# Patient Record
Sex: FEMALE | Race: WHITE | NOT HISPANIC OR LATINO | Employment: OTHER | URBAN - METROPOLITAN AREA
[De-identification: names, ages, dates, MRNs, and addresses within clinical notes are randomized per-mention and may not be internally consistent; named-entity substitution may affect disease eponyms.]

---

## 2017-12-08 LAB
EXT MICROALBUMIN URINE RANDOM: 7.7
HBA1C MFR BLD HPLC: 7.8 %

## 2018-03-12 LAB — HBA1C MFR BLD HPLC: 8.3 %

## 2018-06-11 LAB
EXT MICROALBUMIN URINE RANDOM: 4.9
HBA1C MFR BLD HPLC: 8.6 %

## 2018-09-05 LAB — HBA1C MFR BLD HPLC: 6.8 %

## 2018-12-20 LAB — HBA1C MFR BLD HPLC: 8.3 %

## 2019-01-24 ENCOUNTER — OFFICE VISIT (OUTPATIENT)
Dept: FAMILY MEDICINE CLINIC | Facility: CLINIC | Age: 59
End: 2019-01-24
Payer: COMMERCIAL

## 2019-01-24 VITALS
SYSTOLIC BLOOD PRESSURE: 110 MMHG | TEMPERATURE: 96.7 F | OXYGEN SATURATION: 97 % | BODY MASS INDEX: 36.18 KG/M2 | HEIGHT: 67 IN | WEIGHT: 230.5 LBS | DIASTOLIC BLOOD PRESSURE: 64 MMHG | HEART RATE: 65 BPM

## 2019-01-24 DIAGNOSIS — E11.9 TYPE 2 DIABETES MELLITUS WITHOUT COMPLICATION, WITH LONG-TERM CURRENT USE OF INSULIN (HCC): Primary | ICD-10-CM

## 2019-01-24 DIAGNOSIS — Z79.4 TYPE 2 DIABETES MELLITUS WITHOUT COMPLICATION, WITH LONG-TERM CURRENT USE OF INSULIN (HCC): Primary | ICD-10-CM

## 2019-01-24 DIAGNOSIS — Z23 NEED FOR INFLUENZA VACCINATION: ICD-10-CM

## 2019-01-24 DIAGNOSIS — Z12.39 SCREENING FOR BREAST CANCER: ICD-10-CM

## 2019-01-24 DIAGNOSIS — E78.5 HYPERLIPIDEMIA LDL GOAL <100: ICD-10-CM

## 2019-01-24 DIAGNOSIS — Z12.11 SCREENING FOR COLON CANCER: ICD-10-CM

## 2019-01-24 PROCEDURE — 99204 OFFICE O/P NEW MOD 45 MIN: CPT | Performed by: FAMILY MEDICINE

## 2019-01-24 PROCEDURE — 3725F SCREEN DEPRESSION PERFORMED: CPT | Performed by: FAMILY MEDICINE

## 2019-01-24 RX ORDER — BLOOD SUGAR DIAGNOSTIC
STRIP MISCELLANEOUS
Refills: 4 | COMMUNITY
Start: 2018-12-09 | End: 2020-07-20

## 2019-01-24 RX ORDER — INSULIN DETEMIR 100 [IU]/ML
INJECTION, SOLUTION SUBCUTANEOUS
Refills: 0 | COMMUNITY
Start: 2018-12-31 | End: 2019-02-05 | Stop reason: SDUPTHER

## 2019-01-24 RX ORDER — SITAGLIPTIN AND METFORMIN HYDROCHLORIDE 100; 1000 MG/1; MG/1
TABLET, FILM COATED, EXTENDED RELEASE ORAL
Refills: 6 | COMMUNITY
Start: 2019-01-09 | End: 2019-02-05 | Stop reason: SDUPTHER

## 2019-01-24 NOTE — ASSESSMENT & PLAN NOTE
The patient is absolutely not worried about her elevated LDL levels  She does not want to take a statin or any other medication to bring the level down  We did discuss that an elevated LDL over 100 in a patient with diabetes increases the risk for heart attack, stroke, kidney failure, among other things  At this point I am going to recheck the lipids in one year from December as there is no point in checking them every three months  It is not recommended to check the lipids every three months and she is not treating them so there is even less reason to do so

## 2019-01-24 NOTE — ASSESSMENT & PLAN NOTE
The patient's diabetes was uncontrolled when the A1c was checked in December  Had previously been well controlled with an A1c of 6 8 but prior to that she had been uncontrolled with A1cs over 8  For now she is continuing the same dosages of her medications and has made more diet changes after the new year  We will recheck the A1c at a visit in March  Her blood pressure is excellent, she does not have hypertension  That being said, she should be on a low dose of lisinopril for renal protection  She does not want to take any other medicines that she absolutely does not have to take  This is something that I discuss with her in the future  Her eye exam and foot exam are up-to-date  We will get the records from her previous physician  I will repeat a microalbumin in June as it will be a year at that point

## 2019-01-24 NOTE — PROGRESS NOTES
Subjective:   Chief Complaint   Patient presents with    New Patient     pt is here due to a change in insurance  She would like to establish care  Pt had just been seen in November at [facility]  Internal Medicine in [place] for her diabetes  Pt has no complaints  Patient ID: Rylan King is a 66 y o  female      The patient is new to the office, here to establish care  She was previously being seen at [facility]  Internal Medicine  She has a diagnosis of type 2 diabetes  She has recently changed insurances and now has [facility] which is why she is changing practice is  She says that her diabetes has been diagnosed since the early 2000s  She is currently on Janumet, regular insulin with meals depending on her sugars prior to the meal, and Levemir  She had been getting full lab work including A1c, CMP, and lipid panel along with a visit and foot exam every three months    She did bring her lab results from the past year  12/8/2017-A1c 7 8  3/12/2018 A1c 8 3  6/11/2018 A1c 8 6  9/5/2018 A1c 6 8  She did have an A1c again in December and she says it was 7 something but she does not remember the exact number and does not have the results printed out with her today    She has also had persistent LDL cholesterol levels  She does not want to take any medication for this  She says that this is not something that she and her previous doctor discussed  She says she did discuss at all with her chiropractor and her chiropractor was not worried about it  -151 since 12/2017  Her TG have been mildly elevated as well - 150-198    Her last microalbumin was in June 2018 and it was normal    She has had an eye exam recently with no retinopathy  Her foot exam is always normal, no neuropathy  Her creatinines in EGFR has always been normal, no nephropathy    She does work very hard to keep a diabetic diet          The following portions of the patient's history were reviewed and updated as appropriate: allergies, current medications, past family history, past medical history, past social history, past surgical history and problem list     Review of Systems      Objective:  Vitals:    01/24/19 0856   BP: 110/64   Pulse: 65   Temp: (!) 96 7 °F (35 9 °C)   SpO2: 97%   Weight: 105 kg (230 lb 8 oz)   Height: 5' 7 25" (1 708 m)      Physical Exam   Constitutional: She is oriented to person, place, and time  She appears well-developed and well-nourished  HENT:   Head: Normocephalic and atraumatic  Mouth/Throat: Oropharynx is clear and moist    Eyes: Conjunctivae and EOM are normal    Neck: Normal range of motion  Neck supple  No thyroid mass present  Cardiovascular: Normal rate, regular rhythm and normal heart sounds  Pulmonary/Chest: Effort normal and breath sounds normal  No respiratory distress  She has no wheezes  She has no rales  Abdominal: Normal appearance  Musculoskeletal: Normal range of motion  She exhibits no edema  Neurological: She is alert and oriented to person, place, and time  Skin: Skin is warm, dry and intact  Psychiatric: She has a normal mood and affect  Her behavior is normal  Judgment and thought content normal    Nursing note and vitals reviewed  Assessment/Plan:    Type 2 diabetes mellitus without complication, with long-term current use of insulin ([facility] [address] )  The patient's diabetes was uncontrolled when the A1c was checked in December  Had previously been well controlled with an A1c of 6 8 but prior to that she had been uncontrolled with A1cs over 8  For now she is continuing the same dosages of her medications and has made more diet changes after the new year  We will recheck the A1c at a visit in March  Her blood pressure is excellent, she does not have hypertension  That being said, she should be on a low dose of lisinopril for renal protection  She does not want to take any other medicines that she absolutely does not have to take    This is something that I discuss with her in the future  Her eye exam and foot exam are up-to-date  We will get the records from her previous physician  I will repeat a microalbumin in June as it will be a year at that point  Hyperlipidemia LDL goal <100  The patient is absolutely not worried about her elevated LDL levels  She does not want to take a statin or any other medication to bring the level down  We did discuss that an elevated LDL over 100 in a patient with diabetes increases the risk for heart attack, stroke, kidney failure, among other things  At this point I am going to recheck the lipids in one year from December as there is no point in checking them every three months  It is not recommended to check the lipids every three months and she is not treating them so there is even less reason to do so  Diagnoses and all orders for this visit:    Type 2 diabetes mellitus without complication, with long-term current use of insulin (HCC)  -     Comprehensive metabolic panel; Future  -     Hemoglobin A1c (w/out EAG);  Future    Hyperlipidemia LDL goal <100    Need for influenza vaccination  -     SYRINGE/SINGLE-DOSE VIAL: influenza vaccine, [id number], quadrivalent, 0 5 mL, preservative-free, for patients 3+ yr ([facility])    Screening for colon cancer  -     Occult Blood, Fecal, IA; Future  -     Occult Blood, Fecal, IA    Screening for breast cancer  -     Mammo screening bilateral w 3d & cad; Future

## 2019-02-04 RX ORDER — SITAGLIPTIN AND METFORMIN HYDROCHLORIDE 100; 1000 MG/1; MG/1
TABLET, FILM COATED, EXTENDED RELEASE ORAL
Refills: 6 | Status: CANCELLED | OUTPATIENT
Start: 2019-02-04

## 2019-02-04 RX ORDER — INSULIN DETEMIR 100 [IU]/ML
INJECTION, SOLUTION SUBCUTANEOUS
Qty: 5 PEN | Refills: 0 | Status: CANCELLED | OUTPATIENT
Start: 2019-02-04

## 2019-02-05 ENCOUNTER — TELEPHONE (OUTPATIENT)
Dept: FAMILY MEDICINE CLINIC | Facility: CLINIC | Age: 59
End: 2019-02-05

## 2019-02-05 DIAGNOSIS — Z79.4 CONTROLLED TYPE 2 DIABETES MELLITUS WITHOUT COMPLICATION, WITH LONG-TERM CURRENT USE OF INSULIN (HCC): Primary | ICD-10-CM

## 2019-02-05 DIAGNOSIS — Z79.4 TYPE 2 DIABETES MELLITUS WITHOUT COMPLICATION, WITH LONG-TERM CURRENT USE OF INSULIN (HCC): Primary | ICD-10-CM

## 2019-02-05 DIAGNOSIS — E11.9 TYPE 2 DIABETES MELLITUS WITHOUT COMPLICATION, WITH LONG-TERM CURRENT USE OF INSULIN (HCC): Primary | ICD-10-CM

## 2019-02-05 DIAGNOSIS — E11.9 CONTROLLED TYPE 2 DIABETES MELLITUS WITHOUT COMPLICATION, WITH LONG-TERM CURRENT USE OF INSULIN (HCC): Primary | ICD-10-CM

## 2019-02-05 RX ORDER — METFORMIN HYDROCHLORIDE 500 MG/1
1000 TABLET, EXTENDED RELEASE ORAL
Qty: 180 TABLET | Refills: 3 | Status: SHIPPED | OUTPATIENT
Start: 2019-02-05 | End: 2020-02-03

## 2019-02-05 RX ORDER — SITAGLIPTIN AND METFORMIN HYDROCHLORIDE 100; 1000 MG/1; MG/1
1 TABLET, FILM COATED, EXTENDED RELEASE ORAL DAILY
Qty: 90 TABLET | Refills: 3 | Status: SHIPPED | OUTPATIENT
Start: 2019-02-05 | End: 2019-07-09 | Stop reason: ALTCHOICE

## 2019-02-05 RX ORDER — INSULIN DETEMIR 100 [IU]/ML
40 INJECTION, SOLUTION SUBCUTANEOUS DAILY
Qty: 5 PEN | Refills: 5 | Status: SHIPPED | OUTPATIENT
Start: 2019-02-05 | End: 2019-04-16

## 2019-02-05 NOTE — TELEPHONE ENCOUNTER
Patient's Janumet under prior auth currently (pending - submitted via cover my meds)  Please send an alternative to [facility] for patient so that she has medication  They did approve her levemir so she does have insulin  Thanks

## 2019-02-05 NOTE — TELEPHONE ENCOUNTER
Patient needing refills on her Levemir and Janumet today as she is leaving on a trip tomorrow  She would like them sent to [facility]  Please advise, thanks

## 2019-03-15 DIAGNOSIS — Z79.4 TYPE 2 DIABETES MELLITUS WITHOUT COMPLICATION, WITH LONG-TERM CURRENT USE OF INSULIN (HCC): Primary | ICD-10-CM

## 2019-03-15 DIAGNOSIS — E11.9 TYPE 2 DIABETES MELLITUS WITHOUT COMPLICATION, WITH LONG-TERM CURRENT USE OF INSULIN (HCC): Primary | ICD-10-CM

## 2019-03-15 NOTE — PROGRESS NOTES
Please call and let the patient know that I received a notification from her pharmacy that with the insurance she currently has Levemir will not be covered  Jonh Dunne is the medication that will be covered so I sent it  It should be similar

## 2019-03-18 ENCOUNTER — TELEPHONE (OUTPATIENT)
Dept: FAMILY MEDICINE CLINIC | Facility: CLINIC | Age: 59
End: 2019-03-18

## 2019-04-11 DIAGNOSIS — Z11.59 ENCOUNTER FOR HEPATITIS C SCREENING TEST FOR LOW RISK PATIENT: Primary | ICD-10-CM

## 2019-04-16 ENCOUNTER — TELEPHONE (OUTPATIENT)
Dept: FAMILY MEDICINE CLINIC | Facility: CLINIC | Age: 59
End: 2019-04-16

## 2019-04-16 DIAGNOSIS — Z79.4 TYPE 2 DIABETES MELLITUS WITHOUT COMPLICATION, WITH LONG-TERM CURRENT USE OF INSULIN (HCC): ICD-10-CM

## 2019-04-16 DIAGNOSIS — E11.9 TYPE 2 DIABETES MELLITUS WITHOUT COMPLICATION, WITH LONG-TERM CURRENT USE OF INSULIN (HCC): ICD-10-CM

## 2019-04-16 DIAGNOSIS — E11.9 TYPE 2 DIABETES MELLITUS WITHOUT COMPLICATION, WITH LONG-TERM CURRENT USE OF INSULIN (HCC): Primary | ICD-10-CM

## 2019-04-16 DIAGNOSIS — Z79.4 TYPE 2 DIABETES MELLITUS WITHOUT COMPLICATION, WITH LONG-TERM CURRENT USE OF INSULIN (HCC): Primary | ICD-10-CM

## 2019-04-17 ENCOUNTER — TELEPHONE (OUTPATIENT)
Dept: FAMILY MEDICINE CLINIC | Facility: CLINIC | Age: 59
End: 2019-04-17

## 2019-04-18 DIAGNOSIS — Z79.4 TYPE 2 DIABETES MELLITUS WITHOUT COMPLICATION, WITH LONG-TERM CURRENT USE OF INSULIN (HCC): Primary | ICD-10-CM

## 2019-04-18 DIAGNOSIS — E11.9 TYPE 2 DIABETES MELLITUS WITHOUT COMPLICATION, WITH LONG-TERM CURRENT USE OF INSULIN (HCC): Primary | ICD-10-CM

## 2019-04-22 ENCOUNTER — OFFICE VISIT (OUTPATIENT)
Dept: FAMILY MEDICINE CLINIC | Facility: CLINIC | Age: 59
End: 2019-04-22
Payer: COMMERCIAL

## 2019-04-22 VITALS
HEART RATE: 67 BPM | HEIGHT: 67 IN | TEMPERATURE: 97.4 F | OXYGEN SATURATION: 98 % | DIASTOLIC BLOOD PRESSURE: 78 MMHG | SYSTOLIC BLOOD PRESSURE: 110 MMHG | WEIGHT: 231.5 LBS | BODY MASS INDEX: 36.34 KG/M2

## 2019-04-22 DIAGNOSIS — Z79.4 TYPE 2 DIABETES MELLITUS WITHOUT COMPLICATION, WITH LONG-TERM CURRENT USE OF INSULIN (HCC): Primary | ICD-10-CM

## 2019-04-22 DIAGNOSIS — F43.12 CHRONIC POST-TRAUMATIC STRESS DISORDER (PTSD): ICD-10-CM

## 2019-04-22 DIAGNOSIS — Z13.29 SCREENING FOR THYROID DISORDER: ICD-10-CM

## 2019-04-22 DIAGNOSIS — E78.5 HYPERLIPIDEMIA LDL GOAL <100: ICD-10-CM

## 2019-04-22 DIAGNOSIS — Z13.0 SCREENING, ANEMIA, DEFICIENCY, IRON: ICD-10-CM

## 2019-04-22 DIAGNOSIS — E66.9 OBESITY (BMI 35.0-39.9 WITHOUT COMORBIDITY): ICD-10-CM

## 2019-04-22 DIAGNOSIS — E11.9 TYPE 2 DIABETES MELLITUS WITHOUT COMPLICATION, WITH LONG-TERM CURRENT USE OF INSULIN (HCC): Primary | ICD-10-CM

## 2019-04-22 DIAGNOSIS — Z13.220 SCREENING FOR LIPID DISORDERS: ICD-10-CM

## 2019-04-22 PROCEDURE — 99214 OFFICE O/P EST MOD 30 MIN: CPT | Performed by: FAMILY MEDICINE

## 2019-04-22 PROCEDURE — 1036F TOBACCO NON-USER: CPT | Performed by: FAMILY MEDICINE

## 2019-04-22 PROCEDURE — 3008F BODY MASS INDEX DOCD: CPT | Performed by: FAMILY MEDICINE

## 2019-07-02 ENCOUNTER — CLINICAL SUPPORT (OUTPATIENT)
Dept: FAMILY MEDICINE CLINIC | Facility: CLINIC | Age: 59
End: 2019-07-02
Payer: COMMERCIAL

## 2019-07-02 DIAGNOSIS — Z11.59 ENCOUNTER FOR HEPATITIS C SCREENING TEST FOR LOW RISK PATIENT: ICD-10-CM

## 2019-07-02 DIAGNOSIS — Z79.4 TYPE 2 DIABETES MELLITUS WITHOUT COMPLICATION, WITH LONG-TERM CURRENT USE OF INSULIN (HCC): Primary | ICD-10-CM

## 2019-07-02 DIAGNOSIS — E11.9 TYPE 2 DIABETES MELLITUS WITHOUT COMPLICATION, WITH LONG-TERM CURRENT USE OF INSULIN (HCC): ICD-10-CM

## 2019-07-02 DIAGNOSIS — Z13.0 SCREENING, ANEMIA, DEFICIENCY, IRON: ICD-10-CM

## 2019-07-02 DIAGNOSIS — Z79.4 TYPE 2 DIABETES MELLITUS WITHOUT COMPLICATION, WITH LONG-TERM CURRENT USE OF INSULIN (HCC): ICD-10-CM

## 2019-07-02 DIAGNOSIS — E78.5 HYPERLIPIDEMIA LDL GOAL <100: Primary | ICD-10-CM

## 2019-07-02 DIAGNOSIS — E11.9 TYPE 2 DIABETES MELLITUS WITHOUT COMPLICATION, WITH LONG-TERM CURRENT USE OF INSULIN (HCC): Primary | ICD-10-CM

## 2019-07-02 DIAGNOSIS — Z13.29 SCREENING FOR THYROID DISORDER: ICD-10-CM

## 2019-07-02 PROCEDURE — 36415 COLL VENOUS BLD VENIPUNCTURE: CPT

## 2019-07-02 RX ORDER — PEN NEEDLE, DIABETIC 31 GX5/16"
NEEDLE, DISPOSABLE MISCELLANEOUS
Qty: 100 EACH | Refills: 5 | Status: SHIPPED | OUTPATIENT
Start: 2019-07-02 | End: 2019-10-07 | Stop reason: SDUPTHER

## 2019-07-03 LAB
ALBUMIN SERPL-MCNC: 4.3 G/DL (ref 3.5–5.5)
ALBUMIN/GLOB SERPL: 1.6 {RATIO} (ref 1.2–2.2)
ALP SERPL-CCNC: 58 IU/L (ref 39–117)
ALT SERPL-CCNC: 14 IU/L (ref 0–32)
AST SERPL-CCNC: 15 IU/L (ref 0–40)
BASOPHILS # BLD AUTO: 0.1 X10E3/UL (ref 0–0.2)
BASOPHILS NFR BLD AUTO: 1 %
BILIRUB SERPL-MCNC: 0.4 MG/DL (ref 0–1.2)
BUN SERPL-MCNC: 10 MG/DL (ref 6–24)
BUN/CREAT SERPL: 13 (ref 9–23)
CALCIUM SERPL-MCNC: 9.6 MG/DL (ref 8.7–10.2)
CHLORIDE SERPL-SCNC: 106 MMOL/L (ref 96–106)
CHOLEST SERPL-MCNC: 233 MG/DL (ref 100–199)
CO2 SERPL-SCNC: 24 MMOL/L (ref 20–29)
CREAT SERPL-MCNC: 0.78 MG/DL (ref 0.57–1)
EOSINOPHIL # BLD AUTO: 0.2 X10E3/UL (ref 0–0.4)
EOSINOPHIL NFR BLD AUTO: 3 %
ERYTHROCYTE [DISTWIDTH] IN BLOOD BY AUTOMATED COUNT: 13.8 % (ref 12.3–15.4)
EST. AVERAGE GLUCOSE BLD GHB EST-MCNC: 203 MG/DL
GLOBULIN SER-MCNC: 2.7 G/DL (ref 1.5–4.5)
GLUCOSE SERPL-MCNC: 173 MG/DL (ref 65–99)
HBA1C MFR BLD: 8.7 % (ref 4.8–5.6)
HCT VFR BLD AUTO: 40.5 % (ref 34–46.6)
HCV AB S/CO SERPL IA: <0.1 S/CO RATIO (ref 0–0.9)
HDLC SERPL-MCNC: 57 MG/DL
HGB BLD-MCNC: 13.7 G/DL (ref 11.1–15.9)
IMM GRANULOCYTES # BLD: 0 X10E3/UL (ref 0–0.1)
IMM GRANULOCYTES NFR BLD: 1 %
LDLC SERPL CALC-MCNC: 149 MG/DL (ref 0–99)
LDLC/HDLC SERPL: 2.6 RATIO (ref 0–3.2)
LYMPHOCYTES # BLD AUTO: 2.2 X10E3/UL (ref 0.7–3.1)
LYMPHOCYTES NFR BLD AUTO: 39 %
MCH RBC QN AUTO: 28.6 PG (ref 26.6–33)
MCHC RBC AUTO-ENTMCNC: 33.8 G/DL (ref 31.5–35.7)
MCV RBC AUTO: 85 FL (ref 79–97)
MONOCYTES # BLD AUTO: 0.3 X10E3/UL (ref 0.1–0.9)
MONOCYTES NFR BLD AUTO: 6 %
NEUTROPHILS # BLD AUTO: 2.9 X10E3/UL (ref 1.4–7)
NEUTROPHILS NFR BLD AUTO: 50 %
PLATELET # BLD AUTO: 188 X10E3/UL (ref 150–450)
POTASSIUM SERPL-SCNC: 4.2 MMOL/L (ref 3.5–5.2)
PROT SERPL-MCNC: 7 G/DL (ref 6–8.5)
RBC # BLD AUTO: 4.79 X10E6/UL (ref 3.77–5.28)
SL AMB EGFR AFRICAN AMERICAN: 98 ML/MIN/1.73
SL AMB EGFR NON AFRICAN AMERICAN: 85 ML/MIN/1.73
SL AMB VLDL CHOLESTEROL CALC: 27 MG/DL (ref 5–40)
SODIUM SERPL-SCNC: 141 MMOL/L (ref 134–144)
TRIGL SERPL-MCNC: 133 MG/DL (ref 0–149)
TSH SERPL DL<=0.005 MIU/L-ACNC: 1.24 UIU/ML (ref 0.45–4.5)
WBC # BLD AUTO: 5.7 X10E3/UL (ref 3.4–10.8)

## 2019-07-09 ENCOUNTER — OFFICE VISIT (OUTPATIENT)
Dept: FAMILY MEDICINE CLINIC | Facility: CLINIC | Age: 59
End: 2019-07-09
Payer: COMMERCIAL

## 2019-07-09 VITALS
SYSTOLIC BLOOD PRESSURE: 112 MMHG | TEMPERATURE: 97.9 F | WEIGHT: 236.25 LBS | HEIGHT: 67 IN | DIASTOLIC BLOOD PRESSURE: 62 MMHG | HEART RATE: 75 BPM | OXYGEN SATURATION: 97 % | BODY MASS INDEX: 37.08 KG/M2

## 2019-07-09 DIAGNOSIS — Z01.419 ENCOUNTER FOR GYNECOLOGICAL EXAMINATION WITHOUT ABNORMAL FINDING: ICD-10-CM

## 2019-07-09 DIAGNOSIS — E11.9 TYPE 2 DIABETES MELLITUS WITHOUT COMPLICATION, WITH LONG-TERM CURRENT USE OF INSULIN (HCC): Primary | ICD-10-CM

## 2019-07-09 DIAGNOSIS — E78.5 HYPERLIPIDEMIA LDL GOAL <100: ICD-10-CM

## 2019-07-09 DIAGNOSIS — Z79.4 TYPE 2 DIABETES MELLITUS WITHOUT COMPLICATION, WITH LONG-TERM CURRENT USE OF INSULIN (HCC): Primary | ICD-10-CM

## 2019-07-09 DIAGNOSIS — R39.89 URINE DISCOLORATION: ICD-10-CM

## 2019-07-09 DIAGNOSIS — L30.9 DERMATITIS OF RIGHT FOOT: ICD-10-CM

## 2019-07-09 DIAGNOSIS — Z12.11 SCREEN FOR COLON CANCER: ICD-10-CM

## 2019-07-09 DIAGNOSIS — Z12.4 SCREENING FOR CERVICAL CANCER: ICD-10-CM

## 2019-07-09 PROCEDURE — 3008F BODY MASS INDEX DOCD: CPT | Performed by: FAMILY MEDICINE

## 2019-07-09 PROCEDURE — 1036F TOBACCO NON-USER: CPT | Performed by: FAMILY MEDICINE

## 2019-07-09 PROCEDURE — 99396 PREV VISIT EST AGE 40-64: CPT | Performed by: FAMILY MEDICINE

## 2019-07-09 PROCEDURE — 99214 OFFICE O/P EST MOD 30 MIN: CPT | Performed by: FAMILY MEDICINE

## 2019-07-09 RX ORDER — KETOCONAZOLE 20 MG/G
CREAM TOPICAL DAILY
Qty: 60 G | Refills: 0 | Status: SHIPPED | OUTPATIENT
Start: 2019-07-09 | End: 2020-12-03

## 2019-07-09 NOTE — PROGRESS NOTES
Yrn Coreas is a 66 y o  female here for a routine gynecologic exam       Current complaints:   None  She has some dryness - but uses something over the counter and it is helping  Gynecologic History  No LMP recorded  Patient is postmenopausal   Contraception: none  Last Pap: year ago  Results were: normal  Last mammogram: years ago   Results were: normal  Last colonoscopy: never  Results were: she was given a FIT kit months ago but has not done it    Obstetric History  OB History        The following portions of the patient's history were reviewed and updated as appropriate: allergies, current medications, past family history, past medical history, past social history, past surgical history and problem list     Review of Systems   Constitutional: Negative for unexpected weight change  Genitourinary: Negative for difficulty urinating, dyspareunia, dysuria, frequency, menstrual problem, pelvic pain, vaginal bleeding, vaginal discharge and vaginal pain  Skin: Negative for rash  Objective    Vitals:    07/09/19 1051   BP: 112/62   Pulse: 75   Temp: 97 9 °F (36 6 °C)   SpO2: 97%   Weight: 107 kg (236 lb 4 oz)   Height: 5' 7 25" (1 708 m)       Physical Exam   Constitutional: She appears well-developed and well-nourished  Pulmonary/Chest: Effort normal  Right breast exhibits no mass, no nipple discharge, no skin change and no tenderness  Left breast exhibits no mass, no nipple discharge, no skin change and no tenderness  No breast swelling, discharge or bleeding  Breasts are symmetrical    Genitourinary: Vagina normal and uterus normal  No breast swelling, discharge or bleeding  Pelvic exam was performed with patient supine  There is no rash, tenderness or lesion on the right labia  There is no rash, tenderness or lesion on the left labia  Cervix exhibits no motion tenderness, no discharge and no friability  Right adnexum displays no mass, no tenderness and no fullness   Left adnexum displays no mass, no tenderness and no fullness  Assessment     Healthy female exam       Plan     Education reviewed: low fat, low cholesterol diet and skin cancer screening  Follow up in: 3 months

## 2019-07-09 NOTE — ASSESSMENT & PLAN NOTE
The patient's diabetes is uncontrolled  It is difficult to treat her in the sense that she feels she understands everything completely but really does not seem to thoroughly understand diabetes  She admits she likes to [facility] but she is not doing this very well  I suggested she take 6-8 units of insulin before each meal regardless of what her sugar is and continue the current dose of Basaglar  I am going to refer her to endocrinology because I think she would benefit significantly from a continuous glucose monitor  She needs to see that her sugars are high because she is absolutely surprised about her A1c today

## 2019-07-09 NOTE — ASSESSMENT & PLAN NOTE
The patient's LDL is 149 it should be less than 100  It has been lower in the past and I would think that as her sugars come down this would come down as well though I do not think she is ever going to get less than 100 without medication  She absolutely refuses to take a statin

## 2019-07-10 LAB
ALBUMIN/CREAT UR: 12.5 MG/G CREAT (ref 0–30)
APPEARANCE UR: ABNORMAL
BILIRUB UR QL STRIP: NEGATIVE
COLOR UR: YELLOW
CREAT UR-MCNC: 227.2 MG/DL
GLUCOSE UR QL: ABNORMAL
HGB UR QL STRIP: NEGATIVE
KETONES UR QL STRIP: NEGATIVE
LEUKOCYTE ESTERASE UR QL STRIP: NEGATIVE
MICRO URNS: ABNORMAL
MICROALBUMIN UR-MCNC: 28.5 UG/ML
NITRITE UR QL STRIP: NEGATIVE
PH UR STRIP: 5 [PH] (ref 5–7.5)
PROT UR QL STRIP: NEGATIVE
SP GR UR: >=1.03 (ref 1–1.03)
UROBILINOGEN UR STRIP-ACNC: 0.2 MG/DL (ref 0.2–1)

## 2019-07-11 LAB
CYTOLOGIST CVX/VAG CYTO: NORMAL
DX ICD CODE: NORMAL
HPV I/H RISK 1 DNA CVX QL PROBE+SIG AMP: NEGATIVE
OTHER STN SPEC: NORMAL
PATH REPORT.FINAL DX SPEC: NORMAL
SL AMB NOTE:: NORMAL
SL AMB SPECIMEN ADEQUACY: NORMAL

## 2019-08-20 DIAGNOSIS — E11.9 TYPE 2 DIABETES MELLITUS WITHOUT COMPLICATION, WITH LONG-TERM CURRENT USE OF INSULIN (HCC): ICD-10-CM

## 2019-08-20 DIAGNOSIS — Z79.4 TYPE 2 DIABETES MELLITUS WITHOUT COMPLICATION, WITH LONG-TERM CURRENT USE OF INSULIN (HCC): ICD-10-CM

## 2019-10-07 ENCOUNTER — OFFICE VISIT (OUTPATIENT)
Dept: FAMILY MEDICINE CLINIC | Facility: CLINIC | Age: 59
End: 2019-10-07
Payer: COMMERCIAL

## 2019-10-07 VITALS
BODY MASS INDEX: 38.59 KG/M2 | OXYGEN SATURATION: 98 % | TEMPERATURE: 98.1 F | DIASTOLIC BLOOD PRESSURE: 80 MMHG | WEIGHT: 248.25 LBS | SYSTOLIC BLOOD PRESSURE: 119 MMHG | HEART RATE: 57 BPM

## 2019-10-07 DIAGNOSIS — Z79.4 TYPE 2 DIABETES MELLITUS WITHOUT COMPLICATION, WITH LONG-TERM CURRENT USE OF INSULIN (HCC): ICD-10-CM

## 2019-10-07 DIAGNOSIS — Z23 NEED FOR INFLUENZA VACCINATION: ICD-10-CM

## 2019-10-07 DIAGNOSIS — B37.3 VAGINAL YEAST INFECTION: Primary | ICD-10-CM

## 2019-10-07 DIAGNOSIS — E11.9 TYPE 2 DIABETES MELLITUS WITHOUT COMPLICATION, WITH LONG-TERM CURRENT USE OF INSULIN (HCC): ICD-10-CM

## 2019-10-07 LAB — SL AMB POCT HEMOGLOBIN AIC: 8.8 (ref ?–6.5)

## 2019-10-07 PROCEDURE — 99214 OFFICE O/P EST MOD 30 MIN: CPT | Performed by: FAMILY MEDICINE

## 2019-10-07 PROCEDURE — 83036 HEMOGLOBIN GLYCOSYLATED A1C: CPT | Performed by: FAMILY MEDICINE

## 2019-10-07 RX ORDER — FLUCONAZOLE 150 MG/1
150 TABLET ORAL WEEKLY
Qty: 3 TABLET | Refills: 0 | Status: SHIPPED | OUTPATIENT
Start: 2019-10-07 | End: 2019-10-22

## 2019-10-07 RX ORDER — LANCETS
EACH MISCELLANEOUS
Qty: 100 EACH | Refills: 5 | Status: SHIPPED | OUTPATIENT
Start: 2019-10-07 | End: 2020-07-20

## 2019-10-07 RX ORDER — BLOOD-GLUCOSE METER
EACH MISCELLANEOUS 4 TIMES DAILY
Qty: 1 KIT | Refills: 0 | Status: SHIPPED | OUTPATIENT
Start: 2019-10-07 | End: 2020-07-20

## 2019-10-07 NOTE — PATIENT INSTRUCTIONS
Vulvovaginal Candidiasis   WHAT YOU NEED TO KNOW:   Vulvovaginal candidiasis, or yeast infection, is a common vaginal infection  Vulvovaginal candidiasis is caused by a fungus, or yeast-like germ  Fungi are normally found in your vagina  When there are too many fungi, it can cause an infection  DISCHARGE INSTRUCTIONS:   Return to the emergency department if:   · You have fever and chills  · You are bleeding from your vagina and it is not your monthly period  · You develop abdominal or pelvic pain  Contact your healthcare provider if:   · Your signs and symptoms get worse, even after treatment  · You have questions or concerns about your condition or care  Medicines:   · Medicines  help treat the fungal infection and decrease inflammation  The medicine may be a pill, topical cream, or vaginal suppository  · Take your medicine as directed  Contact your healthcare provider if you think your medicine is not helping or if you have side effects  Tell him of her if you are allergic to any medicine  Keep a list of the medicines, vitamins, and herbs you take  Include the amounts, and when and why you take them  Bring the list or the pill bottles to follow-up visits  Carry your medicine list with you in case of an emergency  Manage your symptoms:   · Wear cotton underwear  · Keep the vaginal area clean and dry  · Do not have sex until your symptoms are gone  · Do not douche  · Do not use feminine hygiene sprays, powders, or bubble bath  Prevent another infection:   · Take showers instead of baths  · Eat yogurt  · Limit the amount of alcohol you drink'    · Control your blood sugar if you are diabetic  · Limit your time in hot tubs  Follow up with your healthcare provider as directed:  Write down your questions so you remember to ask them during your visits     © 2017 [employer]ios Information is for End User's use only and may not be sold, redistributed or otherwise used for commercial purposes  All illustrations and images included in CareNotes® are the copyrighted property of A D A M , Inc  or Amilcar Herrera  The above information is an  only  It is not intended as medical advice for individual conditions or treatments  Talk to your doctor, nurse or pharmacist before following any medical regimen to see if it is safe and effective for you

## 2019-10-07 NOTE — PROGRESS NOTES
Subjective:   Chief Complaint   Patient presents with    Vaginitis     pt here today with itching  Pt states she does have discharge, but it does not have a smell and it is a light discharge  Pt started with ears itching about a week ago   Medication Refill     pt is wondering if she can have a new test kit  FBW complete 7/2/19  Last A1C was 8 7 on 7/2/19  Pt states she had a thermography in place of her mammogram  Pt went to [facility] for her DM eye exam  Pt defers flu shot  A1C was 8 8 today  Cologuard will be sent  Patient ID: Nader Parekh is a 64 y o  female      The patient is here today complaining of a very itchy vaginal discharge  She has had trouble with discomfort in this area, which she felt was due to vaginal dryness, but over the weekend things got much worse  She is unable to wear any pants that or even remotely tight without itching a lot  She does have discharge though it is not necessarily thick and white  She has definitely had yeast infections in the past  This feels very much like a yeast infection to her  She also has not yet been able to get in see endocrinology  She had an appointment coming up that she made many months ago and was just called advised that it needed to be moved to January  She is very upset about this  She knows that her sugars are high   We want her to to a continuous glucose monitor to help her get more control of the diabetes  She has continued to gain weight on the insulin  Her A1c today is 8 8 wears three months ago was 8 7        The following portions of the patient's history were reviewed and updated as appropriate: allergies, current medications, past family history, past medical history, past social history, past surgical history and problem list     Review of Systems      Objective:  Vitals:    10/07/19 1048   BP: 119/80   Pulse: 57   Temp: 98 1 °F (36 7 °C)   SpO2: 98%   Weight: 113 kg (248 lb 4 oz)      Physical Exam   Constitutional: She is oriented to person, place, and time  She appears well-developed and well-nourished  No distress  Neurological: She is alert and oriented to person, place, and time  No cranial nerve deficit  Coordination normal    Skin: Skin is warm and dry  No rash noted  She is not diaphoretic  No erythema  Psychiatric: She has a normal mood and affect  Her behavior is normal  Judgment and thought content normal          Assessment/Plan:    Type 2 diabetes mellitus without complication, with long-term current use of insulin ([facility] [address] )  I am going to add Victoza at this time  She is already good with giving herself an injection and this would hopefully help her lose some weight as well  She will continue her current doses of insulin for now  She will continue metformin 1000 mg  She has taken 2000 mg in the past but has significant GI upset when she does so  I am going to reach out to endocrinology to see if we can get her set up with her continuous glucose monitor prior to her actual visit so we can get her controlled sooner rather than later  She was reminded she needs her diabetic eye exam     Lab Results   Component Value Date    HGBA1C 8 8 (A) 10/07/2019        Diagnoses and all orders for this visit:    Vaginal yeast infection  -     fluconazole (DIFLUCAN) 150 mg tablet; Take 1 tablet (150 mg total) by mouth once a week for 3 doses    Type 2 diabetes mellitus without complication, with long-term current use of insulin (HCC)  -     liraglutide (VICTOZA) injection; Inject 0 2 mL (1 2 mg total) under the skin daily Start with 0 6mg daily for 1 week  -     Blood Glucose Monitoring Suppl (ACCU-CHEK GUIDE) w/Device KIT; by Does not apply route 4 (four) times a day  -     glucose blood (ACCU-CHEK GUIDE) test strip; Use as instructed  -     Lancets (ACCU-CHEK MULTICLIX) lancets; Use as instructed  -     Insulin Pen Needle (B-D ULTRAFINE III SHORT PEN) 31G X 8 MM MISC;  Inject under the skin 5 (five) times a day  -     POCT hemoglobin A1c    Need for influenza vaccination  -     influenza vaccine, [id number], quadrivalent, recombinant, PF, 0 5 mL, for patients 18 yr+ (FLUBLOK)

## 2019-10-07 NOTE — ASSESSMENT & PLAN NOTE
I am going to add Victoza at this time  She is already good with giving herself an injection and this would hopefully help her lose some weight as well  She will continue her current doses of insulin for now  She will continue metformin 1000 mg  She has taken 2000 mg in the past but has significant GI upset when she does so  I am going to reach out to endocrinology to see if we can get her set up with her continuous glucose monitor prior to her actual visit so we can get her controlled sooner rather than later    She was reminded she needs her diabetic eye exam     Lab Results   Component Value Date    HGBA1C 8 8 (A) 10/07/2019

## 2019-10-23 ENCOUNTER — CONSULT (OUTPATIENT)
Dept: ENDOCRINOLOGY | Facility: HOSPITAL | Age: 59
End: 2019-10-23
Attending: FAMILY MEDICINE
Payer: COMMERCIAL

## 2019-10-23 VITALS
WEIGHT: 245 LBS | HEIGHT: 67 IN | HEART RATE: 68 BPM | BODY MASS INDEX: 38.45 KG/M2 | DIASTOLIC BLOOD PRESSURE: 76 MMHG | SYSTOLIC BLOOD PRESSURE: 114 MMHG

## 2019-10-23 DIAGNOSIS — E11.9 TYPE 2 DIABETES MELLITUS WITHOUT COMPLICATION, WITH LONG-TERM CURRENT USE OF INSULIN (HCC): ICD-10-CM

## 2019-10-23 DIAGNOSIS — E78.5 HYPERLIPIDEMIA LDL GOAL <100: Primary | ICD-10-CM

## 2019-10-23 DIAGNOSIS — Z79.4 TYPE 2 DIABETES MELLITUS WITHOUT COMPLICATION, WITH LONG-TERM CURRENT USE OF INSULIN (HCC): ICD-10-CM

## 2019-10-23 PROCEDURE — 99244 OFF/OP CNSLTJ NEW/EST MOD 40: CPT | Performed by: INTERNAL MEDICINE

## 2019-10-23 NOTE — PROGRESS NOTES
10/23/2019    Assessment/Plan      Diagnoses and all orders for this visit:    Hyperlipidemia LDL goal <100  -     Lipid Panel with Direct LDL reflex; Future  -     Lipid Panel with Direct LDL reflex    Type 2 diabetes mellitus without complication, with long-term current use of insulin ([facility] )  -     Ambulatory referral to Endocrinology  -     Hemoglobin A1C; Future  -     Comprehensive metabolic panel; Future  -     CBC and differential; Future  -     Microalbumin / creatinine urine ratio; Future  -     TSH, 3rd generation; Future  -     Hemoglobin A1C  -     Comprehensive metabolic panel  -     CBC and differential  -     Microalbumin / creatinine urine ratio  -     TSH, 3rd generation    Other orders  -     CRANBERRY PO; Take by mouth        Assessment/Plan:  1  Type 2 diabetes with long-term insulin use:  She does frustration in terms of weight gain while on insulin  I think starting Victoza was a good idea  I do not see a history of pancreatitis in her records  We discussed the risks and side effects look out for 1 year increasing her dose  I will increase that dose to 1 2 mg daily  I provided her a log sheet that she will send in blood sugars for review in about 1 week and we will see if we can adjust her insulin doses over time as well  Continue metformin as well right now  Follow-up in about 3 months with labs as ordered above just prior  2  Hyperlipidemia:  Check lipid panel before next appointment  CC: Diabetes Consult    History of Present Illness     HPI: Theophilus Soulier is a 64y o  year old female with type 2 diabetes for about 17 years  She is on oral agents and insulin at home and takes metformin XR 1000 mg daily with dinner, Victoza 0 6 mg daily, Basaglar 40 units daily as well as NovoLog  In the past, metformin dose was attempted to be increased but this caused gastrointestinal issues  She denies any polyuria, polydipsia, nocturia and blurry vision    She denies neuropathy, nephropathy and retinopathy  Hypoglycemic episodes: No      The patient's last eye exam was in Dec 2018  Blood Sugar/Glucometer/Pump/CGM review:  Glucometer download from 10/10 through 10/23 shows an average sugar 171 with 1 4 readings per day  Highs readings 267 Los as 1 1  Blood sugars generally are well controlled in the morning  There are infrequent values later in the day  In the past, she has declined treatment for elevated cholesterol through her PCP  Review of Systems   Constitutional: Negative for fatigue  HENT: Negative for trouble swallowing and voice change  Eyes: Negative for visual disturbance  Respiratory: Negative for shortness of breath  Cardiovascular: Negative for palpitations and leg swelling  Gastrointestinal: Negative for abdominal pain, nausea and vomiting  Endocrine: Negative for polydipsia and polyuria  Musculoskeletal: Negative for arthralgias and myalgias  Skin: Negative for rash  Neurological: Negative for dizziness, tremors and weakness  Hematological: Negative for adenopathy  Psychiatric/Behavioral: Negative for agitation and confusion  Historical Information   Past Medical History:   Diagnosis Date    Anxiety     Condyloma acuminatum     Diabetes mellitus ([facility] ) 2002    type 2    Genital warts     Herpes simplex     HPV (human papilloma virus) infection      History reviewed  No pertinent surgical history    Social History   Social History     Substance and Sexual Activity   Alcohol Use Yes    Frequency: Monthly or less     Social History     Substance and Sexual Activity   Drug Use Yes    Types: Marijuana     Social History     Tobacco Use   Smoking Status Never Smoker   Smokeless Tobacco Never Used     Family History:   Family History   Problem Relation Age of Onset    Diabetes Father     Heart disease Father     ALS Father     Diabetes Brother     Alzheimer's disease Mother     Colon cancer Maternal Grandfather        Meds/Allergies Current Outpatient Medications   Medication Sig Dispense Refill    Blood Glucose Monitoring Suppl (ACCU-CHEK GUIDE) w/Device KIT by Does not apply route 4 (four) times a day 1 kit 0    Cholecalciferol (VITAMIN D PO) Take by mouth      CRANBERRY PO Take by mouth      ELDERBERRY PO Take by mouth      glucose blood (ACCU-CHEK GUIDE) test strip Use as instructed 100 each 5    insulin glargine (BASAGLAR KWIKPEN) 100 units/mL injection pen Inject 40 Units under the skin daily for 90 days 12 pen 3    Insulin Pen Needle (B-D ULTRAFINE III SHORT PEN) 31G X 8 MM MISC Inject under the skin 5 (five) times a day 150 each 5    ketoconazole (NIZORAL) 2 % cream Apply topically daily 60 g 0    Lancets (ACCU-CHEK MULTICLIX) lancets Use as instructed 100 each 5    liraglutide (VICTOZA) injection Inject 0 2 mL (1 2 mg total) under the skin daily Start with 0 6mg daily for 1 week 6 mL 1    metFORMIN (GLUCOPHAGE-XR) 500 mg 24 hr tablet Take 2 tablets (1,000 mg total) by mouth daily with dinner 180 tablet 3    Nutritional Supplements (DHEA PO) Take by mouth      Omega-3 Fatty Acids (FISH OIL PO) Take by mouth      QUERCETIN PO Take by mouth      VITAMIN E PO Take by mouth      CONTOUR NEXT TEST test strip TEST 3 TIMES A DAY  4    insulin aspart (NovoLOG) 100 units/mL injection Inject under the skin 3 (three) times a day before meals       No current facility-administered medications for this visit  No Known Allergies    Objective   Vitals: Blood pressure 114/76, pulse 68, height 5' 7 25" (1 708 m), weight 111 kg (245 lb)  Invasive Devices     None                 Physical Exam   Constitutional: She is oriented to person, place, and time  She appears well-developed and well-nourished  No distress  HENT:   Head: Normocephalic and atraumatic  Neck: Normal range of motion  Neck supple  No thyromegaly present  Cardiovascular: Normal rate and regular rhythm     Pulses:       Dorsalis pedis pulses are 2+ on the right side, and 2+ on the left side  Posterior tibial pulses are 2+ on the right side, and 2+ on the left side  Pulmonary/Chest: Effort normal and breath sounds normal  No respiratory distress  Abdominal: Soft  Bowel sounds are normal    Musculoskeletal: Normal range of motion  She exhibits no edema  Feet:   Right Foot:   Skin Integrity: Negative for ulcer, skin breakdown, erythema, warmth, callus or dry skin  Left Foot:   Skin Integrity: Negative for ulcer, skin breakdown, erythema, warmth, callus or dry skin  Neurological: She is alert and oriented to person, place, and time  She exhibits normal muscle tone  Skin: Skin is warm and dry  No rash noted  She is not diaphoretic  Psychiatric: She has a normal mood and affect  Her behavior is normal    Vitals reviewed  Patient's shoes and socks removed  Right Foot/Ankle   Right Foot Inspection  Skin Exam: skin normal and skin intact no dry skin, no warmth, no callus, no erythema, no maceration, no abnormal color, no pre-ulcer, no ulcer and no callus                            Sensory   Vibration: intact    Monofilament testing: intact  Vascular    The right DP pulse is 2+  The right PT pulse is 2+  Left Foot/Ankle  Left Foot Inspection  Skin Exam: skin normal and skin intactno dry skin, no warmth, no erythema, no maceration, normal color, no pre-ulcer, no ulcer and no callus                                         Sensory   Vibration: intact    Monofilament: intact  Vascular    The left DP pulse is 2+  The left PT pulse is 2+  Assign Risk Category:  No deformity present; No loss of protective sensation;        Risk: 0        The history was obtained from the review of the chart and from the patient      Lab Results:    Most recent Alc is  Lab Results   Component Value Date    HGBA1C 8 8 (A) 10/07/2019           No components found for: HA1C  No components found for: GLU    Lab Results   Component Value Date    CREATININE 0 78 07/02/2019    BUN 10 07/02/2019    K 4 2 07/02/2019     07/02/2019    CO2 24 07/02/2019     No results found for: EGFR  No components found for: [facility]    Lab Results   Component Value Date    HDL 57 07/02/2019    TRIG 133 07/02/2019       Lab Results   Component Value Date    ALT 14 07/02/2019    AST 15 07/02/2019       Lab Results   Component Value Date    TSH 1 240 07/02/2019       Recent Results (from the past 61464 hour(s))   Hemoglobin A1C    Collection Time: 09/05/18 12:00 AM   Result Value Ref Range    Hemoglobin A1C 6 8    Hemoglobin A1C    Collection Time: 12/20/18 12:00 AM   Result Value Ref Range    Hemoglobin A1C 8 3    TSH, 3rd generation with Free T4 reflex    Collection Time: 07/02/19 10:45 AM   Result Value Ref Range    TSH 1 240 0 450 - 4 500 uIU/mL   Lipid Panel with Direct LDL reflex    Collection Time: 07/02/19 10:45 AM   Result Value Ref Range    Cholesterol, Total 233 (H) 100 - 199 mg/dL    Triglycerides 133 0 - 149 mg/dL    HDL 57 >39 mg/dL    VLDL Cholesterol Calculated 27 5 - 40 mg/dL    LDL Direct 149 (H) 0 - 99 mg/dL    LDl/HDL Ratio 2 6 0 0 - 3 2 ratio   CBC and differential    Collection Time: 07/02/19 10:45 AM   Result Value Ref Range    White Blood Cell Count 5 7 3 4 - 10 8 x10E3/uL    Red Blood Cell Count 4 79 3 77 - 5 28 x10E6/uL    Hemoglobin 13 7 11 1 - 15 9 g/dL    HCT 40 5 34 0 - 46 6 %    MCV 85 79 - 97 fL    MCH 28 6 26 6 - 33 0 pg    MCHC 33 8 31 5 - 35 7 g/dL    RDW 13 8 12 3 - 15 4 %    Platelet Count 021 811 - 450 x10E3/uL    Neutrophils 50 Not Estab  %    Lymphocytes 39 Not Estab  %    Monocytes 6 Not Estab  %    Eosinophils 3 Not Estab  %    Basophils PCT 1 Not Estab  %    Neutrophils (Absolute) 2 9 1 4 - 7 0 x10E3/uL    Lymphocytes (Absolute) 2 2 0 7 - 3 1 x10E3/uL    Monocytes (Absolute) 0 3 0 1 - 0 9 x10E3/uL    Eosinophils (Absolute) 0 2 0 0 - 0 4 x10E3/uL    Basophils ABS 0 1 0 0 - 0 2 x10E3/uL    Immature Granulocytes 1 Not Estab  %    Immature Granulocytes (Absolute) 0 0 0 0 - 0 1 x10E3/uL   Hepatitis C antibody    Collection Time: 07/02/19 10:45 AM   Result Value Ref Range    HEP C AB <0 1 0 0 - 0 9 s/co ratio   HEMOGLOBIN A1C W/ EAG ESTIMATION    Collection Time: 07/02/19 10:45 AM   Result Value Ref Range    Hemoglobin A1C 8 7 (H) 4 8 - 5 6 %    Estimated Average Glucose 203 mg/dL   Comprehensive metabolic panel    Collection Time: 07/02/19 10:45 AM   Result Value Ref Range    Glucose, Random 173 (H) 65 - 99 mg/dL    BUN 10 6 - 24 mg/dL    Creatinine 0 78 0 57 - 1 00 mg/dL    eGFR Non African American 85 >59 mL/min/1 73    eGFR  98 >59 mL/min/1 73    SL AMB BUN/CREATININE RATIO 13 9 - 23    Sodium 141 134 - 144 mmol/L    Potassium 4 2 3 5 - 5 2 mmol/L    Chloride 106 96 - 106 mmol/L    CO2 24 20 - 29 mmol/L    CALCIUM 9 6 8 7 - 10 2 mg/dL    Protein, Total 7 0 6 0 - 8 5 g/dL    Albumin 4 3 3 5 - 5 5 g/dL    Globulin, Total 2 7 1 5 - 4 5 g/dL    Albumin/Globulin Ratio 1 6 1 2 - 2 2    TOTAL BILIRUBIN 0 4 0 0 - 1 2 mg/dL    Alk Phos Isoenzymes 58 39 - 117 IU/L    AST 15 0 - 40 IU/L    ALT 14 0 - 32 IU/L   Pap Lb, HPV-hr    Collection Time: 07/09/19 11:05 AM   Result Value Ref Range    Diagnosis: Comment     Specimen Adequacy Comment     Clinician Provided ICD10 Comment     Performed by: Comment      NILAM Gann Note: Comment     HPV, High Risk Only Negative Negative   UA (URINE) with reflex to Microscopic    Collection Time: 07/09/19  5:12 PM   Result Value Ref Range    Specific Gravity      >=1 030 (A) 1 005 - 1 030    Ph 5 0 5 0 - 7 5    Color UA Yellow Yellow    Urine Appearance Turbid (A) Clear    Leukocyte Esterase Negative Negative    Protein Negative Negative/Trace    Glucose, 24 HR Urine 2+ (A) Negative    Ketone, Urine Negative Negative    Blood, Urine Negative Negative    Bilirubin, Urine Negative Negative    Urobilinogen Urine 0 2 0 2 - 1 0 mg/dL    SL AMB NITRITES URINE, QUAL   Negative Negative    Microscopic Examination Comment    Microalbumin / creatinine urine ratio    Collection Time: 07/09/19  5:12 PM   Result Value Ref Range    Creatinine, Urine 227 2 Not Estab  mg/dL    Microalbum  ,U,Random 28 5 Not Estab  ug/mL    Microalb/Creat Ratio 12 5 0 0 - 30 0 mg/g creat   POCT hemoglobin A1c    Collection Time: 10/07/19 12:13 PM   Result Value Ref Range    Hemoglobin A1C 8 8 (A) 6 5         Future Appointments   Date Time Provider Janet Saloni   4/8/2020  1:30 PM Val Mendoza MD [facility]       Portions of the record may have been created with voice recognition software  Occasional wrong word or "sound a like" substitutions may have occurred due to the inherent limitations of voice recognition software  Read the chart carefully and recognize, using context, where substitutions have occurred

## 2019-12-12 ENCOUNTER — TELEPHONE (OUTPATIENT)
Dept: FAMILY MEDICINE CLINIC | Facility: CLINIC | Age: 59
End: 2019-12-12

## 2019-12-12 NOTE — TELEPHONE ENCOUNTER
Called pt looking to see if she went to eye dr and if so where  Please let me know when she calls back

## 2020-02-03 DIAGNOSIS — E11.9 TYPE 2 DIABETES MELLITUS WITHOUT COMPLICATION, WITH LONG-TERM CURRENT USE OF INSULIN (HCC): ICD-10-CM

## 2020-02-03 DIAGNOSIS — Z79.4 TYPE 2 DIABETES MELLITUS WITHOUT COMPLICATION, WITH LONG-TERM CURRENT USE OF INSULIN (HCC): ICD-10-CM

## 2020-02-03 RX ORDER — METFORMIN HYDROCHLORIDE 500 MG/1
1000 TABLET, EXTENDED RELEASE ORAL
Qty: 60 TABLET | Refills: 3 | Status: SHIPPED | OUTPATIENT
Start: 2020-02-03 | End: 2020-05-27

## 2020-02-10 LAB — HBA1C MFR BLD HPLC: 10.1 %

## 2020-02-11 ENCOUNTER — OFFICE VISIT (OUTPATIENT)
Dept: ENDOCRINOLOGY | Facility: HOSPITAL | Age: 60
End: 2020-02-11
Payer: COMMERCIAL

## 2020-02-11 VITALS
BODY MASS INDEX: 38.88 KG/M2 | HEART RATE: 78 BPM | DIASTOLIC BLOOD PRESSURE: 70 MMHG | WEIGHT: 247.7 LBS | SYSTOLIC BLOOD PRESSURE: 112 MMHG | HEIGHT: 67 IN

## 2020-02-11 DIAGNOSIS — E78.5 HYPERLIPIDEMIA LDL GOAL <100: ICD-10-CM

## 2020-02-11 DIAGNOSIS — E55.9 VITAMIN D DEFICIENCY: ICD-10-CM

## 2020-02-11 DIAGNOSIS — Z79.4 TYPE 2 DIABETES MELLITUS WITHOUT COMPLICATION, WITH LONG-TERM CURRENT USE OF INSULIN (HCC): Primary | ICD-10-CM

## 2020-02-11 DIAGNOSIS — E11.9 TYPE 2 DIABETES MELLITUS WITHOUT COMPLICATION, WITH LONG-TERM CURRENT USE OF INSULIN (HCC): Primary | ICD-10-CM

## 2020-02-11 LAB
ALBUMIN SERPL-MCNC: 4.5 G/DL (ref 3.8–4.9)
ALBUMIN/CREAT UR: 37 MG/G CREAT (ref 0–29)
ALBUMIN/GLOB SERPL: 2 {RATIO} (ref 1.2–2.2)
ALP SERPL-CCNC: 61 IU/L (ref 39–117)
ALT SERPL-CCNC: 21 IU/L (ref 0–32)
AST SERPL-CCNC: 14 IU/L (ref 0–40)
BASOPHILS # BLD AUTO: 0.1 X10E3/UL (ref 0–0.2)
BASOPHILS NFR BLD AUTO: 1 %
BILIRUB SERPL-MCNC: 0.4 MG/DL (ref 0–1.2)
BUN SERPL-MCNC: 13 MG/DL (ref 6–24)
BUN/CREAT SERPL: 18 (ref 9–23)
CALCIUM SERPL-MCNC: 9.5 MG/DL (ref 8.7–10.2)
CHLORIDE SERPL-SCNC: 99 MMOL/L (ref 96–106)
CHOLEST SERPL-MCNC: 214 MG/DL (ref 100–199)
CO2 SERPL-SCNC: 26 MMOL/L (ref 20–29)
CREAT SERPL-MCNC: 0.72 MG/DL (ref 0.57–1)
CREAT UR-MCNC: 142.5 MG/DL
EOSINOPHIL # BLD AUTO: 0.2 X10E3/UL (ref 0–0.4)
EOSINOPHIL NFR BLD AUTO: 3 %
ERYTHROCYTE [DISTWIDTH] IN BLOOD BY AUTOMATED COUNT: 13.6 % (ref 11.7–15.4)
EST. AVERAGE GLUCOSE BLD GHB EST-MCNC: 243 MG/DL
GLOBULIN SER-MCNC: 2.3 G/DL (ref 1.5–4.5)
GLUCOSE SERPL-MCNC: 161 MG/DL (ref 65–99)
HBA1C MFR BLD: 10.1 % (ref 4.8–5.6)
HCT VFR BLD AUTO: 39.6 % (ref 34–46.6)
HDLC SERPL-MCNC: 56 MG/DL
HGB BLD-MCNC: 13.3 G/DL (ref 11.1–15.9)
IMM GRANULOCYTES # BLD: 0 X10E3/UL (ref 0–0.1)
IMM GRANULOCYTES NFR BLD: 1 %
LDLC SERPL CALC-MCNC: 133 MG/DL (ref 0–99)
LDLC/HDLC SERPL: 2.4 RATIO (ref 0–3.2)
LYMPHOCYTES # BLD AUTO: 2.3 X10E3/UL (ref 0.7–3.1)
LYMPHOCYTES NFR BLD AUTO: 40 %
MCH RBC QN AUTO: 28.1 PG (ref 26.6–33)
MCHC RBC AUTO-ENTMCNC: 33.6 G/DL (ref 31.5–35.7)
MCV RBC AUTO: 84 FL (ref 79–97)
MICROALBUMIN UR-MCNC: 52.8 UG/ML
MONOCYTES # BLD AUTO: 0.5 X10E3/UL (ref 0.1–0.9)
MONOCYTES NFR BLD AUTO: 9 %
NEUTROPHILS # BLD AUTO: 2.7 X10E3/UL (ref 1.4–7)
NEUTROPHILS NFR BLD AUTO: 46 %
PLATELET # BLD AUTO: 171 X10E3/UL (ref 150–450)
POTASSIUM SERPL-SCNC: 4.3 MMOL/L (ref 3.5–5.2)
PROT SERPL-MCNC: 6.8 G/DL (ref 6–8.5)
RBC # BLD AUTO: 4.74 X10E6/UL (ref 3.77–5.28)
SL AMB EGFR AFRICAN AMERICAN: 108 ML/MIN/1.73
SL AMB EGFR NON AFRICAN AMERICAN: 93 ML/MIN/1.73
SL AMB VLDL CHOLESTEROL CALC: 25 MG/DL (ref 5–40)
SODIUM SERPL-SCNC: 139 MMOL/L (ref 134–144)
TRIGL SERPL-MCNC: 123 MG/DL (ref 0–149)
TSH SERPL DL<=0.005 MIU/L-ACNC: 1.96 UIU/ML (ref 0.45–4.5)
WBC # BLD AUTO: 5.8 X10E3/UL (ref 3.4–10.8)

## 2020-02-11 PROCEDURE — 3060F POS MICROALBUMINURIA REV: CPT | Performed by: NURSE PRACTITIONER

## 2020-02-11 PROCEDURE — 1036F TOBACCO NON-USER: CPT | Performed by: NURSE PRACTITIONER

## 2020-02-11 PROCEDURE — 99214 OFFICE O/P EST MOD 30 MIN: CPT | Performed by: NURSE PRACTITIONER

## 2020-02-11 PROCEDURE — 3008F BODY MASS INDEX DOCD: CPT | Performed by: NURSE PRACTITIONER

## 2020-02-11 NOTE — PATIENT INSTRUCTIONS
Be mindful of diet  Stay active and stay hydrated  Continue metformin  For now, continue Basaglar at current dose  Start Victoza to 1 2 mg  Follow-up with diabetes education for injection training  Check blood sugars at least twice daily at alternating times and send a record to the office in 2 weeks for review after starting Victoza  Consider starting statin therapy to help control your cholesterol, as discussed  Obtain an updated diabetic eye exam     Obtain lab work as prescribed  Supplement with 2000 units of vitamin D3 daily  As discussed, start Red Yeast Rice 600 mg twice daily

## 2020-02-11 NOTE — PROGRESS NOTES
Gissell Rodríguez 64 y o  female MRN: [id number]    Encounter: [id number]      Assessment/Plan     Assessment: This is a 64y o -year-old female with type 2 diabetes with longer term insulin use, hyperlipidemia and vitamin-D deficiency  Plan:  1  Type 2 diabetes with long-term insulin use:  Her hemoglobin A1c is elevated to 10 1  For now, she will continue Basaglar and Metformin at current dose  I have asked her to start Victoza as prescribed at last visit  She will follow up with diabetes education for injection training and also medical nutrition therapy for help with her diet  I have asked her to check her blood sugars twice daily at alternating times and send a record to the office 2 weeks after starting Victoza  Reviewed recognition and proper treatment of hypoglycemic episodes  I encouraged her to have a diabetic eye exam completed      2  Hyperlipidemia:  Recent total cholesterol is elevated at 214 with an LDL of 133  She is hesitant to start statin therapy despite explaining the benefits, risks and side effects  She will try red yeast rice 600 mg twice daily  Check fasting lipid panel prior to next visit  3   Vitamin-D deficiency:  I have encouraged her to supplement with 2000 units of vitamin D3 daily consistently  Will check 25 hydroxy vitamin-D level over time  CC:  Type 2 Diabetes follow-up    History of Present Illness     HPI:  64y o  year old female with type 2 diabetes for about 18 years  She is on oral agents and insulin at home and takes metformin XR 1000 mg daily with dinner, Victoza 1 2 mg daily and Basaglar 40 units  She states that she has not started Victoza since her last visit because she was unsure of how to perform the injections  She states that she has not been treating with NovoLog at meals for approximately 1-2 years  Her most recent hemoglobin A1c from February 10, 2020 is 10 1    In the past, metformin dose was attempted to be increased but this caused gastrointestinal issues  She denies any polyuria, polydipsia, nocturia and blurry vision  She denies neuropathy, nephropathy and retinopathy        Hypoglycemic episodes: No       The patient's last eye exam was in December 2018  Her most recent diabetic foot exam was performed at her office visit on October 23, 2019       Blood Sugar/Glucometer/Pump/CGM review:  Glucometer download from January 29 through February 11, 2020 shows an average 14 readings-1 0 readings per day with a average blood sugar of 171 mg/dL      In the past, she has declined treatment for elevated cholesterol through her PCP  Review of Systems   Constitutional: Negative  Negative for chills, fatigue and fever  HENT: Negative  Negative for trouble swallowing and voice change  Eyes: Negative  Negative for visual disturbance  Respiratory: Negative  Negative for chest tightness and shortness of breath  Cardiovascular: Negative  Negative for chest pain  Gastrointestinal: Negative  Negative for abdominal pain, constipation, diarrhea and vomiting  Endocrine: Negative for cold intolerance, heat intolerance, polydipsia, polyphagia and polyuria  Genitourinary: Negative  Musculoskeletal: Negative  Skin: Negative  Allergic/Immunologic: Negative  Neurological: Negative  Negative for dizziness, syncope, light-headedness and headaches  Hematological: Negative  Psychiatric/Behavioral: Negative  All other systems reviewed and are negative  Historical Information   Past Medical History:   Diagnosis Date    Anxiety     Condyloma acuminatum     [facility] mellitus [facility] ) 2002    type 2    Genital warts     Herpes simplex     HPV (human papilloma virus) infection      No past surgical history on file    Social History   Social History     Substance and Sexual Activity   Alcohol Use Yes    Frequency: Monthly or less     Social History     Substance and Sexual Activity   Drug Use Yes    Types: Marijuana Social History     Tobacco Use   Smoking Status Never Smoker   Smokeless Tobacco Never Used     Family History:   Family History   Problem Relation Age of Onset    Diabetes Father     Heart disease Father     ALS Father     Diabetes Brother     Alzheimer's disease Mother     Colon cancer Maternal Grandfather        Meds/Allergies   Current Outpatient Medications   Medication Sig Dispense Refill    Blood Glucose Monitoring Suppl (ACCU-CHEK GUIDE) w/Device KIT by Does not apply route 4 (four) times a day 1 kit 0    Cholecalciferol (VITAMIN D PO) Take by mouth      CONTOUR NEXT TEST test strip TEST 3 TIMES A DAY  4    CRANBERRY PO Take by mouth      ELDERBERRY PO Take by mouth      glucose blood (ACCU-CHEK GUIDE) test strip Use as instructed 100 each 5    insulin glargine (BASAGLAR KWIKPEN) 100 units/mL injection pen Inject 40 Units under the skin daily for 90 days 12 pen 3    Insulin Pen Needle (B-D ULTRAFINE III SHORT PEN) 31G X 8 MM MISC Inject under the skin 5 (five) times a day 150 each 5    ketoconazole (NIZORAL) 2 % cream Apply topically daily 60 g 0    Lancets (ACCU-CHEK MULTICLIX) lancets Use as instructed 100 each 5    liraglutide (VICTOZA) injection INJECT 0 2 ML (1 2 MG TOTAL) UNDER THE SKIN DAILY START WITH 0 6MG DAILY FOR 1 WEEK 6 pen 3    metFORMIN (GLUCOPHAGE-XR) 500 mg 24 hr tablet TAKE 2 TABLETS (1,000 MG TOTAL) BY MOUTH DAILY WITH DINNER 60 tablet 3    Nutritional Supplements (DHEA PO) Take by mouth      Omega-3 Fatty Acids (FISH OIL PO) Take by mouth      QUERCETIN PO Take by mouth      VITAMIN E PO Take by mouth      insulin aspart (NovoLOG) 100 units/mL injection Inject under the skin 3 (three) times a day before meals       No current facility-administered medications for this visit  No Known Allergies    Objective   Vitals: Blood pressure 112/70, pulse 78, height 5' 7 25" (1 708 m), weight 112 kg (247 lb 11 2 oz)      Physical Exam   Constitutional: She is oriented to person, place, and time  She appears well-developed and well-nourished  Obese   HENT:   Head: Normocephalic and atraumatic  Mouth/Throat: Oropharynx is clear and moist    Eyes: Pupils are equal, round, and reactive to light  Conjunctivae and EOM are normal    Neck: Normal range of motion  Neck supple  Cardiovascular: Normal rate, regular rhythm, normal heart sounds and intact distal pulses  Pulses are no weak pulses  Pulses:       Dorsalis pedis pulses are 1+ on the right side, and 1+ on the left side  Posterior tibial pulses are 1+ on the right side, and 1+ on the left side  Pulmonary/Chest: Effort normal and breath sounds normal    Abdominal: Soft  Bowel sounds are normal    Musculoskeletal: Normal range of motion  Feet:   Right Foot:   Skin Integrity: Negative for ulcer, skin breakdown, erythema, warmth, callus or dry skin  Left Foot:   Skin Integrity: Negative for ulcer, skin breakdown, erythema, warmth, callus or dry skin  Neurological: She is alert and oriented to person, place, and time  Skin: Skin is warm and dry  Dry skin   Psychiatric: She has a normal mood and affect  Her behavior is normal  Judgment and thought content normal    Vitals reviewed  Patient's shoes and socks removed  Right Foot/Ankle   Right Foot Inspection  Skin Exam: skin normal and skin intact no dry skin, no warmth, no callus, no erythema, no maceration, no abnormal color, no pre-ulcer, no ulcer and no callus                          Toe Exam: ROM and strength within normal limits  Sensory       Monofilament testing: intact  Vascular  Capillary refills: < 3 seconds  The right DP pulse is 1+  The right PT pulse is 1+       Left Foot/Ankle  Left Foot Inspection  Skin Exam: skin normal and skin intactno dry skin, no warmth, no erythema, no maceration, normal color, no pre-ulcer, no ulcer and no callus                         Toe Exam: ROM and strength within normal limits                   Sensory Monofilament: intact  Vascular  Capillary refills: < 3 seconds  The left DP pulse is 1+  The left PT pulse is 1+  Assign Risk Category:  No deformity present; No loss of protective sensation; No weak pulses       Risk: 0        Lab Results:   Lab Results   Component Value Date/Time    Hemoglobin A1C 8 8 (A) 10/07/2019 12:13 PM    Hemoglobin A1C 8 7 (H) 07/02/2019 10:45 AM    White Blood Cell Count 5 7 07/02/2019 10:45 AM    Hemoglobin 13 7 07/02/2019 10:45 AM    HCT 40 5 07/02/2019 10:45 AM    MCV 85 07/02/2019 10:45 AM    Platelet Count 977 76/69/1146 10:45 AM    BUN 10 07/02/2019 10:45 AM    Potassium 4 2 07/02/2019 10:45 AM    Chloride 106 07/02/2019 10:45 AM    CO2 24 07/02/2019 10:45 AM    Creatinine 0 78 07/02/2019 10:45 AM    AST 15 07/02/2019 10:45 AM    ALT 14 07/02/2019 10:45 AM    Albumin 4 3 07/02/2019 10:45 AM    Globulin, Total 2 7 07/02/2019 10:45 AM    HDL 57 07/02/2019 10:45 AM    Triglycerides 133 07/02/2019 10:45 AM     Portions of the record may have been created with voice recognition software  Occasional wrong word or "sound a like" substitutions may have occurred due to the inherent limitations of voice recognition software  Read the chart carefully and recognize, using context, where substitutions have occurred

## 2020-02-13 ENCOUNTER — TELEPHONE (OUTPATIENT)
Dept: ENDOCRINOLOGY | Facility: HOSPITAL | Age: 60
End: 2020-02-13

## 2020-02-13 ENCOUNTER — OFFICE VISIT (OUTPATIENT)
Dept: DIABETES SERVICES | Facility: HOSPITAL | Age: 60
End: 2020-02-13
Payer: COMMERCIAL

## 2020-02-13 VITALS — WEIGHT: 245 LBS | BODY MASS INDEX: 38.45 KG/M2 | HEIGHT: 67 IN

## 2020-02-13 DIAGNOSIS — Z79.4 TYPE 2 DIABETES MELLITUS WITHOUT COMPLICATION, WITH LONG-TERM CURRENT USE OF INSULIN (HCC): Primary | ICD-10-CM

## 2020-02-13 DIAGNOSIS — E11.9 TYPE 2 DIABETES MELLITUS WITHOUT COMPLICATION, WITH LONG-TERM CURRENT USE OF INSULIN (HCC): Primary | ICD-10-CM

## 2020-02-13 PROCEDURE — 97802 MEDICAL NUTRITION INDIV IN: CPT | Performed by: DIETITIAN, REGISTERED

## 2020-02-13 NOTE — TELEPHONE ENCOUNTER
Patient was seen for appt 2/11/20  She said at that time not all lab results were back at that time  Now that they are all back she would like to know results of all labs done 2/10/20   Thanks

## 2020-02-13 NOTE — PROGRESS NOTES
Medical Nutrition Therapy     Assessment    Visit Type: Initial visit  Chief complaint:  Type 2     HPI:  Met with Ynes "Quintonne Mulga" for initial MNT  States diabetes close to 18 years, completed initial Education at diagnosis  Her blood sugars have been good at times, but the past 2 rounds she has been over 10%  This has scared her in to being motivated to make changes  Has had a lack of compliance with medications over the years due to unclear instructions rather than noncompliance  She started taking her Victoza again this week as she was unsure of how to take it  Seems to be varying the time of day  Discussed it does not matter when she takes that as long as it is a consistent time  Reviewed possible side effects as well  She understands how to use the pen as she currently takes insulin  Basaglar 40-50 units at night, she fluctuates this dose based on what her blood sugars were that morning  Discussed she cannot take basal insulin this way, she will stick with 40 units  Previously was on mealtime insulin at 1 point but did not have a clear understanding of how to take it and it was eventually no longer discussed at visits with her previous doctor so she just stopped  testing sugars once a day, doing twice a day  Today was 91, usually <200, 180, 160, then later in the day  She wants to work on making a variety of changes, including starting to exercise again, better sleep patterns, more consistency in her eating and better vegetable intake  She sees that her blood sugars are better when she works on all of these things and she is motivated for change now  Together we discussed what foods contain CHO, reading a food label, serving sizes, and set a carb goal of 15-30g CHO/meal to promote weight loss with 15g snacks  Put together sample meals for Ynes's reference and evaluated Ynes's current eating plan  Good understanding, Sigrid Sharpe will call with questions or for more education   F/u linked with Megan Morales in May 2020         Ht Readings from Last 1 Encounters:   02/13/20 5' 7" (1 702 m)     Wt Readings from Last 3 Encounters:   02/13/20 111 kg (245 lb)   02/11/20 112 kg (247 lb 11 2 oz)   10/23/19 111 kg (245 lb)        Body mass index is 38 37 kg/m²  Lab Results   Component Value Date    HGBA1C 10 1 (H) 02/10/2020    HGBA1C 10 1 02/10/2020    HGBA1C 8 8 (A) 10/07/2019       No results found for: CHOL  Lab Results   Component Value Date    HDL 56 02/10/2020    HDL 57 07/02/2019     No results found for: [facility]  Lab Results   Component Value Date    TRIG 123 02/10/2020    TRIG 133 07/02/2019     No results found for: CHOLHDL    Weight Change: Yes has been gaining     Medical Diagnosis/ICD 10 Code:  E11 9    Barriers to Learning: no barriers    Do you follow any special diet presently?: No- lives alone  Self employed   Who shops: patient  Who cooks: patient    Food Log: Completed via the method of food recall    Breakfast: up around 8-9am  Usually not hungry right away  Shake from [employer]  9-10am   Morning Snack:not much, would be cutie, herring in wine sauce  Lunch: after 1pm  [employer] salads if out, shake if not for breakfast  [place] at [employer]  Salad at home trying to eat more of those  Afternoon Snack: toast 1 slice with cinamon,   Dinner: 5-6pm, sometimes 6-7pm  [place] and alexi slaw and grilled lettuce  Usually out  Always veggies involved  Evening Snack: was doing chocolate recently- chocolate with toffee 1, popcorn, chips, ice cream  Ok with cottage cheese  Sometimes banana bed 10-11pm   Beverages: water, seltzer, green tea plain, no soda, no juice but loves it, no milk has some flax milk  Eating out/Take out: often   Exercise : ADL, this is weak spot had issues with her right foot blisters       Nutrition Diagnosis:  Food and nutrition related knowledge deficit  related to Lack of prior exposure to accurate nutrition related information as evidenced by Verbalizes inaccurate or incomplete information    Intervention: plate method, label reading, behavior modification strategies, carbohydrate counting, meal planning, individualized meal plan, monitoring portion control and exercise guidelines     Treatment Goals: Patient understands education and recommendations    Education Material Given  Oliver Saavedra was provided the Portion Booklet and Planning Healthy Meals     Monitoring and evaluation:    Term code indicator   1 6 3 Carbohydrate Intake Criteria: 15-30g CHO per meal, 15g CHO snacks    Patients Response to Instruction:  Conner Wilkerson  Expected Compliancegood    Thank you for coming to the [facility] for education today  Please feel free to call with any questions or concerns      Rafaela Green, [address] [place] [address] 20  [address] [facility] [phone]

## 2020-02-13 NOTE — TELEPHONE ENCOUNTER
Not all of her lab work was resulted at the time of the visit:  Hemoglobin A1c is 10 1  Thyroid function is normal   Urine microalbumin creatinine ratio slightly elevated at 37  We will continue to monitor    CBC is essentially normal

## 2020-03-06 ENCOUNTER — OFFICE VISIT (OUTPATIENT)
Dept: FAMILY MEDICINE CLINIC | Facility: CLINIC | Age: 60
End: 2020-03-06
Payer: COMMERCIAL

## 2020-03-06 VITALS
HEIGHT: 67 IN | DIASTOLIC BLOOD PRESSURE: 68 MMHG | HEART RATE: 75 BPM | WEIGHT: 244.25 LBS | BODY MASS INDEX: 38.34 KG/M2 | OXYGEN SATURATION: 97 % | TEMPERATURE: 97.8 F | SYSTOLIC BLOOD PRESSURE: 104 MMHG

## 2020-03-06 DIAGNOSIS — Z79.4 TYPE 2 DIABETES MELLITUS WITHOUT COMPLICATION, WITH LONG-TERM CURRENT USE OF INSULIN (HCC): ICD-10-CM

## 2020-03-06 DIAGNOSIS — J01.00 ACUTE NON-RECURRENT MAXILLARY SINUSITIS: Primary | ICD-10-CM

## 2020-03-06 DIAGNOSIS — E11.9 TYPE 2 DIABETES MELLITUS WITHOUT COMPLICATION, WITH LONG-TERM CURRENT USE OF INSULIN (HCC): ICD-10-CM

## 2020-03-06 PROCEDURE — 1036F TOBACCO NON-USER: CPT | Performed by: FAMILY MEDICINE

## 2020-03-06 PROCEDURE — 3046F HEMOGLOBIN A1C LEVEL >9.0%: CPT | Performed by: FAMILY MEDICINE

## 2020-03-06 PROCEDURE — 99214 OFFICE O/P EST MOD 30 MIN: CPT | Performed by: FAMILY MEDICINE

## 2020-03-06 PROCEDURE — 3008F BODY MASS INDEX DOCD: CPT | Performed by: FAMILY MEDICINE

## 2020-03-06 RX ORDER — CEFUROXIME AXETIL 500 MG/1
500 TABLET ORAL EVERY 12 HOURS SCHEDULED
Qty: 20 TABLET | Refills: 0 | Status: SHIPPED | OUTPATIENT
Start: 2020-03-06 | End: 2020-03-16

## 2020-03-06 NOTE — ASSESSMENT & PLAN NOTE
Lab Results   Component Value Date    HGBA1C 10 1 (H) 02/10/2020    I told the patient is very proud of her for starting to take this seriously  Last month prior to her going to diabetes education her A1c had gone up to 10 1  She is now making excellent changes and I encouraged she do so continuously  She is disheartened by the increase in numbers but I advised this is likely due to her illness and this should go back to where she was when her illness resolves  She will follow-up with diabetes education and endocrinology as scheduled and continue her current medications

## 2020-03-06 NOTE — PROGRESS NOTES
Subjective:   Chief Complaint   Patient presents with    Cold Like Symptoms     PT COMES IN TODAY WITH COLD SX'S FROM SUNDAY INTO MONDAY  SINUS PRESSURE / CONGESTION , COUGHING , SNEEZING AND EAR ITCHING   PT STATES HER BS NUMBERS ARE UP/DOWN SINCE BEING SICK   PT SEEN BY ENDOCRINE  LAST OV FEBRUARY  Patient ID: Akhil White is a 64 y o  female  The pt is here today because she has been sick for about a week  She started with diabetes education on 2/13/20 after a visit with endo on 2/11/20  She had been doing really well with her numbers for the first time in years  She has really never taken the diabetes seriously and now she is, she has made a ton of diet and lifestyle changes  She is taking her medicines as prescribed  She started noticing her sugars going up about five days ago  This is also when she started feeling like she had a cold, maybe a day or two prior to that  It has gotten much worse  Sneezing - a ton, it's so bad she has to pull over  Cough, congestion, runny nose  Can't sleep at night  Sinus pain or pressure  Hurts to cough  No chest pain or shortness of breath  No fevers or chills  No nausea or vomiting      The following portions of the patient's history were reviewed and updated as appropriate: allergies, current medications, past family history, past medical history, past social history, past surgical history and problem list     Review of Systems          Objective:  Vitals:    03/06/20 1133   BP: 104/68   Pulse: 75   Temp: 97 8 °F (36 6 °C)   SpO2: 97%   Weight: 111 kg (244 lb 4 oz)   Height: 5' 7" (1 702 m)      Physical Exam   Constitutional: She is oriented to person, place, and time  Vital signs are normal  She appears well-developed and well-nourished  She appears ill  HENT:   Head: Normocephalic and atraumatic  Right Ear: Tympanic membrane and external ear normal    Left Ear: Tympanic membrane and external ear normal    Nose: Mucosal edema present   No rhinorrhea or sinus tenderness  Right sinus exhibits maxillary sinus tenderness  Right sinus exhibits no frontal sinus tenderness  Left sinus exhibits maxillary sinus tenderness  Left sinus exhibits no frontal sinus tenderness  Mouth/Throat: Mucous membranes are normal  Posterior oropharyngeal erythema present  No oropharyngeal exudate, posterior oropharyngeal edema or tonsillar abscesses  Eyes: Conjunctivae and lids are normal    Pulmonary/Chest: Effort normal and breath sounds normal    Lymphadenopathy:     She has cervical adenopathy  Neurological: She is alert and oriented to person, place, and time  Skin: Skin is warm, dry and intact  Psychiatric: She has a normal mood and affect  Thought content normal          Assessment/Plan:    Type 2 diabetes mellitus without complication, with long-term current use of insulin ([facility])    Lab Results   Component Value Date    HGBA1C 10 1 (H) 02/10/2020    I told the patient is very proud of her for starting to take this seriously  Last month prior to her going to diabetes education her A1c had gone up to 10 1  She is now making excellent changes and I encouraged she do so continuously  She is disheartened by the increase in numbers but I advised this is likely due to her illness and this should go back to where she was when her illness resolves  She will follow-up with diabetes education and endocrinology as scheduled and continue her current medications  Diagnoses and all orders for this visit:    Acute non-recurrent maxillary sinusitis  -     cefuroxime (CEFTIN) 500 mg tablet; Take 1 tablet (500 mg total) by mouth every 12 (twelve) hours for 10 days    I suspect that the patient has a bacterial sinusitis  I prescribed antibiotics and encouraged medication for sx relief  Rest and fluids encouraged as well        Type 2 diabetes mellitus without complication, with long-term current use of insulin ([facility] [address] )

## 2020-03-13 DIAGNOSIS — B37.9 YEAST INFECTION: Primary | ICD-10-CM

## 2020-03-13 RX ORDER — FLUCONAZOLE 150 MG/1
150 TABLET ORAL ONCE
Qty: 1 TABLET | Refills: 0 | Status: SHIPPED | OUTPATIENT
Start: 2020-03-13 | End: 2020-03-13

## 2020-03-18 ENCOUNTER — OFFICE VISIT (OUTPATIENT)
Dept: FAMILY MEDICINE CLINIC | Facility: CLINIC | Age: 60
End: 2020-03-18
Payer: COMMERCIAL

## 2020-03-18 VITALS
DIASTOLIC BLOOD PRESSURE: 60 MMHG | WEIGHT: 243.5 LBS | TEMPERATURE: 97.2 F | OXYGEN SATURATION: 96 % | BODY MASS INDEX: 38.22 KG/M2 | HEART RATE: 73 BPM | SYSTOLIC BLOOD PRESSURE: 100 MMHG | HEIGHT: 67 IN

## 2020-03-18 DIAGNOSIS — L43.9 LICHEN PLANUS ATROPHICUS: Primary | ICD-10-CM

## 2020-03-18 PROCEDURE — 3008F BODY MASS INDEX DOCD: CPT | Performed by: FAMILY MEDICINE

## 2020-03-18 PROCEDURE — 99214 OFFICE O/P EST MOD 30 MIN: CPT | Performed by: FAMILY MEDICINE

## 2020-03-18 PROCEDURE — 3046F HEMOGLOBIN A1C LEVEL >9.0%: CPT | Performed by: FAMILY MEDICINE

## 2020-03-18 PROCEDURE — 1036F TOBACCO NON-USER: CPT | Performed by: FAMILY MEDICINE

## 2020-03-18 NOTE — PROGRESS NOTES
Subjective:   Chief Complaint   Patient presents with    itch     pt c/o yeast infection, increased discharge form last week and itchiness        Patient ID: Vladimir Beltran is a 64 y o  female  The pt is here today because she has had terrible vaginal itching  She had left over fluconazole so she took 1 on  Friday 3/13/20  Took another this week as well - it worked a little but not a lot each time  Thursday night she was in a ton of pain, had ice on her vagina, almost called 911  Most of her sx are external  She has some discharge - not a lot- no odor  Worse around the time that she would have had her period, she thinks            The following portions of the patient's history were reviewed and updated as appropriate: allergies, current medications, past family history, past medical history, past social history, past surgical history and problem list     Review of Systems          Objective:  Vitals:    03/18/20 1523   BP: 100/60   Pulse: 73   Temp: (!) 97 2 °F (36 2 °C)   SpO2: 96%   Weight: 110 kg (243 lb 8 oz)   Height: 5' 7" (1 702 m)      Physical Exam   Constitutional: She is oriented to person, place, and time  She appears well-developed and well-nourished  No distress  Genitourinary:   Genitourinary Comments: Significant erythema and edema with some areas of hypopigmentation on a labia, tender   Neurological: She is alert and oriented to person, place, and time  No cranial nerve deficit  Coordination normal    Skin: Skin is warm and dry  No rash noted  She is not diaphoretic  No erythema  Psychiatric: She has a normal mood and affect  Her behavior is normal  Judgment and thought content normal          Assessment/Plan:    No problem-specific Assessment & Plan notes found for this encounter  Diagnoses and all orders for this visit:    Lichen planus atrophicus  -     triamcinolone (KENALOG) 0 1 % ointment;  Apply topically 2 (two) times a day        I do not think this is [place] infection but rather lichen planus  We discussed the diagnosis and I am going to prescribe triamcinolone for her  If she is not getting better she will let me know  If she does get completely better she can discontinue use of the steroid unless she gets recurrent symptoms  If his symptoms are so significant that they will not go away without daily use of the triamcinolone I would then recommend a biopsy through gyn

## 2020-04-08 DIAGNOSIS — E11.9 TYPE 2 DIABETES MELLITUS WITHOUT COMPLICATION, WITH LONG-TERM CURRENT USE OF INSULIN (HCC): ICD-10-CM

## 2020-04-08 DIAGNOSIS — Z79.4 TYPE 2 DIABETES MELLITUS WITHOUT COMPLICATION, WITH LONG-TERM CURRENT USE OF INSULIN (HCC): ICD-10-CM

## 2020-05-12 ENCOUNTER — TELEMEDICINE (OUTPATIENT)
Dept: FAMILY MEDICINE CLINIC | Facility: CLINIC | Age: 60
End: 2020-05-12
Payer: COMMERCIAL

## 2020-05-12 DIAGNOSIS — E11.9 TYPE 2 DIABETES MELLITUS WITHOUT COMPLICATION, WITH LONG-TERM CURRENT USE OF INSULIN (HCC): Primary | ICD-10-CM

## 2020-05-12 DIAGNOSIS — Z79.4 TYPE 2 DIABETES MELLITUS WITHOUT COMPLICATION, WITH LONG-TERM CURRENT USE OF INSULIN (HCC): Primary | ICD-10-CM

## 2020-05-12 DIAGNOSIS — E78.5 HYPERLIPIDEMIA LDL GOAL <100: ICD-10-CM

## 2020-05-12 PROBLEM — E11.65 HYPERGLYCEMIA DUE TO TYPE 2 DIABETES MELLITUS (HCC): Status: ACTIVE | Noted: 2020-05-12

## 2020-05-12 PROBLEM — I10 ESSENTIAL HYPERTENSION: Status: ACTIVE | Noted: 2020-05-12

## 2020-05-12 PROBLEM — E11.65 HYPERGLYCEMIA DUE TO TYPE 2 DIABETES MELLITUS (HCC): Status: RESOLVED | Noted: 2020-05-12 | Resolved: 2020-05-12

## 2020-05-12 PROBLEM — E78.01 FAMILIAL HYPERCHOLESTEROLEMIA: Status: ACTIVE | Noted: 2020-05-12

## 2020-05-12 LAB — SL AMB POCT HEMOGLOBIN AIC: 7.4 (ref ?–6.5)

## 2020-05-12 PROCEDURE — 99214 OFFICE O/P EST MOD 30 MIN: CPT | Performed by: FAMILY MEDICINE

## 2020-05-12 PROCEDURE — 1036F TOBACCO NON-USER: CPT | Performed by: FAMILY MEDICINE

## 2020-05-12 PROCEDURE — 3051F HG A1C>EQUAL 7.0%<8.0%: CPT | Performed by: FAMILY MEDICINE

## 2020-05-12 PROCEDURE — 3008F BODY MASS INDEX DOCD: CPT | Performed by: FAMILY MEDICINE

## 2020-05-12 PROCEDURE — 83036 HEMOGLOBIN GLYCOSYLATED A1C: CPT | Performed by: FAMILY MEDICINE

## 2020-05-13 ENCOUNTER — TELEPHONE (OUTPATIENT)
Dept: FAMILY MEDICINE CLINIC | Facility: CLINIC | Age: 60
End: 2020-05-13

## 2020-05-13 VITALS — HEIGHT: 67 IN | BODY MASS INDEX: 37.34 KG/M2 | WEIGHT: 237.9 LBS

## 2020-05-20 ENCOUNTER — TELEPHONE (OUTPATIENT)
Dept: ENDOCRINOLOGY | Facility: HOSPITAL | Age: 60
End: 2020-05-20

## 2020-05-20 LAB
ALBUMIN SERPL-MCNC: 4 G/DL (ref 3.8–4.9)
ALBUMIN/GLOB SERPL: 1.7 {RATIO} (ref 1.2–2.2)
ALP SERPL-CCNC: 57 IU/L (ref 39–117)
ALT SERPL-CCNC: 17 IU/L (ref 0–32)
AST SERPL-CCNC: 15 IU/L (ref 0–40)
BILIRUB SERPL-MCNC: 0.3 MG/DL (ref 0–1.2)
BUN SERPL-MCNC: 9 MG/DL (ref 6–24)
BUN/CREAT SERPL: 11 (ref 9–23)
CALCIUM SERPL-MCNC: 9.1 MG/DL (ref 8.7–10.2)
CHLORIDE SERPL-SCNC: 106 MMOL/L (ref 96–106)
CHOLEST SERPL-MCNC: 190 MG/DL (ref 100–199)
CHOLEST/HDLC SERPL: 4.1 RATIO (ref 0–4.4)
CO2 SERPL-SCNC: 25 MMOL/L (ref 20–29)
CREAT SERPL-MCNC: 0.8 MG/DL (ref 0.57–1)
EST. AVERAGE GLUCOSE BLD GHB EST-MCNC: 169 MG/DL
GLOBULIN SER-MCNC: 2.3 G/DL (ref 1.5–4.5)
GLUCOSE SERPL-MCNC: 98 MG/DL (ref 65–99)
HBA1C MFR BLD: 7.5 % (ref 4.8–5.6)
HDLC SERPL-MCNC: 46 MG/DL
LDLC SERPL CALC-MCNC: 108 MG/DL (ref 0–99)
POTASSIUM SERPL-SCNC: 4.4 MMOL/L (ref 3.5–5.2)
PROT SERPL-MCNC: 6.3 G/DL (ref 6–8.5)
SL AMB EGFR AFRICAN AMERICAN: 95 ML/MIN/1.73
SL AMB EGFR NON AFRICAN AMERICAN: 82 ML/MIN/1.73
SL AMB VLDL CHOLESTEROL CALC: 36 MG/DL (ref 5–40)
SODIUM SERPL-SCNC: 142 MMOL/L (ref 134–144)
TRIGL SERPL-MCNC: 182 MG/DL (ref 0–149)

## 2020-05-20 PROCEDURE — 3051F HG A1C>EQUAL 7.0%<8.0%: CPT | Performed by: NURSE PRACTITIONER

## 2020-05-21 ENCOUNTER — OFFICE VISIT (OUTPATIENT)
Dept: ENDOCRINOLOGY | Facility: HOSPITAL | Age: 60
End: 2020-05-21
Payer: COMMERCIAL

## 2020-05-21 ENCOUNTER — OFFICE VISIT (OUTPATIENT)
Dept: DIABETES SERVICES | Facility: HOSPITAL | Age: 60
End: 2020-05-21
Payer: COMMERCIAL

## 2020-05-21 ENCOUNTER — TELEPHONE (OUTPATIENT)
Dept: ENDOCRINOLOGY | Facility: HOSPITAL | Age: 60
End: 2020-05-21

## 2020-05-21 VITALS
TEMPERATURE: 98 F | HEART RATE: 67 BPM | DIASTOLIC BLOOD PRESSURE: 80 MMHG | WEIGHT: 243.4 LBS | SYSTOLIC BLOOD PRESSURE: 124 MMHG | HEIGHT: 67 IN | BODY MASS INDEX: 38.2 KG/M2

## 2020-05-21 VITALS — HEIGHT: 67 IN | WEIGHT: 237 LBS | BODY MASS INDEX: 37.2 KG/M2

## 2020-05-21 DIAGNOSIS — Z79.4 TYPE 2 DIABETES MELLITUS WITHOUT COMPLICATION, WITH LONG-TERM CURRENT USE OF INSULIN (HCC): Primary | ICD-10-CM

## 2020-05-21 DIAGNOSIS — E78.5 HYPERLIPIDEMIA LDL GOAL <100: ICD-10-CM

## 2020-05-21 DIAGNOSIS — E11.9 TYPE 2 DIABETES MELLITUS WITHOUT COMPLICATION, WITH LONG-TERM CURRENT USE OF INSULIN (HCC): Primary | ICD-10-CM

## 2020-05-21 DIAGNOSIS — E55.9 VITAMIN D DEFICIENCY: ICD-10-CM

## 2020-05-21 LAB
LEFT EYE DIABETIC RETINOPATHY: NORMAL
RIGHT EYE DIABETIC RETINOPATHY: NORMAL

## 2020-05-21 PROCEDURE — 3008F BODY MASS INDEX DOCD: CPT | Performed by: NURSE PRACTITIONER

## 2020-05-21 PROCEDURE — 1036F TOBACCO NON-USER: CPT | Performed by: NURSE PRACTITIONER

## 2020-05-21 PROCEDURE — 3051F HG A1C>EQUAL 7.0%<8.0%: CPT | Performed by: NURSE PRACTITIONER

## 2020-05-21 PROCEDURE — 97803 MED NUTRITION INDIV SUBSEQ: CPT | Performed by: DIETITIAN, REGISTERED

## 2020-05-21 PROCEDURE — 99214 OFFICE O/P EST MOD 30 MIN: CPT | Performed by: NURSE PRACTITIONER

## 2020-05-27 DIAGNOSIS — Z79.4 TYPE 2 DIABETES MELLITUS WITHOUT COMPLICATION, WITH LONG-TERM CURRENT USE OF INSULIN (HCC): ICD-10-CM

## 2020-05-27 DIAGNOSIS — E11.9 TYPE 2 DIABETES MELLITUS WITHOUT COMPLICATION, WITH LONG-TERM CURRENT USE OF INSULIN (HCC): ICD-10-CM

## 2020-05-27 RX ORDER — METFORMIN HYDROCHLORIDE 500 MG/1
1000 TABLET, EXTENDED RELEASE ORAL
Qty: 60 TABLET | Refills: 3 | Status: SHIPPED | OUTPATIENT
Start: 2020-05-27 | End: 2020-09-25

## 2020-06-05 DIAGNOSIS — E11.9 TYPE 2 DIABETES MELLITUS WITHOUT COMPLICATION, WITH LONG-TERM CURRENT USE OF INSULIN (HCC): ICD-10-CM

## 2020-06-05 DIAGNOSIS — Z79.4 TYPE 2 DIABETES MELLITUS WITHOUT COMPLICATION, WITH LONG-TERM CURRENT USE OF INSULIN (HCC): ICD-10-CM

## 2020-07-17 DIAGNOSIS — L43.9 LICHEN PLANUS ATROPHICUS: ICD-10-CM

## 2020-07-17 DIAGNOSIS — E11.9 TYPE 2 DIABETES MELLITUS WITHOUT COMPLICATION, WITH LONG-TERM CURRENT USE OF INSULIN (HCC): ICD-10-CM

## 2020-07-17 DIAGNOSIS — Z79.4 TYPE 2 DIABETES MELLITUS WITHOUT COMPLICATION, WITH LONG-TERM CURRENT USE OF INSULIN (HCC): ICD-10-CM

## 2020-07-20 ENCOUNTER — TELEPHONE (OUTPATIENT)
Dept: FAMILY MEDICINE CLINIC | Facility: CLINIC | Age: 60
End: 2020-07-20

## 2020-07-20 DIAGNOSIS — Z79.4 TYPE 2 DIABETES MELLITUS WITHOUT COMPLICATION, WITH LONG-TERM CURRENT USE OF INSULIN (HCC): Primary | ICD-10-CM

## 2020-07-20 DIAGNOSIS — E11.9 TYPE 2 DIABETES MELLITUS WITHOUT COMPLICATION, WITH LONG-TERM CURRENT USE OF INSULIN (HCC): Primary | ICD-10-CM

## 2020-07-20 RX ORDER — BLOOD-GLUCOSE METER
EACH MISCELLANEOUS DAILY
Qty: 1 KIT | Refills: 0 | Status: SHIPPED | OUTPATIENT
Start: 2020-07-20 | End: 2022-02-07 | Stop reason: SDUPTHER

## 2020-07-20 RX ORDER — LANCETS
EACH MISCELLANEOUS
Qty: 100 EACH | Refills: 5 | Status: SHIPPED | OUTPATIENT
Start: 2020-07-20 | End: 2022-03-03

## 2020-07-20 NOTE — TELEPHONE ENCOUNTER
She called her insurance they will pay for One touch or contor  Can you call that in  The other is not covered by her insurance  She needs the meter and test strips

## 2020-07-23 ENCOUNTER — TELEPHONE (OUTPATIENT)
Dept: FAMILY MEDICINE CLINIC | Facility: CLINIC | Age: 60
End: 2020-07-23

## 2020-07-23 NOTE — TELEPHONE ENCOUNTER
Pt adv, pt states basaglar is no longer covered by her ins, pt do not need a refill right now but she is wondering if levemir will be covered

## 2020-08-18 ENCOUNTER — TELEPHONE (OUTPATIENT)
Dept: FAMILY MEDICINE CLINIC | Facility: CLINIC | Age: 60
End: 2020-08-18

## 2020-08-18 DIAGNOSIS — Z79.4 TYPE 2 DIABETES MELLITUS WITHOUT COMPLICATION, WITH LONG-TERM CURRENT USE OF INSULIN (HCC): Primary | ICD-10-CM

## 2020-08-18 DIAGNOSIS — E11.9 TYPE 2 DIABETES MELLITUS WITHOUT COMPLICATION, WITH LONG-TERM CURRENT USE OF INSULIN (HCC): Primary | ICD-10-CM

## 2020-08-18 RX ORDER — INSULIN DETEMIR 100 [IU]/ML
40 INJECTION, SOLUTION SUBCUTANEOUS DAILY
Qty: 12 PEN | Refills: 1 | Status: SHIPPED | OUTPATIENT
Start: 2020-08-18 | End: 2020-10-31

## 2020-08-18 NOTE — TELEPHONE ENCOUNTER
PT CALLS IN TODAY REGARDING RX FOR LEVEMIR PEN  [facility] PHONED PATIENT THAT NEW RX FOR THE COVERED MED ( LEVEMIR ) HAS NOT BEEN SENT  HER INSURANCE DOES NOT COVER BASAGLAR ANY LONGER  PLEASE SEND TO [facility]    QUESTIONS    PHONE PATIENT [phone]

## 2020-09-03 ENCOUNTER — TELEPHONE (OUTPATIENT)
Dept: ENDOCRINOLOGY | Facility: HOSPITAL | Age: 60
End: 2020-09-03

## 2020-09-05 LAB
ALBUMIN SERPL-MCNC: 4.3 G/DL (ref 3.8–4.9)
ALBUMIN/GLOB SERPL: 1.9 {RATIO} (ref 1.2–2.2)
ALP SERPL-CCNC: 59 IU/L (ref 39–117)
ALT SERPL-CCNC: 21 IU/L (ref 0–32)
AST SERPL-CCNC: 18 IU/L (ref 0–40)
BILIRUB SERPL-MCNC: 0.4 MG/DL (ref 0–1.2)
BUN SERPL-MCNC: 11 MG/DL (ref 6–24)
BUN/CREAT SERPL: 13 (ref 9–23)
CALCIUM SERPL-MCNC: 9.5 MG/DL (ref 8.7–10.2)
CHLORIDE SERPL-SCNC: 106 MMOL/L (ref 96–106)
CO2 SERPL-SCNC: 20 MMOL/L (ref 20–29)
CREAT SERPL-MCNC: 0.85 MG/DL (ref 0.57–1)
EST. AVERAGE GLUCOSE BLD GHB EST-MCNC: 151 MG/DL
GLOBULIN SER-MCNC: 2.3 G/DL (ref 1.5–4.5)
GLUCOSE SERPL-MCNC: 112 MG/DL (ref 65–99)
HBA1C MFR BLD: 6.9 % (ref 4.8–5.6)
POTASSIUM SERPL-SCNC: 4 MMOL/L (ref 3.5–5.2)
PROT SERPL-MCNC: 6.6 G/DL (ref 6–8.5)
SL AMB EGFR AFRICAN AMERICAN: 88 ML/MIN/1.73
SL AMB EGFR NON AFRICAN AMERICAN: 76 ML/MIN/1.73
SODIUM SERPL-SCNC: 142 MMOL/L (ref 134–144)

## 2020-09-05 PROCEDURE — 3044F HG A1C LEVEL LT 7.0%: CPT | Performed by: NURSE PRACTITIONER

## 2020-09-08 ENCOUNTER — OFFICE VISIT (OUTPATIENT)
Dept: ENDOCRINOLOGY | Facility: HOSPITAL | Age: 60
End: 2020-09-08
Payer: COMMERCIAL

## 2020-09-08 VITALS
HEIGHT: 67 IN | DIASTOLIC BLOOD PRESSURE: 80 MMHG | SYSTOLIC BLOOD PRESSURE: 122 MMHG | WEIGHT: 233.2 LBS | BODY MASS INDEX: 36.6 KG/M2 | HEART RATE: 79 BPM | TEMPERATURE: 97.6 F

## 2020-09-08 DIAGNOSIS — E11.9 TYPE 2 DIABETES MELLITUS WITHOUT COMPLICATION, WITH LONG-TERM CURRENT USE OF INSULIN (HCC): Primary | ICD-10-CM

## 2020-09-08 DIAGNOSIS — E78.5 HYPERLIPIDEMIA LDL GOAL <100: ICD-10-CM

## 2020-09-08 DIAGNOSIS — E55.9 VITAMIN D DEFICIENCY: ICD-10-CM

## 2020-09-08 DIAGNOSIS — Z79.4 TYPE 2 DIABETES MELLITUS WITHOUT COMPLICATION, WITH LONG-TERM CURRENT USE OF INSULIN (HCC): Primary | ICD-10-CM

## 2020-09-08 PROCEDURE — 1036F TOBACCO NON-USER: CPT | Performed by: NURSE PRACTITIONER

## 2020-09-08 PROCEDURE — 99214 OFFICE O/P EST MOD 30 MIN: CPT | Performed by: NURSE PRACTITIONER

## 2020-09-08 NOTE — PATIENT INSTRUCTIONS
Be mindful of diet      Stay active and stay hydrated        For now, continue  your current regimen      Check blood sugars at least twice daily at alternating times and send a record to the office in 2 weeks for review after starting Victoza      Keep up the good work!     Obtain lab work as prescribed      Supplement with 2000 units of vitamin D3 daily      Continue Co Q10 with Red Yeast Rice

## 2020-09-08 NOTE — PROGRESS NOTES
Petrona Parker 64 y o  female MRN: [id number]    Encounter: [id number]      Assessment/Plan     Assessment: This is a 64y o -year-old female with type 2 diabetes with long term insulin use, hyperlipidemia and vitamin-D deficiency  Plan:  1  Type 2 diabetes with long-term insulin use:  Her hemoglobin A1c is improved to 6 9  Recent blood sugars are well controlled  For now, she will continue her current regimen   She will continue with positive lifestyle changes with regard to diet and exercise  I have asked her to continue checking her blood sugars regularly and send a record to the office in 2 weeks for review   Reviewed recognition and proper treatment of hypoglycemic episodes  Check hemoglobin A1c and comprehensive metabolic panel prior to next visit       2  Hyperlipidemia: Continue CQ10 and red yeast rice 600 mg daily   Check fasting lipid panel prior to next visit     3   Vitamin-D deficiency:  Continue supplementation    Will check 25 hydroxy vitamin-D level prior to next visit  CC:  Type 2 Diabetes follow-up    History of Present Illness     HPI:  64 y  o  year old female with type 2 diabetes for about 18 years   She is on oral agents and insulin at home and takes metformin XR 1000 mg daily with dinner, Victoza 1 8 mg daily and Basaglar 40 units   She states that she has not been treating with NovoLog at meals for approximately 1-2 years  Stacey Verma most recent hemoglobin A1c from  September 4, 2020 is  6 9   In the past, metformin dose was attempted to be increased but this caused gastrointestinal issues   She denies any polyuria, polydipsia, nocturia and blurry vision   She denies neuropathy, nephropathy and retinopathy        Hypoglycemic episodes: No       The patient's last eye exam was on May 21, 2020   Her most recent diabetic foot exam was performed at her office visit on February 11, 2020       Blood Sugar/Glucometer/Pump/CGM review:  Glucometer download reveals only a few scattered readings that appear to be controlled      In the past, she has declined treatment for elevated cholesterol through her PCP  She has treating with Co Q10 and red yeast rice  Review of Systems   Constitutional: Negative  Negative for chills, fatigue and fever  HENT: Negative  Negative for trouble swallowing and voice change  Eyes: Negative  Negative for photophobia, pain, discharge, redness, itching and visual disturbance  Respiratory: Negative  Negative for chest tightness and shortness of breath  Cardiovascular: Negative  Negative for chest pain  Gastrointestinal: Negative  Negative for abdominal pain, constipation, diarrhea and vomiting  Endocrine: Positive for polyuria (Once per night nocturia)  Negative for cold intolerance, heat intolerance, polydipsia and polyphagia  Genitourinary: Negative  Musculoskeletal: Negative  Skin: Negative  Allergic/Immunologic: Negative  Neurological: Negative  Negative for dizziness, syncope, light-headedness and headaches  Hematological: Negative  Psychiatric/Behavioral: Negative  All other systems reviewed and are negative  Historical Information   Past Medical History:   Diagnosis Date    Anxiety     Condyloma acuminatum     Diabetes mellitus ([facility] [address] ) 2002    type 2    Genital warts     Herpes simplex     HPV (human papilloma virus) infection      No past surgical history on file    Social History   Social History     Substance and Sexual Activity   Alcohol Use Yes    Frequency: Monthly or less     Social History     Substance and Sexual Activity   Drug Use Yes    Types: Marijuana     Social History     Tobacco Use   Smoking Status Never Smoker   Smokeless Tobacco Never Used     Family History:   Family History   Problem Relation Age of Onset    Diabetes Father     Heart disease Father     ALS Father     Diabetes Brother     Alzheimer's disease Mother     Colon cancer Maternal Grandfather        Meds/Allergies   Current Outpatient Medications   Medication Sig Dispense Refill    Blood Glucose Monitoring Suppl (Pawel Baez) w/Device KIT by Does not apply route daily 1 kit 0    Cholecalciferol (VITAMIN D PO) Take by mouth      Coenzyme Q10-Red Yeast Rice (CO Q-10 PLUS RED YEAST RICE PO) Take 2 capsules by mouth      CRANBERRY PO Take by mouth      ELDERBERRY PO Take by mouth      glucose blood (OneTouch Verio) test strip Check sugars 2-4 times a day 100 each 5    insulin detemir (Levemir FlexTouch) 100 Units/mL injection pen Inject 40 Units under the skin daily 12 pen 1    Insulin Pen Needle (B-D ULTRAFINE III SHORT PEN) 31G X 8 MM MISC Inject under the skin 5 (five) times a day 150 each 5    ketoconazole (NIZORAL) 2 % cream Apply topically daily (Patient not taking: Reported on 5/12/2020) 60 g 0    Lancets (ONETOUCH ULTRASOFT) lancets Check sugars 2-4 times a day 100 each 5    liraglutide (VICTOZA) injection Inject 0 3 mL (1 8 mg total) under the skin daily 27 mL 1    LUTEIN PO Take by mouth daily      metFORMIN (GLUCOPHAGE-XR) 500 mg 24 hr tablet TAKE 2 TABLETS (1,000 MG TOTAL) BY MOUTH DAILY WITH DINNER 60 tablet 3    Nutritional Supplements (DHEA PO) Take by mouth      Omega-3 Fatty Acids (FISH OIL PO) Take by mouth      Probiotic Product (PROBIOTIC PO) Take by mouth daily      QUERCETIN PO Take by mouth      triamcinolone (KENALOG) 0 1 % ointment APPLY TO AFFECTED AREA TWICE A DAY 80 g 0    VITAMIN E PO Take by mouth       No current facility-administered medications for this visit  No Known Allergies    Objective   Vitals: There were no vitals taken for this visit  Physical Exam  Vitals signs reviewed  Constitutional:       Appearance: She is well-developed  She is obese  HENT:      Head: Normocephalic and atraumatic  Eyes:      Conjunctiva/sclera: Conjunctivae normal       Pupils: Pupils are equal, round, and reactive to light  Neck:      Musculoskeletal: Normal range of motion and neck supple  Cardiovascular:      Rate and Rhythm: Normal rate and regular rhythm  Heart sounds: Normal heart sounds  Pulmonary:      Effort: Pulmonary effort is normal       Breath sounds: Normal breath sounds  Abdominal:      General: Bowel sounds are normal       Palpations: Abdomen is soft  Musculoskeletal: Normal range of motion  Skin:     General: Skin is warm and dry  Comments: Dry skin   Neurological:      Mental Status: She is alert and oriented to person, place, and time  Psychiatric:         Behavior: Behavior normal          Thought Content:  Thought content normal          Judgment: Judgment normal        Lab Results:   Lab Results   Component Value Date/Time    Hemoglobin A1C 6 9 (H) 09/04/2020 07:40 AM    Hemoglobin A1C 7 5 (H) 05/19/2020 07:12 AM    Hemoglobin A1C 7 4 (A) 05/12/2020 03:08 PM    Hemoglobin A1C 10 1 (H) 02/10/2020 07:38 AM    White Blood Cell Count 5 8 02/10/2020 07:38 AM    Hemoglobin 13 3 02/10/2020 07:38 AM    HCT 39 6 02/10/2020 07:38 AM    MCV 84 02/10/2020 07:38 AM    Platelet Count 059 52/23/7877 07:38 AM    BUN 11 09/04/2020 07:40 AM    BUN 9 05/19/2020 07:12 AM    BUN 13 02/10/2020 07:38 AM    Potassium 4 0 09/04/2020 07:40 AM    Potassium 4 4 05/19/2020 07:12 AM    Potassium 4 3 02/10/2020 07:38 AM    Chloride 106 09/04/2020 07:40 AM    Chloride 106 05/19/2020 07:12 AM    Chloride 99 02/10/2020 07:38 AM    CO2 20 09/04/2020 07:40 AM    CO2 25 05/19/2020 07:12 AM    CO2 26 02/10/2020 07:38 AM    Creatinine 0 85 09/04/2020 07:40 AM    Creatinine 0 80 05/19/2020 07:12 AM    Creatinine 0 72 02/10/2020 07:38 AM    AST 18 09/04/2020 07:40 AM    AST 15 05/19/2020 07:12 AM    AST 14 02/10/2020 07:38 AM    ALT 21 09/04/2020 07:40 AM    ALT 17 05/19/2020 07:12 AM    ALT 21 02/10/2020 07:38 AM    Albumin 4 3 09/04/2020 07:40 AM    Albumin 4 0 05/19/2020 07:12 AM    Albumin 4 5 02/10/2020 07:38 AM    Globulin, Total 2 3 09/04/2020 07:40 AM    Globulin, Total 2 3 05/19/2020 07:12 AM    Globulin, Total 2 3 02/10/2020 07:38 AM    HDL 46 05/19/2020 07:12 AM    HDL 56 02/10/2020 07:38 AM    Triglycerides 182 (H) 05/19/2020 07:12 AM    Triglycerides 123 02/10/2020 07:38 AM     Portions of the record may have been created with voice recognition software  Occasional wrong word or "sound a like" substitutions may have occurred due to the inherent limitations of voice recognition software  Read the chart carefully and recognize, using context, where substitutions have occurred

## 2020-09-25 DIAGNOSIS — E11.9 TYPE 2 DIABETES MELLITUS WITHOUT COMPLICATION, WITH LONG-TERM CURRENT USE OF INSULIN (HCC): ICD-10-CM

## 2020-09-25 DIAGNOSIS — Z79.4 TYPE 2 DIABETES MELLITUS WITHOUT COMPLICATION, WITH LONG-TERM CURRENT USE OF INSULIN (HCC): ICD-10-CM

## 2020-09-25 RX ORDER — METFORMIN HYDROCHLORIDE 500 MG/1
TABLET, EXTENDED RELEASE ORAL
Qty: 60 TABLET | Refills: 3 | Status: SHIPPED | OUTPATIENT
Start: 2020-09-25 | End: 2021-01-25

## 2020-10-31 DIAGNOSIS — Z79.4 TYPE 2 DIABETES MELLITUS WITHOUT COMPLICATION, WITH LONG-TERM CURRENT USE OF INSULIN (HCC): ICD-10-CM

## 2020-10-31 DIAGNOSIS — L43.9 LICHEN PLANUS ATROPHICUS: ICD-10-CM

## 2020-10-31 DIAGNOSIS — E11.9 TYPE 2 DIABETES MELLITUS WITHOUT COMPLICATION, WITH LONG-TERM CURRENT USE OF INSULIN (HCC): ICD-10-CM

## 2020-10-31 RX ORDER — INSULIN DETEMIR 100 [IU]/ML
40 INJECTION, SOLUTION SUBCUTANEOUS DAILY
Qty: 5 PEN | Refills: 0 | Status: SHIPPED | OUTPATIENT
Start: 2020-10-31 | End: 2020-12-01 | Stop reason: SDUPTHER

## 2020-12-01 DIAGNOSIS — E11.9 TYPE 2 DIABETES MELLITUS WITHOUT COMPLICATION, WITH LONG-TERM CURRENT USE OF INSULIN (HCC): ICD-10-CM

## 2020-12-01 DIAGNOSIS — Z79.4 TYPE 2 DIABETES MELLITUS WITHOUT COMPLICATION, WITH LONG-TERM CURRENT USE OF INSULIN (HCC): ICD-10-CM

## 2020-12-01 RX ORDER — INSULIN DETEMIR 100 [IU]/ML
40 INJECTION, SOLUTION SUBCUTANEOUS DAILY
Qty: 5 PEN | Refills: 0 | OUTPATIENT
Start: 2020-12-01 | End: 2021-03-01

## 2020-12-01 RX ORDER — INSULIN DETEMIR 100 [IU]/ML
40 INJECTION, SOLUTION SUBCUTANEOUS DAILY
Qty: 5 PEN | Refills: 0 | Status: SHIPPED | OUTPATIENT
Start: 2020-12-01 | End: 2020-12-03 | Stop reason: SDUPTHER

## 2020-12-03 ENCOUNTER — OFFICE VISIT (OUTPATIENT)
Dept: FAMILY MEDICINE CLINIC | Facility: CLINIC | Age: 60
End: 2020-12-03
Payer: COMMERCIAL

## 2020-12-03 VITALS
HEART RATE: 66 BPM | HEIGHT: 67 IN | TEMPERATURE: 96.5 F | DIASTOLIC BLOOD PRESSURE: 88 MMHG | WEIGHT: 230 LBS | SYSTOLIC BLOOD PRESSURE: 130 MMHG | BODY MASS INDEX: 36.1 KG/M2 | OXYGEN SATURATION: 99 %

## 2020-12-03 DIAGNOSIS — Z00.00 ANNUAL PHYSICAL EXAM: Primary | ICD-10-CM

## 2020-12-03 DIAGNOSIS — F43.12 CHRONIC POST-TRAUMATIC STRESS DISORDER (PTSD): ICD-10-CM

## 2020-12-03 DIAGNOSIS — E11.9 TYPE 2 DIABETES MELLITUS WITHOUT COMPLICATION, WITH LONG-TERM CURRENT USE OF INSULIN (HCC): ICD-10-CM

## 2020-12-03 DIAGNOSIS — L20.84 INTRINSIC ECZEMA: ICD-10-CM

## 2020-12-03 DIAGNOSIS — Z79.4 TYPE 2 DIABETES MELLITUS WITHOUT COMPLICATION, WITH LONG-TERM CURRENT USE OF INSULIN (HCC): ICD-10-CM

## 2020-12-03 PROCEDURE — 3008F BODY MASS INDEX DOCD: CPT | Performed by: FAMILY MEDICINE

## 2020-12-03 PROCEDURE — 99396 PREV VISIT EST AGE 40-64: CPT | Performed by: FAMILY MEDICINE

## 2020-12-03 PROCEDURE — 3725F SCREEN DEPRESSION PERFORMED: CPT | Performed by: FAMILY MEDICINE

## 2020-12-03 PROCEDURE — 1036F TOBACCO NON-USER: CPT | Performed by: FAMILY MEDICINE

## 2020-12-03 RX ORDER — PEN NEEDLE, DIABETIC 31 GX5/16"
NEEDLE, DISPOSABLE MISCELLANEOUS
Qty: 100 EACH | Refills: 5 | Status: SHIPPED | OUTPATIENT
Start: 2020-12-03 | End: 2022-02-07 | Stop reason: SDUPTHER

## 2020-12-03 RX ORDER — INSULIN DETEMIR 100 [IU]/ML
40 INJECTION, SOLUTION SUBCUTANEOUS DAILY
Qty: 12 PEN | Refills: 1 | Status: SHIPPED | OUTPATIENT
Start: 2020-12-03 | End: 2021-04-29

## 2020-12-18 DIAGNOSIS — E11.9 TYPE 2 DIABETES MELLITUS WITHOUT COMPLICATION, WITH LONG-TERM CURRENT USE OF INSULIN (HCC): ICD-10-CM

## 2020-12-18 DIAGNOSIS — Z79.4 TYPE 2 DIABETES MELLITUS WITHOUT COMPLICATION, WITH LONG-TERM CURRENT USE OF INSULIN (HCC): ICD-10-CM

## 2020-12-18 RX ORDER — LIRAGLUTIDE 6 MG/ML
INJECTION SUBCUTANEOUS
Qty: 9 PEN | Refills: 5 | Status: SHIPPED | OUTPATIENT
Start: 2020-12-18 | End: 2022-02-23

## 2021-01-06 LAB
25(OH)D3+25(OH)D2 SERPL-MCNC: 23.6 NG/ML (ref 30–100)
ALBUMIN SERPL-MCNC: 4.3 G/DL (ref 3.8–4.9)
ALBUMIN/GLOB SERPL: 1.7 {RATIO} (ref 1.2–2.2)
ALP SERPL-CCNC: 63 IU/L (ref 39–117)
ALT SERPL-CCNC: 14 IU/L (ref 0–32)
AST SERPL-CCNC: 16 IU/L (ref 0–40)
BILIRUB SERPL-MCNC: 0.6 MG/DL (ref 0–1.2)
BUN SERPL-MCNC: 12 MG/DL (ref 6–24)
BUN/CREAT SERPL: 15 (ref 9–23)
CALCIUM SERPL-MCNC: 9.7 MG/DL (ref 8.7–10.2)
CHLORIDE SERPL-SCNC: 101 MMOL/L (ref 96–106)
CHOLEST SERPL-MCNC: 232 MG/DL (ref 100–199)
CHOLEST/HDLC SERPL: 4.8 RATIO (ref 0–4.4)
CO2 SERPL-SCNC: 27 MMOL/L (ref 20–29)
CREAT SERPL-MCNC: 0.82 MG/DL (ref 0.57–1)
EST. AVERAGE GLUCOSE BLD GHB EST-MCNC: 197 MG/DL
GLOBULIN SER-MCNC: 2.6 G/DL (ref 1.5–4.5)
GLUCOSE SERPL-MCNC: 126 MG/DL (ref 65–99)
HBA1C MFR BLD: 8.5 % (ref 4.8–5.6)
HDLC SERPL-MCNC: 48 MG/DL
LDLC SERPL CALC-MCNC: 153 MG/DL (ref 0–99)
POTASSIUM SERPL-SCNC: 4.2 MMOL/L (ref 3.5–5.2)
PROT SERPL-MCNC: 6.9 G/DL (ref 6–8.5)
SL AMB EGFR AFRICAN AMERICAN: 91 ML/MIN/1.73
SL AMB EGFR NON AFRICAN AMERICAN: 79 ML/MIN/1.73
SL AMB VLDL CHOLESTEROL CALC: 31 MG/DL (ref 5–40)
SODIUM SERPL-SCNC: 142 MMOL/L (ref 134–144)
TRIGL SERPL-MCNC: 170 MG/DL (ref 0–149)

## 2021-01-06 PROCEDURE — 3052F HG A1C>EQUAL 8.0%<EQUAL 9.0%: CPT | Performed by: NURSE PRACTITIONER

## 2021-01-12 ENCOUNTER — OFFICE VISIT (OUTPATIENT)
Dept: ENDOCRINOLOGY | Facility: HOSPITAL | Age: 61
End: 2021-01-12
Payer: COMMERCIAL

## 2021-01-12 VITALS
HEIGHT: 67 IN | SYSTOLIC BLOOD PRESSURE: 110 MMHG | HEART RATE: 80 BPM | DIASTOLIC BLOOD PRESSURE: 66 MMHG | BODY MASS INDEX: 35.12 KG/M2 | WEIGHT: 223.8 LBS

## 2021-01-12 DIAGNOSIS — E78.5 HYPERLIPIDEMIA LDL GOAL <100: ICD-10-CM

## 2021-01-12 DIAGNOSIS — Z79.4 TYPE 2 DIABETES MELLITUS WITHOUT COMPLICATION, WITH LONG-TERM CURRENT USE OF INSULIN (HCC): Primary | ICD-10-CM

## 2021-01-12 DIAGNOSIS — E55.9 VITAMIN D DEFICIENCY: ICD-10-CM

## 2021-01-12 DIAGNOSIS — E11.9 TYPE 2 DIABETES MELLITUS WITHOUT COMPLICATION, WITH LONG-TERM CURRENT USE OF INSULIN (HCC): Primary | ICD-10-CM

## 2021-01-12 PROCEDURE — 1036F TOBACCO NON-USER: CPT | Performed by: NURSE PRACTITIONER

## 2021-01-12 PROCEDURE — 3008F BODY MASS INDEX DOCD: CPT | Performed by: NURSE PRACTITIONER

## 2021-01-12 PROCEDURE — 99214 OFFICE O/P EST MOD 30 MIN: CPT | Performed by: NURSE PRACTITIONER

## 2021-01-12 NOTE — PROGRESS NOTES
Nader Parekh 64 y o  female MRN: [id number]    Encounter: [id number]      Assessment/Plan     Assessment: This is a 64y o -year-old female with type 2 diabetes with long term insulin use, hyperlipidemia and vitamin-D deficiency  Plan:  1  Type 2 diabetes with long-term insulin use:  Her hemoglobin A1c is elevated to 8 5  For now, she will continue her current regimen of Victoza and Metformin consistently  I have asked her to increase her Levemir to 40 units   She will continue with positive lifestyle changes with regard to diet and exercise   I have asked her to continue checking her blood sugars regularly and send a record to the office in 2 weeks for review   Reviewed recognition and proper treatment of hypoglycemic episodes  Check hemoglobin A1c and comprehensive metabolic panel prior to next visit       2  Hyperlipidemia:  Cholesterol continues to be elevated  She is not currently interested in pursuing statin therapy  Continue CQ10 and red yeast rice 600 mg daily - consistently        3   Vitamin-D deficiency:  Continue supplementation  with 5000 units of vitamin D3 daily - consistently       CC: Type 2 Diabetes follow-up    History of Present Illness     HPI:  64 y  o  year old female with type 2 diabetes for about 18 years   She is on oral agents and insulin at home and takes metformin XR 1000 mg daily with dinner, Victoza 1 8 mg daily and Levemir 40 units   She states that she has not been consistent with her Victoza daily  She has also has been stressed with job related issues  Ros Catherine most recent hemoglobin A1c from January 5, 2021 is 8  5   In the past, metformin dose was attempted to be increased but this caused gastrointestinal issues   She denies any polyuria, polydipsia, nocturia and blurry vision   She denies neuropathy, nephropathy and retinopathy        Hypoglycemic episodes: No       The patient's last eye exam was on May 21, 2020   Her most recent diabetic foot exam was performed at her office visit on February 11, 2020       Blood Sugar/Glucometer/Pump/CGM review:  Glucometer download from December 30, 2020 to January 12, 2021 reveals only a few scattered readings which are elevated over 200 mg/dl at times      In the past, she has declined treatment for elevated cholesterol through her PCP   She has treating with Co Q10 and red yeast rice inconsistently  For her vitamin-D deficiency, she is supplementing with 1000 units of vitamin D3 when she can remember  Review of Systems   Constitutional: Negative  Negative for chills, fatigue and fever  HENT: Negative  Negative for trouble swallowing and voice change  Eyes: Negative  Negative for visual disturbance  Respiratory: Negative  Negative for chest tightness and shortness of breath  Cardiovascular: Negative  Negative for chest pain  Gastrointestinal: Negative  Negative for abdominal pain, constipation, diarrhea and vomiting  Endocrine: Positive for polyuria ( once per night nocturia)  Negative for cold intolerance, heat intolerance, polydipsia and polyphagia  Genitourinary: Negative  Musculoskeletal: Negative  Skin: Negative  Allergic/Immunologic: Negative  Neurological: Negative  Negative for dizziness, syncope, light-headedness and headaches  Hematological: Negative  Psychiatric/Behavioral: The patient is nervous/anxious  All other systems reviewed and are negative  Historical Information   Past Medical History:   Diagnosis Date    Anxiety     Condyloma acuminatum     Diabetes mellitus ([facility] [address] ) 2002    type 2    Genital warts     Herpes simplex     HPV (human papilloma virus) infection      History reviewed  No pertinent surgical history    Social History   Social History     Substance and Sexual Activity   Alcohol Use Yes    Frequency: Monthly or less     Social History     Substance and Sexual Activity   Drug Use Yes    Types: Marijuana     Social History     Tobacco Use   Smoking Status Never Smoker   Smokeless Tobacco Never Used     Family History:   Family History   Problem Relation Age of Onset    Diabetes Father     Heart disease Father     ALS Father     Diabetes Brother     Alzheimer's disease Mother     Colon cancer Maternal Grandfather        Meds/Allergies   Current Outpatient Medications   Medication Sig Dispense Refill    Blood Glucose Monitoring Suppl (Delores Nathan) w/Device KIT by Does not apply route daily 1 kit 0    Cholecalciferol (VITAMIN D PO) Take by mouth      Coenzyme Q10-Red Yeast Rice (CO Q-10 PLUS RED YEAST RICE PO) Take 2 capsules by mouth      ELDERBERRY PO Take by mouth      glucose blood (OneTouch Verio) test strip Check sugars 2-4 times a day 100 each 5    insulin detemir (Levemir FlexTouch) 100 Units/mL injection pen Inject 40 Units under the skin daily 12 pen 1    Lancets (ONETOUCH ULTRASOFT) lancets Check sugars 2-4 times a day 100 each 5    LUTEIN PO Take by mouth daily      LYSINE PO Take by mouth      metFORMIN (GLUCOPHAGE-XR) 500 mg 24 hr tablet TAKE 2 TABLETS BY MOUTH DAILY WITH DINNER 60 tablet 3    Nutritional Supplements (DHEA PO) Take by mouth      Omega-3 Fatty Acids (FISH OIL PO) Take by mouth      patient supplied medication [place]- adrenal cortex      QUERCETIN PO Take by mouth      triamcinolone (KENALOG) 0 1 % ointment Apply 1 application topically 2 (two) times a day To affected area 80 g 0    Victoza injection INJECT 0 3 ML (1 8 MG) UNDER THE SKIN ONCE DAILY 9 pen 5    VITAMIN E PO Take by mouth      CRANBERRY PO Take by mouth      Insulin Pen Needle (B-D ULTRAFINE III SHORT PEN) 31G X 8 MM MISC Inject under the skin 5 (five) times a day 100 each 5    Probiotic Product (PROBIOTIC PO) Take by mouth daily       No current facility-administered medications for this visit  Allergies   Allergen Reactions    Eggs Or Egg-Derived Products      Upset stomach          Objective   Vitals: Blood pressure 110/66, pulse 80, height 5' 7" (1 702 m), weight 102 kg (223 lb 12 8 oz)  Physical Exam  Constitutional:       Appearance: She is well-developed  She is obese  HENT:      Head: Normocephalic and atraumatic  Eyes:      Conjunctiva/sclera: Conjunctivae normal       Pupils: Pupils are equal, round, and reactive to light  Neck:      Musculoskeletal: Normal range of motion and neck supple  Cardiovascular:      Rate and Rhythm: Normal rate and regular rhythm  Heart sounds: Normal heart sounds  Pulmonary:      Effort: Pulmonary effort is normal       Breath sounds: Normal breath sounds  Abdominal:      General: Bowel sounds are normal       Palpations: Abdomen is soft  Musculoskeletal: Normal range of motion  Skin:     General: Skin is warm and dry  Neurological:      Mental Status: She is alert and oriented to person, place, and time  Psychiatric:         Behavior: Behavior normal          Thought Content:  Thought content normal          Judgment: Judgment normal        Lab Results:   Lab Results   Component Value Date/Time    Hemoglobin A1C 8 5 (H) 01/05/2021 09:48 AM    Hemoglobin A1C 6 9 (H) 09/04/2020 07:40 AM    Hemoglobin A1C 7 5 (H) 05/19/2020 07:12 AM    White Blood Cell Count 5 8 02/10/2020 07:38 AM    Hemoglobin 13 3 02/10/2020 07:38 AM    HCT 39 6 02/10/2020 07:38 AM    MCV 84 02/10/2020 07:38 AM    Platelet Count 736 62/90/2478 07:38 AM    BUN 12 01/05/2021 09:48 AM    BUN 11 09/04/2020 07:40 AM    BUN 9 05/19/2020 07:12 AM    Potassium 4 2 01/05/2021 09:48 AM    Potassium 4 0 09/04/2020 07:40 AM    Potassium 4 4 05/19/2020 07:12 AM    Chloride 101 01/05/2021 09:48 AM    Chloride 106 09/04/2020 07:40 AM    Chloride 106 05/19/2020 07:12 AM    CO2 27 01/05/2021 09:48 AM    CO2 20 09/04/2020 07:40 AM    CO2 25 05/19/2020 07:12 AM    Creatinine 0 82 01/05/2021 09:48 AM    Creatinine 0 85 09/04/2020 07:40 AM    Creatinine 0 80 05/19/2020 07:12 AM    AST 16 01/05/2021 09:48 AM    AST 18 09/04/2020 07:40 AM    AST 15 05/19/2020 07:12 AM    ALT 14 01/05/2021 09:48 AM    ALT 21 09/04/2020 07:40 AM    ALT 17 05/19/2020 07:12 AM    Albumin 4 3 01/05/2021 09:48 AM    Albumin 4 3 09/04/2020 07:40 AM    Albumin 4 0 05/19/2020 07:12 AM    Globulin, Total 2 6 01/05/2021 09:48 AM    Globulin, Total 2 3 09/04/2020 07:40 AM    Globulin, Total 2 3 05/19/2020 07:12 AM    HDL 48 01/05/2021 09:48 AM    HDL 46 05/19/2020 07:12 AM    HDL 56 02/10/2020 07:38 AM    Triglycerides 170 (H) 01/05/2021 09:48 AM    Triglycerides 182 (H) 05/19/2020 07:12 AM    Triglycerides 123 02/10/2020 07:38 AM     Portions of the record may have been created with voice recognition software  Occasional wrong word or "sound a like" substitutions may have occurred due to the inherent limitations of voice recognition software  Read the chart carefully and recognize, using context, where substitutions have occurred

## 2021-01-12 NOTE — PATIENT INSTRUCTIONS
Be mindful of diet      Stay active and stay hydrated        For now, continue your current regimen of metformin and Victoza - consistently  Please increase Levemir to 44 units      Check blood sugars at least twice daily at alternating times and send a record to the office in 2 weeks for review      Obtain lab work as prescribed      Supplement with 2000 units of vitamin D3 daily - consistently      Continue Co Q10 with Red Yeast Rice

## 2021-01-17 DIAGNOSIS — Z79.4 TYPE 2 DIABETES MELLITUS WITHOUT COMPLICATION, WITH LONG-TERM CURRENT USE OF INSULIN (HCC): ICD-10-CM

## 2021-01-17 DIAGNOSIS — E11.9 TYPE 2 DIABETES MELLITUS WITHOUT COMPLICATION, WITH LONG-TERM CURRENT USE OF INSULIN (HCC): ICD-10-CM

## 2021-01-18 RX ORDER — BLOOD SUGAR DIAGNOSTIC
STRIP MISCELLANEOUS
Qty: 100 EACH | Refills: 5 | Status: SHIPPED | OUTPATIENT
Start: 2021-01-18 | End: 2022-03-03

## 2021-01-24 DIAGNOSIS — E11.9 TYPE 2 DIABETES MELLITUS WITHOUT COMPLICATION, WITH LONG-TERM CURRENT USE OF INSULIN (HCC): ICD-10-CM

## 2021-01-24 DIAGNOSIS — Z79.4 TYPE 2 DIABETES MELLITUS WITHOUT COMPLICATION, WITH LONG-TERM CURRENT USE OF INSULIN (HCC): ICD-10-CM

## 2021-01-25 RX ORDER — METFORMIN HYDROCHLORIDE 500 MG/1
TABLET, EXTENDED RELEASE ORAL
Qty: 60 TABLET | Refills: 3 | Status: SHIPPED | OUTPATIENT
Start: 2021-01-25 | End: 2021-05-24

## 2021-03-10 DIAGNOSIS — Z23 ENCOUNTER FOR IMMUNIZATION: ICD-10-CM

## 2021-04-08 DIAGNOSIS — L20.84 INTRINSIC ECZEMA: ICD-10-CM

## 2021-04-28 DIAGNOSIS — L20.84 INTRINSIC ECZEMA: ICD-10-CM

## 2021-04-28 DIAGNOSIS — Z79.4 TYPE 2 DIABETES MELLITUS WITHOUT COMPLICATION, WITH LONG-TERM CURRENT USE OF INSULIN (HCC): ICD-10-CM

## 2021-04-28 DIAGNOSIS — E11.9 TYPE 2 DIABETES MELLITUS WITHOUT COMPLICATION, WITH LONG-TERM CURRENT USE OF INSULIN (HCC): ICD-10-CM

## 2021-04-29 RX ORDER — INSULIN DETEMIR 100 [IU]/ML
40 INJECTION, SOLUTION SUBCUTANEOUS DAILY
Qty: 15 ML | Refills: 2 | Status: SHIPPED | OUTPATIENT
Start: 2021-04-29 | End: 2021-07-06

## 2021-05-23 DIAGNOSIS — E11.9 TYPE 2 DIABETES MELLITUS WITHOUT COMPLICATION, WITH LONG-TERM CURRENT USE OF INSULIN (HCC): ICD-10-CM

## 2021-05-23 DIAGNOSIS — Z79.4 TYPE 2 DIABETES MELLITUS WITHOUT COMPLICATION, WITH LONG-TERM CURRENT USE OF INSULIN (HCC): ICD-10-CM

## 2021-05-24 RX ORDER — METFORMIN HYDROCHLORIDE 500 MG/1
500 TABLET, EXTENDED RELEASE ORAL
Qty: 60 TABLET | Refills: 3 | Status: SHIPPED | OUTPATIENT
Start: 2021-05-24 | End: 2021-07-06 | Stop reason: SDUPTHER

## 2021-05-29 LAB
ALBUMIN SERPL-MCNC: 4.5 G/DL (ref 3.8–4.9)
ALBUMIN/CREAT UR: 25 MG/G CREAT (ref 0–29)
ALBUMIN/GLOB SERPL: 2 {RATIO} (ref 1.2–2.2)
ALP SERPL-CCNC: 68 IU/L (ref 48–121)
ALT SERPL-CCNC: 13 IU/L (ref 0–32)
AST SERPL-CCNC: 13 IU/L (ref 0–40)
BILIRUB SERPL-MCNC: 0.6 MG/DL (ref 0–1.2)
BUN SERPL-MCNC: 11 MG/DL (ref 6–24)
BUN/CREAT SERPL: 14 (ref 9–23)
CALCIUM SERPL-MCNC: 9.3 MG/DL (ref 8.7–10.2)
CHLORIDE SERPL-SCNC: 100 MMOL/L (ref 96–106)
CO2 SERPL-SCNC: 25 MMOL/L (ref 20–29)
CREAT SERPL-MCNC: 0.81 MG/DL (ref 0.57–1)
CREAT UR-MCNC: 39.5 MG/DL
EST. AVERAGE GLUCOSE BLD GHB EST-MCNC: 229 MG/DL
GLOBULIN SER-MCNC: 2.3 G/DL (ref 1.5–4.5)
GLUCOSE SERPL-MCNC: 232 MG/DL (ref 65–99)
HBA1C MFR BLD: 9.6 % (ref 4.8–5.6)
MICROALBUMIN UR-MCNC: 10 UG/ML
POTASSIUM SERPL-SCNC: 4.7 MMOL/L (ref 3.5–5.2)
PROT SERPL-MCNC: 6.8 G/DL (ref 6–8.5)
SL AMB EGFR AFRICAN AMERICAN: 93 ML/MIN/1.73
SL AMB EGFR NON AFRICAN AMERICAN: 80 ML/MIN/1.73
SODIUM SERPL-SCNC: 137 MMOL/L (ref 134–144)
TSH SERPL DL<=0.005 MIU/L-ACNC: 1.03 UIU/ML (ref 0.45–4.5)

## 2021-05-29 PROCEDURE — 3046F HEMOGLOBIN A1C LEVEL >9.0%: CPT | Performed by: NURSE PRACTITIONER

## 2021-05-29 PROCEDURE — 3061F NEG MICROALBUMINURIA REV: CPT | Performed by: NURSE PRACTITIONER

## 2021-06-02 ENCOUNTER — OFFICE VISIT (OUTPATIENT)
Dept: ENDOCRINOLOGY | Facility: HOSPITAL | Age: 61
End: 2021-06-02
Payer: COMMERCIAL

## 2021-06-02 VITALS
HEART RATE: 70 BPM | HEIGHT: 67 IN | WEIGHT: 222.6 LBS | DIASTOLIC BLOOD PRESSURE: 60 MMHG | SYSTOLIC BLOOD PRESSURE: 112 MMHG | BODY MASS INDEX: 34.94 KG/M2

## 2021-06-02 DIAGNOSIS — E11.9 TYPE 2 DIABETES MELLITUS WITHOUT COMPLICATION, WITH LONG-TERM CURRENT USE OF INSULIN (HCC): Primary | ICD-10-CM

## 2021-06-02 DIAGNOSIS — E78.5 HYPERLIPIDEMIA LDL GOAL <100: ICD-10-CM

## 2021-06-02 DIAGNOSIS — Z79.4 TYPE 2 DIABETES MELLITUS WITHOUT COMPLICATION, WITH LONG-TERM CURRENT USE OF INSULIN (HCC): Primary | ICD-10-CM

## 2021-06-02 DIAGNOSIS — E55.9 VITAMIN D DEFICIENCY: ICD-10-CM

## 2021-06-02 PROCEDURE — 1036F TOBACCO NON-USER: CPT | Performed by: NURSE PRACTITIONER

## 2021-06-02 PROCEDURE — 3008F BODY MASS INDEX DOCD: CPT | Performed by: NURSE PRACTITIONER

## 2021-06-02 PROCEDURE — 99214 OFFICE O/P EST MOD 30 MIN: CPT | Performed by: NURSE PRACTITIONER

## 2021-06-02 NOTE — PATIENT INSTRUCTIONS
Be mindful of diet      Stay active and stay hydrated        For now, continue your current regimen of metformin and Victoza - consistently      Please increase Levemir to 50 units      Check blood sugars at least twice daily at alternating times and send a record to the office in 2 weeks for review      Obtain lab work as prescribed      Supplement with 2000 units of vitamin D3 daily - consistently      Continue Co Q10 with Red Yeast Rice

## 2021-06-02 NOTE — PROGRESS NOTES
Coretta Lizama 64 y o  female MRN: [id number]    Encounter: [id number]      Assessment/Plan     Assessment: This is a 64y o -year-old female with type 2 diabetes with long term insulin use, hyperlipidemia and vitamin-D deficiency  Plan:  1  Type 2 diabetes with long-term insulin use:  Her hemoglobin A1c is elevated to 9 6  For now, she will continue her current regimen of Victoza and Metformin consistently  I have asked her to increase her Levemir to 50 units   She will continue with positive lifestyle changes with regard to diet and exercise  Offered medical nutrition therapy for help with her diet which was declined at this time  She is not interested in starting any new medications to help treat her diabetes at this time despite a lengthy conversation  Erin Beltre have asked her to continue checking her blood sugars regularly and send a record to the office in 2 weeks for review  Reviewed recognition and proper treatment of hypoglycemic episodes   Check hemoglobin A1c and comprehensive metabolic panel prior to next visit       2  Hyperlipidemia:  Continue CQ10 and red yeast rice 600 mg daily - consistently  She has not been interested in pursuing statin therapy in the past   Check fasting lipid panel prior to next visit       3  Vitamin-D deficiency:  Continue supplementation  with 5000 units of vitamin D3 daily - consistently       CC:   Type 2 Diabetes follow-up    History of Present Illness     HPI:  64 y  o  year old female with type 2 diabetes for about 18 years   She is on oral agents and insulin at home and takes metformin XR 1000 mg daily with dinner, Victoza 1 8 mg daily and Levemir 44 units   She states that she has not been consistent with her Victoza daily    She has also has been stressed with job related issues  Abelardo Daniel most recent hemoglobin A1c from May 28, 2021 is 9 6   In the past, metformin dose was attempted to be increased but this caused gastrointestinal issues   She denies any polyuria, polydipsia, nocturia and blurry vision   She denies neuropathy, nephropathy and retinopathy        Hypoglycemic episodes: No       The patient's last eye exam was on May 21, 2020  Her most recent diabetic foot exam was performed at her office visit on February 11, 2020       Blood Sugar/Glucometer/Pump/CGM review: Limited from May 24 through June 1, 2021 reveals only a few scattered readings which are elevated over 200 mg/dl at times      In the past, she has declined treatment for elevated cholesterol through her PCP   She has treating with CoQ10 and red yeast rice inconsistently      For her vitamin-D deficiency, she is supplementing with 1000 units of vitamin D3 when she can remember  Review of Systems   Constitutional: Negative  Negative for chills, fatigue and fever  HENT: Negative  Negative for trouble swallowing and voice change  Eyes: Negative  Negative for photophobia, pain, discharge, redness, itching and visual disturbance  Respiratory: Negative  Negative for chest tightness and shortness of breath  Cardiovascular: Negative  Negative for chest pain  Gastrointestinal: Negative  Negative for abdominal pain, constipation, diarrhea and vomiting  Endocrine: Positive for polyuria ( once per night nocturia)  Negative for cold intolerance, heat intolerance, polydipsia and polyphagia  Genitourinary: Negative  Musculoskeletal: Negative  Skin: Negative  Allergic/Immunologic: Negative  Neurological: Negative  Negative for dizziness, syncope, light-headedness and headaches  Hematological: Negative  Psychiatric/Behavioral: The patient is nervous/anxious  All other systems reviewed and are negative  Historical Information   Past Medical History:   Diagnosis Date    Anxiety     Condyloma acuminatum     Diabetes mellitus ([facility] [address] ) 2002    type 2    Genital warts     Herpes simplex     HPV (human papilloma virus) infection      No past surgical history on file    Social History   Social History     Substance and Sexual Activity   Alcohol Use Yes    Frequency: Monthly or less     Social History     Substance and Sexual Activity   Drug Use Yes    Types: Marijuana     Social History     Tobacco Use   Smoking Status Never Smoker   Smokeless Tobacco Never Used     Family History:   Family History   Problem Relation Age of Onset    Diabetes Father     Heart disease Father     ALS Father     Diabetes Brother     Alzheimer's disease Mother     Colon cancer Maternal Grandfather        Meds/Allergies   Current Outpatient Medications   Medication Sig Dispense Refill    Blood Glucose Monitoring Suppl Ashley Sanchez) w/Device KIT by Does not apply route daily 1 kit 0    Cholecalciferol (VITAMIN D PO) Take by mouth      Coenzyme Q10-Red Yeast Rice (CO Q-10 PLUS RED YEAST RICE PO) Take 2 capsules by mouth      CRANBERRY PO Take by mouth      ELDERBERRY PO Take by mouth      glucose blood (OneTouch Verio) test strip CHECK SUGARS 2-4 TIMES A  each 5    insulin detemir (Levemir FlexTouch) 100 Units/mL injection pen Inject 40 Units under the skin daily 15 mL 2    Insulin Pen Needle (B-D ULTRAFINE III SHORT PEN) 31G X 8 MM MISC Inject under the skin 5 (five) times a day 100 each 5    Lancets (ONETOUCH ULTRASOFT) lancets Check sugars 2-4 times a day 100 each 5    LUTEIN PO Take by mouth daily      LYSINE PO Take by mouth      metFORMIN (GLUCOPHAGE-XR) 500 mg 24 hr tablet Take 1 tablet (500 mg total) by mouth daily with dinner 60 tablet 3    Nutritional Supplements (DHEA PO) Take by mouth      Omega-3 Fatty Acids (FISH OIL PO) Take by mouth      patient supplied medication Isabelle Mtz- adrenal cortex      Probiotic Product (PROBIOTIC PO) Take by mouth daily      QUERCETIN PO Take by mouth      triamcinolone (KENALOG) 0 1 % ointment Apply 1 application topically 2 (two) times a day To affected area 80 g 0    Victoza injection INJECT 0 3 ML (1 8 MG) UNDER THE SKIN ONCE DAILY 9 pen 5    VITAMIN E PO Take by mouth       No current facility-administered medications for this visit  Allergies   Allergen Reactions    Eggs Or Egg-Derived Products - Food Allergy      Upset stomach  Objective   Vitals: There were no vitals taken for this visit  Physical Exam  Vitals signs reviewed  Constitutional:       Appearance: She is well-developed  She is obese  HENT:      Head: Normocephalic and atraumatic  Eyes:      Conjunctiva/sclera: Conjunctivae normal       Pupils: Pupils are equal, round, and reactive to light  Neck:      Musculoskeletal: Normal range of motion and neck supple  Cardiovascular:      Rate and Rhythm: Normal rate and regular rhythm  Heart sounds: Normal heart sounds  Pulmonary:      Effort: Pulmonary effort is normal       Breath sounds: Normal breath sounds  Abdominal:      General: Bowel sounds are normal       Palpations: Abdomen is soft  Musculoskeletal: Normal range of motion  Skin:     General: Skin is warm and dry  Neurological:      Mental Status: She is alert and oriented to person, place, and time  Psychiatric:         Behavior: Behavior normal          Thought Content:  Thought content normal          Judgment: Judgment normal        Lab Results:   Lab Results   Component Value Date/Time    Hemoglobin A1C 9 6 (H) 05/28/2021 12:00 PM    Hemoglobin A1C 8 5 (H) 01/05/2021 09:48 AM    Hemoglobin A1C 6 9 (H) 09/04/2020 07:40 AM    BUN 11 05/28/2021 12:00 PM    BUN 12 01/05/2021 09:48 AM    BUN 11 09/04/2020 07:40 AM    Potassium 4 7 05/28/2021 12:00 PM    Potassium 4 2 01/05/2021 09:48 AM    Potassium 4 0 09/04/2020 07:40 AM    Chloride 100 05/28/2021 12:00 PM    Chloride 101 01/05/2021 09:48 AM    Chloride 106 09/04/2020 07:40 AM    CO2 25 05/28/2021 12:00 PM    CO2 27 01/05/2021 09:48 AM    CO2 20 09/04/2020 07:40 AM    Creatinine 0 81 05/28/2021 12:00 PM    Creatinine 0 82 01/05/2021 09:48 AM    Creatinine 0 85 09/04/2020 07:40 AM AST 13 05/28/2021 12:00 PM    AST 16 01/05/2021 09:48 AM    AST 18 09/04/2020 07:40 AM    ALT 13 05/28/2021 12:00 PM    ALT 14 01/05/2021 09:48 AM    ALT 21 09/04/2020 07:40 AM    Albumin 4 5 05/28/2021 12:00 PM    Albumin 4 3 01/05/2021 09:48 AM    Albumin 4 3 09/04/2020 07:40 AM    Globulin, Total 2 3 05/28/2021 12:00 PM    Globulin, Total 2 6 01/05/2021 09:48 AM    Globulin, Total 2 3 09/04/2020 07:40 AM    HDL 48 01/05/2021 09:48 AM    Triglycerides 170 (H) 01/05/2021 09:48 AM       Portions of the record may have been created with voice recognition software  Occasional wrong word or "sound a like" substitutions may have occurred due to the inherent limitations of voice recognition software  Read the chart carefully and recognize, using context, where substitutions have occurred

## 2021-07-06 ENCOUNTER — OFFICE VISIT (OUTPATIENT)
Dept: FAMILY MEDICINE CLINIC | Facility: CLINIC | Age: 61
End: 2021-07-06
Payer: COMMERCIAL

## 2021-07-06 VITALS
HEIGHT: 67 IN | DIASTOLIC BLOOD PRESSURE: 72 MMHG | SYSTOLIC BLOOD PRESSURE: 112 MMHG | BODY MASS INDEX: 35.39 KG/M2 | WEIGHT: 225.5 LBS | TEMPERATURE: 97.6 F | OXYGEN SATURATION: 97 % | HEART RATE: 67 BPM

## 2021-07-06 DIAGNOSIS — F43.12 CHRONIC POST-TRAUMATIC STRESS DISORDER (PTSD): ICD-10-CM

## 2021-07-06 DIAGNOSIS — E11.9 TYPE 2 DIABETES MELLITUS WITHOUT COMPLICATION, WITH LONG-TERM CURRENT USE OF INSULIN (HCC): Primary | ICD-10-CM

## 2021-07-06 DIAGNOSIS — F41.1 GENERALIZED ANXIETY DISORDER: ICD-10-CM

## 2021-07-06 DIAGNOSIS — Z79.4 TYPE 2 DIABETES MELLITUS WITHOUT COMPLICATION, WITH LONG-TERM CURRENT USE OF INSULIN (HCC): Primary | ICD-10-CM

## 2021-07-06 DIAGNOSIS — Z12.31 ENCOUNTER FOR SCREENING MAMMOGRAM FOR MALIGNANT NEOPLASM OF BREAST: ICD-10-CM

## 2021-07-06 PROBLEM — E78.01 FAMILIAL HYPERCHOLESTEROLEMIA: Status: ACTIVE | Noted: 2021-07-06

## 2021-07-06 PROBLEM — I10 ESSENTIAL HYPERTENSION: Status: ACTIVE | Noted: 2021-07-06

## 2021-07-06 PROCEDURE — 99214 OFFICE O/P EST MOD 30 MIN: CPT | Performed by: FAMILY MEDICINE

## 2021-07-06 PROCEDURE — 3008F BODY MASS INDEX DOCD: CPT | Performed by: FAMILY MEDICINE

## 2021-07-06 PROCEDURE — 3725F SCREEN DEPRESSION PERFORMED: CPT | Performed by: FAMILY MEDICINE

## 2021-07-06 PROCEDURE — 1036F TOBACCO NON-USER: CPT | Performed by: FAMILY MEDICINE

## 2021-07-06 RX ORDER — CHROMIUM 200 MCG
TABLET ORAL
COMMUNITY
End: 2022-03-03

## 2021-07-06 RX ORDER — INSULIN DETEMIR 100 [IU]/ML
50 INJECTION, SOLUTION SUBCUTANEOUS DAILY
Qty: 15 ML | Refills: 2
Start: 2021-07-06 | End: 2021-08-07

## 2021-07-06 RX ORDER — METFORMIN HYDROCHLORIDE 500 MG/1
1000 TABLET, EXTENDED RELEASE ORAL 2 TIMES DAILY WITH MEALS
Qty: 360 TABLET | Refills: 1 | Status: SHIPPED | OUTPATIENT
Start: 2021-07-06 | End: 2022-02-10 | Stop reason: SDUPTHER

## 2021-07-06 RX ORDER — ESCITALOPRAM OXALATE 10 MG/1
10 TABLET ORAL DAILY
Qty: 30 TABLET | Refills: 5 | Status: SHIPPED | OUTPATIENT
Start: 2021-07-06 | End: 2021-10-14

## 2021-07-06 NOTE — PROGRESS NOTES
BMI Counseling: Body mass index is 35 32 kg/m²  The BMI is above normal  Exercise recommendations include exercising 3-5 times per week  Subjective:   Chief Complaint   Patient presents with    Follow-up     PT COMES IN FOR HER 6 MONTH SUGAR FOLLOW UP    BW COMPLETED 05/28  MAMMO TO BE SCHEDULED  COVID VACCINE COMPLETED   Anxiety     ELEVATED ANXIETY         Patient ID: Latonia Kate is a 64 y o  female  Patient is here today to follow-up on her diabetes and chronic medical conditions  She has PTSD and generalized anxiety  She is not depressed   She wants to start an SSRI as she has a friend who is on one and was talking to her about how well she is doing on it   When the patient gets very anxious she feels like her sugars correlate and are very high   Her A1c has gone from 6 9 and 9 6 in the past year   She is seeing Endocrinology   They have increased her insulin and according to their note discussed different medications with her  According to their note she refused other medications   She did increase the insulin but tells me she is going to take it back down as she has not seen a difference   She is absolutely convinced that when she is anxious her sugars are high and when she is not anxious or sugars are lower  She also has a lot of problems with her teeth - she has a large hole now after a root canal       The following portions of the patient's history were reviewed and updated as appropriate: allergies, current medications, past family history, past medical history, past social history, past surgical history and problem list     Review of Systems      Objective:  Vitals:    07/06/21 1128   BP: 112/72   Pulse: 67   Temp: 97 6 °F (36 4 °C)   SpO2: 97%   Weight: 102 kg (225 lb 8 oz)   Height: 5' 7" (1 702 m)      Physical Exam  Cardiovascular:      Pulses: no weak pulses          Dorsalis pedis pulses are 2+ on the right side and 2+ on the left side     Feet:      Right foot:      Skin integrity: No ulcer, skin breakdown, erythema, warmth, callus or dry skin  Left foot:      Skin integrity: No ulcer, skin breakdown, erythema, warmth, callus or dry skin  Patient's shoes and socks removed  Right Foot/Ankle   Right Foot Inspection  Skin Exam: skin normal and skin intact no dry skin, no warmth, no callus, no erythema, no maceration, no abnormal color, no pre-ulcer, no ulcer and no callus                            Sensory       Monofilament testing: intact  Vascular    The right DP pulse is 2+  Left Foot/Ankle  Left Foot Inspection  Skin Exam: skin normal and skin intactno dry skin, no warmth, no erythema, no maceration, normal color, no pre-ulcer, no ulcer and no callus                                         Sensory       Monofilament: intact  Vascular    The left DP pulse is 2+  Assign Risk Category:  No deformity present; No loss of protective sensation; No weak pulses       Risk: 0        Assessment/Plan:    Type 2 diabetes mellitus without complication, with long-term current use of insulin ([address] )    Recent labs and endocrinology note reviewed  She was going to take the insulin back down but I advised that she continue the 50 units as she says her fasting sugars were about 170-180  I advised that the  Fasting sugars are would drive the dose of that insulin and her fasting sugars were not controlled so she should stick with the increased to 50 units  She is also only taking 1000 milligrams of metformin a day  I am going to have her try to add 500 milligrams in the morning and if she is doing well with that increase that to 1000 milligrams  She has had one episode of significant diarrhea with metformin in the past but has been doing well with her current dose so I am hopeful she can increase the dose  We had a long discussion about the fact that acute anxiety should not necessarily acutely increase her sugars  Chronic anxiety can do that    Her chronic dental work in dental problems can increase her sugars  That being said, her sugars have increased steadily over the past year and I believe the medication changes or additions are going to need to be made  She was reluctant to do this but I advised that if we do not get her diabetes better controlled she certainly is at risk for long-term affects including things like kidney failure and dialysis  Since I made these changes in since we had this discussion I am also going to forward this note to endocrinology  Lab Results   Component Value Date    HGBA1C 9 6 (H) 05/28/2021       Generalized anxiety disorder    The patient does suffer with chronic anxiety and PTSD and she is not currently on any medication for that  I am very willing to give her an SSRI to see if that helps  We discussed that sometimes this can take many weeks to start working  As long as she is feeling well on it she should keep taking it and we can always increase the dose if needed  Diagnoses and all orders for this visit:    Type 2 diabetes mellitus without complication, with long-term current use of insulin (HCC)  -     metFORMIN (GLUCOPHAGE-XR) 500 mg 24 hr tablet; Take 2 tablets (1,000 mg total) by mouth 2 (two) times a day with meals  -     insulin detemir (Levemir FlexTouch) 100 Units/mL injection pen; Inject 50 Units under the skin daily    Generalized anxiety disorder  -     escitalopram (LEXAPRO) 10 mg tablet; Take 1 tablet (10 mg total) by mouth daily    Chronic post-traumatic stress disorder (PTSD)  -     escitalopram (LEXAPRO) 10 mg tablet;  Take 1 tablet (10 mg total) by mouth daily    Encounter for screening mammogram for malignant neoplasm of breast  -     Mammo screening bilateral w 3d & cad; Future    Other orders  -     Chromium (Chromium GTF) 200 MCG TABS; Take by mouth  -     ASHWAGANDHA PO; Take by mouth

## 2021-07-06 NOTE — Clinical Note
I had a long discussion with the patient about the fact that her acute anxiety is not causing her acute blood pressures sugar elevations  I do not think she believes me, still  I did tell her that we really need to adjust medications and encourage that she continue to discuss this with you  She was going to take her insulin dose back down and I think I may have convinced her to keep it where it is  I did also talk with her about increasing the metformin which she may or may not try  She is interested to know what medication changes might be made and does respond to her my chart messages so if you have thoughts your could certainly send her a message

## 2021-07-06 NOTE — ASSESSMENT & PLAN NOTE
The patient does suffer with chronic anxiety and PTSD and she is not currently on any medication for that  I am very willing to give her an SSRI to see if that helps  We discussed that sometimes this can take many weeks to start working  As long as she is feeling well on it she should keep taking it and we can always increase the dose if needed

## 2021-07-06 NOTE — ASSESSMENT & PLAN NOTE
Recent labs and endocrinology note reviewed  She was going to take the insulin back down but I advised that she continue the 50 units as she says her fasting sugars were about 170-180  I advised that the  Fasting sugars are would drive the dose of that insulin and her fasting sugars were not controlled so she should stick with the increased to 50 units  She is also only taking 1000 milligrams of metformin a day  I am going to have her try to add 500 milligrams in the morning and if she is doing well with that increase that to 1000 milligrams  She has had one episode of significant diarrhea with metformin in the past but has been doing well with her current dose so I am hopeful she can increase the dose  We had a long discussion about the fact that acute anxiety should not necessarily acutely increase her sugars  Chronic anxiety can do that  Her chronic dental work in dental problems can increase her sugars  That being said, her sugars have increased steadily over the past year and I believe the medication changes or additions are going to need to be made  She was reluctant to do this but I advised that if we do not get her diabetes better controlled she certainly is at risk for long-term affects including things like kidney failure and dialysis  Since I made these changes in since we had this discussion I am also going to forward this note to endocrinology        Lab Results   Component Value Date    HGBA1C 9 6 (H) 05/28/2021

## 2021-08-07 DIAGNOSIS — Z79.4 TYPE 2 DIABETES MELLITUS WITHOUT COMPLICATION, WITH LONG-TERM CURRENT USE OF INSULIN (HCC): ICD-10-CM

## 2021-08-07 DIAGNOSIS — E11.9 TYPE 2 DIABETES MELLITUS WITHOUT COMPLICATION, WITH LONG-TERM CURRENT USE OF INSULIN (HCC): ICD-10-CM

## 2021-08-07 RX ORDER — INSULIN DETEMIR 100 [IU]/ML
INJECTION, SOLUTION SUBCUTANEOUS
Qty: 45 ML | Refills: 2 | Status: SHIPPED | OUTPATIENT
Start: 2021-08-07 | End: 2022-03-03

## 2021-10-09 LAB
ALBUMIN SERPL-MCNC: 4.5 G/DL (ref 3.8–4.9)
ALBUMIN/GLOB SERPL: 1.9 {RATIO} (ref 1.2–2.2)
ALP SERPL-CCNC: 55 IU/L (ref 44–121)
ALT SERPL-CCNC: 15 IU/L (ref 0–32)
AST SERPL-CCNC: 17 IU/L (ref 0–40)
BASOPHILS # BLD AUTO: 0.1 X10E3/UL (ref 0–0.2)
BASOPHILS NFR BLD AUTO: 1 %
BILIRUB SERPL-MCNC: 0.8 MG/DL (ref 0–1.2)
BUN SERPL-MCNC: 14 MG/DL (ref 6–24)
BUN/CREAT SERPL: 16 (ref 9–23)
CALCIUM SERPL-MCNC: 9.8 MG/DL (ref 8.7–10.2)
CHLORIDE SERPL-SCNC: 99 MMOL/L (ref 96–106)
CHOLEST SERPL-MCNC: 221 MG/DL (ref 100–199)
CHOLEST/HDLC SERPL: 4.3 RATIO (ref 0–4.4)
CO2 SERPL-SCNC: 24 MMOL/L (ref 20–29)
CREAT SERPL-MCNC: 0.85 MG/DL (ref 0.57–1)
EOSINOPHIL # BLD AUTO: 0.2 X10E3/UL (ref 0–0.4)
EOSINOPHIL NFR BLD AUTO: 3 %
ERYTHROCYTE [DISTWIDTH] IN BLOOD BY AUTOMATED COUNT: 12.8 % (ref 11.7–15.4)
EST. AVERAGE GLUCOSE BLD GHB EST-MCNC: 214 MG/DL
GLOBULIN SER-MCNC: 2.4 G/DL (ref 1.5–4.5)
GLUCOSE SERPL-MCNC: 162 MG/DL (ref 65–99)
HBA1C MFR BLD: 9.1 % (ref 4.8–5.6)
HCT VFR BLD AUTO: 40.9 % (ref 34–46.6)
HDLC SERPL-MCNC: 51 MG/DL
HGB BLD-MCNC: 13.7 G/DL (ref 11.1–15.9)
IMM GRANULOCYTES # BLD: 0 X10E3/UL (ref 0–0.1)
IMM GRANULOCYTES NFR BLD: 0 %
LDLC SERPL CALC-MCNC: 144 MG/DL (ref 0–99)
LYMPHOCYTES # BLD AUTO: 2 X10E3/UL (ref 0.7–3.1)
LYMPHOCYTES NFR BLD AUTO: 40 %
MCH RBC QN AUTO: 28 PG (ref 26.6–33)
MCHC RBC AUTO-ENTMCNC: 33.5 G/DL (ref 31.5–35.7)
MCV RBC AUTO: 84 FL (ref 79–97)
MONOCYTES # BLD AUTO: 0.5 X10E3/UL (ref 0.1–0.9)
MONOCYTES NFR BLD AUTO: 9 %
NEUTROPHILS # BLD AUTO: 2.3 X10E3/UL (ref 1.4–7)
NEUTROPHILS NFR BLD AUTO: 47 %
PLATELET # BLD AUTO: 157 X10E3/UL (ref 150–450)
POTASSIUM SERPL-SCNC: 4.4 MMOL/L (ref 3.5–5.2)
PROT SERPL-MCNC: 6.9 G/DL (ref 6–8.5)
RBC # BLD AUTO: 4.89 X10E6/UL (ref 3.77–5.28)
SL AMB EGFR AFRICAN AMERICAN: 87 ML/MIN/1.73
SL AMB EGFR NON AFRICAN AMERICAN: 75 ML/MIN/1.73
SL AMB VLDL CHOLESTEROL CALC: 26 MG/DL (ref 5–40)
SODIUM SERPL-SCNC: 140 MMOL/L (ref 134–144)
TRIGL SERPL-MCNC: 145 MG/DL (ref 0–149)
WBC # BLD AUTO: 5 X10E3/UL (ref 3.4–10.8)

## 2021-10-09 PROCEDURE — 3046F HEMOGLOBIN A1C LEVEL >9.0%: CPT | Performed by: NURSE PRACTITIONER

## 2021-10-14 ENCOUNTER — OFFICE VISIT (OUTPATIENT)
Dept: ENDOCRINOLOGY | Facility: HOSPITAL | Age: 61
End: 2021-10-14
Payer: COMMERCIAL

## 2021-10-14 VITALS
HEIGHT: 67 IN | SYSTOLIC BLOOD PRESSURE: 124 MMHG | WEIGHT: 222.2 LBS | HEART RATE: 64 BPM | BODY MASS INDEX: 34.88 KG/M2 | DIASTOLIC BLOOD PRESSURE: 84 MMHG

## 2021-10-14 DIAGNOSIS — E11.9 TYPE 2 DIABETES MELLITUS WITHOUT COMPLICATION, WITH LONG-TERM CURRENT USE OF INSULIN (HCC): Primary | ICD-10-CM

## 2021-10-14 DIAGNOSIS — I10 ESSENTIAL HYPERTENSION: ICD-10-CM

## 2021-10-14 DIAGNOSIS — Z79.4 TYPE 2 DIABETES MELLITUS WITHOUT COMPLICATION, WITH LONG-TERM CURRENT USE OF INSULIN (HCC): Primary | ICD-10-CM

## 2021-10-14 DIAGNOSIS — E78.5 HYPERLIPIDEMIA LDL GOAL <100: ICD-10-CM

## 2021-10-14 DIAGNOSIS — E55.9 VITAMIN D DEFICIENCY: ICD-10-CM

## 2021-10-14 PROCEDURE — 1036F TOBACCO NON-USER: CPT | Performed by: NURSE PRACTITIONER

## 2021-10-14 PROCEDURE — 3008F BODY MASS INDEX DOCD: CPT | Performed by: NURSE PRACTITIONER

## 2021-10-14 PROCEDURE — 99214 OFFICE O/P EST MOD 30 MIN: CPT | Performed by: NURSE PRACTITIONER

## 2021-10-14 RX ORDER — GLIMEPIRIDE 1 MG/1
TABLET ORAL
Qty: 90 TABLET | Refills: 3 | Status: SHIPPED | OUTPATIENT
Start: 2021-10-14 | End: 2022-03-03

## 2021-12-06 ENCOUNTER — RA CDI HCC (OUTPATIENT)
Dept: OTHER | Facility: HOSPITAL | Age: 61
End: 2021-12-06

## 2021-12-27 ENCOUNTER — RA CDI HCC (OUTPATIENT)
Dept: OTHER | Facility: HOSPITAL | Age: 61
End: 2021-12-27

## 2022-01-10 ENCOUNTER — RA CDI HCC (OUTPATIENT)
Dept: OTHER | Facility: HOSPITAL | Age: 62
End: 2022-01-10

## 2022-01-10 NOTE — PROGRESS NOTES
[address]  coding opportunities          Number of diagnosis code(s) already on the problem list added to FYI fla     Chart Reviewed * (Number of) Inbasket suggestions sent to Provider: 1                  Patients insurance company: [employer] (Medicare Advantage and Commercial)     Visit status: Patient canceled the appointment     Provider never responded to [address]  coding request     [facility] [address]  coding opportunities          Number of diagnosis code(s) already on the problem list added to FYI fla     Chart Reviewed * (Number of) Inbasket suggestions sent to Provider: 1                  Patients insurance company: [employer] ([employer])           Refer to A1c lab dated 10/8/21 = 9 1  E11 65 Type 2 diabetes mellitus with hyperglycemia ([facility] [address] )     With the beginning of the new year, this is a reminder to address ALL HCC (risk adjustment) codes for  as patient scores reset to zero QUINCY   E11 9, Z79 4 Type 2 diabetes mellitus without complication, with long-term current use of insulin ([address] )

## 2022-02-07 DIAGNOSIS — Z79.4 TYPE 2 DIABETES MELLITUS WITHOUT COMPLICATION, WITH LONG-TERM CURRENT USE OF INSULIN (HCC): ICD-10-CM

## 2022-02-07 DIAGNOSIS — E11.9 TYPE 2 DIABETES MELLITUS WITHOUT COMPLICATION, WITH LONG-TERM CURRENT USE OF INSULIN (HCC): ICD-10-CM

## 2022-02-08 RX ORDER — PEN NEEDLE, DIABETIC 31 GX5/16"
NEEDLE, DISPOSABLE MISCELLANEOUS
Qty: 100 EACH | Refills: 5 | Status: SHIPPED | OUTPATIENT
Start: 2022-02-08 | End: 2022-03-03

## 2022-02-10 DIAGNOSIS — Z79.4 TYPE 2 DIABETES MELLITUS WITHOUT COMPLICATION, WITH LONG-TERM CURRENT USE OF INSULIN (HCC): ICD-10-CM

## 2022-02-10 DIAGNOSIS — E11.9 TYPE 2 DIABETES MELLITUS WITHOUT COMPLICATION, WITH LONG-TERM CURRENT USE OF INSULIN (HCC): ICD-10-CM

## 2022-02-10 RX ORDER — BLOOD-GLUCOSE METER
EACH MISCELLANEOUS DAILY
Qty: 1 KIT | Refills: 0 | Status: SHIPPED | OUTPATIENT
Start: 2022-02-10

## 2022-02-10 RX ORDER — METFORMIN HYDROCHLORIDE 500 MG/1
1000 TABLET, EXTENDED RELEASE ORAL
Qty: 180 TABLET | Refills: 0 | Status: SHIPPED | OUTPATIENT
Start: 2022-02-10 | End: 2022-03-03

## 2022-02-17 LAB
ALBUMIN SERPL-MCNC: 4.3 G/DL (ref 3.8–4.9)
ALBUMIN/GLOB SERPL: 1.7 {RATIO} (ref 1.2–2.2)
ALP SERPL-CCNC: 58 IU/L (ref 44–121)
ALT SERPL-CCNC: 17 IU/L (ref 0–32)
AST SERPL-CCNC: 18 IU/L (ref 0–40)
BASOPHILS # BLD AUTO: 0.1 X10E3/UL (ref 0–0.2)
BASOPHILS NFR BLD AUTO: 1 %
BILIRUB SERPL-MCNC: 0.6 MG/DL (ref 0–1.2)
BUN SERPL-MCNC: 12 MG/DL (ref 6–24)
BUN/CREAT SERPL: 15 (ref 9–23)
CALCIUM SERPL-MCNC: 10 MG/DL (ref 8.7–10.2)
CHLORIDE SERPL-SCNC: 101 MMOL/L (ref 96–106)
CO2 SERPL-SCNC: 26 MMOL/L (ref 20–29)
CREAT SERPL-MCNC: 0.82 MG/DL (ref 0.57–1)
EOSINOPHIL # BLD AUTO: 0.1 X10E3/UL (ref 0–0.4)
EOSINOPHIL NFR BLD AUTO: 3 %
ERYTHROCYTE [DISTWIDTH] IN BLOOD BY AUTOMATED COUNT: 12.5 % (ref 11.7–15.4)
EST. AVERAGE GLUCOSE BLD GHB EST-MCNC: 189 MG/DL
GLOBULIN SER-MCNC: 2.6 G/DL (ref 1.5–4.5)
GLUCOSE SERPL-MCNC: 153 MG/DL (ref 65–99)
HBA1C MFR BLD: 8.2 % (ref 4.8–5.6)
HCT VFR BLD AUTO: 41.8 % (ref 34–46.6)
HGB BLD-MCNC: 13.6 G/DL (ref 11.1–15.9)
IMM GRANULOCYTES # BLD: 0 X10E3/UL (ref 0–0.1)
IMM GRANULOCYTES NFR BLD: 0 %
LYMPHOCYTES # BLD AUTO: 1.9 X10E3/UL (ref 0.7–3.1)
LYMPHOCYTES NFR BLD AUTO: 39 %
MCH RBC QN AUTO: 27.4 PG (ref 26.6–33)
MCHC RBC AUTO-ENTMCNC: 32.5 G/DL (ref 31.5–35.7)
MCV RBC AUTO: 84 FL (ref 79–97)
MONOCYTES # BLD AUTO: 0.4 X10E3/UL (ref 0.1–0.9)
MONOCYTES NFR BLD AUTO: 9 %
NEUTROPHILS # BLD AUTO: 2.3 X10E3/UL (ref 1.4–7)
NEUTROPHILS NFR BLD AUTO: 48 %
PLATELET # BLD AUTO: 204 X10E3/UL (ref 150–450)
POTASSIUM SERPL-SCNC: 5.1 MMOL/L (ref 3.5–5.2)
PROT SERPL-MCNC: 6.9 G/DL (ref 6–8.5)
RBC # BLD AUTO: 4.96 X10E6/UL (ref 3.77–5.28)
SL AMB EGFR AFRICAN AMERICAN: 91 ML/MIN/1.73
SL AMB EGFR NON AFRICAN AMERICAN: 79 ML/MIN/1.73
SODIUM SERPL-SCNC: 139 MMOL/L (ref 134–144)
TSH SERPL DL<=0.005 MIU/L-ACNC: 0.97 UIU/ML (ref 0.45–4.5)
WBC # BLD AUTO: 4.9 X10E3/UL (ref 3.4–10.8)

## 2022-02-17 PROCEDURE — 3052F HG A1C>EQUAL 8.0%<EQUAL 9.0%: CPT | Performed by: NURSE PRACTITIONER

## 2022-02-23 ENCOUNTER — OFFICE VISIT (OUTPATIENT)
Dept: ENDOCRINOLOGY | Facility: HOSPITAL | Age: 62
End: 2022-02-23
Payer: COMMERCIAL

## 2022-02-23 VITALS
WEIGHT: 225.4 LBS | BODY MASS INDEX: 35.38 KG/M2 | HEIGHT: 67 IN | SYSTOLIC BLOOD PRESSURE: 124 MMHG | HEART RATE: 68 BPM | DIASTOLIC BLOOD PRESSURE: 80 MMHG

## 2022-02-23 DIAGNOSIS — Z79.4 TYPE 2 DIABETES MELLITUS WITHOUT COMPLICATION, WITH LONG-TERM CURRENT USE OF INSULIN (HCC): Primary | ICD-10-CM

## 2022-02-23 DIAGNOSIS — E78.5 HYPERLIPIDEMIA LDL GOAL <100: ICD-10-CM

## 2022-02-23 DIAGNOSIS — E55.9 VITAMIN D DEFICIENCY: ICD-10-CM

## 2022-02-23 DIAGNOSIS — E11.9 TYPE 2 DIABETES MELLITUS WITHOUT COMPLICATION, WITH LONG-TERM CURRENT USE OF INSULIN (HCC): Primary | ICD-10-CM

## 2022-02-23 PROCEDURE — 1036F TOBACCO NON-USER: CPT | Performed by: NURSE PRACTITIONER

## 2022-02-23 PROCEDURE — 99214 OFFICE O/P EST MOD 30 MIN: CPT | Performed by: NURSE PRACTITIONER

## 2022-02-23 PROCEDURE — 3008F BODY MASS INDEX DOCD: CPT | Performed by: NURSE PRACTITIONER

## 2022-02-23 NOTE — PROGRESS NOTES
Liliane Huerta 63 y o  female MRN: [id number]    Encounter: [id number]      Assessment/Plan     Assessment: This is a 63y o -year-old female with type 2 diabetes with long term insulin use, hyperlipidemia and vitamin-D deficiency  Plan:  1  Type 2 diabetes with long-term insulin use:  Her hemoglobin A1c is improved to 8 2 but remains above goal  For now, she will continue her current regimen of Glimepiride, Levemir and Metformin consistently    She will continue with positive lifestyle changes with regard to diet and exercise   I have asked her to continue checking her blood sugars regularly and send a record to the office in 2 weeks for review  Reviewed recognition and proper treatment of hypoglycemic episodes   Check hemoglobin A1c and comprehensive metabolic panel prior to next visit       2  Hyperlipidemia: Continue CQ10 and red yeast rice 600 mg daily - consistently   She has not been interested in pursuing statin therapy   Check fasting lipid panel prior to next visit       3  Vitamin-D deficiency:  Continue supplementation  with 5000 units of vitamin D3 daily - consistently       CC:  Type 2 Diabetes follow-up    History of Present Illness     HPI:  61 y  o  year old female with type 2 diabetes for about 18 years  She is on oral agents and insulin at home and takes metformin XR 1000 mg daily with dinner, glimepiride 1 mg with lunch and Levemir 40 units   She states that she has not been consistent with her Victoza daily   She has also has been stressed with job related issues  [facility] most recent hemoglobin A1c from February 16, 2022 is 8  2   In the past, metformin dose was attempted to be increased but this caused gastrointestinal issues   She denies any polyuria, polydipsia, nocturia and blurry vision   She denies neuropathy, nephropathy and retinopathy        Hypoglycemic episodes: No       The patient's last eye exam was on May 21, 2020   Her most recent diabetic foot exam was performed at her office visit on July 6, 2021       Blood Sugar/Glucometer/Pump/CGM review: Limited from February 9 through February 23, 2022 reveals she has been taking her blood sugars once daily with some scattered readings elevated over 200 mg/dl at times      In the past, she has declined treatment for elevated cholesterol through her PCP   She has treating with CoQ10 and red yeast rice inconsistently      For her vitamin-D deficiency, she is supplementing with 1000 units of vitamin D3 when she can remember  Review of Systems   Constitutional: Negative  Negative for chills, fatigue and fever  HENT: Negative  Negative for trouble swallowing and voice change  Eyes: Negative  Negative for visual disturbance  Respiratory: Negative  Negative for chest tightness and shortness of breath  Cardiovascular: Negative  Negative for chest pain  Gastrointestinal: Negative  Negative for abdominal pain, constipation, diarrhea and vomiting  Endocrine: Positive for polyuria ( once per night nocturia)  Negative for cold intolerance, heat intolerance, polydipsia and polyphagia  Genitourinary: Negative  Musculoskeletal: Negative  Skin: Negative  Allergic/Immunologic: Negative  Neurological: Negative  Negative for dizziness, syncope, light-headedness and headaches  Hematological: Negative  Psychiatric/Behavioral: The patient is nervous/anxious  All other systems reviewed and are negative  Historical Information   Past Medical History:   Diagnosis Date    Anxiety     Condyloma acuminatum     Diabetes mellitus ([facility] [address] ) 2002    type 2    Genital warts     Herpes simplex     HPV (human papilloma virus) infection      No past surgical history on file    Social History   Social History     Substance and Sexual Activity   Alcohol Use Yes     Social History     Substance and Sexual Activity   Drug Use Yes    Types: Marijuana     Social History     Tobacco Use   Smoking Status Never Smoker   Smokeless Tobacco Never Used     Family History:   Family History   Problem Relation Age of Onset    Diabetes Father     Heart disease Father     ALS Father     Diabetes Brother     Alzheimer's disease Mother     Colon cancer Maternal Grandfather        Meds/Allergies   Current Outpatient Medications   Medication Sig Dispense Refill    Blood Glucose Monitoring Suppl (OneTouch Verio) w/Device KIT Use daily 1 kit 0    Cholecalciferol (VITAMIN D PO) Take by mouth      Chromium (Chromium GTF) 200 MCG TABS Take by mouth      Coenzyme Q10-Red Yeast Rice (CO Q-10 PLUS RED YEAST RICE PO) Take 2 capsules by mouth      CRANBERRY PO Take by mouth      ELDERBERRY PO Take by mouth      glimepiride (AMARYL) 1 mg tablet 1 tablet daily with lunch  90 tablet 3    glucose blood (OneTouch Verio) test strip CHECK SUGARS 2-4 TIMES A  each 5    Insulin Pen Needle (B-D ULTRAFINE III SHORT PEN) 31G X 8 MM MISC Inject under the skin 5 (five) times a day 100 each 5    Lancets (ONETOUCH ULTRASOFT) lancets Check sugars 2-4 times a day 100 each 5    Levemir FlexTouch 100 units/mL injection pen INJECT 40 UNITS UNDER THE SKIN DAILY 45 mL 2    LUTEIN PO Take by mouth daily (Patient not taking: Reported on 10/14/2021)      Lutein-Zeaxanthin 25-5 MG CAPS       metFORMIN (GLUCOPHAGE-XR) 500 mg 24 hr tablet Take 2 tablets (1,000 mg total) by mouth daily with dinner 180 tablet 0    patient supplied medication Chun Berman- adrenal cortex      QUERCETIN PO Take by mouth      triamcinolone (KENALOG) 0 1 % ointment Apply 1 application topically 2 (two) times a day To affected area 80 g 0    Victoza injection INJECT 0 3 ML (1 8 MG) UNDER THE SKIN ONCE DAILY 9 pen 5    VITAMIN E PO Take by mouth       No current facility-administered medications for this visit  Allergies   Allergen Reactions    Eggs Or Egg-Derived Products - Food Allergy      Upset stomach   Milk-Related Compounds - Food Allergy Diarrhea       Objective   Vitals:  There were no vitals taken for this visit  Physical Exam  Vitals reviewed  Constitutional:       Appearance: She is well-developed  She is obese  HENT:      Head: Normocephalic and atraumatic  Eyes:      Conjunctiva/sclera: Conjunctivae normal       Pupils: Pupils are equal, round, and reactive to light  Cardiovascular:      Rate and Rhythm: Normal rate and regular rhythm  Heart sounds: Normal heart sounds  Pulmonary:      Effort: Pulmonary effort is normal       Breath sounds: Normal breath sounds  Abdominal:      General: Bowel sounds are normal       Palpations: Abdomen is soft  Musculoskeletal:         General: Normal range of motion  Cervical back: Normal range of motion and neck supple  Skin:     General: Skin is warm and dry  Neurological:      Mental Status: She is alert and oriented to person, place, and time  Psychiatric:         Behavior: Behavior normal          Thought Content:  Thought content normal          Judgment: Judgment normal        Lab Results:   Lab Results   Component Value Date/Time    Hemoglobin A1C 8 2 (H) 02/16/2022 10:51 AM    Hemoglobin A1C 9 1 (H) 10/08/2021 10:19 AM    Hemoglobin A1C 9 6 (H) 05/28/2021 12:00 PM    White Blood Cell Count 4 9 02/16/2022 10:51 AM    White Blood Cell Count 5 0 10/08/2021 10:19 AM    Hemoglobin 13 6 02/16/2022 10:51 AM    Hemoglobin 13 7 10/08/2021 10:19 AM    HCT 41 8 02/16/2022 10:51 AM    HCT 40 9 10/08/2021 10:19 AM    MCV 84 02/16/2022 10:51 AM    MCV 84 10/08/2021 10:19 AM    Platelet Count 319 61/89/8983 10:51 AM    Platelet Count 361 71/34/4505 10:19 AM    BUN 12 02/16/2022 10:51 AM    BUN 14 10/08/2021 10:19 AM    BUN 11 05/28/2021 12:00 PM    Potassium 5 1 02/16/2022 10:51 AM    Potassium 4 4 10/08/2021 10:19 AM    Potassium 4 7 05/28/2021 12:00 PM    Chloride 101 02/16/2022 10:51 AM    Chloride 99 10/08/2021 10:19 AM    Chloride 100 05/28/2021 12:00 PM    CO2 26 02/16/2022 10:51 AM    CO2 24 10/08/2021 10:19 AM    CO2 25 05/28/2021 12:00 PM    Creatinine 0 82 02/16/2022 10:51 AM    Creatinine 0 85 10/08/2021 10:19 AM    Creatinine 0 81 05/28/2021 12:00 PM    AST 18 02/16/2022 10:51 AM    AST 17 10/08/2021 10:19 AM    AST 13 05/28/2021 12:00 PM    ALT 17 02/16/2022 10:51 AM    ALT 15 10/08/2021 10:19 AM    ALT 13 05/28/2021 12:00 PM    Albumin 4 3 02/16/2022 10:51 AM    Albumin 4 5 10/08/2021 10:19 AM    Albumin 4 5 05/28/2021 12:00 PM    Globulin, Total 2 6 02/16/2022 10:51 AM    Globulin, Total 2 4 10/08/2021 10:19 AM    Globulin, Total 2 3 05/28/2021 12:00 PM    HDL 51 10/08/2021 10:19 AM    Triglycerides 145 10/08/2021 10:19 AM     Portions of the record may have been created with voice recognition software  Occasional wrong word or "sound a like" substitutions may have occurred due to the inherent limitations of voice recognition software  Read the chart carefully and recognize, using context, where substitutions have occurred

## 2022-02-23 NOTE — PATIENT INSTRUCTIONS
Be mindful of diet      Stay active and stay hydrated        For now, continue your current regimen of metformin        Please continue Levemir at current dose, for now      Continue glimepiride 1 mg with lunch      Check blood sugars at least twice daily at alternating times and send a record to the office in 2 weeks for review      Obtain lab work as prescribed      Supplement with 2000 units of vitamin D3 daily - consistently      Continue Co Q10 with Red Yeast Rice

## 2022-02-24 ENCOUNTER — RA CDI HCC (OUTPATIENT)
Dept: OTHER | Facility: HOSPITAL | Age: 62
End: 2022-02-24

## 2022-02-24 NOTE — PROGRESS NOTES
[facility]  coding opportunities             Chart Reviewed * (Number of) Inbasket suggestions sent to Provider: 1 [phone]                  Patients insurance company: [employer] (Medicare Advantage and Commercial)

## 2022-03-03 ENCOUNTER — OFFICE VISIT (OUTPATIENT)
Dept: FAMILY MEDICINE CLINIC | Facility: CLINIC | Age: 62
End: 2022-03-03
Payer: COMMERCIAL

## 2022-03-03 VITALS
WEIGHT: 228 LBS | DIASTOLIC BLOOD PRESSURE: 79 MMHG | SYSTOLIC BLOOD PRESSURE: 119 MMHG | HEIGHT: 67 IN | TEMPERATURE: 95.9 F | BODY MASS INDEX: 35.79 KG/M2 | HEART RATE: 66 BPM | OXYGEN SATURATION: 98 %

## 2022-03-03 DIAGNOSIS — L20.84 INTRINSIC ECZEMA: ICD-10-CM

## 2022-03-03 DIAGNOSIS — E11.9 TYPE 2 DIABETES MELLITUS WITHOUT COMPLICATION, WITH LONG-TERM CURRENT USE OF INSULIN (HCC): ICD-10-CM

## 2022-03-03 DIAGNOSIS — Z00.00 ANNUAL PHYSICAL EXAM: Primary | ICD-10-CM

## 2022-03-03 DIAGNOSIS — Z79.4 TYPE 2 DIABETES MELLITUS WITHOUT COMPLICATION, WITH LONG-TERM CURRENT USE OF INSULIN (HCC): ICD-10-CM

## 2022-03-03 PROCEDURE — 99396 PREV VISIT EST AGE 40-64: CPT | Performed by: PHYSICIAN ASSISTANT

## 2022-03-03 PROCEDURE — 1036F TOBACCO NON-USER: CPT | Performed by: PHYSICIAN ASSISTANT

## 2022-03-03 PROCEDURE — 3008F BODY MASS INDEX DOCD: CPT | Performed by: PHYSICIAN ASSISTANT

## 2022-03-03 PROCEDURE — 3725F SCREEN DEPRESSION PERFORMED: CPT | Performed by: PHYSICIAN ASSISTANT

## 2022-03-03 RX ORDER — LANCETS
EACH MISCELLANEOUS
Qty: 100 EACH | Refills: 5 | Status: SHIPPED | OUTPATIENT
Start: 2022-03-03

## 2022-03-03 RX ORDER — ASCORBIC ACID
200 CRYSTALS ORAL DAILY
Start: 2022-03-03

## 2022-03-03 RX ORDER — METFORMIN HYDROCHLORIDE 500 MG/1
1000 TABLET, EXTENDED RELEASE ORAL
Qty: 180 TABLET | Refills: 0
Start: 2022-03-03 | End: 2022-06-01

## 2022-03-03 RX ORDER — BLOOD SUGAR DIAGNOSTIC
STRIP MISCELLANEOUS
Qty: 100 EACH | Refills: 5 | Status: SHIPPED | OUTPATIENT
Start: 2022-03-03 | End: 2022-08-10

## 2022-03-03 RX ORDER — INSULIN DETEMIR 100 [IU]/ML
40 INJECTION, SOLUTION SUBCUTANEOUS DAILY
Qty: 45 ML | Refills: 2
Start: 2022-03-03 | End: 2022-05-02 | Stop reason: SDUPTHER

## 2022-03-03 RX ORDER — PEN NEEDLE, DIABETIC 31 GX5/16"
NEEDLE, DISPOSABLE MISCELLANEOUS
Qty: 100 EACH | Refills: 5 | Status: SHIPPED | OUTPATIENT
Start: 2022-03-03 | End: 2022-04-02

## 2022-03-03 RX ORDER — LORAZEPAM 2 MG
60 TABLET ORAL DAILY
Start: 2022-03-03

## 2022-03-03 RX ORDER — GLIMEPIRIDE 1 MG/1
TABLET ORAL
Qty: 90 TABLET | Refills: 3
Start: 2022-03-03 | End: 2022-05-02 | Stop reason: SDUPTHER

## 2022-03-03 RX ORDER — CHROMIUM 200 MCG
200 TABLET ORAL DAILY
Qty: 30 TABLET
Start: 2022-03-03

## 2022-03-03 RX ORDER — VIT C/VIT D3/E/ZINC/ELDERBERRY 65 MG-3.15
1 TABLET,CHEWABLE ORAL DAILY
Start: 2022-03-03 | End: 2022-06-01

## 2022-03-03 NOTE — PROGRESS NOTES
[id number]    NAME: Deb Bass  AGE: 63 y o  SEX: female  : 1960     DATE: 3/3/2022     Assessment and Plan:     Problem List Items Addressed This Visit     None      Visit Diagnoses     Annual physical exam    -  Primary          Immunizations and preventive care screenings were discussed with patient today  Appropriate education was printed on patient's after visit summary  Counseling:  · Dental Health: discussed importance of regular tooth brushing, flossing, and dental visits  No follow-ups on file  Chief Complaint:     Chief Complaint   Patient presents with    Physical Exam     yearly      History of Present Illness:     Adult Annual Physical   Patient here for a comprehensive physical exam  The patient reports no problems  Diet and Physical Activity  · Diet/Nutrition: well balanced diet  · Exercise: walking  Depression Screening  PHQ-2/9 Depression Screening    Little interest or pleasure in doing things: 0 - not at all  Feeling down, depressed, or hopeless: 0 - not at all  PHQ-2 Score: 0  PHQ-2 Interpretation: Negative depression screen       General Health  · Sleep: sleeps well  · Hearing: normal - bilateral   · Vision: no vision problems  · Dental: regular dental visits  /GYN Health  · Patient is: postmenopausal  · Last menstrual period: more than 5 years ago  · Contraceptive method: none needed        Review of Systems:     Review of Systems   Constitutional: Negative for appetite change, chills, diaphoresis, fatigue and fever  HENT: Negative for congestion, ear pain, hearing loss, rhinorrhea and sore throat  Eyes: Negative for pain, discharge and visual disturbance  Wears glasses  Respiratory: Negative for cough, chest tightness and shortness of breath  Gastrointestinal: Negative for abdominal pain, constipation, diarrhea, nausea and vomiting     Endocrine: Negative for polydipsia, polyphagia and polyuria  Genitourinary: Negative for dysuria, frequency, vaginal bleeding and vaginal discharge  Musculoskeletal: Negative for arthralgias, back pain, myalgias, neck pain and neck stiffness  Neurological: Negative for dizziness, tremors, weakness, light-headedness, numbness and headaches  Psychiatric/Behavioral: Negative for dysphoric mood, self-injury, sleep disturbance and suicidal ideas  The patient is not nervous/anxious  Past Medical History:     Past Medical History:   Diagnosis Date    Anxiety     Condyloma acuminatum     Diabetes mellitus ([facility] [address] ) 2002    type 2    Genital warts     Herpes simplex     HPV (human papilloma virus) infection       Past Surgical History:     History reviewed  No pertinent surgical history  Social History:     Social History     Socioeconomic History    Marital status:      Spouse name: None    Number of children: None    Years of education: None    Highest education level: None   Occupational History    None   Tobacco Use    Smoking status: Never Smoker    Smokeless tobacco: Never Used   Vaping Use    Vaping Use: Never used   Substance and Sexual Activity    Alcohol use:  Yes    Drug use: Yes     Types: Marijuana    Sexual activity: None   Other Topics Concern    None   Social History Narrative    None     Social Determinants of Health     Financial Resource Strain: Not on file   Food Insecurity: Not on file   Transportation Needs: Not on file   Physical Activity: Not on file   Stress: Not on file   Social Connections: Not on file   Intimate Partner Violence: Not on file   Housing Stability: Not on file      Family History:     Family History   Problem Relation Age of Onset    Diabetes Father     Heart disease Father     ALS Father     Diabetes Brother     Alzheimer's disease Mother     Colon cancer Maternal Grandfather       Current Medications:     Current Outpatient Medications   Medication Sig Dispense Refill    Blood Glucose Monitoring Suppl (OneTouch Verio) w/Device KIT Use daily 1 kit 0    Cholecalciferol (VITAMIN D PO) Take by mouth      Lutein-Zeaxanthin 25-5 MG CAPS        No current facility-administered medications for this visit  Allergies: Allergies   Allergen Reactions    Eggs Or Egg-Derived Products - Food Allergy      Upset stomach   Milk-Related Compounds - Food Allergy Diarrhea      Physical Exam:     /79   Pulse 66   Temp (!) 95 9 °F (35 5 °C)   Ht 5' 7" (1 702 m)   Wt 103 kg (228 lb)   SpO2 98%   BMI 35 71 kg/m²     Physical Exam  Vitals reviewed  Constitutional:       General: She is not in acute distress  Appearance: She is obese  She is not ill-appearing, toxic-appearing or diaphoretic  HENT:      Head: Normocephalic and atraumatic  Right Ear: Tympanic membrane, ear canal and external ear normal  There is no impacted cerumen  Left Ear: Tympanic membrane, ear canal and external ear normal  There is no impacted cerumen  Nose: Nose normal       Mouth/Throat:      Mouth: Mucous membranes are moist       Pharynx: No oropharyngeal exudate or posterior oropharyngeal erythema  Eyes:      General: No scleral icterus  Right eye: No discharge  Left eye: No discharge  Extraocular Movements: Extraocular movements intact  Conjunctiva/sclera: Conjunctivae normal    Cardiovascular:      Rate and Rhythm: Normal rate and regular rhythm  Pulses: Normal pulses  Heart sounds: Normal heart sounds  Pulmonary:      Effort: Pulmonary effort is normal       Breath sounds: Normal breath sounds  Abdominal:      General: There is no distension  Palpations: Abdomen is soft  There is no mass  Tenderness: There is no abdominal tenderness  There is no guarding or rebound  Hernia: No hernia is present  Musculoskeletal:         General: No swelling, tenderness, deformity or signs of injury   Normal range of motion  Right lower leg: No edema  Left lower leg: No edema  Neurological:      General: No focal deficit present  Mental Status: She is alert and oriented to person, place, and time  Cranial Nerves: No cranial nerve deficit  Sensory: No sensory deficit  Motor: No weakness  Coordination: Coordination normal    Psychiatric:         Mood and Affect: Mood normal          Behavior: Behavior normal          Thought Content:  Thought content normal          Judgment: Judgment normal           [facility], DYLAN Gorman

## 2022-03-03 NOTE — PATIENT INSTRUCTIONS

## 2022-05-02 DIAGNOSIS — Z79.4 TYPE 2 DIABETES MELLITUS WITHOUT COMPLICATION, WITH LONG-TERM CURRENT USE OF INSULIN (HCC): Primary | ICD-10-CM

## 2022-05-02 DIAGNOSIS — E11.9 TYPE 2 DIABETES MELLITUS WITHOUT COMPLICATION, WITH LONG-TERM CURRENT USE OF INSULIN (HCC): ICD-10-CM

## 2022-05-02 DIAGNOSIS — E11.9 TYPE 2 DIABETES MELLITUS WITHOUT COMPLICATION, WITH LONG-TERM CURRENT USE OF INSULIN (HCC): Primary | ICD-10-CM

## 2022-05-02 DIAGNOSIS — Z79.4 TYPE 2 DIABETES MELLITUS WITHOUT COMPLICATION, WITH LONG-TERM CURRENT USE OF INSULIN (HCC): ICD-10-CM

## 2022-05-02 RX ORDER — INSULIN DETEMIR 100 [IU]/ML
40 INJECTION, SOLUTION SUBCUTANEOUS DAILY
Qty: 45 ML | Refills: 0 | Status: SHIPPED | OUTPATIENT
Start: 2022-05-02

## 2022-05-02 RX ORDER — INSULIN DETEMIR 100 [IU]/ML
40 INJECTION, SOLUTION SUBCUTANEOUS DAILY
Qty: 45 ML | Refills: 0
Start: 2022-05-02 | End: 2022-05-02 | Stop reason: SDUPTHER

## 2022-05-03 RX ORDER — GLIMEPIRIDE 1 MG/1
TABLET ORAL
Qty: 90 TABLET | Refills: 0 | Status: SHIPPED | OUTPATIENT
Start: 2022-05-03 | End: 2022-07-12

## 2022-06-01 ENCOUNTER — TELEMEDICINE (OUTPATIENT)
Dept: FAMILY MEDICINE CLINIC | Facility: CLINIC | Age: 62
End: 2022-06-01
Payer: COMMERCIAL

## 2022-06-01 VITALS — HEIGHT: 67 IN | BODY MASS INDEX: 35.31 KG/M2 | WEIGHT: 225 LBS | TEMPERATURE: 99.2 F

## 2022-06-01 DIAGNOSIS — J06.9 VIRAL URI WITH COUGH: Primary | ICD-10-CM

## 2022-06-01 PROCEDURE — 99213 OFFICE O/P EST LOW 20 MIN: CPT | Performed by: PHYSICIAN ASSISTANT

## 2022-06-01 NOTE — PROGRESS NOTES
Virtual Regular Visit    Verification of patient location:    Patient is located in the following state in which I hold an active license [place]      Assessment/Plan:    URI- viral, recommend saline nasal flushes, and   Pushing fluids, monitor sx  If worsen call office     Reason for visit is   Chief Complaint   Patient presents with    Virtual Regular Visit     Cough, low grade fever for the past 9-10 days   Home covid test negative    Virtual Regular Visit        Encounter provider Shaw Kendrick PA-C    Provider located at [address] [place] [address]      Recent Visits  No visits were found meeting these conditions  Showing recent visits within past 7 days and meeting all other requirements  Today's Visits  Date Type Provider Dept   06/01/22 Telemedicine Shaw Kendrick PA-C [facility]   Showing today's visits and meeting all other requirements  Future Appointments  No visits were found meeting these conditions  Showing future appointments within next 150 days and meeting all other requirements       The patient was identified by name and date of birth  Araceli Giles was informed that this is a telemedicine visit and that the visit is being conducted through [address] [place] [address] Now and patient was informed that this is a secure, HIPAA-compliant platform  She agrees to proceed     My office door was closed  No one else was in the room  She acknowledged consent and understanding of privacy and security of the video platform  The patient has agreed to participate and understands they can discontinue the visit at any time  Patient is aware this is a billable service  Subjective  Araceli Giles is a 63 y o  female c/o head cold symptoms, dry cough since last Sunday, May 22, 2022  Congestion since  yesterday   Did home test for COVID   Monday evening, May 30th, and it was negative  Mucous is clear         Past Medical History:   Diagnosis Date    Anxiety     Condyloma acuminatum     Diabetes mellitus ([facility] [address] ) 2002    type 2    Genital warts     Herpes simplex     HPV (human papilloma virus) infection        No past surgical history on file  Current Outpatient Medications   Medication Sig Dispense Refill    Blood Glucose Monitoring Suppl (OneTouch Verio) w/Device KIT Use daily 1 kit 0    Cholecalciferol (VITAMIN D PO) Take by mouth      Chromium (Chromium GTF) 200 MCG TABS Take 1 tablet (200 mcg total) by mouth in the morning 30 tablet     Coenzyme Q10-Red Yeast Rice (Co Q-10 Plus Red Yeast Rice)  MG CAPS Take 60 mg by mouth in the morning      glimepiride (AMARYL) 1 mg tablet 1 tablet daily with lunch  90 tablet 0    glucose blood (OneTouch Verio) test strip CHECK SUGARS 2-4 TIMES A  each 5    insulin detemir (Levemir FlexTouch) 100 Units/mL injection pen Inject 40 Units under the skin daily 45 mL 0    Lancets (onetouch ultrasoft) lancets Check sugars 2-4 times a day 100 each 5    Lutein-Zeaxanthin 25-5 MG CAPS       metFORMIN (GLUCOPHAGE-XR) 500 mg 24 hr tablet Take 2 tablets (1,000 mg total) by mouth daily with dinner 180 tablet 0    triamcinolone (KENALOG) 0 1 % ointment Apply 1 application topically 2 (two) times a day To affected area 80 g 0    Vitamin E 200 units TABS Take 1 tablet (200 Units total) by mouth in the morning      Insulin Pen Needle (B-D ULTRAFINE III SHORT PEN) 31G X 8 MM MISC Inject under the skin 5 (five) times a day 100 each 5     No current facility-administered medications for this visit  Allergies   Allergen Reactions    Eggs Or Egg-Derived Products - Food Allergy      Upset stomach   Milk-Related Compounds - Food Allergy Diarrhea       Review of Systems   Constitutional: Positive for chills and fever  Negative for diaphoresis and fatigue  HENT: Positive for congestion and rhinorrhea  Negative for ear pain, postnasal drip and sore throat  Respiratory: Positive for cough   Negative for chest tightness and shortness of breath  Video Exam    Vitals:    06/01/22 1050   Temp: 99 2 °F (37 3 °C)   TempSrc: Tympanic   Weight: 102 kg (225 lb)   Height: 5' 7" (1 702 m)       Physical Exam  Constitutional:       General: She is not in acute distress  Appearance: She is not ill-appearing, toxic-appearing or diaphoretic  Pulmonary:      Effort: Pulmonary effort is normal    Neurological:      General: No focal deficit present  Mental Status: She is alert and oriented to person, place, and time  I spent 20 minutes directly with the patient during this visit    VIRTUAL VISIT DISCLAIMER      Sheryle Radish verbally agrees to participate in [employer]  Pt is aware that [employer] could be limited without vital signs or the ability to perform a full hands-on physical Adairdora Cortez understands she or the provider may request at any time to terminate the video visit and request the patient to seek care or treatment in person

## 2022-06-24 LAB
ALBUMIN SERPL-MCNC: 4.2 G/DL (ref 3.8–4.9)
ALBUMIN/CREAT UR: 5 MG/G CREAT (ref 0–29)
ALBUMIN/GLOB SERPL: 1.8 {RATIO} (ref 1.2–2.2)
ALP SERPL-CCNC: 46 IU/L (ref 44–121)
ALT SERPL-CCNC: 12 IU/L (ref 0–32)
AST SERPL-CCNC: 11 IU/L (ref 0–40)
BASOPHILS # BLD AUTO: 0.1 X10E3/UL (ref 0–0.2)
BASOPHILS NFR BLD AUTO: 2 %
BILIRUB SERPL-MCNC: 0.6 MG/DL (ref 0–1.2)
BUN SERPL-MCNC: 9 MG/DL (ref 6–24)
BUN/CREAT SERPL: 11 (ref 9–23)
CALCIUM SERPL-MCNC: 9.6 MG/DL (ref 8.7–10.2)
CHLORIDE SERPL-SCNC: 102 MMOL/L (ref 96–106)
CHOLEST SERPL-MCNC: 212 MG/DL (ref 100–199)
CHOLEST/HDLC SERPL: 4.4 RATIO (ref 0–4.4)
CO2 SERPL-SCNC: 24 MMOL/L (ref 20–29)
CREAT SERPL-MCNC: 0.83 MG/DL (ref 0.57–1)
CREAT UR-MCNC: 62.6 MG/DL
EGFR: 81 ML/MIN/1.73
EOSINOPHIL # BLD AUTO: 0.2 X10E3/UL (ref 0–0.4)
EOSINOPHIL NFR BLD AUTO: 3 %
ERYTHROCYTE [DISTWIDTH] IN BLOOD BY AUTOMATED COUNT: 12.7 % (ref 11.7–15.4)
EST. AVERAGE GLUCOSE BLD GHB EST-MCNC: 194 MG/DL
GLOBULIN SER-MCNC: 2.4 G/DL (ref 1.5–4.5)
GLUCOSE SERPL-MCNC: 102 MG/DL (ref 65–99)
HBA1C MFR BLD: 8.4 % (ref 4.8–5.6)
HCT VFR BLD AUTO: 38.7 % (ref 34–46.6)
HDLC SERPL-MCNC: 48 MG/DL
HGB BLD-MCNC: 12.7 G/DL (ref 11.1–15.9)
IMM GRANULOCYTES # BLD: 0 X10E3/UL (ref 0–0.1)
IMM GRANULOCYTES NFR BLD: 0 %
LDLC SERPL CALC-MCNC: 136 MG/DL (ref 0–99)
LYMPHOCYTES # BLD AUTO: 2.1 X10E3/UL (ref 0.7–3.1)
LYMPHOCYTES NFR BLD AUTO: 38 %
MCH RBC QN AUTO: 28.2 PG (ref 26.6–33)
MCHC RBC AUTO-ENTMCNC: 32.8 G/DL (ref 31.5–35.7)
MCV RBC AUTO: 86 FL (ref 79–97)
MICROALBUMIN UR-MCNC: 3 UG/ML
MONOCYTES # BLD AUTO: 0.5 X10E3/UL (ref 0.1–0.9)
MONOCYTES NFR BLD AUTO: 9 %
NEUTROPHILS # BLD AUTO: 2.7 X10E3/UL (ref 1.4–7)
NEUTROPHILS NFR BLD AUTO: 48 %
PLATELET # BLD AUTO: 185 X10E3/UL (ref 150–450)
POTASSIUM SERPL-SCNC: 4.5 MMOL/L (ref 3.5–5.2)
PROT SERPL-MCNC: 6.6 G/DL (ref 6–8.5)
RBC # BLD AUTO: 4.51 X10E6/UL (ref 3.77–5.28)
SL AMB VLDL CHOLESTEROL CALC: 28 MG/DL (ref 5–40)
SODIUM SERPL-SCNC: 140 MMOL/L (ref 134–144)
TRIGL SERPL-MCNC: 157 MG/DL (ref 0–149)
TSH SERPL DL<=0.005 MIU/L-ACNC: 0.88 UIU/ML (ref 0.45–4.5)
WBC # BLD AUTO: 5.6 X10E3/UL (ref 3.4–10.8)

## 2022-06-24 PROCEDURE — 3061F NEG MICROALBUMINURIA REV: CPT | Performed by: NURSE PRACTITIONER

## 2022-06-24 PROCEDURE — 3052F HG A1C>EQUAL 8.0%<EQUAL 9.0%: CPT | Performed by: NURSE PRACTITIONER

## 2022-06-28 ENCOUNTER — OFFICE VISIT (OUTPATIENT)
Dept: ENDOCRINOLOGY | Facility: HOSPITAL | Age: 62
End: 2022-06-28
Payer: COMMERCIAL

## 2022-06-28 VITALS
SYSTOLIC BLOOD PRESSURE: 104 MMHG | OXYGEN SATURATION: 99 % | BODY MASS INDEX: 34.47 KG/M2 | HEIGHT: 67 IN | HEART RATE: 70 BPM | WEIGHT: 219.6 LBS | DIASTOLIC BLOOD PRESSURE: 60 MMHG

## 2022-06-28 DIAGNOSIS — E78.5 HYPERLIPIDEMIA LDL GOAL <100: ICD-10-CM

## 2022-06-28 DIAGNOSIS — E55.9 VITAMIN D DEFICIENCY: ICD-10-CM

## 2022-06-28 DIAGNOSIS — Z79.4 TYPE 2 DIABETES MELLITUS WITHOUT COMPLICATION, WITH LONG-TERM CURRENT USE OF INSULIN (HCC): Primary | ICD-10-CM

## 2022-06-28 DIAGNOSIS — E11.9 TYPE 2 DIABETES MELLITUS WITHOUT COMPLICATION, WITH LONG-TERM CURRENT USE OF INSULIN (HCC): Primary | ICD-10-CM

## 2022-06-28 DIAGNOSIS — I10 ESSENTIAL HYPERTENSION: ICD-10-CM

## 2022-06-28 PROCEDURE — 99214 OFFICE O/P EST MOD 30 MIN: CPT | Performed by: NURSE PRACTITIONER

## 2022-06-28 PROCEDURE — 3078F DIAST BP <80 MM HG: CPT | Performed by: NURSE PRACTITIONER

## 2022-06-28 PROCEDURE — 3074F SYST BP LT 130 MM HG: CPT | Performed by: NURSE PRACTITIONER

## 2022-06-28 PROCEDURE — 1036F TOBACCO NON-USER: CPT | Performed by: NURSE PRACTITIONER

## 2022-06-28 PROCEDURE — 3008F BODY MASS INDEX DOCD: CPT | Performed by: NURSE PRACTITIONER

## 2022-06-28 RX ORDER — FLASH GLUCOSE SENSOR
1 KIT MISCELLANEOUS
Qty: 2 EACH | Refills: 6 | Status: SHIPPED | OUTPATIENT
Start: 2022-06-28

## 2022-06-28 RX ORDER — FLASH GLUCOSE SCANNING READER
1 EACH MISCELLANEOUS
Qty: 1 EACH | Refills: 0 | Status: SHIPPED | OUTPATIENT
Start: 2022-06-28

## 2022-06-28 NOTE — PROGRESS NOTES
Disha Beavers 63 y o  female MRN: [id number]    Encounter: [id number]      Assessment/Plan     Assessment: This is a 63y o -year-old female with type 2 diabetes with long term insulin use, hyperlipidemia and vitamin-D deficiency  Plan:  1  Type 2 diabetes with long-term insulin use:  Her hemoglobin A1c is 8 4  For now, she will continue her current regimen of Glimepiride, Levemir and Metformin consistently  Reviewed and reinforced the importance of consistency with her medications, diet and exercise   I have asked her to continue checking her blood sugars regularly and send a record to the office in 2 weeks for review  I have provided her with a prescription for a Freestyle Jorge device and sensors to help facilitate her checking her blood sugars more frequently  Reviewed recognition and proper treatment of hypoglycemic episodes   Check hemoglobin A1c and comprehensive metabolic panel prior to next visit       2  Hyperlipidemia: Continue CQ10 and red yeast rice 600 mg daily - consistently   She has not been interested in pursuing statin therapy   Check fasting lipid panel prior to next visit       3  Vitamin-D deficiency:  Continue supplementation with 2000 units of vitamin D3 daily - consistently       CC:  Type 2 Diabetes follow-up    History of Present Illness     HPI:  61 y  o  year old female with type 2 diabetes for about 18 years  She is on oral agents and insulin at home and takes metformin XR 1000 mg daily with dinner, glimepiride 1 mg with lunch and Levemir 40 units   She states that she has not been consistent with her glimepiride daily  Her most recent hemoglobin A1c from June 23, 2022 is 8  4   In the past, metformin dose was attempted to be increased but this caused gastrointestinal issues  She denies any polyuria, polydipsia, nocturia and blurry vision  She denies neuropathy, nephropathy and retinopathy        Hypoglycemic episodes: No       The patient's last eye exam was on May 21, 2020   Her most recent diabetic foot exam was performed at her office visit on July 6, 2021       Blood Sugar/Glucometer/Pump/CGM review: Limited from Cierra 15 through June 28, 2022 reveals she has been taking her blood sugars readings less than once daily with some scattered readings elevated over 200 mg/dl at times      In the past, she has declined treatment for elevated cholesterol through her PCP   She has treating with CoQ10 and red yeast rice inconsistently      For her vitamin-D deficiency, she is supplementing with 2000 units of vitamin D3 when she can remember  Review of Systems   Constitutional: Negative  Negative for chills, fatigue and fever  HENT: Negative  Negative for trouble swallowing and voice change  Eyes: Negative  Negative for visual disturbance  Respiratory: Negative  Negative for chest tightness and shortness of breath  Cardiovascular: Negative  Negative for chest pain  Gastrointestinal: Negative  Negative for abdominal pain, constipation, diarrhea and vomiting  Endocrine: Positive for polyuria (Once per night nocturia)  Negative for cold intolerance, heat intolerance, polydipsia and polyphagia  Genitourinary: Negative  Musculoskeletal: Negative  Skin: Negative  Allergic/Immunologic: Negative  Neurological: Negative  Negative for dizziness, syncope, light-headedness and headaches  Hematological: Negative  Psychiatric/Behavioral: The patient is nervous/anxious  All other systems reviewed and are negative  Historical Information   Past Medical History:   Diagnosis Date    Anxiety     Condyloma acuminatum     Diabetes mellitus ([facility] [address] ) 2002    type 2    Genital warts     Herpes simplex     HPV (human papilloma virus) infection      History reviewed  No pertinent surgical history    Social History   Social History     Substance and Sexual Activity   Alcohol Use Yes     Social History     Substance and Sexual Activity   Drug Use Yes    Types: Marijuana Social History     Tobacco Use   Smoking Status Never Smoker   Smokeless Tobacco Never Used     Family History:   Family History   Problem Relation Age of Onset    Diabetes Father     Heart disease Father     ALS Father     Diabetes Brother     Alzheimer's disease Mother     Colon cancer Maternal Grandfather        Meds/Allergies   Current Outpatient Medications   Medication Sig Dispense Refill    Bacillus Coagulans-Inulin (PROBIOTIC-PREBIOTIC PO) Take by mouth      Blood Glucose Monitoring Suppl (OneTouch Verio) w/Device KIT Use daily 1 kit 0    Cholecalciferol (VITAMIN D PO) Take by mouth      Chromium (Chromium GTF) 200 MCG TABS Take 1 tablet (200 mcg total) by mouth in the morning 30 tablet     Coenzyme Q10-Red Yeast Rice (Co Q-10 Plus Red Yeast Rice)  MG CAPS Take 60 mg by mouth in the morning      glimepiride (AMARYL) 1 mg tablet 1 tablet daily with lunch  90 tablet 0    glucose blood (OneTouch Verio) test strip CHECK SUGARS 2-4 TIMES A  each 5    insulin detemir (Levemir FlexTouch) 100 Units/mL injection pen Inject 40 Units under the skin daily 45 mL 0    Lancets (onetouch ultrasoft) lancets Check sugars 2-4 times a day 100 each 5    Lutein-Zeaxanthin 25-5 MG CAPS       QUERCETIN PO Take by mouth      triamcinolone (KENALOG) 0 1 % ointment Apply 1 application topically 2 (two) times a day To affected area 80 g 0    Vitamin E 200 units TABS Take 1 tablet (200 Units total) by mouth in the morning      Insulin Pen Needle (B-D ULTRAFINE III SHORT PEN) 31G X 8 MM MISC Inject under the skin 5 (five) times a day 100 each 5    metFORMIN (GLUCOPHAGE-XR) 500 mg 24 hr tablet Take 2 tablets (1,000 mg total) by mouth daily with dinner 180 tablet 0     No current facility-administered medications for this visit  Allergies   Allergen Reactions    Eggs Or Egg-Derived Products - Food Allergy      Upset stomach       Milk-Related Compounds - Food Allergy Diarrhea       Objective Vitals: Blood pressure 104/60, pulse 70, height 5' 7" (1 702 m), weight 99 6 kg (219 lb 9 6 oz), SpO2 99 %  Physical Exam  Vitals reviewed  Constitutional:       Appearance: She is well-developed  She is obese  HENT:      Head: Normocephalic and atraumatic  Eyes:      Conjunctiva/sclera: Conjunctivae normal       Pupils: Pupils are equal, round, and reactive to light  Cardiovascular:      Rate and Rhythm: Normal rate and regular rhythm  Heart sounds: Normal heart sounds  Pulmonary:      Effort: Pulmonary effort is normal       Breath sounds: Normal breath sounds  Abdominal:      General: Bowel sounds are normal       Palpations: Abdomen is soft  Musculoskeletal:         General: Normal range of motion  Cervical back: Normal range of motion and neck supple  Skin:     General: Skin is warm and dry  Neurological:      Mental Status: She is alert and oriented to person, place, and time  Psychiatric:         Behavior: Behavior normal          Thought Content:  Thought content normal          Judgment: Judgment normal        Lab Results:   Lab Results   Component Value Date/Time    Hemoglobin A1C 8 4 (H) 06/23/2022 07:31 AM    Hemoglobin A1C 8 2 (H) 02/16/2022 10:51 AM    Hemoglobin A1C 9 1 (H) 10/08/2021 10:19 AM    White Blood Cell Count 5 6 06/23/2022 07:31 AM    White Blood Cell Count 4 9 02/16/2022 10:51 AM    White Blood Cell Count 5 0 10/08/2021 10:19 AM    Hemoglobin 12 7 06/23/2022 07:31 AM    Hemoglobin 13 6 02/16/2022 10:51 AM    Hemoglobin 13 7 10/08/2021 10:19 AM    HCT 38 7 06/23/2022 07:31 AM    HCT 41 8 02/16/2022 10:51 AM    HCT 40 9 10/08/2021 10:19 AM    MCV 86 06/23/2022 07:31 AM    MCV 84 02/16/2022 10:51 AM    MCV 84 10/08/2021 10:19 AM    Platelet Count 428 19/19/3299 07:31 AM    Platelet Count 049 42/78/9422 10:51 AM    Platelet Count 524 93/36/5339 10:19 AM    BUN 9 06/23/2022 07:31 AM    BUN 12 02/16/2022 10:51 AM    BUN 14 10/08/2021 10:19 AM    Potassium 4 5 06/23/2022 07:31 AM    Potassium 5 1 02/16/2022 10:51 AM    Potassium 4 4 10/08/2021 10:19 AM    Chloride 102 06/23/2022 07:31 AM    Chloride 101 02/16/2022 10:51 AM    Chloride 99 10/08/2021 10:19 AM    CO2 24 06/23/2022 07:31 AM    CO2 26 02/16/2022 10:51 AM    CO2 24 10/08/2021 10:19 AM    Creatinine 0 83 06/23/2022 07:31 AM    Creatinine 0 82 02/16/2022 10:51 AM    Creatinine 0 85 10/08/2021 10:19 AM    AST 11 06/23/2022 07:31 AM    AST 18 02/16/2022 10:51 AM    AST 17 10/08/2021 10:19 AM    ALT 12 06/23/2022 07:31 AM    ALT 17 02/16/2022 10:51 AM    ALT 15 10/08/2021 10:19 AM    Albumin 4 2 06/23/2022 07:31 AM    Albumin 4 3 02/16/2022 10:51 AM    Albumin 4 5 10/08/2021 10:19 AM    Globulin, Total 2 4 06/23/2022 07:31 AM    Globulin, Total 2 6 02/16/2022 10:51 AM    Globulin, Total 2 4 10/08/2021 10:19 AM    HDL 48 06/23/2022 07:31 AM    HDL 51 10/08/2021 10:19 AM    Triglycerides 157 (H) 06/23/2022 07:31 AM    Triglycerides 145 10/08/2021 10:19 AM     Portions of the record may have been created with voice recognition software  Occasional wrong word or "sound a like" substitutions may have occurred due to the inherent limitations of voice recognition software  Read the chart carefully and recognize, using context, where substitutions have occurred

## 2022-06-28 NOTE — PATIENT INSTRUCTIONS
Be mindful of diet  Stay active and stay hydrated  For now, continue your current regimen of metformin  Please continue Levemir at current dose, for now  Continue glimepiride 1 mg with lunch  Please set an alarm to remind you !!!     Check blood sugars at least twice daily at alternating times and send a record to the office in 2 weeks for review  Obtain lab work as prescribed  Supplement with 2000 units of vitamin D3 daily - consistently  Continue Co Q10 with Red Yeast Rice

## 2022-07-05 ENCOUNTER — TELEPHONE (OUTPATIENT)
Dept: OTHER | Facility: OTHER | Age: 62
End: 2022-07-05

## 2022-07-05 DIAGNOSIS — L20.84 INTRINSIC ECZEMA: ICD-10-CM

## 2022-07-07 ENCOUNTER — DOCUMENTATION (OUTPATIENT)
Dept: ENDOCRINOLOGY | Facility: HOSPITAL | Age: 62
End: 2022-07-07

## 2022-07-08 NOTE — PROGRESS NOTES
Prior Sabine Ash was denied because patient is not on multiple insulin injections daily  Patient advised this was denied

## 2022-07-11 DIAGNOSIS — Z79.4 TYPE 2 DIABETES MELLITUS WITHOUT COMPLICATION, WITH LONG-TERM CURRENT USE OF INSULIN (HCC): ICD-10-CM

## 2022-07-11 DIAGNOSIS — E11.9 TYPE 2 DIABETES MELLITUS WITHOUT COMPLICATION, WITH LONG-TERM CURRENT USE OF INSULIN (HCC): ICD-10-CM

## 2022-07-12 RX ORDER — GLIMEPIRIDE 1 MG/1
TABLET ORAL
Qty: 90 TABLET | Refills: 0 | Status: SHIPPED | OUTPATIENT
Start: 2022-07-12 | End: 2022-09-02

## 2022-08-10 DIAGNOSIS — Z79.4 TYPE 2 DIABETES MELLITUS WITHOUT COMPLICATION, WITH LONG-TERM CURRENT USE OF INSULIN (HCC): ICD-10-CM

## 2022-08-10 DIAGNOSIS — E11.9 TYPE 2 DIABETES MELLITUS WITHOUT COMPLICATION, WITH LONG-TERM CURRENT USE OF INSULIN (HCC): ICD-10-CM

## 2022-08-10 RX ORDER — BLOOD SUGAR DIAGNOSTIC
STRIP MISCELLANEOUS
Qty: 100 STRIP | Refills: 5 | Status: SHIPPED | OUTPATIENT
Start: 2022-08-10

## 2022-09-01 DIAGNOSIS — Z79.4 TYPE 2 DIABETES MELLITUS WITHOUT COMPLICATION, WITH LONG-TERM CURRENT USE OF INSULIN (HCC): ICD-10-CM

## 2022-09-01 DIAGNOSIS — L20.84 INTRINSIC ECZEMA: ICD-10-CM

## 2022-09-01 DIAGNOSIS — E11.9 TYPE 2 DIABETES MELLITUS WITHOUT COMPLICATION, WITH LONG-TERM CURRENT USE OF INSULIN (HCC): ICD-10-CM

## 2022-09-02 RX ORDER — GLIMEPIRIDE 1 MG/1
TABLET ORAL
Qty: 90 TABLET | Refills: 0 | Status: SHIPPED | OUTPATIENT
Start: 2022-09-02

## 2022-10-03 DIAGNOSIS — Z79.4 TYPE 2 DIABETES MELLITUS WITHOUT COMPLICATION, WITH LONG-TERM CURRENT USE OF INSULIN (HCC): ICD-10-CM

## 2022-10-03 DIAGNOSIS — E11.9 TYPE 2 DIABETES MELLITUS WITHOUT COMPLICATION, WITH LONG-TERM CURRENT USE OF INSULIN (HCC): ICD-10-CM

## 2022-10-03 RX ORDER — METFORMIN HYDROCHLORIDE 500 MG/1
1000 TABLET, EXTENDED RELEASE ORAL
Qty: 180 TABLET | Refills: 0 | Status: CANCELLED | OUTPATIENT
Start: 2022-10-03 | End: 2023-01-01

## 2022-10-04 ENCOUNTER — TELEPHONE (OUTPATIENT)
Dept: FAMILY MEDICINE CLINIC | Facility: CLINIC | Age: 62
End: 2022-10-04

## 2022-10-04 DIAGNOSIS — E11.9 TYPE 2 DIABETES MELLITUS WITHOUT COMPLICATION, WITH LONG-TERM CURRENT USE OF INSULIN (HCC): ICD-10-CM

## 2022-10-04 DIAGNOSIS — Z79.4 TYPE 2 DIABETES MELLITUS WITHOUT COMPLICATION, WITH LONG-TERM CURRENT USE OF INSULIN (HCC): ICD-10-CM

## 2022-10-04 RX ORDER — METFORMIN HYDROCHLORIDE 500 MG/1
1000 TABLET, EXTENDED RELEASE ORAL
Qty: 180 TABLET | Refills: 0 | Status: SHIPPED | OUTPATIENT
Start: 2022-10-04 | End: 2023-01-02

## 2022-10-04 NOTE — TELEPHONE ENCOUNTER
----- Message from Angie Oleg sent at 10/4/2022 10:10 AM EDT -----  Regarding: please refill Metformin ASAP  Dr Sole Sandhu    I called the refill hotline yesterday to have the metformin refilled and it was not  I did get a return call to set up an appointment, which I did  I do not have any left for tonight's dosage      metFORMIN 500 mg 24 hr tablet  Commonly known as: GLUCOPHAGE-XR  Learn more  Take 2 tablets (1,000 mg total) by mouth daily with dinner

## 2022-10-26 LAB
25(OH)D3+25(OH)D2 SERPL-MCNC: 50.7 NG/ML (ref 30–100)
ALBUMIN SERPL-MCNC: 4.3 G/DL (ref 3.8–4.9)
ALBUMIN/GLOB SERPL: 1.7 {RATIO} (ref 1.2–2.2)
ALP SERPL-CCNC: 41 IU/L (ref 44–121)
ALT SERPL-CCNC: 18 IU/L (ref 0–32)
AST SERPL-CCNC: 15 IU/L (ref 0–40)
BASOPHILS # BLD AUTO: 0.1 X10E3/UL (ref 0–0.2)
BASOPHILS NFR BLD AUTO: 1 %
BILIRUB SERPL-MCNC: 0.7 MG/DL (ref 0–1.2)
BUN SERPL-MCNC: 9 MG/DL (ref 8–27)
BUN/CREAT SERPL: 11 (ref 12–28)
CALCIUM SERPL-MCNC: 10 MG/DL (ref 8.7–10.3)
CHLORIDE SERPL-SCNC: 102 MMOL/L (ref 96–106)
CO2 SERPL-SCNC: 26 MMOL/L (ref 20–29)
CREAT SERPL-MCNC: 0.82 MG/DL (ref 0.57–1)
EGFR: 82 ML/MIN/1.73
EOSINOPHIL # BLD AUTO: 0.1 X10E3/UL (ref 0–0.4)
EOSINOPHIL NFR BLD AUTO: 2 %
ERYTHROCYTE [DISTWIDTH] IN BLOOD BY AUTOMATED COUNT: 12.5 % (ref 11.7–15.4)
EST. AVERAGE GLUCOSE BLD GHB EST-MCNC: 134 MG/DL
GLOBULIN SER-MCNC: 2.5 G/DL (ref 1.5–4.5)
GLUCOSE SERPL-MCNC: 100 MG/DL (ref 70–99)
HBA1C MFR BLD: 6.3 % (ref 4.8–5.6)
HCT VFR BLD AUTO: 37.6 % (ref 34–46.6)
HGB BLD-MCNC: 12.6 G/DL (ref 11.1–15.9)
IMM GRANULOCYTES # BLD: 0 X10E3/UL (ref 0–0.1)
IMM GRANULOCYTES NFR BLD: 0 %
LYMPHOCYTES # BLD AUTO: 1.7 X10E3/UL (ref 0.7–3.1)
LYMPHOCYTES NFR BLD AUTO: 35 %
MCH RBC QN AUTO: 28.6 PG (ref 26.6–33)
MCHC RBC AUTO-ENTMCNC: 33.5 G/DL (ref 31.5–35.7)
MCV RBC AUTO: 85 FL (ref 79–97)
MONOCYTES # BLD AUTO: 0.5 X10E3/UL (ref 0.1–0.9)
MONOCYTES NFR BLD AUTO: 9 %
NEUTROPHILS # BLD AUTO: 2.5 X10E3/UL (ref 1.4–7)
NEUTROPHILS NFR BLD AUTO: 53 %
PLATELET # BLD AUTO: 191 X10E3/UL (ref 150–450)
POTASSIUM SERPL-SCNC: 4.9 MMOL/L (ref 3.5–5.2)
PROT SERPL-MCNC: 6.8 G/DL (ref 6–8.5)
RBC # BLD AUTO: 4.41 X10E6/UL (ref 3.77–5.28)
SODIUM SERPL-SCNC: 141 MMOL/L (ref 134–144)
TSH SERPL DL<=0.005 MIU/L-ACNC: 1.03 UIU/ML (ref 0.45–4.5)
WBC # BLD AUTO: 4.9 X10E3/UL (ref 3.4–10.8)

## 2022-11-01 ENCOUNTER — OFFICE VISIT (OUTPATIENT)
Dept: ENDOCRINOLOGY | Facility: HOSPITAL | Age: 62
End: 2022-11-01

## 2022-11-01 VITALS
SYSTOLIC BLOOD PRESSURE: 122 MMHG | HEIGHT: 67 IN | HEART RATE: 76 BPM | BODY MASS INDEX: 32.52 KG/M2 | WEIGHT: 207.2 LBS | DIASTOLIC BLOOD PRESSURE: 78 MMHG

## 2022-11-01 DIAGNOSIS — Z79.4 TYPE 2 DIABETES MELLITUS WITHOUT COMPLICATION, WITH LONG-TERM CURRENT USE OF INSULIN (HCC): Primary | ICD-10-CM

## 2022-11-01 DIAGNOSIS — E55.9 VITAMIN D DEFICIENCY: ICD-10-CM

## 2022-11-01 DIAGNOSIS — I10 ESSENTIAL HYPERTENSION: ICD-10-CM

## 2022-11-01 DIAGNOSIS — E11.9 TYPE 2 DIABETES MELLITUS WITHOUT COMPLICATION, WITH LONG-TERM CURRENT USE OF INSULIN (HCC): Primary | ICD-10-CM

## 2022-11-01 DIAGNOSIS — E78.5 HYPERLIPIDEMIA LDL GOAL <100: ICD-10-CM

## 2022-11-01 RX ORDER — B-COMPLEX WITH VITAMIN C
TABLET ORAL
COMMUNITY
Start: 2022-08-02

## 2022-11-01 NOTE — PROGRESS NOTES
Lynn Larson 63 y o  female MRN: [id number]    Encounter: [id number]      Assessment/Plan     Assessment: This is a 63y o -year-old female with type 2 diabetes with long term insulin use, hyperlipidemia and vitamin-D deficiency  Plan:  1  Type 2 diabetes with long-term insulin use:  Her hemoglobin A1c is improved to 6 3  For now, she will continue her current regimen of Glimepiride, Levemir and Metformin consistently  Reviewed and reinforced the importance of consistency with her medications, diet and exercise   I have asked her to continue checking her blood sugars regularly and send a record to the office in 2 weeks for review  Reviewed recognition and proper treatment of hypoglycemic episodes   Check hemoglobin A1c and comprehensive metabolic panel prior to next visit       2  Hyperlipidemia: Continue CQ10 and red yeast rice 600 mg daily - consistently   She has not been interested in pursuing statin therapy   Check fasting lipid panel prior to next visit       3  Vitamin-D deficiency: Stable at 50 7  Continue supplementation with 2000 units of vitamin D3 daily - consistently     CC:  Type 2 Diabetes follow-up    History of Present Illness     HPI:  63y o  year old female with type 2 diabetes for about 18 years  She is on oral agents and insulin at home and takes metformin XR 1000 mg daily with dinner, glimepiride 1 mg with lunch and Levemir 40 units   She states that she has not been consistent with her glimepiride daily  Her most recent hemoglobin A1c from October 25, 2022 is 6  3   In the past, metformin dose was attempted to be increased but this caused gastrointestinal issues  She denies any polyuria, polydipsia, nocturia and blurry vision  She denies neuropathy, nephropathy and retinopathy        Hypoglycemic episodes: No       The patient's last eye exam was on May 21, 2020   Her most recent diabetic foot exam was performed at her office visit on July 6, 2021       Blood Sugar/Glucometer/Pump/CGM review: Limited from October 18 through November 1, 2022 reveals she has been taking her blood sugars readings once daily and is relatively controlled      In the past, she has declined treatment for elevated cholesterol through her PCP   She has treating with CoQ10 and red yeast rice inconsistently      For her vitamin-D deficiency, she is supplementing with 2000 units of vitamin D3  Recent 25 hydroxy vitamin-D level from October 25, 2022 is 50 7  Review of Systems   Constitutional: Negative  Negative for chills, fatigue and fever  HENT: Negative  Negative for trouble swallowing and voice change  Eyes: Negative  Negative for visual disturbance  Respiratory: Negative  Negative for chest tightness and shortness of breath  Cardiovascular: Negative  Negative for chest pain  Gastrointestinal: Negative  Negative for abdominal pain, constipation, diarrhea and vomiting  Endocrine: Positive for polyuria (Once per night nocturia)  Negative for cold intolerance, heat intolerance, polydipsia and polyphagia  Genitourinary: Negative  Musculoskeletal: Negative  Skin: Negative  Allergic/Immunologic: Negative  Neurological: Negative  Negative for dizziness, syncope, light-headedness and headaches  Hematological: Negative  Psychiatric/Behavioral: The patient is nervous/anxious  All other systems reviewed and are negative  Historical Information   Past Medical History:   Diagnosis Date   • Anxiety    • Condyloma acuminatum    • Diabetes mellitus ([facility] [address] ) 2002    type 2   • Genital warts    • Herpes simplex    • HPV (human papilloma virus) infection      No past surgical history on file    Social History   Social History     Substance and Sexual Activity   Alcohol Use Not Currently     Social History     Substance and Sexual Activity   Drug Use Yes   • Frequency: 4 0 times per week   • Types: Marijuana    Comment: Have a green card     Social History     Tobacco Use   Smoking Status Never Smoker   Smokeless Tobacco Never Used     Family History:   Family History   Problem Relation Age of Onset   • Diabetes Father    • Heart disease Father    • ALS Father    • Diabetes type II Father            • Diabetes Brother    • Diabetes type II Brother    • Alzheimer's disease Mother    • Colon cancer Maternal Grandfather        Meds/Allergies   Current Outpatient Medications   Medication Sig Dispense Refill   • Blood Glucose Monitoring Suppl (OneTouch Verio) w/Device KIT Use daily 1 kit 0   • Cholecalciferol (VITAMIN D PO) Take 5,000 Units by mouth in the morning     • Chromium (Chromium GTF) 200 MCG TABS Take 1 tablet (200 mcg total) by mouth in the morning 30 tablet    • CINNAMON PO Take by mouth in the morning     • Coenzyme Q10-Red Yeast Rice (Co Q-10 Plus Red Yeast Rice)  MG CAPS Take 60 mg by mouth in the morning     • glimepiride (AMARYL) 1 mg tablet 1 TABLET DAILY WITH LUNCH  90 tablet 0   • insulin detemir (Levemir FlexTouch) 100 Units/mL injection pen Inject 40 Units under the skin daily 45 mL 0   • Lancets (onetouch ultrasoft) lancets Check sugars 2-4 times a day 100 each 5   • Lutein-Zeaxanthin 25-5 MG CAPS      • metFORMIN (GLUCOPHAGE-XR) 500 mg 24 hr tablet Take 2 tablets (1,000 mg total) by mouth daily with dinner 180 tablet 0   • OneTouch Verio test strip CHECK SUGARS 2-4 TIMES A  strip 5   • QUERCETIN PO Take by mouth     • triamcinolone (KENALOG) 0 1 % ointment APPLY 1 APPLICATION TOPICALLY 2 (TWO) TIMES A DAY TO AFFECTED AREA 80 g 0   • Vitamin B Complex-C CAPS      • Insulin Pen Needle (B-D ULTRAFINE III SHORT PEN) 31G X 8 MM MISC Inject under the skin 5 (five) times a day 100 each 5   • Vitamin E 200 units TABS Take 1 tablet (200 Units total) by mouth in the morning (Patient not taking: Reported on 2022)       No current facility-administered medications for this visit       Allergies   Allergen Reactions   • Eggs Or Egg-Derived Products - Food Allergy Upset stomach  • Milk-Related Compounds - Food Allergy Diarrhea       Objective   Vitals: Blood pressure 122/78, pulse 76, height 5' 7" (1 702 m), weight 94 kg (207 lb 3 2 oz)  Physical Exam  Vitals reviewed  Constitutional:       Appearance: She is well-developed  She is obese  HENT:      Head: Normocephalic and atraumatic  Eyes:      Conjunctiva/sclera: Conjunctivae normal       Pupils: Pupils are equal, round, and reactive to light  Cardiovascular:      Rate and Rhythm: Normal rate and regular rhythm  Heart sounds: Normal heart sounds  Pulmonary:      Effort: Pulmonary effort is normal       Breath sounds: Normal breath sounds  Abdominal:      General: Bowel sounds are normal       Palpations: Abdomen is soft  Musculoskeletal:         General: Normal range of motion  Cervical back: Normal range of motion and neck supple  Skin:     General: Skin is warm and dry  Neurological:      Mental Status: She is alert and oriented to person, place, and time  Psychiatric:         Behavior: Behavior normal          Thought Content:  Thought content normal          Judgment: Judgment normal        Lab Results:   Lab Results   Component Value Date/Time    Hemoglobin A1C 6 3 (H) 10/25/2022 10:25 AM    Hemoglobin A1C 8 4 (H) 06/23/2022 07:31 AM    Hemoglobin A1C 8 2 (H) 02/16/2022 10:51 AM    White Blood Cell Count 4 9 10/25/2022 10:25 AM    White Blood Cell Count 5 6 06/23/2022 07:31 AM    White Blood Cell Count 4 9 02/16/2022 10:51 AM    Hemoglobin 12 6 10/25/2022 10:25 AM    Hemoglobin 12 7 06/23/2022 07:31 AM    Hemoglobin 13 6 02/16/2022 10:51 AM    HCT 37 6 10/25/2022 10:25 AM    HCT 38 7 06/23/2022 07:31 AM    HCT 41 8 02/16/2022 10:51 AM    MCV 85 10/25/2022 10:25 AM    MCV 86 06/23/2022 07:31 AM    MCV 84 02/16/2022 10:51 AM    Platelet Count 608 77/17/3659 10:25 AM    Platelet Count 445 84/71/7693 07:31 AM    Platelet Count 706 99/57/9101 10:51 AM    BUN 9 10/25/2022 10:25 AM    BUN 9 06/23/2022 07:31 AM    BUN 12 02/16/2022 10:51 AM    Potassium 4 9 10/25/2022 10:25 AM    Potassium 4 5 06/23/2022 07:31 AM    Potassium 5 1 02/16/2022 10:51 AM    Chloride 102 10/25/2022 10:25 AM    Chloride 102 06/23/2022 07:31 AM    Chloride 101 02/16/2022 10:51 AM    CO2 26 10/25/2022 10:25 AM    CO2 24 06/23/2022 07:31 AM    CO2 26 02/16/2022 10:51 AM    Creatinine 0 82 10/25/2022 10:25 AM    Creatinine 0 83 06/23/2022 07:31 AM    Creatinine 0 82 02/16/2022 10:51 AM    AST 15 10/25/2022 10:25 AM    AST 11 06/23/2022 07:31 AM    AST 18 02/16/2022 10:51 AM    ALT 18 10/25/2022 10:25 AM    ALT 12 06/23/2022 07:31 AM    ALT 17 02/16/2022 10:51 AM    Albumin 4 3 10/25/2022 10:25 AM    Albumin 4 2 06/23/2022 07:31 AM    Albumin 4 3 02/16/2022 10:51 AM    Globulin, Total 2 5 10/25/2022 10:25 AM    Globulin, Total 2 4 06/23/2022 07:31 AM    Globulin, Total 2 6 02/16/2022 10:51 AM    HDL 48 06/23/2022 07:31 AM    Triglycerides 157 (H) 06/23/2022 07:31 AM       Portions of the record may have been created with voice recognition software  Occasional wrong word or "sound a like" substitutions may have occurred due to the inherent limitations of voice recognition software  Read the chart carefully and recognize, using context, where substitutions have occurred

## 2022-11-02 ENCOUNTER — OFFICE VISIT (OUTPATIENT)
Dept: FAMILY MEDICINE CLINIC | Facility: CLINIC | Age: 62
End: 2022-11-02

## 2022-11-02 VITALS
OXYGEN SATURATION: 99 % | HEIGHT: 68 IN | TEMPERATURE: 95.6 F | SYSTOLIC BLOOD PRESSURE: 122 MMHG | DIASTOLIC BLOOD PRESSURE: 80 MMHG | WEIGHT: 208 LBS | HEART RATE: 65 BPM | BODY MASS INDEX: 31.52 KG/M2

## 2022-11-02 DIAGNOSIS — E78.01 FAMILIAL HYPERCHOLESTEROLEMIA: ICD-10-CM

## 2022-11-02 DIAGNOSIS — E11.9 TYPE 2 DIABETES MELLITUS WITHOUT COMPLICATION, WITH LONG-TERM CURRENT USE OF INSULIN (HCC): Primary | ICD-10-CM

## 2022-11-02 DIAGNOSIS — Z79.4 TYPE 2 DIABETES MELLITUS WITHOUT COMPLICATION, WITH LONG-TERM CURRENT USE OF INSULIN (HCC): Primary | ICD-10-CM

## 2022-11-02 DIAGNOSIS — L20.84 INTRINSIC ECZEMA: ICD-10-CM

## 2022-11-02 PROBLEM — Z28.21 PNEUMOCOCCAL VACCINATION DECLINED BY PATIENT: Status: ACTIVE | Noted: 2022-11-02

## 2022-11-02 PROBLEM — Z28.21 INFLUENZA VACCINATION DECLINED BY PATIENT: Status: ACTIVE | Noted: 2022-11-02

## 2022-11-02 PROBLEM — I10 ESSENTIAL HYPERTENSION: Status: RESOLVED | Noted: 2021-07-06 | Resolved: 2022-11-02

## 2022-11-02 NOTE — PROGRESS NOTES
Assessment/Plan:      1  Type 2 diabetes mellitus without complication, with long-term current use of insulin [facility])  Assessment & Plan:    Lab Results   Component Value Date    HGBA1C 6 3 (H) 10/25/2022   controlled, continue current medication  Just seen by endocrine      2  Intrinsic eczema    3  Familial hypercholesterolemia        Subjective:  Chief Complaint   Patient presents with   • Diabetes     Foot exam,         Patient ID: Tj Hill is a 63 y o  female  Pt is here for a follow up on chronic conditions  Pt was diagnosed with diabetes after an ongoing yeast infection  Eliminated everything with yeast, exercising regularly, walking and has not had a yeast infection  Participates with coaching and helping with diabetes  Pt had appt with endocrine yesterday  hba1c down to 6 4 from 8 2 in august  Pt got a thermogram instead of a mammogram      Review of Systems   Constitutional: Negative  Negative for fatigue and fever  HENT: Negative  Eyes: Negative  Respiratory: Negative  Negative for cough  Cardiovascular: Negative  Gastrointestinal: Negative  Endocrine: Negative  Genitourinary: Negative  Musculoskeletal: Negative  Skin: Negative  Allergic/Immunologic: Negative  Neurological: Negative  Psychiatric/Behavioral: Negative  The following portions of the patient's history were reviewed and updated as appropriate: allergies, current medications, past family history, past medical history, past social history, past surgical history and problem list     Objective:  Vitals:    11/02/22 0857   BP: 122/80   BP Location: Left arm   Patient Position: Sitting   Cuff Size: Adult   Pulse: 65   Temp: (!) 95 6 °F (35 3 °C)   TempSrc: Tympanic   SpO2: 99%   Weight: 94 3 kg (208 lb)   Height: 5' 7 5" (1 715 m)      Physical Exam  Vitals and nursing note reviewed  Constitutional:       Appearance: She is well-developed  HENT:      Head: Normocephalic        Right Ear: External ear normal       Left Ear: External ear normal       Nose: Nose normal    Eyes:      Conjunctiva/sclera: Conjunctivae normal       Pupils: Pupils are equal, round, and reactive to light  Cardiovascular:      Rate and Rhythm: Normal rate and regular rhythm  Pulses: no weak pulses          Dorsalis pedis pulses are 2+ on the right side and 2+ on the left side  Posterior tibial pulses are 2+ on the right side and 2+ on the left side  Heart sounds: Normal heart sounds  Pulmonary:      Effort: Pulmonary effort is normal       Breath sounds: Normal breath sounds  Abdominal:      General: Bowel sounds are normal       Palpations: Abdomen is soft  Musculoskeletal:      Cervical back: Normal range of motion and neck supple  Feet:      Right foot:      Skin integrity: No ulcer, skin breakdown, erythema, warmth, callus or dry skin  Left foot:      Skin integrity: No ulcer, skin breakdown, erythema, warmth, callus or dry skin  Skin:     General: Skin is warm and dry  Neurological:      Mental Status: She is alert and oriented to person, place, and time  Psychiatric:         Behavior: Behavior normal          Thought Content: Thought content normal          Judgment: Judgment normal          Patient's shoes and socks removed  Right Foot/Ankle   Right Foot Inspection  Skin Exam: skin normal and skin intact  No dry skin, no warmth, no callus, no erythema, no maceration, no abnormal color, no pre-ulcer, no ulcer and no callus  Toe Exam: ROM and strength within normal limits  Sensory   Vibration: intact  Proprioception: intact  Monofilament testing: intact    Vascular  Capillary refills: < 3 seconds  The right DP pulse is 2+  The right PT pulse is 2+  Left Foot/Ankle  Left Foot Inspection  Skin Exam: skin normal and skin intact  No dry skin, no warmth, no erythema, no maceration, normal color, no pre-ulcer, no ulcer and no callus       Toe Exam: ROM and strength within normal limits  Sensory   Vibration: intact  Proprioception: intact  Monofilament testing: intact    Vascular  Capillary refills: < 3 seconds  The left DP pulse is 2+  The left PT pulse is 2+       Assign Risk Category  No deformity present  No loss of protective sensation  No weak pulses  Risk: 0

## 2022-11-02 NOTE — ASSESSMENT & PLAN NOTE
Lab Results   Component Value Date    HGBA1C 6 3 (H) 10/25/2022   controlled, continue current medication   Just seen by endocrine

## 2022-11-06 DIAGNOSIS — Z79.4 TYPE 2 DIABETES MELLITUS WITHOUT COMPLICATION, WITH LONG-TERM CURRENT USE OF INSULIN (HCC): ICD-10-CM

## 2022-11-06 DIAGNOSIS — E11.9 TYPE 2 DIABETES MELLITUS WITHOUT COMPLICATION, WITH LONG-TERM CURRENT USE OF INSULIN (HCC): ICD-10-CM

## 2022-11-06 RX ORDER — INSULIN DETEMIR 100 [IU]/ML
40 INJECTION, SOLUTION SUBCUTANEOUS DAILY
Qty: 3 ML | Refills: 2 | Status: SHIPPED | OUTPATIENT
Start: 2022-11-06

## 2022-11-23 ENCOUNTER — TELEPHONE (OUTPATIENT)
Dept: FAMILY MEDICINE CLINIC | Facility: CLINIC | Age: 62
End: 2022-11-23

## 2022-11-23 NOTE — TELEPHONE ENCOUNTER
Patient called in because she's experiencing a sore throat that hurts when she swallows and she wants to know what Dr Landon's recommends for her to take for it  Patient is currently in [facility] and will be going to urgent care but wants recommendations in the meantime until then  She states over the counter recommendations are ok as well  She took cough syrup but it wasn't affective

## 2022-12-30 DIAGNOSIS — E11.9 TYPE 2 DIABETES MELLITUS WITHOUT COMPLICATION, WITH LONG-TERM CURRENT USE OF INSULIN (HCC): ICD-10-CM

## 2022-12-30 DIAGNOSIS — Z79.4 TYPE 2 DIABETES MELLITUS WITHOUT COMPLICATION, WITH LONG-TERM CURRENT USE OF INSULIN (HCC): ICD-10-CM

## 2022-12-30 RX ORDER — METFORMIN HYDROCHLORIDE 500 MG/1
TABLET, EXTENDED RELEASE ORAL
Qty: 180 TABLET | Refills: 0 | Status: SHIPPED | OUTPATIENT
Start: 2022-12-30

## 2022-12-30 RX ORDER — GLIMEPIRIDE 1 MG/1
TABLET ORAL
Qty: 90 TABLET | Refills: 0 | Status: SHIPPED | OUTPATIENT
Start: 2022-12-30

## 2023-02-23 DIAGNOSIS — E11.9 TYPE 2 DIABETES MELLITUS WITHOUT COMPLICATION, WITH LONG-TERM CURRENT USE OF INSULIN (HCC): ICD-10-CM

## 2023-02-23 DIAGNOSIS — E11.9 TYPE 2 DIABETES MELLITUS WITHOUT COMPLICATION, WITH LONG-TERM CURRENT USE OF INSULIN (HCC): Primary | ICD-10-CM

## 2023-02-23 DIAGNOSIS — Z79.4 TYPE 2 DIABETES MELLITUS WITHOUT COMPLICATION, WITH LONG-TERM CURRENT USE OF INSULIN (HCC): ICD-10-CM

## 2023-02-23 DIAGNOSIS — Z79.4 TYPE 2 DIABETES MELLITUS WITHOUT COMPLICATION, WITH LONG-TERM CURRENT USE OF INSULIN (HCC): Primary | ICD-10-CM

## 2023-02-23 RX ORDER — INSULIN GLARGINE 100 [IU]/ML
40 INJECTION, SOLUTION SUBCUTANEOUS DAILY
Qty: 15 ML | Refills: 1 | Status: SHIPPED | OUTPATIENT
Start: 2023-02-23 | End: 2023-02-24 | Stop reason: RX

## 2023-02-23 RX ORDER — INSULIN DETEMIR 100 [IU]/ML
40 INJECTION, SOLUTION SUBCUTANEOUS DAILY
Qty: 15 ML | Refills: 2 | Status: SHIPPED | OUTPATIENT
Start: 2023-02-23

## 2023-02-24 DIAGNOSIS — E11.9 TYPE 2 DIABETES MELLITUS WITHOUT COMPLICATION, WITH LONG-TERM CURRENT USE OF INSULIN (HCC): Primary | ICD-10-CM

## 2023-02-24 DIAGNOSIS — Z79.4 TYPE 2 DIABETES MELLITUS WITHOUT COMPLICATION, WITH LONG-TERM CURRENT USE OF INSULIN (HCC): Primary | ICD-10-CM

## 2023-02-24 RX ORDER — INSULIN GLARGINE 100 [IU]/ML
40 INJECTION, SOLUTION SUBCUTANEOUS DAILY
Qty: 15 ML | Refills: 0 | Status: SHIPPED | OUTPATIENT
Start: 2023-02-24

## 2023-02-25 LAB
ALBUMIN SERPL-MCNC: 4.3 G/DL (ref 3.8–4.9)
ALBUMIN/GLOB SERPL: 1.8 {RATIO} (ref 1.2–2.2)
ALP SERPL-CCNC: 45 IU/L (ref 44–121)
ALT SERPL-CCNC: 14 IU/L (ref 0–32)
AST SERPL-CCNC: 17 IU/L (ref 0–40)
BASOPHILS # BLD AUTO: 0.1 X10E3/UL (ref 0–0.2)
BASOPHILS NFR BLD AUTO: 1 %
BILIRUB SERPL-MCNC: 1 MG/DL (ref 0–1.2)
BUN SERPL-MCNC: 8 MG/DL (ref 8–27)
BUN/CREAT SERPL: 8 (ref 12–28)
CALCIUM SERPL-MCNC: 9.8 MG/DL (ref 8.7–10.3)
CHLORIDE SERPL-SCNC: 98 MMOL/L (ref 96–106)
CHOLEST SERPL-MCNC: 194 MG/DL (ref 100–199)
CHOLEST/HDLC SERPL: 3.7 RATIO (ref 0–4.4)
CO2 SERPL-SCNC: 25 MMOL/L (ref 20–29)
CREAT SERPL-MCNC: 0.96 MG/DL (ref 0.57–1)
EGFR: 68 ML/MIN/1.73
EOSINOPHIL # BLD AUTO: 0.2 X10E3/UL (ref 0–0.4)
EOSINOPHIL NFR BLD AUTO: 3 %
ERYTHROCYTE [DISTWIDTH] IN BLOOD BY AUTOMATED COUNT: 12.7 % (ref 11.7–15.4)
EST. AVERAGE GLUCOSE BLD GHB EST-MCNC: 134 MG/DL
GLOBULIN SER-MCNC: 2.4 G/DL (ref 1.5–4.5)
GLUCOSE SERPL-MCNC: 88 MG/DL (ref 70–99)
HBA1C MFR BLD: 6.3 % (ref 4.8–5.6)
HCT VFR BLD AUTO: 36.8 % (ref 34–46.6)
HDLC SERPL-MCNC: 53 MG/DL
HGB BLD-MCNC: 12.7 G/DL (ref 11.1–15.9)
IMM GRANULOCYTES # BLD: 0 X10E3/UL (ref 0–0.1)
IMM GRANULOCYTES NFR BLD: 0 %
LDLC SERPL CALC-MCNC: 119 MG/DL (ref 0–99)
LYMPHOCYTES # BLD AUTO: 1.5 X10E3/UL (ref 0.7–3.1)
LYMPHOCYTES NFR BLD AUTO: 30 %
MCH RBC QN AUTO: 29.1 PG (ref 26.6–33)
MCHC RBC AUTO-ENTMCNC: 34.5 G/DL (ref 31.5–35.7)
MCV RBC AUTO: 84 FL (ref 79–97)
MONOCYTES # BLD AUTO: 0.4 X10E3/UL (ref 0.1–0.9)
MONOCYTES NFR BLD AUTO: 9 %
NEUTROPHILS # BLD AUTO: 2.8 X10E3/UL (ref 1.4–7)
NEUTROPHILS NFR BLD AUTO: 57 %
PLATELET # BLD AUTO: 186 X10E3/UL (ref 150–450)
POTASSIUM SERPL-SCNC: 4.6 MMOL/L (ref 3.5–5.2)
PROT SERPL-MCNC: 6.7 G/DL (ref 6–8.5)
RBC # BLD AUTO: 4.37 X10E6/UL (ref 3.77–5.28)
SL AMB VLDL CHOLESTEROL CALC: 22 MG/DL (ref 5–40)
SODIUM SERPL-SCNC: 135 MMOL/L (ref 134–144)
TRIGL SERPL-MCNC: 123 MG/DL (ref 0–149)
WBC # BLD AUTO: 5 X10E3/UL (ref 3.4–10.8)

## 2023-03-01 ENCOUNTER — OFFICE VISIT (OUTPATIENT)
Dept: ENDOCRINOLOGY | Facility: HOSPITAL | Age: 63
End: 2023-03-01

## 2023-03-01 VITALS
HEART RATE: 78 BPM | SYSTOLIC BLOOD PRESSURE: 124 MMHG | DIASTOLIC BLOOD PRESSURE: 82 MMHG | HEIGHT: 68 IN | BODY MASS INDEX: 31.07 KG/M2 | WEIGHT: 205 LBS

## 2023-03-01 DIAGNOSIS — E78.5 HYPERLIPIDEMIA LDL GOAL <100: ICD-10-CM

## 2023-03-01 DIAGNOSIS — Z79.4 TYPE 2 DIABETES MELLITUS WITHOUT COMPLICATION, WITH LONG-TERM CURRENT USE OF INSULIN (HCC): Primary | ICD-10-CM

## 2023-03-01 DIAGNOSIS — E11.9 TYPE 2 DIABETES MELLITUS WITHOUT COMPLICATION, WITH LONG-TERM CURRENT USE OF INSULIN (HCC): Primary | ICD-10-CM

## 2023-03-01 DIAGNOSIS — E55.9 VITAMIN D DEFICIENCY: ICD-10-CM

## 2023-03-01 DIAGNOSIS — I10 ESSENTIAL HYPERTENSION: ICD-10-CM

## 2023-03-01 NOTE — PROGRESS NOTES
Popeye Berry 63 y o  female MRN: [id number]    Encounter: [id number]      Assessment/Plan     Assessment: This is a 63y o -year-old female with type 2 diabetes with long term insulin use, hyperlipidemia and vitamin-D deficiency  Plan:  1  Type 2 diabetes with long-term insulin use:  Her hemoglobin A1c is improved to 6 3  For now, she will continue her current regimen of Glimepiride, Levemir and Metformin consistently   Reviewed and reinforced the importance of consistency with her medications, diet and exercise   I have asked her to continue checking her blood sugars regularly and send a record to the office in 2 weeks for review   Reviewed recognition and proper treatment of hypoglycemic episodes   Check hemoglobin A1c and comprehensive metabolic panel prior to next visit       2  Hyperlipidemia: Improved  Continue CQ10 and red yeast rice 600 mg daily - consistently   She has not been interested in pursuing statin therapy   Check fasting lipid panel prior to next visit       3  Vitamin-D deficiency: Stable at 50 7  Continue supplementation with 2000 units of vitamin D3 daily - consistently       CC: Type 2 Diabetes follow up    History of Present Illness     HPI:  63y o  year old female with type 2 diabetes for about 18 years  She is on oral agents and insulin at home and takes metformin XR 1000 mg daily with dinner, glimepiride 1 mg with lunch and Levemir 40 units   She states that she has not been consistent with her glimepiride daily  Her most recent hemoglobin A1c from February 24, 2022 is 6  3   In the past, metformin dose was attempted to be increased but this caused gastrointestinal issues  She denies any polyuria, polydipsia, nocturia and blurry vision  She denies neuropathy, nephropathy and retinopathy        Hypoglycemic episodes: No       The patient's last eye exam was on May 21, 2020   Her most recent diabetic foot exam was performed at her office visit on November 2, 2022       Blood Sugar/Glucometer/Pump/CGM review: No blood sugars in office for review      In the past, she has declined treatment for elevated cholesterol through her PCP   She has treating with CoQ10 and red yeast rice inconsistently      For her vitamin-D deficiency, she is supplementing with 2000 units of vitamin D3  Recent 25 hydroxy vitamin-D level from October 25, 2022 is 50 7  Review of Systems   Constitutional: Negative  Negative for chills, fatigue and fever  HENT: Negative  Negative for trouble swallowing and voice change  Eyes: Negative  Negative for photophobia, pain, discharge, redness, itching and visual disturbance  Respiratory: Negative  Negative for chest tightness and shortness of breath  Cardiovascular: Negative  Negative for chest pain  Gastrointestinal: Negative  Negative for abdominal pain, constipation, diarrhea and vomiting  Endocrine: Positive for polyuria  Negative for cold intolerance, heat intolerance, polydipsia and polyphagia  Genitourinary: Negative  Musculoskeletal: Negative  Skin: Negative  Allergic/Immunologic: Negative  Neurological: Negative  Negative for dizziness, syncope, light-headedness and headaches  Hematological: Negative  Psychiatric/Behavioral: The patient is nervous/anxious  All other systems reviewed and are negative  Historical Information   Past Medical History:   Diagnosis Date   • Anxiety    • Condyloma acuminatum    • Diabetes mellitus ([facility] [address] ) 2002    type 2   • Genital warts    • Herpes simplex    • HPV (human papilloma virus) infection    • Obesity 1990   • Visual impairment 1984     History reviewed  No pertinent surgical history    Social History   Social History     Substance and Sexual Activity   Alcohol Use Not Currently     Social History     Substance and Sexual Activity   Drug Use Yes   • Frequency: 4 0 times per week   • Types: Marijuana    Comment: Have a green card     Social History     Tobacco Use   Smoking Status Never   Smokeless Tobacco Never     Family History:   Family History   Problem Relation Age of Onset   • Diabetes Father    • Heart disease Father             • ALS Father    • Diabetes type II Father            • Diabetes Brother    • Diabetes type II Brother    • Alzheimer's disease Mother    • Dementia Mother             • Colon cancer Maternal Grandfather                 Meds/Allergies   Current Outpatient Medications   Medication Sig Dispense Refill   • Blood Glucose Monitoring Suppl (OneTouch Verio) w/Device KIT Use daily 1 kit 0   • Cholecalciferol (VITAMIN D PO) Take 5,000 Units by mouth in the morning     • Chromium (Chromium GTF) 200 MCG TABS Take 1 tablet (200 mcg total) by mouth in the morning 30 tablet    • CINNAMON PO Take by mouth in the morning     • Coenzyme Q10-Red Yeast Rice (Co Q-10 Plus Red Yeast Rice)  MG CAPS Take 60 mg by mouth in the morning     • glimepiride (AMARYL) 1 mg tablet 1 TABLET DAILY WITH LUNCH  90 tablet 0   • Insulin Pen Needle (B-D ULTRAFINE III SHORT PEN) 31G X 8 MM MISC Inject under the skin 5 (five) times a day 100 each 5   • Lancets (onetouch ultrasoft) lancets Check sugars 2-4 times a day 100 each 5   • Levemir FlexTouch 100 units/mL injection pen INJECT 40 UNITS UNDER THE SKIN DAILY 15 mL 2   • Lutein-Zeaxanthin 25-5 MG CAPS      • metFORMIN (GLUCOPHAGE-XR) 500 mg 24 hr tablet TAKE 2 TABLETS BY MOUTH DAILY WITH DINNER 180 tablet 0   • OneTouch Verio test strip CHECK SUGARS 2-4 TIMES A  strip 5   • QUERCETIN PO Take by mouth     • triamcinolone (KENALOG) 0 1 % ointment APPLY 1 APPLICATION TOPICALLY 2 (TWO) TIMES A DAY TO AFFECTED AREA 80 g 0   • Vitamin B Complex-C CAPS      • Insulin Glargine Solostar (Semglee) 100 UNIT/ML SOPN Inject 0 4 mL (40 Units total) under the skin in the morning (Patient not taking: Reported on 3/1/2023) 15 mL 0     No current facility-administered medications for this visit       Allergies Allergen Reactions   • Eggs Or Egg-Derived Products - Food Allergy      Upset stomach  • Milk-Related Compounds - Food Allergy Diarrhea       Objective   Vitals: Blood pressure 124/82, pulse 78, height 5' 7 5" (1 715 m), weight 93 kg (205 lb)  Physical Exam  Vitals reviewed  Constitutional:       Appearance: She is well-developed  HENT:      Head: Normocephalic and atraumatic  Eyes:      Conjunctiva/sclera: Conjunctivae normal       Pupils: Pupils are equal, round, and reactive to light  Cardiovascular:      Rate and Rhythm: Normal rate and regular rhythm  Heart sounds: Normal heart sounds  Pulmonary:      Effort: Pulmonary effort is normal       Breath sounds: Normal breath sounds  Abdominal:      General: Bowel sounds are normal       Palpations: Abdomen is soft  Musculoskeletal:         General: Normal range of motion  Cervical back: Normal range of motion and neck supple  Skin:     General: Skin is warm and dry  Neurological:      Mental Status: She is alert and oriented to person, place, and time  Psychiatric:         Behavior: Behavior normal          Thought Content:  Thought content normal          Judgment: Judgment normal        Lab Results:   Lab Results   Component Value Date/Time    Hemoglobin A1C 6 3 (H) 02/24/2023 10:45 AM    Hemoglobin A1C 6 3 (H) 10/25/2022 10:25 AM    Hemoglobin A1C 8 4 (H) 06/23/2022 07:31 AM    White Blood Cell Count 5 0 02/24/2023 10:45 AM    White Blood Cell Count 4 9 10/25/2022 10:25 AM    White Blood Cell Count 5 6 06/23/2022 07:31 AM    Hemoglobin 12 7 02/24/2023 10:45 AM    Hemoglobin 12 6 10/25/2022 10:25 AM    Hemoglobin 12 7 06/23/2022 07:31 AM    HCT 36 8 02/24/2023 10:45 AM    HCT 37 6 10/25/2022 10:25 AM    HCT 38 7 06/23/2022 07:31 AM    MCV 84 02/24/2023 10:45 AM    MCV 85 10/25/2022 10:25 AM    MCV 86 06/23/2022 07:31 AM    Platelet Count 814 18/35/4643 10:45 AM    Platelet Count 999 15/02/3904 10:25 AM    Platelet Count 631 06/23/2022 07:31 AM    BUN 8 02/24/2023 10:45 AM    BUN 9 10/25/2022 10:25 AM    BUN 9 06/23/2022 07:31 AM    Potassium 4 6 02/24/2023 10:45 AM    Potassium 4 9 10/25/2022 10:25 AM    Potassium 4 5 06/23/2022 07:31 AM    Chloride 98 02/24/2023 10:45 AM    Chloride 102 10/25/2022 10:25 AM    Chloride 102 06/23/2022 07:31 AM    CO2 25 02/24/2023 10:45 AM    CO2 26 10/25/2022 10:25 AM    CO2 24 06/23/2022 07:31 AM    Creatinine 0 96 02/24/2023 10:45 AM    Creatinine 0 82 10/25/2022 10:25 AM    Creatinine 0 83 06/23/2022 07:31 AM    AST 17 02/24/2023 10:45 AM    AST 15 10/25/2022 10:25 AM    AST 11 06/23/2022 07:31 AM    ALT 14 02/24/2023 10:45 AM    ALT 18 10/25/2022 10:25 AM    ALT 12 06/23/2022 07:31 AM    Albumin 4 3 02/24/2023 10:45 AM    Albumin 4 3 10/25/2022 10:25 AM    Albumin 4 2 06/23/2022 07:31 AM    Globulin, Total 2 4 02/24/2023 10:45 AM    Globulin, Total 2 5 10/25/2022 10:25 AM    Globulin, Total 2 4 06/23/2022 07:31 AM    HDL 53 02/24/2023 10:45 AM    HDL 48 06/23/2022 07:31 AM    Triglycerides 123 02/24/2023 10:45 AM    Triglycerides 157 (H) 06/23/2022 07:31 AM     Portions of the record may have been created with voice recognition software  Occasional wrong word or "sound a like" substitutions may have occurred due to the inherent limitations of voice recognition software  Read the chart carefully and recognize, using context, where substitutions have occurred

## 2023-03-16 DIAGNOSIS — E11.9 TYPE 2 DIABETES MELLITUS WITHOUT COMPLICATION, WITH LONG-TERM CURRENT USE OF INSULIN (HCC): ICD-10-CM

## 2023-03-16 DIAGNOSIS — Z79.4 TYPE 2 DIABETES MELLITUS WITHOUT COMPLICATION, WITH LONG-TERM CURRENT USE OF INSULIN (HCC): ICD-10-CM

## 2023-03-16 RX ORDER — METFORMIN HYDROCHLORIDE 500 MG/1
TABLET, EXTENDED RELEASE ORAL
Qty: 180 TABLET | Refills: 0 | Status: SHIPPED | OUTPATIENT
Start: 2023-03-16

## 2023-03-19 DIAGNOSIS — Z79.4 TYPE 2 DIABETES MELLITUS WITHOUT COMPLICATION, WITH LONG-TERM CURRENT USE OF INSULIN (HCC): ICD-10-CM

## 2023-03-19 DIAGNOSIS — E11.9 TYPE 2 DIABETES MELLITUS WITHOUT COMPLICATION, WITH LONG-TERM CURRENT USE OF INSULIN (HCC): ICD-10-CM

## 2023-03-20 ENCOUNTER — TELEPHONE (OUTPATIENT)
Dept: FAMILY MEDICINE CLINIC | Facility: CLINIC | Age: 63
End: 2023-03-20

## 2023-03-20 DIAGNOSIS — E11.9 TYPE 2 DIABETES MELLITUS WITHOUT COMPLICATION, WITH LONG-TERM CURRENT USE OF INSULIN (HCC): ICD-10-CM

## 2023-03-20 DIAGNOSIS — Z79.4 TYPE 2 DIABETES MELLITUS WITHOUT COMPLICATION, WITH LONG-TERM CURRENT USE OF INSULIN (HCC): ICD-10-CM

## 2023-03-20 RX ORDER — GLIMEPIRIDE 1 MG/1
TABLET ORAL
Qty: 90 TABLET | Refills: 0 | Status: SHIPPED | OUTPATIENT
Start: 2023-03-20

## 2023-03-20 RX ORDER — PEN NEEDLE, DIABETIC 31 GX5/16"
NEEDLE, DISPOSABLE MISCELLANEOUS
Qty: 100 EACH | Refills: 5 | Status: SHIPPED | OUTPATIENT
Start: 2023-03-20 | End: 2023-04-19

## 2023-03-20 NOTE — TELEPHONE ENCOUNTER
----- Message from Arnold Rubio sent at 3/19/2023  3:55 PM EDT -----  Regarding: Immediate Refill for BD UF [place]  Contact: [phone]  I didn't realize this script refill ran out on 3/3/23  I am out of needles to administer the evening insulin  BD UF Short Pen Needle [id number]    Thank you!     Arnold Rubio

## 2023-04-04 RX ORDER — ANTIARTHRITIC COMBINATION NO.2 900 MG
TABLET ORAL
COMMUNITY
Start: 2023-02-01

## 2023-04-04 RX ORDER — VITAMIN E 268 MG
CAPSULE ORAL
COMMUNITY
Start: 2022-09-01

## 2023-05-16 RX ORDER — INSULIN GLARGINE 100 [IU]/ML
INJECTION, SOLUTION SUBCUTANEOUS
Refills: 0 | OUTPATIENT
Start: 2023-05-16

## 2023-06-15 DIAGNOSIS — E11.9 TYPE 2 DIABETES MELLITUS WITHOUT COMPLICATION, WITH LONG-TERM CURRENT USE OF INSULIN (HCC): ICD-10-CM

## 2023-06-15 DIAGNOSIS — Z79.4 TYPE 2 DIABETES MELLITUS WITHOUT COMPLICATION, WITH LONG-TERM CURRENT USE OF INSULIN (HCC): ICD-10-CM

## 2023-06-15 RX ORDER — GLIMEPIRIDE 1 MG/1
TABLET ORAL
Qty: 90 TABLET | Refills: 0 | Status: SHIPPED | OUTPATIENT
Start: 2023-06-15

## 2023-06-15 RX ORDER — METFORMIN HYDROCHLORIDE 500 MG/1
TABLET, EXTENDED RELEASE ORAL
Qty: 180 TABLET | Refills: 0 | Status: SHIPPED | OUTPATIENT
Start: 2023-06-15

## 2023-06-19 DIAGNOSIS — E11.9 TYPE 2 DIABETES MELLITUS WITHOUT COMPLICATION, WITH LONG-TERM CURRENT USE OF INSULIN (HCC): ICD-10-CM

## 2023-06-19 DIAGNOSIS — Z79.4 TYPE 2 DIABETES MELLITUS WITHOUT COMPLICATION, WITH LONG-TERM CURRENT USE OF INSULIN (HCC): ICD-10-CM

## 2023-06-19 RX ORDER — INSULIN DETEMIR 100 [IU]/ML
40 INJECTION, SOLUTION SUBCUTANEOUS DAILY
Qty: 15 ML | Refills: 0 | Status: SHIPPED | OUTPATIENT
Start: 2023-06-19

## 2023-06-30 LAB
ALBUMIN SERPL-MCNC: 4.1 G/DL (ref 3.8–4.9)
ALBUMIN/GLOB SERPL: 1.4 {RATIO} (ref 1.2–2.2)
ALP SERPL-CCNC: 53 IU/L (ref 44–121)
ALT SERPL-CCNC: 10 IU/L (ref 0–32)
AST SERPL-CCNC: 12 IU/L (ref 0–40)
BASOPHILS # BLD AUTO: 0.1 X10E3/UL (ref 0–0.2)
BASOPHILS NFR BLD AUTO: 2 %
BILIRUB SERPL-MCNC: 0.9 MG/DL (ref 0–1.2)
BUN SERPL-MCNC: 11 MG/DL (ref 8–27)
BUN/CREAT SERPL: 12 (ref 12–28)
CALCIUM SERPL-MCNC: 9.6 MG/DL (ref 8.7–10.3)
CHLORIDE SERPL-SCNC: 97 MMOL/L (ref 96–106)
CO2 SERPL-SCNC: 26 MMOL/L (ref 20–29)
CREAT SERPL-MCNC: 0.94 MG/DL (ref 0.57–1)
EGFR: 69 ML/MIN/1.73
EOSINOPHIL # BLD AUTO: 0.2 X10E3/UL (ref 0–0.4)
EOSINOPHIL NFR BLD AUTO: 3 %
ERYTHROCYTE [DISTWIDTH] IN BLOOD BY AUTOMATED COUNT: 12.6 % (ref 11.7–15.4)
EST. AVERAGE GLUCOSE BLD GHB EST-MCNC: 126 MG/DL
GLOBULIN SER-MCNC: 2.9 G/DL (ref 1.5–4.5)
GLUCOSE SERPL-MCNC: 96 MG/DL (ref 70–99)
HBA1C MFR BLD: 6 % (ref 4.8–5.6)
HCT VFR BLD AUTO: 39 % (ref 34–46.6)
HGB BLD-MCNC: 12.9 G/DL (ref 11.1–15.9)
IMM GRANULOCYTES # BLD: 0 X10E3/UL (ref 0–0.1)
IMM GRANULOCYTES NFR BLD: 1 %
LYMPHOCYTES # BLD AUTO: 1.8 X10E3/UL (ref 0.7–3.1)
LYMPHOCYTES NFR BLD AUTO: 26 %
MCH RBC QN AUTO: 28.2 PG (ref 26.6–33)
MCHC RBC AUTO-ENTMCNC: 33.1 G/DL (ref 31.5–35.7)
MCV RBC AUTO: 85 FL (ref 79–97)
MONOCYTES # BLD AUTO: 0.5 X10E3/UL (ref 0.1–0.9)
MONOCYTES NFR BLD AUTO: 8 %
NEUTROPHILS # BLD AUTO: 4.2 X10E3/UL (ref 1.4–7)
NEUTROPHILS NFR BLD AUTO: 60 %
PLATELET # BLD AUTO: 205 X10E3/UL (ref 150–450)
POTASSIUM SERPL-SCNC: 4.5 MMOL/L (ref 3.5–5.2)
PROT SERPL-MCNC: 7 G/DL (ref 6–8.5)
RBC # BLD AUTO: 4.57 X10E6/UL (ref 3.77–5.28)
SODIUM SERPL-SCNC: 136 MMOL/L (ref 134–144)
TSH SERPL DL<=0.005 MIU/L-ACNC: 0.82 UIU/ML (ref 0.45–4.5)
WBC # BLD AUTO: 6.8 X10E3/UL (ref 3.4–10.8)

## 2023-07-05 ENCOUNTER — OFFICE VISIT (OUTPATIENT)
Dept: ENDOCRINOLOGY | Facility: HOSPITAL | Age: 63
End: 2023-07-05
Payer: COMMERCIAL

## 2023-07-05 VITALS — WEIGHT: 197.8 LBS | BODY MASS INDEX: 29.98 KG/M2 | HEIGHT: 68 IN

## 2023-07-05 DIAGNOSIS — I10 ESSENTIAL HYPERTENSION: Primary | ICD-10-CM

## 2023-07-05 DIAGNOSIS — E78.5 HYPERLIPIDEMIA LDL GOAL <100: ICD-10-CM

## 2023-07-05 DIAGNOSIS — E11.9 TYPE 2 DIABETES MELLITUS WITHOUT COMPLICATION, WITH LONG-TERM CURRENT USE OF INSULIN (HCC): ICD-10-CM

## 2023-07-05 DIAGNOSIS — Z79.4 TYPE 2 DIABETES MELLITUS WITHOUT COMPLICATION, WITH LONG-TERM CURRENT USE OF INSULIN (HCC): ICD-10-CM

## 2023-07-05 DIAGNOSIS — E55.9 VITAMIN D DEFICIENCY: ICD-10-CM

## 2023-07-05 PROCEDURE — 99214 OFFICE O/P EST MOD 30 MIN: CPT | Performed by: NURSE PRACTITIONER

## 2023-07-05 RX ORDER — METFORMIN HYDROCHLORIDE 500 MG/1
1000 TABLET, EXTENDED RELEASE ORAL
Qty: 180 TABLET | Refills: 3 | Status: SHIPPED | OUTPATIENT
Start: 2023-07-05

## 2023-07-05 RX ORDER — BLOOD SUGAR DIAGNOSTIC
STRIP MISCELLANEOUS
Qty: 200 STRIP | Refills: 5 | Status: SHIPPED | OUTPATIENT
Start: 2023-07-05

## 2023-07-05 NOTE — PROGRESS NOTES
Kenya Mccoy 63 y.o. female MRN: [id number]    Encounter: [id number]      Assessment/Plan     Assessment: This is a 63y.o.-year-old female with type 2 diabetes with long term insulin use, hyperlipidemia and vitamin-D deficiency. Plan:  1. Type 2 diabetes with long-term insulin use:  Her hemoglobin A1c is improved to 6.0. For now, she will continue her current regimen of Glimepiride, and Metformin consistently.  Reviewed and reinforced the importance of consistency with her medications, diet and exercise.  I have asked her to continue checking her blood sugars regularly and send a record to the office in 2 weeks for review.  Reviewed recognition and proper treatment of hypoglycemic episodes.  Check hemoglobin A1c and comprehensive metabolic panel prior to next visit.      2. Hyperlipidemia: Continue CQ10 and red yeast rice 600 mg daily - consistently.  She has not been interested in pursuing statin therapy.  Check fasting lipid panel prior to next visit.      3. Vitamin-D deficiency: Stable at 50.7. Continue supplementation with 2000 units of vitamin D3 daily - consistently.      CC: Type II diabetes follow-up    History of Present Illness     HPI:  63y.o. year old female with type 2 diabetes for approximately 18 years. She is on oral agents and insulin at home and takes metformin XR 1000 mg daily with dinner, glimepiride 1 mg with lunch and Levemir 40 units.  She states that she has not been consistent with her glimepiride daily. Her most recent hemoglobin A1c from June 29, 2023 is 6.0. She denies any polyuria, polydipsia, nocturia and blurry vision. She denies neuropathy, nephropathy and retinopathy.       Hypoglycemic episodes: No.      The patient's last eye exam was on May 21, 2020.  Her most recent diabetic foot exam was performed at her office visit on November 2, 2022.      Blood Sugar/Glucometer/Pump/CGM review: No blood sugars in office for review.     In the past, she has declined treatment for elevated cholesterol through her PCP.  She has treating with CoQ10 and red yeast rice inconsistently.     For her vitamin-D deficiency, she is supplementing with 2000 units of vitamin D3.  Recent 25 hydroxy vitamin-D level from October 25, 2022 is 50.7. Review of Systems   Constitutional: Negative. Negative for chills, fatigue and fever. HENT: Negative. Negative for trouble swallowing and voice change. Eyes: Negative. Negative for photophobia, pain, discharge, redness, itching and visual disturbance. Respiratory: Negative. Negative for chest tightness and shortness of breath. Cardiovascular: Negative. Negative for chest pain. Gastrointestinal: Negative. Negative for abdominal pain, constipation, diarrhea and vomiting. Endocrine: Positive for polyuria. Negative for cold intolerance, heat intolerance, polydipsia and polyphagia. Genitourinary: Negative. Musculoskeletal: Negative. Skin: Negative. Allergic/Immunologic: Negative. Neurological: Negative. Negative for dizziness, syncope, light-headedness and headaches. Hematological: Negative. Psychiatric/Behavioral: The patient is nervous/anxious. All other systems reviewed and are negative. Historical Information   Past Medical History:   Diagnosis Date   • Anxiety    • Condyloma acuminatum    • Diabetes mellitus ([address]) 2002    type 2   • Genital warts    • Herpes simplex    • HPV (human papilloma virus) infection    • Obesity 1990   • Visual impairment 1984     No past surgical history on file.   Social History   Social History     Substance and Sexual Activity   Alcohol Use Not Currently     Social History     Substance and Sexual Activity   Drug Use Yes   • Frequency: 4.0 times per week   • Types: Marijuana    Comment: Have a green card     Social History     Tobacco Use   Smoking Status Never   Smokeless Tobacco Never     Family History:   Family History   Problem Relation Age of Onset   • Diabetes Father    • Heart disease Father             • ALS Father    • Diabetes type II Father            • Diabetes Brother    • Diabetes type II Brother    • Alzheimer's disease Mother    • Dementia Mother             • Colon cancer Maternal Grandfather                 Meds/Allergies   Current Outpatient Medications   Medication Sig Dispense Refill   • ADRENAL CORTEX PO      • Blood Glucose Monitoring Suppl (OneTouch Verio) w/Device KIT Use daily 1 kit 0   • Cholecalciferol (VITAMIN D PO) Take 5,000 Units by mouth in the morning     • Chromium (Chromium GTF) 200 MCG TABS Take 1 tablet (200 mcg total) by mouth in the morning 30 tablet    • CINNAMON PO Take by mouth in the morning     • Coenzyme Q10-Red Yeast Rice (Co Q-10 Plus Red Yeast Rice)  MG CAPS Take 60 mg by mouth in the morning     • DHEA 25 MG tablet      • glimepiride (AMARYL) 1 mg tablet 1 TABLET DAILY WITH LUNCH. 90 tablet 0   • glucose blood (OneTouch Verio) test strip Test sugars twice a day 200 strip 5   • Lancets (onetouch ultrasoft) lancets Check sugars 2-4 times a day 100 each 5   • Levemir FlexTouch 100 units/mL injection pen INJECT 40 UNITS UNDER THE SKIN DAILY 15 mL 0   • Lutein-Zeaxanthin 25-5 MG CAPS      • metFORMIN (GLUCOPHAGE-XR) 500 mg 24 hr tablet TAKE 2 TABLETS BY MOUTH DAILY WITH DINNER. 180 tablet 0   • QUERCETIN PO Take by mouth     • triamcinolone (KENALOG) 0.1 % ointment APPLY 1 APPLICATION TOPICALLY 2 (TWO) TIMES A DAY TO AFFECTED AREA 80 g 0   • Vitamin B Complex-C CAPS      • Vitamin E 180 MG (400 UNIT) capsule      • Insulin Glargine Solostar (Semglee) 100 UNIT/ML SOPN Inject 0.4 mL (40 Units total) under the skin in the morning (Patient not taking: Reported on 3/1/2023) 15 mL 0   • Insulin Pen Needle (B-D ULTRAFINE III SHORT PEN) 31G X 8 MM MISC Inject under the skin 5 (five) times a day 100 each 5     No current facility-administered medications for this visit.      Allergies   Allergen Reactions   • Eggs Or Egg-Derived Products - Food Allergy      Upset stomach. • Milk-Related Compounds - Food Allergy Diarrhea   • Tomato (Diagnostic) - Food Allergy Other (See Comments)     Reflux       Objective   Vitals: Height 5' 7.5" (1.715 m), weight 89.7 kg (197 lb 12.8 oz). Physical Exam  Vitals reviewed. Constitutional:       Appearance: She is well-developed. HENT:      Head: Normocephalic and atraumatic. Eyes:      Conjunctiva/sclera: Conjunctivae normal.      Pupils: Pupils are equal, round, and reactive to light. Cardiovascular:      Rate and Rhythm: Normal rate and regular rhythm. Heart sounds: Normal heart sounds. Pulmonary:      Effort: Pulmonary effort is normal.      Breath sounds: Normal breath sounds. Abdominal:      General: Bowel sounds are normal.      Palpations: Abdomen is soft. Musculoskeletal:         General: Normal range of motion. Cervical back: Normal range of motion and neck supple. Skin:     General: Skin is warm and dry. Neurological:      Mental Status: She is alert and oriented to person, place, and time. Psychiatric:         Behavior: Behavior normal.         Thought Content:  Thought content normal.         Judgment: Judgment normal.       Lab Results:   Lab Results   Component Value Date/Time    Hemoglobin A1C 6.0 (H) 06/29/2023 10:31 AM    Hemoglobin A1C 6.3 (H) 02/24/2023 10:45 AM    Hemoglobin A1C 6.3 (H) 10/25/2022 10:25 AM    White Blood Cell Count 6.8 06/29/2023 10:31 AM    White Blood Cell Count 5.0 02/24/2023 10:45 AM    White Blood Cell Count 4.9 10/25/2022 10:25 AM    Hemoglobin 12.9 06/29/2023 10:31 AM    Hemoglobin 12.7 02/24/2023 10:45 AM    Hemoglobin 12.6 10/25/2022 10:25 AM    HCT 39.0 06/29/2023 10:31 AM    HCT 36.8 02/24/2023 10:45 AM    HCT 37.6 10/25/2022 10:25 AM    MCV 85 06/29/2023 10:31 AM    MCV 84 02/24/2023 10:45 AM    MCV 85 10/25/2022 10:25 AM    Platelet Count 819 31/54/6735 10:31 AM    Platelet Count 573 68/13/9032 10:45 AM Platelet Count 493 89/16/9566 10:25 AM    BUN 11 06/29/2023 10:31 AM    BUN 8 02/24/2023 10:45 AM    BUN 9 10/25/2022 10:25 AM    Potassium 4.5 06/29/2023 10:31 AM    Potassium 4.6 02/24/2023 10:45 AM    Potassium 4.9 10/25/2022 10:25 AM    Chloride 97 06/29/2023 10:31 AM    Chloride 98 02/24/2023 10:45 AM    Chloride 102 10/25/2022 10:25 AM    CO2 26 06/29/2023 10:31 AM    CO2 25 02/24/2023 10:45 AM    CO2 26 10/25/2022 10:25 AM    Creatinine 0.94 06/29/2023 10:31 AM    Creatinine 0.96 02/24/2023 10:45 AM    Creatinine 0.82 10/25/2022 10:25 AM    AST 12 06/29/2023 10:31 AM    AST 17 02/24/2023 10:45 AM    AST 15 10/25/2022 10:25 AM    ALT 10 06/29/2023 10:31 AM    ALT 14 02/24/2023 10:45 AM    ALT 18 10/25/2022 10:25 AM    Protein, Total 7.0 06/29/2023 10:31 AM    Protein, Total 6.7 02/24/2023 10:45 AM    Protein, Total 6.8 10/25/2022 10:25 AM    Albumin 4.1 06/29/2023 10:31 AM    Albumin 4.3 02/24/2023 10:45 AM    Albumin 4.3 10/25/2022 10:25 AM    Globulin, Total 2.9 06/29/2023 10:31 AM    Globulin, Total 2.4 02/24/2023 10:45 AM    Globulin, Total 2.5 10/25/2022 10:25 AM    HDL 53 02/24/2023 10:45 AM    Triglycerides 123 02/24/2023 10:45 AM     Portions of the record may have been created with voice recognition software. Occasional wrong word or "sound a like" substitutions may have occurred due to the inherent limitations of voice recognition software. Read the chart carefully and recognize, using context, where substitutions have occurred.

## 2023-07-05 NOTE — PATIENT INSTRUCTIONS
Be mindful of diet. Stay active and stay hydrated. For now, continue your current regimen of metformin. Please decrease Levemir to 34 unit. Continue glimepiride 1 mg with lunch. Check blood sugars at least twice daily at alternating times and send a record to the office in 2 weeks for review. Supplement with 2000 units of vitamin D3 daily - consistently. Continue Co Q10 with Red Yeast Rice. Obtain lab work as prescribed.

## 2023-07-17 DIAGNOSIS — Z79.4 TYPE 2 DIABETES MELLITUS WITHOUT COMPLICATION, WITH LONG-TERM CURRENT USE OF INSULIN (HCC): ICD-10-CM

## 2023-07-17 DIAGNOSIS — E11.9 TYPE 2 DIABETES MELLITUS WITHOUT COMPLICATION, WITH LONG-TERM CURRENT USE OF INSULIN (HCC): ICD-10-CM

## 2023-07-17 RX ORDER — INSULIN DETEMIR 100 [IU]/ML
INJECTION, SOLUTION SUBCUTANEOUS
Qty: 15 ML | Refills: 0 | Status: SHIPPED | OUTPATIENT
Start: 2023-07-17 | End: 2023-08-17

## 2023-07-27 ENCOUNTER — TELEPHONE (OUTPATIENT)
Dept: FAMILY MEDICINE CLINIC | Facility: CLINIC | Age: 63
End: 2023-07-27

## 2023-07-27 ENCOUNTER — OFFICE VISIT (OUTPATIENT)
Dept: FAMILY MEDICINE CLINIC | Facility: CLINIC | Age: 63
End: 2023-07-27
Payer: COMMERCIAL

## 2023-07-27 VITALS
HEIGHT: 67 IN | SYSTOLIC BLOOD PRESSURE: 110 MMHG | BODY MASS INDEX: 29.82 KG/M2 | HEART RATE: 65 BPM | OXYGEN SATURATION: 97 % | WEIGHT: 190 LBS | TEMPERATURE: 96.9 F | DIASTOLIC BLOOD PRESSURE: 70 MMHG

## 2023-07-27 DIAGNOSIS — I10 ESSENTIAL HYPERTENSION: ICD-10-CM

## 2023-07-27 DIAGNOSIS — E11.9 TYPE 2 DIABETES MELLITUS WITHOUT COMPLICATION, WITH LONG-TERM CURRENT USE OF INSULIN (HCC): ICD-10-CM

## 2023-07-27 DIAGNOSIS — Z00.00 WELL ADULT EXAM: Primary | ICD-10-CM

## 2023-07-27 DIAGNOSIS — Z79.4 TYPE 2 DIABETES MELLITUS WITHOUT COMPLICATION, WITH LONG-TERM CURRENT USE OF INSULIN (HCC): ICD-10-CM

## 2023-07-27 DIAGNOSIS — T78.40XA ALLERGY, INITIAL ENCOUNTER: Primary | ICD-10-CM

## 2023-07-27 PROBLEM — E78.2 MIXED HYPERLIPIDEMIA: Status: ACTIVE | Noted: 2023-07-27

## 2023-07-27 PROCEDURE — 99396 PREV VISIT EST AGE 40-64: CPT | Performed by: FAMILY MEDICINE

## 2023-07-27 PROCEDURE — 3725F SCREEN DEPRESSION PERFORMED: CPT | Performed by: FAMILY MEDICINE

## 2023-07-27 PROCEDURE — 3078F DIAST BP <80 MM HG: CPT | Performed by: FAMILY MEDICINE

## 2023-07-27 PROCEDURE — 3074F SYST BP LT 130 MM HG: CPT | Performed by: FAMILY MEDICINE

## 2023-07-27 NOTE — PROGRESS NOTES
Moreno Gaviria is a 63 y.o.  female and is here for routine health maintenance. The patient reports no problems. History of Present Illness     Pt is here for a physical today. She was seen by endocrine who decreased levemir to 34 units starting 7/5  Her blood sugars have been No lower than 70 -   6.0 for hba1c now on her meter, verio one touch   Her Eye exam is scheduled  in august- Dr. Anai Pal eye   Has mold in her trailer - and is planning on moving out. She is in a rental property. Family history: Paternal grandfather with colon cancer       Well Adult Physical   Patient here for a comprehensive physical exam.      Diet and Physical Activity  Diet: well balanced diet  Weight concerns: Patient is overweight (BMI 25.0-29. 9)  Exercise: frequently      Depression Screen  PHQ-2/9 Depression Screening    Little interest or pleasure in doing things: 0 - not at all  Feeling down, depressed, or hopeless: 0 - not at all  PHQ-2 Score: 0  PHQ-2 Interpretation: Negative depression screen          General Health  Hearing: Normal:  bilateral  Vision: no vision problems  Dental: regular dental visits     History:  LMP: No LMP recorded. Patient is postmenopausal.      Cancer Screening  Colononoscopy due  Mammogram due  Pap due 7/2024, up to date  Abnormal pap? no  Smoker NO Annual screening with low-dose helical computed tomography (CT) for patients age 54 to 76 years with history of smoking at least 30 pack-years and, if a former smoker, had quit within the previous 15 years      The following portions of the patient's history were reviewed and updated as appropriate: allergies, current medications, past family history, past medical history, past social history, past surgical history and problem list.    Review of Systems     Review of Systems   Constitutional: Negative. Negative for fatigue and fever. HENT: Negative. Eyes: Negative. Respiratory: Negative. Negative for cough. Cardiovascular: Negative. Gastrointestinal: Negative. Endocrine: Negative. Genitourinary: Negative. Musculoskeletal: Negative. Skin: Negative. Allergic/Immunologic: Negative. Neurological: Negative. Psychiatric/Behavioral: Negative. Past Medical History     Past Medical History:   Diagnosis Date   • Anxiety    • Condyloma acuminatum    • Diabetes mellitus ([address] [facility])     type 2   • Genital warts    • Herpes simplex    • HPV (human papilloma virus) infection    • Obesity    • Visual impairment        Past Surgical History     History reviewed. No pertinent surgical history.     Social History     Social History     Socioeconomic History   • Marital status:      Spouse name: None   • Number of children: None   • Years of education: None   • Highest education level: None   Occupational History   • None   Tobacco Use   • Smoking status: Never   • Smokeless tobacco: Never   Vaping Use   • Vaping Use: Never used   Substance and Sexual Activity   • Alcohol use: Not Currently   • Drug use: Yes     Frequency: 4.0 times per week     Types: Marijuana     Comment: Have a green card   • Sexual activity: Not Currently     Partners: Male     Birth control/protection: Post-menopausal     Comment: Not active   Other Topics Concern   • None   Social History Narrative   • None     Social Determinants of Health     Financial Resource Strain: Not on file   Food Insecurity: Not on file   Transportation Needs: Not on file   Physical Activity: Not on file   Stress: Not on file   Social Connections: Not on file   Intimate Partner Violence: Not on file   Housing Stability: Not on file       Family History     Family History   Problem Relation Age of Onset   • Diabetes Father    • Heart disease Father             • ALS Father    • Diabetes type II Father            • Diabetes Brother    • Diabetes type II Brother    • Alzheimer's disease Mother    • Dementia Mother     • Colon cancer Maternal Grandfather                 Current Medications       Current Outpatient Medications:   •  ADRENAL CORTEX PO, , Disp: , Rfl:   •  Blood Glucose Monitoring Suppl (OneTouch Verio) w/Device KIT, Use daily, Disp: 1 kit, Rfl: 0  •  Cholecalciferol (VITAMIN D PO), Take 5,000 Units by mouth in the morning, Disp: , Rfl:   •  Chromium (Chromium GTF) 200 MCG TABS, Take 1 tablet (200 mcg total) by mouth in the morning, Disp: 30 tablet, Rfl:   •  CINNAMON PO, Take by mouth in the morning, Disp: , Rfl:   •  Coenzyme Q10-Red Yeast Rice (Co Q-10 Plus Red Yeast Rice)  MG CAPS, Take 60 mg by mouth in the morning, Disp: , Rfl:   •  DHEA 25 MG tablet, , Disp: , Rfl:   •  glimepiride (AMARYL) 1 mg tablet, 1 TABLET DAILY WITH LUNCH., Disp: 90 tablet, Rfl: 0  •  glucose blood (OneTouch Verio) test strip, Test sugars twice a day, Disp: 200 strip, Rfl: 5  •  Lancets (onetouch ultrasoft) lancets, Check sugars 2-4 times a day, Disp: 100 each, Rfl: 5  •  Levemir FlexPen 100 units/mL injection pen, INJECT 40 UNITS UNDER THE SKIN DAILY, Disp: 15 mL, Rfl: 0  •  Lutein-Zeaxanthin 25-5 MG CAPS, , Disp: , Rfl:   •  metFORMIN (GLUCOPHAGE-XR) 500 mg 24 hr tablet, Take 2 tablets (1,000 mg total) by mouth daily with dinner, Disp: 180 tablet, Rfl: 3  •  QUERCETIN PO, Take by mouth, Disp: , Rfl:   •  triamcinolone (KENALOG) 0.1 % ointment, APPLY 1 APPLICATION TOPICALLY 2 (TWO) TIMES A DAY TO AFFECTED AREA, Disp: 80 g, Rfl: 0  •  Vitamin B Complex-C CAPS, , Disp: , Rfl:   •  Vitamin E 180 MG (400 UNIT) capsule, , Disp: , Rfl:   •  Insulin Pen Needle (B-D ULTRAFINE III SHORT PEN) 31G X 8 MM MISC, Inject under the skin 5 (five) times a day, Disp: 100 each, Rfl: 5     Allergies     Allergies   Allergen Reactions   • Eggs Or Egg-Derived Products - Food Allergy      Upset stomach.     • Milk-Related Compounds - Food Allergy Diarrhea   • Tomato (Diagnostic) - Food Allergy Other (See Comments)     Reflux Objective     /70   Pulse 65   Temp (!) 96.9 °F (36.1 °C) (Tympanic)   Ht 5' 7" (1.702 m)   Wt 86.2 kg (190 lb)   SpO2 97%   BMI 29.76 kg/m²      Physical Exam  Vitals and nursing note reviewed. Constitutional:       Appearance: She is well-developed. HENT:      Head: Normocephalic. Right Ear: External ear normal.      Left Ear: External ear normal.      Nose: Nose normal.   Eyes:      Conjunctiva/sclera: Conjunctivae normal.      Pupils: Pupils are equal, round, and reactive to light. Cardiovascular:      Rate and Rhythm: Normal rate and regular rhythm. Heart sounds: Normal heart sounds. Pulmonary:      Effort: Pulmonary effort is normal.      Breath sounds: Normal breath sounds. Abdominal:      General: Bowel sounds are normal.      Palpations: Abdomen is soft. Musculoskeletal:      Cervical back: Normal range of motion and neck supple. Skin:     General: Skin is warm and dry. Neurological:      Mental Status: She is alert and oriented to person, place, and time. Psychiatric:         Behavior: Behavior normal.         Thought Content: Thought content normal.         Judgment: Judgment normal.           No results found.     Health Maintenance     Health Maintenance   Topic Date Due   • Pneumococcal Vaccine: Pediatrics (0 to 5 Years) and At-Risk Patients (6 to 59 Years) (1 - PCV) Never done   • HIV Screening  Never done   • DTaP,Tdap,and Td Vaccines (2 - Td or Tdap) 01/01/2021   • COVID-19 Vaccine (3 - Pfizer series) 06/30/2021   • DM Eye Exam  05/21/2022   • BMI: Followup Plan  07/06/2022   • Cervical Cancer Screening  07/09/2022   • Kidney Health Evaluation: Albumin/Creatinine Ratio  06/23/2023   • Colorectal Cancer Screening  05/19/2023   • Influenza Vaccine (1) 09/01/2023   • Breast Cancer Screening: Mammogram  07/27/2024 (Originally 9/25/2002)   • Diabetic Foot Exam  11/02/2023   • HEMOGLOBIN A1C  12/29/2023   • Kidney Health Evaluation: GFR  06/29/2024   • Depression Screening  07/27/2024   • BMI: Adult  07/27/2024   • Annual Physical  07/27/2024   • Hepatitis C Screening  Completed   • HIB Vaccine  Aged Out   • IPV Vaccine  Aged Out   • Hepatitis A Vaccine  Aged Out   • Meningococcal ACWY Vaccine  Aged Out   • HPV Vaccine  Aged Out     Immunization History   Administered Date(s) Administered   • COVID-19 PFIZER VACCINE 0.3 ML IM 04/14/2021, 05/05/2021   • Tdap 01/01/2011       Assessment/Plan         1. Healthy female exam.  2. Patient Counseling:   · Nutrition: Stressed importance of a well balanced diet, moderation of sodium/saturated fat, caloric balance and sufficient intake of fiber  · Exercise: Stressed the importance of regular exercise with a goal of 150 minutes per week  · Dental Health: Discussed daily flossing and brushing and regular dental visits     · Immunizations reviewed. · Discussed benefits of screening . · Discussed the patient's BMI with her. The BMI is above average; BMI management plan is completed  3. Cancer Screening   4. Labs   5. continue follow up with endocrine. 6. Follow up in one year.     Ketan Gary DO

## 2023-07-27 NOTE — PATIENT INSTRUCTIONS
Eye exam scheduled for next month [facility] eye  [facility] - will come in the mail for colon cancer screen       Wellness Visit for Adults   AMBULATORY CARE:   A wellness visit  is when you see your healthcare provider to get screened for health problems. Your healthcare provider will also give you advice on how to stay healthy. Write down your questions so you remember to ask them. Ask your healthcare provider how often you should have a wellness visit. What happens at a wellness visit:  Your healthcare provider will ask about your health, and your family history of health problems. This includes high blood pressure, heart disease, and cancer. He or she will ask if you have symptoms that concern you, if you smoke, and about your mood. You may also be asked about your intake of medicines, supplements, food, and alcohol. Any of the following may be done: Your weight  will be checked. Your height may also be checked so your body mass index (BMI) can be calculated. Your BMI shows if you are at a healthy weight. Your blood pressure  and heart rate will be checked. Your temperature may also be checked. Blood and urine tests  may be done. Blood tests may be done to check your cholesterol levels. Abnormal cholesterol levels increase your risk for heart disease and stroke. You may also need a blood or urine test to check for diabetes if you are at increased risk. Urine tests may be done to look for signs of an infection or kidney disease. A physical exam  includes checking your heartbeat and lungs with a stethoscope. Your healthcare provider may also check your skin to look for sun damage. Screening tests  may be recommended. A screening test is done to check for diseases that may not cause symptoms. The screening tests you may need depend on your age, gender, family history, and lifestyle habits. For example, colorectal screening may be recommended if you are 48years old or older.     Screening tests you need if you are a woman:   A Pap smear  is used to screen for cervical cancer. Pap smears are usually done every 3 to 5 years depending on your age. You may need them more often if you have had abnormal Pap smear test results in the past. Ask your healthcare provider how often you should have a Pap smear. A mammogram  is an x-ray of your breasts to screen for breast cancer. Experts recommend mammograms every 2 years starting at age 48 years. You may need a mammogram at age 52 years or younger if you have an increased risk for breast cancer. Talk to your healthcare provider about when you should start having mammograms and how often you need them. Vaccines you may need:   Get an influenza vaccine  every year. The influenza vaccine protects you from the flu. Several types of viruses cause the flu. The viruses change over time, so new vaccines are made each year. Get a tetanus-diphtheria (Td) booster vaccine  every 10 years. This vaccine protects you against tetanus and diphtheria. Tetanus is a severe infection that may cause painful muscle spasms and lockjaw. Diphtheria is a severe bacterial infection that causes a thick covering in the back of your mouth and throat. Get a human papillomavirus (HPV) vaccine  if you are female and aged 23 to 32 or male 23 to 24 and never received it. This vaccine protects you from HPV infection. HPV is the most common infection spread by sexual contact. HPV may also cause vaginal, penile, and anal cancers. Get a pneumococcal vaccine  if you are aged 72 years or older. The pneumococcal vaccine is an injection given to protect you from pneumococcal disease. Pneumococcal disease is an infection caused by pneumococcal bacteria. The infection may cause pneumonia, meningitis, or an ear infection. Get a shingles vaccine  if you are 60 or older, even if you have had shingles before. The shingles vaccine is an injection to protect you from the varicella-zoster virus.  This is the same virus that causes chickenpox. Shingles is a painful rash that develops in people who had chickenpox or have been exposed to the virus. How to eat healthy:  My Plate is a model for planning healthy meals. It shows the types and amounts of foods that should go on your plate. Fruits and vegetables make up about half of your plate, and grains and protein make up the other half. A serving of dairy is included on the side of your plate. The amount of calories and serving sizes you need depends on your age, gender, weight, and height. Examples of healthy foods are listed below:  Eat a variety of vegetables  such as dark green, red, and orange vegetables. You can also include canned vegetables low in sodium (salt) and frozen vegetables without added butter or sauces. Eat a variety of fresh fruits , canned fruit in 100% juice, frozen fruit, and dried fruit. Include whole grains. At least half of the grains you eat should be whole grains. Examples include whole-wheat bread, wheat pasta, brown rice, and whole-grain cereals such as oatmeal.    Eat a variety of protein foods such as seafood (fish and shellfish), lean meat, and poultry without skin (turkey and chicken). Examples of lean meats include pork leg, shoulder, or tenderloin, and beef round, sirloin, tenderloin, and extra lean ground beef. Other protein foods include eggs and egg substitutes, beans, peas, soy products, nuts, and seeds. Choose low-fat dairy products such as skim or 1% milk or low-fat yogurt, cheese, and cottage cheese. Limit unhealthy fats  such as butter, hard margarine, and shortening. Exercise:  Exercise at least 30 minutes per day on most days of the week. Some examples of exercise include walking, biking, dancing, and swimming. You can also fit in more physical activity by taking the stairs instead of the elevator or parking farther away from stores. Include muscle strengthening activities 2 days each week.  Regular exercise provides many health benefits. It helps you manage your weight, and decreases your risk for type 2 diabetes, heart disease, stroke, and high blood pressure. Exercise can also help improve your mood. Ask your healthcare provider about the best exercise plan for you. General health and safety guidelines:   Do not smoke. Nicotine and other chemicals in cigarettes and cigars can cause lung damage. Ask your healthcare provider for information if you currently smoke and need help to quit. E-cigarettes or smokeless tobacco still contain nicotine. Talk to your healthcare provider before you use these products. Limit alcohol. A drink of alcohol is 12 ounces of beer, 5 ounces of wine, or 1½ ounces of liquor. Lose weight, if needed. Being overweight increases your risk of certain health conditions. These include heart disease, high blood pressure, type 2 diabetes, and certain types of cancer. Protect your skin. Do not sunbathe or use tanning beds. Use sunscreen with a SPF 15 or higher. Apply sunscreen at least 15 minutes before you go outside. Reapply sunscreen every 2 hours. Wear protective clothing, hats, and sunglasses when you are outside. Drive safely. Always wear your seatbelt. Make sure everyone in your car wears a seatbelt. A seatbelt can save your life if you are in an accident. Do not use your cell phone when you are driving. This could distract you and cause an accident. Pull over if you need to make a call or send a text message. Practice safe sex. Use latex condoms if are sexually active and have more than one partner. Your healthcare provider may recommend screening tests for sexually transmitted infections (STIs). Wear helmets, lifejackets, and protective gear. Always wear a helmet when you ride a bike or motorcycle, go skiing, or play sports that could cause a head injury. Wear protective equipment when you play sports.  Wear a lifejacket when you are on a boat or doing water sports. © Copyright [facility] 2022 Information is for End User's use only and may not be sold, redistributed or otherwise used for commercial purposes. The above information is an  only. It is not intended as medical advice for individual conditions or treatments. Talk to your doctor, nurse or pharmacist before following any medical regimen to see if it is safe and effective for you.

## 2023-07-27 NOTE — TELEPHONE ENCOUNTER
Pt came in after her appointment  to see if she can have that blood work ordered to see if she has any mold allergies.     Please advise

## 2023-07-28 NOTE — ASSESSMENT & PLAN NOTE
Lab Results   Component Value Date    HGBA1C 6.0 (H) 06/29/2023   controlled, continue current medication and keep follow up with endocrine

## 2023-07-31 DIAGNOSIS — Z91.09 ENVIRONMENTAL ALLERGIES: Primary | ICD-10-CM

## 2023-08-10 ENCOUNTER — TELEPHONE (OUTPATIENT)
Dept: ENDOCRINOLOGY | Facility: HOSPITAL | Age: 63
End: 2023-08-10

## 2023-08-12 DIAGNOSIS — E11.9 TYPE 2 DIABETES MELLITUS WITHOUT COMPLICATION, WITH LONG-TERM CURRENT USE OF INSULIN (HCC): ICD-10-CM

## 2023-08-12 DIAGNOSIS — Z79.4 TYPE 2 DIABETES MELLITUS WITHOUT COMPLICATION, WITH LONG-TERM CURRENT USE OF INSULIN (HCC): ICD-10-CM

## 2023-08-13 RX ORDER — BLOOD-GLUCOSE METER
EACH MISCELLANEOUS DAILY
Qty: 1 KIT | Refills: 0 | Status: SHIPPED | OUTPATIENT
Start: 2023-08-13

## 2023-08-14 NOTE — TELEPHONE ENCOUNTER
The patient was called and told about the provider's recommendation concerning the decrease in her levemir.

## 2023-08-15 DIAGNOSIS — E11.9 TYPE 2 DIABETES MELLITUS WITHOUT COMPLICATION, WITH LONG-TERM CURRENT USE OF INSULIN (HCC): ICD-10-CM

## 2023-08-15 DIAGNOSIS — Z79.4 TYPE 2 DIABETES MELLITUS WITHOUT COMPLICATION, WITH LONG-TERM CURRENT USE OF INSULIN (HCC): ICD-10-CM

## 2023-08-15 RX ORDER — BLOOD SUGAR DIAGNOSTIC
STRIP MISCELLANEOUS
Qty: 100 STRIP | Refills: 5 | Status: SHIPPED | OUTPATIENT
Start: 2023-08-15

## 2023-08-15 NOTE — TELEPHONE ENCOUNTER
Patient has her meter now but needs test strips for meter, requesting refill  [facility]  For the one topuch verio new meter

## 2023-08-17 ENCOUNTER — TELEPHONE (OUTPATIENT)
Dept: FAMILY MEDICINE CLINIC | Facility: CLINIC | Age: 63
End: 2023-08-17

## 2023-08-17 DIAGNOSIS — E11.9 TYPE 2 DIABETES MELLITUS WITHOUT COMPLICATION, WITH LONG-TERM CURRENT USE OF INSULIN (HCC): ICD-10-CM

## 2023-08-17 DIAGNOSIS — Z79.4 TYPE 2 DIABETES MELLITUS WITHOUT COMPLICATION, WITH LONG-TERM CURRENT USE OF INSULIN (HCC): ICD-10-CM

## 2023-08-17 LAB
LEFT EYE DIABETIC RETINOPATHY: NORMAL
RIGHT EYE DIABETIC RETINOPATHY: NORMAL

## 2023-08-17 PROCEDURE — 2023F DILAT RTA XM W/O RTNOPTHY: CPT | Performed by: FAMILY MEDICINE

## 2023-08-17 RX ORDER — INSULIN DETEMIR 100 [IU]/ML
INJECTION, SOLUTION SUBCUTANEOUS
Qty: 15 ML | Refills: 0 | Status: SHIPPED | OUTPATIENT
Start: 2023-08-17

## 2023-08-17 NOTE — TELEPHONE ENCOUNTER
Patient calling office if ok to speak with Buffy Vasquez. Patient advise Buffy Vasquez currently with a patient and staff need to take message for provider. Patient calling for advised. Patient is currently in the process of moving yesterday was moving and putting thing on the storage did had breakfast and a snack. When patient return home was having a conversation with a friend and her friend notice patient was not making any sense on what she was saying patient luther said. Friend ask patient if she was ok if she want to go to the ER for evaluation. Patient stated no she was find. Per patient this just happened yesterday and she sat down drink plenty of lemonade and fealt fine. Per patient she thing it may be low sugar not sure. Per patient any other symptoms and she does not feel was dehydration.      Looking for advise

## 2023-08-18 ENCOUNTER — OFFICE VISIT (OUTPATIENT)
Dept: FAMILY MEDICINE CLINIC | Facility: CLINIC | Age: 63
End: 2023-08-18
Payer: COMMERCIAL

## 2023-08-18 VITALS
HEIGHT: 67 IN | WEIGHT: 189 LBS | OXYGEN SATURATION: 97 % | RESPIRATION RATE: 18 BRPM | BODY MASS INDEX: 29.66 KG/M2 | TEMPERATURE: 98.3 F | SYSTOLIC BLOOD PRESSURE: 102 MMHG | DIASTOLIC BLOOD PRESSURE: 60 MMHG | HEART RATE: 69 BPM

## 2023-08-18 DIAGNOSIS — I10 ESSENTIAL HYPERTENSION: ICD-10-CM

## 2023-08-18 DIAGNOSIS — E11.9 TYPE 2 DIABETES MELLITUS WITHOUT COMPLICATION, WITH LONG-TERM CURRENT USE OF INSULIN (HCC): Primary | ICD-10-CM

## 2023-08-18 DIAGNOSIS — Z79.4 TYPE 2 DIABETES MELLITUS WITHOUT COMPLICATION, WITH LONG-TERM CURRENT USE OF INSULIN (HCC): Primary | ICD-10-CM

## 2023-08-18 PROCEDURE — 99213 OFFICE O/P EST LOW 20 MIN: CPT | Performed by: FAMILY MEDICINE

## 2023-08-18 NOTE — PROGRESS NOTES
Assessment/Plan:      1. Type 2 diabetes mellitus without complication, with long-term current use of insulin [facility])  Assessment & Plan:    Lab Results   Component Value Date    HGBA1C 6.0 (H) 06/29/2023   controlled, next due after 9/29. Possible episode of hypoglycemia, cut back on insulin      2. Essential hypertension  Assessment & Plan:  Controlled, currently without medication          Subjective:  Chief Complaint   Patient presents with   • Follow-up     On Wednesday 08/16/23 Patient felt dizzy and seemed out of it Patients friend says she had symptoms  as if it was a stroke  After drinking some lemonade she felt a lot better   Patient is doing cataract surgery September 13         Patient ID: Sunni Neely is a 63 y.o. female. Pt is moving   Pt complains of some Dizziness when getting out of the car intermittently  Friend was present at visit and states pt had an episode when she was talking and not making sense. Given  Lemonade right away, speaking normally again. But Still a little off. Dropped from insulin to 40U to 34U, to 32 units follows with endocrine. No chest pain or shortness of breath. No weakness. Review of Systems   Constitutional: Negative. Negative for fatigue and fever. HENT: Negative. Eyes: Negative. Respiratory: Negative. Negative for cough. Cardiovascular: Negative. Gastrointestinal: Negative. Endocrine: Negative. Genitourinary: Negative. Musculoskeletal: Negative. Skin: Negative. Allergic/Immunologic: Negative. Neurological: Negative. Psychiatric/Behavioral: Negative.           The following portions of the patient's history were reviewed and updated as appropriate: allergies, current medications, past family history, past medical history, past social history, past surgical history and problem list.    Objective:  Vitals:    08/18/23 1548   BP: 102/60   BP Location: Right arm   Patient Position: Sitting   Cuff Size: Standard   Pulse: 69 Resp: 18   Temp: 98.3 °F (36.8 °C)   TempSrc: Tympanic   SpO2: 97%   Weight: 85.7 kg (189 lb)   Height: 5' 7" (1.702 m)      Physical Exam  Vitals and nursing note reviewed. Constitutional:       Appearance: She is well-developed. HENT:      Head: Normocephalic and atraumatic. Cardiovascular:      Rate and Rhythm: Normal rate and regular rhythm. Heart sounds: Normal heart sounds. Pulmonary:      Effort: Pulmonary effort is normal.      Breath sounds: Normal breath sounds. Abdominal:      General: Bowel sounds are normal.      Palpations: Abdomen is soft. Skin:     General: Skin is warm and dry. Neurological:      Mental Status: She is alert and oriented to person, place, and time. Psychiatric:         Behavior: Behavior normal.         Thought Content:  Thought content normal.         Judgment: Judgment normal.

## 2023-08-18 NOTE — PATIENT INSTRUCTIONS
Monitor blood sugars  Keep appointment with endocrine.    Due for colon cancer screen- patient prefers cologuard-

## 2023-09-10 DIAGNOSIS — E11.9 TYPE 2 DIABETES MELLITUS WITHOUT COMPLICATION, WITH LONG-TERM CURRENT USE OF INSULIN (HCC): ICD-10-CM

## 2023-09-10 DIAGNOSIS — Z79.4 TYPE 2 DIABETES MELLITUS WITHOUT COMPLICATION, WITH LONG-TERM CURRENT USE OF INSULIN (HCC): ICD-10-CM

## 2023-09-11 RX ORDER — GLIMEPIRIDE 1 MG/1
TABLET ORAL
Qty: 90 TABLET | Refills: 0 | Status: SHIPPED | OUTPATIENT
Start: 2023-09-11

## 2023-09-16 PROBLEM — R47.1 DYSARTHRIA: Status: ACTIVE | Noted: 2023-09-16

## 2023-09-16 PROBLEM — Z00.00 WELL ADULT EXAM: Status: RESOLVED | Noted: 2023-07-27 | Resolved: 2023-09-16

## 2023-09-16 NOTE — ASSESSMENT & PLAN NOTE
Lab Results   Component Value Date    HGBA1C 6.0 (H) 06/29/2023   controlled, next due after 9/29.  Possible episode of hypoglycemia, cut back on insulin

## 2023-09-16 NOTE — ASSESSMENT & PLAN NOTE
Possible episode of low blood sugar. No weakness. Pt will monitor blood sugars for lows and pt cut back on insulin. Pt has follow up with endocrine.

## 2023-09-18 DIAGNOSIS — Z79.4 TYPE 2 DIABETES MELLITUS WITHOUT COMPLICATION, WITH LONG-TERM CURRENT USE OF INSULIN (HCC): ICD-10-CM

## 2023-09-18 DIAGNOSIS — E11.9 TYPE 2 DIABETES MELLITUS WITHOUT COMPLICATION, WITH LONG-TERM CURRENT USE OF INSULIN (HCC): ICD-10-CM

## 2023-09-18 RX ORDER — INSULIN DETEMIR 100 [IU]/ML
INJECTION, SOLUTION SUBCUTANEOUS
Qty: 15 ML | Refills: 0 | Status: SHIPPED | OUTPATIENT
Start: 2023-09-18

## 2023-09-27 ENCOUNTER — TELEPHONE (OUTPATIENT)
Dept: FAMILY MEDICINE CLINIC | Facility: CLINIC | Age: 63
End: 2023-09-27
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
## 2023-10-02 ENCOUNTER — TELEPHONE (OUTPATIENT)
Dept: FAMILY MEDICINE CLINIC | Facility: CLINIC | Age: 63
End: 2023-10-02

## 2023-10-02 NOTE — TELEPHONE ENCOUNTER
Good morning. My name is Lauren BAKER. YOB: 1960. My callback number is [phone]. Today I'm calling about a follow up visit with Doctor Geneva Maki. I was at [facility] last week for hypoglycemia and I called her before going to the hospital and she directed me to do so. I was calling to follow up with her on the results and some more questions I had regarding cardiac, the cardiac cardiology. Thank you.

## 2023-10-02 NOTE — TELEPHONE ENCOUNTER
Faxed referral letter to [facility] for Ynes to Dr. Amita Martinez. Fax#[phone].

## 2023-10-04 ENCOUNTER — OFFICE VISIT (OUTPATIENT)
Dept: FAMILY MEDICINE CLINIC | Facility: CLINIC | Age: 63
End: 2023-10-04
Payer: COMMERCIAL

## 2023-10-04 VITALS
HEART RATE: 64 BPM | WEIGHT: 188 LBS | HEIGHT: 67 IN | SYSTOLIC BLOOD PRESSURE: 110 MMHG | DIASTOLIC BLOOD PRESSURE: 60 MMHG | TEMPERATURE: 96.7 F | BODY MASS INDEX: 29.51 KG/M2 | OXYGEN SATURATION: 100 %

## 2023-10-04 DIAGNOSIS — E11.65 TYPE 2 DIABETES MELLITUS WITH HYPERGLYCEMIA, WITH LONG-TERM CURRENT USE OF INSULIN (HCC): ICD-10-CM

## 2023-10-04 DIAGNOSIS — Z79.4 TYPE 2 DIABETES MELLITUS WITH HYPERGLYCEMIA, WITH LONG-TERM CURRENT USE OF INSULIN (HCC): ICD-10-CM

## 2023-10-04 DIAGNOSIS — I45.9 HEART BLOCK: ICD-10-CM

## 2023-10-04 DIAGNOSIS — Z79.4 TYPE 2 DIABETES MELLITUS WITHOUT COMPLICATION, WITH LONG-TERM CURRENT USE OF INSULIN (HCC): ICD-10-CM

## 2023-10-04 DIAGNOSIS — S00.83XD CONTUSION OF OTHER PART OF HEAD, SUBSEQUENT ENCOUNTER: ICD-10-CM

## 2023-10-04 DIAGNOSIS — Z12.11 SCREEN FOR COLON CANCER: ICD-10-CM

## 2023-10-04 DIAGNOSIS — R55 SYNCOPE, UNSPECIFIED SYNCOPE TYPE: Primary | ICD-10-CM

## 2023-10-04 DIAGNOSIS — I10 ESSENTIAL HYPERTENSION: ICD-10-CM

## 2023-10-04 DIAGNOSIS — E11.9 TYPE 2 DIABETES MELLITUS WITHOUT COMPLICATION, WITH LONG-TERM CURRENT USE OF INSULIN (HCC): ICD-10-CM

## 2023-10-04 PROBLEM — S00.93XA CONTUSION OF HEAD: Status: ACTIVE | Noted: 2023-10-04

## 2023-10-04 PROCEDURE — 99214 OFFICE O/P EST MOD 30 MIN: CPT | Performed by: FAMILY MEDICINE

## 2023-10-04 NOTE — PROGRESS NOTES
Assessment & Plan     1. Syncope, unspecified syncope type  Assessment & Plan:  Pt cut back on levimir from 20U to 8U and monitor blood sugar. Has appointment with endocrine 10/10. Increase levimir to 10 U . 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 Type 2 diabetes mellitus without complication, with long-term current use of insulin (HCC)  Assessment & Plan:    Lab Results   Component Value Date    HGBA1C 6.0 (H) 06/29/2023   next hba1c upcoming . Pt currently taking 8 units of levemir down from 20 units and blood sugars have been running 140-150. No further episodes. Has upcoming follow up with endocrine. 3. Essential hypertension  Assessment & Plan:  Controlled, not on medication      4. Heart block  Assessment & Plan:  Bifasicular on EKG in recent hospitalization. Has follow up with cardiology      5. Type 2 diabetes mellitus with hyperglycemia, with long-term current use of insulin ([facility])    6. Contusion of other part of head, subsequent encounter  Assessment & Plan:  Improved, no further symptoms of headache, nausea. Feeling tired intermittently       7. Screen for colon cancer  -     [facility]        Depression Screening and Follow-up Plan: Patient was screened for depression during today's encounter. They screened negative with a PHQ-2 score of 0. Subjective     Transitional Care Management Review:   Yomaira Ramon is a 66 y.o. female here for TCM follow up. During the TCM phone call patient stated:  TCM Call       None          TCM Call       None          Pt is here for follow up from hospital  Pt had taken Night time insulin  Her blood sugar was 80 the next am and ate breakfast.  Before lunch, she had a syncopal epsiode  Hit forehead and nose. Abrasion on right side of face. She was Sipping orange juice. [place] ambulance arrived. Head trauma- had trouble with immediate memory of event. Could not remember phone number or address.    Blood sugar had been running 100-120   Dropped levemir from 20 to 8U now running 140-150  Next week on 10/10 has follow up with endocrine. EKG showed bifasicular heart block       Review of Systems   Constitutional: Negative. Negative for fatigue and fever. HENT: Negative. Eyes: Negative. Respiratory: Negative. Negative for cough. Cardiovascular: Negative. Gastrointestinal: Negative. Endocrine: Negative. Genitourinary: Negative. Musculoskeletal: Negative. Skin: Negative. Allergic/Immunologic: Negative. Neurological:  Positive for syncope. Psychiatric/Behavioral: Negative. Objective     /60   Pulse 64   Temp (!) 96.7 °F (35.9 °C) (Tympanic)   Ht 5' 7" (1.702 m)   Wt 85.3 kg (188 lb)   SpO2 100%   BMI 29.44 kg/m²      Physical Exam  Vitals and nursing note reviewed. Constitutional:       Appearance: She is well-developed. HENT:      Head: Normocephalic and atraumatic. Right Ear: External ear normal.      Left Ear: External ear normal.      Nose: Nose normal.   Eyes:      Conjunctiva/sclera: Conjunctivae normal.      Pupils: Pupils are equal, round, and reactive to light. Cardiovascular:      Rate and Rhythm: Normal rate and regular rhythm. Heart sounds: Normal heart sounds. Pulmonary:      Effort: Pulmonary effort is normal.      Breath sounds: Normal breath sounds. Abdominal:      General: Bowel sounds are normal.      Palpations: Abdomen is soft. Musculoskeletal:         General: Normal range of motion. Cervical back: Normal range of motion and neck supple. Skin:     General: Skin is warm and dry. Neurological:      Mental Status: She is alert and oriented to person, place, and time. Psychiatric:         Behavior: Behavior normal.         Thought Content:  Thought content normal.         Judgment: Judgment normal.       Medications have been reviewed by provider in current encounter    Alden Rain DO

## 2023-10-04 NOTE — PATIENT INSTRUCTIONS
Cardiac monitor, turning in on Monday  Follow up with cardiology 10/19  Endocrine 10/10  Increase levemir to 10u daily at bedtime and continue to monitor blood glucose  No glimepiride. Concussion   AMBULATORY CARE:   A concussion  is a mild brain injury. It is usually caused by a bump or blow to the head from a fall, a motor vehicle crash, or a sports injury. Being forcefully shaken may cause a concussion. Symptoms  may happen right away, or they may develop days after the concussion:  Headache    Dizziness, loss of balance, or blurry vision    Nausea or vomiting    A change in mood, such as restlessness or irritability    Trouble thinking, remembering things, or concentrating    Ringing in the ears    Drowsiness or decreased energy    Changes in your normal sleeping pattern    Call your local emergency number (911 in the ), or have someone call if:   You cannot be woken. You have a seizure, increasing confusion, or a change in personality. Your speech becomes slurred. Seek care immediately if:   You have sudden or new vision problems. One of your pupils is bigger than the other. You have a severe headache that does not go away. You have arm or leg weakness, numbness, or new problems with coordination. You have blood or clear fluid coming out of the ears or nose. You cannot stop vomiting. Call your doctor if:   You have nausea or are vomiting. You feel more sleepy than usual.    Your symptoms get worse. Your symptoms last longer than 6 weeks. You have questions or concerns about your condition or care. Manage a concussion:  Usually no treatment is needed for a mild concussion. Concussion symptoms usually go away within 10 days, but they may last longer. The following may be recommended to manage your symptoms:  Rest from physical and mental activities as directed. Mental activities are those that require thinking, concentration, and attention.  You will need to rest until your symptoms are gone. Rest will allow you to recover from your concussion. Ask your healthcare provider when you can return to work and other daily activities. Have someone stay with you for the first 24 hours after your injury. Your healthcare provider should be contacted if your symptoms get worse, or you develop new symptoms. Do not participate in sports and physical activities until your healthcare provider says it is okay. These can make your symptoms worse or lead to another concussion. Your healthcare provider will tell you when it is okay for you to return to sports or physical activities. Ask for more information about sports concussions. Do not use heavy machinery or drive for 24 hours after your injury, or as directed. This can be dangerous and cause a serious accident. Your healthcare provider will tell you when it is safe for you to return to these activities. Pain medicine  may help relieve headache pain. Do not use NSAIDs or aspirin. These can increase your risk for bleeding. Your provider may recommend acetaminophen. Ask how much to take and how often to take it. Follow directions. Read the labels of all other medicines you are using to see if they also contain acetaminophen, or ask your doctor or pharmacist. Acetaminophen can cause liver damage if not taken correctly. Prevent another concussion:   Wear protective sports equipment that fits properly. Helmets help lower your risk for a serious brain injury. Talk to your healthcare provider about ways you can decrease your risk for a concussion if you play sports. Wear your seatbelt every time you travel. This helps lower your risk for a head injury if you are in a car accident. Follow up with your doctor as directed:  Write down your questions so you remember to ask them during your visits.   For more information:   [employer]  [address] , [address]  Phone: 1- [phone] [phone]  Phone: 1- Gabbie  Web Address: [employer]. org    © Copyright [employer] 2023 Information is for End User's use only and may not be sold, redistributed or otherwise used for commercial purposes. The above information is an  only. It is not intended as medical advice for individual conditions or treatments. Talk to your doctor, nurse or pharmacist before following any medical regimen to see if it is safe and effective for you.

## 2023-10-10 ENCOUNTER — OFFICE VISIT (OUTPATIENT)
Dept: ENDOCRINOLOGY | Facility: HOSPITAL | Age: 63
End: 2023-10-10
Payer: COMMERCIAL

## 2023-10-10 VITALS
HEART RATE: 66 BPM | BODY MASS INDEX: 29.54 KG/M2 | DIASTOLIC BLOOD PRESSURE: 70 MMHG | HEIGHT: 67 IN | WEIGHT: 188.2 LBS | SYSTOLIC BLOOD PRESSURE: 114 MMHG

## 2023-10-10 DIAGNOSIS — Z79.4 TYPE 2 DIABETES MELLITUS WITHOUT COMPLICATION, WITH LONG-TERM CURRENT USE OF INSULIN (HCC): Primary | ICD-10-CM

## 2023-10-10 DIAGNOSIS — E78.5 HYPERLIPIDEMIA LDL GOAL <100: ICD-10-CM

## 2023-10-10 DIAGNOSIS — I10 ESSENTIAL HYPERTENSION: ICD-10-CM

## 2023-10-10 DIAGNOSIS — E55.9 VITAMIN D DEFICIENCY: ICD-10-CM

## 2023-10-10 DIAGNOSIS — E11.9 TYPE 2 DIABETES MELLITUS WITHOUT COMPLICATION, WITH LONG-TERM CURRENT USE OF INSULIN (HCC): Primary | ICD-10-CM

## 2023-10-10 PROBLEM — Z12.11 SCREEN FOR COLON CANCER: Status: ACTIVE | Noted: 2023-07-27

## 2023-10-10 PROCEDURE — 99214 OFFICE O/P EST MOD 30 MIN: CPT | Performed by: NURSE PRACTITIONER

## 2023-10-10 NOTE — PATIENT INSTRUCTIONS
Be mindful of diet. Stay active and stay hydrated. For now, continue your current regimen of Levemir and Metformin. Check blood sugars at least twice daily at alternating times and send a record to the office in 2 weeks for review. Supplement with 2000 units of vitamin D3 daily - consistently. Continue Co Q10 with Red Yeast Rice. Obtain lab work as prescribed.

## 2023-10-10 NOTE — PROGRESS NOTES
Sonya Leon 66 y.o. female MRN: [id number]    Encounter: [id number]      Assessment/Plan     Assessment: This is a 66y.o.-year-old female with type 2 diabetes with long term insulin use, hyperlipidemia and vitamin-D deficiency. Plan:  1. Type 2 diabetes with long-term insulin use: Incidence of low blood sugar and hypoglycemia has greatly decreased since her discharge from the hospital and the decrease of her Levemir the omission of her glimepiride. For now, she will continue her current regimen of Levemir and Metformin consistently. Reviewed and reinforced the importance of consistency with her medications, diet and exercise. I have asked her to continue checking her blood sugars regularly and send a record to the office in 2 weeks for review. Reviewed recognition and proper treatment of hypoglycemic episodes. Check hemoglobin A1c and comprehensive metabolic panel prior to next visit. 2. Hyperlipidemia: Continue CQ10 and red yeast rice 600 mg daily - consistently. She has not been interested in pursuing statin therapy. Check fasting lipid panel prior to next visit. 3. Vitamin-D deficiency: Continue supplementation with 2000 units of vitamin D3 daily - consistently. CC: Type II diabetes follow-up    History of Present Illness     HPI:  66y.o. year old female with type 2 diabetes for approximately 18 years. She is on oral agents and insulin at home and takes metformin XR 1000 mg daily with dinner and Levemir 8 units. He was recently hospitalized at [facility] for a syncopal episode on September 27, 2023. She was discharged on September 29, 2023. She was having lower blood sugars throughout her visit at the hospital, thus, her glimepiride was stopped and her Levemir was decreased. She states that she has not been consistent with her glimepiride daily. She denies any polyuria, polydipsia, nocturia and blurry vision. She denies neuropathy, nephropathy and retinopathy. Hypoglycemic episodes: No.      The patient's last eye exam was on May 21, 2020. Her most recent diabetic foot exam was performed at her office visit on November 2, 2022. Blood Sugar/Glucometer/Pump/CGM review: No blood sugars in office for review. In the past, she has declined treatment for elevated cholesterol through her PCP. She has treating with CoQ10 and red yeast rice inconsistently. For her vitamin-D deficiency, she is supplementing with 2000 units of vitamin D3.       Review of Systems   Constitutional: Negative. Negative for chills, fatigue and fever. HENT: Negative. Negative for trouble swallowing and voice change. Eyes: Negative. Negative for photophobia, pain, discharge, redness, itching and visual disturbance. Respiratory: Negative. Negative for chest tightness and shortness of breath. Cardiovascular: Negative. Negative for chest pain. Gastrointestinal: Negative. Negative for abdominal pain, constipation, diarrhea and vomiting. Endocrine: Positive for polyuria. Negative for cold intolerance, heat intolerance, polydipsia and polyphagia. Genitourinary: Negative. Musculoskeletal: Negative. Skin: Negative. Allergic/Immunologic: Negative. Neurological: Negative. Negative for dizziness, syncope, light-headedness and headaches. Hematological: Negative. Psychiatric/Behavioral:  The patient is nervous/anxious. All other systems reviewed and are negative. Historical Information   Past Medical History:   Diagnosis Date    Anxiety     Condyloma acuminatum     Diabetes mellitus ([address]) 2002    type 2    Genital warts     Herpes simplex     HPV (human papilloma virus) infection     Obesity 1990    Visual impairment 1984     History reviewed. No pertinent surgical history.   Social History   Social History     Substance and Sexual Activity   Alcohol Use Not Currently     Social History     Substance and Sexual Activity   Drug Use Yes    Frequency: 4.0 times per week    Types: Marijuana    Comment: Have a green card     Social History     Tobacco Use   Smoking Status Never   Smokeless Tobacco Never     Family History:   Family History   Problem Relation Age of Onset    Diabetes Father     Heart disease Father              ALS Father     Diabetes type II Father             Diabetes Brother     Diabetes type II Brother     Alzheimer's disease Mother     Dementia Mother              Colon cancer Maternal Grandfather                 Meds/Allergies   Current Outpatient Medications   Medication Sig Dispense Refill    ADRENAL CORTEX PO       Blood Glucose Monitoring Suppl (OneTouch Verio Flex System) w/Device KIT Inject under the skin daily Testing Once a day E11.9 1 kit 0    Cholecalciferol (VITAMIN D PO) Take 5,000 Units by mouth in the morning      Chromium (Chromium GTF) 200 MCG TABS Take 1 tablet (200 mcg total) by mouth in the morning 30 tablet     CINNAMON PO Take by mouth in the morning      Coenzyme Q10-Red Yeast Rice (Co Q-10 Plus Red Yeast Rice)  MG CAPS Take 60 mg by mouth in the morning      DHEA 25 MG tablet       glucose blood (OneTouch Verio) test strip CHECK SUGARS 2-4 TIMES A  strip 5    Insulin Pen Needle (B-D ULTRAFINE III SHORT PEN) 31G X 8 MM MISC Inject under the skin 5 (five) times a day 100 each 5    Lancets (onetouch ultrasoft) lancets Check sugars 2-4 times a day 100 each 5    Levemir FlexPen 100 units/mL injection pen INJECT 40 UNITS UNDER THE SKIN DAILY 15 mL 0    Lutein-Zeaxanthin 25-5 MG CAPS       metFORMIN (GLUCOPHAGE-XR) 500 mg 24 hr tablet Take 2 tablets (1,000 mg total) by mouth daily with dinner 180 tablet 3    QUERCETIN PO Take by mouth      triamcinolone (KENALOG) 0.1 % ointment APPLY 1 APPLICATION TOPICALLY 2 (TWO) TIMES A DAY TO AFFECTED AREA 80 g 0    Vitamin B Complex-C CAPS       Vitamin E 180 MG (400 UNIT) capsule        No current facility-administered medications for this visit. Allergies   Allergen Reactions    Eggs Or Egg-Derived Products - Food Allergy      Upset stomach. Milk-Related Compounds - Food Allergy Diarrhea    Tomato (Diagnostic) - Food Allergy Other (See Comments)     Reflux       Objective   Vitals: There were no vitals taken for this visit. Physical Exam  Vitals reviewed. Constitutional:       Appearance: She is well-developed. HENT:      Head: Normocephalic and atraumatic. Eyes:      Conjunctiva/sclera: Conjunctivae normal.      Pupils: Pupils are equal, round, and reactive to light. Cardiovascular:      Rate and Rhythm: Normal rate and regular rhythm. Pulses: no weak pulses          Dorsalis pedis pulses are 1+ on the right side and 1+ on the left side. Posterior tibial pulses are 1+ on the right side and 1+ on the left side. Heart sounds: Normal heart sounds. Pulmonary:      Effort: Pulmonary effort is normal.      Breath sounds: Normal breath sounds. Abdominal:      General: Bowel sounds are normal.      Palpations: Abdomen is soft. Musculoskeletal:         General: Normal range of motion. Cervical back: Normal range of motion and neck supple. Feet:      Right foot:      Skin integrity: No ulcer, skin breakdown, erythema, warmth, callus or dry skin. Left foot:      Skin integrity: No ulcer, skin breakdown, erythema, warmth, callus or dry skin. Skin:     General: Skin is warm and dry. Neurological:      Mental Status: She is alert and oriented to person, place, and time. Psychiatric:         Behavior: Behavior normal.         Thought Content: Thought content normal.         Judgment: Judgment normal.       Patient's shoes and socks removed. Right Foot/Ankle   Right Foot Inspection  Skin Exam: skin normal and skin intact. No dry skin, no warmth, no callus, no erythema, no maceration, no abnormal color, no pre-ulcer, no ulcer and no callus. Toe Exam: ROM and strength within normal limits.      Sensory Monofilament testing: intact    Vascular  Capillary refills: < 3 seconds  The right DP pulse is 1+. The right PT pulse is 1+. Left Foot/Ankle  Left Foot Inspection  Skin Exam: skin normal and skin intact. No dry skin, no warmth, no erythema, no maceration, normal color, no pre-ulcer, no ulcer and no callus. Toe Exam: ROM and strength within normal limits. Sensory   Monofilament testing: intact    Vascular  Capillary refills: < 3 seconds  The left DP pulse is 1+. The left PT pulse is 1+.      Assign Risk Category  No deformity present  No loss of protective sensation  No weak pulses  Risk: 0        Lab Results:   Lab Results   Component Value Date/Time    Hemoglobin A1C 6.0 (H) 06/29/2023 10:31 AM    Hemoglobin A1C 6.3 (H) 02/24/2023 10:45 AM    Hemoglobin A1C 6.3 (H) 10/25/2022 10:25 AM    White Blood Cell Count 6.8 06/29/2023 10:31 AM    White Blood Cell Count 5.0 02/24/2023 10:45 AM    White Blood Cell Count 4.9 10/25/2022 10:25 AM    Hemoglobin 12.9 06/29/2023 10:31 AM    Hemoglobin 12.7 02/24/2023 10:45 AM    Hemoglobin 12.6 10/25/2022 10:25 AM    HCT 39.0 06/29/2023 10:31 AM    HCT 36.8 02/24/2023 10:45 AM    HCT 37.6 10/25/2022 10:25 AM    MCV 85 06/29/2023 10:31 AM    MCV 84 02/24/2023 10:45 AM    MCV 85 10/25/2022 10:25 AM    Platelet Count 183 51/98/7942 10:31 AM    Platelet Count 314 80/43/0312 10:45 AM    Platelet Count 707 95/42/9874 10:25 AM    BUN 11 06/29/2023 10:31 AM    BUN 8 02/24/2023 10:45 AM    BUN 9 10/25/2022 10:25 AM    Potassium 4.5 06/29/2023 10:31 AM    Potassium 4.6 02/24/2023 10:45 AM    Potassium 4.9 10/25/2022 10:25 AM    Chloride 97 06/29/2023 10:31 AM    Chloride 98 02/24/2023 10:45 AM    Chloride 102 10/25/2022 10:25 AM    CO2 26 06/29/2023 10:31 AM    CO2 25 02/24/2023 10:45 AM    CO2 26 10/25/2022 10:25 AM    Creatinine 0.94 06/29/2023 10:31 AM    Creatinine 0.96 02/24/2023 10:45 AM    Creatinine 0.82 10/25/2022 10:25 AM    AST 12 06/29/2023 10:31 AM    AST 17 02/24/2023 10:45 AM    AST 15 10/25/2022 10:25 AM    ALT 10 06/29/2023 10:31 AM    ALT 14 02/24/2023 10:45 AM    ALT 18 10/25/2022 10:25 AM    Protein, Total 7.0 06/29/2023 10:31 AM    Protein, Total 6.7 02/24/2023 10:45 AM    Protein, Total 6.8 10/25/2022 10:25 AM    Albumin 4.1 06/29/2023 10:31 AM    Albumin 4.3 02/24/2023 10:45 AM    Albumin 4.3 10/25/2022 10:25 AM    Globulin, Total 2.9 06/29/2023 10:31 AM    Globulin, Total 2.4 02/24/2023 10:45 AM    Globulin, Total 2.5 10/25/2022 10:25 AM    HDL 53 02/24/2023 10:45 AM    Triglycerides 123 02/24/2023 10:45 AM     Portions of the record may have been created with voice recognition software. Occasional wrong word or "sound a like" substitutions may have occurred due to the inherent limitations of voice recognition software. Read the chart carefully and recognize, using context, where substitutions have occurred.

## 2023-10-11 NOTE — ASSESSMENT & PLAN NOTE
Pt cut back on levimir from 20U to 8U and monitor blood sugar. Has appointment with endocrine 10/10. Increase levimir to 10 U .  Keep follow up with cardiology for bifasicular heart block on EKG for evaluation

## 2023-10-11 NOTE — ASSESSMENT & PLAN NOTE
Lab Results   Component Value Date    HGBA1C 6.0 (H) 06/29/2023   next hba1c upcoming . Pt currently taking 8 units of levemir down from 20 units and blood sugars have been running 140-150. No further episodes. Has upcoming follow up with endocrine.

## 2023-10-14 PROBLEM — I45.9 HEART BLOCK: Status: ACTIVE | Noted: 2023-10-14

## 2023-10-20 ENCOUNTER — CLINICAL SUPPORT (OUTPATIENT)
Dept: FAMILY MEDICINE CLINIC | Facility: CLINIC | Age: 63
End: 2023-10-20
Payer: COMMERCIAL

## 2023-10-20 ENCOUNTER — TELEPHONE (OUTPATIENT)
Dept: FAMILY MEDICINE CLINIC | Facility: CLINIC | Age: 63
End: 2023-10-20

## 2023-10-20 DIAGNOSIS — S81.802A OPEN LEG WOUND, LEFT, INITIAL ENCOUNTER: Primary | ICD-10-CM

## 2023-10-20 DIAGNOSIS — Z23 ENCOUNTER FOR IMMUNIZATION: Primary | ICD-10-CM

## 2023-10-20 PROCEDURE — 90471 IMMUNIZATION ADMIN: CPT

## 2023-10-20 PROCEDURE — 90715 TDAP VACCINE 7 YRS/> IM: CPT

## 2023-10-20 RX ORDER — CEPHALEXIN 500 MG/1
500 CAPSULE ORAL 2 TIMES DAILY
Qty: 14 CAPSULE | Refills: 0 | Status: SHIPPED | OUTPATIENT
Start: 2023-10-20 | End: 2023-10-27

## 2023-10-20 NOTE — TELEPHONE ENCOUNTER
Abrasion to left anterior shin with surrounding erythema and edema.  Warm to touch  Keflex twice daily for 7 days   Keep clean and dry  Keep covered  TDAP given today

## 2023-10-20 NOTE — TELEPHONE ENCOUNTER
Can she be here by 4pm for me to just take a quick look at it. She is overdue for a tetanus vaccine. I would recommend she come today for a tetanus vaccine. I can quick peak at it and prescribe an antibiotic if appropriate.

## 2023-10-20 NOTE — TELEPHONE ENCOUNTER
Sharri my name is Janes Rodriguez first name Ynes TAMAYO. 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 So on Monday I was dog walking in [place] and there's a railroad trestle that's unused and a lot of folks across the trestle and then climb up onto the bridge and walk across the little canal that's there. So I did quite a long walk and I know I was tired. Wasn't wasn't necessarily low blood sugar, but I tripped and I stumbled over the railroad tracks and onto rocks that are next to the rock next to the trestle line Anyway, the wound that I sustained is a pink coloring around the wound itself, and it does look like it got infected, even though I've been putting antibacterial spray on it as well as Seb. So I just thought, maybe I don't know if I need to be seen or do I need an antibiotic. It does hurt a little bit, but not like as painful as it was on Monday. So what, like I said, would have been doing, is putting antibacterial bacitracin. I think it's called on the wounded self cleaning it every day and putting it on the wound. And then actually now it doesn't look as red as it did earlier. So I didn't know what I should be seeing or or maybe just to tell a so I could show you picture up in. OK. So starting this afternoon, I'll be in [place], [place], still on a pet sit, but just closer to the office where I am now. Thank you very much. My number is [phone]. Thank you.

## 2023-10-21 DIAGNOSIS — E11.9 TYPE 2 DIABETES MELLITUS WITHOUT COMPLICATION, WITH LONG-TERM CURRENT USE OF INSULIN (HCC): ICD-10-CM

## 2023-10-21 DIAGNOSIS — Z79.4 TYPE 2 DIABETES MELLITUS WITHOUT COMPLICATION, WITH LONG-TERM CURRENT USE OF INSULIN (HCC): ICD-10-CM

## 2023-10-21 RX ORDER — INSULIN DETEMIR 100 [IU]/ML
INJECTION, SOLUTION SUBCUTANEOUS
Qty: 15 ML | Refills: 0 | Status: SHIPPED | OUTPATIENT
Start: 2023-10-21

## 2023-10-22 LAB — COLOGUARD RESULT REPORTABLE: NEGATIVE

## 2023-10-23 ENCOUNTER — TELEPHONE (OUTPATIENT)
Dept: FAMILY MEDICINE CLINIC | Facility: CLINIC | Age: 63
End: 2023-10-23

## 2023-10-23 NOTE — TELEPHONE ENCOUNTER
----- Message from Khang Hendrickson DO sent at 10/22/2023  1:11 PM EDT -----  Your cologuard is negative, repeat in 3 years.

## 2023-10-23 NOTE — TELEPHONE ENCOUNTER
Patient aware on results, saw mychart message from provider to them on Mychart   Your cologuard is negative, repeat in 3 years.    Written by Min Walker DO on 10/22/2023  1:11 PM EDT  Seen by patient Amadeo Reina on 10/22/2023  1:17 PM

## 2023-10-25 ENCOUNTER — TELEPHONE (OUTPATIENT)
Dept: ENDOCRINOLOGY | Facility: HOSPITAL | Age: 63
End: 2023-10-25

## 2023-10-31 ENCOUNTER — TELEPHONE (OUTPATIENT)
Dept: ENDOCRINOLOGY | Facility: HOSPITAL | Age: 63
End: 2023-10-31

## 2023-10-31 ENCOUNTER — TELEPHONE (OUTPATIENT)
Dept: FAMILY MEDICINE CLINIC | Facility: CLINIC | Age: 63
End: 2023-10-31

## 2023-10-31 NOTE — TELEPHONE ENCOUNTER
Pt called and just wanted to inform doctor that she is in the hospital of last night. Pt said she is at [facility] in [place], [address] Pt said that she gave your name as her primary doctor and was not sure if they reached out yet.

## 2023-10-31 NOTE — TELEPHONE ENCOUNTER
SETH    The patient called this afternoon and wanted to let you know that she is currently at [facility] in [place]. She stated that she was having issues with low sugars, and she is having additional testing and an MRI as well.

## 2023-11-02 ENCOUNTER — TELEPHONE (OUTPATIENT)
Dept: FAMILY MEDICINE CLINIC | Facility: CLINIC | Age: 63
End: 2023-11-02

## 2023-11-02 ENCOUNTER — TRANSITIONAL CARE MANAGEMENT (OUTPATIENT)
Dept: FAMILY MEDICINE CLINIC | Facility: CLINIC | Age: 63
End: 2023-11-02

## 2023-11-02 RX ORDER — CRANBERRY FRUIT EXTRACT 200 MG
CAPSULE ORAL
COMMUNITY
Start: 2023-09-27

## 2023-11-02 NOTE — TELEPHONE ENCOUNTER
Pt is calling because she was just released from [facility] in [facility]. Pt was there from 10/30 to 11/1. Pt says that she can not get any dental work done until primary doctor gives the okay. Pt is asking if doctor could write a letter to dentist to clear her to come in. Pt is seeing Dr. Naila Alvarez. Pt is asking once letter is written if the doctor can fax that over to them. Pt has appointment scheduled 11/14.     Fax #[phone]

## 2023-11-03 ENCOUNTER — OFFICE VISIT (OUTPATIENT)
Dept: FAMILY MEDICINE CLINIC | Facility: CLINIC | Age: 63
End: 2023-11-03
Payer: COMMERCIAL

## 2023-11-03 ENCOUNTER — TELEPHONE (OUTPATIENT)
Dept: FAMILY MEDICINE CLINIC | Facility: CLINIC | Age: 63
End: 2023-11-03

## 2023-11-03 VITALS
SYSTOLIC BLOOD PRESSURE: 114 MMHG | HEART RATE: 67 BPM | DIASTOLIC BLOOD PRESSURE: 64 MMHG | BODY MASS INDEX: 28.72 KG/M2 | HEIGHT: 67 IN | WEIGHT: 183 LBS | OXYGEN SATURATION: 100 % | TEMPERATURE: 97.1 F

## 2023-11-03 DIAGNOSIS — E11.9 TYPE 2 DIABETES MELLITUS WITHOUT COMPLICATION, WITH LONG-TERM CURRENT USE OF INSULIN (HCC): ICD-10-CM

## 2023-11-03 DIAGNOSIS — R94.01 ABNORMAL EEG: ICD-10-CM

## 2023-11-03 DIAGNOSIS — R47.01 APHASIA: Primary | ICD-10-CM

## 2023-11-03 DIAGNOSIS — Z79.4 TYPE 2 DIABETES MELLITUS WITHOUT COMPLICATION, WITH LONG-TERM CURRENT USE OF INSULIN (HCC): ICD-10-CM

## 2023-11-03 PROBLEM — I10 ESSENTIAL HYPERTENSION: Status: RESOLVED | Noted: 2021-07-06 | Resolved: 2023-11-03

## 2023-11-03 PROBLEM — I45.9 HEART BLOCK: Status: RESOLVED | Noted: 2023-10-14 | Resolved: 2023-11-03

## 2023-11-03 LAB
ALBUMIN SERPL-MCNC: 4.1 G/DL (ref 3.9–4.9)
ALBUMIN/CREAT UR: 11 MG/G CREAT (ref 0–29)
ALBUMIN/GLOB SERPL: 1.4 {RATIO} (ref 1.2–2.2)
ALP SERPL-CCNC: 65 IU/L (ref 44–121)
ALT SERPL-CCNC: 15 IU/L (ref 0–32)
AST SERPL-CCNC: 15 IU/L (ref 0–40)
BASOPHILS # BLD AUTO: 0.1 X10E3/UL (ref 0–0.2)
BASOPHILS NFR BLD AUTO: 2 %
BILIRUB SERPL-MCNC: 0.9 MG/DL (ref 0–1.2)
BUN SERPL-MCNC: 12 MG/DL (ref 8–27)
BUN/CREAT SERPL: 12 (ref 12–28)
CALCIUM SERPL-MCNC: 10.1 MG/DL (ref 8.7–10.3)
CHLORIDE SERPL-SCNC: 102 MMOL/L (ref 96–106)
CHOLEST SERPL-MCNC: 208 MG/DL (ref 100–199)
CHOLEST/HDLC SERPL: 3.9 RATIO (ref 0–4.4)
CO2 SERPL-SCNC: 27 MMOL/L (ref 20–29)
CREAT SERPL-MCNC: 1.02 MG/DL (ref 0.57–1)
CREAT UR-MCNC: 217.9 MG/DL
EGFR: 63 ML/MIN/1.73
EOSINOPHIL # BLD AUTO: 0.1 X10E3/UL (ref 0–0.4)
EOSINOPHIL NFR BLD AUTO: 2 %
ERYTHROCYTE [DISTWIDTH] IN BLOOD BY AUTOMATED COUNT: 12.3 % (ref 11.7–15.4)
EST. AVERAGE GLUCOSE BLD GHB EST-MCNC: 140 MG/DL
GLOBULIN SER-MCNC: 2.9 G/DL (ref 1.5–4.5)
GLUCOSE SERPL-MCNC: 200 MG/DL (ref 70–99)
HBA1C MFR BLD: 6.5 % (ref 4.8–5.6)
HCT VFR BLD AUTO: 40.3 % (ref 34–46.6)
HDLC SERPL-MCNC: 53 MG/DL
HGB BLD-MCNC: 13 G/DL (ref 11.1–15.9)
IMM GRANULOCYTES # BLD: 0 X10E3/UL (ref 0–0.1)
IMM GRANULOCYTES NFR BLD: 1 %
LDLC SERPL CALC-MCNC: 129 MG/DL (ref 0–99)
LYMPHOCYTES # BLD AUTO: 1.2 X10E3/UL (ref 0.7–3.1)
LYMPHOCYTES NFR BLD AUTO: 24 %
MCH RBC QN AUTO: 28.5 PG (ref 26.6–33)
MCHC RBC AUTO-ENTMCNC: 32.3 G/DL (ref 31.5–35.7)
MCV RBC AUTO: 88 FL (ref 79–97)
MICROALBUMIN UR-MCNC: 24.6 UG/ML
MONOCYTES # BLD AUTO: 0.3 X10E3/UL (ref 0.1–0.9)
MONOCYTES NFR BLD AUTO: 7 %
NEUTROPHILS # BLD AUTO: 3.1 X10E3/UL (ref 1.4–7)
NEUTROPHILS NFR BLD AUTO: 64 %
PLATELET # BLD AUTO: 194 X10E3/UL (ref 150–450)
POTASSIUM SERPL-SCNC: 4.3 MMOL/L (ref 3.5–5.2)
PROT SERPL-MCNC: 7 G/DL (ref 6–8.5)
RBC # BLD AUTO: 4.56 X10E6/UL (ref 3.77–5.28)
SL AMB VLDL CHOLESTEROL CALC: 26 MG/DL (ref 5–40)
SODIUM SERPL-SCNC: 141 MMOL/L (ref 134–144)
TRIGL SERPL-MCNC: 144 MG/DL (ref 0–149)
WBC # BLD AUTO: 4.8 X10E3/UL (ref 3.4–10.8)

## 2023-11-03 PROCEDURE — 99496 TRANSJ CARE MGMT HIGH F2F 7D: CPT | Performed by: FAMILY MEDICINE

## 2023-11-03 RX ORDER — CLOPIDOGREL BISULFATE 75 MG/1
75 TABLET ORAL DAILY
Qty: 30 TABLET | Refills: 1 | Status: SHIPPED | OUTPATIENT
Start: 2023-11-03

## 2023-11-03 RX ORDER — LEVETIRACETAM 500 MG/1
500 TABLET ORAL EVERY 12 HOURS SCHEDULED
Qty: 60 TABLET | Refills: 2 | Status: SHIPPED | OUTPATIENT
Start: 2023-11-03

## 2023-11-03 NOTE — PATIENT INSTRUCTIONS
Dental clearance Dr. Shoshana Powell fax [phone]  Schedule appt with neurology  Records request sent  Keep appt with endocrine  Schedule pap

## 2023-11-03 NOTE — LETTER
November 3, 2023      Lluvia Russell 9/25/2021 was seen in my office today, 11/3/2023, in follow up to her recent hospitalization. She is cleared to be seen by the dentist and undergo any treatment necessary. If you have further questions, please feel free to call [phone].         Sincerely,      Bladimir Gomez D.O.

## 2023-11-03 NOTE — TELEPHONE ENCOUNTER
Pt is calling because they were just in this morning and forgot to ask the doctor if she has any recommendations for a Neurologist in [place]. Pt said that her insurance would not cover [place].     Please advise

## 2023-11-03 NOTE — PROGRESS NOTES
Assessment & Plan     1. Aphasia  Assessment & Plan:  Records release sent for hospital stay- ct/mri/eeg  Cva vs seizure activity. Schedule follow up with neurology. Pt will start keppra and hold on plavix until after dental work. Orders:  -     clopidogrel (PLAVIX) 75 mg tablet; Take 1 tablet (75 mg total) by mouth daily    2. Abnormal EEG  Assessment & Plan:  Requested records to review. Eeg abnormal per patient. Schedule follow up with neurology. Start keppra as prescribed in hospital    Orders:  -     levETIRAcetam (Keppra) 500 mg tablet; Take 1 tablet (500 mg total) by mouth every 12 (twelve) hours    3. Type 2 diabetes mellitus without complication, with long-term current use of insulin (HCC)  Assessment & Plan:    Lab Results   Component Value Date    HGBA1C 6.5 (H) 11/02/2023   Controlled, next hba1c 2/3/2023  Diabetic foot and eye exam up to date  Currently taking levemir 12 u daily and metformin. Has upcoming appt with endocrine. Subjective     Transitional Care Management Review:   Jonathan President is a 66 y.o. female here for TCM follow up. During the TCM phone call patient stated:  TCM Call       Date and time call was made  11/2/2023  3:06 PM    Hospital care reviewed  Records not available    Patient was hospitialized at  Other (comment)    [facility] in [address] [facility]    Date of Admission  10/30/23    Date of discharge  11/01/23    Disposition  Home    Were the patients medications reviewed and updated  Yes    Current Symptoms  None          TCM Call       Post hospital issues  None    Should patient be enrolled in anticoag monitoring? No    Scheduled for follow up?   Yes    Did you obtain your prescribed medications  Yes    Do you need help managing your prescriptions or medications  No    Is transportation to your appointment needed  No    I have advised the patient to call PCP with any new or worsening symptoms  ABDIEL Greer          Pt here in follow up to recent hospitalization. We have not received hospital records yet. Pt states Monday night was having trouble speaking, aphasia. Pts friend called [facility] and taken to [facility].   Seen in ed had vomiting, pt states she has motion sickness. No diarrhea  Pt was in ICU Pt had eeg - pt states was abnormal and put on keppra - had ct and MRI of her brain per patient - normal  Prescribed plavix and aspirin. Blood work done yesterday at [address] now taking 12 units of levemir  Pt states that she has not picked up the prescription medication yet. Pt has an upcoming dental appointment and does not want to start plavix. Pt states she is feeling good today and has not had any further episodes. Review of Systems   Constitutional: Negative. Negative for fatigue and fever. HENT: Negative. Eyes: Negative. Respiratory: Negative. Negative for cough. Cardiovascular: Negative. Gastrointestinal: Negative. Endocrine: Negative. Genitourinary: Negative. Musculoskeletal: Negative. Skin: Negative. Allergic/Immunologic: Negative. Neurological: Negative. Psychiatric/Behavioral: Negative.          Objective     /64   Pulse 67   Temp (!) 97.1 °F (36.2 °C)   Ht 5' 7" (1.702 m)   Wt 83 kg (183 lb)   SpO2 100%   BMI 28.66 kg/m²      Physical Exam  Medications have been reviewed by provider in current encounter    Maury Julian DO

## 2023-11-05 PROBLEM — R47.01 APHASIA: Status: ACTIVE | Noted: 2023-11-05

## 2023-11-05 PROBLEM — R94.01 ABNORMAL EEG: Status: ACTIVE | Noted: 2023-11-05

## 2023-11-05 NOTE — ASSESSMENT & PLAN NOTE
Records release sent for hospital stay- ct/mri/eeg  Cva vs seizure activity. Schedule follow up with neurology. Pt will start keppra and hold on plavix until after dental work.

## 2023-11-05 NOTE — ASSESSMENT & PLAN NOTE
Requested records to review. Eeg abnormal per patient. Schedule follow up with neurology.  Start keppra as prescribed in hospital

## 2023-11-05 NOTE — ASSESSMENT & PLAN NOTE
Lab Results   Component Value Date    HGBA1C 6.5 (H) 11/02/2023   Controlled, next hba1c 2/3/2023  Diabetic foot and eye exam up to date  Currently taking levemir 12 u daily and metformin. Has upcoming appt with endocrine.

## 2023-11-07 ENCOUNTER — TELEPHONE (OUTPATIENT)
Dept: FAMILY MEDICINE CLINIC | Facility: CLINIC | Age: 63
End: 2023-11-07

## 2023-11-07 NOTE — TELEPHONE ENCOUNTER
Patient called, wanted to keep you up to date on her health:  [facility] 10/30-11/1 - Aphasia   Patient has MRI,CT,and EEG done saw Dr. Susanne Harding last week and update her what Dr. Graeme romero/ [facility] said this morning that all tests were all negative and normal, he agreed no stroke, said could be a very tiny TIA mini stroke, patient fell sept and went \[facility]\"" and CT scan from there said could be low blood sugar. Advised her keep food in car and have something avail and not let blood sugar drop.

## 2023-11-07 NOTE — TELEPHONE ENCOUNTER
[facility] to received faxed records for patient, medical records, office is closed, call back during regular business hours 8 am to 4:30 pm, also sent LVM to call back

## 2023-11-09 ENCOUNTER — OFFICE VISIT (OUTPATIENT)
Dept: ENDOCRINOLOGY | Facility: HOSPITAL | Age: 63
End: 2023-11-09
Payer: COMMERCIAL

## 2023-11-09 VITALS
BODY MASS INDEX: 28.72 KG/M2 | HEART RATE: 62 BPM | DIASTOLIC BLOOD PRESSURE: 76 MMHG | WEIGHT: 183 LBS | SYSTOLIC BLOOD PRESSURE: 118 MMHG | HEIGHT: 67 IN

## 2023-11-09 DIAGNOSIS — Z79.4 TYPE 2 DIABETES MELLITUS WITHOUT COMPLICATION, WITH LONG-TERM CURRENT USE OF INSULIN (HCC): Primary | ICD-10-CM

## 2023-11-09 DIAGNOSIS — E11.9 TYPE 2 DIABETES MELLITUS WITHOUT COMPLICATION, WITH LONG-TERM CURRENT USE OF INSULIN (HCC): Primary | ICD-10-CM

## 2023-11-09 DIAGNOSIS — E78.5 HYPERLIPIDEMIA LDL GOAL <100: ICD-10-CM

## 2023-11-09 DIAGNOSIS — I10 ESSENTIAL HYPERTENSION: ICD-10-CM

## 2023-11-09 DIAGNOSIS — E55.9 VITAMIN D DEFICIENCY: ICD-10-CM

## 2023-11-09 PROCEDURE — 99214 OFFICE O/P EST MOD 30 MIN: CPT | Performed by: NURSE PRACTITIONER

## 2023-11-09 RX ORDER — ROSUVASTATIN CALCIUM 5 MG/1
5 TABLET, COATED ORAL DAILY
Qty: 90 TABLET | Refills: 3 | Status: SHIPPED | OUTPATIENT
Start: 2023-11-09

## 2023-11-09 NOTE — PATIENT INSTRUCTIONS
Stay active and stay hydrated. Hemoglobin A1c is 6.5. Keep up the good work!!!! For now, continue your current dose of Metformin. Increase Levemir slightly to 16 units. Check blood sugars at least twice daily at alternating times and send a record to the office in 2 weeks for review. Supplement with 2000 units of vitamin D3 daily - consistently. Start Crestor 5 mg daily. Discontinue Co Q10 with Red Yeast Rice. Obtain lab work as prescribed.

## 2023-11-09 NOTE — PROGRESS NOTES
Jonathan President 66 y.o. female MRN: [id number]    Encounter: [id number]      Assessment/Plan     Assessment: This is a 66y.o.-year-old female with type 2 diabetes with long term insulin use, hyperlipidemia and vitamin-D deficiency. Plan:  1. Type 2 diabetes with long-term insulin use: I most recent hemoglobin A1c is 6.5. Review of her recent blood sugars reveals some mild hyperglycemia in the morning at times in the 170-190 range. For this I have asked her to increase her Levemir slightly to 16 units. She will continue metformin at current dose. Reviewed and reinforced the importance of consistency with her medications, diet and exercise. I have asked her to continue checking her blood sugars regularly and send a record to the office in 2 weeks for review. Reviewed recognition and proper treatment of hypoglycemic episodes. Check hemoglobin A1c and comprehensive metabolic panel prior to next visit. 2. Hyperlipidemia: Start Crestor 5 mg daily. Discontinue CQ10 and red yeast rice 600 mg daily after you start Crestor. She has not been interested in pursuing statin therapy. Check fasting lipid panel prior to next visit. 3. Vitamin-D deficiency: Continue supplementation with 2000 units of vitamin D3 daily - consistently. CC: Type 2 Diabetes Follow Up    History of Present Illness     HPI:  66y.o. year old female with type 2 diabetes for approximately 18 years. She is on oral agents and insulin at home and takes metformin XR 1000 mg daily with dinner and Levemir 8 units. He was recently hospitalized at [facility] for a syncopal episode on September 27, 2023. She was discharged on September 29, 2023. She was having lower blood sugars throughout her visit at the hospital, thus, her glimepiride was stopped and her Levemir was decreased. She states that she has not been consistent with her glimepiride daily. She denies any polyuria, polydipsia, nocturia and blurry vision.  She denies neuropathy, nephropathy and retinopathy. Hypoglycemic episodes: No.      The patient's last eye exam was on May 21, 2020. Her most recent diabetic foot exam was performed at her office visit on October 10, 2023. Blood Sugar/Glucometer/Pump/CGM review: Limited blood sugars in office for review. In the past, she has declined treatment for elevated cholesterol through her PCP. She has treating with CoQ10 and red yeast rice inconsistently. For her vitamin-D deficiency, she is supplementing with 2000 units of vitamin D3.       Review of Systems   Constitutional: Negative. Negative for chills, fatigue and fever. HENT: Negative. Negative for trouble swallowing and voice change. Eyes: Negative. Negative for photophobia, pain, discharge, redness, itching and visual disturbance. Respiratory: Negative. Negative for chest tightness and shortness of breath. Cardiovascular: Negative. Negative for chest pain. Gastrointestinal: Negative. Negative for abdominal pain, constipation, diarrhea and vomiting. Endocrine: Negative for cold intolerance, heat intolerance, polydipsia, polyphagia and polyuria. Genitourinary: Negative. Musculoskeletal: Negative. Skin: Negative. Allergic/Immunologic: Negative. Neurological: Negative. Negative for dizziness, syncope, light-headedness and headaches. Hematological: Negative. Psychiatric/Behavioral:  The patient is nervous/anxious. All other systems reviewed and are negative. Historical Information   Past Medical History:   Diagnosis Date    Anxiety     Condyloma acuminatum     Depression 1968    Therapy    Diabetes mellitus ([facility]) 2002    type 2    Genital warts     Herpes simplex     HPV (human papilloma virus) infection     Obesity 1990    Visual impairment 1984     History reviewed. No pertinent surgical history.   Social History   Social History     Substance and Sexual Activity   Alcohol Use Not Currently     Social History     Substance and Sexual Activity   Drug Use Yes    Frequency: 1.0 times per week    Types: Marijuana    Comment: Have a medical card     Social History     Tobacco Use   Smoking Status Never   Smokeless Tobacco Never     Family History:   Family History   Problem Relation Age of Onset    Alzheimer's disease Mother     Dementia Mother          2016    Diabetes Father              Heart disease Father              ALS Father     Diabetes type II Father             Hypertension Sister     Diabetes Brother     Diabetes type II Brother     No Known Problems Brother     Colon cancer Maternal Grandfather                 Meds/Allergies   Current Outpatient Medications   Medication Sig Dispense Refill    ADRENAL CORTEX PO       Blood Glucose Monitoring Suppl (OneTouch Verio Flex System) w/Device KIT Inject under the skin daily Testing Once a day E11.9 1 kit 0    Cholecalciferol (VITAMIN D PO) Take 5,000 Units by mouth in the morning      CINNAMON PO Take by mouth in the morning      Coenzyme Q10-Red Yeast Rice (Co Q-10 Plus Red Yeast Rice)  MG CAPS Take 60 mg by mouth in the morning      glucose blood (OneTouch Verio) test strip CHECK SUGARS 2-4 TIMES A  strip 5    insulin detemir (Levemir FlexPen) 100 Units/mL injection pen 8 units daily (Patient taking differently: 12 units daily) 15 mL 0    Lancets (onetouch ultrasoft) lancets Check sugars 2-4 times a day 100 each 5    metFORMIN (GLUCOPHAGE-XR) 500 mg 24 hr tablet Take 2 tablets (1,000 mg total) by mouth daily with dinner 180 tablet 3    Red Yeast Rice Extract 600 MG CAPS       triamcinolone (KENALOG) 0.1 % ointment APPLY 1 APPLICATION TOPICALLY 2 (TWO) TIMES A DAY TO AFFECTED AREA 80 g 0    Vitamin B Complex-C CAPS       Chromium (Chromium GTF) 200 MCG TABS Take 1 tablet (200 mcg total) by mouth in the morning (Patient not taking: Reported on 2023) 30 tablet     clopidogrel (PLAVIX) 75 mg tablet Take 1 tablet (75 mg total) by mouth daily (Patient not taking: Reported on 11/9/2023) 30 tablet 1    Insulin Pen Needle (B-D ULTRAFINE III SHORT PEN) 31G X 8 MM MISC Inject under the skin 5 (five) times a day 100 each 5    levETIRAcetam (Keppra) 500 mg tablet Take 1 tablet (500 mg total) by mouth every 12 (twelve) hours (Patient not taking: Reported on 11/9/2023) 60 tablet 2     No current facility-administered medications for this visit. Allergies   Allergen Reactions    Eggs Or Egg-Derived Products - Food Allergy      Upset stomach. Milk-Related Compounds - Food Allergy Diarrhea    Tomato (Diagnostic) - Food Allergy Other (See Comments)     Reflux       Objective   Vitals: Blood pressure 118/76, pulse 62, height 5' 7" (1.702 m), weight 83 kg (183 lb). Physical Exam  Vitals reviewed. Constitutional:       Appearance: She is well-developed. HENT:      Head: Normocephalic and atraumatic. Eyes:      Conjunctiva/sclera: Conjunctivae normal.      Pupils: Pupils are equal, round, and reactive to light. Cardiovascular:      Rate and Rhythm: Normal rate and regular rhythm. Heart sounds: Normal heart sounds. Pulmonary:      Effort: Pulmonary effort is normal.      Breath sounds: Normal breath sounds. Abdominal:      General: Bowel sounds are normal.      Palpations: Abdomen is soft. Musculoskeletal:         General: Normal range of motion. Cervical back: Normal range of motion and neck supple. Skin:     General: Skin is warm and dry. Neurological:      Mental Status: She is alert and oriented to person, place, and time. Psychiatric:         Behavior: Behavior normal.         Thought Content:  Thought content normal.         Judgment: Judgment normal.       Lab Results:   Lab Results   Component Value Date/Time    Hemoglobin A1C 6.5 (H) 11/02/2023 11:23 AM    Hemoglobin A1C 6.0 (H) 06/29/2023 10:31 AM    Hemoglobin A1C 6.3 (H) 02/24/2023 10:45 AM    White Blood Cell Count 4.8 11/02/2023 11:23 AM    White Blood Cell Count 6.8 06/29/2023 10:31 AM    White Blood Cell Count 5.0 02/24/2023 10:45 AM    Hemoglobin 13.0 11/02/2023 11:23 AM    Hemoglobin 12.9 06/29/2023 10:31 AM    Hemoglobin 12.7 02/24/2023 10:45 AM    HCT 40.3 11/02/2023 11:23 AM    HCT 39.0 06/29/2023 10:31 AM    HCT 36.8 02/24/2023 10:45 AM    MCV 88 11/02/2023 11:23 AM    MCV 85 06/29/2023 10:31 AM    MCV 84 02/24/2023 10:45 AM    Platelet Count 907 66/45/2975 11:23 AM    Platelet Count 686 26/84/8410 10:31 AM    Platelet Count 029 55/40/0986 10:45 AM    BUN 12 11/02/2023 11:23 AM    BUN 11 06/29/2023 10:31 AM    BUN 8 02/24/2023 10:45 AM    Potassium 4.3 11/02/2023 11:23 AM    Potassium 4.5 06/29/2023 10:31 AM    Potassium 4.6 02/24/2023 10:45 AM    Chloride 102 11/02/2023 11:23 AM    Chloride 97 06/29/2023 10:31 AM    Chloride 98 02/24/2023 10:45 AM    CO2 27 11/02/2023 11:23 AM    CO2 26 06/29/2023 10:31 AM    CO2 25 02/24/2023 10:45 AM    Creatinine 1.02 (H) 11/02/2023 11:23 AM    Creatinine 0.94 06/29/2023 10:31 AM    Creatinine 0.96 02/24/2023 10:45 AM    AST 15 11/02/2023 11:23 AM    AST 12 06/29/2023 10:31 AM    AST 17 02/24/2023 10:45 AM    ALT 15 11/02/2023 11:23 AM    ALT 10 06/29/2023 10:31 AM    ALT 14 02/24/2023 10:45 AM    Protein, Total 7.0 11/02/2023 11:23 AM    Protein, Total 7.0 06/29/2023 10:31 AM    Protein, Total 6.7 02/24/2023 10:45 AM    Albumin 4.1 11/02/2023 11:23 AM    Albumin 4.1 06/29/2023 10:31 AM    Albumin 4.3 02/24/2023 10:45 AM    Globulin, Total 2.9 11/02/2023 11:23 AM    Globulin, Total 2.9 06/29/2023 10:31 AM    Globulin, Total 2.4 02/24/2023 10:45 AM    HDL 53 11/02/2023 11:23 AM    HDL 53 02/24/2023 10:45 AM    Triglycerides 144 11/02/2023 11:23 AM    Triglycerides 123 02/24/2023 10:45 AM     Portions of the record may have been created with voice recognition software.  Occasional wrong word or "sound a like" substitutions may have occurred due to the inherent limitations of voice recognition software. Read the chart carefully and recognize, using context, where substitutions have occurred. 67

## 2023-11-13 ENCOUNTER — TELEPHONE (OUTPATIENT)
Dept: NEUROLOGY | Facility: CLINIC | Age: 63
End: 2023-11-13

## 2023-11-13 NOTE — TELEPHONE ENCOUNTER
Patient calling to schedule new patient appointment for TIA on 10/30/2023 and patient seen at [address] [facility] [address]. Testing done. Triage intake sent.

## 2023-11-20 ENCOUNTER — TELEPHONE (OUTPATIENT)
Dept: FAMILY MEDICINE CLINIC | Facility: CLINIC | Age: 63
End: 2023-11-20

## 2023-11-20 NOTE — TELEPHONE ENCOUNTER
Called patient LVM patient advised on Dr. Geoffrey Beth message  There is a small incidence of patients on antibiotics developing stroke symptoms. I would ask neurology as well since they see more strokes than family practice. Barbie Siegel

## 2023-11-20 NOTE — TELEPHONE ENCOUNTER
----- Message from Hoa Robles sent at 11/18/2023 10:04 AM EST -----  Regarding: Side effects of Cephalexin  Contact: [phone]  Dr. Misty Schmitt,    My dentist visit to exam the gum issue this week ended up prescribing Cephalexin. I remember this being the same pill I had earlier. Unfortunately I am experiencing most of the RARE side effects this antibiotic causes. My dental surgery is Monday, so for now I'm going to stop taking it. Last night when I returned back to the place I'm staying,  I experienced all the same symptoms I had when I was sent to the hospital the second time. I was extremely tired just like Oct 30th. So I started to drink alot of water and that seemed to help. I read up on the medication and I'm wondering if this might of been the cause for "TIA" / Aphasia?     thanks for letting me know    bret

## 2023-12-01 ENCOUNTER — TELEPHONE (OUTPATIENT)
Dept: FAMILY MEDICINE CLINIC | Facility: CLINIC | Age: 63
End: 2023-12-01

## 2023-12-01 DIAGNOSIS — I63.9 CEREBROVASCULAR ACCIDENT (CVA), UNSPECIFIED MECHANISM (HCC): ICD-10-CM

## 2023-12-01 DIAGNOSIS — R56.9 SEIZURE-LIKE ACTIVITY (HCC): Primary | ICD-10-CM

## 2023-12-01 NOTE — TELEPHONE ENCOUNTER
Patient recommends she find neurologist through [facility] called and set up appt neuro [facility] Phys[facility] assistant [facility] in [facility] and they need referral saying she was referred to neuro and her appt is dec 29th and needs prior to appt at 8:15 AM dec 29th    Phone # [phone]  Fax # [phone]    Called the provider to figure out what information is needed for referral. Spoke to the office and they said she just needs amb ref neuro for Cva vs seizure activity.       Referral completed,oreded,and in the epic system

## 2023-12-09 PROBLEM — Z12.11 SCREEN FOR COLON CANCER: Status: RESOLVED | Noted: 2023-07-27 | Resolved: 2023-12-09

## 2023-12-13 ENCOUNTER — OFFICE VISIT (OUTPATIENT)
Dept: FAMILY MEDICINE CLINIC | Facility: CLINIC | Age: 63
End: 2023-12-13
Payer: COMMERCIAL

## 2023-12-13 VITALS
TEMPERATURE: 96.4 F | HEART RATE: 68 BPM | WEIGHT: 184 LBS | DIASTOLIC BLOOD PRESSURE: 80 MMHG | HEIGHT: 67 IN | OXYGEN SATURATION: 99 % | SYSTOLIC BLOOD PRESSURE: 132 MMHG | BODY MASS INDEX: 28.88 KG/M2

## 2023-12-13 DIAGNOSIS — R47.01 APHASIA: ICD-10-CM

## 2023-12-13 DIAGNOSIS — Z79.4 TYPE 2 DIABETES MELLITUS WITHOUT COMPLICATION, WITH LONG-TERM CURRENT USE OF INSULIN (HCC): ICD-10-CM

## 2023-12-13 DIAGNOSIS — R94.01 ABNORMAL EEG: Primary | ICD-10-CM

## 2023-12-13 DIAGNOSIS — E11.9 TYPE 2 DIABETES MELLITUS WITHOUT COMPLICATION, WITH LONG-TERM CURRENT USE OF INSULIN (HCC): ICD-10-CM

## 2023-12-13 PROCEDURE — 3075F SYST BP GE 130 - 139MM HG: CPT | Performed by: FAMILY MEDICINE

## 2023-12-13 PROCEDURE — 3079F DIAST BP 80-89 MM HG: CPT | Performed by: FAMILY MEDICINE

## 2023-12-13 PROCEDURE — 99213 OFFICE O/P EST LOW 20 MIN: CPT | Performed by: FAMILY MEDICINE

## 2023-12-13 PROCEDURE — 3725F SCREEN DEPRESSION PERFORMED: CPT | Performed by: FAMILY MEDICINE

## 2023-12-13 RX ORDER — INSULIN DETEMIR 100 [IU]/ML
INJECTION, SOLUTION SUBCUTANEOUS
Start: 2023-12-13

## 2023-12-13 NOTE — PROGRESS NOTES
"Assessment/Plan:      1. Abnormal EEG  Assessment & Plan:  Keep appointment with neuro      2. Type 2 diabetes mellitus without complication, with long-term current use of insulin ([facility])  Assessment & Plan:    Lab Results   Component Value Date    HGBA1C 6.5 (H) 11/02/2023   Controlled next after 2/2/2024, continue to monitor blood sugars. Foot and eye exam, urine microalbumin up to date    Orders:  -     insulin detemir (Levemir FlexPen) 100 Units/mL injection pen; 16 units daily    3. Aphasia  Assessment & Plan:  No further episodes since blood sugar has been better regulated,             Subjective:  Chief Complaint   Patient presents with    Follow-up     Diabetic check her endo did her A1c in november         Patient ID: Ynes Mendoza is a 63 y.o. female.    Pt has been checking blood sugars. 140 in am  lowest 98  Had 300 one episode over the weekend.  Recently moved- increased stress  Using Levimir 16 units  Feeling better  Stopped antibiotic from dentist and feeling better.   Needs cataract removal left eye           Review of Systems   Constitutional: Negative.  Negative for fatigue and fever.   HENT: Negative.     Eyes: Negative.    Respiratory: Negative.  Negative for cough.    Cardiovascular: Negative.    Gastrointestinal: Negative.    Endocrine: Negative.    Genitourinary: Negative.    Musculoskeletal: Negative.    Skin: Negative.    Allergic/Immunologic: Negative.    Neurological: Negative.    Psychiatric/Behavioral: Negative.           The following portions of the patient's history were reviewed and updated as appropriate: allergies, current medications, past family history, past medical history, past social history, past surgical history and problem list.    Objective:  Vitals:    12/13/23 0949   BP: 132/80   Pulse: 68   Temp: (!) 96.4 °F (35.8 °C)   TempSrc: Tympanic   SpO2: 99%   Weight: 83.5 kg (184 lb)   Height: 5' 7\" (1.702 m)      Physical Exam  Vitals and nursing note reviewed.   Constitutional: "       Appearance: She is well-developed.   HENT:      Head: Normocephalic and atraumatic.   Cardiovascular:      Rate and Rhythm: Normal rate and regular rhythm.      Heart sounds: Normal heart sounds.   Pulmonary:      Effort: Pulmonary effort is normal.      Breath sounds: Normal breath sounds.   Abdominal:      General: Bowel sounds are normal.      Palpations: Abdomen is soft.   Skin:     General: Skin is warm and dry.   Neurological:      Mental Status: She is alert and oriented to person, place, and time.   Psychiatric:         Behavior: Behavior normal.         Thought Content: Thought content normal.         Judgment: Judgment normal.

## 2023-12-18 ENCOUNTER — TELEPHONE (OUTPATIENT)
Dept: NEUROLOGY | Facility: CLINIC | Age: 63
End: 2023-12-18

## 2023-12-18 NOTE — TELEPHONE ENCOUNTER
Placed call to confirm patients upcoming appointment, Informed patient of the date/time and location. Advised to call office if they need to reschedule their visit.

## 2023-12-20 DIAGNOSIS — E11.9 TYPE 2 DIABETES MELLITUS WITHOUT COMPLICATION, WITH LONG-TERM CURRENT USE OF INSULIN (HCC): ICD-10-CM

## 2023-12-20 DIAGNOSIS — Z79.4 TYPE 2 DIABETES MELLITUS WITHOUT COMPLICATION, WITH LONG-TERM CURRENT USE OF INSULIN (HCC): ICD-10-CM

## 2023-12-20 RX ORDER — METFORMIN HYDROCHLORIDE 500 MG/1
1000 TABLET, EXTENDED RELEASE ORAL
Qty: 180 TABLET | Refills: 3 | Status: SHIPPED | OUTPATIENT
Start: 2023-12-20

## 2023-12-20 RX ORDER — PEN NEEDLE, DIABETIC 31 GX5/16"
NEEDLE, DISPOSABLE MISCELLANEOUS
Qty: 100 EACH | Refills: 5 | Status: SHIPPED | OUTPATIENT
Start: 2023-12-20 | End: 2024-01-19

## 2023-12-20 NOTE — TELEPHONE ENCOUNTER
Insulin pen needle  Metformin 500 mg- has refills but changed pharmacies and they wont transfer them.    [phone]

## 2023-12-21 DIAGNOSIS — E11.9 TYPE 2 DIABETES MELLITUS WITHOUT COMPLICATION, WITH LONG-TERM CURRENT USE OF INSULIN (HCC): Primary | ICD-10-CM

## 2023-12-21 DIAGNOSIS — Z79.4 TYPE 2 DIABETES MELLITUS WITHOUT COMPLICATION, WITH LONG-TERM CURRENT USE OF INSULIN (HCC): Primary | ICD-10-CM

## 2023-12-21 RX ORDER — BLOOD SUGAR DIAGNOSTIC
STRIP MISCELLANEOUS
Qty: 100 STRIP | Refills: 5 | Status: SHIPPED | OUTPATIENT
Start: 2023-12-21

## 2023-12-21 NOTE — TELEPHONE ENCOUNTER
Received [email or web address] message requesting test strips to go to a different [facility].

## 2023-12-31 NOTE — ASSESSMENT & PLAN NOTE
Lab Results   Component Value Date    HGBA1C 6.5 (H) 11/02/2023   Controlled next after 2/2/2024, continue to monitor blood sugars. Foot and eye exam, urine microalbumin up to date

## 2024-01-10 ENCOUNTER — TELEPHONE (OUTPATIENT)
Dept: NEUROLOGY | Facility: CLINIC | Age: 64
End: 2024-01-10

## 2024-01-10 NOTE — TELEPHONE ENCOUNTER
LMOM informing patient their appointment on 2/6/24 with Dr. Rodriguez will need to be rescheduled. Offered appointment tomorrow at 2 pm in [place]

## 2024-01-19 ENCOUNTER — TELEPHONE (OUTPATIENT)
Dept: NEUROLOGY | Facility: CLINIC | Age: 64
End: 2024-01-19

## 2024-01-19 DIAGNOSIS — E11.9 TYPE 2 DIABETES MELLITUS WITHOUT COMPLICATION, WITH LONG-TERM CURRENT USE OF INSULIN (HCC): ICD-10-CM

## 2024-01-19 DIAGNOSIS — Z79.4 TYPE 2 DIABETES MELLITUS WITHOUT COMPLICATION, WITH LONG-TERM CURRENT USE OF INSULIN (HCC): ICD-10-CM

## 2024-01-19 RX ORDER — INSULIN DETEMIR 100 [IU]/ML
INJECTION, SOLUTION SUBCUTANEOUS
Qty: 15 ML | Refills: 0 | Status: SHIPPED | OUTPATIENT
Start: 2024-01-19

## 2024-01-19 RX ORDER — METFORMIN HYDROCHLORIDE 500 MG/1
1000 TABLET, EXTENDED RELEASE ORAL
Qty: 180 TABLET | Refills: 0 | Status: SHIPPED | OUTPATIENT
Start: 2024-01-19

## 2024-01-19 NOTE — TELEPHONE ENCOUNTER
Patient called she is out of metformin, would like it sent [address]     Refills have been requested for the following medications:         metFORMIN (GLUCOPHAGE-XR) 500 mg 24 hr tablet [Saloni Landon]      Patient Comment: This prescription was not picked up. [place] nor [facility] 's have it on file. I am out. Is it possible to have it filled for today 1/19/24 or by 1/20/24? thank you     Preferred pharmacy: [id number] - [place], [place] - [address] [place] [address]

## 2024-01-19 NOTE — TELEPHONE ENCOUNTER
Received VM transcription from 1/18/24, 11:42 AM:    Hello, my name is Ynes Mendoza. I am calling because I have an appointment on Monday with Dr. Rodriguez. And I would like to know if they received the medical records from [facility], in [place], [place] from my last hospital visit. If they have not received them, I would like to either go pick them up in [place] or make sure the records are at the office on Monday. Thank you.  ------------------------    See media tab 11/8/2023. Please confirm with pt that these are the records she is referring to.

## 2024-01-22 ENCOUNTER — PATIENT MESSAGE (OUTPATIENT)
Dept: NEUROLOGY | Facility: CLINIC | Age: 64
End: 2024-01-22

## 2024-01-22 ENCOUNTER — CONSULT (OUTPATIENT)
Dept: NEUROLOGY | Facility: CLINIC | Age: 64
End: 2024-01-22
Payer: COMMERCIAL

## 2024-01-22 VITALS
BODY MASS INDEX: 29.65 KG/M2 | HEART RATE: 67 BPM | TEMPERATURE: 98.1 F | SYSTOLIC BLOOD PRESSURE: 100 MMHG | WEIGHT: 189.3 LBS | DIASTOLIC BLOOD PRESSURE: 76 MMHG | OXYGEN SATURATION: 99 %

## 2024-01-22 DIAGNOSIS — Z86.73 HISTORY OF TIA (TRANSIENT ISCHEMIC ATTACK): ICD-10-CM

## 2024-01-22 DIAGNOSIS — E11.9 TYPE 2 DIABETES MELLITUS WITHOUT COMPLICATION, WITH LONG-TERM CURRENT USE OF INSULIN (HCC): ICD-10-CM

## 2024-01-22 DIAGNOSIS — Z79.4 TYPE 2 DIABETES MELLITUS WITHOUT COMPLICATION, WITH LONG-TERM CURRENT USE OF INSULIN (HCC): ICD-10-CM

## 2024-01-22 DIAGNOSIS — Z86.73 HISTORY OF TIA (TRANSIENT ISCHEMIC ATTACK): Primary | ICD-10-CM

## 2024-01-22 DIAGNOSIS — R47.01 APHASIA: ICD-10-CM

## 2024-01-22 DIAGNOSIS — E78.2 MIXED HYPERLIPIDEMIA: ICD-10-CM

## 2024-01-22 PROBLEM — R94.01 ABNORMAL EEG: Status: RESOLVED | Noted: 2023-11-05 | Resolved: 2024-01-22

## 2024-01-22 PROCEDURE — 99214 OFFICE O/P EST MOD 30 MIN: CPT | Performed by: PSYCHIATRY & NEUROLOGY

## 2024-01-22 RX ORDER — ASPIRIN 81 MG/1
81 TABLET, CHEWABLE ORAL DAILY
Start: 2024-01-22

## 2024-01-22 NOTE — PROGRESS NOTES
Patient ID: Ynes Mendoza is a 63 y.o. female.    Assessment/Plan:   Patient Instructions   Transient ischemic attack: Ynes presents for an initial consultation with regard to an episode that happened towards the end of last October which sounds like a transient ischemic attack.  She has not had similar symptoms since that time but does report 1 prior episode of altered speech from last summer.  Her neurologic exam is reassuring in the office today and I was able to review most of the records from [facility] from her admission.  At this point in time based on her description I do not think this sounds consistent with seizure, but consistent with aphasia and was likely either a transient ischemic attack or tenecteplase aborted stroke.  -For secondary stroke prevention I would recommend aspirin 81 mg taken once daily.  She should take note of being a little more prone to bleeding or bruising on the medication.  She should also inform her dentist and may need to stop the medication prior to dental procedures  -I agree with her current rosuvastatin and would recommend checking her cholesterol level approximately 3 months from its initiation with a target LDL cholesterol of less than 70.  If the LDL cholesterol remains greater than 70 I would recommend increasing the medication until she is at goal provide if she does not have any side effects with it.  She should not combine this with red yeast rice.  -She should occasionally check her blood pressure away from doctors office and target numbers less than 130/80 most of the time  -We will defer to her primary care team and endocrinology team for monitoring of her cholesterol and blood sugar numbers and agree with a target hemoglobin A1c of less than 7%  -She should ensure that she gets adequate rest, remains well-hydrated, and should stay physically active every day  -We will request the original images from [facility] which I will review directly once they are  available.  If there is any evidence of carotid artery stenosis we will consider monitoring every so often with a Doppler ultrasound to make sure that there is no worsening    From my standpoint she is doing exceptionally well.  She will return to the office to see me directly in 6 months time but I would be happy to see her sooner if any should arise and I will be in touch with her once we have access to her images.  If she were to have strokelike symptoms such as sudden painless loss of vision or double vision, difficulty speaking or swallowing, vertigo/room spinning that does not quickly resolve, or sudden weakness/numbness/loss of coordination affecting 1 side of the face or body she should proceed by ambulance to the nearest emergency room immediately.     Diagnoses and all orders for this visit:    History of TIA (transient ischemic attack)  -     aspirin 81 mg chewable tablet; Chew 1 tablet (81 mg total) daily    Mixed hyperlipidemia    Type 2 diabetes mellitus without complication, with long-term current use of insulin (HCC)    Aphasia           Subjective:    CLAUDIO Quick presents for an initial consultation with regard to a prior neurologic event.    By way of background she notes that this past July she had a brief episode during which she had difficulty speaking and her speech was quite garbled.  The episode was transient and she chalked it up to hypoglycemia at the time.  Notably she had already been drinking a combination of lemonade and tea prior to the episode itself and the symptoms resolved prior to ingesting other food or liquid    This past September she did have an episode of hypoglycemia that caused syncope    On or about October 30, 2023 she was at a friend's house and had just come down from taking a nap.  She might of felt just a little bit of fogginess in terms of thinking.  She noted suddenly that she was unable to speak.  She was awake during this time and had no loss of comprehension or  confusion, but most typically could not say any words at all and what words did come out did not sound particularly garbled but were not in the correct order.  She was taken to the emergency room at [facility] and given tenecteplase for presumed acute ischemic stroke.    Reportedly vessel imaging at the time did not show any large vessel occlusion/large territorial infarction.  MRI was negative for acute ischemic stroke.  Her symptoms resolved after 4 hours.  She had an EEG which was abnormal due to some left frontal slowing but no epileptiform changes.    I was able to otherwise review her risk factors and her LDL cholesterol at the most recent check was 129.    We had an extensive discussion with regard to the physiology/pathophysiology of stroke and TIA.  At this point TIA is the most likely explanation for her symptoms.  Bearing this in mind she was advised to restart taking baby aspirin once per day and given additional direction with regard to control of vascular risk factors as described above.    She did note that she has some challenges if she sits in a car for too long wherein she will feel dizzy/off balance and as result she uses a cane to prevent falls.  She also notes that previously she had weakness in her distal bilateral upper extremities to the point that she could not open jars or bottles.  This has been improving.    I was able to review the imaging results in general from [facility] as they were scanned into chart through the primary care team     Objective:    Blood pressure 100/76, pulse 67, temperature 98.1 °F (36.7 °C), temperature source Temporal, weight 85.9 kg (189 lb 4.8 oz), SpO2 99%.    Physical Exam    Neurological Exam    At the time of my examination she was awake and alert and in no distress.  Cranial nerves II through XII are symmetrically intact bilaterally.  Motor testing reveals symmetric strength throughout the bilateral upper and lower extremities although   strength is somewhat diminished compared to what might otherwise be expected.  Sensation was symmetric to temperature and vibration in the bilateral upper extremities.  Light touch was symmetric in the bilateral lower extremities. There was no clear tremor or ataxia.  She was able to rise easily without assistance and her gait was stable.    ROS:    Review of Systems   Constitutional:  Negative for appetite change, fatigue and fever.   HENT: Negative.  Negative for hearing loss, tinnitus, trouble swallowing and voice change.    Eyes: Negative.  Negative for photophobia, pain and visual disturbance.   Respiratory: Negative.  Negative for shortness of breath.    Cardiovascular: Negative.  Negative for palpitations.   Gastrointestinal: Negative.  Negative for nausea and vomiting.   Endocrine: Negative.  Negative for cold intolerance.   Genitourinary: Negative.  Negative for dysuria, frequency and urgency.   Musculoskeletal:  Negative for back pain, gait problem, myalgias, neck pain and neck stiffness.   Skin: Negative.  Negative for rash.   Allergic/Immunologic: Negative.    Neurological: Negative.  Negative for dizziness, tremors, seizures, syncope, facial asymmetry, speech difficulty, weakness, light-headedness, numbness and headaches.   Hematological: Negative.  Does not bruise/bleed easily.   Psychiatric/Behavioral: Negative.  Negative for confusion, hallucinations and sleep disturbance.    All other systems reviewed and are negative.    I have spent a total time of 56 minutes on 01/22/24 in caring for this patient including Diagnostic results, Prognosis, Risks and benefits of tx options, Instructions for management, Patient and family education, Importance of tx compliance, Risk factor reductions, Impressions, Counseling / Coordination of care, Documenting in the medical record, Reviewing / ordering tests, medicine, procedures  , and Obtaining or reviewing history  .

## 2024-01-22 NOTE — PATIENT INSTRUCTIONS
Transient ischemic attack: Ynes presents for an initial consultation with regard to an episode that happened towards the end of last October which sounds like a transient ischemic attack.  She has not had similar symptoms since that time but does report 1 prior episode of altered speech from last summer.  Her neurologic exam is reassuring in the office today and I was able to review most of the records from [facility] from her admission.  At this point in time based on her description I do not think this sounds consistent with seizure, but consistent with aphasia and was likely either a transient ischemic attack or tenecteplase aborted stroke.  -For secondary stroke prevention I would recommend aspirin 81 mg taken once daily.  She should take note of being a little more prone to bleeding or bruising on the medication.  She should also inform her dentist and may need to stop the medication prior to dental procedures  -I agree with her current rosuvastatin and would recommend checking her cholesterol level approximately 3 months from its initiation with a target LDL cholesterol of less than 70.  If the LDL cholesterol remains greater than 70 I would recommend increasing the medication until she is at goal provide if she does not have any side effects with it.  She should not combine this with red yeast rice.  -She should occasionally check her blood pressure away from doctors office and target numbers less than 130/80 most of the time  -We will defer to her primary care team and endocrinology team for monitoring of her cholesterol and blood sugar numbers and agree with a target hemoglobin A1c of less than 7%  -She should ensure that she gets adequate rest, remains well-hydrated, and should stay physically active every day  -We will request the original images from [facility] which I will review directly once they are available.  If there is any evidence of carotid artery stenosis we will consider monitoring  every so often with a Doppler ultrasound to make sure that there is no worsening    From my standpoint she is doing exceptionally well.  She will return to the office to see me directly in 6 months time but I would be happy to see her sooner if any should arise and I will be in touch with her once we have access to her images.  If she were to have strokelike symptoms such as sudden painless loss of vision or double vision, difficulty speaking or swallowing, vertigo/room spinning that does not quickly resolve, or sudden weakness/numbness/loss of coordination affecting 1 side of the face or body she should proceed by ambulance to the nearest emergency room immediately.

## 2024-01-23 RX ORDER — ASPIRIN 81 MG/1
81 TABLET, CHEWABLE ORAL DAILY
Qty: 30 TABLET | Refills: 3 | Status: CANCELLED | OUTPATIENT
Start: 2024-01-23

## 2024-01-23 NOTE — TELEPHONE ENCOUNTER
Called pt. She was made aware that the ASA 81 mg is available over the counter. She stated that she would get it at her pharmacy.

## 2024-01-23 NOTE — PATIENT COMMUNICATION
Spoke with pt. She was made aware that the ASA 81 mg is available over the counter. She will pick it up at the pharmacy.

## 2024-02-03 LAB
CHOLEST SERPL-MCNC: 148 MG/DL (ref 100–199)
CHOLEST/HDLC SERPL: 2.3 RATIO (ref 0–4.4)
HDLC SERPL-MCNC: 63 MG/DL
LDLC SERPL CALC-MCNC: 71 MG/DL (ref 0–99)
SL AMB VLDL CHOLESTEROL CALC: 14 MG/DL (ref 5–40)
TRIGL SERPL-MCNC: 70 MG/DL (ref 0–149)

## 2024-02-04 LAB
A ALTERNATA IGE QN: <0.1 KU/L
A FUMIGATUS IGE QN: <0.1 KU/L
ALBUMIN SERPL-MCNC: 3.7 G/DL (ref 3.9–4.9)
ALBUMIN/GLOB SERPL: 1.5 {RATIO} (ref 1.2–2.2)
ALP SERPL-CCNC: 54 IU/L (ref 44–121)
ALT SERPL-CCNC: 12 IU/L (ref 0–32)
AMER ROACH IGE QN: <0.1 KU/L
AST SERPL-CCNC: 15 IU/L (ref 0–40)
BAHIA GRASS IGE QN: <0.1 KU/L
BASOPHILS # BLD AUTO: 0.1 X10E3/UL (ref 0–0.2)
BASOPHILS NFR BLD AUTO: 1 %
BERMUDA GRASS IGE QN: <0.1 KU/L
BILIRUB SERPL-MCNC: 0.6 MG/DL (ref 0–1.2)
BOXELDER IGE QN: <0.1 KU/L
BUN SERPL-MCNC: 11 MG/DL (ref 8–27)
BUN/CREAT SERPL: 14 (ref 12–28)
C HERBARUM IGE QN: <0.1 KU/L
CALCIUM SERPL-MCNC: 9.5 MG/DL (ref 8.7–10.3)
CAT DANDER IGE QN: <0.1 KU/L
CHLORIDE SERPL-SCNC: 103 MMOL/L (ref 96–106)
CMN PIGWEED IGE QN: <0.1 KU/L
CO2 SERPL-SCNC: 24 MMOL/L (ref 20–29)
COMMON RAGWEED IGE QN: <0.1 KU/L
CREAT SERPL-MCNC: 0.76 MG/DL (ref 0.57–1)
D FARINAE IGE QN: <0.1 KU/L
D PTERONYSS IGE QN: <0.1 KU/L
DOG DANDER IGE QN: <0.1 KU/L
EGFR: 89 ML/MIN/1.73
ENGL PLANTAIN IGE QN: <0.1 KU/L
EOSINOPHIL # BLD AUTO: 0.1 X10E3/UL (ref 0–0.4)
EOSINOPHIL NFR BLD AUTO: 3 %
ERYTHROCYTE [DISTWIDTH] IN BLOOD BY AUTOMATED COUNT: 12 % (ref 11.7–15.4)
EST. AVERAGE GLUCOSE BLD GHB EST-MCNC: 180 MG/DL
GLOBULIN SER-MCNC: 2.5 G/DL (ref 1.5–4.5)
GLUCOSE SERPL-MCNC: 166 MG/DL (ref 70–99)
HBA1C MFR BLD: 7.9 % (ref 4.8–5.6)
HCT VFR BLD AUTO: 36.7 % (ref 34–46.6)
HGB BLD-MCNC: 11.8 G/DL (ref 11.1–15.9)
IMM GRANULOCYTES # BLD: 0 X10E3/UL (ref 0–0.1)
IMM GRANULOCYTES NFR BLD: 0 %
JOHNSON GRASS IGE QN: <0.1 KU/L
KENT BLUE GRASS IGE QN: <0.1 KU/L
LYMPHOCYTES # BLD AUTO: 1.3 X10E3/UL (ref 0.7–3.1)
LYMPHOCYTES NFR BLD AUTO: 26 %
Lab: NORMAL
M RACEMOSUS IGE QN: <0.1 KU/L
MCH RBC QN AUTO: 29.2 PG (ref 26.6–33)
MCHC RBC AUTO-ENTMCNC: 32.2 G/DL (ref 31.5–35.7)
MCV RBC AUTO: 91 FL (ref 79–97)
MONOCYTES # BLD AUTO: 0.4 X10E3/UL (ref 0.1–0.9)
MONOCYTES NFR BLD AUTO: 8 %
MT JUNIPER IGE QN: <0.1 KU/L
NETTLE IGE QN: <0.1 KU/L
NEUTROPHILS # BLD AUTO: 3 X10E3/UL (ref 1.4–7)
NEUTROPHILS NFR BLD AUTO: 62 %
PENICILLIUM CHRYSOGENUM: <0.1 KU/L
PLATELET # BLD AUTO: 157 X10E3/UL (ref 150–450)
POTASSIUM SERPL-SCNC: 4.3 MMOL/L (ref 3.5–5.2)
PROT SERPL-MCNC: 6.2 G/DL (ref 6–8.5)
RBC # BLD AUTO: 4.04 X10E6/UL (ref 3.77–5.28)
SILVER BIRCH IGE QN: <0.1 KU/L
SODIUM SERPL-SCNC: 142 MMOL/L (ref 134–144)
STEMPHYLIUM HERBARUM: <0.1 KU/L
TSH SERPL DL<=0.005 MIU/L-ACNC: 0.87 UIU/ML (ref 0.45–4.5)
WBC # BLD AUTO: 4.9 X10E3/UL (ref 3.4–10.8)
WHITE ELM IGE QN: <0.1 KU/L
WHITE HICKORY IGE QN: <0.1 KU/L
WHITE MULBERRY IGE QN: <0.1 KU/L
WHITE OAK IGE QN: <0.1 KU/L

## 2024-02-08 ENCOUNTER — OFFICE VISIT (OUTPATIENT)
Dept: ENDOCRINOLOGY | Facility: HOSPITAL | Age: 64
End: 2024-02-08
Payer: COMMERCIAL

## 2024-02-08 VITALS
HEART RATE: 66 BPM | WEIGHT: 189 LBS | HEIGHT: 67 IN | DIASTOLIC BLOOD PRESSURE: 76 MMHG | SYSTOLIC BLOOD PRESSURE: 118 MMHG | BODY MASS INDEX: 29.66 KG/M2

## 2024-02-08 DIAGNOSIS — E78.01 FAMILIAL HYPERCHOLESTEROLEMIA: ICD-10-CM

## 2024-02-08 DIAGNOSIS — E78.2 MIXED HYPERLIPIDEMIA: ICD-10-CM

## 2024-02-08 DIAGNOSIS — Z79.4 TYPE 2 DIABETES MELLITUS WITHOUT COMPLICATION, WITH LONG-TERM CURRENT USE OF INSULIN (HCC): Primary | ICD-10-CM

## 2024-02-08 DIAGNOSIS — E11.9 TYPE 2 DIABETES MELLITUS WITHOUT COMPLICATION, WITH LONG-TERM CURRENT USE OF INSULIN (HCC): Primary | ICD-10-CM

## 2024-02-08 DIAGNOSIS — E11.65 TYPE 2 DIABETES MELLITUS WITH HYPERGLYCEMIA, WITH LONG-TERM CURRENT USE OF INSULIN (HCC): ICD-10-CM

## 2024-02-08 DIAGNOSIS — Z79.4 TYPE 2 DIABETES MELLITUS WITH HYPERGLYCEMIA, WITH LONG-TERM CURRENT USE OF INSULIN (HCC): ICD-10-CM

## 2024-02-08 PROCEDURE — 99214 OFFICE O/P EST MOD 30 MIN: CPT | Performed by: NURSE PRACTITIONER

## 2024-02-08 NOTE — PATIENT INSTRUCTIONS
Be mindful of diet.    Stay active and stay hydrated.       Hemoglobin A1c is 7.9.     For now, continue your current dose of Metformin and Levemir.     Check blood sugars at least twice daily at alternating times and send a record to the office in 2 weeks for review.     Supplement with 2000 units of vitamin D3 daily - consistently.     Continue Crestor 5 mg daily.     Obtain lab work as prescribed.

## 2024-02-08 NOTE — PROGRESS NOTES
Ynes Mendoza 63 y.o. female MRN: [id number]    Encounter: [id number]      Assessment/Plan     Assessment:  This is a 63 y.o.-year-old female with type 2 diabetes with long term insulin use, hyperlipidemia and vitamin-D deficiency     Plan:  1. Type 2 diabetes with long-term insulin use: I most recent hemoglobin A1c is 7.9.  Review of her recent blood sugars reveals some mild hyperglycemia in the morning at times.  For some discussion, she will continue her current regimen and focus on her diet and exercise.  Reviewed and reinforced the importance of consistency with her medications, diet and exercise.  I have asked her to continue checking her blood sugars regularly and send a record to the office in 2 weeks for review.  Reviewed recognition and proper treatment of hypoglycemic episodes.  Check hemoglobin A1c and comprehensive metabolic panel prior to next visit.      2. Hyperlipidemia: Improved with Crestor 5 mg daily. Continue Crestor and will continue to monitor.      3. Vitamin-D deficiency: Continue supplementation with 2000 units of vitamin D3 daily - consistently.      CC: Type 2 Diabetes follow up    History of Present Illness     HPI:  63 y.o. year old female with type 2 diabetes for approximately 18 years. She is on oral agents and insulin at home and takes metformin XR 1000 mg daily with dinner and Levemir 816 units.  He was recently hospitalized at [facility] for a syncopal episode on September 27, 2023.  She was discharged on September 29, 2023.  She was having lower blood sugars throughout her visit at the hospital, thus, her glimepiride was stopped and her Levemir was decreased.  Recent Hemoglobin A1c from February 2, 2024 is 7.9.  She denies any polyuria, polydipsia, nocturia and blurry vision. She denies neuropathy, nephropathy and retinopathy.       Hypoglycemic episodes: No.      The patient's last eye exam was on May 21, 2020. Her most recent diabetic foot exam was performed at her  office visit on October 10, 2023.      Blood Sugar/Glucometer/Pump/CGM review: Limited blood sugars in office for review.     For her hyperlipidemia, she is treated with Crestor 5 mg daily which was started at last office visit..     For her vitamin-D deficiency, she is supplementing with 2000 units of vitamin D3.       Review of Systems   Constitutional: Negative.  Negative for chills, fatigue and fever.   HENT: Negative.  Negative for trouble swallowing and voice change.    Eyes: Negative.  Negative for photophobia, pain, discharge, redness, itching and visual disturbance.   Respiratory: Negative.  Negative for chest tightness and shortness of breath.    Cardiovascular: Negative.  Negative for chest pain and palpitations.   Gastrointestinal: Negative.  Negative for abdominal pain, constipation, diarrhea and vomiting.   Endocrine: Negative for heat intolerance, polydipsia, polyphagia and polyuria.   Genitourinary: Negative.    Musculoskeletal: Negative.    Skin: Negative.    Allergic/Immunologic: Negative.    Neurological: Negative.  Negative for dizziness, syncope, light-headedness and headaches.   Hematological: Negative.    Psychiatric/Behavioral:  The patient is nervous/anxious.    All other systems reviewed and are negative.      Historical Information   Past Medical History:   Diagnosis Date    Abnormal EEG 11/05/2023    Anxiety     Aphasia 11/05/2023    Condyloma acuminatum     Depression 1968    Therapy    Diabetes mellitus (HCC) 2002    type 2    Genital warts     Herpes simplex     HPV (human papilloma virus) infection     Obesity 1990    Visual impairment 1984     History reviewed. No pertinent surgical history.  Social History   Social History     Substance and Sexual Activity   Alcohol Use Not Currently     Social History     Substance and Sexual Activity   Drug Use Yes    Frequency: 1.0 times per week    Types: Marijuana    Comment: Have a medical card     Social History     Tobacco Use   Smoking  Status Never   Smokeless Tobacco Never     Family History:   Family History   Problem Relation Age of Onset    Alzheimer's disease Mother     Dementia Mother          2016    Diabetes Father              Heart disease Father              ALS Father     Diabetes type II Father             Hypertension Sister     Diabetes Brother     Diabetes type II Brother     No Known Problems Brother     Colon cancer Maternal Grandfather                 Meds/Allergies   Current Outpatient Medications   Medication Sig Dispense Refill    ADRENAL CORTEX PO       aspirin 81 mg chewable tablet Chew 1 tablet (81 mg total) daily      Blood Glucose Monitoring Suppl (OneTouch Verio Flex System) w/Device KIT Inject under the skin daily Testing Once a day E11.9 1 kit 0    Cholecalciferol (VITAMIN D PO) Take 5,000 Units by mouth in the morning      CINNAMON PO Take by mouth in the morning      glucose blood (OneTouch Verio) test strip CHECK SUGARS 2-4 TIMES A  strip 5    insulin detemir (Levemir FlexPen) 100 Units/mL injection pen 8 UNITS DAILY (Patient taking differently: 16 UNITS DAILY) 15 mL 0    Insulin Pen Needle (B-D ULTRAFINE III SHORT PEN) 31G X 8 MM MISC Inject under the skin 5 (five) times a day 100 each 5    Lancets (onetouch ultrasoft) lancets Check sugars 2-4 times a day 100 each 5    metFORMIN (GLUCOPHAGE-XR) 500 mg 24 hr tablet Take 2 tablets (1,000 mg total) by mouth daily with dinner 180 tablet 0    rosuvastatin (CRESTOR) 5 mg tablet Take 1 tablet (5 mg total) by mouth daily 90 tablet 3    triamcinolone (KENALOG) 0.1 % ointment APPLY 1 APPLICATION TOPICALLY 2 (TWO) TIMES A DAY TO AFFECTED AREA 80 g 0    Vitamin B Complex-C CAPS        No current facility-administered medications for this visit.     Allergies   Allergen Reactions    Cephalexin Confusion    Eggs Or Egg-Derived Products - Food Allergy      Upset stomach.     Milk-Related Compounds - Food Allergy Diarrhea  "   Tomato (Diagnostic) - Food Allergy Other (See Comments)     Reflux       Objective   Vitals: Blood pressure 118/76, pulse 66, height 5' 7\" (1.702 m), weight 85.7 kg (189 lb).    Physical Exam  Vitals reviewed.   Constitutional:       Appearance: She is well-developed.   HENT:      Head: Normocephalic and atraumatic.   Eyes:      Conjunctiva/sclera: Conjunctivae normal.      Pupils: Pupils are equal, round, and reactive to light.   Cardiovascular:      Rate and Rhythm: Normal rate and regular rhythm.      Heart sounds: Normal heart sounds.   Pulmonary:      Effort: Pulmonary effort is normal.      Breath sounds: Normal breath sounds.   Abdominal:      General: Bowel sounds are normal.      Palpations: Abdomen is soft.   Musculoskeletal:         General: Normal range of motion.      Cervical back: Normal range of motion and neck supple.   Skin:     General: Skin is warm and dry.   Neurological:      Mental Status: She is alert and oriented to person, place, and time.   Psychiatric:         Behavior: Behavior normal.         Thought Content: Thought content normal.         Judgment: Judgment normal.       Lab Results:   Lab Results   Component Value Date/Time    Hemoglobin A1C 7.9 (H) 02/02/2024 09:38 AM    Hemoglobin A1C 6.5 (H) 11/02/2023 11:23 AM    Hemoglobin A1C 6.0 (H) 06/29/2023 10:31 AM    White Blood Cell Count 4.9 02/02/2024 09:38 AM    White Blood Cell Count 4.8 11/02/2023 11:23 AM    White Blood Cell Count 6.8 06/29/2023 10:31 AM    Hemoglobin 11.8 02/02/2024 09:38 AM    Hemoglobin 13.0 11/02/2023 11:23 AM    Hemoglobin 12.9 06/29/2023 10:31 AM    HCT 36.7 02/02/2024 09:38 AM    HCT 40.3 11/02/2023 11:23 AM    HCT 39.0 06/29/2023 10:31 AM    MCV 91 02/02/2024 09:38 AM    MCV 88 11/02/2023 11:23 AM    MCV 85 06/29/2023 10:31 AM    Platelet Count 157 02/02/2024 09:38 AM    Platelet Count 194 11/02/2023 11:23 AM    Platelet Count 205 06/29/2023 10:31 AM    BUN 11 02/02/2024 09:38 AM    BUN 12 11/02/2023 " "11:23 AM    BUN 11 06/29/2023 10:31 AM    Potassium 4.3 02/02/2024 09:38 AM    Potassium 4.3 11/02/2023 11:23 AM    Potassium 4.5 06/29/2023 10:31 AM    Chloride 103 02/02/2024 09:38 AM    Chloride 102 11/02/2023 11:23 AM    Chloride 97 06/29/2023 10:31 AM    CO2 24 02/02/2024 09:38 AM    CO2 27 11/02/2023 11:23 AM    CO2 26 06/29/2023 10:31 AM    Creatinine 0.76 02/02/2024 09:38 AM    Creatinine 1.02 (H) 11/02/2023 11:23 AM    Creatinine 0.94 06/29/2023 10:31 AM    AST 15 02/02/2024 09:38 AM    AST 15 11/02/2023 11:23 AM    AST 12 06/29/2023 10:31 AM    ALT 12 02/02/2024 09:38 AM    ALT 15 11/02/2023 11:23 AM    ALT 10 06/29/2023 10:31 AM    Protein, Total 6.2 02/02/2024 09:38 AM    Protein, Total 7.0 11/02/2023 11:23 AM    Protein, Total 7.0 06/29/2023 10:31 AM    Albumin 3.7 (L) 02/02/2024 09:38 AM    Albumin 4.1 11/02/2023 11:23 AM    Albumin 4.1 06/29/2023 10:31 AM    Globulin, Total 2.5 02/02/2024 09:38 AM    Globulin, Total 2.9 11/02/2023 11:23 AM    Globulin, Total 2.9 06/29/2023 10:31 AM    HDL 63 02/02/2024 09:39 AM    HDL 53 11/02/2023 11:23 AM    HDL 53 02/24/2023 10:45 AM    Triglycerides 70 02/02/2024 09:39 AM    Triglycerides 144 11/02/2023 11:23 AM    Triglycerides 123 02/24/2023 10:45 AM     Portions of the record may have been created with voice recognition software. Occasional wrong word or \"sound a like\" substitutions may have occurred due to the inherent limitations of voice recognition software. Read the chart carefully and recognize, using context, where substitutions have occurred.    "

## 2024-02-15 NOTE — PROGRESS NOTES
Met with patient to discuss physical therapy needs. Pt was referred to me from a member of [facility].   Patient has a history and referral to physical therapy for her lower back pain. She reports benefiting greatly from myofascial work in the past for pain/injuries. We discussed my areas of expertise for breast cancer rehabilitation and pelvic floor physical therapy and my exercise-based approach with only small amounts of myofascial work. Patient and I agreed further pursuit of physical therapy with me at this time was not the right fit.     Visit was cancelled at this time.     Marley Wilson, PT     [facility]  coding opportunities             Chart Reviewed * (Number of) Inbasket suggestions sent to Provider: 1                  [facility]  coding opportunities             Chart Reviewed * (Number of) Inbasket suggestions sent to Provider: 1                  Patients insurance company: [employer] (Medicare Advantage and Commercial)     Visit status: Patient canceled the appointment     Provider never responded to [place] [id number]  coding request     Patients insurance company: [employer] (Medicare Advantage and Commercial)           Refer to A1c lab dated 10/8/21 = 9 1  E11 65 Type 2 diabetes mellitus with hyperglycemia ([facility] )

## 2024-02-27 ENCOUNTER — CLINICAL SUPPORT (OUTPATIENT)
Dept: FAMILY MEDICINE CLINIC | Facility: CLINIC | Age: 64
End: 2024-02-27
Payer: COMMERCIAL

## 2024-02-27 DIAGNOSIS — Z11.1 PPD SCREENING TEST: Primary | ICD-10-CM

## 2024-02-27 PROCEDURE — 86580 TB INTRADERMAL TEST: CPT

## 2024-02-29 ENCOUNTER — CLINICAL SUPPORT (OUTPATIENT)
Dept: FAMILY MEDICINE CLINIC | Facility: CLINIC | Age: 64
End: 2024-02-29

## 2024-02-29 DIAGNOSIS — Z11.1 ENCOUNTER FOR PPD SKIN TEST READING: Primary | ICD-10-CM

## 2024-02-29 LAB
INDURATION: 0 MM
TB SKIN TEST: NEGATIVE

## 2024-03-12 ENCOUNTER — TELEPHONE (OUTPATIENT)
Dept: ENDOCRINOLOGY | Facility: HOSPITAL | Age: 64
End: 2024-03-12

## 2024-03-14 NOTE — TELEPHONE ENCOUNTER
Patient does not want to increase her metformin to 1000 mg in the morning because when she takes more than 2 tablets, she will have diarrhea. Is there anything else she can change? Please advise patient.

## 2024-03-15 ENCOUNTER — OFFICE VISIT (OUTPATIENT)
Dept: FAMILY MEDICINE CLINIC | Facility: CLINIC | Age: 64
End: 2024-03-15
Payer: COMMERCIAL

## 2024-03-15 VITALS
SYSTOLIC BLOOD PRESSURE: 112 MMHG | TEMPERATURE: 95.3 F | DIASTOLIC BLOOD PRESSURE: 64 MMHG | BODY MASS INDEX: 29.98 KG/M2 | OXYGEN SATURATION: 100 % | HEART RATE: 59 BPM | WEIGHT: 191 LBS | HEIGHT: 67 IN

## 2024-03-15 DIAGNOSIS — Z79.4 TYPE 2 DIABETES MELLITUS WITHOUT COMPLICATION, WITH LONG-TERM CURRENT USE OF INSULIN (HCC): Primary | ICD-10-CM

## 2024-03-15 DIAGNOSIS — E78.01 FAMILIAL HYPERCHOLESTEROLEMIA: ICD-10-CM

## 2024-03-15 DIAGNOSIS — E11.9 TYPE 2 DIABETES MELLITUS WITHOUT COMPLICATION, WITH LONG-TERM CURRENT USE OF INSULIN (HCC): Primary | ICD-10-CM

## 2024-03-15 DIAGNOSIS — Z79.4 TYPE 2 DIABETES MELLITUS WITH HYPERGLYCEMIA, WITH LONG-TERM CURRENT USE OF INSULIN (HCC): Primary | ICD-10-CM

## 2024-03-15 DIAGNOSIS — E11.65 TYPE 2 DIABETES MELLITUS WITH HYPERGLYCEMIA, WITH LONG-TERM CURRENT USE OF INSULIN (HCC): Primary | ICD-10-CM

## 2024-03-15 PROBLEM — S00.93XA CONTUSION OF HEAD: Status: RESOLVED | Noted: 2023-10-04 | Resolved: 2024-03-15

## 2024-03-15 PROCEDURE — 99213 OFFICE O/P EST LOW 20 MIN: CPT | Performed by: FAMILY MEDICINE

## 2024-03-15 RX ORDER — INSULIN DETEMIR 100 [IU]/ML
INJECTION, SOLUTION SUBCUTANEOUS
Qty: 15 ML | Refills: 2 | Status: SHIPPED | OUTPATIENT
Start: 2024-03-15

## 2024-03-15 RX ORDER — GLIMEPIRIDE 1 MG/1
TABLET ORAL
Qty: 45 TABLET | Refills: 3 | Status: SHIPPED | OUTPATIENT
Start: 2024-03-15

## 2024-03-15 NOTE — ASSESSMENT & PLAN NOTE
Lab Results   Component Value Date    HGBA1C 7.9 (H) 02/02/2024   Next hba1c after 5/2 . Keep follow up with endocrine on 5/23   Eye exam and foot exam up to date

## 2024-03-15 NOTE — PROGRESS NOTES
"Assessment/Plan:      1. Type 2 diabetes mellitus without complication, with long-term current use of insulin ([facility])  Assessment & Plan:    Lab Results   Component Value Date    HGBA1C 7.9 (H) 02/02/2024   Next hba1c after 5/2 . Keep follow up with endocrine on 5/23   Eye exam and foot exam up to date    Orders:  -     insulin detemir (Levemir FlexPen) 100 Units/mL injection pen; 16 UNITS DAILY    2. Familial hypercholesterolemia  Assessment & Plan:  Controlled, up to date with lipid panel. Continue crestor 5mg daily            Subjective:  Chief Complaint   Patient presents with    Follow-up     Follow up   Weight going back on         Patient ID: Ynes Mendoza is a 63 y.o. female.    Pt is here for a 3 month follow up on chronic conditions.   She has Increased stress with living environment.   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  When she was at a clients house, she had an episode of Dizziness at top of staircase- resolved quickly, did not fall   Last hba1c 7.9 on 2/2. Next due after 5/2- states she has been doing better since then.   Seen by eye doctor for cataract surgery-         Review of Systems   Constitutional: Negative.  Negative for fatigue and fever.   HENT: Negative.     Eyes: Negative.    Respiratory: Negative.  Negative for cough.    Cardiovascular: Negative.    Gastrointestinal: Negative.    Endocrine: Negative.    Genitourinary: Negative.    Musculoskeletal: Negative.    Skin: Negative.    Allergic/Immunologic: Negative.    Neurological: Negative.    Psychiatric/Behavioral: Negative.           The following portions of the patient's history were reviewed and updated as appropriate: allergies, current medications, past family history, past medical history, past social history, past surgical history and problem list.    Objective:  Vitals:    03/15/24 0953   BP: 112/64   Pulse: 59   Temp: (!) 95.3 °F (35.2 °C)   TempSrc: Tympanic   SpO2: 100%   Weight: 86.6 kg (191 lb)   Height: 5' 7\" (1.702 " m)      Physical Exam  Vitals and nursing note reviewed.   Constitutional:       Appearance: She is well-developed.   HENT:      Head: Normocephalic and atraumatic.   Cardiovascular:      Rate and Rhythm: Normal rate and regular rhythm.      Heart sounds: Normal heart sounds.   Pulmonary:      Effort: Pulmonary effort is normal.      Breath sounds: Normal breath sounds.   Abdominal:      General: Bowel sounds are normal.      Palpations: Abdomen is soft.   Skin:     General: Skin is warm and dry.   Neurological:      Mental Status: She is alert and oriented to person, place, and time.   Psychiatric:         Behavior: Behavior normal.         Thought Content: Thought content normal.         Judgment: Judgment normal.

## 2024-03-15 NOTE — PATIENT INSTRUCTIONS
5/2 blood work   5/23 appointment   Consider restarting glimepiride or start januvia= will wait for endocrine response.

## 2024-03-17 PROBLEM — E78.01 FAMILIAL HYPERCHOLESTEROLEMIA: Status: ACTIVE | Noted: 2019-01-24

## 2024-03-22 LAB
LEFT EYE DIABETIC RETINOPATHY: NORMAL
RIGHT EYE DIABETIC RETINOPATHY: NORMAL

## 2024-03-26 ENCOUNTER — TELEMEDICINE (OUTPATIENT)
Dept: FAMILY MEDICINE CLINIC | Facility: CLINIC | Age: 64
End: 2024-03-26
Payer: COMMERCIAL
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
## 2024-03-26 DIAGNOSIS — S29.9XXA TRAUMATIC INJURY OF RIB: ICD-10-CM

## 2024-03-26 PROCEDURE — 99214 OFFICE O/P EST MOD 30 MIN: CPT | Performed by: FAMILY MEDICINE

## 2024-03-26 NOTE — ASSESSMENT & PLAN NOTE
Seen initially at [facility] with chest xray and EKG. Normal. Right rib injury
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
## 2024-03-26 NOTE — ASSESSMENT & PLAN NOTE
Xray chest, negative. Cool compresses, avoid aggravating motions. Ibuprofen as needed with food. Consider muscle relaxer if sharp pains

## 2024-04-01 ENCOUNTER — TELEPHONE (OUTPATIENT)
Dept: FAMILY MEDICINE CLINIC | Facility: CLINIC | Age: 64
End: 2024-04-01

## 2024-04-01 DIAGNOSIS — M62.838 MUSCLE SPASM: Primary | ICD-10-CM

## 2024-04-01 RX ORDER — TIZANIDINE 4 MG/1
4 TABLET ORAL EVERY 8 HOURS PRN
Qty: 20 TABLET | Refills: 0 | Status: SHIPPED | OUTPATIENT
Start: 2024-04-01

## 2024-04-01 NOTE — TELEPHONE ENCOUNTER
Pt calling and say she had telehealth with you on the 26th and said she was going to prescribe muscle relaxer and say she didn't need it at that time and now she is calling for them say she needs them

## 2024-04-15 DIAGNOSIS — E11.9 TYPE 2 DIABETES MELLITUS WITHOUT COMPLICATION, WITH LONG-TERM CURRENT USE OF INSULIN (HCC): ICD-10-CM

## 2024-04-15 DIAGNOSIS — Z79.4 TYPE 2 DIABETES MELLITUS WITHOUT COMPLICATION, WITH LONG-TERM CURRENT USE OF INSULIN (HCC): ICD-10-CM

## 2024-04-15 RX ORDER — METFORMIN HYDROCHLORIDE 500 MG/1
TABLET, EXTENDED RELEASE ORAL
Qty: 180 TABLET | Refills: 0 | Status: SHIPPED | OUTPATIENT
Start: 2024-04-15 | End: 2024-04-16 | Stop reason: SDUPTHER

## 2024-04-16 DIAGNOSIS — Z79.4 TYPE 2 DIABETES MELLITUS WITHOUT COMPLICATION, WITH LONG-TERM CURRENT USE OF INSULIN (HCC): ICD-10-CM

## 2024-04-16 DIAGNOSIS — E11.9 TYPE 2 DIABETES MELLITUS WITHOUT COMPLICATION, WITH LONG-TERM CURRENT USE OF INSULIN (HCC): ICD-10-CM

## 2024-04-16 RX ORDER — METFORMIN HYDROCHLORIDE 500 MG/1
1000 TABLET, EXTENDED RELEASE ORAL DAILY
Qty: 180 TABLET | Refills: 0 | Status: SHIPPED | OUTPATIENT
Start: 2024-04-16

## 2024-04-16 NOTE — TELEPHONE ENCOUNTER
Pt call and say she is in not in [place] she is traveling and and will not get the metfromin medication that you send in yesterday, wanted to know if you can send it to some other pharmacy where she is located at       I change the pharmacy where she is located

## 2024-05-03 DIAGNOSIS — Z79.4 TYPE 2 DIABETES MELLITUS WITHOUT COMPLICATION, WITH LONG-TERM CURRENT USE OF INSULIN (HCC): ICD-10-CM

## 2024-05-03 DIAGNOSIS — E11.9 TYPE 2 DIABETES MELLITUS WITHOUT COMPLICATION, WITH LONG-TERM CURRENT USE OF INSULIN (HCC): ICD-10-CM

## 2024-05-03 DIAGNOSIS — M62.838 MUSCLE SPASM: ICD-10-CM

## 2024-05-04 RX ORDER — BLOOD SUGAR DIAGNOSTIC
STRIP MISCELLANEOUS
Qty: 300 STRIP | Refills: 5 | Status: SHIPPED | OUTPATIENT
Start: 2024-05-04

## 2024-05-06 RX ORDER — TIZANIDINE 4 MG/1
4 TABLET ORAL EVERY 8 HOURS PRN
Qty: 20 TABLET | Refills: 0 | Status: SHIPPED | OUTPATIENT
Start: 2024-05-06

## 2024-05-09 ENCOUNTER — TELEPHONE (OUTPATIENT)
Dept: NEUROLOGY | Facility: CLINIC | Age: 64
End: 2024-05-09

## 2024-05-13 ENCOUNTER — TELEPHONE (OUTPATIENT)
Dept: FAMILY MEDICINE CLINIC | Facility: CLINIC | Age: 64
End: 2024-05-13

## 2024-05-13 NOTE — TELEPHONE ENCOUNTER
Lmom for patient to call to schedule a pre op Cataract PE scheduled for 8/22/24 at [facility].    Patient must have pre op within 30 days of appt.  No earlier than 7/22/24.    EKG is required.    Preop PE forms are in MA station to be completed at time of visit.

## 2024-05-17 LAB
25(OH)D3+25(OH)D2 SERPL-MCNC: 48.9 NG/ML (ref 30–100)
ALBUMIN SERPL-MCNC: 4.2 G/DL (ref 3.9–4.9)
ALBUMIN/GLOB SERPL: 1.8 {RATIO} (ref 1.2–2.2)
ALP SERPL-CCNC: 60 IU/L (ref 44–121)
ALT SERPL-CCNC: 17 IU/L (ref 0–32)
AST SERPL-CCNC: 16 IU/L (ref 0–40)
BASOPHILS # BLD AUTO: 0.1 X10E3/UL (ref 0–0.2)
BASOPHILS NFR BLD AUTO: 2 %
BILIRUB SERPL-MCNC: 0.6 MG/DL (ref 0–1.2)
BUN SERPL-MCNC: 15 MG/DL (ref 8–27)
BUN/CREAT SERPL: 22 (ref 12–28)
CALCIUM SERPL-MCNC: 9.7 MG/DL (ref 8.7–10.3)
CHLORIDE SERPL-SCNC: 104 MMOL/L (ref 96–106)
CO2 SERPL-SCNC: 24 MMOL/L (ref 20–29)
CREAT SERPL-MCNC: 0.69 MG/DL (ref 0.57–1)
EGFR: 99 ML/MIN/1.73
EOSINOPHIL # BLD AUTO: 0.2 X10E3/UL (ref 0–0.4)
EOSINOPHIL NFR BLD AUTO: 3 %
ERYTHROCYTE [DISTWIDTH] IN BLOOD BY AUTOMATED COUNT: 12.6 % (ref 11.7–15.4)
EST. AVERAGE GLUCOSE BLD GHB EST-MCNC: 174 MG/DL
GLOBULIN SER-MCNC: 2.4 G/DL (ref 1.5–4.5)
GLUCOSE SERPL-MCNC: 95 MG/DL (ref 70–99)
HBA1C MFR BLD: 7.7 % (ref 4.8–5.6)
HCT VFR BLD AUTO: 38.7 % (ref 34–46.6)
HGB BLD-MCNC: 12.2 G/DL (ref 11.1–15.9)
IMM GRANULOCYTES # BLD: 0 X10E3/UL (ref 0–0.1)
IMM GRANULOCYTES NFR BLD: 0 %
LYMPHOCYTES # BLD AUTO: 1.3 X10E3/UL (ref 0.7–3.1)
LYMPHOCYTES NFR BLD AUTO: 26 %
MCH RBC QN AUTO: 29.1 PG (ref 26.6–33)
MCHC RBC AUTO-ENTMCNC: 31.5 G/DL (ref 31.5–35.7)
MCV RBC AUTO: 92 FL (ref 79–97)
MONOCYTES # BLD AUTO: 0.4 X10E3/UL (ref 0.1–0.9)
MONOCYTES NFR BLD AUTO: 7 %
NEUTROPHILS # BLD AUTO: 3.1 X10E3/UL (ref 1.4–7)
NEUTROPHILS NFR BLD AUTO: 62 %
PLATELET # BLD AUTO: 173 X10E3/UL (ref 150–450)
POTASSIUM SERPL-SCNC: 4.5 MMOL/L (ref 3.5–5.2)
PROT SERPL-MCNC: 6.6 G/DL (ref 6–8.5)
RBC # BLD AUTO: 4.19 X10E6/UL (ref 3.77–5.28)
SODIUM SERPL-SCNC: 141 MMOL/L (ref 134–144)
WBC # BLD AUTO: 5.1 X10E3/UL (ref 3.4–10.8)

## 2024-05-23 ENCOUNTER — OFFICE VISIT (OUTPATIENT)
Dept: ENDOCRINOLOGY | Facility: HOSPITAL | Age: 64
End: 2024-05-23
Payer: COMMERCIAL

## 2024-05-23 VITALS
DIASTOLIC BLOOD PRESSURE: 78 MMHG | SYSTOLIC BLOOD PRESSURE: 120 MMHG | WEIGHT: 194.6 LBS | BODY MASS INDEX: 30.54 KG/M2 | HEIGHT: 67 IN

## 2024-05-23 DIAGNOSIS — E78.2 MIXED HYPERLIPIDEMIA: ICD-10-CM

## 2024-05-23 DIAGNOSIS — E55.9 VITAMIN D DEFICIENCY: ICD-10-CM

## 2024-05-23 DIAGNOSIS — E78.01 FAMILIAL HYPERCHOLESTEROLEMIA: ICD-10-CM

## 2024-05-23 DIAGNOSIS — E11.9 TYPE 2 DIABETES MELLITUS WITHOUT COMPLICATION, WITH LONG-TERM CURRENT USE OF INSULIN (HCC): Primary | ICD-10-CM

## 2024-05-23 DIAGNOSIS — Z79.4 TYPE 2 DIABETES MELLITUS WITH HYPERGLYCEMIA, WITH LONG-TERM CURRENT USE OF INSULIN (HCC): ICD-10-CM

## 2024-05-23 DIAGNOSIS — Z79.4 TYPE 2 DIABETES MELLITUS WITHOUT COMPLICATION, WITH LONG-TERM CURRENT USE OF INSULIN (HCC): Primary | ICD-10-CM

## 2024-05-23 DIAGNOSIS — I10 ESSENTIAL HYPERTENSION: ICD-10-CM

## 2024-05-23 DIAGNOSIS — E11.65 TYPE 2 DIABETES MELLITUS WITH HYPERGLYCEMIA, WITH LONG-TERM CURRENT USE OF INSULIN (HCC): ICD-10-CM

## 2024-05-23 PROCEDURE — 99214 OFFICE O/P EST MOD 30 MIN: CPT | Performed by: NURSE PRACTITIONER
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
## 2024-05-23 NOTE — PATIENT INSTRUCTIONS
Be mindful of diet.     Stay active and stay hydrated.       Hemoglobin A1c is 7.7.     For now, continue your current dose of Metformin, Glimepiride and Levemir.     Check blood sugars at least twice daily at alternating times and send a record to the office in 2 weeks for review.     Supplement with 2000 units of vitamin D3 daily - consistently.     Continue Crestor 5 mg daily.     Obtain lab work as prescribed.

## 2024-06-07 ENCOUNTER — NURSE TRIAGE (OUTPATIENT)
Age: 64
End: 2024-06-07

## 2024-06-07 NOTE — TELEPHONE ENCOUNTER
See below. I did ask her to email her blood sugars. I advised her that you are out till Tuesday and she wants to wait for you. The lowest blood sugar was 79. Mainly her numbers have been between 100 and 120.

## 2024-06-07 NOTE — TELEPHONE ENCOUNTER
"Patient called with low sugars over the last 2 weeks. She stated that she almost stumbled yesterday and her sugar was 118. She thought that was a good number for her, but 125-140 is a good range for her. She said below that is too low for her. She wanted to see if Issa Rashid can review her medications and make adjustments. She said she has been trying to access her mychart to send him her readings, but she can't get into it. She said her numbers the past 2 weeks have mainly been below 120, and she is worried about passing out. Can she be called with an update?        Reason for Disposition   Morning (before breakfast) blood glucose < 80 mg/dL (4.4 mmol/L) and more than once in past week    Answer Assessment - Initial Assessment Questions  1. SYMPTOMS: \"What symptoms are you concerned about?\"      Stumbled, and blood sugar was 118  2. ONSET:  \"When did the symptoms start?\"     Past 2 weeks    3. BLOOD GLUCOSE: \"What is your blood glucose level?\"       112, before lunch 79  4. USUAL RANGE: \"What is your blood glucose level usually?\" (e.g., usual fasting morning value, usual evening value)      125-140  5. TYPE 1 or 2:  \"Do you know what type of diabetes you have?\"  (e.g., Type 1, Type 2, Gestational; doesn't know)       Type 2  6. INSULIN: \"Do you take insulin?\" \"What type of insulin(s) do you use? What is the mode of delivery? (syringe, pen; injection or pump) \"When did you last give yourself an insulin dose?\" (i.e., time or hours/minutes ago) \"How much did you give?\" (i.e., how many units)      Insulin   7. DIABETES PILLS: \"Do you take any pills for your diabetes?\"      Yes   8. OTHER SYMPTOMS: \"Do you have any symptoms?\" (e.g., fever, frequent urination, difficulty breathing, vomiting)      denies  9. LOW BLOOD GLUCOSE TREATMENT: \"What have you done so far to treat the low blood glucose level?\"      Eats a protein bar, juice boxes   10. FOOD: \"When did you last eat or drink?\"        About an hour ago   11. " "ALONE: \"Are you alone right now or is someone with you?\"         Alone    Protocols used: Diabetes - Low Blood Sugar-ADULT-[place]    "

## 2024-06-11 NOTE — TELEPHONE ENCOUNTER
For the complaint of the mild low blood sugars we can decrease her long-acting insulin from 16 units to 12 units.  Otherwise, continue current regimen.

## 2024-06-11 NOTE — TELEPHONE ENCOUNTER
I called and left the patient a detailed message concerning the providers recommendations for the low sugars.

## 2024-06-13 ENCOUNTER — TELEPHONE (OUTPATIENT)
Dept: ENDOCRINOLOGY | Facility: HOSPITAL | Age: 64
End: 2024-06-13

## 2024-06-13 ENCOUNTER — TELEPHONE (OUTPATIENT)
Age: 64
End: 2024-06-13

## 2024-06-13 NOTE — TELEPHONE ENCOUNTER
Spoke with patient and [place]. Informed patient that the insurance company would need to authorize treatment in [place]. Patient stated that a claim is open under [facility] as well. Asked patient if she would like to put Chiropractic care under that claim. She said she would have to find that info and call back.   Informed patient I would try reaching to the  under [employer] again but if they do not authorize coverage then appt on 6/14 would need to be rescheduled.

## 2024-06-13 NOTE — TELEPHONE ENCOUNTER
Received email from patient, which will be attached. I believe she wanted to send in blood sugar logs, but they were not attached. Left message for patient to call back and discuss further.
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
## 2024-06-14 NOTE — TELEPHONE ENCOUNTER
She seems to be doing better with less hypoglycemia.  For now, continue current regimen.  As for her email?  I will ask Kassy next week which meter would be best to fit her needs as outlined.

## 2024-06-14 NOTE — TELEPHONE ENCOUNTER
Blood sugars have been sent in and they will be scanned into her chart for review.  She stated that she has been having some issues with low sugars and she did cut down the insulin as you instructed.    Please review and advise

## 2024-06-19 NOTE — TELEPHONE ENCOUNTER
Caller: [facility]    Doctor: Lore    Reason for call: Calling to provide TPL claim info. Updated in registration. L/m to callback to schedule NP appt    [facility]  Claim # [id number]   DOI 3/25/24  Neck/back    Call back#: [phone]

## 2024-07-01 DIAGNOSIS — Z79.4 TYPE 2 DIABETES MELLITUS WITH HYPERGLYCEMIA, WITH LONG-TERM CURRENT USE OF INSULIN (HCC): Primary | ICD-10-CM

## 2024-07-01 DIAGNOSIS — E11.65 TYPE 2 DIABETES MELLITUS WITH HYPERGLYCEMIA, WITH LONG-TERM CURRENT USE OF INSULIN (HCC): Primary | ICD-10-CM

## 2024-07-01 RX ORDER — BLOOD SUGAR DIAGNOSTIC
STRIP MISCELLANEOUS
Qty: 100 EACH | Refills: 1 | Status: SHIPPED | OUTPATIENT
Start: 2024-07-01

## 2024-07-01 RX ORDER — BLOOD-GLUCOSE METER
KIT MISCELLANEOUS DAILY
Qty: 1 KIT | Refills: 0 | Status: SHIPPED | OUTPATIENT
Start: 2024-07-01

## 2024-07-01 RX ORDER — BLOOD-GLUCOSE CONTROL, LOW
EACH MISCELLANEOUS
Qty: 100 EACH | Refills: 1 | Status: SHIPPED | OUTPATIENT
Start: 2024-07-01

## 2024-07-29 ENCOUNTER — CONSULT (OUTPATIENT)
Dept: FAMILY MEDICINE CLINIC | Facility: CLINIC | Age: 64
End: 2024-07-29
Payer: COMMERCIAL

## 2024-07-29 VITALS
BODY MASS INDEX: 30.92 KG/M2 | HEIGHT: 67 IN | WEIGHT: 197 LBS | SYSTOLIC BLOOD PRESSURE: 102 MMHG | DIASTOLIC BLOOD PRESSURE: 62 MMHG | TEMPERATURE: 97.7 F | HEART RATE: 62 BPM | OXYGEN SATURATION: 97 %

## 2024-07-29 DIAGNOSIS — H25.12 AGE-RELATED NUCLEAR CATARACT OF LEFT EYE: ICD-10-CM

## 2024-07-29 DIAGNOSIS — R26.89 BALANCE PROBLEM: ICD-10-CM

## 2024-07-29 DIAGNOSIS — Z01.818 PRE-OP EXAMINATION: Primary | ICD-10-CM

## 2024-07-29 DIAGNOSIS — Z79.4 TYPE 2 DIABETES MELLITUS WITHOUT COMPLICATION, WITH LONG-TERM CURRENT USE OF INSULIN (HCC): ICD-10-CM

## 2024-07-29 DIAGNOSIS — E11.9 TYPE 2 DIABETES MELLITUS WITHOUT COMPLICATION, WITH LONG-TERM CURRENT USE OF INSULIN (HCC): ICD-10-CM

## 2024-07-29 LAB — SL AMB POCT HEMOGLOBIN AIC: 7.2 (ref ?–6.5)

## 2024-07-29 PROCEDURE — 93000 ELECTROCARDIOGRAM COMPLETE: CPT | Performed by: FAMILY MEDICINE

## 2024-07-29 PROCEDURE — 99214 OFFICE O/P EST MOD 30 MIN: CPT | Performed by: FAMILY MEDICINE

## 2024-07-29 PROCEDURE — 83036 HEMOGLOBIN GLYCOSYLATED A1C: CPT | Performed by: FAMILY MEDICINE
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
## 2024-07-29 NOTE — PROGRESS NOTES
"Assessment/Plan:      1. Pre-op examination  -     POCT ECG  -     POCT hemoglobin A1c  2. Age-related nuclear cataract of left eye  3. Balance problem  -     Ambulatory Referral to Otolaryngology; Future  4. Type 2 diabetes mellitus without complication, with long-term current use of insulin ([facility])  Assessment & Plan:    Lab Results   Component Value Date    HGBA1C 7.2 (A) 07/29/2024   Adequate control, repeat hba1c in 3 months        Subjective:  Chief Complaint   Patient presents with    Pre-op Exam     Eye surgery  EKG   [place] 08/22/2024         Patient ID: Ynes Mendoza is a 63 y.o. female.    Requesting Peer counseling. Complains of some lightheadedness.   Ent evaluation for inner ear- feels off balance since her fall. Notices more if warm out.   Pt changed to highmark         Review of Systems   Constitutional: Negative.  Negative for fatigue and fever.   HENT: Negative.     Eyes: Negative.    Respiratory: Negative.  Negative for cough.    Cardiovascular: Negative.    Gastrointestinal: Negative.    Endocrine: Negative.    Genitourinary: Negative.    Musculoskeletal: Negative.    Skin: Negative.    Allergic/Immunologic: Negative.    Neurological:  Positive for dizziness and light-headedness.   Psychiatric/Behavioral: Negative.           The following portions of the patient's history were reviewed and updated as appropriate: allergies, current medications, past family history, past medical history, past social history, past surgical history and problem list.    Objective:  Vitals:    07/29/24 0857   BP: 102/62   Pulse: 62   Temp: 97.7 °F (36.5 °C)   TempSrc: Tympanic   SpO2: 97%   Weight: 89.4 kg (197 lb)   Height: 5' 7\" (1.702 m)      Physical Exam  Vitals and nursing note reviewed.   Constitutional:       Appearance: She is well-developed.   HENT:      Head: Normocephalic and atraumatic.      Right Ear: External ear normal.      Left Ear: External ear normal.      Nose: Nose normal.   Eyes:      " Conjunctiva/sclera: Conjunctivae normal.      Pupils: Pupils are equal, round, and reactive to light.   Cardiovascular:      Rate and Rhythm: Normal rate and regular rhythm.      Heart sounds: Normal heart sounds.   Pulmonary:      Effort: Pulmonary effort is normal.      Breath sounds: Normal breath sounds.   Abdominal:      General: Bowel sounds are normal.      Palpations: Abdomen is soft.   Musculoskeletal:         General: Normal range of motion.      Cervical back: Normal range of motion and neck supple.   Skin:     General: Skin is warm and dry.   Neurological:      Mental Status: She is alert and oriented to person, place, and time.   Psychiatric:         Behavior: Behavior normal.         Thought Content: Thought content normal.         Judgment: Judgment normal.

## 2024-07-31 DIAGNOSIS — E11.9 TYPE 2 DIABETES MELLITUS WITHOUT COMPLICATION, WITH LONG-TERM CURRENT USE OF INSULIN (HCC): ICD-10-CM

## 2024-07-31 DIAGNOSIS — Z79.4 TYPE 2 DIABETES MELLITUS WITHOUT COMPLICATION, WITH LONG-TERM CURRENT USE OF INSULIN (HCC): ICD-10-CM

## 2024-07-31 NOTE — TELEPHONE ENCOUNTER
Reason for call:   [x] Refill   [] Prior Auth  [] Other:     Office:   [x] PCP/Provider -  Saloni Landon DO   [] Specialty/Provider -     Medication: metFORMIN (GLUCOPHAGE-XR) 500 mg 24 hr tablet     Dose/Frequency:     Take 2 tablets (1,000 mg total) by mouth in the morning       Quantity: 200    Pharmacy: [facility]/pharmacy #[id number] - [place], [place] - [address] [place] [address] [phone]     Does the patient have enough for 3 days?   [x] Yes   [] No - Send as HP to POD

## 2024-08-01 ENCOUNTER — TELEPHONE (OUTPATIENT)
Age: 64
End: 2024-08-01

## 2024-08-01 DIAGNOSIS — M62.838 MUSCLE SPASM: ICD-10-CM

## 2024-08-01 DIAGNOSIS — Z79.4 TYPE 2 DIABETES MELLITUS WITHOUT COMPLICATION, WITH LONG-TERM CURRENT USE OF INSULIN (HCC): ICD-10-CM

## 2024-08-01 DIAGNOSIS — E11.65 TYPE 2 DIABETES MELLITUS WITH HYPERGLYCEMIA, WITH LONG-TERM CURRENT USE OF INSULIN (HCC): ICD-10-CM

## 2024-08-01 DIAGNOSIS — Z79.4 TYPE 2 DIABETES MELLITUS WITH HYPERGLYCEMIA, WITH LONG-TERM CURRENT USE OF INSULIN (HCC): ICD-10-CM

## 2024-08-01 DIAGNOSIS — E11.9 TYPE 2 DIABETES MELLITUS WITHOUT COMPLICATION, WITH LONG-TERM CURRENT USE OF INSULIN (HCC): ICD-10-CM

## 2024-08-01 PROBLEM — Z01.818 PRE-OP EXAMINATION: Status: ACTIVE | Noted: 2024-08-01

## 2024-08-01 PROBLEM — H25.12 AGE-RELATED NUCLEAR CATARACT OF LEFT EYE: Status: ACTIVE | Noted: 2024-08-01

## 2024-08-01 PROBLEM — R26.89 BALANCE PROBLEM: Status: ACTIVE | Noted: 2024-08-01

## 2024-08-01 RX ORDER — BLOOD-GLUCOSE METER
KIT MISCELLANEOUS DAILY
Qty: 1 KIT | Refills: 1 | Status: SHIPPED | OUTPATIENT
Start: 2024-08-01

## 2024-08-01 RX ORDER — METFORMIN HYDROCHLORIDE 500 MG/1
1000 TABLET, EXTENDED RELEASE ORAL DAILY
Qty: 200 TABLET | Refills: 0 | OUTPATIENT
Start: 2024-08-01

## 2024-08-01 RX ORDER — BLOOD SUGAR DIAGNOSTIC
STRIP MISCELLANEOUS
Qty: 100 EACH | Refills: 1 | Status: SHIPPED | OUTPATIENT
Start: 2024-08-01

## 2024-08-01 RX ORDER — TIZANIDINE 4 MG/1
4 TABLET ORAL EVERY 8 HOURS PRN
Qty: 20 TABLET | Refills: 0 | Status: SHIPPED | OUTPATIENT
Start: 2024-08-01

## 2024-08-01 RX ORDER — BLOOD-GLUCOSE CONTROL, LOW
EACH MISCELLANEOUS
Qty: 100 EACH | Refills: 1 | Status: SHIPPED | OUTPATIENT
Start: 2024-08-01

## 2024-08-01 RX ORDER — METFORMIN HYDROCHLORIDE 500 MG/1
1000 TABLET, EXTENDED RELEASE ORAL DAILY
Qty: 200 TABLET | Refills: 1 | Status: SHIPPED | OUTPATIENT
Start: 2024-08-01

## 2024-08-01 NOTE — TELEPHONE ENCOUNTER
Pt called to discuss multiple things. Pt stated she had put a message regarding a couple refills and noticed that her metformin was continuing to be sent to the pharmacy in [place] when she was on vacation. Pt would like to make sure that the script is being sent to the correct pharmacy, [id number] [address] [facility], [place]. Please advise pt if another script can be sent, I did advise pt their system verified the script as of this morning but please check. Pt also requested new test scripts for her blood glucose monitor and wanted to see if Dr. Landon could also approve that; she tried to go through her Endocrinologist back in beginning of July but then she stopped seeing that provider.    Pt received a notification regarding her EKG she completed for her surgery; she noticed the graph images but also saw the result as abnormal and was concerned. Please advise pt once Dr. Landon has resulted her notes for the EKG and discuss.    Pt wanted to make Dr. Landon aware about the mammogram she got completed last November and she should be seeing the results in pt's chart soon. Pt was asking about the referral to Otolaryngology and wondering what the next step was; advised her if she is unable to see it in the chart, she can call back so we can provide her the phone # to schedule.    Please advise pt regarding the metformin, the blood glucose test scripts, and the EKG results, and if the office has any additional information they would like to discuss with her, thank you.

## 2024-08-01 NOTE — ASSESSMENT & PLAN NOTE
Lab Results   Component Value Date    HGBA1C 7.2 (A) 07/29/2024   Adequate control, repeat hba1c in 3 months

## 2024-08-02 ENCOUNTER — APPOINTMENT (EMERGENCY)
Dept: RADIOLOGY | Facility: HOSPITAL | Age: 64
End: 2024-08-02
Attending: EMERGENCY MEDICINE
Payer: MEDICAID

## 2024-08-02 ENCOUNTER — TELEPHONE (OUTPATIENT)
Age: 64
End: 2024-08-02

## 2024-08-02 ENCOUNTER — HOSPITAL ENCOUNTER (INPATIENT)
Facility: HOSPITAL | Age: 64
LOS: 3 days | Discharge: HOME HEALTH | End: 2024-08-05
Attending: EMERGENCY MEDICINE | Admitting: INTERNAL MEDICINE
Payer: MEDICAID

## 2024-08-02 DIAGNOSIS — R47.01 APHASIA: Primary | ICD-10-CM

## 2024-08-02 PROBLEM — Z79.4 TYPE 2 DIABETES MELLITUS, WITH LONG-TERM CURRENT USE OF INSULIN (CMS/HCC): Status: ACTIVE | Noted: 2024-08-02

## 2024-08-02 PROBLEM — R21 RASH AND NONSPECIFIC SKIN ERUPTION: Status: ACTIVE | Noted: 2024-08-02

## 2024-08-02 PROBLEM — E78.01 FAMILIAL HYPERCHOLESTEROLEMIA: Status: ACTIVE | Noted: 2024-08-02

## 2024-08-02 PROBLEM — E78.5 DYSLIPIDEMIA: Status: ACTIVE | Noted: 2024-08-02

## 2024-08-02 PROBLEM — Z86.73 HX-TIA (TRANSIENT ISCHEMIC ATTACK): Status: ACTIVE | Noted: 2024-08-02

## 2024-08-02 PROBLEM — E11.9 DIABETES MELLITUS (CMS/HCC): Status: ACTIVE | Noted: 2024-08-02

## 2024-08-02 LAB
ABO + RH BLD: NORMAL
AMMONIA PLAS-SCNC: 14 UMOL/L (ref 18–72)
AMPHET UR QL SCN: NOT DETECTED
ANION GAP SERPL CALC-SCNC: 11 MEQ/L (ref 3–15)
APTT PPP: 25 SEC (ref 23–35)
BACTERIA URNS QL MICRO: ABNORMAL /HPF
BARBITURATES UR QL SCN: NOT DETECTED
BASOPHILS # BLD: 0.08 K/UL (ref 0.01–0.1)
BASOPHILS NFR BLD: 1.3 %
BENZODIAZ UR QL SCN: NOT DETECTED
BILIRUB UR QL STRIP.AUTO: NEGATIVE MG/DL
BLD GP AB SCN SERPL QL: NEGATIVE
BNP SERPL-MCNC: 53 PG/ML
BUN SERPL-MCNC: 21 MG/DL (ref 7–25)
CALCIUM SERPL-MCNC: 10 MG/DL (ref 8.6–10.3)
CANNABINOIDS UR QL SCN: NOT DETECTED
CHLORIDE SERPL-SCNC: 102 MEQ/L (ref 98–107)
CLARITY UR REFRACT.AUTO: CLEAR
CO2 SERPL-SCNC: 23 MEQ/L (ref 21–31)
COCAINE UR QL SCN: NOT DETECTED
COLOR UR AUTO: YELLOW
CREAT SERPL-MCNC: 1.1 MG/DL (ref 0.6–1.2)
CRP SERPL-MCNC: 1.7 MG/L
D AG BLD QL: POSITIVE
DIFFERENTIAL METHOD BLD: NORMAL
EGFRCR SERPLBLD CKD-EPI 2021: 57.3 ML/MIN/1.73M*2
EOSINOPHIL # BLD: 0.17 K/UL (ref 0.04–0.36)
EOSINOPHIL NFR BLD: 2.7 %
ERYTHROCYTE [DISTWIDTH] IN BLOOD BY AUTOMATED COUNT: 12.3 % (ref 11.7–14.4)
ERYTHROCYTE [SEDIMENTATION RATE] IN BLOOD BY WESTERGREN METHOD: 14 MM/HR
ETHANOL SERPL-MCNC: <10 MG/DL
FENTANYL URINE SCR: NOT DETECTED
GLUCOSE BLD-MCNC: 167 MG/DL (ref 70–99)
GLUCOSE BLD-MCNC: 228 MG/DL (ref 70–99)
GLUCOSE SERPL-MCNC: 246 MG/DL (ref 70–99)
GLUCOSE UR STRIP.AUTO-MCNC: NEGATIVE MG/DL
HCT VFR BLD AUTO: 38.3 % (ref 35–45)
HGB BLD-MCNC: 13.2 G/DL (ref 11.8–15.7)
HGB UR QL STRIP.AUTO: NEGATIVE
HYALINE CASTS #/AREA URNS LPF: ABNORMAL /LPF
IMM GRANULOCYTES # BLD AUTO: 0.02 K/UL (ref 0–0.08)
IMM GRANULOCYTES NFR BLD AUTO: 0.3 %
INR PPP: 1
KETONES UR STRIP.AUTO-MCNC: NEGATIVE MG/DL
LABORATORY COMMENT REPORT: NORMAL
LACTATE SERPL-SCNC: 1.3 MMOL/L (ref 0.4–2)
LEUKOCYTE ESTERASE UR QL STRIP.AUTO: NEGATIVE
LYMPHOCYTES # BLD: 1.82 K/UL (ref 1.2–3.5)
LYMPHOCYTES NFR BLD: 29 %
MCH RBC QN AUTO: 29.6 PG (ref 28–33.2)
MCHC RBC AUTO-ENTMCNC: 34.5 G/DL (ref 32.2–35.5)
MCV RBC AUTO: 85.9 FL (ref 83–98)
MONOCYTES # BLD: 0.51 K/UL (ref 0.28–0.8)
MONOCYTES NFR BLD: 8.1 %
NEUTROPHILS # BLD: 3.68 K/UL (ref 1.7–7)
NEUTS SEG NFR BLD: 58.6 %
NITRITE UR QL STRIP.AUTO: NEGATIVE
NRBC BLD-RTO: 0 %
OPIATES UR QL SCN: NOT DETECTED
PCP UR QL SCN: NOT DETECTED
PDW BLD AUTO: 10.9 FL (ref 9.4–12.3)
PH UR STRIP.AUTO: 8.5 [PH]
PLATELET # BLD AUTO: 177 K/UL (ref 150–369)
POCT TEST: ABNORMAL
POCT TEST: ABNORMAL
POTASSIUM SERPL-SCNC: 4.1 MEQ/L (ref 3.5–5.1)
PROT UR QL STRIP.AUTO: ABNORMAL
PROTHROMBIN TIME: 13.3 SEC (ref 12.2–14.5)
RBC # BLD AUTO: 4.46 M/UL (ref 3.93–5.22)
RBC #/AREA URNS HPF: ABNORMAL /HPF
SODIUM SERPL-SCNC: 136 MEQ/L (ref 136–145)
SP GR UR REFRACT.AUTO: 1.03
SPECIMEN EXP DATE BLD: NORMAL
SQUAMOUS URNS QL MICRO: ABNORMAL /HPF
TROPONIN I SERPL HS-MCNC: 4.7 PG/ML
TROPONIN I SERPL HS-MCNC: 5.5 PG/ML
TSH SERPL DL<=0.05 MIU/L-ACNC: 0.64 MIU/L (ref 0.34–5.6)
UROBILINOGEN UR STRIP-ACNC: 0.2 EU/DL
WBC # BLD AUTO: 6.28 K/UL (ref 3.8–10.5)
WBC #/AREA URNS HPF: ABNORMAL /HPF

## 2024-08-02 PROCEDURE — 86901 BLOOD TYPING SEROLOGIC RH(D): CPT

## 2024-08-02 PROCEDURE — 70450 CT HEAD/BRAIN W/O DYE: CPT

## 2024-08-02 PROCEDURE — 86140 C-REACTIVE PROTEIN: CPT | Performed by: STUDENT IN AN ORGANIZED HEALTH CARE EDUCATION/TRAINING PROGRAM

## 2024-08-02 PROCEDURE — 85652 RBC SED RATE AUTOMATED: CPT | Performed by: STUDENT IN AN ORGANIZED HEALTH CARE EDUCATION/TRAINING PROGRAM

## 2024-08-02 PROCEDURE — 85025 COMPLETE CBC W/AUTO DIFF WBC: CPT | Performed by: EMERGENCY MEDICINE

## 2024-08-02 PROCEDURE — 20600000 HC ROOM AND CARE INTERMEDIATE/TELEMETRY

## 2024-08-02 PROCEDURE — G0480 DRUG TEST DEF 1-7 CLASSES: HCPCS | Performed by: EMERGENCY MEDICINE

## 2024-08-02 PROCEDURE — 85730 THROMBOPLASTIN TIME PARTIAL: CPT | Performed by: EMERGENCY MEDICINE

## 2024-08-02 PROCEDURE — 80048 BASIC METABOLIC PNL TOTAL CA: CPT | Performed by: EMERGENCY MEDICINE

## 2024-08-02 PROCEDURE — 84484 ASSAY OF TROPONIN QUANT: CPT | Performed by: EMERGENCY MEDICINE

## 2024-08-02 PROCEDURE — 36415 COLL VENOUS BLD VENIPUNCTURE: CPT | Performed by: EMERGENCY MEDICINE

## 2024-08-02 PROCEDURE — 63700000 HC SELF-ADMINISTRABLE DRUG: Performed by: EMERGENCY MEDICINE

## 2024-08-02 PROCEDURE — 80061 LIPID PANEL: CPT | Performed by: STUDENT IN AN ORGANIZED HEALTH CARE EDUCATION/TRAINING PROGRAM

## 2024-08-02 PROCEDURE — 81003 URINALYSIS AUTO W/O SCOPE: CPT | Performed by: EMERGENCY MEDICINE

## 2024-08-02 PROCEDURE — 93005 ELECTROCARDIOGRAM TRACING: CPT | Performed by: EMERGENCY MEDICINE

## 2024-08-02 PROCEDURE — 99223 1ST HOSP IP/OBS HIGH 75: CPT | Performed by: INTERNAL MEDICINE

## 2024-08-02 PROCEDURE — 0042T CT BRAIN PERFUSION: CPT

## 2024-08-02 PROCEDURE — 70498 CT ANGIOGRAPHY NECK: CPT

## 2024-08-02 PROCEDURE — 4A03X5D MEASUREMENT OF ARTERIAL FLOW, INTRACRANIAL, EXTERNAL APPROACH: ICD-10-PCS | Performed by: RADIOLOGY

## 2024-08-02 PROCEDURE — 82607 VITAMIN B-12: CPT | Performed by: STUDENT IN AN ORGANIZED HEALTH CARE EDUCATION/TRAINING PROGRAM

## 2024-08-02 PROCEDURE — 84443 ASSAY THYROID STIM HORMONE: CPT | Performed by: STUDENT IN AN ORGANIZED HEALTH CARE EDUCATION/TRAINING PROGRAM

## 2024-08-02 PROCEDURE — 63600105 HC IODINE BASED CONTRAST: Performed by: EMERGENCY MEDICINE

## 2024-08-02 PROCEDURE — 99285 EMERGENCY DEPT VISIT HI MDM: CPT | Mod: 25

## 2024-08-02 PROCEDURE — 83036 HEMOGLOBIN GLYCOSYLATED A1C: CPT | Performed by: STUDENT IN AN ORGANIZED HEALTH CARE EDUCATION/TRAINING PROGRAM

## 2024-08-02 PROCEDURE — 83880 ASSAY OF NATRIURETIC PEPTIDE: CPT | Performed by: STUDENT IN AN ORGANIZED HEALTH CARE EDUCATION/TRAINING PROGRAM

## 2024-08-02 PROCEDURE — 85610 PROTHROMBIN TIME: CPT | Performed by: EMERGENCY MEDICINE

## 2024-08-02 PROCEDURE — 80307 DRUG TEST PRSMV CHEM ANLYZR: CPT | Performed by: INTERNAL MEDICINE

## 2024-08-02 PROCEDURE — 82140 ASSAY OF AMMONIA: CPT | Performed by: STUDENT IN AN ORGANIZED HEALTH CARE EDUCATION/TRAINING PROGRAM

## 2024-08-02 PROCEDURE — 83605 ASSAY OF LACTIC ACID: CPT | Performed by: STUDENT IN AN ORGANIZED HEALTH CARE EDUCATION/TRAINING PROGRAM

## 2024-08-02 RX ORDER — IBUPROFEN 200 MG
16-32 TABLET ORAL AS NEEDED
Status: DISCONTINUED | OUTPATIENT
Start: 2024-08-02 | End: 2024-08-05 | Stop reason: HOSPADM

## 2024-08-02 RX ORDER — IOPAMIDOL 755 MG/ML
100 INJECTION, SOLUTION INTRAVASCULAR
Status: COMPLETED | OUTPATIENT
Start: 2024-08-02 | End: 2024-08-02

## 2024-08-02 RX ORDER — ACETAMINOPHEN 325 MG/1
650 TABLET ORAL EVERY 4 HOURS PRN
Status: DISCONTINUED | OUTPATIENT
Start: 2024-08-02 | End: 2024-08-05 | Stop reason: HOSPADM

## 2024-08-02 RX ORDER — METFORMIN HYDROCHLORIDE 500 MG/1
1000 TABLET, EXTENDED RELEASE ORAL EVERY MORNING
COMMUNITY

## 2024-08-02 RX ORDER — ROSUVASTATIN CALCIUM 5 MG/1
5 TABLET, COATED ORAL DAILY
Status: DISCONTINUED | OUTPATIENT
Start: 2024-08-03 | End: 2024-08-03

## 2024-08-02 RX ORDER — DEXTROSE 40 %
15-30 GEL (GRAM) ORAL AS NEEDED
Status: DISCONTINUED | OUTPATIENT
Start: 2024-08-02 | End: 2024-08-05 | Stop reason: HOSPADM

## 2024-08-02 RX ORDER — CLOPIDOGREL BISULFATE 300 MG/1
600 TABLET, FILM COATED ORAL ONCE
Status: COMPLETED | OUTPATIENT
Start: 2024-08-02 | End: 2024-08-02

## 2024-08-02 RX ORDER — DEXTROSE 50 % IN WATER (D50W) INTRAVENOUS SYRINGE
25 AS NEEDED
Status: DISCONTINUED | OUTPATIENT
Start: 2024-08-02 | End: 2024-08-05 | Stop reason: HOSPADM

## 2024-08-02 RX ORDER — ROSUVASTATIN CALCIUM 5 MG/1
5 TABLET, COATED ORAL DAILY
COMMUNITY

## 2024-08-02 RX ORDER — IOPAMIDOL 755 MG/ML
40 INJECTION, SOLUTION INTRAVASCULAR
Status: COMPLETED | OUTPATIENT
Start: 2024-08-02 | End: 2024-08-02

## 2024-08-02 RX ORDER — GLIMEPIRIDE 1 MG/1
0.5 TABLET ORAL
COMMUNITY

## 2024-08-02 RX ORDER — INSULIN LISPRO 100 [IU]/ML
3-5 INJECTION, SOLUTION INTRAVENOUS; SUBCUTANEOUS 4 TIMES DAILY
Status: DISCONTINUED | OUTPATIENT
Start: 2024-08-03 | End: 2024-08-03

## 2024-08-02 RX ORDER — NAPROXEN SODIUM 220 MG/1
324 TABLET, FILM COATED ORAL ONCE
Status: COMPLETED | OUTPATIENT
Start: 2024-08-02 | End: 2024-08-02

## 2024-08-02 RX ADMIN — ASPIRIN 81 MG CHEWABLE TABLET 324 MG: 81 TABLET CHEWABLE at 18:17

## 2024-08-02 RX ADMIN — CLOPIDOGREL BISULFATE 600 MG: 300 TABLET, FILM COATED ORAL at 18:16

## 2024-08-02 RX ADMIN — IOPAMIDOL 100 ML: 755 INJECTION, SOLUTION INTRAVENOUS at 17:21

## 2024-08-02 RX ADMIN — IOPAMIDOL 40 ML: 755 INJECTION, SOLUTION INTRAVENOUS at 17:58

## 2024-08-02 ASSESSMENT — ENCOUNTER SYMPTOMS
LANGUAGE SYMPTOMS: 1
STRIDOR: 0
WOUND: 0
SPEECH DIFFICULTY: 1
EYE REDNESS: 0
WHEEZING: 0
FEVER: 0
HEADACHES: 1
FACIAL ASYMMETRY: 1
VOMITING: 0
CONFUSION: 1

## 2024-08-02 ASSESSMENT — COGNITIVE AND FUNCTIONAL STATUS - GENERAL: DO YOU HAVE SERIOUS DIFFICULTY WALKING OR CLIMBING STAIRS: NO
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
## 2024-08-03 LAB
ALBUMIN SERPL-MCNC: 4 G/DL (ref 3.5–5.7)
ALP SERPL-CCNC: 40 IU/L (ref 34–125)
ALT SERPL-CCNC: 16 IU/L (ref 7–52)
ANION GAP SERPL CALC-SCNC: 8 MEQ/L (ref 3–15)
AST SERPL-CCNC: 15 IU/L (ref 13–39)
ATRIAL RATE: 62
BILIRUB SERPL-MCNC: 1.2 MG/DL (ref 0.3–1.2)
BILIRUB UR QL STRIP.AUTO: NEGATIVE MG/DL
BUN SERPL-MCNC: 18 MG/DL (ref 7–25)
CALCIUM SERPL-MCNC: 9.3 MG/DL (ref 8.6–10.3)
CHLORIDE SERPL-SCNC: 102 MEQ/L (ref 98–107)
CHOLEST SERPL-MCNC: 182 MG/DL
CK SERPL-CCNC: 42 U/L (ref 30–223)
CLARITY UR REFRACT.AUTO: CLEAR
CO2 SERPL-SCNC: 29 MEQ/L (ref 21–31)
COLOR UR AUTO: YELLOW
CREAT SERPL-MCNC: 1.1 MG/DL (ref 0.6–1.2)
EGFRCR SERPLBLD CKD-EPI 2021: 57.3 ML/MIN/1.73M*2
EST. AVERAGE GLUCOSE BLD GHB EST-MCNC: 166 MG/DL
GLUCOSE BLD-MCNC: 155 MG/DL (ref 70–99)
GLUCOSE BLD-MCNC: 196 MG/DL (ref 70–99)
GLUCOSE BLD-MCNC: 227 MG/DL (ref 70–99)
GLUCOSE BLD-MCNC: 97 MG/DL (ref 70–99)
GLUCOSE SERPL-MCNC: 270 MG/DL (ref 70–99)
GLUCOSE UR STRIP.AUTO-MCNC: NEGATIVE MG/DL
HBA1C MFR BLD: 7.4 %
HDLC SERPL-MCNC: 61 MG/DL
HDLC SERPL: 3 {RATIO}
HGB UR QL STRIP.AUTO: NEGATIVE
KETONES UR STRIP.AUTO-MCNC: NEGATIVE MG/DL
LDLC SERPL CALC-MCNC: 103 MG/DL
LEUKOCYTE ESTERASE UR QL STRIP.AUTO: NEGATIVE
MAGNESIUM SERPL-MCNC: 2 MG/DL (ref 1.8–2.5)
NITRITE UR QL STRIP.AUTO: NEGATIVE
NONHDLC SERPL-MCNC: 121 MG/DL
P AXIS: 6
PH UR STRIP.AUTO: 7 [PH]
POCT TEST: ABNORMAL
POCT TEST: NORMAL
POTASSIUM SERPL-SCNC: 4.4 MEQ/L (ref 3.5–5.1)
PR INTERVAL: 400
PROT SERPL-MCNC: 7.1 G/DL (ref 6–8.2)
PROT UR QL STRIP.AUTO: NEGATIVE
QRS DURATION: 126
QT INTERVAL: 440
QTC CALCULATION(BAZETT): 446
R AXIS: -18
SODIUM SERPL-SCNC: 139 MEQ/L (ref 136–145)
SP GR UR REFRACT.AUTO: 1.01
T WAVE AXIS: -7
TRIGL SERPL-MCNC: 92 MG/DL
UROBILINOGEN UR STRIP-ACNC: 0.2 EU/DL
VENTRICULAR RATE: 62
VIT B12 SERPL-MCNC: 765 PG/ML (ref 180–914)

## 2024-08-03 PROCEDURE — 97116 GAIT TRAINING THERAPY: CPT | Mod: GP

## 2024-08-03 PROCEDURE — 97162 PT EVAL MOD COMPLEX 30 MIN: CPT | Mod: GP

## 2024-08-03 PROCEDURE — 97530 THERAPEUTIC ACTIVITIES: CPT | Mod: GO

## 2024-08-03 PROCEDURE — 82550 ASSAY OF CK (CPK): CPT | Performed by: HOSPITALIST

## 2024-08-03 PROCEDURE — 80061 LIPID PANEL: CPT | Performed by: HOSPITALIST

## 2024-08-03 PROCEDURE — 99222 1ST HOSP IP/OBS MODERATE 55: CPT | Performed by: STUDENT IN AN ORGANIZED HEALTH CARE EDUCATION/TRAINING PROGRAM

## 2024-08-03 PROCEDURE — 63600000 HC DRUGS/DETAIL CODE: Performed by: PHYSICIAN ASSISTANT

## 2024-08-03 PROCEDURE — 63700000 HC SELF-ADMINISTRABLE DRUG: Performed by: INTERNAL MEDICINE

## 2024-08-03 PROCEDURE — 36415 COLL VENOUS BLD VENIPUNCTURE: CPT | Performed by: HOSPITALIST

## 2024-08-03 PROCEDURE — 20600000 HC ROOM AND CARE INTERMEDIATE/TELEMETRY

## 2024-08-03 PROCEDURE — 63700000 HC SELF-ADMINISTRABLE DRUG: Performed by: HOSPITALIST

## 2024-08-03 PROCEDURE — 81003 URINALYSIS AUTO W/O SCOPE: CPT | Performed by: EMERGENCY MEDICINE

## 2024-08-03 PROCEDURE — 99233 SBSQ HOSP IP/OBS HIGH 50: CPT | Performed by: HOSPITALIST

## 2024-08-03 PROCEDURE — 97166 OT EVAL MOD COMPLEX 45 MIN: CPT | Mod: GO

## 2024-08-03 PROCEDURE — 82040 ASSAY OF SERUM ALBUMIN: CPT | Performed by: HOSPITALIST

## 2024-08-03 PROCEDURE — 83735 ASSAY OF MAGNESIUM: CPT | Performed by: HOSPITALIST

## 2024-08-03 PROCEDURE — 93010 ELECTROCARDIOGRAM REPORT: CPT | Performed by: INTERNAL MEDICINE

## 2024-08-03 RX ORDER — METFORMIN HYDROCHLORIDE 500 MG/1
500 TABLET ORAL 2 TIMES DAILY WITH MEALS
Status: DISCONTINUED | OUTPATIENT
Start: 2024-08-03 | End: 2024-08-05 | Stop reason: HOSPADM

## 2024-08-03 RX ORDER — INSULIN LISPRO 100 [IU]/ML
3-17 INJECTION, SOLUTION INTRAVENOUS; SUBCUTANEOUS
Status: DISCONTINUED | OUTPATIENT
Start: 2024-08-03 | End: 2024-08-05 | Stop reason: HOSPADM

## 2024-08-03 RX ORDER — ASPIRIN 81 MG/1
81 TABLET ORAL DAILY
Status: DISCONTINUED | OUTPATIENT
Start: 2024-08-03 | End: 2024-08-05 | Stop reason: HOSPADM

## 2024-08-03 RX ORDER — ROSUVASTATIN CALCIUM 20 MG/1
20 TABLET, COATED ORAL NIGHTLY
Status: DISCONTINUED | OUTPATIENT
Start: 2024-08-03 | End: 2024-08-05 | Stop reason: HOSPADM

## 2024-08-03 RX ADMIN — ROSUVASTATIN CALCIUM 20 MG: 20 TABLET, FILM COATED ORAL at 21:08

## 2024-08-03 RX ADMIN — INSULIN LISPRO 5 UNITS: 100 INJECTION, SOLUTION INTRAVENOUS; SUBCUTANEOUS at 22:12

## 2024-08-03 RX ADMIN — METFORMIN HYDROCHLORIDE 500 MG: 500 TABLET ORAL at 16:38

## 2024-08-03 RX ADMIN — INSULIN LISPRO 3 UNITS: 100 INJECTION, SOLUTION INTRAVENOUS; SUBCUTANEOUS at 12:22

## 2024-08-03 RX ADMIN — ASPIRIN 81 MG: 81 TABLET, COATED ORAL at 07:57

## 2024-08-03 RX ADMIN — INSULIN LISPRO 3 UNITS: 100 INJECTION, SOLUTION INTRAVENOUS; SUBCUTANEOUS at 07:57

## 2024-08-03 ASSESSMENT — COGNITIVE AND FUNCTIONAL STATUS - GENERAL
CLIMB 3 TO 5 STEPS WITH RAILING: 3 - A LITTLE
MOVING TO AND FROM BED TO CHAIR: 4 - NONE
CLIMB 3 TO 5 STEPS WITH RAILING: 3 - A LITTLE
MOVING TO AND FROM BED TO CHAIR: 4 - NONE
STANDING UP FROM CHAIR USING ARMS: 3 - A LITTLE
CLIMB 3 TO 5 STEPS WITH RAILING: 3 - A LITTLE
STANDING UP FROM CHAIR USING ARMS: 4 - NONE
WALKING IN HOSPITAL ROOM: 3 - A LITTLE
AFFECT: WFL
DRESSING REGULAR UPPER BODY CLOTHING: 4 - NONE
WALKING IN HOSPITAL ROOM: 4 - NONE
MOVING TO AND FROM BED TO CHAIR: 4 - NONE
WALKING IN HOSPITAL ROOM: 3 - A LITTLE
DRESSING REGULAR LOWER BODY CLOTHING: 4 - NONE
EATING MEALS: 4 - NONE
TOILETING: 4 - NONE
HELP NEEDED FOR BATHING: 3 - A LITTLE
HELP NEEDED FOR PERSONAL GROOMING: 4 - NONE
STANDING UP FROM CHAIR USING ARMS: 3 - A LITTLE
AFFECT: WFL;OTHER (SEE COMMENTS)

## 2024-08-03 NOTE — PLAN OF CARE
Plan of Care Review  Plan of Care Reviewed With: patient  Outcome Evaluation: pt alert, confused to situation/place (knows in hospital but forgets name/town), continues with aphasia and memory issues, trouble with recall, stroke education provided, SCD's placed and on, passed dysphagia screening, NIH-1 (for aphasia), VSS, bed alarm acitve.    Pt continues to struggles with memory. Could not tell me her  or what are she grew in right away, gave her time to think about it and it eventually came to her.

## 2024-08-03 NOTE — PLAN OF CARE
Problem: Stroke, Ischemic (Includes Transient Ischemic Attack)  Goal: Effective Bowel Elimination  Outcome: Met  Goal: Optimal Functional Ability  Outcome: Met  Goal: Effective Oxygenation and Ventilation  Outcome: Met  Goal: Improved Sensorimotor Function  Outcome: Met  Goal: Optimal Eating and Swallowing without Aspiration  Outcome: Met  Goal: Effective Urinary Elimination  Outcome: Met
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
## 2024-08-03 NOTE — PLAN OF CARE
Problem: Adult Inpatient Plan of Care  Goal: Plan of Care Review  Outcome: Progressing  Flowsheets (Taken 8/3/2024 1600)  Progress: improving  Outcome Evaluation: PT IE complete  Plan of Care Reviewed With: patient     Problem: Mobility Impairment  Goal: Optimal Mobility  Outcome: Progressing
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
## 2024-08-03 NOTE — PLAN OF CARE
Problem: Adult Inpatient Plan of Care  Goal: Plan of Care Review  Outcome: Progressing  Flowsheets (Taken 8/3/2024 0262)  Progress: improving  Outcome Evaluation: OT IE complete.  Plan of Care Reviewed With: patient     Problem: Self-Care Deficit  Goal: Improved Ability to Complete Activities of Daily Living  Outcome: Progressing
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
## 2024-08-03 NOTE — PLAN OF CARE
Plan of Care Review  Plan of Care Reviewed With: patient  Progress: improving  Outcome Evaluation: pt aaox4, calm and cooperative. NIH 1 for aphasia. NSR with 1st degree AV block. OOB ad hailey. SSI for BG coverabe. SCDs for coag therapy. UA sent. Making needs known. Safety maintained. Call bell within reach.

## 2024-08-03 NOTE — ASSESSMENT & PLAN NOTE
A1c 7.4  Will need ongoing management and adjustment with her outpatient providers  
- clearly demarcated, dull erythematous, macular, clustered rash noted on the Rt side of the neck.   - When asked, pt can not give any details of rash or express that she is aware of it. Does not seem to comprehend with the aphasia.   - Rash suspicious for healing shingles.   - continue to monitor characteristics of rash.   - Pt does not express any itching/pain at present. If develops treat accordingly with analgesics/antipruritics.     
Continue rosuvastatin  
History of TIA and episode of expressive aphasia in the past which was worked up per her previous neurologist.  Workup as stated above  
Patient presents with transient aphasia   TIA vs seizure; has had two prior episodes in the past  CTA head/neck no large vessel occlusion  MRI brain no acute abnormality. No stroke.   Neurology consult noted  ASA, Statin, Plavix added for 21 days per neuro recs  Defer to neurology regarding the addition of entiepileptic medication, EEG, etc.   Echo with bubble  Monitor on telemetry - no evidence of atrial fibrillation.  Normal B12, lactic acid, CPK levels.    Patient feels back to baseline and would like to be discharged home.   Neurology recommends no further inpatient eval at this time  For ongoing outpatient follow up  Per neurology, if she has another episode, would consider trial of antiepileptic medicine at that time. Patient has plans to follow up with her neurologist at [facility].   DMV reporting form sent [place]   Patient counseled on no driving until cleared by her neurologist.  
if goes home would need 24/7 HHA/Sub-acute Rehab/Home PT

## 2024-08-04 ENCOUNTER — APPOINTMENT (INPATIENT)
Dept: RADIOLOGY | Facility: HOSPITAL | Age: 64
End: 2024-08-04
Attending: PHYSICIAN ASSISTANT
Payer: MEDICAID

## 2024-08-04 PROBLEM — R21 RASH AND NONSPECIFIC SKIN ERUPTION: Status: RESOLVED | Noted: 2024-08-02 | Resolved: 2024-08-04

## 2024-08-04 LAB
ANION GAP SERPL CALC-SCNC: 8 MEQ/L (ref 3–15)
BUN SERPL-MCNC: 19 MG/DL (ref 7–25)
CALCIUM SERPL-MCNC: 9.5 MG/DL (ref 8.6–10.3)
CHLORIDE SERPL-SCNC: 104 MEQ/L (ref 98–107)
CO2 SERPL-SCNC: 28 MEQ/L (ref 21–31)
CREAT SERPL-MCNC: 1 MG/DL (ref 0.6–1.2)
EGFRCR SERPLBLD CKD-EPI 2021: >60 ML/MIN/1.73M*2
GLUCOSE BLD-MCNC: 117 MG/DL (ref 70–99)
GLUCOSE BLD-MCNC: 126 MG/DL (ref 70–99)
GLUCOSE BLD-MCNC: 137 MG/DL (ref 70–99)
GLUCOSE BLD-MCNC: 154 MG/DL (ref 70–99)
GLUCOSE BLD-MCNC: 160 MG/DL (ref 70–99)
GLUCOSE SERPL-MCNC: 111 MG/DL (ref 70–99)
POCT TEST: ABNORMAL
POTASSIUM SERPL-SCNC: 4.1 MEQ/L (ref 3.5–5.1)
SODIUM SERPL-SCNC: 140 MEQ/L (ref 136–145)

## 2024-08-04 PROCEDURE — 20600000 HC ROOM AND CARE INTERMEDIATE/TELEMETRY

## 2024-08-04 PROCEDURE — 63700000 HC SELF-ADMINISTRABLE DRUG: Performed by: HOSPITALIST

## 2024-08-04 PROCEDURE — 70551 MRI BRAIN STEM W/O DYE: CPT

## 2024-08-04 PROCEDURE — 63600000 HC DRUGS/DETAIL CODE: Mod: JZ | Performed by: HOSPITALIST

## 2024-08-04 PROCEDURE — 99233 SBSQ HOSP IP/OBS HIGH 50: CPT | Performed by: HOSPITALIST

## 2024-08-04 PROCEDURE — 63700000 HC SELF-ADMINISTRABLE DRUG: Performed by: INTERNAL MEDICINE

## 2024-08-04 PROCEDURE — 80048 BASIC METABOLIC PNL TOTAL CA: CPT | Performed by: HOSPITALIST

## 2024-08-04 PROCEDURE — 36415 COLL VENOUS BLD VENIPUNCTURE: CPT | Performed by: HOSPITALIST

## 2024-08-04 RX ORDER — LORAZEPAM 2 MG/ML
0.5 INJECTION INTRAMUSCULAR ONCE
Status: COMPLETED | OUTPATIENT
Start: 2024-08-04 | End: 2024-08-04

## 2024-08-04 RX ORDER — CLOPIDOGREL BISULFATE 75 MG/1
75 TABLET ORAL DAILY
Status: DISCONTINUED | OUTPATIENT
Start: 2024-08-04 | End: 2024-08-05 | Stop reason: HOSPADM

## 2024-08-04 RX ORDER — CLOPIDOGREL BISULFATE 75 MG/1
75 TABLET ORAL DAILY
Status: DISCONTINUED | OUTPATIENT
Start: 2024-08-04 | End: 2024-08-04

## 2024-08-04 RX ADMIN — LORAZEPAM 0.5 MG: 2 INJECTION, SOLUTION INTRAMUSCULAR; INTRAVENOUS at 11:30

## 2024-08-04 RX ADMIN — METFORMIN HYDROCHLORIDE 500 MG: 500 TABLET ORAL at 08:38

## 2024-08-04 RX ADMIN — INSULIN LISPRO 5 UNITS: 100 INJECTION, SOLUTION INTRAVENOUS; SUBCUTANEOUS at 08:39

## 2024-08-04 RX ADMIN — INSULIN LISPRO 5 UNITS: 100 INJECTION, SOLUTION INTRAVENOUS; SUBCUTANEOUS at 16:51

## 2024-08-04 RX ADMIN — ASPIRIN 81 MG: 81 TABLET, COATED ORAL at 08:38

## 2024-08-04 RX ADMIN — CLOPIDOGREL 75 MG: 75 TABLET ORAL at 13:33

## 2024-08-04 RX ADMIN — ROSUVASTATIN CALCIUM 20 MG: 20 TABLET, FILM COATED ORAL at 20:56

## 2024-08-04 RX ADMIN — INSULIN LISPRO 3 UNITS: 100 INJECTION, SOLUTION INTRAVENOUS; SUBCUTANEOUS at 21:59

## 2024-08-04 RX ADMIN — METFORMIN HYDROCHLORIDE 500 MG: 500 TABLET ORAL at 16:52

## 2024-08-04 ASSESSMENT — COGNITIVE AND FUNCTIONAL STATUS - GENERAL
CLIMB 3 TO 5 STEPS WITH RAILING: 3 - A LITTLE
WALKING IN HOSPITAL ROOM: 4 - NONE
STANDING UP FROM CHAIR USING ARMS: 4 - NONE
WALKING IN HOSPITAL ROOM: 4 - NONE
CLIMB 3 TO 5 STEPS WITH RAILING: 3 - A LITTLE
MOVING TO AND FROM BED TO CHAIR: 4 - NONE
STANDING UP FROM CHAIR USING ARMS: 4 - NONE
MOVING TO AND FROM BED TO CHAIR: 4 - NONE

## 2024-08-04 NOTE — PLAN OF CARE
Plan of Care Review  Plan of Care Reviewed With: patient  Progress: improving  Outcome Evaluation: pt aaox4, calm and cooperative. NIH 1. MRI completed. Improved aphasia. NSR with 1st degree. Ambulating OOB without assistance. SSI coverage per MAR. Making needs known. SAfety maintained. Call bell within reach.
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
## 2024-08-04 NOTE — PLAN OF CARE
Problem: Adult Inpatient Plan of Care  Goal: Plan of Care Review  Outcome: Progressing  Flowsheets (Taken 8/4/2024 0545)  Progress: improving  Outcome Evaluation: Received pt in bed,AAOx4, denies pain, NIH=1, sinus with first degree block on cardia, independent, SCDs in placed, refused bed alarm, maintained safety precation, MRI and Echo are pending. Call light in reach.  Plan of Care Reviewed With: patient     Problem: Adult Inpatient Plan of Care  Goal: Patient-Specific Goal (Individualized)  Outcome: Progressing   Plan of Care Review  Plan of Care Reviewed With: patient  Progress: improving  Outcome Evaluation: Received pt in bed,AAOx4, denies pain, NIH=1, sinus with first degree block on cardia, independent, SCDs in placed, refused bed alarm, maintained safety precation, MRI and Echo are pending. Call light in reach.

## 2024-08-04 NOTE — PROGRESS NOTES
Hospital Medicine Service -  Daily Progress Note       SUBJECTIVE   Patient seen and examined earlier this morning.   Feeling better today  Speech clear  No other complaint currently  Await MRI      OBJECTIVE      Vital signs in last 24 hours:  Temp:  [36.1 °C (97 °F)-36.7 °C (98.1 °F)] 36.1 °C (97 °F)  Heart Rate:  [53-60] 58  Resp:  [12-16] 16  BP: (104-124)/(56-73) 109/62    PHYSICAL EXAMINATION        General: nontoxic appearing, no acute distress  HEENT: Moist membranes, PERRL, anicteric sclera   Neck: supple, no JVD  Cardiac: RRR, +S1/S2, no murmur, no rub   Lungs: clear bilaterally, no wheezing/rales/rhonchi  Abdomen: soft, NT/ND, +BS, no rebound/guarding   Extremities: no edema, distal perfusion intact  Neuro: AAOx3, nonfocal.   Skin: no rash. Clean, dry, intact.   Psych: cooperative     LABS / IMAGING / TELE      Labs (personally reviewed):  Results from last 7 days   Lab Units 08/04/24  0341   SODIUM mEQ/L 140   POTASSIUM mEQ/L 4.1   CHLORIDE mEQ/L 104   CO2 mEQ/L 28   BUN mg/dL 19   CREATININE mg/dL 1.0   GLUCOSE mg/dL 111*   CALCIUM mg/dL 9.5       Results from last 7 days   Lab Units 08/03/24  1005   MAGNESIUM mg/dL 2.0    Results from last 7 days   Lab Units 08/02/24  1701   WBC K/uL 6.28   HEMOGLOBIN g/dL 13.2   HEMATOCRIT % 38.3   PLATELETS K/uL 177       Results from last 7 days   Lab Units 08/02/24  1701   INR  1.0          Microbiology Data (personally reviewed):  Microbiology Results       ** No results found for the last 720 hours. **            Imaging  I have independently reviewed the pertinent imaging from the last 24 hrs.    ECG/Telemetry  Telemetry reviewed    ASSESSMENT AND PLAN      * Aphasia  Assessment & Plan  Patient presents with transient aphasia   TIA vs seizure; has had two prior episodes in the past  Work up in progress  MRI pending; r/o CVA  CTA head/neck no large vessel occlusion  Neurology consult noted  ASA, Statin, Plavix added for 21 days per neuro recs  Defer to  neurology regarding the addition of entiepileptic medication, EEG, etc.   Echo with bubble  Monitor on telemetry to rule out arrhythmia  Normal B12, lactic acid, CPK levels.    Continue to monitor      Hx-TIA (transient ischemic attack)  Assessment & Plan  History of TIA and episode of expressive aphasia in the past which was worked up per her previous neurologist.  Workup in progress as stated above    Familial hypercholesterolemia  Assessment & Plan  Continue rosuvastatin    Type 2 diabetes mellitus, with long-term current use of insulin (CMS/[facility])  Assessment & Plan  Controlled currently  A1c 7.4  Will need ongoing management and adjustment with her outpatient providers         VTE Assessment: Padua VTE Score: 3  VTE Prophylaxis Plan: Current anticoagulants:    None        Code Status: Full Code    Estimated Discharge Date: 8/5/2024       Seun Zaragoza DO  8/4/2024  11:41 AM

## 2024-08-05 ENCOUNTER — TELEPHONE (OUTPATIENT)
Age: 64
End: 2024-08-05

## 2024-08-05 ENCOUNTER — APPOINTMENT (INPATIENT)
Dept: CARDIOLOGY | Facility: HOSPITAL | Age: 64
End: 2024-08-05
Attending: HOSPITALIST
Payer: MEDICAID

## 2024-08-05 VITALS
WEIGHT: 193.7 LBS | RESPIRATION RATE: 16 BRPM | DIASTOLIC BLOOD PRESSURE: 58 MMHG | TEMPERATURE: 97.4 F | HEART RATE: 63 BPM | OXYGEN SATURATION: 100 % | SYSTOLIC BLOOD PRESSURE: 106 MMHG

## 2024-08-05 LAB
AORTIC ROOT ANNULUS: 3 CM
AORTIC VALVE MEAN VELOCITY: 0.86 M/S
AORTIC VALVE VELOCITY TIME INTEGRAL: 23.9 CM
ASCENDING AORTA: 3 CM
AV MEAN GRADIENT: 3 MMHG
AV PEAK GRADIENT: 6 MMHG
AV PEAK VELOCITY-S: 1.21 M/S
AV VALVE AREA: 1.62 CM2
AV VELOCITY RATIO: 0.63
AVA (VTI): 2.17 CM2
DOP CALC LVOT STROKE VOLUME: 51.93 CM3
E WAVE DECELERATION TIME: 367 MS
E/A RATIO: 0.6
E/E' RATIO: 5.2
E/LAT E' RATIO: 6.1
EDV (BP): 48 CM3
EF (A4C): 52.7 %
EF A2C: 62.4 %
EJECTION FRACTION: 59 %
EST RIGHT VENT SYSTOLIC PRESSURE BY TRICUSPID REGURGITATION JET: 26 MMHG
ESV (BP): 19.7 CM3
FRACTIONAL SHORTENING: 31.74 %
GLUCOSE BLD-MCNC: 109 MG/DL (ref 70–99)
GLUCOSE BLD-MCNC: 170 MG/DL (ref 70–99)
GLUCOSE BLD-MCNC: 198 MG/DL (ref 70–99)
INTERVENTRICULAR SEPTUM: 0.88 CM
LA ESV (BP): 38.5 CM3
LAAS-AP2: 15.2 CM2
LAAS-AP4: 13.8 CM2
LALD A4C: 4.34 CM
LALD A4C: 4.45 CM
LAV-S: 41.7 CM3
LEFT ATRIUM VOLUME: 35.1 CM3
LEFT INTERNAL DIMENSION IN SYSTOLE: 2.99 CM
LEFT VENTRICLE DIASTOLIC VOLUME: 53.9 CM3
LEFT VENTRICLE SYSTOLIC VOLUME: 25.5 CM3
LEFT VENTRICULAR INTERNAL DIMENSION IN DIASTOLE: 4.38 CM
LEFT VENTRICULAR POSTERIOR WALL IN END DIASTOLE: 1.01 CM
LV DIASTOLIC VOLUME: 38.8 CM3
LV ESV (APICAL 2 CHAMBER): 14.5 CM3
LVAD-AP2: 16.8 CM2
LVAD-AP4: 21 CM2
LVAS-AP2: 9.37 CM2
LVAS-AP4: 12.8 CM2
LVLD-AP2: 6.08 CM
LVLD-AP4: 6.72 CM
LVLS-AP2: 5.21 CM
LVLS-AP4: 5.47 CM
LVOT 2D: 2.1 CM
LVOT A: 3.46 CM2
LVOT MG: 2 MMHG
LVOT MV: 0.57 M/S
LVOT PEAK VELOCITY: 0.72 M/S
LVOT PG: 2 MMHG
LVOT VTI: 15 CM
MV E'TISSUE VEL-LAT: 0.07 M/S
MV E'TISSUE VEL-MED: 0.09 M/S
MV MEAN GRADIENT: 2 MMHG
MV PEAK A VEL: 0.74 M/S
MV PEAK E VEL: 0.45 M/S
MV PEAK GRADIENT: 4 MMHG
MV STENOSIS PRESSURE HALF TIME: 90 MS
MV VALVE AREA BY CONTINUITY EQUATION: 2.98 CM2
MV VALVE AREA P 1/2 METHOD: 2.44 CM2
MV VTI: 17.4 CM
POCT TEST: ABNORMAL
POSTERIOR WALL: 1.01 CM
PULM VEIN S/D RATIO: 0.8
PV PEAK D VEL: 0.47 M/S
PV PEAK S VEL: 0.4 M/S
RAP: 10 MMHG
TAPSE: 1.67 CM
TR MAX PG: 15.84 MMHG
TRICUSPID VALVE PEAK REGURGITATION VELOCITY: 1.99 M/S

## 2024-08-05 PROCEDURE — 63700000 HC SELF-ADMINISTRABLE DRUG: Performed by: HOSPITALIST

## 2024-08-05 PROCEDURE — 99239 HOSP IP/OBS DSCHRG MGMT >30: CPT | Performed by: HOSPITALIST

## 2024-08-05 PROCEDURE — 93306 TTE W/DOPPLER COMPLETE: CPT

## 2024-08-05 PROCEDURE — 63700000 HC SELF-ADMINISTRABLE DRUG: Performed by: INTERNAL MEDICINE

## 2024-08-05 RX ORDER — ASPIRIN 81 MG/1
81 TABLET ORAL DAILY
COMMUNITY

## 2024-08-05 RX ORDER — TIZANIDINE 4 MG/1
4 TABLET ORAL EVERY 8 HOURS PRN
COMMUNITY
End: 2024-08-05 | Stop reason: HOSPADM

## 2024-08-05 RX ORDER — CLOPIDOGREL BISULFATE 75 MG/1
75 TABLET ORAL DAILY
Qty: 19 TABLET | Refills: 0 | Status: SHIPPED | OUTPATIENT
Start: 2024-08-06 | End: 2024-08-25

## 2024-08-05 RX ADMIN — ASPIRIN 81 MG: 81 TABLET, COATED ORAL at 09:37

## 2024-08-05 RX ADMIN — INSULIN LISPRO 5 UNITS: 100 INJECTION, SOLUTION INTRAVENOUS; SUBCUTANEOUS at 13:17

## 2024-08-05 RX ADMIN — INSULIN LISPRO 5 UNITS: 100 INJECTION, SOLUTION INTRAVENOUS; SUBCUTANEOUS at 09:33

## 2024-08-05 RX ADMIN — METFORMIN HYDROCHLORIDE 500 MG: 500 TABLET ORAL at 17:45

## 2024-08-05 RX ADMIN — METFORMIN HYDROCHLORIDE 500 MG: 500 TABLET ORAL at 09:37

## 2024-08-05 RX ADMIN — CLOPIDOGREL 75 MG: 75 TABLET ORAL at 09:37

## 2024-08-05 ASSESSMENT — COGNITIVE AND FUNCTIONAL STATUS - GENERAL
CLIMB 3 TO 5 STEPS WITH RAILING: 4 - NONE
MOVING TO AND FROM BED TO CHAIR: 4 - NONE
WALKING IN HOSPITAL ROOM: 4 - NONE
STANDING UP FROM CHAIR USING ARMS: 4 - NONE

## 2024-08-05 NOTE — PLAN OF CARE
MRI Brain without any evidence of acute process. No further Inpatient Neurological workup indicated. Recommend follow up with Primary Neurologist as an outpatient.     Tomas Curiel MD  Neurology

## 2024-08-05 NOTE — PLAN OF CARE
Patient alert and oriented times four. NIH and neuro check done. Echo completed. Patient to be discharged home, patient's brother to transport her.

## 2024-08-05 NOTE — DISCHARGE SUMMARY
[facility] DISCHARGE SUMMARY       BRIEF OVERVIEW   Admitting Provider: Clair Cheung DO  Attending Provider: Seun Zaragoza DO Attending phys phone: [phone]    PCP: [facility], No Pcp Confirmed [phone]    Admission Date: 8/2/2024  Discharge Date: 8/5/2024    Discharge To: home with family and home care     DISCHARGE DIAGNOSES      Primary Discharge Diagnosis  Aphasia    Secondary Discharge Diagnoses  Active Hospital Problems    Diagnosis Date Noted    Aphasia 08/02/2024     Priority: High    Hx-TIA (transient ischemic attack) 08/02/2024     Priority: Medium    Type 2 diabetes mellitus, with long-term current use of insulin (CMS/[facility]) 08/02/2024    Familial hypercholesterolemia 08/02/2024      Resolved Hospital Problems    Diagnosis Date Noted Date Resolved    Rash and nonspecific skin eruption 08/02/2024 08/04/2024     SUMMARY OF HOSPITALIZATION      Presenting Problem/History of Present Illness  Aphasia [R47.01]    This is a 63 y.o. year-old female admitted on 8/2/2024 with Aphasia [R47.01].    See admit H and P and ED documentation for further details.     Hospital Course    See below Assessment and Plan for further details to the hospitalization. See  consult notes for additional details.     Assessment and Plan:  * Aphasia  Assessment & Plan  Patient presents with transient aphasia   TIA vs seizure; has had two prior episodes in the past  CTA head/neck no large vessel occlusion  MRI brain no acute abnormality. No stroke.   Neurology consult noted  ASA, Statin, Plavix added for 21 days per neuro recs  Defer to neurology regarding the addition of entiepileptic medication, EEG, etc.   Echo with bubble  Monitor on telemetry - no evidence of atrial fibrillation.  Normal B12, lactic acid, CPK levels.    Patient feels back to baseline and would like to be discharged home.   Neurology recommends no further inpatient eval at this time  For ongoing outpatient follow up  Per neurology,  if she has another episode, would consider trial of antiepileptic medicine at that time. Patient has plans to follow up with her neurologist at [facility].   DMV reporting form sent [place]   Patient counseled on no driving until cleared by her neurologist.    Hx-TIA (transient ischemic attack)  Assessment & Plan  History of TIA and episode of expressive aphasia in the past which was worked up per her previous neurologist.  Workup as stated above    Familial hypercholesterolemia  Assessment & Plan  Continue rosuvastatin    Type 2 diabetes mellitus, with long-term current use of insulin (CMS/[facility])  Assessment & Plan   A1c 7.4  Will need ongoing management and adjustment with her outpatient providers          Exam on Day of Discharge  General: nontoxic appearing, no acute distress  HEENT: Moist membranes, PERRL, anicteric sclera   Neck: supple, no JVD  Cardiac: RRR, +S1/S2, no murmur, no rub   Lungs: clear bilaterally, no wheezing/rales/rhonchi  Abdomen: soft, NT/ND, +BS, no rebound/guarding   Extremities: no edema, distal perfusion intact  Neuro: AAOx3, nonfocal.   Skin: no rash. Clean, dry, intact.   Psych: cooperative      Consults During Admission  IP CONSULT TO NEUROLOGY      DISCHARGE MEDICATIONS       Medication List        START taking these medications      clopidogreL 75 mg tablet  Commonly known as: PLAVIX  Start taking on: August 6, 2024  Take 1 tablet (75 mg total) by mouth daily for 19 doses.  Dose: 75 mg            CONTINUE taking these medications      aspirin 81 mg enteric coated tablet  Take 81 mg by mouth daily.  Dose: 81 mg     glimepiride 1 mg tablet  Commonly known as: AMARYL  Take 0.5 mg by mouth daily with lunch.  Dose: 0.5 mg     insulin detemir U-100 100 unit/mL (3 mL) pen  Commonly known as: LEVEMIR  Inject 12 Units under the skin nightly.  Dose: 12 Units     metFORMIN  mg 24 hr tablet  Commonly known as: GLUCOPHAGE-XR  Take 1,000 mg by mouth every morning.  Dose: 1,000 mg      rosuvastatin 5 mg tablet  Commonly known as: CRESTOR  Take 5 mg by mouth daily.  Dose: 5 mg            STOP taking these medications      tiZANidine 4 mg tablet  Commonly known as: ZANAFLEX                PROCEDURES / LABS / IMAGING      Procedures      Pertinent Imaging  MRI BRAIN WITHOUT CONTRAST    Result Date: 8/4/2024  IMPRESSION:  No acute intracranial lesion. Atrophy and nonspecific white matter T2 hyperintensities likely secondary to chronic small vessel ischemia.    CT ANGIOGRAPHY HEAD/NECK WITH AND WITHOUT IV CONTRAST    Result Date: 8/2/2024  IMPRESSION:  Within the confines of the study, patency of the major arteries of the neck and [place]. No large vessel occlusion. CT perfusion images without penumbra or core infarct. If clinical symptoms necessitate an MRI of the brain may be obtained as it is more sensitive for acute ischemia.    CT BRAIN PERFUSION WITH IV CONTRAST    Result Date: 8/2/2024  IMPRESSION:  Within the confines of the study, patency of the major arteries of the neck and [place]. No large vessel occlusion. CT perfusion images without penumbra or core infarct. If clinical symptoms necessitate an MRI of the brain may be obtained as it is more sensitive for acute ischemia.    CT HEAD STROKE ALERT WITHOUT IV CONTRAST    Result Date: 8/2/2024  IMPRESSION: No acute intracranial lesion. Atrophy, nonspecific white matter changes probably secondary to chronic small vessel ischemia and atherosclerotic vascular calcification. Finding:    Stroke alert   Acuity: Unexpected  Status:  CLOSED Critical read back was performed and results were read back by Steve Hernandez DO  on  8/2/2024 1713 hours      OUTPATIENT  FOLLOW-UP / REFERRALS / PENDING TESTS          Test Results Pending at Discharge  Unresulted Labs (From admission, onward)       Start     Ordered    08/02/24 1926  Lipid Panel Refl Direct LDL, CARDIO IQ  Once        Question:  Release to patient  Answer:  Immediate     08/02/24 1926                    Important Issues to Address in Follow-Up  Follow up/Discharge instructions discussed with the patient and/or power of  at the time of discharge include but are not limited to:      -Follow up with your primary care physician within one week.  -Follow up with your Neurologist at [facility] as discussed in 1-2 weeks.   NO Driving until cleared by your neurologist.     Continue aspirin and Plavix for 21 days, then continue aspirin alone.     [facility]; if you have any recurrent episode, consider a trial of anti seizure medication.   Discuss with your primary care physician and neurologist about arranging further outpatient cardiac monitoring.     Avoid dehydration and avoid excess heat.   Avoid alcohol.    -If you have chest pain, shortness of breath, fever, uncontrolled vomiting or diarrhea, severe abdominal pain, light headedness, passing out, vision change, numbness/tingling/weakness, or any additional concerning symptoms as discussed, return to the Emergency Department.     DISCHARGE DISPOSITION      45 minutes spent on patient care and coordination of today's discharge. This includes discussing the discharge plan, discharge medications, and follow up instructions with the patient and/or power of , as well as coordinating care with nursing and consulting physicians.     Discharge to: home with family and home care    Code Status At Discharge: Full Code    Physician Order for Life-Sustaining Treatment Document Status        No documents found

## 2024-08-05 NOTE — TELEPHONE ENCOUNTER
Just FYI patient was admitted to [facility].    [address], [place] [address] ·  [phone]    Admitted 08/2/2024  Possible Stroke    Stated her testing seems to be coming back ok . Just wanted to let Dr. Landon. Know.

## 2024-08-05 NOTE — DISCHARGE INSTRUCTIONS
-Follow up with your primary care physician within one week.  -Follow up with your Neurologist at [facility] as discussed in 1-2 weeks.   NO Driving until cleared by your neurologist.     Continue aspirin and Plavix for 21 days, then continue aspirin alone.     Per [facility] neurologist recommendation; if you have any recurrent episode, consider a trial of anti seizure medication.   Discuss with your primary care physician and neurologist about arranging further outpatient cardiac monitoring.     Avoid dehydration and avoid excess heat.   Avoid alcohol.    -If you have chest pain, shortness of breath, fever, uncontrolled vomiting or diarrhea, severe abdominal pain, light headedness, passing out, vision change, numbness/tingling/weakness, or any additional concerning symptoms as discussed, return to the Emergency Department.

## 2024-08-05 NOTE — PLAN OF CARE
Care Coordination Discharge Plan Note     Discharge Needs Assessment  Concerns to be Addressed: care coordination/care conferences, discharge planning, adjustment to diagnosis/illness  Current Discharge Risk: lack of support system/caregiver    Anticipated Discharge Plan  Anticipated Discharge Disposition: home with home health  Type of Home Care Services: nursing, home PT, home OT      Patient Choice  Offered/Gave Vendor List: yes (RN/PT/OT)  Patient's Choice of Community Agency(s): [facility]    Patient and/or patient guardian/advocate was made aware of their right to choose a provider. A list of eligible providers was presented and reviewed with the patient and/or patient guardian/advocate in written and/or verbal form. The list delineates providers in the patient’s desired geographic area who are participating in the Medicare program and/or providers contracted with the patient’s primary insurance. The Medicare list and quality ratings were obtained from the Medicare.gov [medicare.gov] website.    ---------------------------------------------------------------------------------------------------------------------    Interdisciplinary Discharge Plan Review:  Participants:     Concerns Comments: Met with sofia to review d/c planning. She is agreeable to HC referral to [facility] for RN/PT/OT services. Referral sent to felton Feliz for [facility] office. ED RN reporting that info about pt's car location is in her purple purse, but no paperwork found. Following up with [facility] to determine pickup scene so that we can contact local police to locate where car was impounded.    Determined car was towed to [place], TC to pt's sister to coordinate return of vehicle. [facility] unable to accept for services due to staffing, pt agreeable to referral to [facility]    Discharge Plan:   Disposition/Destination: Home Health Care - Other / Home  Discharge Facility:    Community Resources:      Discharge Transportation:  Is Out  of Hospital DNR needed at Discharge: no  Does patient need discharge transport?  (unsure, thinks her brother will be able to transport)

## 2024-08-05 NOTE — PLAN OF CARE
Problem: Adult Inpatient Plan of Care  Goal: Plan of Care Review  Outcome: Progressing  Flowsheets (Taken 8/5/2024 0353)  Progress: improving  Outcome Evaluation: Pt aaox4, denies pain, NIH=0, sinus lashawn with first degree on monitor, independent, Echo pending, safety precaution maintained and call light in reach.  Plan of Care Reviewed With: patient     Problem: Adult Inpatient Plan of Care  Goal: Patient-Specific Goal (Individualized)  Outcome: Progressing     Problem: Adult Inpatient Plan of Care  Goal: Absence of Hospital-Acquired Illness or Injury  Outcome: Progressing   Plan of Care Review  Plan of Care Reviewed With: patient  Progress: improving  Outcome Evaluation: Pt aaox4, denies pain, NIH=0, sinus lashawn with first degree on monitor, independent, Echo pending, safety precaution maintained and call light in reach.

## 2024-08-06 ENCOUNTER — TRANSITIONAL CARE MANAGEMENT (OUTPATIENT)
Dept: FAMILY MEDICINE CLINIC | Facility: CLINIC | Age: 64
End: 2024-08-06

## 2024-08-06 ENCOUNTER — TELEPHONE (OUTPATIENT)
Age: 64
End: 2024-08-06

## 2024-08-06 DIAGNOSIS — E11.9 TYPE 2 DIABETES MELLITUS WITHOUT COMPLICATION, WITH LONG-TERM CURRENT USE OF INSULIN (HCC): ICD-10-CM

## 2024-08-06 DIAGNOSIS — Z79.4 TYPE 2 DIABETES MELLITUS WITHOUT COMPLICATION, WITH LONG-TERM CURRENT USE OF INSULIN (HCC): ICD-10-CM

## 2024-08-06 DIAGNOSIS — E11.65 TYPE 2 DIABETES MELLITUS WITH HYPERGLYCEMIA, WITH LONG-TERM CURRENT USE OF INSULIN (HCC): ICD-10-CM

## 2024-08-06 DIAGNOSIS — Z79.4 TYPE 2 DIABETES MELLITUS WITH HYPERGLYCEMIA, WITH LONG-TERM CURRENT USE OF INSULIN (HCC): ICD-10-CM

## 2024-08-06 LAB
CHOLEST SERPL-MCNC: 193 MG/DL
CHOLEST/HDLC SERPL: 2.8 CALC
HDLC SERPL-MCNC: 68 MG/DL
LDLC SERPL CALC-MCNC: 104 MG/DL (CALC)
NONHDLC SERPL-MCNC: 125 MG/DL (CALC)
TRIGL SERPL-MCNC: 118 MG/DL

## 2024-08-06 RX ORDER — GLIMEPIRIDE 1 MG/1
TABLET ORAL
Qty: 45 TABLET | Refills: 1 | Status: SHIPPED | OUTPATIENT
Start: 2024-08-06

## 2024-08-06 NOTE — TELEPHONE ENCOUNTER
[facility] to [facility] Dr. Nash for a HFU ..the patient was in [facility] 8/2/24. Pt was told to follow up with her neurologist at [facility]. In 1-2 wk. However The first available appt is 10/1/24 at 12:30.. I schd the pt and placed her on the waiting list.   As per pt she can not get her license back. Until she sees the neurologist.      -Follow up with your Neurologist at [facility] as discussed in 1-2 weeks.   NO Driving until cleared by your neurologist.

## 2024-08-06 NOTE — PLAN OF CARE
Care Coordination Discharge Plan Summary    Admission Assessment Summary    General Information  Readmission Within the last 30 days: no previous admission in last 30 days  Does patient have a : No  Patient-Specific Goals (include timeframe): To go home    Living Arrangements  Arrived From: other (see comments), home  Current Living Arrangements: other (see comments)  People in Home: roommate(s)  Home Accessibility: stairs to enter home (Group)  Living Arrangement Comments: Pt lives in shared home with on the first floor with 2 [facility]    Social Determinants of Health - Screenings  Housing Stability  In the last 12 months, was there a time when you were not able to pay the mortgage or rent on time?: No  In the last 12 months, how many places have you lived?: 2  In the last 12 months, was there a time when you did not have a steady place to sleep or slept in a shelter (including now)?: No  Utility Access  In the past 12 months has the electric, gas, oil, or water company threatened to shut off services in your home?: No  Transportation Needs  In the past 12 months, has lack of transportation kept you from medical appointments or from getting medications?: No  In the past 12 months, has lack of transportation kept you from meetings, work, or from getting things needed for daily living?: No    Functional Status Prior to Admission  Assistive Device/Animal Currently Used at Home: cane, joe  Functional Status Comments: Pt has relied on a cane during times when she feels dizzy. Otherwise she is independent with ADL's and IADL's  IADL Comments:      Discharge Needs Assessment    Concerns to be Addressed: care coordination/care conferences, discharge planning, adjustment to diagnosis/illness  Current Discharge Risk: lack of support system/caregiver  Anticipated Changes Related to Illness: none    Discharge Plan Summary    Patient Choice  Offered/Gave Vendor List: yes (RN/PT/OT)  Patient's Choice of  Community Agency(s): [facility]  Patient and/or patient guardian/advocate was made aware of their right to choose a provider. A list of eligible providers was presented and reviewed with the patient and/or patient guardian/advocate in written and/or verbal form. The list delineates providers in the patient’s desired geographic area who are participating in the Medicare program and/or providers contracted with the patient’s primary insurance. The Medicare list and quality ratings were obtained from the Medicare.gov [medicare.gov] website.    Concerns / Comments: Paient discharged after CC left yesterday, brother transported. Noted respose to [facility] referral this AM, unable to provide service due to staffing. Pt agreeable to referral to [facility] Phone: [phone]. [facility] able to accept for services, AVS faxed.    Discharge Plan:  Disposition/Destination: Home Health Care - Other / Home       Connection to Community  Not applicable  Community Resources:      Discharge Transportation:  Is Out of Hospital DNR needed at Discharge: no  Does patient need discharge transport? No

## 2024-08-07 ENCOUNTER — TELEPHONE (OUTPATIENT)
Age: 64
End: 2024-08-07

## 2024-08-07 DIAGNOSIS — Z86.73 HISTORY OF TIA (TRANSIENT ISCHEMIC ATTACK): Primary | ICD-10-CM

## 2024-08-07 RX ORDER — PEN NEEDLE, DIABETIC 31 GX5/16"
NEEDLE, DISPOSABLE MISCELLANEOUS
Qty: 100 EACH | Refills: 5 | Status: SHIPPED | OUTPATIENT
Start: 2024-08-07 | End: 2024-09-06

## 2024-08-07 NOTE — TELEPHONE ENCOUNTER
Cinthya Goncalves routed conversation to You17 hours ago (5:16 PM)     Cinthya Contreras7 hours ago (5:15 PM)     [place]  Pt called to [facility] an appt to see Dr. Nash for a HFU ..the patient was in [facility] 8/2/24. Pt was told to follow up with her neurologist at [facility]. In 1-2 wk. However The first available appt is 10/1/24 at 12:30.. I schd the pt and placed her on the waiting list.   As per pt she can not get her license back. Until she sees the neurologist.        -Follow up with your Neurologist at [facility] as discussed in 1-2 weeks.   NO Driving until cleared by your neurologist.          Note

## 2024-08-07 NOTE — TELEPHONE ENCOUNTER
Patient called stating she was seen at hospital last week for possible stroke and needs to be cleared by neurologist to drive.  She is unable to get appointment until November.  Patient would like a referral for another neurologist so she can be seen sooner.  Patient has PCP appointment 8/9.  Please advise and contact patient.

## 2024-08-07 NOTE — TELEPHONE ENCOUNTER
Left message that the Vitamin D is over the counter and if she has any questions to call the office.

## 2024-08-07 NOTE — TELEPHONE ENCOUNTER
Sharri Lowry,     Patient called yesterday and needs a follow up with you, Patient was admitted and consulted by at [facility] but recommended to follow up with you.    That being said because not consulted by a [facility] neurologist patient will need an OVS or OVL with you or who do you recommend?    Can You please advise? Patient was seen for Aphasia...11/15/2024 would be your fist available appt     Thank you in advance for your time and help,     Matilde Lewis with you was on 1/22/2024     History of TIA (transient ischemic attack)  -     aspirin 81 mg chewable tablet; Chew 1 tablet (81 mg total) daily     Mixed hyperlipidemia     Type 2 diabetes mellitus without complication, with long-term current use of insulin (HCC)     Aphasia

## 2024-08-09 ENCOUNTER — OFFICE VISIT (OUTPATIENT)
Dept: FAMILY MEDICINE CLINIC | Facility: CLINIC | Age: 64
End: 2024-08-09
Payer: MEDICARE

## 2024-08-09 ENCOUNTER — TELEPHONE (OUTPATIENT)
Age: 64
End: 2024-08-09

## 2024-08-09 VITALS
WEIGHT: 190.12 LBS | HEIGHT: 67 IN | DIASTOLIC BLOOD PRESSURE: 68 MMHG | BODY MASS INDEX: 29.84 KG/M2 | SYSTOLIC BLOOD PRESSURE: 120 MMHG | TEMPERATURE: 97.1 F | OXYGEN SATURATION: 99 % | HEART RATE: 57 BPM

## 2024-08-09 DIAGNOSIS — Z86.73 HISTORY OF TIA (TRANSIENT ISCHEMIC ATTACK): ICD-10-CM

## 2024-08-09 DIAGNOSIS — Z12.31 SCREENING MAMMOGRAM FOR BREAST CANCER: ICD-10-CM

## 2024-08-09 DIAGNOSIS — Z76.89 ENCOUNTER FOR SUPPORT AND COORDINATION OF TRANSITION OF CARE: Primary | ICD-10-CM

## 2024-08-09 PROCEDURE — 99496 TRANSJ CARE MGMT HIGH F2F 7D: CPT

## 2024-08-09 RX ORDER — CLOPIDOGREL BISULFATE 75 MG/1
75 TABLET ORAL DAILY
Qty: 17 TABLET | Refills: 0 | Status: SHIPPED | OUTPATIENT
Start: 2024-08-09 | End: 2024-08-26

## 2024-08-09 NOTE — PROGRESS NOTES
Transition of Care Visit  Name: Ynes Mendoza      : 1960      MRN: [id number]  Encounter Provider: Taylor Durand DO  Encounter Date: 2024   Encounter department: [facility]    Assessment & Plan   1. Encounter for support and coordination of transition of care  Assessment & Plan:  -hospitalized at [facility] from - for suspected TIA   -discharged with SA, statin, 21-day course of plavix; lost prescription for plavix after taking 2 days   -follow up with neurology scheduled for 2024, on waitlist to be seen sooner. Understands she cannot drive until cleared by neurology     Plan:  Gave new prescription for plavix to complete 21-day course   Given contact info for  additional neurologists in the area to see if she can be seen sooner  Return precautions to ED reviewed   2. History of TIA (transient ischemic attack)  Assessment & Plan:  -[facility] - for [facility] TIA; had 2 similar episodes in 2023 and 2023  -workup in hospital including MRI and CTA negative for CVA  -discharged with SA, statin, 21-day course of plavix; lost prescription for plavix after taking 2 days   -follow up with neurology scheduled for 2024, on waitlist to be seen sooner. Understands she cannot drive until cleared by neurology     Plan:  Gave new prescription for plavix to complete 21-day course   Given contact info for  additional neurologists in the area to see if she can be seen sooner  Return precautions to ED reviewed     Orders:  -     clopidogrel (Plavix) 75 mg tablet; Take 1 tablet (75 mg total) by mouth daily for 17 doses  3. Screening mammogram for breast cancer  -     Mammo screening bilateral w 3d & cad; Future         History of Present Illness     Transitional Care Management Review:   Ynes Mendoza is a 63 y.o. female here for TCM follow up.     During the TCM phone call patient stated:  TCM Call       Date and time call was made  2024  9:51 AM     Hospital care reviewed  Records not available    Patient was hospitialized at  Other (comment)    Comment  [facility]    Date of Admission  08/02/24    Date of discharge  08/05/24    Diagnosis  Possible Stroke    Disposition  Home    Were the patients medications reviewed and updated  Yes    Current Symptoms  None          TCM Call       Post hospital issues  None    Should patient be enrolled in anticoag monitoring?  No    Scheduled for follow up?  Yes    Patients specialists  Neurologist    Did you obtain your prescribed medications  Yes    Do you need help managing your prescriptions or medications  No    Is transportation to your appointment needed  No    I have advised the patient to call PCP with any new or worsening symptoms  Vita MOSLEY, Patient     Are you recieving any outpatient services  No    Are you recieving home care services  No    Are you using any community resources  No    Have you fallen in the last 12 months  No    Interperter language line needed  No          Patient presents for TCM. Patient hospitalized at [facility] from 8/2-8/5/24 for suspected TIA after presenting to the ED on 8/2 with expressive aphasia. Was worked up for CVA during hospitalization. Evaluated by neurology. CTA head and neck showed no large vessel occlusion and MRI was negative. Labwork was within normal limits. Patient was started on ASA, statin, and a 21-day course of plavix for neuro recs. She was discharged to home with plans to follow up with outpatient neurology. Per neuro during hospitalization, if patient has another episode (as she has had 2 episodes of aphasia, confusion prior to this hospitalization- one in Sept 2023 and one in Nov 2023), would consider trial of antiepileptics. Has upcoming appointment with neurology in [facility] but would like to be seen sooner. Has PT and OT appointments scheduled over the next week.     Patient reports since discharge from hospital she is still feeling  "dizzy and lightheaded occasionally. Denies any syncope, headache, blurry vision, additional episodes of aphasia. She also reports she lost the prescription for plavix that she was prescribed on discharge after taking 2 pills, so she missed yesterday's dose.      Review of Systems   Constitutional:  Negative for chills and fever.   HENT:  Negative for ear pain and sore throat.    Eyes:  Negative for pain and visual disturbance.   Respiratory:  Negative for cough and shortness of breath.    Cardiovascular:  Negative for chest pain and palpitations.   Gastrointestinal:  Negative for abdominal pain and vomiting.   Genitourinary:  Negative for dysuria and hematuria.   Musculoskeletal:  Negative for arthralgias and back pain.   Skin:  Negative for color change and rash.   Neurological:  Positive for dizziness. Negative for seizures and syncope.   All other systems reviewed and are negative.    Objective     /68   Pulse 57   Temp (!) 97.1 °F (36.2 °C)   Ht 5' 7\" (1.702 m)   Wt 86.2 kg (190 lb 1.9 oz)   SpO2 99%   BMI 29.78 kg/m²     Physical Exam  Constitutional:       General: She is not in acute distress.     Appearance: Normal appearance. She is not ill-appearing or toxic-appearing.   HENT:      Head: Normocephalic and atraumatic.      Right Ear: External ear normal.      Left Ear: External ear normal.      Nose: Nose normal.   Eyes:      Conjunctiva/sclera: Conjunctivae normal.   Cardiovascular:      Rate and Rhythm: Normal rate and regular rhythm.      Heart sounds: Normal heart sounds. No murmur heard.  Pulmonary:      Effort: Pulmonary effort is normal. No respiratory distress.      Breath sounds: Normal breath sounds. No wheezing, rhonchi or rales.   Musculoskeletal:         General: Normal range of motion.   Skin:     General: Skin is warm and dry.   Neurological:      General: No focal deficit present.      Mental Status: She is alert and oriented to person, place, and time.      Cranial Nerves: " Cranial nerves 2-12 are intact. No cranial nerve deficit, dysarthria or facial asymmetry.      Sensory: Sensation is intact. No sensory deficit.      Motor: No weakness or tremor.      Coordination: Coordination is intact.   Psychiatric:         Mood and Affect: Mood normal.         Behavior: Behavior normal.       Medications have been reviewed by provider in current encounter    Administrative Statements

## 2024-08-09 NOTE — ASSESSMENT & PLAN NOTE
-hospitalized at [facility] from 8/2-8/5 for suspected TIA; had 2 similar episodes in Sept 2023 and Nov 2023  -workup in hospital including MRI and CTA negative for CVA  -discharged with SA, statin, 21-day course of plavix; lost prescription for plavix after taking 2 days   -follow up with neurology scheduled for Nov 2024, on waitlist to be seen sooner. Understands she cannot drive until cleared by neurology     Plan:  Gave new prescription for plavix to complete 21-day course   Given contact info for  additional neurologists in the area to see if she can be seen sooner  Return precautions to ED reviewed

## 2024-08-09 NOTE — ASSESSMENT & PLAN NOTE
-hospitalized [facility] from 8/2-8/5 for suspected TIA   -discharged with SA, statin, 21-day course of plavix; lost prescription for plavix after taking 2 days   -follow up with neurology scheduled for Nov 2024, on waitlist to be seen sooner. Understands she cannot drive until cleared by neurology     Plan:  Gave new prescription for plavix to complete 21-day course   Given contact info for  additional neurologists in the area to see if she can be seen sooner  Return precautions to ED reviewed

## 2024-08-12 ENCOUNTER — TELEPHONE (OUTPATIENT)
Age: 64
End: 2024-08-12

## 2024-08-12 NOTE — TELEPHONE ENCOUNTER
April from [facility], is a occupational therapist that is requesting for Dr. Landon, to be able to place an order for the patient to get a shower chair.     This order would have to be faxed over to [employer] [place], fax: [phone]      For any additional information that is needed, please reach out to April at [phone]

## 2024-08-13 DIAGNOSIS — R26.89 BALANCE PROBLEM: ICD-10-CM

## 2024-08-13 DIAGNOSIS — R55 SYNCOPE, UNSPECIFIED SYNCOPE TYPE: Primary | ICD-10-CM

## 2024-08-13 NOTE — TELEPHONE ENCOUNTER
Called and spoke to Medical records who provided me with the phone number to obtain the images of the pt's MRI    Call Number [phone].    NA, lmom     Will call back to obtain and schedule accordingly per .     Thank you,     Matilde     PLEASE READ BELOW: 'S INSTRUCTIONS AND REQUEST IMAGES OF PT'S MRI!!

## 2024-08-14 NOTE — TELEPHONE ENCOUNTER
Sharri Quinonez Morning,     Can someone in the office please fax and or mail label to [facility] for Images to be sent to ?    Called facility and spoke to Sofia, [facility] uses power share for images... ... And stated if we do not use this system then we can either fax a request to [phone].And or ship an overnight request label to receive it overnight   Mailing address:  [address], [address].

## 2024-08-15 ENCOUNTER — TELEPHONE (OUTPATIENT)
Age: 64
End: 2024-08-15

## 2024-08-16 ENCOUNTER — OFFICE VISIT (OUTPATIENT)
Dept: FAMILY MEDICINE CLINIC | Facility: CLINIC | Age: 64
End: 2024-08-16
Payer: MEDICARE

## 2024-08-16 VITALS
HEART RATE: 60 BPM | SYSTOLIC BLOOD PRESSURE: 116 MMHG | HEIGHT: 67 IN | DIASTOLIC BLOOD PRESSURE: 68 MMHG | BODY MASS INDEX: 30.61 KG/M2 | TEMPERATURE: 96.2 F | WEIGHT: 195 LBS | OXYGEN SATURATION: 99 %

## 2024-08-16 DIAGNOSIS — L03.115 CELLULITIS OF RIGHT LOWER EXTREMITY: Primary | ICD-10-CM
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
## 2024-08-16 PROCEDURE — 99213 OFFICE O/P EST LOW 20 MIN: CPT

## 2024-08-16 RX ORDER — SULFAMETHOXAZOLE/TRIMETHOPRIM 800-160 MG
1 TABLET ORAL 2 TIMES DAILY
Qty: 14 TABLET | Refills: 0 | Status: SHIPPED | OUTPATIENT
Start: 2024-08-16 | End: 2024-08-23

## 2024-08-16 RX ORDER — CLOPIDOGREL BISULFATE 75 MG/1
75 TABLET ORAL DAILY
COMMUNITY
Start: 2024-08-06 | End: 2024-08-25

## 2024-08-16 RX ORDER — DIPHENHYDRAMINE HCL 25 MG
25 CAPSULE ORAL EVERY 6 HOURS PRN
Status: SHIPPED | OUTPATIENT
Start: 2024-08-16

## 2024-08-16 NOTE — ASSESSMENT & PLAN NOTE
-suspect 2/2 insect bite infection on right shin     Plan:  Bactrim BID x7 days as patient has an adverse reaction to keflex   Demarcated area of erythema with pen in office today, return to office/ED if erythema spreads beyond demarcated boarder while taking antibiotic   Benadryl to avoid further scratching   Return to office/ED precautions for expanding area of redness, fever/chills

## 2024-08-16 NOTE — PROGRESS NOTES
Ambulatory Visit  Name: Ynes Mendoza      : 1960      MRN: [id number]  Encounter Provider: Taylor Durand DO  Encounter Date: 2024   Encounter department: [facility]    Assessment & Plan   1. Cellulitis of right lower extremity  Assessment & Plan:  -suspect 2/2 insect bite infection on right shin     Plan:  Bactrim BID x7 days as patient has an adverse reaction to keflex   Demarcated area of erythema with pen in office today, return to office/ED if erythema spreads beyond demarcated boarder while taking antibiotic   Benadryl to avoid further scratching   Return to office/ED precautions for expanding area of redness, fever/chills  Orders:  -     sulfamethoxazole-trimethoprim (BACTRIM DS) 800-160 mg per tablet; Take 1 tablet by mouth 2 (two) times a day for 7 days  2. Bug bite, initial encounter  -     diphenhydrAMINE (BENADRYL) capsule 25 mg       History of Present Illness     Pt presents for multiple itchy bug bites on her upper and lower extremities. Reports she has been constantly scratching them. No fever/chills.     Also notes a large area of red skin on her right shin. States it feels warm and painful/itchy. Denies any fever or chills.    Insect Bite  Associated symptoms include a rash. Pertinent negatives include no abdominal pain, arthralgias, chest pain, chills, coughing, fever, sore throat or vomiting.       Review of Systems   Constitutional:  Negative for chills and fever.   HENT:  Negative for ear pain and sore throat.    Eyes:  Negative for pain and visual disturbance.   Respiratory:  Negative for cough and shortness of breath.    Cardiovascular:  Negative for chest pain and palpitations.   Gastrointestinal:  Negative for abdominal pain and vomiting.   Genitourinary:  Negative for dysuria and hematuria.   Musculoskeletal:  Negative for arthralgias and back pain.   Skin:  Positive for rash. Negative for color change.   Neurological:  Negative for seizures and syncope.  "  All other systems reviewed and are negative.      Objective     /68 (BP Location: Left arm, Patient Position: Sitting, Cuff Size: Large)   Pulse 60   Temp (!) 96.2 °F (35.7 °C) (Tympanic)   Ht 5' 7\" (1.702 m)   Wt 88.5 kg (195 lb)   SpO2 99%   BMI 30.54 kg/m²     Physical Exam  Constitutional:       General: She is not in acute distress.     Appearance: Normal appearance. She is not ill-appearing or toxic-appearing.   HENT:      Head: Normocephalic and atraumatic.      Right Ear: External ear normal.      Left Ear: External ear normal.      Nose: Nose normal.   Eyes:      Conjunctiva/sclera: Conjunctivae normal.   Cardiovascular:      Rate and Rhythm: Normal rate and regular rhythm.      Heart sounds: Normal heart sounds. No murmur heard.  Pulmonary:      Effort: Pulmonary effort is normal. No respiratory distress.      Breath sounds: Normal breath sounds. No wheezing, rhonchi or rales.   Musculoskeletal:         General: Normal range of motion.   Skin:     General: Skin is warm and dry.      Findings: Erythema (warm, erythematous area on right shin surrounding a raised urticarial region with punctate center) and lesion (several circular urticarial lesions with punctate and excoriated centers scatterd on upper abd lower extremities) present.   Neurological:      General: No focal deficit present.      Mental Status: She is alert and oriented to person, place, and time.   Psychiatric:         Mood and Affect: Mood normal.         Behavior: Behavior normal.       Administrative Statements           "

## 2024-08-19 ENCOUNTER — TELEPHONE (OUTPATIENT)
Age: 64
End: 2024-08-19

## 2024-08-20 ENCOUNTER — TELEPHONE (OUTPATIENT)
Age: 64
End: 2024-08-20

## 2024-08-20 NOTE — TELEPHONE ENCOUNTER
Called patient and gave Dr. Landon.s message. Patient said that the ENT she was referred to says that they were not accepting new patients till 2025. Patient said that her insurance sent her a list of ENT's that are close to her in [place].     Patient was requesting a  to try to help her create a path and to help her with appointments as she forgets sometimes. Mainly wants to someone to help her get on the right path.    Patient was given numbers for [facility] [place] [facility] [place]. Patient said that this is to try to help get her license back.

## 2024-08-20 NOTE — TELEPHONE ENCOUNTER
PA for clopidogrel (PLAVIX) 75 mg tablet not required     Reason (screenshot if applicable)          Patient advised by          [x] MyChart Message  [] Phone call   []LMOM  []L/M to call office as no active Communication consent on file  []Unable to leave detailed message as VM not approved on Communication consent       Pharmacy advised by    [x]Fax  []Phone call

## 2024-08-20 NOTE — TELEPHONE ENCOUNTER
Patient calling regarding Neurology referral. Pt soonest appt able to schedule was on 11/29/24 with Dr. Rodriguez. Pt inquiring if PCP able to have patient seen sooner.       Patient requesting referrals for ENT and .      Please review and advise.  Thank you

## 2024-08-26 ENCOUNTER — NURSE TRIAGE (OUTPATIENT)
Age: 64
End: 2024-08-26

## 2024-08-26 ENCOUNTER — TELEPHONE (OUTPATIENT)
Age: 64
End: 2024-08-26

## 2024-08-26 ENCOUNTER — TELEPHONE (OUTPATIENT)
Dept: NEUROLOGY | Facility: CLINIC | Age: 64
End: 2024-08-26

## 2024-08-26 DIAGNOSIS — Z79.4 TYPE 2 DIABETES MELLITUS WITHOUT COMPLICATION, WITH LONG-TERM CURRENT USE OF INSULIN (HCC): Primary | ICD-10-CM

## 2024-08-26 DIAGNOSIS — E11.9 TYPE 2 DIABETES MELLITUS WITHOUT COMPLICATION, WITH LONG-TERM CURRENT USE OF INSULIN (HCC): Primary | ICD-10-CM
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
## 2024-08-26 NOTE — TELEPHONE ENCOUNTER
MSW attempted to reach patient at [phone] to discuss transportation options. No answer. MSW left a message requesting callback. Awaiting same.

## 2024-08-26 NOTE — TELEPHONE ENCOUNTER
"Created new a new encounter from a [employer] inbound:    \"Patient is concerned because she is not working and has to take ubers everywhere. Patient is interested in social work to help schedule transportation for her appointment. Patient is planning on retiring but is questioning if she should be on disability. Patient said to get her license back Dr. Rodriguez will eventually have to fill out three forms.      Patient confirmed the best phone number to reach her is [phone].   "

## 2024-08-27 ENCOUNTER — TELEPHONE (OUTPATIENT)
Age: 64
End: 2024-08-27

## 2024-08-27 LAB
ALBUMIN SERPL-MCNC: 4.5 G/DL (ref 3.9–4.9)
ALP SERPL-CCNC: 51 IU/L (ref 44–121)
ALT SERPL-CCNC: 14 IU/L (ref 0–32)
AST SERPL-CCNC: 17 IU/L (ref 0–40)
BASOPHILS # BLD AUTO: 0.1 X10E3/UL (ref 0–0.2)
BASOPHILS NFR BLD AUTO: 1 %
BILIRUB SERPL-MCNC: 0.8 MG/DL (ref 0–1.2)
BUN SERPL-MCNC: 18 MG/DL (ref 8–27)
BUN/CREAT SERPL: 19 (ref 12–28)
CALCIUM SERPL-MCNC: 10 MG/DL (ref 8.7–10.3)
CHLORIDE SERPL-SCNC: 103 MMOL/L (ref 96–106)
CHOLEST SERPL-MCNC: 160 MG/DL (ref 100–199)
CHOLEST/HDLC SERPL: 2.5 RATIO (ref 0–4.4)
CO2 SERPL-SCNC: 23 MMOL/L (ref 20–29)
CREAT SERPL-MCNC: 0.96 MG/DL (ref 0.57–1)
EGFR: 67 ML/MIN/1.73
EOSINOPHIL # BLD AUTO: 0.2 X10E3/UL (ref 0–0.4)
EOSINOPHIL NFR BLD AUTO: 3 %
ERYTHROCYTE [DISTWIDTH] IN BLOOD BY AUTOMATED COUNT: 12.4 % (ref 11.7–15.4)
EST. AVERAGE GLUCOSE BLD GHB EST-MCNC: 177 MG/DL
GLOBULIN SER-MCNC: 2.7 G/DL (ref 1.5–4.5)
GLUCOSE SERPL-MCNC: 124 MG/DL (ref 70–99)
HBA1C MFR BLD: 7.8 % (ref 4.8–5.6)
HCT VFR BLD AUTO: 39.6 % (ref 34–46.6)
HDLC SERPL-MCNC: 64 MG/DL
HGB BLD-MCNC: 13.1 G/DL (ref 11.1–15.9)
IMM GRANULOCYTES # BLD: 0 X10E3/UL (ref 0–0.1)
IMM GRANULOCYTES NFR BLD: 0 %
LDLC SERPL CALC-MCNC: 78 MG/DL (ref 0–99)
LYMPHOCYTES # BLD AUTO: 1.4 X10E3/UL (ref 0.7–3.1)
LYMPHOCYTES NFR BLD AUTO: 24 %
MCH RBC QN AUTO: 29.2 PG (ref 26.6–33)
MCHC RBC AUTO-ENTMCNC: 33.1 G/DL (ref 31.5–35.7)
MCV RBC AUTO: 88 FL (ref 79–97)
MONOCYTES # BLD AUTO: 0.4 X10E3/UL (ref 0.1–0.9)
MONOCYTES NFR BLD AUTO: 7 %
NEUTROPHILS # BLD AUTO: 3.8 X10E3/UL (ref 1.4–7)
NEUTROPHILS NFR BLD AUTO: 65 %
PLATELET # BLD AUTO: 211 X10E3/UL (ref 150–450)
POTASSIUM SERPL-SCNC: 5.1 MMOL/L (ref 3.5–5.2)
PROT SERPL-MCNC: 7.2 G/DL (ref 6–8.5)
RBC # BLD AUTO: 4.49 X10E6/UL (ref 3.77–5.28)
SL AMB VLDL CHOLESTEROL CALC: 18 MG/DL (ref 5–40)
SODIUM SERPL-SCNC: 142 MMOL/L (ref 134–144)
TRIGL SERPL-MCNC: 97 MG/DL (ref 0–149)
WBC # BLD AUTO: 5.9 X10E3/UL (ref 3.4–10.8)

## 2024-08-27 NOTE — TELEPHONE ENCOUNTER
PT CALLED TO [facility] TRANSPORTATION P/U FOR HER APPT TOMORROW AT 9 AM    I TRIED CALLING THE OFFICE N/A      PLEASE ASSIST,  THANK YOU

## 2024-08-27 NOTE — TELEPHONE ENCOUNTER
"MSW received the following response from the [employer]:    \"Dami Johnson  0830 AM 08/28/24 SCHEDULED LYFT/UBER PICKUP\"    MSW phoned patient at [phone] to make her aware of the  time. Patient is aware to meet the  outside.     Lyft waiver to be signed at time of appt. Patient will  [employer] application at the time of the appt to apply for ongoing transportation.  "

## 2024-08-27 NOTE — TELEPHONE ENCOUNTER
The pt called to move her appt and is rescheduled and is asking for lyft transportation to and from appt if that could be set up. I did make her aware that they would notify her closer to her appointment date.

## 2024-08-27 NOTE — TELEPHONE ENCOUNTER
"MSW sent the following Lyft request to the [employer] Pool:    \"Patients Name: Ynes Mendoza  : 1960  Phone Number: [phone]  Appointment Date: 24  Appointment time: 845AM ARRIVAL, 9AM APPT TIME   Address: [address] [place], [place] [address] [address]  Drop off Facility/Office Name: [facility]  Drop off  Address: [address] [place] [address] [facility] [place] [address]  Cost Center: [id number]  Special notes/directions for  - WILL BE TRAVELING ALONE. WILL BE USING A CANE  Note for scheduling team - PT AWARE TO BE READY 1 HOUR EARLY\"    Awaiting response.     Lyft Qualifier Tool completed. Patient to sign Waiver at time of appt.   "

## 2024-08-27 NOTE — TELEPHONE ENCOUNTER
LATE ENTRY FROM 813AM:    Patient called back to this writer ([phone]). MSW offered a one time, courtesy Lyft ride due to short notice for neurology appt transportation but advised that she will need to consider public transportation for future appts. Patient was agreeable to same. Patient stated that she will be traveling with a cane, will be traveling alone, and that her # is a cell that can receive texts.     MSW advised that this writer will request a Lyft ride for patient's 8/28 appt and that she should plan to be ready 1 hour before the appt. MSW did advise that she will receive a text message closer to the appt with the ETA of the . Patient is aware of the need to sign to the Lyft waiver upon arrival to the office. Patient would also like to  a [employer] application at the time of her appt. MSW will ensure that a copy of the application will be available at the  for patient to .

## 2024-08-27 NOTE — TELEPHONE ENCOUNTER
Pt called in to confirm what time her lyft will be picking her up to take her to her appt with Dr. Rodriguez tomorrow at 9 am.

## 2024-08-28 ENCOUNTER — OFFICE VISIT (OUTPATIENT)
Dept: NEUROLOGY | Facility: CLINIC | Age: 64
End: 2024-08-28
Payer: MEDICARE

## 2024-08-28 VITALS
HEART RATE: 56 BPM | TEMPERATURE: 97.8 F | WEIGHT: 197.1 LBS | BODY MASS INDEX: 30.93 KG/M2 | OXYGEN SATURATION: 98 % | DIASTOLIC BLOOD PRESSURE: 58 MMHG | HEIGHT: 67 IN | SYSTOLIC BLOOD PRESSURE: 110 MMHG

## 2024-08-28 DIAGNOSIS — Z86.73 HISTORY OF TIA (TRANSIENT ISCHEMIC ATTACK): ICD-10-CM

## 2024-08-28 DIAGNOSIS — R40.4 ALTERED SENSORIUM: Primary | ICD-10-CM

## 2024-08-28 DIAGNOSIS — E78.5 HYPERLIPIDEMIA LDL GOAL <100: ICD-10-CM

## 2024-08-28 DIAGNOSIS — Z79.4 TYPE 2 DIABETES MELLITUS WITHOUT COMPLICATION, WITH LONG-TERM CURRENT USE OF INSULIN (HCC): ICD-10-CM

## 2024-08-28 DIAGNOSIS — R42 DIZZINESS: ICD-10-CM

## 2024-08-28 DIAGNOSIS — E78.01 FAMILIAL HYPERCHOLESTEROLEMIA: ICD-10-CM

## 2024-08-28 DIAGNOSIS — E11.9 TYPE 2 DIABETES MELLITUS WITHOUT COMPLICATION, WITH LONG-TERM CURRENT USE OF INSULIN (HCC): ICD-10-CM

## 2024-08-28 PROCEDURE — 99214 OFFICE O/P EST MOD 30 MIN: CPT | Performed by: PSYCHIATRY & NEUROLOGY

## 2024-08-28 RX ORDER — BLOOD-GLUCOSE METER
EACH MISCELLANEOUS
Qty: 1 KIT | Refills: 0 | Status: SHIPPED | OUTPATIENT
Start: 2024-08-28
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
## 2024-08-28 NOTE — TELEPHONE ENCOUNTER
MSW reviewed chart - patient arrived to her appt via Lyft without issue. Lyft waiver was signed and scanned into chart.    MSW provided a copy of the [employer] application to the [facility]  staff and they will provide the application to patient.     The  staff also have the number to call for return Lyft ride home, and will reach out to this writer if they need assistance in scheduling same.    MSW will follow-up with patient next week to see if she was able to complete/submit the [employer] application for processing.

## 2024-08-30 ENCOUNTER — TELEPHONE (OUTPATIENT)
Age: 64
End: 2024-08-30

## 2024-08-30 NOTE — TELEPHONE ENCOUNTER
PA for Contour Next Test Strips SUBMITTED     via    [x]CMM-KEY: [id number]  [][employer]-Case ID #   []Faxed to plan   []Other website   []Phone call Case ID #     Office notes sent, clinical questions answered. Awaiting determination    Turnaround time for your insurance to make a decision on your Prior Authorization can take 7-21 business days.

## 2024-08-30 NOTE — TELEPHONE ENCOUNTER
Patient called requesting a Rolator. The one she is requesting is the Elenker, color purple or blue, adjustable (can raise handle bars), model [id number]. All terrain.    Please advise patient     [phone]

## 2024-08-30 NOTE — TELEPHONE ENCOUNTER
Patient called back and she is asking for a Rolator, she states she can get one at [facility], patient is 5 foot 7.    CB: [phone]

## 2024-09-02 DIAGNOSIS — R26.89 BALANCE PROBLEM: Primary | ICD-10-CM

## 2024-09-02 DIAGNOSIS — R42 DIZZINESS: ICD-10-CM

## 2024-09-02 RX ORDER — CALCIUM CARBONATE 160(400)MG
TABLET,CHEWABLE ORAL DAILY
Qty: 1 EACH | Refills: 0 | Status: SHIPPED | OUTPATIENT
Start: 2024-09-02

## 2024-09-04 NOTE — TELEPHONE ENCOUNTER
MSW attempted to reach patient at [phone] to follow-up about the [employer] application. No answer. MSW left a message requesting callback. Awaiting same.

## 2024-09-05 NOTE — TELEPHONE ENCOUNTER
MSW attempted to reach patient this date at [phone]. No answer. MSW left a detailed message advising that this writer will follow-up with Katty Nelson next week to confirm receipt of her application. MSW did advise that she will need to complete an in-person eval so that Katty Nelson can determine her eligibility for ongoing services. MSW provided this writer's name/direct callback number/hours in case patient had any questions.

## 2024-09-05 NOTE — TELEPHONE ENCOUNTER
"MSW retrieved the following message after hours on 9/4/24 from patient:    \"Merlyn Lopez, this is Ynes Mendoza returning your call. I didn't know what time you left work today. The phone number that was left on my phone is not your phone number. I don't even know what number it is. But anyway, the number I called was the one you left me, [phone]. I had help with my physical therapist that comes to the house to fill out the application. I didn't understand a lot of it so I did fill out the [employer] application. It's now done. So I went and got a [employer] envelope today at [facility] and also she my physical therapist is going to print out because I don't have a printer my my birth certificate. So once I get that back, she's going to drop it off. I think tomorrow 'cause she's coming to the house, then I'll put everything in the envelope and I'll mail it to [employer] tomorrow. And I'm not sure when they'll get it. So I don't know how long it takes, but since I don't have a vehicle and I'm not allowed to drive, I can't I wouldn't be able to get it there sooner. So that's kind of what took me a little bit longer than expected was I didn't understand a lot of the application didn't make any sense to me. So it had help with it. But I will get it back to them as of tomorrow and however long that takes to arrive from where I live here in [place] near [facility] and the post office. Excuse me, that's right there. I'm near the ideal food basket that's I live on [place] [address]. OK, so thank you for checking in. And that's when the application will get mailed. Thank you.\"        "

## 2024-09-06 ENCOUNTER — TELEPHONE (OUTPATIENT)
Age: 64
End: 2024-09-06

## 2024-09-11 ENCOUNTER — OFFICE VISIT (OUTPATIENT)
Dept: ENDOCRINOLOGY | Facility: HOSPITAL | Age: 64
End: 2024-09-11
Payer: MEDICARE

## 2024-09-11 ENCOUNTER — TELEPHONE (OUTPATIENT)
Age: 64
End: 2024-09-11

## 2024-09-11 VITALS
HEART RATE: 56 BPM | SYSTOLIC BLOOD PRESSURE: 122 MMHG | DIASTOLIC BLOOD PRESSURE: 90 MMHG | HEIGHT: 67 IN | BODY MASS INDEX: 31.52 KG/M2 | WEIGHT: 200.8 LBS

## 2024-09-11 DIAGNOSIS — E55.9 VITAMIN D DEFICIENCY: ICD-10-CM

## 2024-09-11 DIAGNOSIS — E78.5 HYPERLIPIDEMIA LDL GOAL <100: ICD-10-CM

## 2024-09-11 DIAGNOSIS — E11.9 TYPE 2 DIABETES MELLITUS WITHOUT COMPLICATION, WITH LONG-TERM CURRENT USE OF INSULIN (HCC): ICD-10-CM

## 2024-09-11 DIAGNOSIS — E78.2 MIXED HYPERLIPIDEMIA: ICD-10-CM

## 2024-09-11 DIAGNOSIS — Z79.4 TYPE 2 DIABETES MELLITUS WITHOUT COMPLICATION, WITH LONG-TERM CURRENT USE OF INSULIN (HCC): ICD-10-CM

## 2024-09-11 DIAGNOSIS — E11.65 TYPE 2 DIABETES MELLITUS WITH HYPERGLYCEMIA, WITH LONG-TERM CURRENT USE OF INSULIN (HCC): Primary | ICD-10-CM

## 2024-09-11 DIAGNOSIS — E78.01 FAMILIAL HYPERCHOLESTEROLEMIA: ICD-10-CM

## 2024-09-11 DIAGNOSIS — I10 ESSENTIAL HYPERTENSION: ICD-10-CM

## 2024-09-11 DIAGNOSIS — Z79.4 TYPE 2 DIABETES MELLITUS WITH HYPERGLYCEMIA, WITH LONG-TERM CURRENT USE OF INSULIN (HCC): Primary | ICD-10-CM

## 2024-09-11 PROCEDURE — 99214 OFFICE O/P EST MOD 30 MIN: CPT | Performed by: NURSE PRACTITIONER
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
## 2024-09-11 NOTE — TELEPHONE ENCOUNTER
Shared following message with the patient  [facility]   to Ynes Mendoza and proxy (Gaurav Mendoza)   MM      9/3/24  6:43 PM  John Quick,     Dr. Landon faxed the order to [facility] for the rolater.    This [facility]'s MyChart message has not been read.    Patient was returning call that she received during weekend. Could not see encounter.    Please call and advise her accordingly.  Thank you!!

## 2024-09-11 NOTE — PATIENT INSTRUCTIONS
Be mindful of diet.     Stay active and stay hydrated.       Hemoglobin A1c is 7.8.     For now, continue your current dose of Metformin, Glimepiride and Levemir.     We placed a DEX COM G7 sensor on you today.  Please come back in 10 days for removal and sensor report download (around September 24, 2024).     Supplement with 2000 units of vitamin D3 daily - consistently.     Continue Crestor 5 mg daily.     Obtain lab work as prescribed.

## 2024-09-13 NOTE — TELEPHONE ENCOUNTER
MSW phoned Katty Nelson at [phone], ext [phone], and spoke with Gutierrez. He stated that patient is scheduled for her in-person evaluation on 9/23. JANET will follow-up with Katty Nelson on 10/14, as Katty Nelson has 3 weeks to determine patient's eligibility after her in-person evaluation.

## 2024-09-16 ENCOUNTER — EVALUATION (OUTPATIENT)
Dept: PHYSICAL THERAPY | Facility: CLINIC | Age: 64
End: 2024-09-16
Payer: MEDICARE

## 2024-09-16 ENCOUNTER — TELEPHONE (OUTPATIENT)
Dept: NEUROLOGY | Facility: CLINIC | Age: 64
End: 2024-09-16

## 2024-09-16 DIAGNOSIS — R42 DIZZINESS: ICD-10-CM

## 2024-09-16 DIAGNOSIS — R40.4 ALTERED SENSORIUM: ICD-10-CM

## 2024-09-16 DIAGNOSIS — Z74.09 IMPAIRED FUNCTIONAL MOBILITY, BALANCE, AND ENDURANCE: Primary | ICD-10-CM

## 2024-09-16 PROCEDURE — 97162 PT EVAL MOD COMPLEX 30 MIN: CPT

## 2024-09-16 NOTE — TELEPHONE ENCOUNTER
Pt stopped by to check on AMB extended holter monitor.  Explained can take 2-3 weeks and is in prior auth review. Gave phone number [phone]
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
## 2024-09-17 ENCOUNTER — TELEPHONE (OUTPATIENT)
Age: 64
End: 2024-09-17

## 2024-09-17 NOTE — TELEPHONE ENCOUNTER
I was able to call the patient back and give her the information that she was asking about concerning the dexcom rep

## 2024-09-18 ENCOUNTER — TELEPHONE (OUTPATIENT)
Age: 64
End: 2024-09-18

## 2024-09-18 NOTE — TELEPHONE ENCOUNTER
Patient called she stated that she is eligible  for  dexcom  and would like for  sowmya  to  please  send a script  to   [facility] in [address].

## 2024-09-19 DIAGNOSIS — Z79.4 TYPE 2 DIABETES MELLITUS WITH HYPERGLYCEMIA, WITH LONG-TERM CURRENT USE OF INSULIN (HCC): Primary | ICD-10-CM

## 2024-09-19 DIAGNOSIS — E11.65 TYPE 2 DIABETES MELLITUS WITH HYPERGLYCEMIA, WITH LONG-TERM CURRENT USE OF INSULIN (HCC): Primary | ICD-10-CM

## 2024-09-19 RX ORDER — ACYCLOVIR 400 MG/1
1 TABLET ORAL
Qty: 3 EACH | Refills: 6 | Status: SHIPPED | OUTPATIENT
Start: 2024-09-19

## 2024-09-20 ENCOUNTER — TELEPHONE (OUTPATIENT)
Age: 64
End: 2024-09-20

## 2024-09-20 NOTE — TELEPHONE ENCOUNTER
She can take the sensor off and when she comes in on Tuesday we can retrieve the information from the  that we provided.

## 2024-09-20 NOTE — TELEPHONE ENCOUNTER
"Patient calling she states she was given a dexcom sensor and is scheduled to come into the office Tuesday to have it removed. She states this was just a \"trial\". She states the dexcom sensor is alarming and telling her it expires in 24 hours and she is asking what she should do?  Please advise  "

## 2024-09-24 ENCOUNTER — OFFICE VISIT (OUTPATIENT)
Dept: PHYSICAL THERAPY | Facility: CLINIC | Age: 64
End: 2024-09-24
Payer: MEDICARE

## 2024-09-24 DIAGNOSIS — R40.4 ALTERED SENSORIUM: ICD-10-CM

## 2024-09-24 DIAGNOSIS — R42 DIZZINESS: ICD-10-CM

## 2024-09-24 DIAGNOSIS — Z74.09 IMPAIRED FUNCTIONAL MOBILITY, BALANCE, AND ENDURANCE: Primary | ICD-10-CM

## 2024-09-24 PROCEDURE — 97112 NEUROMUSCULAR REEDUCATION: CPT

## 2024-09-24 NOTE — TELEPHONE ENCOUNTER
Patient dropped off the trial Dexcom .  Zeny will download it & pass on to Ha for review.    While she was doing the trial, she got her own personal dexcom.  We gave her the number to call to schedule a diabetic education appointment with Zeny for a personal Dexcom set up.

## 2024-09-24 NOTE — PROGRESS NOTES
"Daily Note     Today's date: 2024  Patient name: Ynes Mendoza  : 1960  MRN: [id number]  Referring provider: Spenser Rodriguez MD  Dx:   Encounter Diagnosis     ICD-10-CM    1. Impaired functional mobility, balance, and endurance  Z74.09       2. Dizziness  R42       3. Altered sensorium  R40.4                      Subjective: reports that she has been getting some spots in her vision today. She states she feels a little dizzy today. Reports that she received a rollator and it has been working well.       Objective: See treatment diary below  3MBWT: 7.9 seconds no AD  0.49m/s SSGS  TU.8 seconds with rollator, 22.0 seconds no AD  FGA     MCTSIB:  30 seconds  15 seconds  30 seconds  12 seconds      Assessment: Tolerated treatment well. Patient demonstrated fatigue post treatment, exhibited good technique with therapeutic exercises, and would benefit from continued PT. Further balance testing was completed, demonstrated increased fall risk from SSGS, TUG time, FGA, and increased visual dependence and decreased vestibular integration. Added exercises in // bars with significant hesitancy and decreased gait speed with ambulation with HT/HN with swaying feeling, not necessarily dizziness. Reviewed VOR x 1 seated for home exercise program and educated on importance of visual stabilization with head turns. Continue to progress as tolerated.      Plan: Continue per plan of care.      POC expires Unit limit Auth Expiration date PT/OT/ST + Visit Limit?   24 N/a TBD BOMN                           Visit/Unit Tracking  AUTH Status:  Date             8 authorized Used 1 2             Remaining  TBD 6                  Precautions: DMII, PTSD, anxiety, syncope      Manuals                                                                 Neuro Re-Ed FGA  MCTSIB  TUG  SSGS            VOR x 1 Seated VOR x 1 30\" x 3 side/side            Imaginary targets             HT/HN ambulation // bars HT/HN " x 3 laps each                                                                                          Ther Ex                                                                                           Ther Activity                                       Gait Training                                       Education Balance systems, VOR, gaze stabilization

## 2024-09-26 ENCOUNTER — OFFICE VISIT (OUTPATIENT)
Dept: DIABETES SERVICES | Facility: HOSPITAL | Age: 64
End: 2024-09-26
Payer: MEDICARE

## 2024-09-26 ENCOUNTER — TELEPHONE (OUTPATIENT)
Age: 64
End: 2024-09-26

## 2024-09-26 VITALS — HEIGHT: 67 IN | BODY MASS INDEX: 31.39 KG/M2 | WEIGHT: 200 LBS

## 2024-09-26 DIAGNOSIS — Z79.4 TYPE 2 DIABETES MELLITUS WITHOUT COMPLICATION, WITH LONG-TERM CURRENT USE OF INSULIN (HCC): Primary | ICD-10-CM

## 2024-09-26 DIAGNOSIS — E11.9 TYPE 2 DIABETES MELLITUS WITHOUT COMPLICATION, WITH LONG-TERM CURRENT USE OF INSULIN (HCC): Primary | ICD-10-CM

## 2024-09-26 PROCEDURE — 95249 CONT GLUC MNTR PT PROV EQP: CPT | Performed by: DIETITIAN, REGISTERED

## 2024-09-26 NOTE — PROGRESS NOTES
Dexcom G7 Personal Training    Met with Ynes Mendoza for Dexcom G7 personal training. Patient comes in today with there own unit to be trained on. Completed all aspects of training, including site selection on rotation, not infusing insulin near the sensor site, proper insertion technique, inserting codes into the , charging, waterproof sensor, range of 20ft, setting high low alarms that can be adjusted based on their preferences. They put on their first sensor by themselves with no issue.  Left my office today with sensor on and in 30 min warm up mode.      Ynes Mendoza will be running the dexcom through their .    Discussed creating a [employer] account to be able to link and upload from home or auto upload from their phone. Patient was added to our clarity account today while in office and invited to link to us if using their phone. Patient understands that reciever will be downloaded while in office at time of visit and/or cell phone will automatically upload to our clarity for them. They understand that their blood sugars are not monitored by us on a regular basis, but that we can access them as needed or desired by the patient and provider.     Dexcom's phone number is in their paperwork, encouraged Ynes to reach out to [employer] if they have any issues after hours, 24/7. Training completed, will call with questions.     Lab Results   Component Value Date    HGBA1C 7.8 (H) 08/26/2024       Lab Results   Component Value Date    SODIUM 142 08/26/2024    K 5.1 08/26/2024     08/26/2024    CO2 23 08/26/2024    BUN 18 08/26/2024    CREATININE 0.96 08/26/2024    GLUC 124 (H) 08/26/2024    AST 17 08/26/2024    ALT 14 08/26/2024    TP 7.2 08/26/2024    TBILI 0.8 08/26/2024    EGFR 67 08/26/2024           Patient response to instruction    Comprehension: good  Motivation: good  Expected Compliance: good  Response to Teachback: 100%, demonstrated understanding    Thank you for referring your patient to   [facility], it was a pleasure working with them today. Please feel free to call with any questions or concerns.

## 2024-09-26 NOTE — Clinical Note
Dexcom personal training completed. Can I please get a referral for dexcom personal training for insurance purposes? Dx code E11.9 from chart please. Thanks! -Zeny

## 2024-09-27 ENCOUNTER — TELEPHONE (OUTPATIENT)
Age: 64
End: 2024-09-27

## 2024-09-27 ENCOUNTER — OFFICE VISIT (OUTPATIENT)
Dept: PHYSICAL THERAPY | Facility: CLINIC | Age: 64
End: 2024-09-27
Payer: MEDICARE

## 2024-09-27 DIAGNOSIS — Z74.09 IMPAIRED FUNCTIONAL MOBILITY, BALANCE, AND ENDURANCE: Primary | ICD-10-CM

## 2024-09-27 DIAGNOSIS — R40.4 ALTERED SENSORIUM: ICD-10-CM

## 2024-09-27 DIAGNOSIS — R42 DIZZINESS: ICD-10-CM

## 2024-09-27 PROCEDURE — 97112 NEUROMUSCULAR REEDUCATION: CPT

## 2024-09-27 NOTE — PROGRESS NOTES
"Daily Note     Today's date: 2024  Patient name: Ynes Mendoza  : 1960  MRN: [id number]  Referring provider: Spenser Rodriguez MD  Dx:   Encounter Diagnosis     ICD-10-CM    1. Impaired functional mobility, balance, and endurance  Z74.09       2. Dizziness  R42       3. Altered sensorium  R40.4                        Subjective: reports that she felt tired after last session and went to sleep when she got home last time.      Objective: See treatment diary below      Assessment: Tolerated treatment well. Patient demonstrated fatigue post treatment, exhibited good technique with therapeutic exercises, and would benefit from continued PT. Added additional balance activities during session, most difficulty with HKM and lateral hurdles. Increased challenge with lateral hurdles moving to the L side> R side. Fatigue in hips during exercise, and reported stiffness in her neck during head turns (improved with repetitions). Some difficulty maintaining eyes on ball with throws, improved with cuing and repetitions. Continue to progress as tolerated.      Plan: Continue per plan of care.      POC expires Unit limit Auth Expiration date PT/OT/ST + Visit Limit?   24 N/a TBD BOMN                           Visit/Unit Tracking  AUTH Status:  Date            8 authorized Used 1 2 3            Remaining  TBD 6 5                 Precautions: DMII, PTSD, anxiety, syncope      Manuals                                                      Neuro Re-Ed FGA  MCTSIB  TUG  SSGS          VOR x 1 Seated VOR x 1 30\" x 3 side/side Standing ball toss side/side, feet apart x 30 reps         Imaginary targets           HT/HN ambulation // bars HT/HN x 3 laps each // bars (long) HT/HN x 3 laps each         sidesteps  // bars (long) x 3 laps         HKM  // bars (long) x 3 laps         hurdles  // bars (long) 6-inch (6) step-to pattern forward x 3 laps  Lateral x 3 laps         Static balance  FAEC 30\" x 3 " "  FAEO with HT/HN 30\" x 3 each                               Ther Ex                                                                             Ther Activity                                 Gait Training                                 Education Balance systems, VOR, gaze stabilization                                     "

## 2024-09-27 NOTE — TELEPHONE ENCOUNTER
Pt spoke with counselor Loni Carlos at [facility] who informed pt that starting October 1st she would be losing her medicaid assistance and pt is too young to qualify for medicare. He informed her unless she can get a letter from Dr. Rodriguez stating that pt is on a treatment plan and needs to continue to see us faxed to his office . Pt confirmed the letter needs to include her record #[id number] and pt would like a copy of the letter uploaded to her [employer] as well. Letter is needed for today.        P: [phone]  fax#[phone]

## 2024-10-01 NOTE — TELEPHONE ENCOUNTER
Letter has been generated and updated in [employer]. Letter has also been faxed to given number. Sent [employer] message to pt to be made aware.

## 2024-10-02 ENCOUNTER — OFFICE VISIT (OUTPATIENT)
Dept: PHYSICAL THERAPY | Facility: CLINIC | Age: 64
End: 2024-10-02
Payer: MEDICARE

## 2024-10-02 DIAGNOSIS — R42 DIZZINESS: ICD-10-CM

## 2024-10-02 DIAGNOSIS — Z74.09 IMPAIRED FUNCTIONAL MOBILITY, BALANCE, AND ENDURANCE: Primary | ICD-10-CM

## 2024-10-02 DIAGNOSIS — R40.4 ALTERED SENSORIUM: ICD-10-CM

## 2024-10-02 PROCEDURE — 97110 THERAPEUTIC EXERCISES: CPT

## 2024-10-02 PROCEDURE — 97112 NEUROMUSCULAR REEDUCATION: CPT

## 2024-10-02 NOTE — PROGRESS NOTES
"Daily Note     Today's date: 10/2/2024  Patient name: Ynes Mendoza  : 1960  MRN: [id number]  Referring provider: Spenser Rodriguez MD  Dx:   Encounter Diagnosis     ICD-10-CM    1. Impaired functional mobility, balance, and endurance  Z74.09       2. Dizziness  R42       3. Altered sensorium  R40.4                          Subjective: reports she felt tired after last session, doesn't think she did too much. States she goes for her sleep study tomorrow.      Objective: See treatment diary below      Assessment: Tolerated treatment well. Patient demonstrated fatigue post treatment, exhibited good technique with therapeutic exercises, and would benefit from continued PT. Continues to have instability with ambulation with head movements, however appears to have increased speed with decreased stopping compared to previous sessions. Improved stability with hurdles with decreased need for UE support on // bars. Added nustep for increased endurance at end of session, tolerated well with some fatigue. Continue to progress as tolerated.      Plan: Continue per plan of care.      POC expires Unit limit Auth Expiration date PT/OT/ST + Visit Limit?   24 N/a 11/15 BOMN                           Visit/Unit Tracking  AUTH Status:  Date 9/16 9/24 9/27 10/2          8 authorized Used 1 2 3 4           Remaining  TBD 6 5 4                Precautions: DMII, PTSD, anxiety, syncope      Manuals 9/24 9/27 10/2                                                    Neuro Re-Ed FGA  MCTSIB  TUG  SSGS          VOR x 1 Seated VOR x 1 30\" x 3 side/side Standing ball toss side/side, feet apart x 30 reps         Imaginary targets           HT/HN ambulation // bars HT/HN x 3 laps each // bars (long) HT/HN x 3 laps each // bars (long) HT/HN x 3 laps each        sidesteps  // bars (long) x 3 laps // bars (long) x 3 laps        HKM  // bars (long) x 3 laps // bars (long) x 3 laps        hurdles  // bars (long) 6-inch (6) step-to pattern " "forward x 3 laps  Lateral x 3 laps // bars (long) 6-inch (6) step-to pattern forward x 3 laps  Lateral x 3 laps        Static balance  FAEC 30\" x 3   FAEO with HT/HN 30\" x 3 each                               Ther Ex           nustep   L6 for 8 minutes                                                               Ther Activity                                 Gait Training                                 Education Balance systems, VOR, gaze stabilization                                     "

## 2024-10-03 ENCOUNTER — HOSPITAL ENCOUNTER (OUTPATIENT)
Dept: NEUROLOGY | Facility: CLINIC | Age: 64
End: 2024-10-03
Payer: MEDICARE

## 2024-10-03 DIAGNOSIS — R40.4 ALTERED SENSORIUM: ICD-10-CM

## 2024-10-03 PROCEDURE — 95819 EEG AWAKE AND ASLEEP: CPT | Performed by: PSYCHIATRY & NEUROLOGY

## 2024-10-03 PROCEDURE — 95819 EEG AWAKE AND ASLEEP: CPT

## 2024-10-04 ENCOUNTER — OFFICE VISIT (OUTPATIENT)
Dept: PHYSICAL THERAPY | Facility: CLINIC | Age: 64
End: 2024-10-04
Payer: MEDICARE

## 2024-10-04 DIAGNOSIS — Z74.09 IMPAIRED FUNCTIONAL MOBILITY, BALANCE, AND ENDURANCE: Primary | ICD-10-CM

## 2024-10-04 DIAGNOSIS — R40.4 ALTERED SENSORIUM: ICD-10-CM

## 2024-10-04 DIAGNOSIS — R42 DIZZINESS: ICD-10-CM

## 2024-10-04 PROCEDURE — 97110 THERAPEUTIC EXERCISES: CPT

## 2024-10-04 PROCEDURE — 97112 NEUROMUSCULAR REEDUCATION: CPT

## 2024-10-04 NOTE — PROGRESS NOTES
"Daily Note     Today's date: 10/4/2024  Patient name: Ynes Mendoza  : 1960  MRN: [id number]  Referring provider: Spenser Rodriguez MD  Dx:   Encounter Diagnosis     ICD-10-CM    1. Impaired functional mobility, balance, and endurance  Z74.09       2. Dizziness  R42       3. Altered sensorium  R40.4                            Subjective:  reports that she was tired after last session and went to sleep after therapy. States that she is having some cramping at the arch of the R foot today.      Objective: See treatment diary below      Assessment: Tolerated treatment well. Patient demonstrated fatigue post treatment, exhibited good technique with therapeutic exercises, and would benefit from continued PT. Started with nustep during session, tolerated 10 minutes well with reporting decreased cramping of foot. Continued balance activities in // bars with minimal UE support during session. Improved balance with HKM and sidesteps. Continues to have instability with lateral>forward hurdles. Discussed vestibular integration and VOR deficits with head impulse testing and how this relates to functional mobility with crossing street, grocery shopping etc. Continue to progress as tolerated.        Plan: Continue per plan of care.      POC expires Unit limit Auth Expiration date PT/OT/ST + Visit Limit?   24 N/a 11/15 BOMN                           Visit/Unit Tracking  AUTH Status:  Date 9/16 9/24 9/27 10/2 10/4         8 authorized Used 1 2 3 4 5          Remaining  TBD 6 5 4 5               Precautions: DMII, PTSD, anxiety, syncope      Manuals 9/24 9/27 10/2 10/4                                         Neuro Re-Ed FGA  MCTSIB  TUG  SSGS        VOR x 1 Seated VOR x 1 30\" x 3 side/side Standing ball toss side/side, feet apart x 30 reps       Imaginary targets         HT/HN ambulation // bars HT/HN x 3 laps each // bars (long) HT/HN x 3 laps each // bars (long) HT/HN x 3 laps each // bars (long) HT/HN x 3 laps each  " "   sidesteps  // bars (long) x 3 laps // bars (long) x 3 laps // bars (long) x 3 laps     HKM  // bars (long) x 3 laps // bars (long) x 3 laps // bars (long) x 3 laps     hurdles  // bars (long) 6-inch (6) step-to pattern forward x 3 laps  Lateral x 3 laps // bars (long) 6-inch (6) step-to pattern forward x 3 laps  Lateral x 3 laps // bars (long) 6-inch (6) step-to pattern forward x 3 laps    Lateral x 3 laps     Static balance  FAEC 30\" x 3   FAEO with HT/HN 30\" x 3 each                         Ther Ex         nustep   L6 for 8 minutes L6 for 10 minutes                                                  Ther Activity                           Gait Training                           Education Balance systems, VOR, gaze stabilization   Vestibular integration and VOR, update to HEP with sidestepping over shoe box at kitchen counter, safety within house with exercises                            "

## 2024-10-09 ENCOUNTER — OFFICE VISIT (OUTPATIENT)
Dept: PHYSICAL THERAPY | Facility: CLINIC | Age: 64
End: 2024-10-09
Payer: MEDICARE

## 2024-10-09 DIAGNOSIS — R42 DIZZINESS: ICD-10-CM

## 2024-10-09 DIAGNOSIS — R40.4 ALTERED SENSORIUM: ICD-10-CM

## 2024-10-09 DIAGNOSIS — Z74.09 IMPAIRED FUNCTIONAL MOBILITY, BALANCE, AND ENDURANCE: Primary | ICD-10-CM

## 2024-10-09 DIAGNOSIS — I49.9 IRREGULAR HEART BEAT: Primary | ICD-10-CM

## 2024-10-09 PROCEDURE — 97112 NEUROMUSCULAR REEDUCATION: CPT

## 2024-10-09 PROCEDURE — 97110 THERAPEUTIC EXERCISES: CPT

## 2024-10-09 NOTE — PROGRESS NOTES
"Daily Note     Today's date: 10/9/2024  Patient name: Ynes Mendoza  : 1960  MRN: [id number]  Referring provider: Spenser Rodriguez MD  Dx:   Encounter Diagnosis     ICD-10-CM    1. Impaired functional mobility, balance, and endurance  Z74.09       2. Dizziness  R42       3. Altered sensorium  R40.4                              Subjective:  reports she was tired after last session. States that her balance feels off today, like \"its a gear behind\"      Objective: See treatment diary below      Assessment: Tolerated treatment well. Patient demonstrated fatigue post treatment, exhibited good technique with therapeutic exercises, and would benefit from continued PT. Patient reporting increased instability during session, however tolerated treatment well, requiring 1-finger support during HKM. Continue to progress as tolerated.        Plan: Continue per plan of care.      POC expires Unit limit Auth Expiration date PT/OT/ST + Visit Limit?   24 N/a 11/15 BOMN                           Visit/Unit Tracking  AUTH Status:  Date 9/16 9/24 9/27 10/2 10/4 10/9        8 authorized Used 1 2 3 4 5 6         Remaining  TBD 6 5 4 3 2              Precautions: DMII, PTSD, anxiety, syncope      Manuals 9/24 9/27 10/2 10/4 10/9                                        Neuro Re-Ed FGA  MCTSIB  TUG  SSGS        VOR x 1 Seated VOR x 1 30\" x 3 side/side Standing ball toss side/side, feet apart x 30 reps       Imaginary targets         HT/HN ambulation // bars HT/HN x 3 laps each // bars (long) HT/HN x 3 laps each // bars (long) HT/HN x 3 laps each // bars (long) HT/HN x 3 laps each // bars (long) HT/HN x 3 laps    sidesteps  // bars (long) x 3 laps // bars (long) x 3 laps // bars (long) x 3 laps // bars (long) x 3 laps    HKM  // bars (long) x 3 laps // bars (long) x 3 laps // bars (long) x 3 laps // bars (long) x 3 laps, finger touch prn    hurdles  // bars (long) 6-inch (6) step-to pattern forward x 3 laps  Lateral x 3 laps // " "bars (long) 6-inch (6) step-to pattern forward x 3 laps  Lateral x 3 laps // bars (long) 6-inch (6) step-to pattern forward x 3 laps    Lateral x 3 laps // bars (long) 6-inch (6) step-to pattern forward x 3 laps    Static balance  FAEC 30\" x 3   FAEO with HT/HN 30\" x 3 each                         Ther Ex         nustep   L6 for 8 minutes L6 for 10 minutes L6 for 10 minutes                                                 Ther Activity                           Gait Training                           Education Balance systems, VOR, gaze stabilization   Vestibular integration and VOR, update to HEP with sidestepping over shoe box at kitchen counter, safety within house with exercises                            "

## 2024-10-11 ENCOUNTER — EVALUATION (OUTPATIENT)
Dept: PHYSICAL THERAPY | Facility: CLINIC | Age: 64
End: 2024-10-11
Payer: MEDICARE

## 2024-10-11 DIAGNOSIS — R42 DIZZINESS: ICD-10-CM

## 2024-10-11 DIAGNOSIS — Z74.09 IMPAIRED FUNCTIONAL MOBILITY, BALANCE, AND ENDURANCE: Primary | ICD-10-CM

## 2024-10-11 DIAGNOSIS — R40.4 ALTERED SENSORIUM: ICD-10-CM

## 2024-10-11 PROCEDURE — 97112 NEUROMUSCULAR REEDUCATION: CPT
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
## 2024-10-14 ENCOUNTER — OFFICE VISIT (OUTPATIENT)
Dept: PHYSICAL THERAPY | Facility: CLINIC | Age: 64
End: 2024-10-14
Payer: MEDICARE

## 2024-10-14 DIAGNOSIS — R42 DIZZINESS: ICD-10-CM

## 2024-10-14 DIAGNOSIS — Z74.09 IMPAIRED FUNCTIONAL MOBILITY, BALANCE, AND ENDURANCE: Primary | ICD-10-CM

## 2024-10-14 DIAGNOSIS — R40.4 ALTERED SENSORIUM: ICD-10-CM

## 2024-10-14 PROCEDURE — 97112 NEUROMUSCULAR REEDUCATION: CPT

## 2024-10-14 NOTE — PROGRESS NOTES
"Daily Note     Today's date: 10/14/2024  Patient name: Ynes Mendoza  : 1960  MRN: [id number]  Referring provider: Spenser Rodriguez MD  Dx:   Encounter Diagnosis     ICD-10-CM    1. Impaired functional mobility, balance, and endurance  Z74.09       2. Dizziness  R42       3. Altered sensorium  R40.4                      Subjective: reports that her hips are sore today, not sure why. Notices that she is doing better with getting in and out of the house.       Objective: See treatment diary below      Assessment: Tolerated treatment well. Patient demonstrated fatigue post treatment, exhibited good technique with therapeutic exercises, and would benefit from continued PT. Demonstrating improved stability with HKM during session, able to perform without UE support. Able to progress hurdles to step over step pattern without holding on. Increased challenge with lateral hurdles than forward hurdles, some difficulty with foot placement. Added stepping onto foam pads with good balance, increased challenge with lateral. Continue to progress as tolerated.      Plan: Continue per plan of care.  Trial SOLO NV as able     POC expires Unit limit Auth Expiration date PT/OT/ST + Visit Limit?   24 N/a 11/15 BOMN                           Visit/Unit Tracking  AUTH Status:  Date 9/16 9/24 9/27 10/2 10/4 10/9 10/11 10/14      8 authorized Used 1 2 3 4 5 6 7 8       Remaining  TBD 6 5 4 3 2 1 0            Precautions: DMII, PTSD, anxiety, syncope      Manuals 9/24 9/27 10/2 10/4 10/9 10/11 10/14                                           Neuro Re-Ed FGA  MCTSIB  TUG  SSGS     FGA  MCTSIB  TUG  SSGS  DHI    VOR x 1 Seated VOR x 1 30\" x 3 side/side Standing ball toss side/side, feet apart x 30 reps        Imaginary targets          HT/HN ambulation // bars HT/HN x 3 laps each // bars (long) HT/HN x 3 laps each // bars (long) HT/HN x 3 laps each // bars (long) HT/HN x 3 laps each // bars (long) HT/HN x 3 laps  // bars (long) x " "3 laps each HT/HN   sidesteps  // bars (long) x 3 laps // bars (long) x 3 laps // bars (long) x 3 laps // bars (long) x 3 laps  // bars (long) x 3 laps   HKM  // bars (long) x 3 laps // bars (long) x 3 laps // bars (long) x 3 laps // bars (long) x 3 laps, finger touch prn  // bars (long) x 3 laps, no UE support   hurdles  // bars (long) 6-inch (6) step-to pattern forward x 3 laps  Lateral x 3 laps // bars (long) 6-inch (6) step-to pattern forward x 3 laps  Lateral x 3 laps // bars (long) 6-inch (6) step-to pattern forward x 3 laps    Lateral x 3 laps // bars (long) 6-inch (6) step-to pattern forward x 3 laps  // bars (long) 6-inch (7) step over step forward x 3 laps    Lateral x 3 laps   Static balance  FAEC 30\" x 3   FAEO with HT/HN 30\" x 3 each        tandem      // bars (short) x 3 laps    foam       // bars (long) up and over (3) foam pads forward x 3 laps    Lateral x 3 laps   Ther Ex          nustep   L6 for 8 minutes L6 for 10 minutes L6 for 10 minutes  L6 for 10 minutes                                                     Ther Activity                              Gait Training                              Education Balance systems, VOR, gaze stabilization   Vestibular integration and VOR, update to HEP with sidestepping over shoe box at kitchen counter, safety within house with exercises  Balance systems, progress with therapy, POC                           "

## 2024-10-14 NOTE — TELEPHONE ENCOUNTER
MSW phoned [employer] at [phone], ext [phone], to request status of application. Call disconnected x2. MSW called back to [phone], ext [phone]. No answer. MSW left a message requesting callback. Awaiting same.

## 2024-10-15 ENCOUNTER — TELEPHONE (OUTPATIENT)
Age: 64
End: 2024-10-15

## 2024-10-15 NOTE — TELEPHONE ENCOUNTER
Patient states she put Dex[employer] sensor in arm and it did not pair with device. States she then got a message regarding a new  and the sensor would not work.  States this was the 3rd sensor she had. Asking if she should  another sensor or bring in the other two for review. Please advise, thank you.

## 2024-10-15 NOTE — TELEPHONE ENCOUNTER
Pt called as she states she just missed a call from Dr. Rodriguez's office (per caller ID). Pt did not listen to the vm, she just hit [facility] to call our office. Per chart review, no documented call from Neurology today. Pt verbalized understanding and will go back and listen to . She will call our office if needed.

## 2024-10-15 NOTE — TELEPHONE ENCOUNTER
Returned call to patient. She forgot that I had told her that she can call [employer] for a replacement for a failed sensor. She will do that versus driving down to our office to get one, as she does not drive and would need to get an uber. If she changes her mind, she can call us and I'm happy to put one at the .

## 2024-10-15 NOTE — TELEPHONE ENCOUNTER
MSW received callback from Gutierrez at [facility] ([phone], ext [phone]). He stated that patient is registered with them under [employer] as of 10/1/24, so she is good until 10/1/27. Gutierrez stated that she has free medical trips. Gutierrez stated that patient should be receiving a Welcome Packet with rider guide in the mail soon, if she has not already.    MSW attempted to reach patient at [phone] to make her of above. No answer. MSW left a detailed message with above. MSW advised that she can call [phone] to scheduled rides 2-14 days before the appt, and that she will need to provide the appt date/time/location/duration to schedule. MSW offered to call with patient to schedule transportation to her 11/11/24 neurology appt, if needed. MSW requested a callback. Awaiting same.

## 2024-10-16 ENCOUNTER — OFFICE VISIT (OUTPATIENT)
Dept: PHYSICAL THERAPY | Facility: CLINIC | Age: 64
End: 2024-10-16
Payer: MEDICARE

## 2024-10-16 DIAGNOSIS — R42 DIZZINESS: ICD-10-CM

## 2024-10-16 DIAGNOSIS — R40.4 ALTERED SENSORIUM: ICD-10-CM

## 2024-10-16 DIAGNOSIS — Z74.09 IMPAIRED FUNCTIONAL MOBILITY, BALANCE, AND ENDURANCE: Primary | ICD-10-CM

## 2024-10-16 PROCEDURE — 97112 NEUROMUSCULAR REEDUCATION: CPT

## 2024-10-16 NOTE — PROGRESS NOTES
Daily Note     Today's date: 10/16/2024  Patient name: Ynes Mendoza  : 1960  MRN: [id number]  Referring provider: Spenser Rodriguez MD  Dx:   Encounter Diagnosis     ICD-10-CM    1. Impaired functional mobility, balance, and endurance  Z74.09       2. Dizziness  R42       3. Altered sensorium  R40.4                        Subjective: reports she has noticed that things are getting easier since starting therapy. She states that she has been feeling more clear and felt she may not need the rollator today.      Objective: See treatment diary below      Assessment: Tolerated treatment well. Patient demonstrated fatigue post treatment, exhibited good technique with therapeutic exercises, and would benefit from continued PT. Discussed fall risk and recommendation to continue using the rollator to decrease fall risk. Some instability initially with performing foam FAEO HT but improved with time and repetitions. Decreased UE use throughout session compared to previous sessions. Continue to progress as tolerated.        Plan: Continue per plan of care.  Trial SOLO NV as able     POC expires Unit limit Auth Expiration date PT/OT/ST + Visit Limit?   24 N/a 12/15 BOMN                           Visit/Unit Tracking  AUTH Status:  Date 9/16 9/24 9/27 10/2 10/4 10/9 10/11  PN 10/14 10/16     8 authorized Used 1 2 3 4 5 6 7 8 1      Remaining  TBD 6 5 4 3 2 1 0 7           Precautions: DMII, PTSD, anxiety, syncope      Manuals 9/27 10/2 10/4 10/9 10/11 10/14 10/16                                           Neuro Re-Ed     FGA  MCTSIB  TUG  SSGS  DHI     VOR x 1 Standing ball toss side/side, feet apart x 30 reps         Imaginary targets          HT/HN ambulation // bars (long) HT/HN x 3 laps each // bars (long) HT/HN x 3 laps each // bars (long) HT/HN x 3 laps each // bars (long) HT/HN x 3 laps  // bars (long) x 3 laps each HT/HN // bars (long) x 3 laps each  HT/HN   sidesteps // bars (long) x 3 laps // bars (long) x 3  "laps // bars (long) x 3 laps // bars (long) x 3 laps  // bars (long) x 3 laps    HKM // bars (long) x 3 laps // bars (long) x 3 laps // bars (long) x 3 laps // bars (long) x 3 laps, finger touch prn  // bars (long) x 3 laps, no UE support // bars (long) x 3 laps no UE support   hurdles // bars (long) 6-inch (6) step-to pattern forward x 3 laps  Lateral x 3 laps // bars (long) 6-inch (6) step-to pattern forward x 3 laps  Lateral x 3 laps // bars (long) 6-inch (6) step-to pattern forward x 3 laps    Lateral x 3 laps // bars (long) 6-inch (6) step-to pattern forward x 3 laps  // bars (long) 6-inch (7) step over step forward x 3 laps    Lateral x 3 laps // bars (long) 6-inch (6) step over step forward x 3 laps    Lateral x 3 laps   Static balance FAEC 30\" x 3   FAEO with HT/HN 30\" x 3 each         tandem     // bars (short) x 3 laps  // bars (long) x 3 laps   foam      // bars (long) up and over (3) foam pads forward x 3 laps    Lateral x 3 laps // bars (long) up and over foam pads (4) forward x 3 laps    Lateral x 3 laps    Foam FAEO 30\" x 3 HT/HN each   Ther Ex          nustep  L6 for 8 minutes L6 for 10 minutes L6 for 10 minutes  L6 for 10 minutes                                                      Ther Activity                              Gait Training                              Education   Vestibular integration and VOR, update to HEP with sidestepping over shoe box at kitchen counter, safety within house with exercises  Balance systems, progress with therapy, POC                            "

## 2024-10-16 NOTE — TELEPHONE ENCOUNTER
MSW phoned patient at [phone] and was able to reach her. MSW advised that patient is registered with [employer] as of 10/1/24, so she is good until 10/1/27. MSW advised that she will receive free rides to her medical appts. MSW advised that she will be receiving a Welcome Packet with rider guide in the mail soon, if she has not already. Patient stated that she had not received it, but will watch for same.     MSW advised that she can call [phone] to scheduled rides 2-14 days before the appt, and that she will need to provide the appt date/time/location/duration to schedule. MSW did advise that [employer] is a shared ride service so she will need to plan for other starts/stops when scheduling her rides. MSW did advise that [employer] operates in [facility], but that out of county travel can be requested via completion of an out of county form which needs to be signed off on the referring/rendering physician of the [facility] service.    MSW offered to call with patient to schedule transportation to her 11/11/24 neurology appt, if needed. Patient is agreeable to same. MSW will facilitate conference call with patient/[employer] on 10/28.

## 2024-10-18 ENCOUNTER — APPOINTMENT (OUTPATIENT)
Dept: PHYSICAL THERAPY | Facility: CLINIC | Age: 64
End: 2024-10-18
Payer: MEDICARE

## 2024-10-21 ENCOUNTER — OFFICE VISIT (OUTPATIENT)
Dept: PHYSICAL THERAPY | Facility: CLINIC | Age: 64
End: 2024-10-21
Payer: MEDICARE

## 2024-10-21 DIAGNOSIS — R40.4 ALTERED SENSORIUM: ICD-10-CM

## 2024-10-21 DIAGNOSIS — Z74.09 IMPAIRED FUNCTIONAL MOBILITY, BALANCE, AND ENDURANCE: Primary | ICD-10-CM

## 2024-10-21 DIAGNOSIS — R42 DIZZINESS: ICD-10-CM

## 2024-10-21 PROCEDURE — 97110 THERAPEUTIC EXERCISES: CPT

## 2024-10-21 PROCEDURE — 97112 NEUROMUSCULAR REEDUCATION: CPT

## 2024-10-21 NOTE — PROGRESS NOTES
Daily Note     Today's date: 10/21/2024  Patient name: Ynes Mendoza  : 1960  MRN: [id number]  Referring provider: Spenser Rodriguez MD  Dx:   Encounter Diagnosis     ICD-10-CM    1. Impaired functional mobility, balance, and endurance  Z74.09       2. Dizziness  R42       3. Altered sensorium  R40.4                          Subjective: no new reports      Objective: See treatment diary below      Assessment: Tolerated treatment well. Patient demonstrated fatigue post treatment, exhibited good technique with therapeutic exercises, and would benefit from continued PT. Trialed SOLO harness during today's session, some increased instability with foam pads and with hurdles, but no overt LOB noted. Some increased path deviation with HT/HN compared to in // bars. Continue to progress as tolerated.        Plan: Continue per plan of care.  Trial SOLO NV as able     POC expires Unit limit Auth Expiration date PT/OT/ST + Visit Limit?   24 N/a 12/15 BOMN                           Visit/Unit Tracking  AUTH Status:  Date 9/16 9/24 9/27 10/2 10/4 10/9 10/11  PN 10/14 10/16 10/21    8 authorized Used 1 2 3 4 5 6 7 8 1 2     Remaining  TBD 6 5 4 3 2 1 0 7 6          Precautions: DMII, PTSD, anxiety, syncope      Manuals 10/2 10/4 10/9 10/11 10/14 10/16 10/21                                           Neuro Re-Ed    FGA  MCTSIB  TUG  SSGS  DHI      VOR x 1          Imaginary targets          HT/HN ambulation // bars (long) HT/HN x 3 laps each // bars (long) HT/HN x 3 laps each // bars (long) HT/HN x 3 laps  // bars (long) x 3 laps each HT/HN // bars (long) x 3 laps each  HT/HN SOLO  1/4 lap x 3 laps each direction   sidesteps // bars (long) x 3 laps // bars (long) x 3 laps // bars (long) x 3 laps  // bars (long) x 3 laps  SOLO  1/4 lap x 3 laps   HKM // bars (long) x 3 laps // bars (long) x 3 laps // bars (long) x 3 laps, finger touch prn  // bars (long) x 3 laps, no UE support // bars (long) x 3 laps no UE support  "SOLO  1/4 lap x 3 laps   hurdles // bars (long) 6-inch (6) step-to pattern forward x 3 laps  Lateral x 3 laps // bars (long) 6-inch (6) step-to pattern forward x 3 laps    Lateral x 3 laps // bars (long) 6-inch (6) step-to pattern forward x 3 laps  // bars (long) 6-inch (7) step over step forward x 3 laps    Lateral x 3 laps // bars (long) 6-inch (6) step over step forward x 3 laps    Lateral x 3 laps SOLO  6-inch (6) step over step forward x 3 laps    Lateral x 3 laps   Static balance          tandem    // bars (short) x 3 laps  // bars (long) x 3 laps    foam     // bars (long) up and over (3) foam pads forward x 3 laps    Lateral x 3 laps // bars (long) up and over foam pads (4) forward x 3 laps    Lateral x 3 laps    Foam FAEO 30\" x 3 HT/HN each SOLO  Up and over (3) foam pads  Forward x 3 laps   Ther Ex          nustep L6 for 8 minutes L6 for 10 minutes L6 for 10 minutes  L6 for 10 minutes  L6 for 10 minutes                                                     Ther Activity                              Gait Training                              Education  Vestibular integration and VOR, update to HEP with sidestepping over shoe box at kitchen counter, safety within house with exercises  Balance systems, progress with therapy, POC                             "

## 2024-10-22 DIAGNOSIS — E78.5 HYPERLIPIDEMIA LDL GOAL <100: ICD-10-CM

## 2024-10-22 RX ORDER — ROSUVASTATIN CALCIUM 5 MG/1
5 TABLET, COATED ORAL DAILY
Qty: 90 TABLET | Refills: 1 | Status: SHIPPED | OUTPATIENT
Start: 2024-10-22

## 2024-10-23 ENCOUNTER — APPOINTMENT (OUTPATIENT)
Dept: PHYSICAL THERAPY | Facility: CLINIC | Age: 64
End: 2024-10-23
Payer: MEDICARE

## 2024-10-24 ENCOUNTER — APPOINTMENT (OUTPATIENT)
Dept: PHYSICAL THERAPY | Facility: CLINIC | Age: 64
End: 2024-10-24
Payer: MEDICARE

## 2024-10-25 ENCOUNTER — OFFICE VISIT (OUTPATIENT)
Dept: PHYSICAL THERAPY | Facility: CLINIC | Age: 64
End: 2024-10-25
Payer: MEDICARE

## 2024-10-25 DIAGNOSIS — Z74.09 IMPAIRED FUNCTIONAL MOBILITY, BALANCE, AND ENDURANCE: Primary | ICD-10-CM

## 2024-10-25 DIAGNOSIS — R42 DIZZINESS: ICD-10-CM

## 2024-10-25 DIAGNOSIS — R40.4 ALTERED SENSORIUM: ICD-10-CM

## 2024-10-25 PROCEDURE — 97112 NEUROMUSCULAR REEDUCATION: CPT

## 2024-10-25 NOTE — PROGRESS NOTES
Daily Note     Today's date: 10/25/2024  Patient name: Ynes Mendoza  : 1960  MRN: [id number]  Referring provider: Spenser Rodriguez MD  Dx:   Encounter Diagnosis     ICD-10-CM    1. Impaired functional mobility, balance, and endurance  Z74.09       2. Dizziness  R42       3. Altered sensorium  R40.4                            Subjective: Felt tired after last session, states her legs feel tired today.       Objective: See treatment diary below      Assessment: Tolerated treatment well. Patient demonstrated fatigue post treatment, exhibited good technique with therapeutic exercises, and would benefit from continued PT.  1 instance of catching toes when stepping up and over foam pads, able to take reactive step to maintain balance. Improved stability with performing hurdles compared to previous sessions. Increased sway with lateral stepping on/off of foam laterally with NBOS on foam. Continue to progress as tolerated.      Plan: Continue per plan of care.       POC expires Unit limit Auth Expiration date PT/OT/ST + Visit Limit?   24 N/a 12/15 BOMN                           Visit/Unit Tracking  AUTH Status:  Date 9/16 9/24 9/27 10/2 10/4 10/9 10/11  PN 10/14 10/16 10/21 10/25   8 authorized Used 1 2 3 4 5 6 7 8 1 2 3    Remaining  TBD 6 5 4 3 2 1 0 7 6 5         Precautions: DMII, PTSD, anxiety, syncope      Manuals 10/4 10/9 10/11 10/14 10/16 10/21 10/25                                           Neuro Re-Ed   FGA  MCTSIB  TUG  SSGS  DHI       VOR x 1          Imaginary targets          HT/HN ambulation // bars (long) HT/HN x 3 laps each // bars (long) HT/HN x 3 laps  // bars (long) x 3 laps each HT/HN // bars (long) x 3 laps each  HT/HN SOLO  1/4 lap x 3 laps each direction SOLO  1/4 lap x 3 laps each direction   sidesteps // bars (long) x 3 laps // bars (long) x 3 laps  // bars (long) x 3 laps  SOLO  1/4 lap x 3 laps SOLO  1/4 lap x 3 laps   HKM // bars (long) x 3 laps // bars (long) x 3 laps, finger  "touch prn  // bars (long) x 3 laps, no UE support // bars (long) x 3 laps no UE support SOLO  1/4 lap x 3 laps SOLO  1/4 lap x 3 laps   hurdles // bars (long) 6-inch (6) step-to pattern forward x 3 laps    Lateral x 3 laps // bars (long) 6-inch (6) step-to pattern forward x 3 laps  // bars (long) 6-inch (7) step over step forward x 3 laps    Lateral x 3 laps // bars (long) 6-inch (6) step over step forward x 3 laps    Lateral x 3 laps SOLO  6-inch (6) step over step forward x 3 laps    Lateral x 3 laps SOLO  6-inch (6) step over step forward x 3 laps    Lateral x 3 laps   Static balance          tandem   // bars (short) x 3 laps  // bars (long) x 3 laps  SOLO  1/4 lap x 3 laps   foam    // bars (long) up and over (3) foam pads forward x 3 laps    Lateral x 3 laps // bars (long) up and over foam pads (4) forward x 3 laps    Lateral x 3 laps    Foam FAEO 30\" x 3 HT/HN each SOLO  Up and over (3) foam pads  Forward x 3 laps SOLO  Up and over (3) foam pads, forward x 3 laps    Lateral x 3 laps   Ther Ex          nustep L6 for 10 minutes L6 for 10 minutes  L6 for 10 minutes  L6 for 10 minutes                                                      Ther Activity                              Gait Training                              Education Vestibular integration and VOR, update to HEP with sidestepping over shoe box at kitchen counter, safety within house with exercises  Balance systems, progress with therapy, POC                              "

## 2024-10-29 NOTE — TELEPHONE ENCOUNTER
MSW attempted to reach patient at [phone] to inquire if she wanted assistance in scheduling [employer] transportation for her 11/11 neurology appt. No answer. MSW left a message requesting callback. Awaiting same.

## 2024-10-30 ENCOUNTER — OFFICE VISIT (OUTPATIENT)
Dept: PHYSICAL THERAPY | Facility: CLINIC | Age: 64
End: 2024-10-30
Payer: MEDICARE

## 2024-10-30 DIAGNOSIS — Z74.09 IMPAIRED FUNCTIONAL MOBILITY, BALANCE, AND ENDURANCE: Primary | ICD-10-CM

## 2024-10-30 DIAGNOSIS — R40.4 ALTERED SENSORIUM: ICD-10-CM

## 2024-10-30 DIAGNOSIS — R42 DIZZINESS: ICD-10-CM

## 2024-10-30 PROCEDURE — 97110 THERAPEUTIC EXERCISES: CPT

## 2024-10-30 PROCEDURE — 97112 NEUROMUSCULAR REEDUCATION: CPT

## 2024-10-30 NOTE — TELEPHONE ENCOUNTER
LATE ENTRY FROM 10/29/24:    MSW received a callback from patient ([phone]). Patient stated that she has been using [employer] to get to/from PT so she is comfortable calling on her own to schedule transportation for her upcoming neurology appt.     The Social Work Team will be available to patient as needed.

## 2024-10-30 NOTE — PROGRESS NOTES
Daily Note     Today's date: 10/30/2024  Patient name: Ynes Mendoza  : 1960  MRN: [id number]  Referring provider: Spenser Rodriguez MD  Dx:   Encounter Diagnosis     ICD-10-CM    1. Impaired functional mobility, balance, and endurance  Z74.09       2. Dizziness  R42       3. Altered sensorium  R40.4                              Subjective: Reports that she fell backwards before getting on the [employer] bus today, scraped her R wrist and her R hip is sore. States that she did not injure herself otherwise and no head strike.       Objective: See treatment diary below      Assessment: Tolerated treatment well. Patient demonstrated fatigue post treatment, exhibited good technique with therapeutic exercises, and would benefit from continued PT.  No significant discomfort on nustep or throughout session. Appeared to have improved stability with performing ambulation with HT/HN compared to previous sessions, only 1 instance of imbalance and dizziness. Improved stability with sidestepping on/off foam compared to previous sessions. Continue to progress as tolerated.      Plan: Continue per plan of care.       POC expires Unit limit Auth Expiration date PT/OT/ST + Visit Limit?   24 N/a 12/15 BOMN                           Visit/Unit Tracking  AUTH Status:  Date 9/27 10/2 10/4 10/9 10/11  PN 10/14 10/16 10/21 10/25 10/30   8 authorized Used 3 4 5 6 7 8 1 2 3 4    Remaining  5 4 3 2 1 0 7 6 5 4         Precautions: DMII, PTSD, anxiety, syncope      Manuals 10/11 10/14 10/16 10/21 10/25 10/30                                       Neuro Re-Ed FGA  MCTSIB  TUG  SSGS  DHI        VOR x 1         Imaginary targets         HT/HN ambulation  // bars (long) x 3 laps each HT/HN // bars (long) x 3 laps each  HT/HN SOLO  1/4 lap x 3 laps each direction SOLO  1/4 lap x 3 laps each direction SOLO  1/4 lap x 3 laps each direction   sidesteps  // bars (long) x 3 laps  SOLO  1/4 lap x 3 laps SOLO  1/4 lap x 3 laps SOLO  1/4 lap x 3  "laps   HKM  // bars (long) x 3 laps, no UE support // bars (long) x 3 laps no UE support SOLO  1/4 lap x 3 laps SOLO  1/4 lap x 3 laps SOLO  1/4 lap x 3 laps   hurdles  // bars (long) 6-inch (7) step over step forward x 3 laps    Lateral x 3 laps // bars (long) 6-inch (6) step over step forward x 3 laps    Lateral x 3 laps SOLO  6-inch (6) step over step forward x 3 laps    Lateral x 3 laps SOLO  6-inch (6) step over step forward x 3 laps    Lateral x 3 laps    Static balance         backward      SOLO  1/4 lap x 3 laps   tandem // bars (short) x 3 laps  // bars (long) x 3 laps  SOLO  1/4 lap x 3 laps    foam  // bars (long) up and over (3) foam pads forward x 3 laps    Lateral x 3 laps // bars (long) up and over foam pads (4) forward x 3 laps    Lateral x 3 laps    Foam FAEO 30\" x 3 HT/HN each SOLO  Up and over (3) foam pads  Forward x 3 laps SOLO  Up and over (3) foam pads, forward x 3 laps    Lateral x 3 laps SOLO  Up and over (3) foam pads forward x 3 laps    Lateral x 3 laps   Ther Ex         nustep  L6 for 10 minutes  L6 for 10 minutes                                                  Ther Activity                           Gait Training                           Education Balance systems, progress with therapy, POC                             "

## 2024-11-01 ENCOUNTER — OFFICE VISIT (OUTPATIENT)
Dept: PHYSICAL THERAPY | Facility: CLINIC | Age: 64
End: 2024-11-01
Payer: MEDICARE

## 2024-11-01 DIAGNOSIS — R42 DIZZINESS: ICD-10-CM

## 2024-11-01 DIAGNOSIS — R40.4 ALTERED SENSORIUM: ICD-10-CM

## 2024-11-01 DIAGNOSIS — Z74.09 IMPAIRED FUNCTIONAL MOBILITY, BALANCE, AND ENDURANCE: Primary | ICD-10-CM

## 2024-11-01 PROCEDURE — 97110 THERAPEUTIC EXERCISES: CPT

## 2024-11-01 PROCEDURE — 97112 NEUROMUSCULAR REEDUCATION: CPT

## 2024-11-01 NOTE — PROGRESS NOTES
Daily Note     Today's date: 2024  Patient name: Ynes Mendoza  : 1960  MRN: [id number]  Referring provider: Spenser Rodriguez MD  Dx:   No diagnosis found.                         Subjective: Reports that she fell backwards before getting on the [employer] bus today, scraped her R wrist and her R hip is sore. States that she did not injure herself otherwise and no head strike.       Objective: See treatment diary below      Assessment: Tolerated treatment well. Patient demonstrated fatigue post treatment, exhibited good technique with therapeutic exercises, and would benefit from continued PT.  Demonstrated improved stability with ambulation with HT/HN compared to previous sessions. Some increased challenge with performing lateral>forward hurdles. Some dizziness with lateral on/off of foam pads. Continue to progress as tolerated.      Plan: Continue per plan of care.       POC expires Unit limit Auth Expiration date PT/OT/ST + Visit Limit?   24 N/a 12/15 BOMN                           Visit/Unit Tracking  AUTH Status:  Date 9/27 10/2 10/4 10/9 10/11  PN 10/14 10/16 10/21 10/25 10/30 11/1   8 authorized Used 3 4 5 6 7 8 1 2 3 4 5    Remaining  5 4 3 2 1 0 7 6 5 4 3         Precautions: DMII, PTSD, anxiety, syncope      Manuals 10/11 10/14 10/16 10/21 10/25 10/30 11/1                                           Neuro Re-Ed FGA  MCTSIB  TUG  SSGS  DHI         VOR x 1          Imaginary targets          HT/HN ambulation  // bars (long) x 3 laps each HT/HN // bars (long) x 3 laps each  HT/HN SOLO  1/4 lap x 3 laps each direction SOLO  1/4 lap x 3 laps each direction SOLO  1/4 lap x 3 laps each direction SOLO  1/4 lap x 3 laps each direction   sidesteps  // bars (long) x 3 laps  SOLO  1/4 lap x 3 laps SOLO  1/4 lap x 3 laps SOLO  1/4 lap x 3 laps SOLO  1/4 lap x 4 laps   HKM  // bars (long) x 3 laps, no UE support // bars (long) x 3 laps no UE support SOLO  1/4 lap x 3 laps SOLO  1/4 lap x 3 laps SOLO  1/4  "lap x 3 laps SOLO  1/4 lap x 4 laps   hurdles  // bars (long) 6-inch (7) step over step forward x 3 laps    Lateral x 3 laps // bars (long) 6-inch (6) step over step forward x 3 laps    Lateral x 3 laps SOLO  6-inch (6) step over step forward x 3 laps    Lateral x 3 laps SOLO  6-inch (6) step over step forward x 3 laps    Lateral x 3 laps  SOLO  6-inch (6) step over step forward x 3 laps    Lateral x 3 laps   Static balance          backward      SOLO  1/4 lap x 3 laps SOLO  1/4 lap x 4 laps   tandem // bars (short) x 3 laps  // bars (long) x 3 laps  SOLO  1/4 lap x 3 laps     foam  // bars (long) up and over (3) foam pads forward x 3 laps    Lateral x 3 laps // bars (long) up and over foam pads (4) forward x 3 laps    Lateral x 3 laps    Foam FAEO 30\" x 3 HT/HN each SOLO  Up and over (3) foam pads  Forward x 3 laps SOLO  Up and over (3) foam pads, forward x 3 laps    Lateral x 3 laps SOLO  Up and over (3) foam pads forward x 3 laps    Lateral x 3 laps SOLO  Up and over (3) foam pads forward x 3 laps    Lateral x 3 laps   Ther Ex          nustep  L6 for 10 minutes  L6 for 10 minutes   L6 for 10 minutes                                                     Ther Activity                              Gait Training                              Education Balance systems, progress with therapy, POC                                "

## 2024-11-04 ENCOUNTER — OFFICE VISIT (OUTPATIENT)
Dept: PHYSICAL THERAPY | Facility: CLINIC | Age: 64
End: 2024-11-04
Payer: MEDICARE

## 2024-11-04 DIAGNOSIS — R42 DIZZINESS: ICD-10-CM

## 2024-11-04 DIAGNOSIS — R40.4 ALTERED SENSORIUM: ICD-10-CM

## 2024-11-04 DIAGNOSIS — Z74.09 IMPAIRED FUNCTIONAL MOBILITY, BALANCE, AND ENDURANCE: Primary | ICD-10-CM

## 2024-11-04 PROCEDURE — 97112 NEUROMUSCULAR REEDUCATION: CPT

## 2024-11-04 PROCEDURE — 97110 THERAPEUTIC EXERCISES: CPT

## 2024-11-04 NOTE — PROGRESS NOTES
Daily Note     Today's date: 2024  Patient name: Ynes Mendoza  : 1960  MRN: [id number]  Referring provider: Spenser Rodriguez MD  Dx:   Encounter Diagnosis     ICD-10-CM    1. Impaired functional mobility, balance, and endurance  Z74.09       2. Altered sensorium  R40.4       3. Dizziness  R42                                Subjective: Reports her balance felt wobbly this weekend.      Objective: See treatment diary below      Assessment: Tolerated treatment well. Patient demonstrated fatigue post treatment, exhibited good technique with therapeutic exercises, and would benefit from continued PT.  Increased tolerance to upright exercises with being able to perform HKM, sidestepping, and backwards prior to seated break. Increased instability with lateral>forward hurdles. Demonstrated improved stability with HT/HN with only slight variation in speed with up/down movement during session. Continue to progress as tolerated.      Plan: Continue per plan of care.       POC expires Unit limit Auth Expiration date PT/OT/ST + Visit Limit?   24 N/a 12/15 BOMN                           Visit/Unit Tracking  AUTH Status:  10/2 10/4 10/9 10/11  PN 10/14 10/16 10/21 10/25 10/30 11/1 11/4   8 authorized 4 5 6 7 8 1 2 3 4 5 6    4 3 2 1 0 7 6 5 4 3 2         Precautions: DMII, PTSD, anxiety, syncope      Manuals 10/14 10/16 10/21 10/25 10/30 11/1 11/4                                           Neuro Re-Ed          VOR x 1          Imaginary targets          HT/HN ambulation // bars (long) x 3 laps each HT/HN // bars (long) x 3 laps each  HT/HN SOLO  1/4 lap x 3 laps each direction SOLO  1/4 lap x 3 laps each direction SOLO  1/4 lap x 3 laps each direction SOLO  1/4 lap x 3 laps each direction SOLO  1/4 lap x 3 laps each direction   sidesteps // bars (long) x 3 laps  SOLO  1/4 lap x 3 laps SOLO  1/4 lap x 3 laps SOLO  1/4 lap x 3 laps SOLO  1/4 lap x 4 laps SOLO  1/4 lap x 3 laps   HKM // bars (long) x 3 laps, no UE  "support // bars (long) x 3 laps no UE support SOLO  1/4 lap x 3 laps SOLO  1/4 lap x 3 laps SOLO  1/4 lap x 3 laps SOLO  1/4 lap x 4 laps SOLO  1/4 lap x 3 laps   hurdles // bars (long) 6-inch (7) step over step forward x 3 laps    Lateral x 3 laps // bars (long) 6-inch (6) step over step forward x 3 laps    Lateral x 3 laps SOLO  6-inch (6) step over step forward x 3 laps    Lateral x 3 laps SOLO  6-inch (6) step over step forward x 3 laps    Lateral x 3 laps  SOLO  6-inch (6) step over step forward x 3 laps    Lateral x 3 laps SOLO  6-inch (6) step over step  Forward x 3 laps    Lateral x 3 laps   Static balance          backward     SOLO  1/4 lap x 3 laps SOLO  1/4 lap x 4 laps SOLO  1/4 lap x 3 laps   tandem  // bars (long) x 3 laps  SOLO  1/4 lap x 3 laps      foam // bars (long) up and over (3) foam pads forward x 3 laps    Lateral x 3 laps // bars (long) up and over foam pads (4) forward x 3 laps    Lateral x 3 laps    Foam FAEO 30\" x 3 HT/HN each SOLO  Up and over (3) foam pads  Forward x 3 laps SOLO  Up and over (3) foam pads, forward x 3 laps    Lateral x 3 laps SOLO  Up and over (3) foam pads forward x 3 laps    Lateral x 3 laps SOLO  Up and over (3) foam pads forward x 3 laps    Lateral x 3 laps    Ther Ex          nustep L6 for 10 minutes  L6 for 10 minutes   L6 for 10 minutes L6 for 10 minutes                                                     Ther Activity                              Gait Training                              Education                                 "

## 2024-11-06 ENCOUNTER — OFFICE VISIT (OUTPATIENT)
Dept: PHYSICAL THERAPY | Facility: CLINIC | Age: 64
End: 2024-11-06
Payer: MEDICARE

## 2024-11-06 DIAGNOSIS — R42 DIZZINESS: ICD-10-CM

## 2024-11-06 DIAGNOSIS — Z74.09 IMPAIRED FUNCTIONAL MOBILITY, BALANCE, AND ENDURANCE: Primary | ICD-10-CM

## 2024-11-06 DIAGNOSIS — R40.4 ALTERED SENSORIUM: ICD-10-CM

## 2024-11-06 PROCEDURE — 97112 NEUROMUSCULAR REEDUCATION: CPT

## 2024-11-06 PROCEDURE — 97110 THERAPEUTIC EXERCISES: CPT

## 2024-11-06 NOTE — PROGRESS NOTES
Daily Note     Today's date: 2024  Patient name: Ynes Mendoza  : 1960  MRN: [id number]  Referring provider: Spenser Rodriguez MD  Dx:   Encounter Diagnosis     ICD-10-CM    1. Impaired functional mobility, balance, and endurance  Z74.09       2. Altered sensorium  R40.4       3. Dizziness  R42                                  Subjective: Reports that she felt dizzy when she went to bed last night, and feels some dizziness today. Reports that she has a mild headache today. Reports that she is wearing different shoes that make her feel like she is barefoot.      Objective: See treatment diary below      Assessment: Tolerated treatment well. Patient demonstrated fatigue post treatment, exhibited good technique with therapeutic exercises, and would benefit from continued PT.  Appeared to have some increased challenge with balance activities during session. Added tandem walking , able to take reactive side step to maintain balance without overt LOB. Advised to make sure that she keeps the walker near her and not parking it and walking without especially with increased dizziness. Continue to progress as tolerated.      Plan: Continue per plan of care.       POC expires Unit limit Auth Expiration date PT/OT/ST + Visit Limit?   24 N/a 12/15 BOMN                           Visit/Unit Tracking  AUTH Status:  10/2 10/4 10/9 10/11  PN 10/14 10/16 10/21 10/25 10/30 11/1 11/4 11/6   8 authorized 4 5 6 7 8 1 2 3 4 5 6 7    4 3 2 1 0 7 6 5 4 3 2 1         Precautions: DMII, PTSD, anxiety, syncope      Manuals 10/16 10/21 10/25 10/30 11/1 11/4 11/6                                           Neuro Re-Ed          VOR x 1          Imaginary targets          HT/HN ambulation // bars (long) x 3 laps each  HT/HN SOLO  1/4 lap x 3 laps each direction SOLO  1/4 lap x 3 laps each direction SOLO  1/4 lap x 3 laps each direction SOLO  1/4 lap x 3 laps each direction SOLO  1/4 lap x 3 laps each direction SOLO  1/4 lap x 3 laps  "each direction   sidesteps  SOLO  1/4 lap x 3 laps SOLO  1/4 lap x 3 laps SOLO  1/4 lap x 3 laps SOLO  1/4 lap x 4 laps SOLO  1/4 lap x 3 laps SOLO  1/4 lap x 3 laps   HKM // bars (long) x 3 laps no UE support SOLO  1/4 lap x 3 laps SOLO  1/4 lap x 3 laps SOLO  1/4 lap x 3 laps SOLO  1/4 lap x 4 laps SOLO  1/4 lap x 3 laps SOLO  1/4 lap x 3 laps   hurdles // bars (long) 6-inch (6) step over step forward x 3 laps    Lateral x 3 laps SOLO  6-inch (6) step over step forward x 3 laps    Lateral x 3 laps SOLO  6-inch (6) step over step forward x 3 laps    Lateral x 3 laps  SOLO  6-inch (6) step over step forward x 3 laps    Lateral x 3 laps SOLO  6-inch (6) step over step  Forward x 3 laps    Lateral x 3 laps    Static balance          backward    SOLO  1/4 lap x 3 laps SOLO  1/4 lap x 4 laps SOLO  1/4 lap x 3 laps SOLO  1/4 lap x 3 laps   tandem // bars (long) x 3 laps  SOLO  1/4 lap x 3 laps    SOLO  1/4 lap x 3 laps   foam // bars (long) up and over foam pads (4) forward x 3 laps    Lateral x 3 laps    Foam FAEO 30\" x 3 HT/HN each SOLO  Up and over (3) foam pads  Forward x 3 laps SOLO  Up and over (3) foam pads, forward x 3 laps    Lateral x 3 laps SOLO  Up and over (3) foam pads forward x 3 laps    Lateral x 3 laps SOLO  Up and over (3) foam pads forward x 3 laps    Lateral x 3 laps     Ther Ex          nustep  L6 for 10 minutes   L6 for 10 minutes L6 for 10 minutes L7 for 10 minutes                                                     Ther Activity                              Gait Training                              Education                                 "

## 2024-11-08 ENCOUNTER — OFFICE VISIT (OUTPATIENT)
Dept: FAMILY MEDICINE CLINIC | Facility: CLINIC | Age: 64
End: 2024-11-08
Payer: MEDICARE

## 2024-11-08 VITALS
BODY MASS INDEX: 30.92 KG/M2 | TEMPERATURE: 97.6 F | HEIGHT: 67 IN | SYSTOLIC BLOOD PRESSURE: 116 MMHG | OXYGEN SATURATION: 97 % | HEART RATE: 60 BPM | WEIGHT: 197 LBS | DIASTOLIC BLOOD PRESSURE: 70 MMHG

## 2024-11-08 DIAGNOSIS — Z79.4 TYPE 2 DIABETES MELLITUS WITHOUT COMPLICATION, WITH LONG-TERM CURRENT USE OF INSULIN (HCC): ICD-10-CM

## 2024-11-08 DIAGNOSIS — Z00.00 WELL ADULT EXAM: Primary | ICD-10-CM

## 2024-11-08 DIAGNOSIS — R55 SYNCOPE, UNSPECIFIED SYNCOPE TYPE: ICD-10-CM

## 2024-11-08 DIAGNOSIS — I49.9 IRREGULAR HEART BEAT: ICD-10-CM

## 2024-11-08 DIAGNOSIS — E11.9 TYPE 2 DIABETES MELLITUS WITHOUT COMPLICATION, WITH LONG-TERM CURRENT USE OF INSULIN (HCC): ICD-10-CM

## 2024-11-08 DIAGNOSIS — R26.89 BALANCE PROBLEM: ICD-10-CM

## 2024-11-08 PROBLEM — Z01.818 PRE-OP EXAMINATION: Status: RESOLVED | Noted: 2024-08-01 | Resolved: 2024-11-08

## 2024-11-08 PROCEDURE — 99213 OFFICE O/P EST LOW 20 MIN: CPT | Performed by: FAMILY MEDICINE

## 2024-11-08 PROCEDURE — 99396 PREV VISIT EST AGE 40-64: CPT | Performed by: FAMILY MEDICINE

## 2024-11-08 NOTE — PATIENT INSTRUCTIONS
"Blood work prior to endocrine visit.   Schedule pap for cervical cancer screen     Patient Education     Routine physical for adults   The Basics   Written by the doctors and editors at [facility]   What is a physical? -- A physical is a routine visit, or \"check-up,\" with your doctor. You might also hear it called a \"wellness visit\" or \"preventive visit.\"  During each visit, the doctor will:   Ask about your physical and mental health   Ask about your habits, behaviors, and lifestyle   Do an exam   Give you vaccines if needed   Talk to you about any medicines you take   Give advice about your health   Answer your questions  Getting regular check-ups is an important part of taking care of your health. It can help your doctor find and treat any problems you have. But it's also important for preventing health problems.  A routine physical is different from a \"sick visit.\" A sick visit is when you see a doctor because of a health concern or problem. Since physicals are scheduled ahead of time, you can think about what you want to ask the doctor.  How often should I get a physical? -- It depends on your age and health. In general, for people age 21 years and older:   If you are younger than 50 years, you might be able to get a physical every 3 years.   If you are 50 years or older, your doctor might recommend a physical every year.  If you have an ongoing health condition, like diabetes or high blood pressure, your doctor will probably want to see you more often.  What happens during a physical? -- In general, each visit will include:   Physical exam - The doctor or nurse will check your height, weight, heart rate, and blood pressure. They will also look at your eyes and ears. They will ask about how you are feeling and whether you have any symptoms that bother you.   Medicines - It's a good idea to bring a list of all the medicines you take to each doctor visit. Your doctor will talk to you about your medicines and answer " "any questions. Tell them if you are having any side effects that bother you. You should also tell them if you are having trouble paying for any of your medicines.   Habits and behaviors - This includes:   Your diet   Your exercise habits   Whether you smoke, drink alcohol, or use drugs   Whether you are sexually active   Whether you feel safe at home  Your doctor will talk to you about things you can do to improve your health and lower your risk of health problems. They will also offer help and support. For example, if you want to quit smoking, they can give you advice and might prescribe medicines. If you want to improve your diet or get more physical activity, they can help you with this, too.   Lab tests, if needed - The tests you get will depend on your age and situation. For example, your doctor might want to check your:   Cholesterol   Blood sugar   Iron level   Vaccines - The recommended vaccines will depend on your age, health, and what vaccines you already had. Vaccines are very important because they can prevent certain serious or deadly infections.   Discussion of screening - \"Screening\" means checking for diseases or other health problems before they cause symptoms. Your doctor can recommend screening based on your age, risk, and preferences. This might include tests to check for:   Cancer, such as breast, prostate, cervical, ovarian, colorectal, prostate, lung, or skin cancer   Sexually transmitted infections, such as chlamydia and gonorrhea   Mental health conditions like depression and anxiety  Your doctor will talk to you about the different types of screening tests. They can help you decide which screenings to have. They can also explain what the results might mean.   Answering questions - The physical is a good time to ask the doctor or nurse questions about your health. If needed, they can refer you to other doctors or specialists, too.  Adults older than 65 years often need other care, too. As " you get older, your doctor will talk to you about:   How to prevent falling at home   Hearing or vision tests   Memory testing   How to take your medicines safely   Making sure that you have the help and support you need at home  All topics are updated as new evidence becomes available and our peer review process is complete.  This topic retrieved from [employer] on: May 02, 2024.  Topic 201689 Version 1.0  Release: 32.4.3 - C32.122  © 2024 [employer]Date, Inc[employer] and/or its affiliates. All rights reserved.  Consumer Information Use and Disclaimer   Disclaimer: This generalized information is a limited summary of diagnosis, treatment, and/or medication information. It is not meant to be comprehensive and should be used as a tool to help the user understand and/or assess potential diagnostic and treatment options. It does NOT include all information about conditions, treatments, medications, side effects, or risks that may apply to a specific patient. It is not intended to be medical advice or a substitute for the medical advice, diagnosis, or treatment of a health care provider based on the health care provider's examination and assessment of a patient's specific and unique circumstances. Patients must speak with a health care provider for complete information about their health, medical questions, and treatment options, including any risks or benefits regarding use of medications. This information does not endorse any treatments or medications as safe, effective, or approved for treating a specific patient. Up[employer]Date, Inc. and its affiliates disclaim any warranty or liability relating to this information or the use thereof.The use of this information is governed by the Terms of Use, available at https://www.wolterskluwer.com/en/know/clinical-effectiveness-terms. 2024© Up[employer]Date, Inc. and its affiliates and/or licensors. All rights reserved.  Copyright   © 2024 [employer], [employer]. and/or its affiliates. All rights reserved.

## 2024-11-08 NOTE — PROGRESS NOTES
Diabetic Foot Exam    Patient's shoes and socks removed.    Right Foot/Ankle   Right Foot Inspection  Skin Exam: skin normal and skin intact. No dry skin, no warmth, no callus, no erythema, no maceration, no abnormal color, no pre-ulcer, no ulcer and no callus.     Toe Exam: ROM and strength within normal limits.     Sensory   Monofilament testing: intact    Vascular  Capillary refills: < 3 seconds  The right DP pulse is 2+. The right PT pulse is 2+.     Left Foot/Ankle  Left Foot Inspection  Skin Exam: skin normal and skin intact. No dry skin, no warmth, no erythema, no maceration, normal color, no pre-ulcer, no ulcer and no callus.     Toe Exam: ROM and strength within normal limits.     Sensory   Monofilament testing: intact    Vascular  Capillary refills: < 3 seconds  The left DP pulse is 2+. The left PT pulse is 2+.     Assign Risk Category  No deformity present  No loss of protective sensation  No weak pulses  Risk: 0  ADULT ANNUAL PHYSICAL  [facility]    NAME: Ynes Mendoza  AGE: 64 y.o. SEX: female  : 1960     DATE: 11/10/2024     Assessment and Plan:     1. Well adult exam  2. Type 2 diabetes mellitus without complication, with long-term current use of insulin ([facility])  Assessment & Plan:    Lab Results   Component Value Date    HGBA1C 7.8 (H) 2024   Uncontrolled, next hba1c due after . Pt has a cgm. Pt states her stress level has increased with the recent election and she was not following a good diet plan. Follows with endocrine and has an upcoming appointment  Orders:  -     Ambulatory Referral to Social Work Care Management Program; Future  3. Syncope, unspecified syncope type  Assessment & Plan:  Following with neurology- has upcoming appointment  Orders:  -     Ambulatory Referral to Social Work Care Management Program; Future  4. Irregular heart beat  Assessment & Plan:  Evaluated with echo and bubble study- normal  Orders:  -      Ambulatory Referral to Social Work Care Management Program; Future  5. Balance problem  Assessment & Plan:  Continue with physical therapy      Immunizations and preventive care screenings were discussed with patient today. Appropriate education was printed on patient's after visit summary.    Counseling:  Dental Health: discussed importance of regular tooth brushing, flossing, and dental visits.  Exercise: the importance of regular exercise/physical activity was discussed. Recommend exercise 3-5 times per week for at least 30 minutes.       Depression Screening and Follow-up Plan: Patient was screened for depression during today's encounter. They screened negative with a PHQ-2 score of 0.        No follow-ups on file.     Chief Complaint:     Chief Complaint   Patient presents with    Physical Exam      History of Present Illness:     Pt is here for a physical today. She has been going to physical therapy and is helping but still feeling weak and dizzy.   Pt is requesting a health mentor to help with managing her health and home situation.           Review of Systems:     Review of Systems   Constitutional: Negative.  Negative for fatigue and fever.   HENT: Negative.     Eyes: Negative.    Respiratory: Negative.  Negative for cough.    Cardiovascular: Negative.    Gastrointestinal: Negative.    Endocrine: Negative.    Genitourinary: Negative.    Musculoskeletal: Negative.    Skin: Negative.    Allergic/Immunologic: Negative.    Neurological: Negative.    Psychiatric/Behavioral: Negative.        Past Medical History:     Past Medical History:   Diagnosis Date    Abnormal EEG 11/05/2023    Anxiety     Aphasia 11/05/2023    Condyloma acuminatum     Depression 1968    Therapy    Diabetes mellitus (HCC) 2002    type 2    Genital warts     Herpes simplex     HPV (human papilloma virus) infection     Obesity 1990    Visual impairment 1984      Past Surgical History:     History reviewed. No pertinent surgical history.    Social History:     Social History     Socioeconomic History    Marital status:      Spouse name: None    Number of children: None    Years of education: None    Highest education level: None   Occupational History    None   Tobacco Use    Smoking status: Never    Smokeless tobacco: Never   Vaping Use    Vaping status: Never Used   Substance and Sexual Activity    Alcohol use: Not Currently    Drug use: Yes     Frequency: 1.0 times per week     Types: Marijuana     Comment: Have a green card    Sexual activity: Not Currently     Partners: Male     Birth control/protection: Post-menopausal     Comment: Not active   Other Topics Concern    None   Social History Narrative    None     Social Determinants of Health     Financial Resource Strain: Not on file   Food Insecurity: Not on file   Transportation Needs: Not on file   Physical Activity: Not on file   Stress: Not on file   Social Connections: Not on file   Intimate Partner Violence: Not on file   Housing Stability: Not on file      Family History:     Family History   Problem Relation Age of Onset    Alzheimer's disease Mother     Dementia Mother              Diabetes Father              Heart disease Father              ALS Father     Diabetes type II Father             Hypertension Sister     Diabetes Brother     Diabetes type II Brother     No Known Problems Brother     Colon cancer Maternal Grandfather              Cancer Maternal Grandmother         Lymphoma      Current Medications:     Current Outpatient Medications   Medication Sig Dispense Refill    ADRENAL CORTEX PO       aspirin 81 mg chewable tablet Chew 1 tablet (81 mg total) daily      Blood Glucose Monitoring Suppl (Contour Next Monitor) w/Device KIT Use to monitor blood sugars daily 1 kit 0    Cholecalciferol (VITAMIN D PO) Take 5,000 Units by mouth in the morning      CINNAMON PO Take by mouth in the morning      Continuous Glucose  "Sensor (Dexcom G7 Sensor) Use 1 Device every 10 days 3 each 6    Contour Next Test test strip Test twice daily 200 strip 3    glimepiride (AMARYL) 1 mg tablet Take 1/2 tablet with lunch. 45 tablet 1    insulin detemir (Levemir FlexPen) 100 Units/mL injection pen 16 UNITS DAILY (Patient taking differently: Inject 14 Units under the skin daily 16 UNITS DAILY) 15 mL 2    Insulin Pen Needle (B-D ULTRAFINE III SHORT PEN) 31G X 8 MM MISC Inject under the skin 5 (five) times a day 100 each 5    Lancets (onetouch ultrasoft) lancets Check sugars 2-4 times a day 100 each 5    metFORMIN (GLUCOPHAGE-XR) 500 mg 24 hr tablet Take 2 tablets (1,000 mg total) by mouth in the morning 200 tablet 1    Misc. Devices (Rollator Ultra-Light) MISC Use daily 1 each 0    rosuvastatin (CRESTOR) 5 mg tablet TAKE 1 TABLET (5 MG TOTAL) BY MOUTH DAILY. 90 tablet 1    triamcinolone (KENALOG) 0.1 % ointment APPLY 1 APPLICATION TOPICALLY 2 (TWO) TIMES A DAY TO AFFECTED AREA 80 g 0    Vitamin B Complex-C CAPS        Current Facility-Administered Medications   Medication Dose Route Frequency Provider Last Rate Last Admin    diphenhydrAMINE (BENADRYL) capsule 25 mg  25 mg Oral Q6H PRN           Allergies:     Allergies   Allergen Reactions    Cephalexin Confusion    Eggs Or Egg-Derived Products - Food Allergy      Upset stomach.     Milk-Related Compounds - Food Allergy Diarrhea    Tomato (Diagnostic) - Food Allergy Other (See Comments)     Reflux      Physical Exam:     /70   Pulse 60   Temp 97.6 °F (36.4 °C) (Tympanic)   Ht 5' 7\" (1.702 m)   Wt 89.4 kg (197 lb)   SpO2 97%   BMI 30.85 kg/m²     Physical Exam  Vitals and nursing note reviewed.   Constitutional:       Appearance: She is well-developed. She is obese.      Comments: Walking with walker   HENT:      Head: Normocephalic and atraumatic.      Right Ear: External ear normal.      Left Ear: External ear normal.      Nose: Nose normal.   Eyes:      Conjunctiva/sclera: Conjunctivae " normal.      Pupils: Pupils are equal, round, and reactive to light.   Cardiovascular:      Rate and Rhythm: Normal rate and regular rhythm.      Pulses: no weak pulses.           Dorsalis pedis pulses are 2+ on the right side and 2+ on the left side.        Posterior tibial pulses are 2+ on the right side and 2+ on the left side.      Heart sounds: Normal heart sounds.   Pulmonary:      Effort: Pulmonary effort is normal.      Breath sounds: Normal breath sounds.   Abdominal:      General: Bowel sounds are normal.      Palpations: Abdomen is soft.   Musculoskeletal:         General: Normal range of motion.      Cervical back: Normal range of motion and neck supple.   Feet:      Right foot:      Skin integrity: No ulcer, skin breakdown, erythema, warmth, callus or dry skin.      Left foot:      Skin integrity: No ulcer, skin breakdown, erythema, warmth, callus or dry skin.   Skin:     General: Skin is warm and dry.   Neurological:      Mental Status: She is alert and oriented to person, place, and time.   Psychiatric:         Behavior: Behavior normal.         Thought Content: Thought content normal.         Judgment: Judgment normal.          Saloni Landon [facility]

## 2024-11-10 PROBLEM — R55 SYNCOPE, UNSPECIFIED SYNCOPE TYPE: Status: ACTIVE | Noted: 2023-10-04

## 2024-11-10 PROBLEM — Z76.89 ENCOUNTER FOR SUPPORT AND COORDINATION OF TRANSITION OF CARE: Status: RESOLVED | Noted: 2024-08-09 | Resolved: 2024-11-10

## 2024-11-10 PROBLEM — L03.115 CELLULITIS OF RIGHT LOWER EXTREMITY: Status: RESOLVED | Noted: 2024-08-16 | Resolved: 2024-11-10

## 2024-11-10 NOTE — ASSESSMENT & PLAN NOTE
Lab Results   Component Value Date    HGBA1C 7.8 (H) 08/26/2024   Uncontrolled, next hba1c due after 11/26. Pt has a cgm. Pt states her stress level has increased with the recent election and she was not following a good diet plan. Follows with endocrine and has an upcoming appointment

## 2024-11-11 ENCOUNTER — PATIENT OUTREACH (OUTPATIENT)
Dept: CASE MANAGEMENT | Facility: OTHER | Age: 64
End: 2024-11-11

## 2024-11-11 ENCOUNTER — OFFICE VISIT (OUTPATIENT)
Dept: PHYSICAL THERAPY | Facility: CLINIC | Age: 64
End: 2024-11-11
Payer: MEDICARE

## 2024-11-11 ENCOUNTER — TELEPHONE (OUTPATIENT)
Age: 64
End: 2024-11-11

## 2024-11-11 ENCOUNTER — OFFICE VISIT (OUTPATIENT)
Dept: NEUROLOGY | Facility: CLINIC | Age: 64
End: 2024-11-11
Payer: COMMERCIAL

## 2024-11-11 VITALS
DIASTOLIC BLOOD PRESSURE: 70 MMHG | BODY MASS INDEX: 31.6 KG/M2 | SYSTOLIC BLOOD PRESSURE: 120 MMHG | WEIGHT: 201.3 LBS | HEIGHT: 67 IN | HEART RATE: 54 BPM

## 2024-11-11 DIAGNOSIS — R55 SYNCOPE, UNSPECIFIED SYNCOPE TYPE: ICD-10-CM

## 2024-11-11 DIAGNOSIS — Z74.09 IMPAIRED FUNCTIONAL MOBILITY, BALANCE, AND ENDURANCE: Primary | ICD-10-CM

## 2024-11-11 DIAGNOSIS — R42 DIZZINESS: ICD-10-CM

## 2024-11-11 DIAGNOSIS — I49.9 IRREGULAR HEART BEAT: ICD-10-CM

## 2024-11-11 DIAGNOSIS — Z86.73 HX-TIA (TRANSIENT ISCHEMIC ATTACK): Primary | ICD-10-CM

## 2024-11-11 DIAGNOSIS — R40.4 ALTERED SENSORIUM: ICD-10-CM

## 2024-11-11 PROCEDURE — 97112 NEUROMUSCULAR REEDUCATION: CPT | Performed by: PHYSICAL THERAPIST

## 2024-11-11 PROCEDURE — 97164 PT RE-EVAL EST PLAN CARE: CPT | Performed by: PHYSICAL THERAPIST

## 2024-11-11 PROCEDURE — 99214 OFFICE O/P EST MOD 30 MIN: CPT | Performed by: PSYCHIATRY & NEUROLOGY

## 2024-11-11 NOTE — TELEPHONE ENCOUNTER
Patient called stating she has the Dexcom G7, she took her old sensor off and is trying to put her new one on but she can not seem to find the caesar on her phone and is really having a hard time getting the new sensor connected. Please call patient back ASAP.

## 2024-11-11 NOTE — PROGRESS NOTES
DENIS VIZCAINO received a referral from patient's PCP stating patient lives in [facility] and would like a health care mentor. DENIS VIZCAINO reviewed patient's chart and called patient ([phone]) to clarify what resources she is looking for. Patient did not answer, DENIS VIZCAINO left a message including DENIS VIZCAINO contact information and requested a call back. DENIS VIZCAINO will attempt to outreach again at a later date.

## 2024-11-11 NOTE — ASSESSMENT & PLAN NOTE
Patient Instructions   Altered sensorium: Ynes presents for a follow-up evaluation with regard to an episode of altered sensorium.  She has completed a sleep deprived EEG which confirmed an irregular heart rhythm but did not show any epileptiform changes.  She continues to experience episodic dizziness but this is mostly triggered by changes in position.  She does have fluctuations in her degree of dizziness from day-to-day.  She has made excellent progress with regard to staying well-hydrated and she is receiving clear benefit from physical therapy  -At this point we will plan to continue her combination of hydration and physical therapy with regard to improving her day-to-day symptoms  -I would encourage her to work with her pharmacy her primary care team in order to reestablish continuous glycemic monitoring when possible.  -We will defer to her primary care team with regard to monitoring of her cholesterol panel and blood sugar numbers and do recommend working on a hemoglobin A1c of less than 7% and LDL cholesterol of less than 70 on an ongoing basis  -Because the EEG confirmed an irregular heart rhythm I will enter a new order for Holter monitoring as I would prefer her to have this sooner rather than later.  I will reach out to my nursing team with regard to trying to get this approved for her.  -She continues to avoid driving and I agree for the time being that is reasonable    From my standpoint she is doing very well.  I would like for her to return to the office to see me directly in 6 months time but I would be happy to see her sooner if the need should arise.  If she does have strokelike symptoms such as sudden painless loss of vision or double vision, difficulty speaking or swallowing, vertigo/room spinning that does not quickly resolve, or unusual weakness or numbness or loss of coordination in 1 side of the face or body she should proceed by ambulance to the nearest emergency room  immediately.    Orders:    AMB extended holter monitor; Future

## 2024-11-11 NOTE — PROGRESS NOTES
"Re-evaluation Note     Today's date: 2024  Patient name: Ynes Mendoza  : 1960  MRN: [id number]  Referring provider: Spenser Rodriguez MD  Dx:   Encounter Diagnosis     ICD-10-CM    1. Impaired functional mobility, balance, and endurance  Z74.09       2. Altered sensorium  R40.4       3. Dizziness  R42                      Subjective: Patient reports waking up early today, has been feeling 'dizzy\" throughout the weekend      Objective: See treatment diary below    DHI: 60 (severe handicap)  10/11/24:82  24: 72       IE: 10/11/24  11/11/24   SSGS 0.49m/s 0.69m/s SSGS no AD  0.77 m/s   TUG 19.8 seconds with rollator  22.0 seconds no AD 13.6 seconds with rollator  14.6 seconds no AD     FGA    3MBWT 7.9 seconds no AD 7.3 seconds no AD                  MCTSIB   10/11/24     Firm EO 30 30     Firm EC 15 30     Foam EO 30 30     Foam EC 12 17                       Assessment: Pt presents this session for Re-evaluation of progress since starting physical therapy. At this time she has made siginficant improvements since last progress update in overal speed of walking as well as with balance all per testing this date. However she continues to be within high handicap category for her feelings of balance and continues to be at a risk of falls per testing. She will continue to benefit from skilled therapy interventin as she continues to make steady progress with overall balance and dizziness as well as getting closer to prior level of function at 2x/week for 4 more weeks.     This session patient felt unsteady with most activities and has difficulty distinguishing types of dizziness. Therapist discussed there is some normal feelings of sway during single leg stance.       Plan: Continue per plan of care.       Goals  ST. Pt will improve DHI to at least 45 points within 4 weeks needed to decrease dizziness and improve function NOT MET  2. Pt will decrease dizziness to 7/10 at worst and " 5/10 average in 4 weeks to improve activity tolerance PARTIALLY MET  3. Further assess balance with FGA, TUG, and SSGS and establish appropriate goal MET  4. Pt will demonstrate independence with HEP within 4 weeks.  MET     LT. Pt will improve DHI to at least 30 points within 8 weeks needed to decrease dizziness and improve function - not met  2. Pt will decrease dizziness to 5/10 at worst and 3/10 average in 8 weeks to improve activity tolerance - not met  3. Patient will report improved tolerance to all ADL tasks with minimal symptoms noted - not met  4. Pt will demonstrate independence with HEP within 8 weeks. - met  5. Pt will improve FGA to at least 17/30 in 8 weeks - met    New Goals:  LT. Pt will improve DHI to at least 30 points within 8 weeks needed to decrease dizziness and improve function  2. Pt will decrease dizziness to 5/10 at worst and 3/10 average in 8 weeks to improve activity tolerance  3. Patient will report improved tolerance to all ADL tasks with minimal symptoms noted  4. Pt will demonstrate independence with HEP within 8 weeks.  5. Pt will improve FGA to at least 23/30 in 8 weeks     POC expiration: 24  Goal expiration: 24       POC expires Unit limit Auth Expiration date PT/OT/ST + Visit Limit?   24 N/a 12/15 BOMN                           Visit/Unit Tracking  AUTH Status:  10/2 10/4 10/9 10/11  PN 10/14 10/16 10/21 10/25 10/30 11/1 11/4 11/6   8 authorized 4 5 6 7 8 1 2 3 4 5 6 7    4 3 2 1 0 7 6 5 4 3 2 1                   8               0                    Precautions: DMII, PTSD, anxiety, syncope      Manuals 11/11 10/21 10/25 10/30 11/1 11/4 11/6                                           Neuro Re-Ed          VOR x 1          Imaginary targets          HT/HN ambulation Long //bars 4 laps ea SOLO  1/4 lap x 3 laps each direction SOLO  1/4 lap x 3 laps each direction SOLO  /4 lap x 3 laps each direction SOLO  1/4 lap x 3 laps each direction SOLO  /  lap x 3 laps each direction SOLO  1/4 lap x 3 laps each direction   sidesteps  SOLO  1/4 lap x 3 laps SOLO  1/4 lap x 3 laps SOLO  1/4 lap x 3 laps SOLO  1/4 lap x 4 laps SOLO  1/4 lap x 3 laps SOLO  1/4 lap x 3 laps   HKM 4 laps //bars long SOLO  1/4 lap x 3 laps SOLO  1/4 lap x 3 laps SOLO  1/4 lap x 3 laps SOLO  1/4 lap x 4 laps SOLO  1/4 lap x 3 laps SOLO  1/4 lap x 3 laps   hurdles  SOLO  6-inch (6) step over step forward x 3 laps    Lateral x 3 laps SOLO  6-inch (6) step over step forward x 3 laps    Lateral x 3 laps  SOLO  6-inch (6) step over step forward x 3 laps    Lateral x 3 laps SOLO  6-inch (6) step over step  Forward x 3 laps    Lateral x 3 laps    Static balance          backward    SOLO  1/4 lap x 3 laps SOLO  1/4 lap x 4 laps SOLO  1/4 lap x 3 laps SOLO  1/4 lap x 3 laps   tandem   SOLO  1/4 lap x 3 laps    SOLO  1/4 lap x 3 laps   foam  SOLO  Up and over (3) foam pads  Forward x 3 laps SOLO  Up and over (3) foam pads, forward x 3 laps    Lateral x 3 laps SOLO  Up and over (3) foam pads forward x 3 laps    Lateral x 3 laps SOLO  Up and over (3) foam pads forward x 3 laps    Lateral x 3 laps     Ther Ex          nustep L6 for 10 min L6 for 10 minutes   L6 for 10 minutes L6 for 10 minutes L7 for 10 minutes                                                     Ther Activity                              Gait Training                              Education

## 2024-11-11 NOTE — PROGRESS NOTES
Ambulatory Visit  Name: Ynes Mendoza      : 1960      MRN: [id number]  Encounter Provider: Spenser Rodriguez MD  Encounter Date: 2024   Encounter department: [facility] [place]    Assessment & Plan  Syncope, unspecified syncope type  Patient Instructions   Altered sensorium: Ynes presents for a follow-up evaluation with regard to an episode of altered sensorium.  She has completed a sleep deprived EEG which confirmed an irregular heart rhythm but did not show any epileptiform changes.  She continues to experience episodic dizziness but this is mostly triggered by changes in position.  She does have fluctuations in her degree of dizziness from day-to-day.  She has made excellent progress with regard to staying well-hydrated and she is receiving clear benefit from physical therapy  -At this point we will plan to continue her combination of hydration and physical therapy with regard to improving her day-to-day symptoms  -I would encourage her to work with her pharmacy her primary care team in order to reestablish continuous glycemic monitoring when possible.  -We will defer to her primary care team with regard to monitoring of her cholesterol panel and blood sugar numbers and do recommend working on a hemoglobin A1c of less than 7% and LDL cholesterol of less than 70 on an ongoing basis  -Because the EEG confirmed an irregular heart rhythm I will enter a new order for Holter monitoring as I would prefer her to have this sooner rather than later.  I will reach out to my nursing team with regard to trying to get this approved for her.  -She continues to avoid driving and I agree for the time being that is reasonable    From my standpoint she is doing very well.  I would like for her to return to the office to see me directly in 6 months time but I would be happy to see her sooner if the need should arise.  If she does have strokelike symptoms such as sudden painless loss of vision  or double vision, difficulty speaking or swallowing, vertigo/room spinning that does not quickly resolve, or unusual weakness or numbness or loss of coordination in 1 side of the face or body she should proceed by ambulance to the nearest emergency room immediately.    Orders:    AMB extended holter monitor; Future    Hx-TIA (transient ischemic attack)    Orders:    AMB extended holter monitor; Future    Dizziness    Orders:    AMB extended holter monitor; Future    Irregular heart beat    Orders:    AMB extended holter monitor; Future        History of Present Illness   HPI  Ynes presents for a follow-up evaluation with regard to her prior episodes of altered sensorium.  She reports no new events per se.  She continues to have episodes of episodic dizziness but reports that she is feeling much better with hydration and that physical therapy has clearly made a difference as well.  She continues to avoid driving.  I was able to review the results of her sleep deprived EEG.  This confirmed an irregular heart rhythm but otherwise was within normal limits.  She did report 1 episode of alteration where after doing some meditation with friends she was suddenly unable to speak and text well for a short time.  This was not accompanied by a loss of consciousness, difficulty with understanding, or any other neurologic symptoms.  She reports that this is similar to what she has had in the past.  It was brief and self-limited.    Review of Systems   Constitutional:  Negative for appetite change, fatigue and fever.   HENT: Negative.  Negative for hearing loss, tinnitus, trouble swallowing and voice change.    Eyes: Negative.  Negative for photophobia, pain and visual disturbance.   Respiratory: Negative.  Negative for shortness of breath.    Cardiovascular: Negative.  Negative for palpitations.   Gastrointestinal: Negative.  Negative for nausea and vomiting.   Endocrine: Negative.  Negative for cold intolerance.  "  Genitourinary: Negative.  Negative for dysuria, frequency and urgency.   Musculoskeletal:  Negative for back pain, gait problem, myalgias, neck pain and neck stiffness.   Skin: Negative.  Negative for rash.   Allergic/Immunologic: Negative.    Neurological: Negative.  Negative for dizziness, tremors, seizures, syncope, facial asymmetry, speech difficulty, weakness, light-headedness, numbness and headaches.   Hematological: Negative.  Does not bruise/bleed easily.   Psychiatric/Behavioral: Negative.  Negative for confusion, hallucinations and sleep disturbance.      I have personally reviewed the MA's review of systems and made changes as necessary.      Objective     Ht 5' 7\" (1.702 m)   Wt 91.3 kg (201 lb 4.8 oz)   BMI 31.53 kg/m²     Physical Exam  Neurologic Exam    At the time of my examination she was awake and alert and in no distress.  There were no clear cranial neuropathies or lateralizing signs.  She remains tangential but answers are generally appropriate in context and she is a reasonable historian.  There was no dysarthria or aphasia.  She was able to rise easily without assistance and her gait with the assistance of her rollator was stable.  "

## 2024-11-11 NOTE — PATIENT INSTRUCTIONS
Altered sensorium: Ynes presents for a follow-up evaluation with regard to an episode of altered sensorium.  She has completed a sleep deprived EEG which confirmed an irregular heart rhythm but did not show any epileptiform changes.  She continues to experience episodic dizziness but this is mostly triggered by changes in position.  She does have fluctuations in her degree of dizziness from day-to-day.  She has made excellent progress with regard to staying well-hydrated and she is receiving clear benefit from physical therapy  -At this point we will plan to continue her combination of hydration and physical therapy with regard to improving her day-to-day symptoms  -I would encourage her to work with her pharmacy her primary care team in order to reestablish continuous glycemic monitoring when possible.  -We will defer to her primary care team with regard to monitoring of her cholesterol panel and blood sugar numbers and do recommend working on a hemoglobin A1c of less than 7% and LDL cholesterol of less than 70 on an ongoing basis  -Because the EEG confirmed an irregular heart rhythm I will enter a new order for Holter monitoring as I would prefer her to have this sooner rather than later.  I will reach out to my nursing team with regard to trying to get this approved for her.  -She continues to avoid driving and I agree for the time being that is reasonable    From my standpoint she is doing very well.  I would like for her to return to the office to see me directly in 6 months time but I would be happy to see her sooner if the need should arise.  If she does have strokelike symptoms such as sudden painless loss of vision or double vision, difficulty speaking or swallowing, vertigo/room spinning that does not quickly resolve, or unusual weakness or numbness or loss of coordination in 1 side of the face or body she should proceed by ambulance to the nearest emergency room immediately.

## 2024-11-12 ENCOUNTER — TELEPHONE (OUTPATIENT)
Dept: DIABETES SERVICES | Facility: CLINIC | Age: 64
End: 2024-11-12

## 2024-11-12 ENCOUNTER — TELEPHONE (OUTPATIENT)
Age: 64
End: 2024-11-12

## 2024-11-12 DIAGNOSIS — I49.9 IRREGULAR HEART BEAT: ICD-10-CM

## 2024-11-12 DIAGNOSIS — Z86.73 HX-TIA (TRANSIENT ISCHEMIC ATTACK): Primary | ICD-10-CM

## 2024-11-12 DIAGNOSIS — R55 SYNCOPE, UNSPECIFIED SYNCOPE TYPE: ICD-10-CM

## 2024-11-12 DIAGNOSIS — R42 DIZZINESS: ICD-10-CM

## 2024-11-12 NOTE — TELEPHONE ENCOUNTER
S/W patient who was transferred to me having issue downloading her Dexcom.  With the help of notes that Zeny entered I tried walking the patient through the steps to get caesar on her phone and we were unsuccessful. I supplied her with the tech support # for Dexcom.

## 2024-11-12 NOTE — TELEPHONE ENCOUNTER
Left message for call back. She may have to call Dexcom or download the caesar again on her phone.

## 2024-11-12 NOTE — TELEPHONE ENCOUNTER
----- Message from Spenser Rodriguez MD sent at 11/11/2024  2:14 PM EST -----  Good Afternoon,    For Ynes I had ordered a Holter monitor which I think had not yet been approved.  That was based on our visit last time.  Since then she has had EEG which confirmed an irregular heart rhythm.  Because of this, it is considerably more important that we get her Holter monitor completed.  I did enter a new order given the additional information.  Can you help pursue insurance authorization for monitoring?    Thanks!  -Dr. DIAMOND

## 2024-11-12 NOTE — TELEPHONE ENCOUNTER
The caesar icon is white with a green dexcom compass on it. If she cannot find it, she can go to her caesar store and type in dexcom g7. If she has it on her phone, it will say open there and she can go in. If she accidentally deleted it, she can redownload and sign in with her same usename and password. Username is typically her phone number, password is either something she created, or if I made it for her it will be [id number]!

## 2024-11-13 ENCOUNTER — OFFICE VISIT (OUTPATIENT)
Dept: PHYSICAL THERAPY | Facility: CLINIC | Age: 64
End: 2024-11-13
Payer: MEDICARE

## 2024-11-13 DIAGNOSIS — R40.4 ALTERED SENSORIUM: ICD-10-CM

## 2024-11-13 DIAGNOSIS — R42 DIZZINESS: ICD-10-CM

## 2024-11-13 DIAGNOSIS — Z74.09 IMPAIRED FUNCTIONAL MOBILITY, BALANCE, AND ENDURANCE: Primary | ICD-10-CM

## 2024-11-13 PROCEDURE — 97112 NEUROMUSCULAR REEDUCATION: CPT

## 2024-11-13 NOTE — PROGRESS NOTES
Daily Note     Today's date: 2024  Patient name: Ynes Mendoza  : 1960  MRN: [id number]  Referring provider: Spenser Rodriguez MD  Dx:   Encounter Diagnosis     ICD-10-CM    1. Impaired functional mobility, balance, and endurance  Z74.09       2. Altered sensorium  R40.4       3. Dizziness  R42                      Subjective: reports the dizziness is feeling better today and is doing well.      Objective: See treatment diary below      Assessment: Tolerated treatment well. Patient demonstrated fatigue post treatment, exhibited good technique with therapeutic exercises, and would benefit from continued PT. Continues to have some instability with head movements with ambulation, adding ball toss with visual tracking during session. Patient reported difficulty with performing due to poor coordination, significant difficulty with being able to perform with visual tracking and head movements, especially in downward direction. Improving balance noted with hurdles, only catching toes occasionally but no overt LOB. Continue to progress as tolerated.       Plan: Continue per plan of care.            POC expires Unit limit Auth Expiration date PT/OT/ST + Visit Limit?   24 N/a 12/15 BOMN                           Visit/Unit Tracking  AUTH Status:  10/2 10/4 10/9 10/11  PN 10/14 10/16 10/21 10/25 10/30 11/1 11/4 11/6   8 authorized 4 5 6 7 8 1 2 3 4 5 6 7    4 3 2 1 0 7 6 5 4 3 2 1                  8 1              0 7                   Precautions: DMII, PTSD, anxiety, syncope      Manuals 10/25 10/30 11/1 11/4 11/6 11/11 11/13                                           Neuro Re-Ed          VOR x 1          Imaginary targets          HT/HN ambulation SOLO  1/4 lap x 3 laps each direction SOLO  1/4 lap x 3 laps each direction SOLO  1/4 lap x 3 laps each direction SOLO  1/4 lap x 3 laps each direction SOLO  1/4 lap x 3 laps each direction Long //bars 4 laps ea SOLO  1/4 lap x 4 laps each  direction    Ball toss with amb side/side x 3 laps  Up/down x 4 laps   sidesteps SOLO  1/4 lap x 3 laps SOLO  1/4 lap x 3 laps SOLO  1/4 lap x 4 laps SOLO  1/4 lap x 3 laps SOLO  1/4 lap x 3 laps  SOLO  1/4 lap x 3 laps   HKM SOLO  1/4 lap x 3 laps SOLO  1/4 lap x 3 laps SOLO  1/4 lap x 4 laps SOLO  1/4 lap x 3 laps SOLO  1/4 lap x 3 laps 4 laps //bars long SOLO  1/4 lap x 3 laps   hurdles SOLO  6-inch (6) step over step forward x 3 laps    Lateral x 3 laps  SOLO  6-inch (6) step over step forward x 3 laps    Lateral x 3 laps SOLO  6-inch (6) step over step  Forward x 3 laps    Lateral x 3 laps   SOLO  6-inch (6 step over step  Forward x 4 laps    Lateral x 4 laps   Static balance          backward  SOLO  1/4 lap x 3 laps SOLO  1/4 lap x 4 laps SOLO  1/4 lap x 3 laps SOLO  1/4 lap x 3 laps     tandem SOLO  1/4 lap x 3 laps    SOLO  1/4 lap x 3 laps  SOLO  1/4 lap x 3 laps   foam SOLO  Up and over (3) foam pads, forward x 3 laps    Lateral x 3 laps SOLO  Up and over (3) foam pads forward x 3 laps    Lateral x 3 laps SOLO  Up and over (3) foam pads forward x 3 laps    Lateral x 3 laps       Ther Ex          nustep   L6 for 10 minutes L6 for 10 minutes L7 for 10 minutes L6 for 10 min                                                      Ther Activity                              Gait Training                              Education

## 2024-11-14 ENCOUNTER — PATIENT OUTREACH (OUTPATIENT)
Dept: CASE MANAGEMENT | Facility: OTHER | Age: 64
End: 2024-11-14

## 2024-11-14 NOTE — TELEPHONE ENCOUNTER
I called cardiology ([phone]) to see what is needed in order to schedule;  referral placed for extended holter monitor (2 weeks);    Was told that a general  referral to cardiology would need to be placed first in order to schedule for extended holter (zio patch).    Dr. Rodriguez, please place referral to general cardiology to evaluate for extended holter monitor (zio patch) due to symptoms  (TIA, syncope, dizziness, irregular heart beat); once entered, cardiology will call patient to schedule. Thank you.

## 2024-11-14 NOTE — PROGRESS NOTES
DENIS VIZCAINO received a voicemail from patient returning DENIS VIZCAINO call. DENIS VIZCAINO called patient back to discuss what resources she was looking for ([phone]). Patient answered and DENIS VIZCAINO introduced role and reason for calling. Patient lives in [place] and lives in a 'boarding home' in [place] on the [address]. She stated another individual who lives there has a health mentor. She already applied for the program and is waiting to hear back. Patient will begin reciving SSI this month and she is on the waitlist at [facility]. At this time, patient is not looking for any additional resources. DENIS VIZCAINO will close. Please re consult DENIS VIZCAINO as needed.

## 2024-11-15 ENCOUNTER — TELEPHONE (OUTPATIENT)
Dept: DIABETES SERVICES | Facility: CLINIC | Age: 64
End: 2024-11-15

## 2024-11-15 DIAGNOSIS — Z79.4 TYPE 2 DIABETES MELLITUS WITHOUT COMPLICATION, WITH LONG-TERM CURRENT USE OF INSULIN (HCC): Primary | ICD-10-CM

## 2024-11-15 DIAGNOSIS — E11.9 TYPE 2 DIABETES MELLITUS WITHOUT COMPLICATION, WITH LONG-TERM CURRENT USE OF INSULIN (HCC): Primary | ICD-10-CM

## 2024-11-15 NOTE — TELEPHONE ENCOUNTER
Please enter a referral for the patient to see diabetes education for help with CGM sensor. Thank you.

## 2024-11-15 NOTE — TELEPHONE ENCOUNTER
S/W patient again regarding Application for Clarity for her Dexcom.  She wants to just see Zeny to do it.  I advised her Zeny requires the Demetrius be downloaded to the phone before the appt.  She did not have a referral either.  I put in a request for a referral and advised the patient when that is received, someone will call back to schedule her an appt with Zeny.  In the meantime, I emailed the directions again on how to download the demetrius and also sent in my chart.

## 2024-11-18 ENCOUNTER — OFFICE VISIT (OUTPATIENT)
Dept: PHYSICAL THERAPY | Facility: CLINIC | Age: 64
End: 2024-11-18
Payer: MEDICARE

## 2024-11-18 DIAGNOSIS — R42 DIZZINESS: ICD-10-CM

## 2024-11-18 DIAGNOSIS — Z74.09 IMPAIRED FUNCTIONAL MOBILITY, BALANCE, AND ENDURANCE: Primary | ICD-10-CM

## 2024-11-18 DIAGNOSIS — R40.4 ALTERED SENSORIUM: ICD-10-CM

## 2024-11-18 PROCEDURE — 97112 NEUROMUSCULAR REEDUCATION: CPT

## 2024-11-18 PROCEDURE — 97110 THERAPEUTIC EXERCISES: CPT

## 2024-11-18 NOTE — PROGRESS NOTES
Daily Note     Today's date: 2024  Patient name: Ynes Mendoza  : 1960  MRN: [id number]  Referring provider: Spenser Rodriguez MD  Dx:   Encounter Diagnosis     ICD-10-CM    1. Impaired functional mobility, balance, and endurance  Z74.09       2. Altered sensorium  R40.4       3. Dizziness  R42                        Subjective: reports she was feeling dizzy this morning after walking in the house, not sure why this brought on the dizziness. States she feels like she has a cold and her ears are clogged.      Objective: See treatment diary below      Assessment: Tolerated treatment well. Patient demonstrated fatigue post treatment, exhibited good technique with therapeutic exercises, and would benefit from continued PT. Performed exercises in the // bars during session since SOLO harness was unavailable. Was able to perform most exercises without use of handrails. Mild toe-catching with forward hurdles, able to catch balance with reactive stepping. Continue to progress as tolerated.       Plan: Continue per plan of care.            POC expires Unit limit Auth Expiration date PT/OT/ST + Visit Limit?   24 N/a 12/15 BOMN                           Visit/Unit Tracking  AUTH Status:  10/2 10/4 10/9 10/11  PN 10/14 10/16 10/21 10/25 10/30 11/1 11/4 11/6   8 authorized 4 5 6 7 8 1 2 3 4 5 6 7    4 3 2 1 0 7 6 5 4 3 2 1                 8 1 2             0 7 6                  Precautions: DMII, PTSD, anxiety, syncope      Manuals 10/30 11/1 11/4 11/6 11/11 11/13 11/18                                           Neuro Re-Ed          VOR x 1          Imaginary targets          HT/HN ambulation SOLO  1/4 lap x 3 laps each direction SOLO  1/4 lap x 3 laps each direction SOLO  1/4 lap x 3 laps each direction SOLO  1/4 lap x 3 laps each direction Long //bars 4 laps ea SOLO  1/4 lap x 4 laps each direction    Ball toss with amb side/side x 3 laps  Up/down x 4 laps // bars (long) amb with HT/HN x 3  each direction   sidesteps SOLO  1/4 lap x 3 laps SOLO  1/4 lap x 4 laps SOLO  1/4 lap x 3 laps SOLO  1/4 lap x 3 laps  SOLO  1/4 lap x 3 laps // bars (long)  1/4 lap x 3 laps   HKM SOLO  1/4 lap x 3 laps SOLO  1/4 lap x 4 laps SOLO  1/4 lap x 3 laps SOLO  1/4 lap x 3 laps 4 laps //bars long SOLO  1/4 lap x 3 laps // bars (long)  1/4 lap x 3 laps   hurdles  SOLO  6-inch (6) step over step forward x 3 laps    Lateral x 3 laps SOLO  6-inch (6) step over step  Forward x 3 laps    Lateral x 3 laps   SOLO  6-inch (6 step over step  Forward x 4 laps    Lateral x 4 laps // bars (long)  6-inch (6) step over step forward x 3 laps    Lateral x 3 laps   Static balance          backward SOLO  1/4 lap x 3 laps SOLO  1/4 lap x 4 laps SOLO  1/4 lap x 3 laps SOLO  1/4 lap x 3 laps      tandem    SOLO  1/4 lap x 3 laps  SOLO  1/4 lap x 3 laps // bars (long)  1/4 lap x 3 laps   foam SOLO  Up and over (3) foam pads forward x 3 laps    Lateral x 3 laps SOLO  Up and over (3) foam pads forward x 3 laps    Lateral x 3 laps     // bars (long)  Up and over foam pads (3) forward x 3 laps    Lateral x 3 laps   Ther Ex          nustep  L6 for 10 minutes L6 for 10 minutes L7 for 10 minutes L6 for 10 min  L6 for 10 minutes                                                     Ther Activity                              Gait Training                              Education

## 2024-11-20 ENCOUNTER — OFFICE VISIT (OUTPATIENT)
Dept: PHYSICAL THERAPY | Facility: CLINIC | Age: 64
End: 2024-11-20
Payer: MEDICARE

## 2024-11-20 DIAGNOSIS — Z74.09 IMPAIRED FUNCTIONAL MOBILITY, BALANCE, AND ENDURANCE: Primary | ICD-10-CM

## 2024-11-20 DIAGNOSIS — R42 DIZZINESS: ICD-10-CM

## 2024-11-20 DIAGNOSIS — R40.4 ALTERED SENSORIUM: ICD-10-CM

## 2024-11-20 PROCEDURE — 97112 NEUROMUSCULAR REEDUCATION: CPT

## 2024-11-20 NOTE — PROGRESS NOTES
Daily Note     Today's date: 2024  Patient name: Ynes Mendoza  : 1960  MRN: [id number]  Referring provider: Spenser Rodriguez MD  Dx:   Encounter Diagnosis     ICD-10-CM    1. Impaired functional mobility, balance, and endurance  Z74.09       2. Altered sensorium  R40.4       3. Dizziness  R42                          Subjective: reports she is feeling better, states that her balance feels a little better today.       Objective: See treatment diary below      Assessment: Tolerated treatment well. Patient demonstrated fatigue post treatment, exhibited good technique with therapeutic exercises, and would benefit from continued PT. Demonstrated improved balance compared to previous sessions with HT/HN during ambulation. Demonstrated multiple reactive steps during ball toss and during tandem walking, no overt LOB. Improved ability to continue ambulation during ball toss with modifying to pass off between hands instead of throwing. Continue to progress as tolerated.       Plan: Continue per plan of care.            POC expires Unit limit Auth Expiration date PT/OT/ST + Visit Limit?   24 N/a 12/15 BOMN                           Visit/Unit Tracking  AUTH Status:  10/2 10/4 10/9 10/11  PN 10/14 10/16 10/21 10/25 10/30 11/1 11/4 11/6   8 authorized 4 5 6 7 8 1 2 3 4 5 6 7    4 3 2 1 0 7 6 5 4 3 2 1                8 1 2 3            0 7 6 5                 Precautions: DMII, PTSD, anxiety, syncope      Manuals 10/30 11/1 11/4 11/6 11/11 11/13 11/18 11/20                                               Neuro Re-Ed           VOR x 1           Imaginary targets           HT/HN ambulation SOLO  1/4 lap x 3 laps each direction SOLO  1/4 lap x 3 laps each direction SOLO  1/4 lap x 3 laps each direction SOLO  1/4 lap x 3 laps each direction Long //bars 4 laps ea SOLO  1/4 lap x 4 laps each direction    Ball toss with amb side/side x 3 laps  Up/down x 4 laps // bars (long) amb with HT/HN x 3  each direction SOLO  1/4 lap x 3 laps each direction    Ball toss/pass with amb side/side x 3 laps  Up/down x 3 laps   sidesteps SOLO  1/4 lap x 3 laps SOLO  1/4 lap x 4 laps SOLO  1/4 lap x 3 laps SOLO  1/4 lap x 3 laps  SOLO  1/4 lap x 3 laps // bars (long)  1/4 lap x 3 laps SOLO  1/4 lap x 3 laps   HKM SOLO  1/4 lap x 3 laps SOLO  1/4 lap x 4 laps SOLO  1/4 lap x 3 laps SOLO  1/4 lap x 3 laps 4 laps //bars long SOLO  1/4 lap x 3 laps // bars (long)  1/4 lap x 3 laps SOLO  1/4 lap x 3 laps   hurdles  SOLO  6-inch (6) step over step forward x 3 laps    Lateral x 3 laps SOLO  6-inch (6) step over step  Forward x 3 laps    Lateral x 3 laps   SOLO  6-inch (6 step over step  Forward x 4 laps    Lateral x 4 laps // bars (long)  6-inch (6) step over step forward x 3 laps    Lateral x 3 laps SOLO  6-inch (6) step over step forward x 4 laps    Lateral x 3 laps   Static balance           backward SOLO  1/4 lap x 3 laps SOLO  1/4 lap x 4 laps SOLO  1/4 lap x 3 laps SOLO  1/4 lap x 3 laps       tandem    SOLO  1/4 lap x 3 laps  SOLO  1/4 lap x 3 laps // bars (long)  1/4 lap x 3 laps    foam SOLO  Up and over (3) foam pads forward x 3 laps    Lateral x 3 laps SOLO  Up and over (3) foam pads forward x 3 laps    Lateral x 3 laps     // bars (long)  Up and over foam pads (3) forward x 3 laps    Lateral x 3 laps SOLO  Up and over foam pads (3) forward x 3 laps    Lateral x 3 laps   Ther Ex           nustep  L6 for 10 minutes L6 for 10 minutes L7 for 10 minutes L6 for 10 min  L6 for 10 minutes                                                           Ther Activity                                 Gait Training                                 Education

## 2024-11-21 ENCOUNTER — TELEPHONE (OUTPATIENT)
Age: 64
End: 2024-11-21

## 2024-11-21 NOTE — TELEPHONE ENCOUNTER
Pt has been scheduled for 11/26. LYFT was ordered and pt notified of 6:40 am  time. Patient advised after the new year she is switching over to the [place] office.

## 2024-11-21 NOTE — TELEPHONE ENCOUNTER
Please call patient to set up an appointment for Dexcom 7 sensor training.  Referral is in the chart and clarity caesar has been downloaded.

## 2024-11-25 ENCOUNTER — OFFICE VISIT (OUTPATIENT)
Dept: PHYSICAL THERAPY | Facility: CLINIC | Age: 64
End: 2024-11-25
Payer: MEDICARE

## 2024-11-25 DIAGNOSIS — Z74.09 IMPAIRED FUNCTIONAL MOBILITY, BALANCE, AND ENDURANCE: Primary | ICD-10-CM

## 2024-11-25 DIAGNOSIS — R40.4 ALTERED SENSORIUM: ICD-10-CM

## 2024-11-25 DIAGNOSIS — R42 DIZZINESS: ICD-10-CM

## 2024-11-25 PROCEDURE — 97112 NEUROMUSCULAR REEDUCATION: CPT | Performed by: PHYSICAL THERAPY ASSISTANT

## 2024-11-25 PROCEDURE — 97110 THERAPEUTIC EXERCISES: CPT | Performed by: PHYSICAL THERAPY ASSISTANT

## 2024-11-25 NOTE — PROGRESS NOTES
Daily Note     Today's date: 2024  Patient name: Ynes Mendoza  : 1960  MRN: [id number]  Referring provider: Spenser Rodriguez MD  Dx:   No diagnosis found.                     Subjective:Pt reports she had a good weekend but is feeling a little off today.       Objective: See treatment diary below      Assessment: Tolerated treatment well. Patient demonstrated fatigue post treatment, exhibited good technique with therapeutic exercises, and would benefit from continued PT.  Trial of tandem on foam beams went well with pt able to demonstrate good reactive stepping for balance loss. Continue to progress as tolerated.       Plan: Continue per plan of care.            POC expires Unit limit Auth Expiration date PT/OT/ST + Visit Limit?   24 N/a 12/15 BOMN                           Visit/Unit Tracking  AUTH Status:  10/2 10/4 10/9 10/11  PN 10/14 10/16 10/21 10/25 10/30 11/1 11/4 11/6   8 authorized 4 5 6 7 8 1 2 3 4 5 6 7    4 3 2 1 0 7 6 5 4 3 2 1               8 1 2 3 4           0 7 6 5 4                Precautions: DMII, PTSD, anxiety, syncope      Manuals                                        Neuro Re-Ed         VOR x 1         Imaginary targets         HT/HN ambulation SOLO  1/4 lap x 3 laps each direction Long //bars 4 laps ea SOLO  1/4 lap x 4 laps each direction    Ball toss with amb side/side x 3 laps  Up/down x 4 laps // bars (long) amb with HT/HN x 3 each direction SOLO  1/4 lap x 3 laps each direction    Ball toss/pass with amb side/side x 3 laps  Up/down x 3 laps SOLO   1/4 lap   X3 ea direction   sidesteps SOLO  1/4 lap x 3 laps  SOLO  1/4 lap x 3 laps // bars (long)  1/4 lap x 3 laps SOLO  1/4 lap x 3 laps SOLO   1/4 lap   x3 laps   HKM SOLO  1/4 lap x 3 laps 4 laps //bars long SOLO  1/4 lap x 3 laps // bars (long)  1/4 lap x 3 laps SOLO  1/4 lap x 3 laps SOLO   1/4 laps   X3 laps   hurdles   SOLO  6-inch (6 step over  step  Forward x 4 laps    Lateral x 4 laps // bars (long)  6-inch (6) step over step forward x 3 laps    Lateral x 3 laps SOLO  6-inch (6) step over step forward x 4 laps    Lateral x 3 laps SOLO  6-inch (6) step over step forward x 4 laps    Lateral x 3 laps   Static balance         backward SOLO  1/4 lap x 3 laps        tandem SOLO  1/4 lap x 3 laps  SOLO  1/4 lap x 3 laps // bars (long)  1/4 lap x 3 laps  SOLO foam beams (2) x 3 laps ea fwd and lat   foam    // bars (long)  Up and over foam pads (3) forward x 3 laps    Lateral x 3 laps SOLO  Up and over foam pads (3) forward x 3 laps    Lateral x 3 laps    Ther Ex         nustep L7 for 10 minutes L6 for 10 min  L6 for 10 minutes  L6 x10'                                                Ther Activity                           Gait Training                           Education

## 2024-11-26 ENCOUNTER — OFFICE VISIT (OUTPATIENT)
Dept: DIABETES SERVICES | Facility: HOSPITAL | Age: 64
End: 2024-11-26
Payer: MEDICARE

## 2024-11-26 VITALS — HEIGHT: 67 IN | BODY MASS INDEX: 31.55 KG/M2 | WEIGHT: 201 LBS

## 2024-11-26 DIAGNOSIS — E11.9 TYPE 2 DIABETES MELLITUS WITHOUT COMPLICATION, WITH LONG-TERM CURRENT USE OF INSULIN (HCC): Primary | ICD-10-CM

## 2024-11-26 DIAGNOSIS — Z79.4 TYPE 2 DIABETES MELLITUS WITHOUT COMPLICATION, WITH LONG-TERM CURRENT USE OF INSULIN (HCC): Primary | ICD-10-CM

## 2024-11-26 PROCEDURE — 95249 CONT GLUC MNTR PT PROV EQP: CPT | Performed by: DIETITIAN, REGISTERED

## 2024-11-26 NOTE — PROGRESS NOTES
Dexcom G7 Personal Training    Met with Ynes Mendoza for Dexcom G7 personal training. Patient comes in today with there own unit to be trained on. Completed all aspects of training, including site selection on rotation, not infusing insulin near the sensor site, proper insertion technique, inserting codes into the , charging, waterproof sensor, range of 20ft, setting high low alarms that can be adjusted based on their preferences. They put on their first sensor by themselves with no issue.  Left my office today with sensor on and in 30 min warm up mode.      Ynes Mendoza will be running the dexcom through their .    Discussed creating a Clarity account to be able to link and upload from home or auto upload from their phone. Patient was added to our clarity account today while in office and invited to link to us if using their phone. Patient understands that reciever will be downloaded while in office at time of visit and/or cell phone will automatically upload to our clarity for them. They understand that their blood sugars are not monitored by us on a regular basis, but that we can access them as needed or desired by the patient and provider.     Dexcom's phone number is in their paperwork, encouraged Ynes to reach out to [facility] if they have any issues after hours, 24/7. Training completed, will call with questions.     Lab Results   Component Value Date    HGBA1C 7.8 (H) 08/26/2024       Lab Results   Component Value Date    SODIUM 142 08/26/2024    K 5.1 08/26/2024     08/26/2024    CO2 23 08/26/2024    BUN 18 08/26/2024    CREATININE 0.96 08/26/2024    GLUC 124 (H) 08/26/2024    AST 17 08/26/2024    ALT 14 08/26/2024    TP 7.2 08/26/2024    TBILI 0.8 08/26/2024    EGFR 67 08/26/2024           Patient response to instruction    Comprehension: good  Motivation: good  Expected Compliance: good  Response to Teachback: 100%, demonstrated understanding    Thank you for referring your patient to   [facility], it was a pleasure working with them today. Please feel free to call with any questions or concerns.

## 2024-11-27 ENCOUNTER — OFFICE VISIT (OUTPATIENT)
Dept: PHYSICAL THERAPY | Facility: CLINIC | Age: 64
End: 2024-11-27
Payer: MEDICARE

## 2024-11-27 DIAGNOSIS — R40.4 ALTERED SENSORIUM: ICD-10-CM

## 2024-11-27 DIAGNOSIS — Z74.09 IMPAIRED FUNCTIONAL MOBILITY, BALANCE, AND ENDURANCE: Primary | ICD-10-CM

## 2024-11-27 DIAGNOSIS — R42 DIZZINESS: ICD-10-CM

## 2024-11-27 PROCEDURE — 97112 NEUROMUSCULAR REEDUCATION: CPT

## 2024-11-27 NOTE — PROGRESS NOTES
Daily Note     Today's date: 2024  Patient name: Ynes Mendoza  : 1960  MRN: [id number]  Referring provider: Spenser Rodriguez MD  Dx:   Encounter Diagnosis     ICD-10-CM    1. Impaired functional mobility, balance, and endurance  Z74.09       2. Altered sensorium  R40.4       3. Dizziness  R42                            Subjective: Reports that her hips were sore after last session, might be due to stretching them more.       Objective: See treatment diary below      Assessment: Tolerated treatment well. Patient demonstrated fatigue post treatment, exhibited good technique with therapeutic exercises, and would benefit from continued PT.  Some instability with head nods>head turns during session. Able to perform increased height of hurdles compared to previous sessions, attempted to perform reciprocal pattern however some difficulty with clearance of R LE, performed step-to pattern instead. Continue to progress as tolerated.      Plan: Continue per plan of care.            POC expires Unit limit Auth Expiration date PT/OT/ST + Visit Limit?   24 N/a 12/15 BOMN                           Visit/Unit Tracking  AUTH Status:  10/2 10/4 10/9 10/11  PN 10/14 10/16 10/21 10/25 10/30 11/1 11/4 11/6   8 authorized 4 5 6 7 8 1 2 3 4 5 6 7    4 3 2 1 0 7 6 5 4 3 2 1               8 1 2 3 4           0 7 6 5 4                Precautions: DMII, PTSD, anxiety, syncope      Manuals                                            Neuro Re-Ed          VOR x 1          Imaginary targets          HT/HN ambulation SOLO  1/4 lap x 3 laps each direction Long //bars 4 laps ea SOLO  1/4 lap x 4 laps each direction    Ball toss with amb side/side x 3 laps  Up/down x 4 laps // bars (long) amb with HT/HN x 3 each direction SOLO  1/4 lap x 3 laps each direction    Ball toss/pass with amb side/side x 3 laps  Up/down x 3 laps SOLO   1/4 lap   X3 ea direction  SOLO  1/4 lap x 3 laps each direction    Ball toss/pass with amb side/side x 3 laps  Up/down x 3 laps   sidesteps SOLO  1/4 lap x 3 laps  SOLO  1/4 lap x 3 laps // bars (long)  1/4 lap x 3 laps SOLO  1/4 lap x 3 laps SOLO   1/4 lap   x3 laps SOLO  1/4 lap x 3 laps   HKM SOLO  1/4 lap x 3 laps 4 laps //bars long SOLO  1/4 lap x 3 laps // bars (long)  1/4 lap x 3 laps SOLO  1/4 lap x 3 laps SOLO   1/4 laps   X3 laps SOLO  1/4 lap x 3 laps   hurdles   SOLO  6-inch (6 step over step  Forward x 4 laps    Lateral x 4 laps // bars (long)  6-inch (6) step over step forward x 3 laps    Lateral x 3 laps SOLO  6-inch (6) step over step forward x 4 laps    Lateral x 3 laps SOLO  6-inch (6) step over step forward x 4 laps    Lateral x 3 laps SOLO  8-inch (6) step-to pattern forward x 3 laps    Lateral x 3 laps   Static balance          backward SOLO  1/4 lap x 3 laps         tandem SOLO  1/4 lap x 3 laps  SOLO  1/4 lap x 3 laps // bars (long)  1/4 lap x 3 laps  SOLO foam beams (2) x 3 laps ea fwd and lat    foam    // bars (long)  Up and over foam pads (3) forward x 3 laps    Lateral x 3 laps SOLO  Up and over foam pads (3) forward x 3 laps    Lateral x 3 laps  SOLO  Up and over foam pads (3) forward x 3 laps    Lateral x 3 laps   Ther Ex          nustep L7 for 10 minutes L6 for 10 min  L6 for 10 minutes  L6 x10'                                                      Ther Activity                              Gait Training                              Education

## 2024-12-02 ENCOUNTER — OFFICE VISIT (OUTPATIENT)
Dept: PHYSICAL THERAPY | Facility: CLINIC | Age: 64
End: 2024-12-02
Payer: MEDICARE

## 2024-12-02 DIAGNOSIS — R42 DIZZINESS: ICD-10-CM

## 2024-12-02 DIAGNOSIS — Z74.09 IMPAIRED FUNCTIONAL MOBILITY, BALANCE, AND ENDURANCE: Primary | ICD-10-CM

## 2024-12-02 DIAGNOSIS — R40.4 ALTERED SENSORIUM: ICD-10-CM

## 2024-12-02 PROCEDURE — 97112 NEUROMUSCULAR REEDUCATION: CPT

## 2024-12-02 NOTE — PROGRESS NOTES
Daily Note     Today's date: 2024  Patient name: Ynes Mendoza  : 1960  MRN: [id number]  Referring provider: Spenser Rodriguez MD  Dx:   No diagnosis found.                       Subjective: Reported dizziness with ambulation with head turns during session      Objective: See treatment diary below      Assessment: Tolerated treatment well. Patient demonstrated fatigue post treatment, exhibited good technique with therapeutic exercises, and would benefit from continued PT.  Added blaze pods with turning with some instability and demonstrated reaching out for stability with Ue's and multiple reactive steps to maintain balance. Improved ability to perform up and over foam pads compared to previous sessions. Continue to progress as tolerated.      Plan: Continue per plan of care.            POC expires Unit limit Auth Expiration date PT/OT/ST + Visit Limit?   24 N/a 12/15 BOMN                           Visit/Unit Tracking  AUTH Status:  10/2 10/4 10/9 10/11  PN 10/14 10/16 10/21 10/25 10/30 11/1 11/4 11/6   8 authorized 4 5 6 7 8 1 2 3 4 5 6 7    4 3 2 1 0 7 6 5 4 3 2 1     12         8 1 2 3 4 5 6         0 7 6 5 4 3 2              Precautions: DMII, PTSD, anxiety, syncope      Manuals  12/                                       Neuro Re-Ed         VOR x 1         Imaginary targets         HT/HN ambulation SOLO  1/4 lap x 4 laps each direction    Ball toss with amb side/side x 3 laps  Up/down x 4 laps // bars (long) amb with HT/HN x 3 each direction SOLO  1/4 lap x 3 laps each direction    Ball toss/pass with amb side/side x 3 laps  Up/down x 3 laps SOLO   1/4 lap   X3 ea direction SOLO  1/4 lap x 3 laps each direction    Ball toss/pass with amb side/side x 3 laps  Up/down x 3 laps SOLO  1/4 lap x 3 laps each direction    Ball toss/pass with amb side/side x 3 laps  Up/down x 3 laps   sidesteps SOLO  1/4 lap x 3 laps // bars (long)    lap x 3 laps SOLO  1/4 lap x 3 laps SOLO   1/4 lap   x3 laps SOLO  1/4 lap x 3 laps SOLO  1/4 lap x 3 laps   HKM SOLO  1/4 lap x 3 laps // bars (long)  1/4 lap x 3 laps SOLO  1/4 lap x 3 laps SOLO   1/4 laps   X3 laps SOLO  1/4 lap x 3 laps SOLO  1/4 lap x 3 laps   hurdles SOLO  6-inch (6 step over step  Forward x 4 laps    Lateral x 4 laps // bars (long)  6-inch (6) step over step forward x 3 laps    Lateral x 3 laps SOLO  6-inch (6) step over step forward x 4 laps    Lateral x 3 laps SOLO  6-inch (6) step over step forward x 4 laps    Lateral x 3 laps SOLO  8-inch (6) step-to pattern forward x 3 laps    Lateral x 3 laps SOLO  8-inch (5) step-to pattern forward x 3 laps    Lateral x 3 laps   Static balance         backward         tandem SOLO  1/4 lap x 3 laps // bars (long)  1/4 lap x 3 laps  SOLO foam beams (2) x 3 laps ea fwd and lat     Blaze pods      SOLO  4 pods, set up over 1/4 lap, forward walking with turning, 1 minute intervals  1st trial: 11 hits  2nd trial: 13 hits  3rd trial: 15 hits   foam  // bars (long)  Up and over foam pads (3) forward x 3 laps    Lateral x 3 laps SOLO  Up and over foam pads (3) forward x 3 laps    Lateral x 3 laps  SOLO  Up and over foam pads (3) forward x 3 laps    Lateral x 3 laps SOLO  Up and over foam pads (3) forward x 3 laps    Lateral x 3 laps   Ther Ex         nustep  L6 for 10 minutes  L6 x10'                                                  Ther Activity                           Gait Training                           Education

## 2024-12-05 ENCOUNTER — OFFICE VISIT (OUTPATIENT)
Dept: PHYSICAL THERAPY | Facility: CLINIC | Age: 64
End: 2024-12-05
Payer: MEDICARE

## 2024-12-05 DIAGNOSIS — R40.4 ALTERED SENSORIUM: ICD-10-CM

## 2024-12-05 DIAGNOSIS — Z74.09 IMPAIRED FUNCTIONAL MOBILITY, BALANCE, AND ENDURANCE: Primary | ICD-10-CM

## 2024-12-05 DIAGNOSIS — R42 DIZZINESS: ICD-10-CM

## 2024-12-05 PROCEDURE — 97112 NEUROMUSCULAR REEDUCATION: CPT | Performed by: PHYSICAL THERAPY ASSISTANT

## 2024-12-05 NOTE — PROGRESS NOTES
Daily Note     Today's date: 2024  Patient name: Ynes Mendoza  : 1960  MRN: [id number]  Referring provider: Spenser Rodriguez MD  Dx:   Encounter Diagnosis     ICD-10-CM    1. Impaired functional mobility, balance, and endurance  Z74.09       2. Altered sensorium  R40.4       3. Dizziness  R42                              Subjective: No new complaints.        Objective: See treatment diary below      Assessment: Tolerated treatment well. Patient demonstrated fatigue post treatment, exhibited good technique with therapeutic exercises, and would benefit from continued PT.  Continue to progress as tolerated.      Plan: Continue per plan of care.            POC expires Unit limit Auth Expiration date PT/OT/ST + Visit Limit?   24 N/a 12/15 BOMN                           Visit/Unit Tracking  AUTH Status:  10/2 10/4 10/9 10/11  PN 10/14 10/16 10/21 10/25 10/30 11/1 11/4 11/6   8 authorized 4 5 6 7 8 1 2 3 4 5 6 7    4 3 2 1 0 7 6 5 4 3 2 1            8 1 2 3 4 5 6 7        0 7 6 5 4 3 2 1             Precautions: DMII, PTSD, anxiety, syncope      Manuals                                            Neuro Re-Ed          VOR x 1          Imaginary targets          HT/HN ambulation SOLO  1/4 lap x 4 laps each direction    Ball toss with amb side/side x 3 laps  Up/down x 4 laps // bars (long) amb with HT/HN x 3 each direction SOLO  1/4 lap x 3 laps each direction    Ball toss/pass with amb side/side x 3 laps  Up/down x 3 laps SOLO   1/4 lap   X3 ea direction SOLO  1/4 lap x 3 laps each direction    Ball toss/pass with amb side/side x 3 laps  Up/down x 3 laps SOLO  1/4 lap x 3 laps each direction    Ball toss/pass with amb side/side x 3 laps  Up/down x 3 laps SOLO  1/4 lap x 3 laps each direction    Ball toss/pass with amb side/side x 3 laps  Up/down x 3 laps   sidesteps SOLO  1/4 lap x 3 laps // bars (long)  1/4 lap x 3 laps  SOLO  1/4 lap x 3 laps SOLO   1/4 lap   x3 laps SOLO  1/4 lap x 3 laps SOLO  1/4 lap x 3 laps SOLO   1/4 lap x3   HKM SOLO  1/4 lap x 3 laps // bars (long)  1/4 lap x 3 laps SOLO  1/4 lap x 3 laps SOLO   1/4 laps   X3 laps SOLO  1/4 lap x 3 laps SOLO  1/4 lap x 3 laps SOLO   1/4 lap x 3   hurdles SOLO  6-inch (6 step over step  Forward x 4 laps    Lateral x 4 laps // bars (long)  6-inch (6) step over step forward x 3 laps    Lateral x 3 laps SOLO  6-inch (6) step over step forward x 4 laps    Lateral x 3 laps SOLO  6-inch (6) step over step forward x 4 laps    Lateral x 3 laps SOLO  8-inch (6) step-to pattern forward x 3 laps    Lateral x 3 laps SOLO  8-inch (5) step-to pattern forward x 3 laps    Lateral x 3 laps SOLO  8-inch (6) step-to pattern forward x 3 laps    Lateral x 3 laps   Static balance          backward          tandem SOLO  1/4 lap x 3 laps // bars (long)  1/4 lap x 3 laps  SOLO foam beams (2) x 3 laps ea fwd and lat   SOLO foam beams (2) x 3 laps ea fwd and lat   Blaze pods      SOLO  4 pods, set up over 1/4 lap, forward walking with turning, 1 minute intervals  1st trial: 11 hits  2nd trial: 13 hits  3rd trial: 15 hits    foam  // bars (long)  Up and over foam pads (3) forward x 3 laps    Lateral x 3 laps SOLO  Up and over foam pads (3) forward x 3 laps    Lateral x 3 laps  SOLO  Up and over foam pads (3) forward x 3 laps    Lateral x 3 laps SOLO  Up and over foam pads (3) forward x 3 laps    Lateral x 3 laps    Ther Ex          nustep  L6 for 10 minutes  L6 x10'   L6 10'                                                     Ther Activity                              Gait Training                              Education

## 2024-12-06 ENCOUNTER — TELEPHONE (OUTPATIENT)
Age: 64
End: 2024-12-06
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
## 2024-12-08 PROBLEM — Z00.00 WELL ADULT EXAM: Status: RESOLVED | Noted: 2024-11-08 | Resolved: 2024-12-08

## 2024-12-09 ENCOUNTER — EVALUATION (OUTPATIENT)
Dept: PHYSICAL THERAPY | Facility: CLINIC | Age: 64
End: 2024-12-09
Payer: MEDICARE

## 2024-12-09 DIAGNOSIS — Z74.09 IMPAIRED FUNCTIONAL MOBILITY, BALANCE, AND ENDURANCE: Primary | ICD-10-CM

## 2024-12-09 DIAGNOSIS — R42 DIZZINESS: ICD-10-CM

## 2024-12-09 DIAGNOSIS — R40.4 ALTERED SENSORIUM: ICD-10-CM

## 2024-12-09 PROCEDURE — 97112 NEUROMUSCULAR REEDUCATION: CPT

## 2024-12-09 NOTE — PROGRESS NOTES
Re-Evaluation    Today's date: 2024  Patient name: Ynes Mendoza  : 1960  MRN: [id number]  Referring provider: Spenser Rodriguez MD  Dx:   Encounter Diagnosis     ICD-10-CM    1. Impaired functional mobility, balance, and endurance  Z74.09       2. Altered sensorium  R40.4       3. Dizziness  R42                        Subjective: Reports that the dizziness has been getting better. States that some days she can get up and and everything is fine. She states that she feels less dizziness when food shopping. States that she had reflux the last few days that caused a chain reaction that made her feel more dizzy. Dizziness has not been happening as much, same intensity. At best dizziness 0/10, at worst 8/10, and average 4/10. Perceived improvement 40-60%.   Bending over makes her the most dizzy, and reports difficulty with getting up from the floor if she gets down onto the floor. Reports she uses the rollator when outside. Doing better with cooking and other ADL's. Reports she has difficulty with stairs.       Objective: See treatment diary below    DHI: 60 (severe handicap)  10/11/24:82  24: 72  24: 68       IE: 10/11/24  11/11/24 12/9/24   SSGS 0.49m/s 0.69m/s SSGS no AD  0.77 m/s 1.06m/s SSGS no AD   TUG 19.8 seconds with rollator  22.0 seconds no AD 13.6 seconds with rollator  14.6 seconds no AD   10.7 seconds no AD  Manual: 12.3 seconds (15%)  Cognitive: 20.9 seconds (95%)   FGA    3MBWT 7.9 seconds no AD 7.3 seconds no AD   6.0 seconds no AD    4-square step test       16.8 seconds no hits  12.7 seconds 1 cane hits      MCTSIB   10/11/24  12/9/24   Firm EO 30 30  30   Firm EC 15 30  30   Foam EO 30 30  30   Foam EC 12 17  30 (increased sway)                     Assessment: Ynes has attended 8 sessions of physical therapy since last progress note. She continues to make improvements with therapy. She has met 2/5 goals and almost met another goal, and is making progress  toward other goals at this time. Since last progress note, self-selected gait speed has significantly improved from 0.77m/s to 1.06m/s, now above cut-off score of 1.0m/s for increased risk of falls. TUG time improved from 14.6 seconds to 10.7 seconds without AD, now well below cut-off score for falls of 13.5 seconds. FGA improved from 20/30 to 22/30, now at cut-off score of 22/30 for fall risk. 3MBWT improved from 7.3 seconds to 6.0 seconds, which continues to be above cut-off score of 4.5 seconds for increased fall risk. MCTSIB improved in foam eyes closed condition from 17 seconds to 30 seconds (with increased sway), indicating improving vestibular integration for balance. Further testing was completed during today's session, with dual task TUG cognitive of 95%, significantly above normal range of 10-20%. 4-square step test demonstrated increased risk of falls with 1st trial above cut-off score of 15 seconds, and 2nd trial with hitting cane, demonstrating increased risk of falls. Ynes continues to demonstrate high perceived impairment from dizziness with DHI score of 68 points, however has continued to decrease slowly from highest reported value of 82 points. Ynes continues to be at increased risk of falls from FGA score, dual task cost from cognitive score and manual score, 3MBWT, and 4-square step test. She would benefit from continued physical therapy to decrease fall risk, improve balance, decrease dizziness, improve functional mobility, improve activity tolerance, and maximize function. Plan to continue 2x/week for 4 additional weeks.     Plan:  Patient would benefit from: PT eval and skilled physical therapy     Planned therapy interventions: abdominal trunk stabilization, balance, neuromuscular re-education, canalith repositioning, patient/caregiver education, motor coordination training, strengthening, stretching, therapeutic activities, therapeutic exercise, gait training and home exercise program      Frequency: 2x week  Duration in weeks: 4  Plan of Care beginning date: 2024  Plan of Care expiration date: 2025  Treatment plan discussed with: patient      Goals  Goals:  LT. Pt will improve DHI to at least 30 points within 8 weeks needed to decrease dizziness and improve function PROGRESSING  2. Pt will decrease dizziness to 5/10 at worst and 3/10 average in 8 weeks to improve activity tolerance PROGRESSING (for average)  3. Patient will report improved tolerance to all ADL tasks with minimal symptoms noted MET  4. Pt will demonstrate independence with HEP within 8 weeks. MET  5. Pt will improve FGA to at least 23/30 in 8 weeks ALMOST MET     NEW GOALS (24)  Pt will improve DHI score to at least 54 points in 4 weeks to decrease impairment from dizziness  Pt will improve FGA score to at least 25/30 points in 4 weeks to improve balance and decrease fall risk  Pt will improve 3MBWT to at least 4.5 seconds in 4 weeks to decrease fall risk   Pt will improve 4-square step test to <15 seconds with no cane hits in 4 weeks to decrease risk of falls  Pt will decrease cognitive dual task cost to at least 75% in 4 weeks to decrease fall risk      POC expires Unit limit Auth Expiration date PT/OT/ST + Visit Limit?   24 N/a 12/15 BOMN                           Visit/Unit Tracking  AUTH Status:  10/2 10/4 10/9 10/11  PN 10/14 10/16 10/21 10/25 10/30 11/1 11/4 11/6   8 authorized 4 5 6 7 8 1 2 3 4 5 6 7    4 3 2 1 0 7 6 5 4 3 2 1   8 authorized   PN       8 1 2 3 4 5 6 7 8       0 7 6 5 4 3 2 1 0            Precautions: DMII, PTSD, anxiety, syncope      Manuals                                            Neuro Re-Ed       FGA   SSGS  3MBWT  Amb EC x 3 trials  MCTSIB  4-square stepping   VOR x 1          Imaginary targets          HT/HN ambulation // bars (long) amb with HT/HN x 3 each direction SOLO   lap x 3  laps each direction    Ball toss/pass with amb side/side x 3 laps  Up/down x 3 laps SOLO   1/4 lap   X3 ea direction SOLO  1/4 lap x 3 laps each direction    Ball toss/pass with amb side/side x 3 laps  Up/down x 3 laps SOLO  1/4 lap x 3 laps each direction    Ball toss/pass with amb side/side x 3 laps  Up/down x 3 laps SOLO  1/4 lap x 3 laps each direction    Ball toss/pass with amb side/side x 3 laps  Up/down x 3 laps    sidesteps // bars (long)  1/4 lap x 3 laps SOLO  1/4 lap x 3 laps SOLO   1/4 lap   x3 laps SOLO  1/4 lap x 3 laps SOLO  1/4 lap x 3 laps SOLO   1/4 lap x3    HKM // bars (long)  1/4 lap x 3 laps SOLO  1/4 lap x 3 laps SOLO   1/4 laps   X3 laps SOLO  1/4 lap x 3 laps SOLO  1/4 lap x 3 laps SOLO   1/4 lap x 3    hurdles // bars (long)  6-inch (6) step over step forward x 3 laps    Lateral x 3 laps SOLO  6-inch (6) step over step forward x 4 laps    Lateral x 3 laps SOLO  6-inch (6) step over step forward x 4 laps    Lateral x 3 laps SOLO  8-inch (6) step-to pattern forward x 3 laps    Lateral x 3 laps SOLO  8-inch (5) step-to pattern forward x 3 laps    Lateral x 3 laps SOLO  8-inch (6) step-to pattern forward x 3 laps    Lateral x 3 laps    Static balance          backward          tandem // bars (long)  1/4 lap x 3 laps  SOLO foam beams (2) x 3 laps ea fwd and lat   SOLO foam beams (2) x 3 laps ea fwd and lat    Blaze pods     SOLO  4 pods, set up over 1/4 lap, forward walking with turning, 1 minute intervals  1st trial: 11 hits  2nd trial: 13 hits  3rd trial: 15 hits     foam // bars (long)  Up and over foam pads (3) forward x 3 laps    Lateral x 3 laps SOLO  Up and over foam pads (3) forward x 3 laps    Lateral x 3 laps  SOLO  Up and over foam pads (3) forward x 3 laps    Lateral x 3 laps SOLO  Up and over foam pads (3) forward x 3 laps    Lateral x 3 laps     Ther Ex          nustep L6 for 10 minutes  L6 x10'   L6 10'                                                      Ther Activity                               Gait Training                              Education       Progress with therapy, vestibular integration, outcome measures, fall risk

## 2024-12-09 NOTE — TELEPHONE ENCOUNTER
Patient called back and gave verbal consent for us to speak to her Destiny Mckenzie her  if that is ok?    Confirmed phone #:  [phone]     Thank you

## 2024-12-11 ENCOUNTER — TELEPHONE (OUTPATIENT)
Age: 64
End: 2024-12-11

## 2024-12-11 NOTE — TELEPHONE ENCOUNTER
Patient called in stating that Gutierrez from [employer] will be faxing us over paperwork to be completed for her transportation for her upcoming appointment.

## 2024-12-12 ENCOUNTER — OFFICE VISIT (OUTPATIENT)
Dept: PHYSICAL THERAPY | Facility: CLINIC | Age: 64
End: 2024-12-12
Payer: MEDICARE

## 2024-12-12 DIAGNOSIS — Z79.4 TYPE 2 DIABETES MELLITUS WITHOUT COMPLICATION, WITH LONG-TERM CURRENT USE OF INSULIN (HCC): ICD-10-CM

## 2024-12-12 DIAGNOSIS — E11.9 TYPE 2 DIABETES MELLITUS WITHOUT COMPLICATION, WITH LONG-TERM CURRENT USE OF INSULIN (HCC): ICD-10-CM

## 2024-12-12 DIAGNOSIS — R40.4 ALTERED SENSORIUM: ICD-10-CM

## 2024-12-12 DIAGNOSIS — Z74.09 IMPAIRED FUNCTIONAL MOBILITY, BALANCE, AND ENDURANCE: Primary | ICD-10-CM

## 2024-12-12 DIAGNOSIS — R42 DIZZINESS: ICD-10-CM

## 2024-12-12 PROCEDURE — 97112 NEUROMUSCULAR REEDUCATION: CPT

## 2024-12-12 PROCEDURE — 97110 THERAPEUTIC EXERCISES: CPT

## 2024-12-12 NOTE — PROGRESS NOTES
Daily Note     Today's date: 2024  Patient name: Ynes Mendoza  : 1960  MRN: [id number]  Referring provider: Spenser Rodriguez MD  Dx:   Encounter Diagnosis     ICD-10-CM    1. Impaired functional mobility, balance, and endurance  Z74.09       2. Altered sensorium  R40.4       3. Dizziness  R42                      Subjective: no new reports      Objective: See treatment diary below      Assessment: Tolerated treatment well. Patient demonstrated fatigue post treatment, exhibited good technique with therapeutic exercises, and would benefit from continued PT. Added TM ambulation during session with no significant dizziness reported. Continues to have instability with performing ball tossing while walking and with quick turns with blaze pods. Continue to progress as tolerated.      Plan: Continue per plan of care.        POC expires Unit limit Auth Expiration date PT/OT/ST + Visit Limit?   24 N/a 12/15 BOMN                           Visit/Unit Tracking  AUTH Status:  10/2 10/4 10/9 10/11  PN 10/14 10/16 10/21 10/25 10/30 11/1 11/4 11/6   8 authorized 4 5 6 7 8 1 2 3 4 5 6 7    4 3 2 1 0 7 6 5 4 3 2 1   8 authorized   PN       8 1 2 3 4 5 6 7 8 1      0 7 6 5 4 3 2 1 0 7           Precautions: DMII, PTSD, anxiety, syncope      Manuals                                            Neuro Re-Ed      FGA   SSGS  3MBWT  Amb EC x 3 trials  MCTSIB  4-square stepping SOLO  [place] 1/4 lap x 3 laps   VOR x 1          Imaginary targets          HT/HN ambulation SOLO  1/4 lap x 3 laps each direction    Ball toss/pass with amb side/side x 3 laps  Up/down x 3 laps SOLO   1/4 lap   X3 ea direction SOLO  1/4 lap x 3 laps each direction    Ball toss/pass with amb side/side x 3 laps  Up/down x 3 laps SOLO  1/4 lap x 3 laps each direction    Ball toss/pass with amb side/side x 3 laps  Up/down x 3 laps SOLO  1/4 lap x 3 laps  each direction    Ball toss/pass with amb side/side x 3 laps  Up/down x 3 laps  Ball toss/pass with amb side/side x 4 laps  Up/down x 4 laps   sidesteps SOLO  1/4 lap x 3 laps SOLO   1/4 lap   x3 laps SOLO  1/4 lap x 3 laps SOLO  1/4 lap x 3 laps SOLO   1/4 lap x3     HKM SOLO  1/4 lap x 3 laps SOLO   1/4 laps   X3 laps SOLO  1/4 lap x 3 laps SOLO  1/4 lap x 3 laps SOLO   1/4 lap x 3  SOLO  1/4 lap x 3   hurdles SOLO  6-inch (6) step over step forward x 4 laps    Lateral x 3 laps SOLO  6-inch (6) step over step forward x 4 laps    Lateral x 3 laps SOLO  8-inch (6) step-to pattern forward x 3 laps    Lateral x 3 laps SOLO  8-inch (5) step-to pattern forward x 3 laps    Lateral x 3 laps SOLO  8-inch (6) step-to pattern forward x 3 laps    Lateral x 3 laps  SOLO  8-inch (6) step-to patter forward x 3 laps    Lateral x 3 laps   Static balance          backward          tandem  SOLO foam beams (2) x 3 laps ea fwd and lat   SOLO foam beams (2) x 3 laps ea fwd and lat     Blaze pods    SOLO  4 pods, set up over 1/4 lap, forward walking with turning, 1 minute intervals  1st trial: 11 hits  2nd trial: 13 hits  3rd trial: 15 hits   SOLO  4 pods, set up over 1/4 lap, forward walking with turning, 1 minute intervals  1st trial: 14 hits  2nd trial: 13 hits  3rd trial: 15 hits    4 pods set up in square, 1 minute intervals  1st trial: 19   2nd trial: 14  3rd trial: 21   foam SOLO  Up and over foam pads (3) forward x 3 laps    Lateral x 3 laps  SOLO  Up and over foam pads (3) forward x 3 laps    Lateral x 3 laps SOLO  Up and over foam pads (3) forward x 3 laps    Lateral x 3 laps   SOLO  Up and over foam pads (3) forward x 3 laps    Lateral x 3 laps   Ther Ex          nustep  L6 x10'   L6 10'     treadmill       SOLO  1.5 mph for 3 minutes, up to 1.7mph for 10 minutes total                                           Ther Activity                              Gait Training                              Education      Progress with  therapy, vestibular integration, outcome measures, fall risk

## 2024-12-13 ENCOUNTER — TELEPHONE (OUTPATIENT)
Age: 64
End: 2024-12-13

## 2024-12-13 RX ORDER — PEN NEEDLE, DIABETIC 31 GX5/16"
NEEDLE, DISPOSABLE MISCELLANEOUS
Qty: 500 EACH | Refills: 1 | Status: SHIPPED | OUTPATIENT
Start: 2024-12-13 | End: 2025-01-12

## 2024-12-13 NOTE — TELEPHONE ENCOUNTER
Patient has a appointment   Monday she want  to know did  if we got the  form that is providing  her transportation was it fill  out and fax back to the company  Call office unable to get  a team member. Please call patient  back.

## 2024-12-14 LAB
ALBUMIN SERPL-MCNC: 4.3 G/DL (ref 3.9–4.9)
ALBUMIN/CREAT UR: <23 MG/G CREAT (ref 0–29)
ALP SERPL-CCNC: 57 IU/L (ref 44–121)
ALT SERPL-CCNC: 12 IU/L (ref 0–32)
AST SERPL-CCNC: 16 IU/L (ref 0–40)
BASOPHILS # BLD AUTO: 0.1 X10E3/UL (ref 0–0.2)
BASOPHILS NFR BLD AUTO: 2 %
BILIRUB SERPL-MCNC: 0.6 MG/DL (ref 0–1.2)
BUN SERPL-MCNC: 10 MG/DL (ref 8–27)
BUN/CREAT SERPL: 13 (ref 12–28)
CALCIUM SERPL-MCNC: 9.6 MG/DL (ref 8.7–10.3)
CHLORIDE SERPL-SCNC: 103 MMOL/L (ref 96–106)
CO2 SERPL-SCNC: 24 MMOL/L (ref 20–29)
CREAT SERPL-MCNC: 0.8 MG/DL (ref 0.57–1)
CREAT UR-MCNC: 13.1 MG/DL
EGFR: 83 ML/MIN/1.73
EOSINOPHIL # BLD AUTO: 0.2 X10E3/UL (ref 0–0.4)
EOSINOPHIL NFR BLD AUTO: 4 %
ERYTHROCYTE [DISTWIDTH] IN BLOOD BY AUTOMATED COUNT: 12.3 % (ref 11.7–15.4)
EST. AVERAGE GLUCOSE BLD GHB EST-MCNC: 186 MG/DL
GLOBULIN SER-MCNC: 2.8 G/DL (ref 1.5–4.5)
GLUCOSE SERPL-MCNC: 114 MG/DL (ref 70–99)
HBA1C MFR BLD: 8.1 % (ref 4.8–5.6)
HCT VFR BLD AUTO: 40.4 % (ref 34–46.6)
HGB BLD-MCNC: 12.8 G/DL (ref 11.1–15.9)
IMM GRANULOCYTES # BLD: 0 X10E3/UL (ref 0–0.1)
IMM GRANULOCYTES NFR BLD: 0 %
LYMPHOCYTES # BLD AUTO: 1.4 X10E3/UL (ref 0.7–3.1)
LYMPHOCYTES NFR BLD AUTO: 26 %
MCH RBC QN AUTO: 28.4 PG (ref 26.6–33)
MCHC RBC AUTO-ENTMCNC: 31.7 G/DL (ref 31.5–35.7)
MCV RBC AUTO: 90 FL (ref 79–97)
MICROALBUMIN UR-MCNC: <3 UG/ML
MONOCYTES # BLD AUTO: 0.5 X10E3/UL (ref 0.1–0.9)
MONOCYTES NFR BLD AUTO: 9 %
NEUTROPHILS # BLD AUTO: 3.1 X10E3/UL (ref 1.4–7)
NEUTROPHILS NFR BLD AUTO: 59 %
PLATELET # BLD AUTO: 197 X10E3/UL (ref 150–450)
POTASSIUM SERPL-SCNC: 4.9 MMOL/L (ref 3.5–5.2)
PROT SERPL-MCNC: 7.1 G/DL (ref 6–8.5)
RBC # BLD AUTO: 4.5 X10E6/UL (ref 3.77–5.28)
SODIUM SERPL-SCNC: 142 MMOL/L (ref 134–144)
TSH SERPL DL<=0.005 MIU/L-ACNC: 0.85 UIU/ML (ref 0.45–4.5)
WBC # BLD AUTO: 5.4 X10E3/UL (ref 3.4–10.8)

## 2024-12-16 ENCOUNTER — OFFICE VISIT (OUTPATIENT)
Dept: PHYSICAL THERAPY | Facility: CLINIC | Age: 64
End: 2024-12-16
Payer: MEDICARE

## 2024-12-16 DIAGNOSIS — R40.4 ALTERED SENSORIUM: ICD-10-CM

## 2024-12-16 DIAGNOSIS — Z74.09 IMPAIRED FUNCTIONAL MOBILITY, BALANCE, AND ENDURANCE: Primary | ICD-10-CM

## 2024-12-16 DIAGNOSIS — R42 DIZZINESS: ICD-10-CM

## 2024-12-16 PROCEDURE — 97112 NEUROMUSCULAR REEDUCATION: CPT

## 2024-12-16 PROCEDURE — 97110 THERAPEUTIC EXERCISES: CPT

## 2024-12-16 NOTE — TELEPHONE ENCOUNTER
The patient was called and made aware that the form was received and that we are awaiting the provider's signature.

## 2024-12-16 NOTE — PROGRESS NOTES
Daily Note     Today's date: 2024  Patient name: Ynes Mendoza  : 1960  MRN: [id number]  Referring provider: Spenser Rodriguez MD  Dx:   Encounter Diagnosis     ICD-10-CM    1. Impaired functional mobility, balance, and endurance  Z74.09       2. Altered sensorium  R40.4       3. Dizziness  R42                        Subjective: no new reports      Objective: See treatment diary below      Assessment: Tolerated treatment well. Patient demonstrated fatigue post treatment, exhibited good technique with therapeutic exercises, and would benefit from continued PT. Was able to watch cars out window during TM trial during session without any dizziness. Tolerated addition of foam beams well with some instability with tandem walking, but no significant LOB requiring SOLO support. Continue to progress as tolerated.      Plan: Continue per plan of care.        POC expires Unit limit Auth Expiration date PT/OT/ST + Visit Limit?   24 N/a 12/15 BOMN                           Visit/Unit Tracking  AUTH Status:  10/2 10/4 10/9 10/11  PN 10/14 10/16 10/21 10/25 10/30 11/1 11/4 11/6   8 authorized 4 5 6 7 8 1 2 3 4 5 6 7    4 3 2 1 0 7 6 5 4 3 2 1   8 authorized   PN      8 1 2 3 4 5 6 7 8 1 2     0 7 6 5 4 3 2 1 0 7 6          Precautions: DMII, PTSD, anxiety, syncope      Manuals                                            Neuro Re-Ed     FGA   SSGS  3MBWT  Amb EC x 3 trials  MCTSIB  4-square stepping SOLO  [place] 1/4 lap x 3 laps SOLO  [place] 1/4 lap x 3 laps   VOR x 1          Imaginary targets          HT/HN ambulation SOLO   1/4 lap   X3 ea direction SOLO  1/4 lap x 3 laps each direction    Ball toss/pass with amb side/side x 3 laps  Up/down x 3 laps SOLO  1/4 lap x 3 laps each direction    Ball toss/pass with amb side/side x 3 laps  Up/down x 3 laps SOLO  1/4 lap x 3 laps each direction    Ball toss/pass  with amb side/side x 3 laps  Up/down x 3 laps  Ball toss/pass with amb side/side x 4 laps  Up/down x 4 laps    sidesteps SOLO   1/4 lap   x3 laps SOLO  1/4 lap x 3 laps SOLO  1/4 lap x 3 laps SOLO   1/4 lap x3      HKM SOLO   1/4 laps   X3 laps SOLO  1/4 lap x 3 laps SOLO  1/4 lap x 3 laps SOLO   1/4 lap x 3  SOLO  1/4 lap x 3 SOLO  1/4 lap x 3 laps   hurdles SOLO  6-inch (6) step over step forward x 4 laps    Lateral x 3 laps SOLO  8-inch (6) step-to pattern forward x 3 laps    Lateral x 3 laps SOLO  8-inch (5) step-to pattern forward x 3 laps    Lateral x 3 laps SOLO  8-inch (6) step-to pattern forward x 3 laps    Lateral x 3 laps  SOLO  8-inch (6) step-to patter forward x 3 laps    Lateral x 3 laps    Static balance          backward          tandem SOLO foam beams (2) x 3 laps ea fwd and lat   SOLO foam beams (2) x 3 laps ea fwd and lat   SOLO  1/4 lap x 3 laps   Blaze pods   SOLO  4 pods, set up over 1/4 lap, forward walking with turning, 1 minute intervals  1st trial: 11 hits  2nd trial: 13 hits  3rd trial: 15 hits   SOLO  4 pods, set up over 1/4 lap, forward walking with turning, 1 minute intervals  1st trial: 14 hits  2nd trial: 13 hits  3rd trial: 15 hits    4 pods set up in square, 1 minute intervals  1st trial: 19   2nd trial: 14  3rd trial: 21    foam  SOLO  Up and over foam pads (3) forward x 3 laps    Lateral x 3 laps SOLO  Up and over foam pads (3) forward x 3 laps    Lateral x 3 laps   SOLO  Up and over foam pads (3) forward x 3 laps    Lateral x 3 laps SOLO  Foam beams sidestepping x 3 laps    Tandem x 3 laps    Ther Ex          nustep L6 x10'   L6 10'      treadmill      SOLO  1.5 mph for 3 minutes, up to 1.7mph for 10 minutes total SOLO  1.7mph for 10 minutes, watching cars out window                                           Ther Activity                              Gait Training                              Education     Progress with therapy, vestibular integration, outcome measures, fall risk

## 2024-12-16 NOTE — TELEPHONE ENCOUNTER
Patient is calling asking if we received the fax from [facility]? She said she needs an out of county permission slip from the provider so patient can have transportation to her appt? She is asking that we call [employer] at [phone] ext [phone] to get the paperwork that is needed so she will have a ride to her appt on 12/18. Please call patient when this is completed.

## 2024-12-17 NOTE — TELEPHONE ENCOUNTER
Received Medical Assistance Transportation form for Out of Service Area Certification from [employer] [facility].  Had signed by Ha and faxed back to [phone].  Left message to let patient know that it was done.

## 2024-12-18 ENCOUNTER — OFFICE VISIT (OUTPATIENT)
Dept: ENDOCRINOLOGY | Facility: HOSPITAL | Age: 64
End: 2024-12-18
Payer: MEDICARE

## 2024-12-18 VITALS
HEART RATE: 62 BPM | OXYGEN SATURATION: 98 % | WEIGHT: 201 LBS | DIASTOLIC BLOOD PRESSURE: 70 MMHG | SYSTOLIC BLOOD PRESSURE: 120 MMHG | BODY MASS INDEX: 31.55 KG/M2 | HEIGHT: 67 IN

## 2024-12-18 DIAGNOSIS — E78.01 FAMILIAL HYPERCHOLESTEROLEMIA: ICD-10-CM

## 2024-12-18 DIAGNOSIS — E78.5 HYPERLIPIDEMIA LDL GOAL <100: ICD-10-CM

## 2024-12-18 DIAGNOSIS — E11.9 TYPE 2 DIABETES MELLITUS WITHOUT COMPLICATION, WITH LONG-TERM CURRENT USE OF INSULIN (HCC): ICD-10-CM

## 2024-12-18 DIAGNOSIS — Z79.4 TYPE 2 DIABETES MELLITUS WITHOUT COMPLICATION, WITH LONG-TERM CURRENT USE OF INSULIN (HCC): ICD-10-CM

## 2024-12-18 DIAGNOSIS — E78.2 MIXED HYPERLIPIDEMIA: ICD-10-CM

## 2024-12-18 DIAGNOSIS — Z79.4 TYPE 2 DIABETES MELLITUS WITH HYPERGLYCEMIA, WITH LONG-TERM CURRENT USE OF INSULIN (HCC): Primary | ICD-10-CM

## 2024-12-18 DIAGNOSIS — E55.9 VITAMIN D DEFICIENCY: ICD-10-CM

## 2024-12-18 DIAGNOSIS — I10 ESSENTIAL HYPERTENSION: ICD-10-CM

## 2024-12-18 DIAGNOSIS — E11.65 TYPE 2 DIABETES MELLITUS WITH HYPERGLYCEMIA, WITH LONG-TERM CURRENT USE OF INSULIN (HCC): Primary | ICD-10-CM

## 2024-12-18 PROCEDURE — 95251 CONT GLUC MNTR ANALYSIS I&R: CPT | Performed by: NURSE PRACTITIONER

## 2024-12-18 PROCEDURE — 99214 OFFICE O/P EST MOD 30 MIN: CPT | Performed by: NURSE PRACTITIONER
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
## 2024-12-18 NOTE — PATIENT INSTRUCTIONS
Be mindful of diet.     Stay active and stay hydrated.       Hemoglobin A1c is 8.1.     For now, continue your current dose of Metformin and Levemir.    Increase Glimepiride to 1 mg at lunch (largest meal).     Supplement with 2000 units of vitamin D3 daily - consistently.     Continue Crestor 5 mg daily.     Obtain lab work as prescribed.

## 2024-12-19 ENCOUNTER — OFFICE VISIT (OUTPATIENT)
Dept: PHYSICAL THERAPY | Facility: CLINIC | Age: 64
End: 2024-12-19
Payer: MEDICARE

## 2024-12-19 DIAGNOSIS — Z74.09 IMPAIRED FUNCTIONAL MOBILITY, BALANCE, AND ENDURANCE: Primary | ICD-10-CM

## 2024-12-19 DIAGNOSIS — R42 DIZZINESS: ICD-10-CM

## 2024-12-19 DIAGNOSIS — R40.4 ALTERED SENSORIUM: ICD-10-CM

## 2024-12-19 PROCEDURE — 97112 NEUROMUSCULAR REEDUCATION: CPT

## 2024-12-19 PROCEDURE — 97110 THERAPEUTIC EXERCISES: CPT

## 2024-12-19 NOTE — PROGRESS NOTES
Daily Note     Today's date: 2024  Patient name: Ynes Mendoza  : 1960  MRN: [id number]  Referring provider: Spenser Rodriguez MD  Dx:   Encounter Diagnosis     ICD-10-CM    1. Impaired functional mobility, balance, and endurance  Z74.09       2. Altered sensorium  R40.4       3. Dizziness  R42                          Subjective: reports she feels shaky this morning.       Objective: See treatment diary below      Assessment: Tolerated treatment well. Patient demonstrated fatigue post treatment, exhibited good technique with therapeutic exercises, and would benefit from continued PT. Added tidal tank during session with good stability, no significant instability with turning. Improved ability to peform hurdles compared to previous sessions. Continue to progress as tolerated.      Plan: Continue per plan of care.        POC expires Unit limit Auth Expiration date PT/OT/ST + Visit Limit?   24 N/a 12/15 BOMN                           Visit/Unit Tracking  AUTH Status:  10/2 10/4 10/9 10/11  PN 10/14 10/16 10/21 10/25 10/30 11/1 11/4 11/6   8 authorized 4 5 6 7 8 1 2 3 4 5 6 7    4 3 2 1 0 7 6 5 4 3 2 1   8 authorized   PN      8 1 2 3 4 5 6 7 8 1 2     0 7 6 5 4 3 2 1 0 7 6          Precautions: DMII, PTSD, anxiety, syncope      Manuals                                        Neuro Re-Ed   FGA   SSGS  3MBWT  Amb EC x 3 trials  MCTSIB  4-square stepping SOLO  [place] 1/4 lap x 3 laps SOLO  [place] 1/4 lap x 3 laps SOLO  Amb with 10# tidal tank 1/4 lap x 4 laps    [place] 1/4 lap x 3 laps   VOR x 1         Imaginary targets         HT/HN ambulation SOLO  1/4 lap x 3 laps each direction    Ball toss/pass with amb side/side x 3 laps  Up/down x 3 laps SOLO  1/4 lap x 3 laps each direction    Ball toss/pass with amb side/side x 3 laps  Up/down x 3 laps  Ball toss/pass with amb side/side x 4 laps  Up/down x 4  laps  SOLO  Amb with 10# tidal tank 1/4 lap x 3 laps each direction   sidesteps SOLO  1/4 lap x 3 laps SOLO   1/4 lap x3       HKM SOLO  1/4 lap x 3 laps SOLO   1/4 lap x 3  SOLO  1/4 lap x 3 SOLO  1/4 lap x 3 laps SOLO  1/4 lap x 3 laps   hurdles SOLO  8-inch (5) step-to pattern forward x 3 laps    Lateral x 3 laps SOLO  8-inch (6) step-to pattern forward x 3 laps    Lateral x 3 laps  SOLO  8-inch (6) step-to patter forward x 3 laps    Lateral x 3 laps  SOLO  8-inch (6) step-to pattern forward x 3 laps    Lateral x 3 laps   Static balance         backward         tandem  SOLO foam beams (2) x 3 laps ea fwd and lat   SOLO  1/4 lap x 3 laps SOLO  1/4 lap x 3 laps   Blaze pods SOLO  4 pods, set up over 1/4 lap, forward walking with turning, 1 minute intervals  1st trial: 11 hits  2nd trial: 13 hits  3rd trial: 15 hits   SOLO  4 pods, set up over 1/4 lap, forward walking with turning, 1 minute intervals  1st trial: 14 hits  2nd trial: 13 hits  3rd trial: 15 hits    4 pods set up in square, 1 minute intervals  1st trial: 19   2nd trial: 14  3rd trial: 21     foam SOLO  Up and over foam pads (3) forward x 3 laps    Lateral x 3 laps   SOLO  Up and over foam pads (3) forward x 3 laps    Lateral x 3 laps SOLO  Foam beams sidestepping x 3 laps    Tandem x 3 laps     Ther Ex         nustep  L6 10'       treadmill    SOLO  1.5 mph for 3 minutes, up to 1.7mph for 10 minutes total SOLO  1.7mph for 10 minutes, watching cars out window SOLO  1.7mph for 10 minutes watching out window                                       Ther Activity                           Gait Training                           Education   Progress with therapy, vestibular integration, outcome measures, fall risk

## 2024-12-23 ENCOUNTER — OFFICE VISIT (OUTPATIENT)
Dept: PHYSICAL THERAPY | Facility: CLINIC | Age: 64
End: 2024-12-23
Payer: MEDICARE

## 2024-12-23 DIAGNOSIS — R42 DIZZINESS: ICD-10-CM

## 2024-12-23 DIAGNOSIS — R40.4 ALTERED SENSORIUM: ICD-10-CM

## 2024-12-23 DIAGNOSIS — Z74.09 IMPAIRED FUNCTIONAL MOBILITY, BALANCE, AND ENDURANCE: Primary | ICD-10-CM

## 2024-12-23 PROCEDURE — 97112 NEUROMUSCULAR REEDUCATION: CPT

## 2024-12-23 NOTE — PROGRESS NOTES
Daily Note     Today's date: 2024  Patient name: Ynes Mendoza  : 1960  MRN: [id number]  Referring provider: Spenser Rodriguez MD  Dx:   Encounter Diagnosis     ICD-10-CM    1. Impaired functional mobility, balance, and endurance  Z74.09       2. Altered sensorium  R40.4       3. Dizziness  R42                            Subjective: reports she tripped backwards and hit the wall when in the bathroom today when cleaning in the shower. No reports of injury.       Objective: See treatment diary below      Assessment: Tolerated treatment well. Patient demonstrated fatigue post treatment, exhibited good technique with therapeutic exercises, and would benefit from continued PT. Continued use of tidal tank to balance activities during session with good tolerance. Patient reported her blood sugar was lower than usually (87), wanted a snack (had some salami), educated that she might need more carbs and to make sure to eat before therapy since exercise does to decrease blood sugar levels. Was given nutrigrain bar during session. Continue to progress as tolerated.      Plan: Continue per plan of care.        POC expires Unit limit Auth Expiration date PT/OT/ST + Visit Limit?   24 N/a 12/15 BOMN                           Visit/Unit Tracking  8 authorized   PN     2 3 4 5 6 7 8 1 2 3 4    6 5 4 3 2 1 0 7 6 5 4         Precautions: DMII, PTSD, anxiety, syncope      Manuals                                            Neuro Re-Ed   FGA   SSGS  3MBWT  Amb EC x 3 trials  MCTSIB  4-square stepping SOLO  [place] 1/4 lap x 3 laps SOLO  [place] 1/4 lap x 3 laps SOLO  Amb with 10# tidal tank 1/4 lap x 4 laps    [place] 1/4 lap x 3 laps SOLO  Amb with 10# tidal tank 1/4 lap x 4 laps    [place] 1/4 lap x 3 laps       VOR x 1          Imaginary targets          HT/HN ambulation SOLO  1/4 lap x 3 laps each direction    Ball  toss/pass with amb side/side x 3 laps  Up/down x 3 laps SOLO  1/4 lap x 3 laps each direction    Ball toss/pass with amb side/side x 3 laps  Up/down x 3 laps  Ball toss/pass with amb side/side x 4 laps  Up/down x 4 laps  SOLO  Amb with 10# tidal tank 1/4 lap x 3 laps each direction SOLO  Amb with 10# tidal tank 1/4 lap x 4 laps each direction   sidesteps SOLO  1/4 lap x 3 laps SOLO   1/4 lap x3     SOLO  1/4 lap x 4 laps with 10# tidal tank   HKM SOLO  1/4 lap x 3 laps SOLO   1/4 lap x 3  SOLO  1/4 lap x 3 SOLO  1/4 lap x 3 laps SOLO  1/4 lap x 3 laps SOLO  1/4 lap x 3 laps with 10# tidal tank   hurdles SOLO  8-inch (5) step-to pattern forward x 3 laps    Lateral x 3 laps SOLO  8-inch (6) step-to pattern forward x 3 laps    Lateral x 3 laps  SOLO  8-inch (6) step-to patter forward x 3 laps    Lateral x 3 laps  SOLO  8-inch (6) step-to pattern forward x 3 laps    Lateral x 3 laps SOLO  8-inch (6) step-to pattern x 3 laps    Lateral x 3 laps   Static balance          backward          tandem  SOLO foam beams (2) x 3 laps ea fwd and lat   SOLO  1/4 lap x 3 laps SOLO  1/4 lap x 3 laps SOLO  1/4 lap x 3 laps   Blaze pods SOLO  4 pods, set up over 1/4 lap, forward walking with turning, 1 minute intervals  1st trial: 11 hits  2nd trial: 13 hits  3rd trial: 15 hits   SOLO  4 pods, set up over 1/4 lap, forward walking with turning, 1 minute intervals  1st trial: 14 hits  2nd trial: 13 hits  3rd trial: 15 hits    4 pods set up in square, 1 minute intervals  1st trial: 19   2nd trial: 14  3rd trial: 21      foam SOLO  Up and over foam pads (3) forward x 3 laps    Lateral x 3 laps   SOLO  Up and over foam pads (3) forward x 3 laps    Lateral x 3 laps SOLO  Foam beams sidestepping x 3 laps    Tandem x 3 laps      Ther Ex          nustep  L6 10'        treadmill    SOLO  1.5 mph for 3 minutes, up to 1.7mph for 10 minutes total SOLO  1.7mph for 10 minutes, watching cars out window SOLO  1.7mph for 10 minutes watching out window                                             Ther Activity                              Gait Training                              Education   Progress with therapy, vestibular integration, outcome measures, fall risk

## 2024-12-27 ENCOUNTER — OFFICE VISIT (OUTPATIENT)
Dept: PHYSICAL THERAPY | Facility: CLINIC | Age: 64
End: 2024-12-27
Payer: MEDICARE

## 2024-12-27 DIAGNOSIS — R40.4 ALTERED SENSORIUM: ICD-10-CM

## 2024-12-27 DIAGNOSIS — R42 DIZZINESS: ICD-10-CM

## 2024-12-27 DIAGNOSIS — Z74.09 IMPAIRED FUNCTIONAL MOBILITY, BALANCE, AND ENDURANCE: Primary | ICD-10-CM

## 2024-12-27 PROCEDURE — 97112 NEUROMUSCULAR REEDUCATION: CPT

## 2024-12-27 PROCEDURE — 97110 THERAPEUTIC EXERCISES: CPT

## 2024-12-27 NOTE — PROGRESS NOTES
Daily Note     Today's date: 2024  Patient name: Ynes Mendoza  : 1960  MRN: [id number]  Referring provider: Spenser Rodriguez MD  Dx:   Encounter Diagnosis     ICD-10-CM    1. Impaired functional mobility, balance, and endurance  Z74.09       2. Altered sensorium  R40.4       3. Dizziness  R42                              Subjective: reports that yesterday she was waiting for [employer] to be delivered, states she had vertigo that started out of nowhere when she was sitting. States that it lasted about 5 minutes before it started to subside, did better with eyes closed and sat on the couch. Reports that she had nausea as well during the episode.       Objective: See treatment diary below      Assessment: Tolerated treatment well. Patient demonstrated fatigue post treatment, exhibited good technique with therapeutic exercises, and would benefit from continued PT. Discussed recommendation to possibly follow up to make an appointment with ENT (patient has referral in chart) due to episodic vertigo without any movement that brought on symptoms. Was able to tolerate increased activity with holding tidal tank with some instability but not overt LOB. Continue to progress as tolerated.      Plan: Continue per plan of care.        POC expires Unit limit Auth Expiration date PT/OT/ST + Visit Limit?   24 N/a 12/15 BOMN                           Visit/Unit Tracking  8 authorized   PN     2 3 4 5 6 7 8 1 2 3 4 5    6 5 4 3 2 1 0 7 6 5 4 3         Precautions: DMII, PTSD, anxiety, syncope      Manuals                                            Neuro Re-Ed  FGA   SSGS  3MBWT  Amb EC x 3 trials  MCTSIB  4-square stepping SOLO  [place] 1/4 lap x 3 laps SOLO  [place] 1/4 lap x 3 laps SOLO  Amb with 10# tidal tank 1/4 lap x 4 laps    [place] 1/4 lap x 3 laps SOLO  Amb with 10# tidal tank 1/4 lap x 4  laps    [place] 1/4 lap x 3 laps     SOLO  Amb with 10# tidal tank 1/4 lap x 4 laps   VOR x 1          Imaginary targets          HT/HN ambulation SOLO  1/4 lap x 3 laps each direction    Ball toss/pass with amb side/side x 3 laps  Up/down x 3 laps  Ball toss/pass with amb side/side x 4 laps  Up/down x 4 laps  SOLO  Amb with 10# tidal tank 1/4 lap x 3 laps each direction SOLO  Amb with 10# tidal tank 1/4 lap x 4 laps each direction SOLO  Amb with 10# tidal tank 1/4 lap x 4 laps each direction   sidesteps SOLO   1/4 lap x3     SOLO  1/4 lap x 4 laps with 10# tidal tank SOLO  1/4 lap x 4 laps with 10# tidal tank   HKM SOLO   1/4 lap x 3  SOLO  1/4 lap x 3 SOLO  1/4 lap x 3 laps SOLO  1/4 lap x 3 laps SOLO  1/4 lap x 3 laps with 10# tidal tank    hurdles SOLO  8-inch (6) step-to pattern forward x 3 laps    Lateral x 3 laps  SOLO  8-inch (6) step-to patter forward x 3 laps    Lateral x 3 laps  SOLO  8-inch (6) step-to pattern forward x 3 laps    Lateral x 3 laps SOLO  8-inch (6) step-to pattern x 3 laps    Lateral x 3 laps SOLO  8-inch (6) step-to pattern x 3 laps    Lateral x 3 laps   Static balance          backward          tandem SOLO foam beams (2) x 3 laps ea fwd and lat   SOLO  1/4 lap x 3 laps SOLO  1/4 lap x 3 laps SOLO  1/4 lap x 3 laps    Blaze pods   SOLO  4 pods, set up over 1/4 lap, forward walking with turning, 1 minute intervals  1st trial: 14 hits  2nd trial: 13 hits  3rd trial: 15 hits    4 pods set up in square, 1 minute intervals  1st trial: 19   2nd trial: 14  3rd trial: 21       foam   SOLO  Up and over foam pads (3) forward x 3 laps    Lateral x 3 laps SOLO  Foam beams sidestepping x 3 laps    Tandem x 3 laps       Ther Ex          nustep L6 10'         treadmill   SOLO  1.5 mph for 3 minutes, up to 1.7mph for 10 minutes total SOLO  1.7mph for 10 minutes, watching cars out window SOLO  1.7mph for 10 minutes watching out window  SOLO  1.7mph for 10 minutes watching out window                                            Ther Activity                              Gait Training                              Education  Progress with therapy, vestibular integration, outcome measures, fall risk

## 2024-12-30 ENCOUNTER — OFFICE VISIT (OUTPATIENT)
Dept: PHYSICAL THERAPY | Facility: CLINIC | Age: 64
End: 2024-12-30
Payer: MEDICARE

## 2024-12-30 DIAGNOSIS — R42 DIZZINESS: ICD-10-CM

## 2024-12-30 DIAGNOSIS — R40.4 ALTERED SENSORIUM: ICD-10-CM

## 2024-12-30 DIAGNOSIS — Z74.09 IMPAIRED FUNCTIONAL MOBILITY, BALANCE, AND ENDURANCE: Primary | ICD-10-CM

## 2024-12-30 PROCEDURE — 97112 NEUROMUSCULAR REEDUCATION: CPT

## 2024-12-30 NOTE — PROGRESS NOTES
Daily Note     Today's date: 2024  Patient name: Ynes Mendoza  : 1960  MRN: [id number]  Referring provider: Spenser Rodriguez MD  Dx:   No diagnosis found.                         Subjective: arrived to session 30 minutes late due to [employer] leahy being late picking her up. Reports that she feels a little dizzy due to the way the  was driving on the way here.       Objective: See treatment diary below      Assessment: Tolerated treatment well. Patient demonstrated fatigue post treatment, exhibited good technique with therapeutic exercises, and would benefit from continued PT. Added river rocks during session with improved foot placement and multiple reactive steps to maintain balance. Added tidal tank to hurdles during session, demonstrated picking up tank to be able to see hurdles. Continue to progress as tolerated.      Plan: Continue per plan of care.        POC expires Unit limit Auth Expiration date PT/OT/ST + Visit Limit?   24 N/a 12/15 BOMN                           Visit/Unit Tracking  8 authorized   PN     4 5 6 7 8 1 2 3 4 5 6    4 3 2 1 0 7 6 5 4 3 2         Precautions: DMII, PTSD, anxiety, syncope      Manuals                                            Neuro Re-Ed FGA   SSGS  3MBWT  Amb EC x 3 trials  MCTSIB  4-square stepping SOLO  [place] 1/4 lap x 3 laps SOLO  [place] 1/4 lap x 3 laps SOLO  Amb with 10# tidal tank 1/4 lap x 4 laps    [place] 1/4 lap x 3 laps SOLO  Amb with 10# tidal tank 1/4 lap x 4 laps    [place] 1/4 lap x 3 laps     SOLO  Amb with 10# tidal tank 1/4 lap x 4 laps SOLO  Amb with 10# tidal tank with rotations 1/4 lap x 4 laps   VOR x 1          Imaginary targets          HT/HN ambulation  Ball toss/pass with amb side/side x 4 laps  Up/down x 4 laps  SOLO  Amb with 10# tidal tank 1/4 lap x 3 laps each direction SOLO  Amb with 10# tidal tank 1/4 lap x 4 laps  each direction SOLO  Amb with 10# tidal tank 1/4 lap x 4 laps each direction SOLO  HT/HN 1/4 lap x 4 laps each direction   sidesteps     SOLO  1/4 lap x 4 laps with 10# tidal tank SOLO  1/4 lap x 4 laps with 10# tidal tank SOLO  1/4 lap x 4 laps with 10# tidal tank     HKM  SOLO  1/4 lap x 3 SOLO  1/4 lap x 3 laps SOLO  1/4 lap x 3 laps SOLO  1/4 lap x 3 laps with 10# tidal tank  SOLO  1/4 lap x 3 laps with 10# tidal tank   hurdles  SOLO  8-inch (6) step-to patter forward x 3 laps    Lateral x 3 laps  SOLO  8-inch (6) step-to pattern forward x 3 laps    Lateral x 3 laps SOLO  8-inch (6) step-to pattern x 3 laps    Lateral x 3 laps SOLO  8-inch (6) step-to pattern x 3 laps    Lateral x 3 laps SOLO  8-inch (6) step-to pattern x 3 laps holding 10# tidal tank    Lateral x 3 laps holding 10# tidal tank   Static balance          backward          tandem   SOLO  1/4 lap x 3 laps SOLO  1/4 lap x 3 laps SOLO  1/4 lap x 3 laps     Blaze pods  SOLO  4 pods, set up over 1/4 lap, forward walking with turning, 1 minute intervals  1st trial: 14 hits  2nd trial: 13 hits  3rd trial: 15 hits    4 pods set up in square, 1 minute intervals  1st trial: 19   2nd trial: 14  3rd trial: 21        River rocks       SOLO  Small and medium forward x 4 laps    Lateral x 1 lap   foam  SOLO  Up and over foam pads (3) forward x 3 laps    Lateral x 3 laps SOLO  Foam beams sidestepping x 3 laps    Tandem x 3 laps        Ther Ex          nustep          treadmill  SOLO  1.5 mph for 3 minutes, up to 1.7mph for 10 minutes total SOLO  1.7mph for 10 minutes, watching cars out window SOLO  1.7mph for 10 minutes watching out window  SOLO  1.7mph for 10 minutes watching out window                                            Ther Activity                              Gait Training                              Education Progress with therapy, vestibular integration, outcome measures, fall risk

## 2025-01-02 ENCOUNTER — APPOINTMENT (OUTPATIENT)
Dept: PHYSICAL THERAPY | Facility: CLINIC | Age: 65
End: 2025-01-02
Payer: MEDICARE

## 2025-01-02 NOTE — PROGRESS NOTES
Daily Note     Today's date: 1/3/2025  Patient name: Ynes Mendoza  : 1960  MRN: [id number]  Referring provider: Spenser Rodriguez MD  Dx:   Encounter Diagnosis     ICD-10-CM    1. Impaired functional mobility, balance, and endurance  Z74.09       2. Altered sensorium  R40.4       3. Dizziness  R42                     Start Time: 946  Stop Time: 1025  Total time in clinic (min): 39 minutes    Subjective: Pt states that she noticed some unsteadiness while looking up to take the ornaments down. Had to hold on to something to steady herself. No other significant updates since last session.       Objective: See treatment diary below      Assessment: Tolerated treatment well. Patient demonstrated fatigue post treatment, exhibited good technique with therapeutic exercises, and would benefit from continued PT. Progressed river rocks to include highest level which was a good challenge for pt requiring frequent stepping strategy to maintain balance. Added tidal tank to tandem walking. Limited session to 2 seated rest breaks today which pt tolerated with appropriate fatigue. Continue to progress as tolerated.      Plan: Continue per plan of care.        POC expires Unit limit Auth Expiration date PT/OT/ST + Visit Limit?   24 N/a 12/15 BOMN                           Visit/Unit Tracking  8 authorized   PN  1/3    4 5 6 7 8 1 2 3 4 5 6 6    4 3 2 1 0 7 6 5 4 3 2 2         Precautions: DMII, PTSD, anxiety, syncope      Manuals 12/9 12/12 12/16 12/19 12/23 12/27 12/30 1/3                                               Neuro Re-Ed FGA   SSGS  3MBWT  Amb EC x 3 trials  MCTSIB  4-square stepping SOLO  [place] 1/4 lap x 3 laps SOLO  [place] 1/4 lap x 3 laps SOLO  Amb with 10# tidal tank 1/4 lap x 4 laps    [place] 1/4 lap x 3 laps SOLO  Amb with 10# tidal tank 1/4 lap x 4 laps    [place] 1/4 lap x 3 laps     SOLO  Amb with 10# tidal tank 1/4 lap x 4 laps  SOLO  Amb with 10# tidal tank with rotations 1/4 lap x 4 laps SOLO  Amb with 10# tidal tank with rotations 1/4 lap x 4 laps   VOR x 1           Imaginary targets           HT/HN ambulation  Ball toss/pass with amb side/side x 4 laps  Up/down x 4 laps  SOLO  Amb with 10# tidal tank 1/4 lap x 3 laps each direction SOLO  Amb with 10# tidal tank 1/4 lap x 4 laps each direction SOLO  Amb with 10# tidal tank 1/4 lap x 4 laps each direction SOLO  HT/HN 1/4 lap x 4 laps each direction SOLO  HT/HN 1/4 lap x 4 laps each direction   sidesteps     SOLO  1/4 lap x 4 laps with 10# tidal tank SOLO  1/4 lap x 4 laps with 10# tidal tank SOLO  1/4 lap x 4 laps with 10# tidal tank      HKM  SOLO  1/4 lap x 3 SOLO  1/4 lap x 3 laps SOLO  1/4 lap x 3 laps SOLO  1/4 lap x 3 laps with 10# tidal tank  SOLO  1/4 lap x 3 laps with 10# tidal tank    hurdles  SOLO  8-inch (6) step-to patter forward x 3 laps    Lateral x 3 laps  SOLO  8-inch (6) step-to pattern forward x 3 laps    Lateral x 3 laps SOLO  8-inch (6) step-to pattern x 3 laps    Lateral x 3 laps SOLO  8-inch (6) step-to pattern x 3 laps    Lateral x 3 laps SOLO  8-inch (6) step-to pattern x 3 laps holding 10# tidal tank    Lateral x 3 laps holding 10# tidal tank SOLO  8-inch (6) step-to pattern x 3 laps holding 10# tidal tank    Lateral x 3 laps holding 10# tidal tank   Static balance           backward           tandem   SOLO  1/4 lap x 3 laps SOLO  1/4 lap x 3 laps SOLO  1/4 lap x 3 laps   SOLO  1/4 lap x 3 laps with tidal tank   Blaze pods  SOLO  4 pods, set up over 1/4 lap, forward walking with turning, 1 minute intervals  1st trial: 14 hits  2nd trial: 13 hits  3rd trial: 15 hits    4 pods set up in square, 1 minute intervals  1st trial: 19   2nd trial: 14  3rd trial: 21         River rocks       SOLO  Small and medium forward x 4 laps    Lateral x 1 lap SOLO  Small, medium and large forward x 4 laps   foam  SOLO  Up and over foam pads (3) forward x 3 laps    Lateral x 3 laps  SOLO  Foam beams sidestepping x 3 laps    Tandem x 3 laps         Ther Ex           nustep           treadmill  SOLO  1.5 mph for 3 minutes, up to 1.7mph for 10 minutes total SOLO  1.7mph for 10 minutes, watching cars out window SOLO  1.7mph for 10 minutes watching out window  SOLO  1.7mph for 10 minutes watching out window  SOLO  1.7mph for 10 minutes watching out window                                               Ther Activity                                 Gait Training                                 Education Progress with therapy, vestibular integration, outcome measures, fall risk

## 2025-01-03 ENCOUNTER — OFFICE VISIT (OUTPATIENT)
Dept: PHYSICAL THERAPY | Facility: CLINIC | Age: 65
End: 2025-01-03
Payer: MEDICARE

## 2025-01-03 DIAGNOSIS — R40.4 ALTERED SENSORIUM: ICD-10-CM

## 2025-01-03 DIAGNOSIS — Z74.09 IMPAIRED FUNCTIONAL MOBILITY, BALANCE, AND ENDURANCE: Primary | ICD-10-CM

## 2025-01-03 DIAGNOSIS — E11.65 TYPE 2 DIABETES MELLITUS WITH HYPERGLYCEMIA, WITH LONG-TERM CURRENT USE OF INSULIN (HCC): ICD-10-CM

## 2025-01-03 DIAGNOSIS — R42 DIZZINESS: ICD-10-CM

## 2025-01-03 DIAGNOSIS — Z79.4 TYPE 2 DIABETES MELLITUS WITH HYPERGLYCEMIA, WITH LONG-TERM CURRENT USE OF INSULIN (HCC): ICD-10-CM

## 2025-01-03 PROCEDURE — 97110 THERAPEUTIC EXERCISES: CPT

## 2025-01-03 PROCEDURE — 97112 NEUROMUSCULAR REEDUCATION: CPT

## 2025-01-06 ENCOUNTER — TELEPHONE (OUTPATIENT)
Age: 65
End: 2025-01-06

## 2025-01-06 ENCOUNTER — OFFICE VISIT (OUTPATIENT)
Dept: PHYSICAL THERAPY | Facility: CLINIC | Age: 65
End: 2025-01-06
Payer: MEDICARE

## 2025-01-06 DIAGNOSIS — Z79.4 TYPE 2 DIABETES MELLITUS WITHOUT COMPLICATION, WITH LONG-TERM CURRENT USE OF INSULIN (HCC): Primary | ICD-10-CM

## 2025-01-06 DIAGNOSIS — Z74.09 IMPAIRED FUNCTIONAL MOBILITY, BALANCE, AND ENDURANCE: Primary | ICD-10-CM

## 2025-01-06 DIAGNOSIS — R42 DIZZINESS: ICD-10-CM

## 2025-01-06 DIAGNOSIS — E11.9 TYPE 2 DIABETES MELLITUS WITHOUT COMPLICATION, WITH LONG-TERM CURRENT USE OF INSULIN (HCC): Primary | ICD-10-CM

## 2025-01-06 DIAGNOSIS — R40.4 ALTERED SENSORIUM: ICD-10-CM

## 2025-01-06 PROCEDURE — 97112 NEUROMUSCULAR REEDUCATION: CPT | Performed by: PHYSICAL THERAPY ASSISTANT

## 2025-01-06 PROCEDURE — 97110 THERAPEUTIC EXERCISES: CPT | Performed by: PHYSICAL THERAPY ASSISTANT

## 2025-01-06 RX ORDER — AMPICILLIN TRIHYDRATE 250 MG
500 CAPSULE ORAL DAILY
Qty: 90 CAPSULE | Refills: 3 | Status: SHIPPED | OUTPATIENT
Start: 2025-01-06

## 2025-01-06 NOTE — TELEPHONE ENCOUNTER
Patient calling stating she tried to replace her Dexcom sensor today and it came up with an error. I provided her with Dexcom help line number to help her trouble shoot.

## 2025-01-06 NOTE — PROGRESS NOTES
Daily Note     Today's date: 2025  Patient name: Ynes Mendoza  : 1960  MRN: [id number]  Referring provider: Spenser Rodriguez MD  Dx:   Encounter Diagnosis     ICD-10-CM    1. Impaired functional mobility, balance, and endurance  Z74.09       2. Altered sensorium  R40.4       3. Dizziness  R42                                Subjective: Pt states that she had a severe episode of vertigo this weekend while at a party.  Pt reports symptoms only lasted a few minutes and then resolved on their own.  Pt does admit to increased fatigue since this incident though.       Objective: See treatment diary below      Assessment: Tolerated treatment well. Patient demonstrated fatigue post treatment, exhibited good technique with therapeutic exercises, and would benefit from continued PT. .Pt able to tolerate only 2 seated rest breaks today with appropriate fatigue. Continue to progress as tolerated.      Plan: Continue per plan of care.        POC expires Unit limit Auth Expiration date PT/OT/ST + Visit Limit?   24 N/a 12/15 BOMN                           Visit/Unit Tracking  8 authorized   PN /3 1/6    4 5 6 7 8 1 2 3 4 5 6 7 8    4 3 2 1 0 7 6 5 4 3 2 1 0         Precautions: DMII, PTSD, anxiety, syncope      Manuals 12/19 12/23 12/27 12/30 1/3 1/                                       Neuro Re-Ed SOLO  Amb with 10# tidal tank 1/4 lap x 4 laps    [place] 1/4 lap x 3 laps SOLO  Amb with 10# tidal tank 1/4 lap x 4 laps    [place] 1/4 lap x 3 laps     SOLO  Amb with 10# tidal tank 1/4 lap x 4 laps SOLO  Amb with 10# tidal tank with rotations 1/4 lap x 4 laps SOLO  Amb with 10# tidal tank with rotations 1/4 lap x 4 laps SOLO  Amb with 10# tidal tank with rotations 1/4 lap x 4 laps   VOR x 1         Imaginary targets         HT/HN ambulation SOLO  Amb with 10# tidal tank 1/4 lap x 3 laps each direction SOLO  Amb with 10# tidal tank 1/4 lap x 4 laps each  direction SOLO  Amb with 10# tidal tank 1/4 lap x 4 laps each direction SOLO  HT/HN 1/4 lap x 4 laps each direction SOLO  HT/HN 1/4 lap x 4 laps each direction    sidesteps  SOLO  1/4 lap x 4 laps with 10# tidal tank SOLO  1/4 lap x 4 laps with 10# tidal tank SOLO  1/4 lap x 4 laps with 10# tidal tank       HKM SOLO  1/4 lap x 3 laps SOLO  1/4 lap x 3 laps with 10# tidal tank  SOLO  1/4 lap x 3 laps with 10# tidal tank  SOLO  1/4 lap x 3 laps with 10# tidal tank   hurdles SOLO  8-inch (6) step-to pattern forward x 3 laps    Lateral x 3 laps SOLO  8-inch (6) step-to pattern x 3 laps    Lateral x 3 laps SOLO  8-inch (6) step-to pattern x 3 laps    Lateral x 3 laps SOLO  8-inch (6) step-to pattern x 3 laps holding 10# tidal tank    Lateral x 3 laps holding 10# tidal tank SOLO  8-inch (6) step-to pattern x 3 laps holding 10# tidal tank    Lateral x 3 laps holding 10# tidal tank SOLO  8-inch (6) step-to pattern x 3 laps holding 10# tidal tank    Lateral x 3 laps holding 10# tidal tank   Static balance         backward         tandem SOLO  1/4 lap x 3 laps SOLO  1/4 lap x 3 laps   SOLO  1/4 lap x 3 laps with tidal tank SOLO  1/4 lap x 3 laps with tidal tank   Blaze pods         [place]iver rocks    SOLO  Small and medium forward x 4 laps    Lateral x 1 lap SOLO  Small, medium and large forward x 4 laps SOLO  Small, medium and large forward x 4 laps     foam         Ther Ex         nustep         treadmill SOLO  1.7mph for 10 minutes watching out window  SOLO  1.7mph for 10 minutes watching out window  SOLO  1.7mph for 10 minutes watching out window SOLO 1.7mph for 10 min watching out window.                                        Ther Activity                           Gait Training                           Education

## 2025-01-07 RX ORDER — GLIMEPIRIDE 1 MG/1
1 TABLET ORAL DAILY
Qty: 90 TABLET | Refills: 0 | Status: SHIPPED | OUTPATIENT
Start: 2025-01-07

## 2025-01-10 ENCOUNTER — EVALUATION (OUTPATIENT)
Dept: PHYSICAL THERAPY | Facility: CLINIC | Age: 65
End: 2025-01-10
Payer: MEDICARE

## 2025-01-10 DIAGNOSIS — R40.4 ALTERED SENSORIUM: ICD-10-CM

## 2025-01-10 DIAGNOSIS — R42 DIZZINESS: ICD-10-CM

## 2025-01-10 DIAGNOSIS — Z74.09 IMPAIRED FUNCTIONAL MOBILITY, BALANCE, AND ENDURANCE: Primary | ICD-10-CM

## 2025-01-10 PROCEDURE — 97164 PT RE-EVAL EST PLAN CARE: CPT

## 2025-01-10 PROCEDURE — 97112 NEUROMUSCULAR REEDUCATION: CPT

## 2025-01-10 NOTE — PROGRESS NOTES
Reevaluation     Today's date: 1/10/2025  Patient name: Ynes Mendoza  : 1960  MRN: [id number]  Referring provider: Spenser Rodriguez MD  Dx:   Encounter Diagnosis     ICD-10-CM    1. Impaired functional mobility, balance, and endurance  Z74.09       2. Altered sensorium  R40.4       3. Dizziness  R42                        Subjective: Pt reports that episodes of dizziness have been increasing since last progress note, though last episode was last Saturday. At best dizziness is rated at 0/10, at worst 10/10, at average 3/10. Continues to use the rollator outside.         Objective: See treatment diary below     DHI: 60 (severe handicap)  10/11/24:82  24: 72  24: 68  1/10/25: 60       IE: 10/11/24  11/11/24 12/9/24 1/10/25   SSGS 0.49m/s 0.69m/s SSGS no AD  0.77 m/s 1.06m/s SSGS no AD 0.93 m/s without AD   TUG 19.8 seconds with rollator  22.0 seconds no AD 13.6 seconds with rollator  14.6 seconds no AD   10.7 seconds no AD  Manual: 12.3 seconds (15%)  Cognitive: 20.9 seconds (95%) 9.49 sec without AD  Manual: 10.37 sec  Cognitive: 10.6 sec   FGA   2030 2230 21/30   3MBWT 7.9 seconds no AD 7.3 seconds no AD   6.0 seconds no AD 5.85 sec without AD    4-square step test       16.8 seconds no hits  12.7 seconds 1 cane hits 11.77 sec 0 hits      MCTSIB   10/11/24  12/9/24 1/10/25   Firm EO 30 30  30 30   Firm EC 15 30  30 30   Foam EO 30 30  30 30   Foam EC 12 17  30 (increased sway) 30 (mild inc sway, inc with time)                         Assessment: Ynes continues to make progress in physical therapy in the last 8 sessions. She has met 2/5 goals that were set at the last reevaluation and continues to make progress towards all other goals. Noted improvements have been made in TUG, manual and cognitive TUG compared to last testing. Of importance, dual task cost has decreased to only 11% compared to 95% previously. While improvement has been noted on the 3MBWT compared to last testing, her  score of 5.85 sec continues to be able the cut off score of 4.5 sec for increased risk for falls. She demonstrates improvement with 4-square step test, today performing this task without any hits in 11.77 sec which is below the cut off score for increased risk for falls. FGA score of 21/30 is slightly below cut off score of 22/30. Average rating of dizziness is decreased to 3/10 vs 4/10 previously, however, dizziness at worst continues to be elevated 10/10. Dizziness also continues to impact her activity and participation as evident by a score of 60 on the DHI. This is, however, an improvement from 68 last assessment. She would benefit from continued physical therapy to decrease fall risk, improve balance, decrease dizziness, improve functional mobility, improve activity tolerance, and maximize function. Plan to continue 2x/week for 4 additional weeks.      Plan:  Patient would benefit from: PT eval and skilled physical therapy     Planned therapy interventions: abdominal trunk stabilization, balance, neuromuscular re-education, canalith repositioning, patient/caregiver education, motor coordination training, strengthening, stretching, therapeutic activities, therapeutic exercise, gait training and home exercise program     Frequency: 2x week  Duration in weeks: 4  Plan of Care beginning date: 1/10/2024  Plan of Care expiration date: 2025  Treatment plan discussed with: patient       Goals  Goals:  LT. Pt will improve DHI to at least 30 points within 8 weeks needed to decrease dizziness and improve function PROGRESSING  2. Pt will decrease dizziness to 5/10 at worst and 3/10 average in 8 weeks to improve activity tolerance PROGRESSING (MET for average)  3. Patient will report improved tolerance to all ADL tasks with minimal symptoms noted MET  4. Pt will demonstrate independence with HEP within 8 weeks. MET  5. Pt will improve FGA to at least 23/30 in 8 weeks ALMOST MET     NEW GOALS (24)  Pt will  improve DHI score to at least 54 points in 4 weeks to decrease impairment from dizziness. PROGRESSING  Pt will improve FGA score to at least 25/30 points in 4 weeks to improve balance and decrease fall risk. PROGRESSING  Pt will improve 3MBWT to at least 4.5 seconds in 4 weeks to decrease fall risk PROGRESSING  Pt will improve 4-square step test to <15 seconds with no cane hits in 4 weeks to decrease risk of falls MET  Pt will decrease cognitive dual task cost to at least 75% in 4 weeks to decrease fall risk MET                      POC expires Unit limit Auth Expiration date PT/OT/ST + Visit Limit?   1/6/24 N/a 12/15 BOMN                           Visit/Unit Tracking  8 authorized 11/25 11/27 12/2 12/5 12/9  PN 12/12 12/16 12/19 12/23 12/27 12/30 1/3 1/6    4 5 6 7 8 1 2 3 4 5 6 7 8    4 3 2 1 0 7 6 5 4 3 2 1 0         Precautions: DMII, PTSD, anxiety, syncope      Manuals 12/19 12/23 12/27 12/30 1/3 1/6 1/10                                           Neuro Re-Ed SOLO  Amb with 10# tidal tank 1/4 lap x 4 laps    [place] 1/4 lap x 3 laps SOLO  Amb with 10# tidal tank 1/4 lap x 4 laps    [place] 1/4 lap x 3 laps     SOLO  Amb with 10# tidal tank 1/4 lap x 4 laps SOLO  Amb with 10# tidal tank with rotations 1/4 lap x 4 laps SOLO  Amb with 10# tidal tank with rotations 1/4 lap x 4 laps SOLO  Amb with 10# tidal tank with rotations 1/4 lap x 4 laps Neuro testing for re-eval   VOR x 1          Imaginary targets          HT/HN ambulation SOLO  Amb with 10# tidal tank 1/4 lap x 3 laps each direction SOLO  Amb with 10# tidal tank 1/4 lap x 4 laps each direction SOLO  Amb with 10# tidal tank 1/4 lap x 4 laps each direction SOLO  HT/HN 1/4 lap x 4 laps each direction SOLO  HT/HN 1/4 lap x 4 laps each direction     sidesteps  SOLO  1/4 lap x 4 laps with 10# tidal tank SOLO  1/4 lap x 4 laps with 10# tidal tank SOLO  1/4 lap x 4 laps with 10# tidal tank        HKM SOLO  1/4 lap x 3 laps SOLO  1/4 lap x 3 laps with 10# tidal tank   SOLO  1/4 lap x 3 laps with 10# tidal tank  SOLO  1/4 lap x 3 laps with 10# tidal tank    hurdles SOLO  8-inch (6) step-to pattern forward x 3 laps    Lateral x 3 laps SOLO  8-inch (6) step-to pattern x 3 laps    Lateral x 3 laps SOLO  8-inch (6) step-to pattern x 3 laps    Lateral x 3 laps SOLO  8-inch (6) step-to pattern x 3 laps holding 10# tidal tank    Lateral x 3 laps holding 10# tidal tank SOLO  8-inch (6) step-to pattern x 3 laps holding 10# tidal tank    Lateral x 3 laps holding 10# tidal tank SOLO  8-inch (6) step-to pattern x 3 laps holding 10# tidal tank    Lateral x 3 laps holding 10# tidal tank    Static balance          backward          tandem SOLO  1/4 lap x 3 laps SOLO  1/4 lap x 3 laps   SOLO  1/4 lap x 3 laps with tidal tank SOLO  1/4 lap x 3 laps with tidal tank    Blaze pods          River rocks    SOLO  Small and medium forward x 4 laps    Lateral x 1 lap SOLO  Small, medium and large forward x 4 laps SOLO  Small, medium and large forward x 4 laps      foam          Ther Ex          nustep          treadmill SOLO  1.7mph for 10 minutes watching out window  SOLO  1.7mph for 10 minutes watching out window  SOLO  1.7mph for 10 minutes watching out window SOLO 1.7mph for 10 min watching out window.                                             Ther Activity                              Gait Training                              Education

## 2025-01-12 DIAGNOSIS — Z79.4 TYPE 2 DIABETES MELLITUS WITHOUT COMPLICATION, WITH LONG-TERM CURRENT USE OF INSULIN (HCC): ICD-10-CM

## 2025-01-12 DIAGNOSIS — E11.9 TYPE 2 DIABETES MELLITUS WITHOUT COMPLICATION, WITH LONG-TERM CURRENT USE OF INSULIN (HCC): ICD-10-CM

## 2025-01-13 ENCOUNTER — OFFICE VISIT (OUTPATIENT)
Dept: PHYSICAL THERAPY | Facility: CLINIC | Age: 65
End: 2025-01-13
Payer: MEDICARE

## 2025-01-13 DIAGNOSIS — R40.4 ALTERED SENSORIUM: ICD-10-CM

## 2025-01-13 DIAGNOSIS — R42 DIZZINESS: ICD-10-CM

## 2025-01-13 DIAGNOSIS — Z74.09 IMPAIRED FUNCTIONAL MOBILITY, BALANCE, AND ENDURANCE: Primary | ICD-10-CM

## 2025-01-13 PROCEDURE — 97530 THERAPEUTIC ACTIVITIES: CPT

## 2025-01-13 PROCEDURE — 97112 NEUROMUSCULAR REEDUCATION: CPT

## 2025-01-13 NOTE — PROGRESS NOTES
Daily Note     Today's date: 2025  Patient name: Ynes Mendoza  : 1960  MRN: [id number]  Referring provider: Spenser Rodriguez MD  Dx:   Encounter Diagnosis     ICD-10-CM    1. Impaired functional mobility, balance, and endurance  Z74.09       2. Altered sensorium  R40.4       3. Dizziness  R42                                  Subjective: Pt reports increased soreness today, has been present since last PT visit which was a RE.       Objective: See treatment diary below      Assessment: Tolerated treatment well. Patient demonstrated fatigue post treatment, exhibited good technique with therapeutic exercises, and would benefit from continued PT. Held further progression as pt notes increased soreness and continued fatigue from LV. Required seated rest break between each intervention today. Reports improved tolerance to river rocks w/o large size today, has ankle pain when using large river rocks. Continue to progress as tolerated.       Plan: Continue per plan of care.        POC expires Unit limit Auth Expiration date PT/OT/ST + Visit Limit?   24 N/a 12/15 BOMN                           Visit/Unit Tracking  8 authorized 1/10 PN                9 10               8 7                    Precautions: DMII, PTSD, anxiety, syncope      Manuals 1/13 12/23 12/27 12/30 1/3 1/6 1/10                                           Neuro Re-Ed  SOLO  Amb with 10# tidal tank 1/4 lap x 4 laps    [place] 1/4 lap x 3 laps     SOLO  Amb with 10# tidal tank 1/4 lap x 4 laps SOLO  Amb with 10# tidal tank with rotations 1/4 lap x 4 laps SOLO  Amb with 10# tidal tank with rotations 1/4 lap x 4 laps SOLO  Amb with 10# tidal tank with rotations 1/4 lap x 4 laps Neuro testing for re-eval   VOR x 1          Imaginary targets          HT/HN ambulation SOLO  HT/HN 1/4 lap x 4 laps each direction SOLO  Amb with 10# tidal tank 1/4 lap x 4 laps each direction SOLO  Amb with 10# tidal tank 1/4 lap x 4 laps each direction SOLO  HT/HN  1/4 lap x 4 laps each direction SOLO  HT/HN 1/4 lap x 4 laps each direction     sidesteps  SOLO  1/4 lap x 4 laps with 10# tidal tank SOLO  1/4 lap x 4 laps with 10# tidal tank SOLO  1/4 lap x 4 laps with 10# tidal tank        HKM SOLO  1/4 lap x 3 laps with 10# tidal tank SOLO  1/4 lap x 3 laps with 10# tidal tank  SOLO  1/4 lap x 3 laps with 10# tidal tank  SOLO  1/4 lap x 3 laps with 10# tidal tank    hurdles  SOLO  8-inch (6) step-to pattern x 3 laps    Lateral x 3 laps SOLO  8-inch (6) step-to pattern x 3 laps    Lateral x 3 laps SOLO  8-inch (6) step-to pattern x 3 laps holding 10# tidal tank    Lateral x 3 laps holding 10# tidal tank SOLO  8-inch (6) step-to pattern x 3 laps holding 10# tidal tank    Lateral x 3 laps holding 10# tidal tank SOLO  8-inch (6) step-to pattern x 3 laps holding 10# tidal tank    Lateral x 3 laps holding 10# tidal tank    Static balance          backward          tandem SOLO  1/4 lap x 4 laps with tidal tank SOLO  1/4 lap x 3 laps   SOLO  1/4 lap x 3 laps with tidal tank SOLO  1/4 lap x 3 laps with tidal tank    Blaze pods          River rocks SOLO  Small and medium forward x 4 laps   SOLO  Small and medium forward x 4 laps    Lateral x 1 lap SOLO  Small, medium and large forward x 4 laps SOLO  Small, medium and large forward x 4 laps      foam          Ther Ex          nustep          treadmill SOLO 1.7mph for 10 min watching out window  SOLO  1.7mph for 10 minutes watching out window  SOLO  1.7mph for 10 minutes watching out window SOLO 1.7mph for 10 min watching out window.                                             Ther Activity                              Gait Training                              Education

## 2025-01-14 ENCOUNTER — CONSULT (OUTPATIENT)
Dept: CARDIOLOGY CLINIC | Facility: CLINIC | Age: 65
End: 2025-01-14
Payer: MEDICARE

## 2025-01-14 VITALS
HEIGHT: 67 IN | SYSTOLIC BLOOD PRESSURE: 98 MMHG | WEIGHT: 203 LBS | HEART RATE: 58 BPM | BODY MASS INDEX: 31.86 KG/M2 | OXYGEN SATURATION: 98 % | DIASTOLIC BLOOD PRESSURE: 70 MMHG

## 2025-01-14 DIAGNOSIS — E11.9 TYPE 2 DIABETES MELLITUS WITHOUT COMPLICATION, WITH LONG-TERM CURRENT USE OF INSULIN (HCC): Primary | ICD-10-CM

## 2025-01-14 DIAGNOSIS — R55 SYNCOPE, UNSPECIFIED SYNCOPE TYPE: ICD-10-CM

## 2025-01-14 DIAGNOSIS — I49.9 IRREGULAR HEART BEAT: ICD-10-CM

## 2025-01-14 DIAGNOSIS — Z79.4 TYPE 2 DIABETES MELLITUS WITHOUT COMPLICATION, WITH LONG-TERM CURRENT USE OF INSULIN (HCC): Primary | ICD-10-CM

## 2025-01-14 DIAGNOSIS — E11.9 TYPE 2 DIABETES MELLITUS WITHOUT COMPLICATION, WITH LONG-TERM CURRENT USE OF INSULIN (HCC): ICD-10-CM

## 2025-01-14 DIAGNOSIS — Z86.73 HX-TIA (TRANSIENT ISCHEMIC ATTACK): ICD-10-CM

## 2025-01-14 DIAGNOSIS — Z79.4 TYPE 2 DIABETES MELLITUS WITHOUT COMPLICATION, WITH LONG-TERM CURRENT USE OF INSULIN (HCC): ICD-10-CM

## 2025-01-14 DIAGNOSIS — R42 DIZZINESS: ICD-10-CM

## 2025-01-14 PROCEDURE — 93000 ELECTROCARDIOGRAM COMPLETE: CPT | Performed by: INTERNAL MEDICINE

## 2025-01-14 PROCEDURE — 99245 OFF/OP CONSLTJ NEW/EST HI 55: CPT | Performed by: INTERNAL MEDICINE

## 2025-01-14 RX ORDER — INSULIN GLARGINE 100 [IU]/ML
INJECTION, SOLUTION SUBCUTANEOUS
Qty: 10 ML | Refills: 2 | Status: SHIPPED | OUTPATIENT
Start: 2025-01-14 | End: 2025-01-16 | Stop reason: ALTCHOICE

## 2025-01-14 RX ORDER — METFORMIN HYDROCHLORIDE 500 MG/1
1000 TABLET, EXTENDED RELEASE ORAL DAILY
Qty: 180 TABLET | Refills: 1 | Status: SHIPPED | OUTPATIENT
Start: 2025-01-14

## 2025-01-14 RX ORDER — INSULIN DETEMIR 100 [IU]/ML
INJECTION, SOLUTION SUBCUTANEOUS
Qty: 15 ML | Refills: 2 | Status: SHIPPED | OUTPATIENT
Start: 2025-01-14 | End: 2025-01-14
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
## 2025-01-15 ENCOUNTER — TELEPHONE (OUTPATIENT)
Dept: CARDIOLOGY CLINIC | Facility: CLINIC | Age: 65
End: 2025-01-15

## 2025-01-15 ENCOUNTER — PREP FOR PROCEDURE (OUTPATIENT)
Dept: CARDIOLOGY CLINIC | Facility: CLINIC | Age: 65
End: 2025-01-15

## 2025-01-15 ENCOUNTER — TELEPHONE (OUTPATIENT)
Age: 65
End: 2025-01-15

## 2025-01-15 ENCOUNTER — CONSULT (OUTPATIENT)
Dept: CARDIOLOGY CLINIC | Facility: CLINIC | Age: 65
End: 2025-01-15
Payer: MEDICARE

## 2025-01-15 ENCOUNTER — NURSE TRIAGE (OUTPATIENT)
Age: 65
End: 2025-01-15

## 2025-01-15 VITALS
DIASTOLIC BLOOD PRESSURE: 62 MMHG | BODY MASS INDEX: 31.81 KG/M2 | HEIGHT: 67 IN | WEIGHT: 202.7 LBS | SYSTOLIC BLOOD PRESSURE: 110 MMHG | HEART RATE: 57 BPM

## 2025-01-15 DIAGNOSIS — I49.9 IRREGULAR HEART BEAT: ICD-10-CM

## 2025-01-15 DIAGNOSIS — R42 DIZZINESS: ICD-10-CM

## 2025-01-15 DIAGNOSIS — Z79.4 TYPE 2 DIABETES MELLITUS WITHOUT COMPLICATION, WITH LONG-TERM CURRENT USE OF INSULIN (HCC): Primary | ICD-10-CM

## 2025-01-15 DIAGNOSIS — R55 SYNCOPE, UNSPECIFIED SYNCOPE TYPE: Primary | ICD-10-CM

## 2025-01-15 DIAGNOSIS — E11.9 TYPE 2 DIABETES MELLITUS WITHOUT COMPLICATION, WITH LONG-TERM CURRENT USE OF INSULIN (HCC): Primary | ICD-10-CM

## 2025-01-15 DIAGNOSIS — R55 SYNCOPE, UNSPECIFIED SYNCOPE TYPE: ICD-10-CM

## 2025-01-15 PROCEDURE — 93000 ELECTROCARDIOGRAM COMPLETE: CPT | Performed by: INTERNAL MEDICINE

## 2025-01-15 PROCEDURE — 99245 OFF/OP CONSLTJ NEW/EST HI 55: CPT | Performed by: INTERNAL MEDICINE

## 2025-01-15 RX ORDER — INSULIN GLARGINE 100 [IU]/ML
14 INJECTION, SOLUTION SUBCUTANEOUS DAILY
Qty: 3 ML | Refills: 1 | Status: CANCELLED | OUTPATIENT
Start: 2025-01-15

## 2025-01-15 NOTE — TELEPHONE ENCOUNTER
Patient called stating that the were taking Levemir insulin that was in pen form and that Dr. Landon sent a script for lantus which is in vile form and patient would like to know how they should go about taking it as they were only given the vile no syringes.    Patient is having surgery on 1/24 and would like to know if they can take this prior to the surgery.    Please advise out to patient to assist with these concerns.
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
## 2025-01-15 NOTE — TELEPHONE ENCOUNTER
Patient scheduled for DUAL CHAMBER PACER  device on 1/24/25  at [facility] with Dr. THOMPSON.        Patient aware of all general instructions.    Medication holds:   Metformin - Take your regular dose day/evening before procedure and do not take morning of procedure.      Lantus - Take your regular dose as you normally would either the evening before or the morning of your procedure.      Glimepiride - Take your regular dose day/evening before procedure and do not take morning of procedure.      Labs to be done NO LATER THAN 1/20/25   CMP / CBC (FASTING 8 HOURS)      Thank you,  Mirtha Roldan

## 2025-01-15 NOTE — TELEPHONE ENCOUNTER
"Reason for Disposition   Nursing judgment     Reviewed instruction sheet with patient, advised which medications to hold, which to take. Hold Metformin & Glimepiride; can take Lantus day of procedure but patient states she is on Levemir. Reviewed chart, advised patient her PCP stopped the Levemir and ordered Lantus yesterday. Advised her to contact PCP verify which insulin she is to be on.    Advised her to hold any vitamins/supplements until after the procedure.    Please verify if she is to take the baby aspirin the day of the procedure.    Patient also requesting a Lyft to and from the hospital; advised need to check the policy on this in regards to surgical procedures, sedatives.    Answer Assessment - Initial Assessment Questions  1. REASON FOR CALL: \"What is the main reason for your call?\" or \"How can I best help you?\"      I am having a pacemaker placed next week and I am confused on what meds to take or hold.    3. OTHER QUESTIONS: \"Do you have any other questions?\"      Can a Lyft be arranged to take me to and from the procedure?    Protocols used: Information Only Call - No Triage-Adult-[place]    "

## 2025-01-15 NOTE — TELEPHONE ENCOUNTER
John Shannon, I just spoke with Ynes, she needed to go over what meds to take or hold for her pacemaker next week.    I have her calling her PCP to verify which insulin she is to be on, Lantus or Levemir. She said she is on Levemir but chart says Lantus (just changed yesterday).    Does she take the baby aspirin the morning of?    And she is asking about a Lyft to and from the hospital. Is this allowed? She has no one in the area that can take her.     If you can call her with answers she would appreciate it.

## 2025-01-15 NOTE — LETTER
1/15/2025               DEVICE PROCEDURE INSTRUCTIONS    Ynes Mendoza   : 1960  MRN: [id number]  [address]  [facility] [id number]    Procedure: DUAL CHAMBER PACE MAKER      Procedure Date: 25    Location: [facility]  Address: [address] [place] [address], [place] [address]        Labs to be done NO LATER THAN 25 : CMP /  CBC (FASTING 8 HOURS)    BLOOD THINNER INSTRUCTIONS (Coumadin / Warfarin / Pradaxa / Eliquis / Xarelto):     N/A     Medication Hold:     Metformin - Take your regular dose day/evening before procedure and do not take morning of procedure.      Lantus - Take your regular dose as you normally would either the evening before or the morning of your procedure.      Glimepiride - Take your regular dose day/evening before procedure and do not take morning of procedure.          Arrival Time: The Hospital will contact you the day prior to your procedure, usually between 4PM - 6PM to instruct you on the time and place to report. If you do not hear from a [facility]  by 6PM the evening prior to your procedure, please contact the campus you are scheduled at.     DO NOT drink or eat ANYTHING after midnight the night prior to your procedure. You may have a minimal amount of water with your AM medications.    Arrange for a responsible adult to drive you to and from the hospital.    You should continue to take your morning medications with a sip of water UNLESS ADVISED OTHERWISE.       DO NOT stop taking Plavix or Aspirin unless advised otherwise.     If you are currently taking Fish Oil, Krill oil and/or Vitamin E please DO NOT TAKE FOR A WEEK PRIOR TO PROCEDURE.     If you are diabetic, DO NOT take any of your diabetic pills the morning of your procedure. Oral diabetic medications may include Glucophage, Prandin, Glyburide, Micronase, Avandia, Glucovance, Precose, Glynase, and Starlix.     Bring a list of daily medications, vitamins, minerals, herbals and  nutritional supplements you take. Please include dosages and the times you take them each day.     If you are packing an overnight bag, pack minimal clothing, you will be given hospital sleepwear.   DO NOT bring money, valuables or jewelry. The wedding band is ok.     If you use CPAP machine, bring it to the hospital.     Bring your Photo ID and Insurance cards with you.     Please notify us if you have been admitted to the hospital within 30 days.    Pre-Operative Showering Instructions. ONLY FOR DEVICE PROCEDURE.    The two nights prior to your procedure, take a shower each night using the special antiseptic body scrub (Chlorhexidine Scrub Brush) you received from the office or hospital. This scrub will replace your soap at home, the sponge is pre-filled with soap.     The scrub brushes are single use only and should be discarded after use.     Shampoo your hair with regular shampoo and rinse completely before using the antiseptic scrub brush.     Using the sponge side of the scrub brush to wash your entire body from your neck down, with special attention to your armpits, chest and groin area. DO NOT USE the scrub on your face and avoid any open wounds. Do not use any other soap or wash your skin.    DO NOT USE any lotion, powder, deodorant or perfume of any kind on your skin after you shower.    AFTER THE FIRST NIGHT of using the scrub, change your bed linen sheets to a fresh clean set and clean pajamas for bedtime.    AFTER THE SECOND NIGHT of using the scrub, use clean pajamas for bedtime.    DO NOT SHOWER THE MORNING OF SURGERY, you will be prepped and cleansed at the hospital prior to your procedure.       PLEASE  THE SPONGES AT YOUR MOST CONVENIENT [facility] CARDIOLOGY OFFICE.                          FAILURE TO FOLLOW ANY OF THESE INSTRUCTIONS COULD RESULT IN THE CANCELLATION OF YOUR PROCEDURE      Please call  [phone] (Mark) or [phone] (Karen) if you do not hear from the   by 6:00PM the night before your procedure.    All patients enter through ENTRANCE B.  Parking is available free of charge or park on Parking Deck B.        Thank you,   Mirtha Roldan  Surgery Coordinator  [facility]   [address] [place] [id number]  Teams: [phone]

## 2025-01-15 NOTE — PROGRESS NOTES
Daily Note     Today's date: 1/15/2025  Patient name: Ynes Mendoza  : 1960  MRN: [id number]  Referring provider: Spenser Rodriguez MD  Dx: No diagnosis found.               Subjective: ***      Objective: See treatment diary below      Assessment: Ynes Tolerated treatment well with appropriate muscle fatigue experienced with ex's. She is making steady gains towards goals. Required vc's t/o session for proper positioning with ex's. She would benefit from continued PT and compliance with HEP.        Plan: Continue per plan of care.        POC expires Unit limit Auth Expiration date PT/OT/ST + Visit Limit?   24 N/a 12/15 BOMN                           Visit/Unit Tracking  8 authorized 1/10 PN                9 10               8 7                    Precautions: DMII, PTSD, anxiety, syncope      Manuals 1/13 12/23 12/27 12/30 1/3 1/6 1/10                                           Neuro Re-Ed  SOLO  Amb with 10# tidal tank 1/4 lap x 4 laps    [place] 1/4 lap x 3 laps     SOLO  Amb with 10# tidal tank 1/4 lap x 4 laps SOLO  Amb with 10# tidal tank with rotations 1/4 lap x 4 laps SOLO  Amb with 10# tidal tank with rotations 1/4 lap x 4 laps SOLO  Amb with 10# tidal tank with rotations 1/4 lap x 4 laps Neuro testing for re-eval   VOR x 1          Imaginary targets          HT/HN ambulation SOLO  HT/HN 1/4 lap x 4 laps each direction SOLO  Amb with 10# tidal tank 1/4 lap x 4 laps each direction SOLO  Amb with 10# tidal tank 1/4 lap x 4 laps each direction SOLO  HT/HN 1/4 lap x 4 laps each direction SOLO  HT/HN 1/4 lap x 4 laps each direction     sidesteps  SOLO  1/4 lap x 4 laps with 10# tidal tank SOLO  1/4 lap x 4 laps with 10# tidal tank SOLO  1/4 lap x 4 laps with 10# tidal tank        HKM SOLO  1/4 lap x 3 laps with 10# tidal tank SOLO  1/4 lap x 3 laps with 10# tidal tank  SOLO  1/4 lap x 3 laps with 10# tidal tank  SOLO  1/4 lap x 3 laps with 10# tidal tank    hurdles  SOLO  8-inch (6) step-to  pattern x 3 laps    Lateral x 3 laps SOLO  8-inch (6) step-to pattern x 3 laps    Lateral x 3 laps SOLO  8-inch (6) step-to pattern x 3 laps holding 10# tidal tank    Lateral x 3 laps holding 10# tidal tank SOLO  8-inch (6) step-to pattern x 3 laps holding 10# tidal tank    Lateral x 3 laps holding 10# tidal tank SOLO  8-inch (6) step-to pattern x 3 laps holding 10# tidal tank    Lateral x 3 laps holding 10# tidal tank    Static balance          backward          tandem SOLO  1/4 lap x 4 laps with tidal tank SOLO  1/4 lap x 3 laps   SOLO  1/4 lap x 3 laps with tidal tank SOLO  1/4 lap x 3 laps with tidal tank    Blaze pods          [place] SOLO  Small and medium forward x 4 laps   SOLO  Small and medium forward x 4 laps    Lateral x 1 lap SOLO  Small, medium and large forward x 4 laps SOLO  Small, medium and large forward x 4 laps      foam          Ther Ex          nustep          treadmill SOLO 1.7mph for 10 min watching out window  SOLO  1.7mph for 10 minutes watching out window  SOLO  1.7mph for 10 minutes watching out window SOLO 1.7mph for 10 min watching out window.                                             Ther Activity                              Gait Training                              Education

## 2025-01-15 NOTE — TELEPHONE ENCOUNTER
Patient in regards to frequency of insulin prescribed yesterday.  It states as directed. Clinical unavailable.Please call patient with frequency.

## 2025-01-16 DIAGNOSIS — E11.9 TYPE 2 DIABETES MELLITUS WITHOUT COMPLICATION, WITH LONG-TERM CURRENT USE OF INSULIN (HCC): Primary | ICD-10-CM

## 2025-01-16 DIAGNOSIS — Z79.4 TYPE 2 DIABETES MELLITUS WITHOUT COMPLICATION, WITH LONG-TERM CURRENT USE OF INSULIN (HCC): Primary | ICD-10-CM

## 2025-01-16 RX ORDER — INSULIN GLARGINE 100 [IU]/ML
14 INJECTION, SOLUTION SUBCUTANEOUS DAILY
Qty: 6 ML | Refills: 2 | Status: SHIPPED | OUTPATIENT
Start: 2025-01-16

## 2025-01-16 NOTE — TELEPHONE ENCOUNTER
I called & spoke with pt sister Lori. She is worried about about post care being pt lives alone & has been passing out & her memory isn't to good. She asked about getting a visiting nurse or maybe having her stay for 1 night post procedure? Please advise as I never had to help with this before.

## 2025-01-16 NOTE — TELEPHONE ENCOUNTER
Patient's sister Loir calling with questions regarding admission and discharge for PPM implant. She would like to know how long patient will stay in hospital, if transportation home can be arranged, and if Visiting nurses can be set up on discharge. She would also like to know about mobility restrictions because patient lives alone.   I did verify that pre-op instructions were reviewed with patient, and discussed them with Lori, also.  Please contact Lori at [phone]

## 2025-01-16 NOTE — TELEPHONE ENCOUNTER
Caller: Ynes Mendoza    Doctor: Dr. Kim/Dr. Sanchez    Call back #: [phone]    Reason for call: Patient would like to know if she can arrange for an at-home nurse to come visit her after her pacemaker implant. Patient stated that she is nervous about her memory/passing out and would like a nurse to check on her to make sure she is okay after the pacemaker implant.

## 2025-01-17 ENCOUNTER — APPOINTMENT (OUTPATIENT)
Dept: PHYSICAL THERAPY | Facility: CLINIC | Age: 65
End: 2025-01-17
Payer: MEDICARE

## 2025-01-17 ENCOUNTER — TELEPHONE (OUTPATIENT)
Age: 65
End: 2025-01-17

## 2025-01-17 NOTE — TELEPHONE ENCOUNTER
"Pt called because she received Zio in mail. She is scheduled for pacemaker 1/24/25 and is ? If she really needs to wear Zio.      Advised per Dr. Sanchez' note from 1/15/25 visit \"Zio monitor is pending (Live) will not  if negative but she is welcome to get this if she has episode with it would confirm the very likely possiblity of intermittent heart block.\"    Pt states she does not feel she needs to wear Zio since she is already scheduled for Pacemaker 1/24 and wearing Zio will increase her anxiety.    Advised I would make Dr. Sanchez aware, if any additional recommendations office would get back to her.   "

## 2025-01-18 LAB
ALBUMIN SERPL-MCNC: 4.3 G/DL (ref 3.9–4.9)
ALP SERPL-CCNC: 52 IU/L (ref 44–121)
ALT SERPL-CCNC: 10 IU/L (ref 0–32)
AST SERPL-CCNC: 13 IU/L (ref 0–40)
BASOPHILS # BLD AUTO: 0.1 X10E3/UL (ref 0–0.2)
BASOPHILS NFR BLD AUTO: 1 %
BILIRUB SERPL-MCNC: 0.7 MG/DL (ref 0–1.2)
BUN SERPL-MCNC: 19 MG/DL (ref 8–27)
BUN/CREAT SERPL: 23 (ref 12–28)
CALCIUM SERPL-MCNC: 9.9 MG/DL (ref 8.7–10.3)
CHLORIDE SERPL-SCNC: 103 MMOL/L (ref 96–106)
CO2 SERPL-SCNC: 23 MMOL/L (ref 20–29)
CREAT SERPL-MCNC: 0.81 MG/DL (ref 0.57–1)
EGFR: 82 ML/MIN/1.73
EOSINOPHIL # BLD AUTO: 0.2 X10E3/UL (ref 0–0.4)
EOSINOPHIL NFR BLD AUTO: 3 %
ERYTHROCYTE [DISTWIDTH] IN BLOOD BY AUTOMATED COUNT: 12.6 % (ref 11.7–15.4)
GLOBULIN SER-MCNC: 2.4 G/DL (ref 1.5–4.5)
GLUCOSE SERPL-MCNC: 174 MG/DL (ref 70–99)
HCT VFR BLD AUTO: 38.8 % (ref 34–46.6)
HGB BLD-MCNC: 12.7 G/DL (ref 11.1–15.9)
IMM GRANULOCYTES # BLD: 0 X10E3/UL (ref 0–0.1)
IMM GRANULOCYTES NFR BLD: 1 %
LYMPHOCYTES # BLD AUTO: 1.5 X10E3/UL (ref 0.7–3.1)
LYMPHOCYTES NFR BLD AUTO: 28 %
MCH RBC QN AUTO: 29.2 PG (ref 26.6–33)
MCHC RBC AUTO-ENTMCNC: 32.7 G/DL (ref 31.5–35.7)
MCV RBC AUTO: 89 FL (ref 79–97)
MONOCYTES # BLD AUTO: 0.5 X10E3/UL (ref 0.1–0.9)
MONOCYTES NFR BLD AUTO: 9 %
NEUTROPHILS # BLD AUTO: 3.1 X10E3/UL (ref 1.4–7)
NEUTROPHILS NFR BLD AUTO: 58 %
PLATELET # BLD AUTO: 176 X10E3/UL (ref 150–450)
POTASSIUM SERPL-SCNC: 4.5 MMOL/L (ref 3.5–5.2)
PROT SERPL-MCNC: 6.7 G/DL (ref 6–8.5)
RBC # BLD AUTO: 4.35 X10E6/UL (ref 3.77–5.28)
SODIUM SERPL-SCNC: 141 MMOL/L (ref 134–144)
WBC # BLD AUTO: 5.4 X10E3/UL (ref 3.4–10.8)

## 2025-01-20 ENCOUNTER — OFFICE VISIT (OUTPATIENT)
Dept: PHYSICAL THERAPY | Facility: CLINIC | Age: 65
End: 2025-01-20
Payer: MEDICARE

## 2025-01-20 DIAGNOSIS — R42 DIZZINESS: ICD-10-CM

## 2025-01-20 DIAGNOSIS — Z74.09 IMPAIRED FUNCTIONAL MOBILITY, BALANCE, AND ENDURANCE: Primary | ICD-10-CM

## 2025-01-20 DIAGNOSIS — R40.4 ALTERED SENSORIUM: ICD-10-CM

## 2025-01-20 PROCEDURE — 97110 THERAPEUTIC EXERCISES: CPT

## 2025-01-20 PROCEDURE — 97112 NEUROMUSCULAR REEDUCATION: CPT

## 2025-01-20 NOTE — PROGRESS NOTES
Daily Note     Today's date: 2025  Patient name: Ynes Mendoza  : 1960  MRN: [id number]  Referring provider: Spenser Rodriguez MD  Dx:   Encounter Diagnosis     ICD-10-CM    1. Impaired functional mobility, balance, and endurance  Z74.09       2. Altered sensorium  R40.4       3. Dizziness  R42           Start Time: 1100  Stop Time: 1145  Total time in clinic (min): 45 minutes    Subjective: Doing well today! Ambulatory with rollator. Excited but also a little nervous about her loop recorder implant coming up - has never has surgery before. Not sure what to expect.      Objective: See treatment diary below      Assessment: Progressed dynamic balance interventions with vestibular integration drills skewed by somatosensory changes with multidirectional stepping and narrowed base of support. Stepping strategy was especially challenged in this context; incorporated single walking stick for light touch cue. Did an excellent job adapting to balance challenges presented this session!      Plan: Continue per plan of care.  Progress treatment as tolerated.         POC expires Unit limit Auth Expiration date PT/OT/ST + Visit Limit?   24 N/a 12/15 BOMN   2/7/25  3/11/25                      Visit/Unit Tracking  8 authorized                8   3                5                   Precautions: DMII, PTSD, anxiety, syncope      Manuals 1/13 1/20/25 12/27 12/30 1/3 1/6 1/10                                           Neuro Re-Ed   SOLO  Amb with 10# tidal tank 1/4 lap x 4 laps SOLO  Amb with 10# tidal tank with rotations 1/4 lap x 4 laps SOLO  Amb with 10# tidal tank with rotations 1/4 lap x 4 laps SOLO  Amb with 10# tidal tank with rotations 1/4 lap x 4 laps Neuro testing for re-eval   VOR x 1          Imaginary targets          HT/HN ambulation SOLO  HT/HN 1/4 lap x 4 laps each direction Solo  HT/HN  Fwd/bwd by length  14' length  X4 laps ea SOLO  Amb with 10# tidal tank 1/4 lap x 4 laps each direction  SOLO  HT/HN 1/4 lap x 4 laps each direction SOLO  HT/HN 1/4 lap x 4 laps each direction     sidesteps   SOLO  1/4 lap x 4 laps with 10# tidal tank SOLO  1/4 lap x 4 laps with 10# tidal tank        HKM SOLO  1/4 lap x 3 laps with 10# tidal tank   SOLO  1/4 lap x 3 laps with 10# tidal tank  SOLO  1/4 lap x 3 laps with 10# tidal tank    hurdles   SOLO  8-inch (6) step-to pattern x 3 laps    Lateral x 3 laps SOLO  8-inch (6) step-to pattern x 3 laps holding 10# tidal tank    Lateral x 3 laps holding 10# tidal tank SOLO  8-inch (6) step-to pattern x 3 laps holding 10# tidal tank    Lateral x 3 laps holding 10# tidal tank SOLO  8-inch (6) step-to pattern x 3 laps holding 10# tidal tank    Lateral x 3 laps holding 10# tidal tank    Static balance          backward          tandem SOLO  1/4 lap x 4 laps with tidal tank Solo  14' length  R walking stick  Backwards  X2 laps   SOLO  1/4 lap x 3 laps with tidal tank SOLO  1/4 lap x 3 laps with tidal tank    Blaze pods          [place] rocks SOLO  Small and medium forward x 4 laps   SOLO  Small and medium forward x 4 laps    Lateral x 1 lap SOLO  Small, medium and large forward x 4 laps SOLO  Small, medium and large forward x 4 laps      foam  Solo  Foam beams x2 in length  Side stepping   X2 laps  W/HT x2 laps  W/HN x2 laps with R UE walking stick        Ther Ex          nustep          treadmill SOLO 1.7mph for 10 min watching out window Solo  BUE  1.7 mph x10 min SOLO  1.7mph for 10 minutes watching out window  SOLO  1.7mph for 10 minutes watching out window SOLO 1.7mph for 10 min watching out window.                                             Ther Activity                              Gait Training                              Education

## 2025-01-21 NOTE — TELEPHONE ENCOUNTER
Patric Sanchez MD  You; Cardiology Ep [id number] hours ago (9:01 AM)       Its fine if she wants to not use the Zio, won't change that she needs pacemaker.

## 2025-01-23 ENCOUNTER — ANESTHESIA EVENT (OUTPATIENT)
Dept: NON INVASIVE DIAGNOSTICS | Facility: HOSPITAL | Age: 65
End: 2025-01-23
Payer: MEDICARE

## 2025-01-23 NOTE — ANESTHESIA PREPROCEDURE EVALUATION
Procedure:  Cardiac pacer implant DUAL CHAMBER (Chest)    Relevant Problems   ANESTHESIA (within normal limits)      CARDIO   (+) Familial hypercholesterolemia      ENDO   (+) Type 2 diabetes mellitus, with long-term current use of insulin (HCC)      GI/HEPATIC (within normal limits)      /RENAL (within normal limits)      HEMATOLOGY (within normal limits)      NEURO/PSYCH   (+) Aphasia   (+) Chronic post-traumatic stress disorder (PTSD)   (+) Generalized anxiety disorder      PULMONARY (within normal limits)      Neurology/Sleep   (+) Hx-TIA (transient ischemic attack)   (+) Syncope, unspecified syncope type      Other   (+) Irregular heart beat      syncope and trifascicular block     TTE 8/2024:    Left Ventricle: Normal ventricle size. Normal wall thickness. Normal   systolic function. Estimated EF 60%. No regional wall motion   abnormalities. Diastolic inflow pattern consistent with impaired   relaxation.    Right Ventricle: Normal ventricle size. Normal systolic function.     Aortic Valve: Normal structure.  Mild focal calcification present. No   regurgitation. No stenosis. Calculated dimensionless index = 0.63.     Mitral Valve: Normal leaflet structure. Normal leaflet motion. No   regurgitation. No stenosis. Mean gradient = 2.00.     Tricuspid Valve: Normal structure. Trace regurgitation. Estimated RVSP   = 26 mmHg. No significant stenosis.     Pulmonic Valve: Normal structure. No regurgitation. No stenosis.     Pericardium: No evidence of pericardial effusion.     Agitated saline contrast injection was performed and negative for PFO.       Lab Results   Component Value Date    WBC 5.4 01/17/2025    HGB 12.7 01/17/2025    HCT 38.8 01/17/2025    MCV 89 01/17/2025     01/17/2025     Lab Results   Component Value Date    SODIUM 141 01/17/2025    K 4.5 01/17/2025     01/17/2025    CO2 23 01/17/2025    BUN 19 01/17/2025    CREATININE 0.81 01/17/2025    GLUC 174 (H) 01/17/2025     No results found  "for: \"INR\", \"PROTIME\"  Lab Results   Component Value Date    HGBA1C 8.1 (H) 12/13/2024            Physical Exam    Airway    Mallampati score: II  TM Distance: >3 FB  Neck ROM: full     Dental   No notable dental hx     Cardiovascular  Cardiovascular exam normal    Pulmonary  Pulmonary exam normal     Other Findings  post-pubertal.      Anesthesia Plan  ASA Score- 3     Anesthesia Type- IV sedation with anesthesia with ASA Monitors.         Additional Monitors:     Airway Plan:            Plan Factors-Exercise tolerance (METS): >4 METS.    Chart reviewed. EKG reviewed.  Existing labs reviewed. Patient summary reviewed.                  Induction- intravenous.    Postoperative Plan-     Perioperative Resuscitation Plan - Level 1 - Full Code.       Informed Consent- Anesthetic plan and risks discussed with patient.  I personally reviewed this patient with the CRNA. Discussed and agreed on the Anesthesia Plan with the CRNA..      NPO Status:  No vitals data found for the desired time range.        "

## 2025-01-24 ENCOUNTER — APPOINTMENT (OUTPATIENT)
Dept: RADIOLOGY | Facility: HOSPITAL | Age: 65
End: 2025-01-24
Payer: MEDICARE

## 2025-01-24 ENCOUNTER — HOSPITAL ENCOUNTER (OUTPATIENT)
Facility: HOSPITAL | Age: 65
Discharge: HOME | End: 2025-01-25
Attending: INTERNAL MEDICINE | Admitting: INTERNAL MEDICINE
Payer: MEDICARE

## 2025-01-24 ENCOUNTER — ANESTHESIA (OUTPATIENT)
Dept: NON INVASIVE DIAGNOSTICS | Facility: HOSPITAL | Age: 65
End: 2025-01-24
Payer: MEDICARE

## 2025-01-24 ENCOUNTER — APPOINTMENT (OUTPATIENT)
Dept: PHYSICAL THERAPY | Facility: CLINIC | Age: 65
End: 2025-01-24
Payer: MEDICARE
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
## 2025-01-24 DIAGNOSIS — I49.9 IRREGULAR HEART BEAT: ICD-10-CM

## 2025-01-24 DIAGNOSIS — R55 SYNCOPE, UNSPECIFIED SYNCOPE TYPE: ICD-10-CM

## 2025-01-24 DIAGNOSIS — G89.18 POST-OP PAIN: Primary | ICD-10-CM

## 2025-01-24 DIAGNOSIS — R42 DIZZINESS: ICD-10-CM

## 2025-01-24 LAB
ANION GAP SERPL CALCULATED.3IONS-SCNC: 9 MMOL/L (ref 4–13)
ATRIAL RATE: 60 BPM
ATRIAL RATE: 62 BPM
ATRIAL RATE: 73 BPM
BUN SERPL-MCNC: 17 MG/DL (ref 5–25)
CALCIUM SERPL-MCNC: 9.6 MG/DL (ref 8.4–10.2)
CHLORIDE SERPL-SCNC: 103 MMOL/L (ref 96–108)
CO2 SERPL-SCNC: 27 MMOL/L (ref 21–32)
CREAT SERPL-MCNC: 0.95 MG/DL (ref 0.6–1.3)
ERYTHROCYTE [DISTWIDTH] IN BLOOD BY AUTOMATED COUNT: 12.7 % (ref 11.6–15.1)
GFR SERPL CREATININE-BSD FRML MDRD: 64 ML/MIN/1.73SQ M
GLUCOSE P FAST SERPL-MCNC: 141 MG/DL (ref 65–99)
GLUCOSE SERPL-MCNC: 141 MG/DL (ref 65–140)
GLUCOSE SERPL-MCNC: 150 MG/DL (ref 65–140)
GLUCOSE SERPL-MCNC: 238 MG/DL (ref 65–140)
GLUCOSE SERPL-MCNC: 240 MG/DL (ref 65–140)
HCT VFR BLD AUTO: 39.6 % (ref 34.8–46.1)
HGB BLD-MCNC: 13.5 G/DL (ref 11.5–15.4)
INR PPP: 0.97 (ref 0.85–1.19)
MAGNESIUM SERPL-MCNC: 1.9 MG/DL (ref 1.9–2.7)
MCH RBC QN AUTO: 29.9 PG (ref 26.8–34.3)
MCHC RBC AUTO-ENTMCNC: 34.1 G/DL (ref 31.4–37.4)
MCV RBC AUTO: 88 FL (ref 82–98)
P AXIS: -10 DEGREES
P AXIS: -86 DEGREES
P AXIS: 35 DEGREES
PLATELET # BLD AUTO: 212 THOUSANDS/UL (ref 149–390)
PMV BLD AUTO: 11.4 FL (ref 8.9–12.7)
POTASSIUM SERPL-SCNC: 3.8 MMOL/L (ref 3.5–5.3)
PR INTERVAL: 174 MS
PR INTERVAL: 208 MS
PR INTERVAL: 298 MS
PROTHROMBIN TIME: 13.2 SECONDS (ref 12.3–15)
QRS AXIS: -3 DEGREES
QRS AXIS: 0 DEGREES
QRS AXIS: 16 DEGREES
QRSD INTERVAL: 126 MS
QRSD INTERVAL: 132 MS
QRSD INTERVAL: 136 MS
QT INTERVAL: 436 MS
QT INTERVAL: 442 MS
QT INTERVAL: 482 MS
QTC INTERVAL: 443 MS
QTC INTERVAL: 482 MS
QTC INTERVAL: 488 MS
RBC # BLD AUTO: 4.51 MILLION/UL (ref 3.81–5.12)
SODIUM SERPL-SCNC: 139 MMOL/L (ref 135–147)
T WAVE AXIS: -11 DEGREES
T WAVE AXIS: 183 DEGREES
T WAVE AXIS: 56 DEGREES
VENTRICULAR RATE: 60 BPM
VENTRICULAR RATE: 62 BPM
VENTRICULAR RATE: 73 BPM
WBC # BLD AUTO: 6.08 THOUSAND/UL (ref 4.31–10.16)

## 2025-01-24 PROCEDURE — 93005 ELECTROCARDIOGRAM TRACING: CPT

## 2025-01-24 PROCEDURE — 93010 ELECTROCARDIOGRAM REPORT: CPT | Performed by: INTERNAL MEDICINE

## 2025-01-24 PROCEDURE — 33208 INSRT HEART PM ATRIAL & VENT: CPT | Performed by: INTERNAL MEDICINE

## 2025-01-24 PROCEDURE — C1887 CATHETER, GUIDING: HCPCS | Performed by: INTERNAL MEDICINE

## 2025-01-24 PROCEDURE — C1785 PMKR, DUAL, RATE-RESP: HCPCS | Performed by: INTERNAL MEDICINE

## 2025-01-24 PROCEDURE — 82948 REAGENT STRIP/BLOOD GLUCOSE: CPT

## 2025-01-24 PROCEDURE — 71045 X-RAY EXAM CHEST 1 VIEW: CPT

## 2025-01-24 PROCEDURE — 83735 ASSAY OF MAGNESIUM: CPT | Performed by: PHYSICIAN ASSISTANT

## 2025-01-24 PROCEDURE — NC001 PR NO CHARGE: Performed by: PHYSICIAN ASSISTANT

## 2025-01-24 PROCEDURE — 80048 BASIC METABOLIC PNL TOTAL CA: CPT | Performed by: PHYSICIAN ASSISTANT

## 2025-01-24 PROCEDURE — C1892 INTRO/SHEATH,FIXED,PEEL-AWAY: HCPCS | Performed by: INTERNAL MEDICINE

## 2025-01-24 PROCEDURE — C1898 LEAD, PMKR, OTHER THAN TRANS: HCPCS | Performed by: INTERNAL MEDICINE

## 2025-01-24 PROCEDURE — 85027 COMPLETE CBC AUTOMATED: CPT | Performed by: PHYSICIAN ASSISTANT

## 2025-01-24 PROCEDURE — 85610 PROTHROMBIN TIME: CPT | Performed by: PHYSICIAN ASSISTANT

## 2025-01-24 PROCEDURE — C1769 GUIDE WIRE: HCPCS | Performed by: INTERNAL MEDICINE

## 2025-01-24 DEVICE — ENVELOPE CMRM6122 ABSORB MED MR
Type: IMPLANTABLE DEVICE | Site: CHEST  WALL | Status: FUNCTIONAL
Brand: TYRX™

## 2025-01-24 DEVICE — IPG W1DR01 AZURE XT DR MRI USA
Type: IMPLANTABLE DEVICE | Site: CHEST  WALL | Status: FUNCTIONAL
Brand: AZURE™ XT DR MRI SURESCAN™

## 2025-01-24 DEVICE — LEAD 457453 MRI US BI RCMCRD MVC
Type: IMPLANTABLE DEVICE | Site: HEART | Status: FUNCTIONAL
Brand: CAPSURE SENSE MRI™ SURESCAN™

## 2025-01-24 RX ORDER — MIDAZOLAM HYDROCHLORIDE 2 MG/2ML
INJECTION, SOLUTION INTRAMUSCULAR; INTRAVENOUS AS NEEDED
Status: DISCONTINUED | OUTPATIENT
Start: 2025-01-24 | End: 2025-01-24

## 2025-01-24 RX ORDER — FENTANYL CITRATE 50 UG/ML
INJECTION, SOLUTION INTRAMUSCULAR; INTRAVENOUS AS NEEDED
Status: DISCONTINUED | OUTPATIENT
Start: 2025-01-24 | End: 2025-01-24

## 2025-01-24 RX ORDER — SODIUM CHLORIDE 9 MG/ML
INJECTION, SOLUTION INTRAVENOUS CONTINUOUS PRN
Status: DISCONTINUED | OUTPATIENT
Start: 2025-01-24 | End: 2025-01-24

## 2025-01-24 RX ORDER — INSULIN GLARGINE 100 [IU]/ML
14 INJECTION, SOLUTION SUBCUTANEOUS
Status: DISCONTINUED | OUTPATIENT
Start: 2025-01-24 | End: 2025-01-25 | Stop reason: HOSPADM

## 2025-01-24 RX ORDER — DIPHENHYDRAMINE HCL 25 MG
25 TABLET ORAL EVERY 6 HOURS PRN
Status: DISCONTINUED | OUTPATIENT
Start: 2025-01-24 | End: 2025-01-25 | Stop reason: HOSPADM

## 2025-01-24 RX ORDER — ONDANSETRON 2 MG/ML
INJECTION INTRAMUSCULAR; INTRAVENOUS AS NEEDED
Status: DISCONTINUED | OUTPATIENT
Start: 2025-01-24 | End: 2025-01-24

## 2025-01-24 RX ORDER — GENTAMICIN 40 MG/ML
INJECTION, SOLUTION INTRAMUSCULAR; INTRAVENOUS CODE/TRAUMA/SEDATION MEDICATION
Status: DISCONTINUED | OUTPATIENT
Start: 2025-01-24 | End: 2025-01-24 | Stop reason: HOSPADM

## 2025-01-24 RX ORDER — LIDOCAINE HYDROCHLORIDE 10 MG/ML
INJECTION, SOLUTION EPIDURAL; INFILTRATION; INTRACAUDAL; PERINEURAL CODE/TRAUMA/SEDATION MEDICATION
Status: DISCONTINUED | OUTPATIENT
Start: 2025-01-24 | End: 2025-01-24 | Stop reason: HOSPADM

## 2025-01-24 RX ORDER — GLIMEPIRIDE 1 MG/1
1 TABLET ORAL DAILY
Status: DISCONTINUED | OUTPATIENT
Start: 2025-01-25 | End: 2025-01-25 | Stop reason: HOSPADM

## 2025-01-24 RX ORDER — INSULIN LISPRO 100 [IU]/ML
1-5 INJECTION, SOLUTION INTRAVENOUS; SUBCUTANEOUS
Status: DISCONTINUED | OUTPATIENT
Start: 2025-01-24 | End: 2025-01-25 | Stop reason: HOSPADM

## 2025-01-24 RX ORDER — PHENYLEPHRINE HCL IN 0.9% NACL 1 MG/10 ML
SYRINGE (ML) INTRAVENOUS AS NEEDED
Status: DISCONTINUED | OUTPATIENT
Start: 2025-01-24 | End: 2025-01-24

## 2025-01-24 RX ORDER — EPHEDRINE SULFATE 50 MG/ML
INJECTION INTRAVENOUS AS NEEDED
Status: DISCONTINUED | OUTPATIENT
Start: 2025-01-24 | End: 2025-01-24

## 2025-01-24 RX ORDER — PROPOFOL 10 MG/ML
INJECTION, EMULSION INTRAVENOUS CONTINUOUS PRN
Status: DISCONTINUED | OUTPATIENT
Start: 2025-01-24 | End: 2025-01-24

## 2025-01-24 RX ORDER — ASPIRIN 81 MG/1
81 TABLET, CHEWABLE ORAL DAILY
Status: DISCONTINUED | OUTPATIENT
Start: 2025-01-24 | End: 2025-01-25 | Stop reason: HOSPADM

## 2025-01-24 RX ORDER — VANCOMYCIN HYDROCHLORIDE 1 G/200ML
1000 INJECTION, SOLUTION INTRAVENOUS ONCE
Status: COMPLETED | OUTPATIENT
Start: 2025-01-24 | End: 2025-01-24

## 2025-01-24 RX ORDER — INSULIN LISPRO 100 [IU]/ML
1-6 INJECTION, SOLUTION INTRAVENOUS; SUBCUTANEOUS
Status: DISCONTINUED | OUTPATIENT
Start: 2025-01-24 | End: 2025-01-25 | Stop reason: HOSPADM

## 2025-01-24 RX ORDER — PRAVASTATIN SODIUM 40 MG
40 TABLET ORAL
Status: DISCONTINUED | OUTPATIENT
Start: 2025-01-24 | End: 2025-01-25 | Stop reason: HOSPADM

## 2025-01-24 RX ORDER — ACETAMINOPHEN 325 MG/1
650 TABLET ORAL EVERY 4 HOURS PRN
Status: DISCONTINUED | OUTPATIENT
Start: 2025-01-24 | End: 2025-01-25 | Stop reason: HOSPADM

## 2025-01-24 RX ORDER — TRIAMCINOLONE ACETONIDE 1 MG/G
1 OINTMENT TOPICAL 2 TIMES DAILY
Status: DISCONTINUED | OUTPATIENT
Start: 2025-01-24 | End: 2025-01-25 | Stop reason: HOSPADM

## 2025-01-24 RX ORDER — SODIUM CHLORIDE 9 MG/ML
20 INJECTION, SOLUTION INTRAVENOUS CONTINUOUS
Status: DISCONTINUED | OUTPATIENT
Start: 2025-01-24 | End: 2025-01-25 | Stop reason: HOSPADM

## 2025-01-24 RX ADMIN — FENTANYL CITRATE 25 MCG: 50 INJECTION INTRAMUSCULAR; INTRAVENOUS at 08:51

## 2025-01-24 RX ADMIN — Medication 200 MCG: at 09:05

## 2025-01-24 RX ADMIN — SODIUM CHLORIDE 20 ML/HR: 0.9 INJECTION, SOLUTION INTRAVENOUS at 07:16

## 2025-01-24 RX ADMIN — INSULIN GLARGINE 14 UNITS: 100 INJECTION, SOLUTION SUBCUTANEOUS at 21:11

## 2025-01-24 RX ADMIN — ONDANSETRON 4 MG: 2 INJECTION INTRAMUSCULAR; INTRAVENOUS at 09:13

## 2025-01-24 RX ADMIN — FENTANYL CITRATE 25 MCG: 50 INJECTION INTRAMUSCULAR; INTRAVENOUS at 08:37

## 2025-01-24 RX ADMIN — Medication 200 MCG: at 08:39

## 2025-01-24 RX ADMIN — PROPOFOL 100 MCG/KG/MIN: 10 INJECTION, EMULSION INTRAVENOUS at 08:13

## 2025-01-24 RX ADMIN — Medication 100 MCG: at 08:26

## 2025-01-24 RX ADMIN — FENTANYL CITRATE 50 MCG: 50 INJECTION INTRAMUSCULAR; INTRAVENOUS at 08:13

## 2025-01-24 RX ADMIN — ACETAMINOPHEN 650 MG: 325 TABLET, FILM COATED ORAL at 21:11

## 2025-01-24 RX ADMIN — Medication 100 MCG: at 08:53

## 2025-01-24 RX ADMIN — EPHEDRINE SULFATE 10 MG: 50 INJECTION, SOLUTION INTRAVENOUS at 09:05

## 2025-01-24 RX ADMIN — EPHEDRINE SULFATE 10 MG: 50 INJECTION, SOLUTION INTRAVENOUS at 08:49

## 2025-01-24 RX ADMIN — Medication 200 MCG: at 09:13

## 2025-01-24 RX ADMIN — VANCOMYCIN HYDROCHLORIDE 1000 MG: 1 INJECTION, SOLUTION INTRAVENOUS at 08:20

## 2025-01-24 RX ADMIN — Medication 200 MCG: at 08:59

## 2025-01-24 RX ADMIN — Medication 200 MCG: at 08:18

## 2025-01-24 RX ADMIN — PRAVASTATIN SODIUM 40 MG: 40 TABLET ORAL at 16:48

## 2025-01-24 RX ADMIN — MIDAZOLAM 2 MG: 1 INJECTION INTRAMUSCULAR; INTRAVENOUS at 08:09

## 2025-01-24 RX ADMIN — ACETAMINOPHEN 650 MG: 325 TABLET, FILM COATED ORAL at 11:12

## 2025-01-24 RX ADMIN — ACETAMINOPHEN 650 MG: 325 TABLET, FILM COATED ORAL at 16:48

## 2025-01-24 RX ADMIN — INSULIN LISPRO 2 UNITS: 100 INJECTION, SOLUTION INTRAVENOUS; SUBCUTANEOUS at 21:12

## 2025-01-24 RX ADMIN — INSULIN LISPRO 3 UNITS: 100 INJECTION, SOLUTION INTRAVENOUS; SUBCUTANEOUS at 17:51

## 2025-01-24 RX ADMIN — SODIUM CHLORIDE: 0.9 INJECTION, SOLUTION INTRAVENOUS at 08:09

## 2025-01-24 RX ADMIN — Medication 200 MCG: at 09:03

## 2025-01-24 RX ADMIN — Medication 200 MCG: at 08:47
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
## 2025-01-24 NOTE — PLAN OF CARE
Problem: PAIN - ADULT  Goal: Verbalizes/displays adequate comfort level or baseline comfort level  Description: Interventions:  - Encourage patient to monitor pain and request assistance  - Assess pain using appropriate pain scale  - Administer analgesics based on type and severity of pain and evaluate response  - Implement non-pharmacological measures as appropriate and evaluate response  - Consider cultural and social influences on pain and pain management  - Notify physician/advanced practitioner if interventions unsuccessful or patient reports new pain  1/24/2025 1811 by Kacie Zamudio RN  Outcome: Progressing  1/24/2025 1811 by Kacie Zamudio RN  Outcome: Progressing     Problem: INFECTION - ADULT  Goal: Absence or prevention of progression during hospitalization  Description: INTERVENTIONS:  - Assess and monitor for signs and symptoms of infection  - Monitor lab/diagnostic results  - Monitor all insertion sites, i.e. indwelling lines, tubes, and drains  - Monitor endotracheal if appropriate and nasal secretions for changes in amount and color  - [place] appropriate cooling/warming therapies per order  - Administer medications as ordered  - Instruct and encourage patient and family to use good hand hygiene technique  - Identify and instruct in appropriate isolation precautions for identified infection/condition  1/24/2025 1811 by Kacie Zamudio RN  Outcome: Progressing  1/24/2025 1811 by Kacie Zamudio RN  Outcome: Progressing

## 2025-01-24 NOTE — DISCHARGE INSTR - AVS FIRST PAGE
Post Procedure Care instructions:     Pain management   Tylenol (acetaminophen) and ice      If you go home on the day of the procedure:  Please send device transmission the following morning      Wound care  For 7 days:  Bandage must remain on wound   Bathing:    If bandage has a good seal on all sides, you may shower   If the bandage is not sealed or there is any question/concern, do not shower. Sponge bathe only     One week after procedure:   Remove bandage    Do not use lotions/powders/creams on incision   Shower normally, do not scrub wound      Restrictions for 6 weeks: (apply these rules only to the arm closest to the incision)   Do not raise elbow above shoulder height   Do not lift greater than 10lbs    No pulling on grab bar or bed rail    When to call clinic:    Redness or swelling at incision site   Opening and/or drainage from the incision   Temperature >100.4 F     If you have any questions:   [phone] 8:30am-5:30pm  [phone] After hours  [phone] Appointments     Information about your cardiac device:   Pacemaker     WHAT YOU SHOULD KNOW:      A pacemaker is a small, battery-powered device that is placed under your skin in your upper chest area with wires placed through a vein that lead directly into the heart. It helps regulate your heart rate and prevent your heart from beating too slowly.      Frequently asked questions about cardiac devices     How long does the device last?    Approximately 10 years, it depends on how often your body uses it     How is my device monitored?    You will have a 2-week follow-up with our device clinic after the initial placement.  At this visit our device techs will establish your follow up appointments   Transmissions are sent every 3 months    Alerts are sent if there are changes to your heart rhythm or the device in between transmissions     Are there long-term restrictions or are there activities that will affect my device?    Generally once healing  is completed there are minimal restrictions long-term   When going through security checkpoints, let them know you have an implanted cardiac device.  You will receive a permeant card, keep this with you   If you have any questions about electronic equipment and your device, please call the device  hotline

## 2025-01-24 NOTE — ANESTHESIA POSTPROCEDURE EVALUATION
Post-Op Assessment Note    CV Status:  Stable    Pain management: adequate       Mental Status:  Alert and awake   Hydration Status:  Euvolemic   PONV Controlled:  Controlled   Airway Patency:  Patent     Post Op Vitals Reviewed: Yes    No anethesia notable event occurred.    Staff: Anesthesiologist, CRNA           Last Filed PACU Vitals:  Vitals Value Taken Time   Temp     Pulse 62     /69     Resp 16     SpO2 99

## 2025-01-24 NOTE — ANESTHESIA POSTPROCEDURE EVALUATION
Post-Op Assessment Note    CV Status:  Stable    Pain management: adequate       Mental Status:  Alert and awake   Hydration Status:  Euvolemic   PONV Controlled:  Controlled   Airway Patency:  Patent     Post Op Vitals Reviewed: Yes    No anethesia notable event occurred.    Staff: Anesthesiologist           Last Filed PACU Vitals:  Vitals Value Taken Time   Temp 97.6 °F (36.4 °C) 01/24/25 0945   Pulse 68 01/24/25 1100   BP 91/53 01/24/25 1100   Resp 16 01/24/25 0945   SpO2 93 % 01/24/25 1015

## 2025-01-24 NOTE — ASSESSMENT & PLAN NOTE
Lab Results   Component Value Date    HGBA1C 8.1 (H) 12/13/2024   Diabetes mellitus type 2 with long-term insulin use, followed by endocrinology as an outpatient  We will continue home regimen with the exception of metformin  Sliding scale will be ordered
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
## 2025-01-24 NOTE — Clinical Note
The PACER GENERATOR LAURENCE XT DR VARNER SURETIFFANIE - [id number] device was inserted. The leads were placed into the connector and visually verified to be in correct position. Injury current obtained.

## 2025-01-25 ENCOUNTER — APPOINTMENT (OUTPATIENT)
Dept: RADIOLOGY | Facility: HOSPITAL | Age: 65
End: 2025-01-25
Payer: MEDICARE

## 2025-01-25 VITALS
OXYGEN SATURATION: 98 % | WEIGHT: 204.37 LBS | BODY MASS INDEX: 32.08 KG/M2 | HEIGHT: 67 IN | SYSTOLIC BLOOD PRESSURE: 118 MMHG | TEMPERATURE: 98 F | DIASTOLIC BLOOD PRESSURE: 75 MMHG | HEART RATE: 60 BPM | RESPIRATION RATE: 17 BRPM

## 2025-01-25 PROBLEM — G89.18 POST-OP PAIN: Status: ACTIVE | Noted: 2025-01-25

## 2025-01-25 LAB
ANION GAP SERPL CALCULATED.3IONS-SCNC: 10 MMOL/L (ref 4–13)
BUN SERPL-MCNC: 18 MG/DL (ref 5–25)
CALCIUM SERPL-MCNC: 9.3 MG/DL (ref 8.4–10.2)
CHLORIDE SERPL-SCNC: 107 MMOL/L (ref 96–108)
CO2 SERPL-SCNC: 26 MMOL/L (ref 21–32)
CREAT SERPL-MCNC: 0.88 MG/DL (ref 0.6–1.3)
ERYTHROCYTE [DISTWIDTH] IN BLOOD BY AUTOMATED COUNT: 12.8 % (ref 11.6–15.1)
GFR SERPL CREATININE-BSD FRML MDRD: 70 ML/MIN/1.73SQ M
GLUCOSE SERPL-MCNC: 129 MG/DL (ref 65–140)
GLUCOSE SERPL-MCNC: 141 MG/DL (ref 65–140)
GLUCOSE SERPL-MCNC: 147 MG/DL (ref 65–140)
HCT VFR BLD AUTO: 38.6 % (ref 34.8–46.1)
HGB BLD-MCNC: 12 G/DL (ref 11.5–15.4)
MAGNESIUM SERPL-MCNC: 2 MG/DL (ref 1.9–2.7)
MCH RBC QN AUTO: 28.2 PG (ref 26.8–34.3)
MCHC RBC AUTO-ENTMCNC: 31.1 G/DL (ref 31.4–37.4)
MCV RBC AUTO: 91 FL (ref 82–98)
PLATELET # BLD AUTO: 157 THOUSANDS/UL (ref 149–390)
PMV BLD AUTO: 11.5 FL (ref 8.9–12.7)
POTASSIUM SERPL-SCNC: 4 MMOL/L (ref 3.5–5.3)
RBC # BLD AUTO: 4.26 MILLION/UL (ref 3.81–5.12)
SODIUM SERPL-SCNC: 143 MMOL/L (ref 135–147)
WBC # BLD AUTO: 5.65 THOUSAND/UL (ref 4.31–10.16)

## 2025-01-25 PROCEDURE — NC001 PR NO CHARGE: Performed by: PHYSICIAN ASSISTANT

## 2025-01-25 PROCEDURE — 80048 BASIC METABOLIC PNL TOTAL CA: CPT | Performed by: PHYSICIAN ASSISTANT

## 2025-01-25 PROCEDURE — 82948 REAGENT STRIP/BLOOD GLUCOSE: CPT

## 2025-01-25 PROCEDURE — 85027 COMPLETE CBC AUTOMATED: CPT | Performed by: PHYSICIAN ASSISTANT

## 2025-01-25 PROCEDURE — 83735 ASSAY OF MAGNESIUM: CPT | Performed by: PHYSICIAN ASSISTANT

## 2025-01-25 PROCEDURE — 71046 X-RAY EXAM CHEST 2 VIEWS: CPT

## 2025-01-25 RX ORDER — TRAMADOL HYDROCHLORIDE 50 MG/1
50 TABLET ORAL EVERY 8 HOURS PRN
Qty: 15 TABLET | Refills: 0 | Status: SHIPPED | OUTPATIENT
Start: 2025-01-25 | End: 2025-02-04

## 2025-01-25 RX ADMIN — ASPIRIN 81 MG CHEWABLE TABLET 81 MG: 81 TABLET CHEWABLE at 09:17

## 2025-01-25 RX ADMIN — GLIMEPIRIDE 1 MG: 1 TABLET ORAL at 09:18

## 2025-01-25 NOTE — PLAN OF CARE
Problem: PAIN - ADULT  Goal: Verbalizes/displays adequate comfort level or baseline comfort level  Description: Interventions:  - Encourage patient to monitor pain and request assistance  - Assess pain using appropriate pain scale  - Administer analgesics based on type and severity of pain and evaluate response  - Implement non-pharmacological measures as appropriate and evaluate response  - Consider cultural and social influences on pain and pain management  - Notify physician/advanced practitioner if interventions unsuccessful or patient reports new pain  Outcome: Progressing     Problem: INFECTION - ADULT  Goal: Absence or prevention of progression during hospitalization  Description: INTERVENTIONS:  - Assess and monitor for signs and symptoms of infection  - Monitor lab/diagnostic results  - Monitor all insertion sites, i.e. indwelling lines, tubes, and drains  - Monitor endotracheal if appropriate and nasal secretions for changes in amount and color  - [place] appropriate cooling/warming therapies per order  - Administer medications as ordered  - Instruct and encourage patient and family to use good hand hygiene technique  - Identify and instruct in appropriate isolation precautions for identified infection/condition  Outcome: Progressing  Goal: Absence of fever/infection during neutropenic period  Description: INTERVENTIONS:  - Monitor WBC    Outcome: Progressing

## 2025-01-31 ENCOUNTER — OFFICE VISIT (OUTPATIENT)
Dept: PHYSICAL THERAPY | Facility: CLINIC | Age: 65
End: 2025-01-31
Payer: MEDICARE

## 2025-01-31 DIAGNOSIS — R42 DIZZINESS: ICD-10-CM

## 2025-01-31 DIAGNOSIS — R40.4 ALTERED SENSORIUM: ICD-10-CM

## 2025-01-31 DIAGNOSIS — Z74.09 IMPAIRED FUNCTIONAL MOBILITY, BALANCE, AND ENDURANCE: Primary | ICD-10-CM

## 2025-01-31 PROCEDURE — 97112 NEUROMUSCULAR REEDUCATION: CPT

## 2025-01-31 PROCEDURE — 97110 THERAPEUTIC EXERCISES: CPT

## 2025-01-31 NOTE — PROGRESS NOTES
Daily Note     Today's date: 2025  Patient name: Ynes Mendoza  : 1960  MRN: [id number]  Referring provider: Spenser Rodriguez MD  Dx:   Encounter Diagnosis     ICD-10-CM    1. Impaired functional mobility, balance, and endurance  Z74.09       2. Altered sensorium  R40.4       3. Dizziness  R42             Start Time: 1015  Stop Time: 1100  Total time in clinic (min): 45 minutes    Subjective: doing well today! Ambulatory with rollator. Said the loop recorder procedure when well, is nervous about taking the bandage off later today but says it's just itchy underneath       Objective: See treatment diary below    5xSTS: 18.94s no UE  TUG: 15.63s no AD      Assessment: Decreased overall intensity of session due to pt's concern about doing any damage to the procedure area, did not wear harness because it would've rubbed that spot. Did reassess TUG and 5xSTS, did decline slightly without use of AD compared to last re-evaluation however no instances of LOB noted. Focused on general conditioning and static balance with compliant surfaces and head movements. Will need formal re-evaluation next week to extend POC.       Plan: Continue per plan of care.  Progress treatment as tolerated.         POC expires Unit limit Auth Expiration date PT/OT/ST + Visit Limit?   24 N/a 12/15 BOMN   2/7/25  3/11/25                      Visit/Unit Tracking  8 authorized               8   3 4               5 4                  Precautions: DMII, PTSD, anxiety, syncope      Manuals 25 1/3 1/6 1/10                                           Neuro Re-Ed   5xSTS and TUG SOLO  Amb with 10# tidal tank with rotations 1/4 lap x 4 laps SOLO  Amb with 10# tidal tank with rotations 1/4 lap x 4 laps SOLO  Amb with 10# tidal tank with rotations 1/4 lap x 4 laps Neuro testing for re-eval   VOR x 1          Imaginary targets          HT/HN ambulation SOLO  HT/HN 1/4 lap x 4 laps each direction Solo  HT/HN  Fwd/bwd  by length  14' length  X4 laps ea  SOLO  HT/HN 1/4 lap x 4 laps each direction SOLO  HT/HN 1/4 lap x 4 laps each direction     sidesteps    SOLO  1/4 lap x 4 laps with 10# tidal tank        HKM SOLO  1/4 lap x 3 laps with 10# tidal tank   SOLO  1/4 lap x 3 laps with 10# tidal tank  SOLO  1/4 lap x 3 laps with 10# tidal tank    hurdles    SOLO  8-inch (6) step-to pattern x 3 laps holding 10# tidal tank    Lateral x 3 laps holding 10# tidal tank SOLO  8-inch (6) step-to pattern x 3 laps holding 10# tidal tank    Lateral x 3 laps holding 10# tidal tank SOLO  8-inch (6) step-to pattern x 3 laps holding 10# tidal tank    Lateral x 3 laps holding 10# tidal tank    Static balance   Standing with HT/HN  2x10 ea direction       backward          tandem SOLO  1/4 lap x 4 laps with tidal tank Solo  14' length  R walking stick  Backwards  X2 laps   SOLO  1/4 lap x 3 laps with tidal tank SOLO  1/4 lap x 3 laps with tidal tank    Blaze pods          River rocks SOLO  Small and medium forward x 4 laps   SOLO  Small and medium forward x 4 laps    Lateral x 1 lap SOLO  Small, medium and large forward x 4 laps SOLO  Small, medium and large forward x 4 laps      foam  Solo  Foam beams x2 in length  Side stepping   X2 laps  W/HT x2 laps  W/HN x2 laps with R UE walking stick STS with feet on foam pad   2x10 RUE only     Removed foam pad x10 RUE only       Ther Ex          nustep   Level 5 (minimal LUE use due to procedure precautions)       treadmill SOLO 1.7mph for 10 min watching out window Solo  BUE  1.7 mph x10 min   SOLO  1.7mph for 10 minutes watching out window SOLO 1.7mph for 10 min watching out window.                                             Ther Activity                              Gait Training                              Education

## 2025-02-03 ENCOUNTER — TELEPHONE (OUTPATIENT)
Age: 65
End: 2025-02-03

## 2025-02-03 ENCOUNTER — OFFICE VISIT (OUTPATIENT)
Dept: PHYSICAL THERAPY | Facility: CLINIC | Age: 65
End: 2025-02-03
Payer: MEDICARE

## 2025-02-03 DIAGNOSIS — Z74.09 IMPAIRED FUNCTIONAL MOBILITY, BALANCE, AND ENDURANCE: Primary | ICD-10-CM

## 2025-02-03 DIAGNOSIS — R42 DIZZINESS: ICD-10-CM

## 2025-02-03 DIAGNOSIS — R40.4 ALTERED SENSORIUM: ICD-10-CM

## 2025-02-03 PROCEDURE — 97112 NEUROMUSCULAR REEDUCATION: CPT

## 2025-02-03 PROCEDURE — 97110 THERAPEUTIC EXERCISES: CPT

## 2025-02-03 NOTE — PROGRESS NOTES
Daily Note     Today's date: 2/3/2025  Patient name: Ynes Mendoza  : 1960  MRN: [id number]  Referring provider: Spenser Rodriguez MD  Dx:   Encounter Diagnosis     ICD-10-CM    1. Impaired functional mobility, balance, and endurance  Z74.09       2. Altered sensorium  R40.4       3. Dizziness  R42           Start Time: 0847          Subjective: Patient noted that she had  surgery to place pace maker this past 25. Patient noted that doctor instructed her to not lift  L her arm above her head.       Objective: See treatment diary below      Assessment: Tolerated treatment fair. Patient exhibited good technique with therapeutic exercises and would benefit from continued PT. Modified treatment exercises and for patient to perform exercises in the // bars instead of SOLO due to her having pace maker placed this past Friday. Patient did well with dynamic balance exercises in // bars and Nustep just LE only.       Plan: Continue per plan of care.        POC expires Unit limit Auth Expiration date PT/OT/ST + Visit Limit?   24 N/a 12/15 BOMN   2/7/25  3/11/25                      Visit/Unit Tracking  8 authorized    2/3           8   3 4 5              5 4 3                 Precautions: DMII, PTSD, anxiety, syncope      Manuals 25 2/3  1/6 1/10                                           Neuro Re-Ed   5xSTS and TUG   SOLO  Amb with 10# tidal tank with rotations 1/4 lap x 4 laps Neuro testing for re-eval   VOR x 1          Imaginary targets          HT/HN ambulation SOLO  HT/HN 1/4 lap x 4 laps each direction Solo  HT/HN  Fwd/bwd by length  14' length  X4 laps ea  // bars 3 laps ea HT/HN      sidesteps    3 laps // bars      HKM SOLO  1/4 lap x 3 laps with 10# tidal tank   3 laps // bars  SOLO  1/4 lap x 3 laps with 10# tidal tank    hurdles      SOLO  8-inch (6) step-to pattern x 3 laps holding 10# tidal tank    Lateral x 3 laps holding 10# tidal tank    Static balance    Standing with HT/HN  2x10 ea direction       backward    3 laps // bars      tandem SOLO  1/4 lap x 4 laps with tidal tank Solo  14' length  R walking stick  Backwards  X2 laps    SOLO  1/4 lap x 3 laps with tidal tank    Blaze pods          River rocks SOLO  Small and medium forward x 4 laps     SOLO  Small, medium and large forward x 4 laps      foam  Solo  Foam beams x2 in length  Side stepping   X2 laps  W/HT x2 laps  W/HN x2 laps with R UE walking stick STS with feet on foam pad   2x10 RUE only     Removed foam pad x10 RUE only       Ther Ex          nustep   Level 5 (minimal LUE use due to procedure precautions) Level 1 LE only due to procedure      treadmill SOLO 1.7mph for 10 min watching out window Solo  BUE  1.7 mph x10 min    SOLO 1.7mph for 10 min watching out window.                                             Ther Activity                              Gait Training                              Education

## 2025-02-03 NOTE — TELEPHONE ENCOUNTER
Patient calling in after her procedure 1/24/25 w/ Dr. Sanchez to update and ask some questions. She states incision looks great with mild bruising. She is still in PT and wants to know if she has any other restrictions other than not lifting her arm above her head.     She also would like to know if there is a way for Dr. Sanchez to place an order for a home health nurse to come to her home regularly to follow up.

## 2025-02-05 ENCOUNTER — EVALUATION (OUTPATIENT)
Dept: PHYSICAL THERAPY | Facility: CLINIC | Age: 65
End: 2025-02-05
Payer: MEDICARE

## 2025-02-05 DIAGNOSIS — R40.4 ALTERED SENSORIUM: ICD-10-CM

## 2025-02-05 DIAGNOSIS — Z74.09 IMPAIRED FUNCTIONAL MOBILITY, BALANCE, AND ENDURANCE: Primary | ICD-10-CM

## 2025-02-05 DIAGNOSIS — R42 DIZZINESS: ICD-10-CM

## 2025-02-05 PROCEDURE — 97110 THERAPEUTIC EXERCISES: CPT

## 2025-02-05 PROCEDURE — 97112 NEUROMUSCULAR REEDUCATION: CPT

## 2025-02-05 NOTE — PROGRESS NOTES
Progress Assessment - DC current POC, Follow-up in One Month    Today's date: 2025  Patient name: Ynes Mendoza  : 1960  MRN: [id number]  Referring provider: Spenser Rodriguez MD  Dx:   Encounter Diagnosis     ICD-10-CM    1. Impaired functional mobility, balance, and endurance  Z74.09       2. Altered sensorium  R40.4       3. Dizziness  R42           Start Time: 0920  Stop Time: 1015  Total time in clinic (min): 55 minutes    Subjective: Doing well today! No new c/o. Has been walking more and more without an assistive device. Feels her balance has significantly improved since beginning PT - doing things that are more balance-intensive without giving them a second thought. Feeling better since her pacemaker implant.    Pacemaker implant 25    Pain: No c/o pain today.    Goals: To maximize her balance and functional mobility.     Objective: See treatment diary below     DHI: 60 (severe handicap)  10/11/24:82  24: 72  24: 68  1/10/25: 60       IE: 10/11/24  11/11/24 12/9/24 1/10/25 2/5/25   SSGS 0.49m/s 0.69m/s SSGS no AD  0.77 m/s 1.06m/s SSGS no AD 0.93 m/s without AD 1.10 m/s no AD   TUG 19.8 seconds with rollator  22.0 seconds no AD 13.6 seconds with rollator  14.6 seconds no AD   10.7 seconds no AD  Manual: 12.3 seconds (15%)  Cognitive: 20.9 seconds (95%) 9.49 sec without AD  Manual: 10.37 sec  Cognitive: 10.6 sec 6.53s no AD  Manual: 6.53s   Co.37s     FGA    3MBWT 7.9 seconds no AD 7.3 seconds no AD   6.0 seconds no AD 5.85 sec without AD 2.94s no AD    4-square step test       16.8 seconds no hits  12.7 seconds 1 cane hits 11.77 sec 0 hits 10.06s 0 hits      MCTSIB   10/11/24  12/9/24 1/10/25   Firm EO 30 30  30 30   Firm EC 15 30  30 30   Foam EO 30 30  30 30   Foam EC 12 17  30 (increased sway) 30 (mild inc sway, inc with time)                          Assessment: Progress assessment completed noting ongoing improvements in mobility,  balance performance and gait speed. Currently no longer classified as a clinically significant greater risk for falls per above respective outcome measure testing - based on this is agreeable to reassessment in one month following compliance with revised HEP completed today.     Plan:  Patient would benefit from: Reassessment in one month.     Planned therapy interventions: abdominal trunk stabilization, balance, neuromuscular re-education, canalith repositioning, patient/caregiver education, motor coordination training, strengthening, stretching, therapeutic activities, therapeutic exercise, gait training and home exercise program     Frequency: 2x week  Duration in weeks: 4  Plan of Care beginning date: 1/10/2024  Plan of Care expiration date: 2025  Treatment plan discussed with: patient       Goals  Goals:  LT. Pt will improve DHI to at least 30 points within 8 weeks needed to decrease dizziness and improve function PROGRESSING  2. Pt will decrease dizziness to 5/10 at worst and 3/10 average in 8 weeks to improve activity tolerance PROGRESSING (MET for average)  3. Patient will report improved tolerance to all ADL tasks with minimal symptoms noted MET  4. Pt will demonstrate independence with HEP within 8 weeks. MET  5. Pt will improve FGA to at least 23/30 in 8 weeks ALMOST MET     NEW GOALS (24)  Pt will improve DHI score to at least 54 points in 4 weeks to decrease impairment from dizziness. PROGRESSING  Pt will improve FGA score to at least 25/30 points in 4 weeks to improve balance and decrease fall risk. MET  Pt will improve 3MBWT to at least 4.5 seconds in 4 weeks to decrease fall risk MET  Pt will improve 4-square step test to <15 seconds with no cane hits in 4 weeks to decrease risk of falls MET  Pt will decrease cognitive dual task cost to at least 75% in 4 weeks to decrease fall risk MET                POC expires Unit limit Auth Expiration date PT/OT/ST + Visit Limit?   24 N/a 12/15  BOMN   2/7/25  3/11/25                      Visit/Unit Tracking  8 authorized   1/20 1/31 2/3 2/5          8   3 4 5 6             5 4 3 2                Precautions: DMII, PTSD, anxiety, syncope    Access Code: [email or web address]  URL: [email or web address]  Date: 02/05/2025  Prepared by: Cristi Hsieh    Exercises  - Daily walking - 15-30 minutes with LRAD  - Heel Walking  - 1 x daily - 6 x weekly - 3 sets - 20 reps  - Sidestepping  - 1 x daily - 6 x weekly - 3 sets - 10 reps  - Walking with head turns then walking with head nods at kitchen countertop  - 1 x daily - 6 x weekly - 3 sets - 20 reps  - Sit to Stand with Armchair  - 1 x daily - 5 x weekly - 5 sets - 10 reps  - Heel Toe Raises with Counter Support  - 1 x daily - 5 x weekly - 3 sets - 10-20 reps  - Standing Hip Abduction with Counter Support  - 1 x daily - 5 x weekly - 2 sets - 10 reps    Manuals 1/13 1/20/25 1/31 2/3 2/5 PN                                             Neuro Re-Ed   5xSTS and TUG  FGA  3 MBWT  TUG Manual  TUG Cog  4 Square Step Test  HEP     VOR x 1          Imaginary targets          HT/HN ambulation SOLO  HT/HN 1/4 lap x 4 laps each direction Solo  HT/HN  Fwd/bwd by length  14' length  X4 laps ea  // bars 3 laps ea HT/HN      sidesteps    3 laps // bars      HKM SOLO  1/4 lap x 3 laps with 10# tidal tank   3 laps // bars      hurdles          Static balance   Standing with HT/HN  2x10 ea direction       backward    3 laps // bars      tandem SOLO  1/4 lap x 4 laps with tidal tank Solo  14' length  R walking stick  Backwards  X2 laps        Blaze pods          River rocks SOLO  Small and medium forward x 4 laps         foam  Solo  Foam beams x2 in length  Side stepping   X2 laps  W/HT x2 laps  W/HN x2 laps with R UE walking stick STS with feet on foam pad   2x10 RUE only     Removed foam pad x10 RUE only       Ther Ex     HEP x10 min     nustep   Level 5 (minimal LUE use due to procedure precautions) Level 1 LE only due to procedure       treadmill SOLO 1.7mph for 10 min watching out window Solo  BUE  1.7 mph x10 min                                                Ther Activity                              Gait Training                              Education

## 2025-02-06 ENCOUNTER — HOSPITAL ENCOUNTER (INPATIENT)
Facility: HOSPITAL | Age: 65
LOS: 6 days | Discharge: HOME | DRG: 053 | End: 2025-02-12
Attending: EMERGENCY MEDICINE | Admitting: EMERGENCY MEDICINE
Payer: MEDICARE

## 2025-02-06 ENCOUNTER — APPOINTMENT (EMERGENCY)
Dept: RADIOLOGY | Facility: HOSPITAL | Age: 65
DRG: 053 | End: 2025-02-06
Payer: MEDICARE

## 2025-02-06 ENCOUNTER — TELEPHONE (OUTPATIENT)
Age: 65
End: 2025-02-06

## 2025-02-06 DIAGNOSIS — R29.90 STROKE-LIKE SYMPTOMS: ICD-10-CM

## 2025-02-06 DIAGNOSIS — R56.9 WITNESSED SEIZURE-LIKE ACTIVITY (HCC): Primary | ICD-10-CM

## 2025-02-06 DIAGNOSIS — I49.9 IRREGULAR HEART BEAT: ICD-10-CM

## 2025-02-06 DIAGNOSIS — I63.9 STROKE (HCC): ICD-10-CM

## 2025-02-06 LAB
ANION GAP SERPL CALCULATED.3IONS-SCNC: 11 MMOL/L (ref 4–13)
APTT PPP: 18 SECONDS (ref 23–34)
BUN SERPL-MCNC: 18 MG/DL (ref 5–25)
CALCIUM SERPL-MCNC: 10.3 MG/DL (ref 8.4–10.2)
CARDIAC TROPONIN I PNL SERPL HS: 3 NG/L (ref ?–50)
CHLORIDE SERPL-SCNC: 102 MMOL/L (ref 96–108)
CO2 SERPL-SCNC: 26 MMOL/L (ref 21–32)
CREAT SERPL-MCNC: 0.9 MG/DL (ref 0.6–1.3)
ERYTHROCYTE [DISTWIDTH] IN BLOOD BY AUTOMATED COUNT: 12.5 % (ref 11.6–15.1)
EST. AVERAGE GLUCOSE BLD GHB EST-MCNC: 163 MG/DL
GFR SERPL CREATININE-BSD FRML MDRD: 68 ML/MIN/1.73SQ M
GLUCOSE SERPL-MCNC: 172 MG/DL (ref 65–140)
GLUCOSE SERPL-MCNC: 71 MG/DL (ref 65–140)
GLUCOSE SERPL-MCNC: 97 MG/DL (ref 65–140)
HBA1C MFR BLD: 7.3 %
HCT VFR BLD AUTO: 41 % (ref 34.8–46.1)
HGB BLD-MCNC: 13.4 G/DL (ref 11.5–15.4)
INR PPP: 0.94 (ref 0.85–1.19)
MCH RBC QN AUTO: 29.1 PG (ref 26.8–34.3)
MCHC RBC AUTO-ENTMCNC: 32.7 G/DL (ref 31.4–37.4)
MCV RBC AUTO: 89 FL (ref 82–98)
PLATELET # BLD AUTO: 175 THOUSANDS/UL (ref 149–390)
PMV BLD AUTO: 10.9 FL (ref 8.9–12.7)
POTASSIUM SERPL-SCNC: 4 MMOL/L (ref 3.5–5.3)
PROTHROMBIN TIME: 12.9 SECONDS (ref 12.3–15)
RBC # BLD AUTO: 4.61 MILLION/UL (ref 3.81–5.12)
SODIUM SERPL-SCNC: 139 MMOL/L (ref 135–147)
WBC # BLD AUTO: 7.8 THOUSAND/UL (ref 4.31–10.16)

## 2025-02-06 PROCEDURE — 82948 REAGENT STRIP/BLOOD GLUCOSE: CPT

## 2025-02-06 PROCEDURE — 85027 COMPLETE CBC AUTOMATED: CPT | Performed by: STUDENT IN AN ORGANIZED HEALTH CARE EDUCATION/TRAINING PROGRAM

## 2025-02-06 PROCEDURE — 70498 CT ANGIOGRAPHY NECK: CPT

## 2025-02-06 PROCEDURE — 85730 THROMBOPLASTIN TIME PARTIAL: CPT | Performed by: STUDENT IN AN ORGANIZED HEALTH CARE EDUCATION/TRAINING PROGRAM

## 2025-02-06 PROCEDURE — 99285 EMERGENCY DEPT VISIT HI MDM: CPT | Performed by: EMERGENCY MEDICINE

## 2025-02-06 PROCEDURE — 85610 PROTHROMBIN TIME: CPT | Performed by: STUDENT IN AN ORGANIZED HEALTH CARE EDUCATION/TRAINING PROGRAM

## 2025-02-06 PROCEDURE — 80048 BASIC METABOLIC PNL TOTAL CA: CPT | Performed by: STUDENT IN AN ORGANIZED HEALTH CARE EDUCATION/TRAINING PROGRAM

## 2025-02-06 PROCEDURE — 99223 1ST HOSP IP/OBS HIGH 75: CPT | Performed by: EMERGENCY MEDICINE

## 2025-02-06 PROCEDURE — 93005 ELECTROCARDIOGRAM TRACING: CPT

## 2025-02-06 PROCEDURE — 99291 CRITICAL CARE FIRST HOUR: CPT

## 2025-02-06 PROCEDURE — 83036 HEMOGLOBIN GLYCOSYLATED A1C: CPT | Performed by: NURSE PRACTITIONER

## 2025-02-06 PROCEDURE — 3E03317 INTRODUCTION OF OTHER THROMBOLYTIC INTO PERIPHERAL VEIN, PERCUTANEOUS APPROACH: ICD-10-PCS | Performed by: INTERNAL MEDICINE

## 2025-02-06 PROCEDURE — 84484 ASSAY OF TROPONIN QUANT: CPT | Performed by: STUDENT IN AN ORGANIZED HEALTH CARE EDUCATION/TRAINING PROGRAM

## 2025-02-06 PROCEDURE — 36415 COLL VENOUS BLD VENIPUNCTURE: CPT | Performed by: STUDENT IN AN ORGANIZED HEALTH CARE EDUCATION/TRAINING PROGRAM

## 2025-02-06 PROCEDURE — 70496 CT ANGIOGRAPHY HEAD: CPT

## 2025-02-06 RX ORDER — CHLORHEXIDINE GLUCONATE ORAL RINSE 1.2 MG/ML
15 SOLUTION DENTAL EVERY 12 HOURS SCHEDULED
Status: DISCONTINUED | OUTPATIENT
Start: 2025-02-06 | End: 2025-02-07

## 2025-02-06 RX ORDER — ONDANSETRON 2 MG/ML
INJECTION INTRAMUSCULAR; INTRAVENOUS
Status: COMPLETED
Start: 2025-02-06 | End: 2025-02-06

## 2025-02-06 RX ORDER — INSULIN LISPRO 100 [IU]/ML
1-6 INJECTION, SOLUTION INTRAVENOUS; SUBCUTANEOUS EVERY 6 HOURS SCHEDULED
Status: DISCONTINUED | OUTPATIENT
Start: 2025-02-07 | End: 2025-02-07

## 2025-02-06 RX ORDER — ATORVASTATIN CALCIUM 40 MG/1
40 TABLET, FILM COATED ORAL EVERY EVENING
Status: DISCONTINUED | OUTPATIENT
Start: 2025-02-06 | End: 2025-02-12 | Stop reason: HOSPADM

## 2025-02-06 RX ADMIN — ONDANSETRON 4 MG: 2 INJECTION INTRAMUSCULAR; INTRAVENOUS at 19:39

## 2025-02-06 RX ADMIN — INSULIN LISPRO 1 UNITS: 100 INJECTION, SOLUTION INTRAVENOUS; SUBCUTANEOUS at 23:45

## 2025-02-06 RX ADMIN — Medication 24 MG: at 21:27

## 2025-02-06 RX ADMIN — IOHEXOL 85 ML: 350 INJECTION, SOLUTION INTRAVENOUS at 19:29

## 2025-02-06 NOTE — TELEPHONE ENCOUNTER
Pt called - read results from Dr Landon.     She has a G7 on arm every 5 minutes it test around midnight it set off alarm that it was low and so she drank juice and waited and eventually it went up.  When she woke up this morning it was just around 100.

## 2025-02-06 NOTE — TELEPHONE ENCOUNTER
Patient had called in and stated last night the 5th, she had experienced her dexa monitor going off, which alerted her sugar dropping to 40.     Patient mentioned that she didn't feel any type of way. But did eat a few snacks, and the sugar level had rise.     She was wanting to know if Dr. Landon is able to give recommendations on what she should do in that situation if it were to happen again. Please advise out to patient when possible.

## 2025-02-07 ENCOUNTER — APPOINTMENT (INPATIENT)
Dept: NON INVASIVE DIAGNOSTICS | Facility: HOSPITAL | Age: 65
DRG: 053 | End: 2025-02-07
Payer: MEDICARE

## 2025-02-07 ENCOUNTER — APPOINTMENT (INPATIENT)
Dept: RADIOLOGY | Facility: HOSPITAL | Age: 65
DRG: 053 | End: 2025-02-07
Payer: MEDICARE

## 2025-02-07 ENCOUNTER — APPOINTMENT (INPATIENT)
Dept: NEUROLOGY | Facility: CLINIC | Age: 65
DRG: 053 | End: 2025-02-07
Payer: MEDICARE

## 2025-02-07 LAB
AMPHETAMINES SERPL QL SCN: NEGATIVE
AORTIC ROOT: 3.3 CM
ASCENDING AORTA: 3.2 CM
ATRIAL RATE: 312 BPM
BACTERIA UR QL AUTO: NORMAL /HPF
BARBITURATES UR QL: NEGATIVE
BENZODIAZ UR QL: NEGATIVE
BILIRUB UR QL STRIP: NEGATIVE
BSA FOR ECHO PROCEDURE: 2.07 M2
CHOLEST SERPL-MCNC: 135 MG/DL (ref ?–200)
CLARITY UR: CLEAR
COCAINE UR QL: NEGATIVE
COLOR UR: ABNORMAL
E WAVE DECELERATION TIME: 229 MS
E/A RATIO: 1.07
FENTANYL UR QL SCN: NEGATIVE
FRACTIONAL SHORTENING: 23 (ref 28–44)
GLUCOSE SERPL-MCNC: 102 MG/DL (ref 65–140)
GLUCOSE SERPL-MCNC: 215 MG/DL (ref 65–140)
GLUCOSE SERPL-MCNC: 409 MG/DL (ref 65–140)
GLUCOSE SERPL-MCNC: 83 MG/DL (ref 65–140)
GLUCOSE UR STRIP-MCNC: NEGATIVE MG/DL
HDLC SERPL-MCNC: 56 MG/DL
HGB UR QL STRIP.AUTO: NEGATIVE
HYDROCODONE UR QL SCN: NEGATIVE
INTERVENTRICULAR SEPTUM IN DIASTOLE (PARASTERNAL SHORT AXIS VIEW): 1.4 CM
INTERVENTRICULAR SEPTUM: 1.4 CM (ref 0.6–1.1)
KETONES UR STRIP-MCNC: NEGATIVE MG/DL
LAAS-AP2: 17.1 CM2
LAAS-AP4: 13.6 CM2
LDLC SERPL CALC-MCNC: 64 MG/DL (ref 0–100)
LEFT ATRIUM SIZE: 2.7 CM
LEFT ATRIUM VOLUME (MOD BIPLANE): 37 ML
LEFT ATRIUM VOLUME INDEX (MOD BIPLANE): 17.9 ML/M2
LEFT INTERNAL DIMENSION IN SYSTOLE: 3.1 CM (ref 2.1–4)
LEFT VENTRICULAR INTERNAL DIMENSION IN DIASTOLE: 4 CM (ref 3.5–6)
LEFT VENTRICULAR POSTERIOR WALL IN END DIASTOLE: 1.2 CM
LEFT VENTRICULAR STROKE VOLUME: 33 ML
LEUKOCYTE ESTERASE UR QL STRIP: NEGATIVE
LV EF US.2D.A4C+ESTIMATED: 57 %
LVSV (TEICH): 33 ML
METHADONE UR QL: NEGATIVE
MV E'TISSUE VEL-LAT: 10 CM/S
MV E'TISSUE VEL-SEP: 5 CM/S
MV PEAK A VEL: 0.45 M/S
MV PEAK E VEL: 48 CM/S
MV STENOSIS PRESSURE HALF TIME: 67 MS
MV VALVE AREA P 1/2 METHOD: 3.28
NITRITE UR QL STRIP: NEGATIVE
NON-SQ EPI CELLS URNS QL MICRO: NORMAL /HPF
OPIATES UR QL SCN: NEGATIVE
OXYCODONE+OXYMORPHONE UR QL SCN: NEGATIVE
PCP UR QL: NEGATIVE
PH UR STRIP.AUTO: 8.5 [PH]
PROT UR STRIP-MCNC: ABNORMAL MG/DL
QRS AXIS: 35 DEGREES
QRSD INTERVAL: 96 MS
QT INTERVAL: 390 MS
QTC INTERVAL: 427 MS
RBC #/AREA URNS AUTO: NORMAL /HPF
RIGHT ATRIAL 2D VOLUME: 21 ML
RIGHT ATRIUM AREA SYSTOLE A4C: 10.5 CM2
RIGHT VENTRICLE ID DIMENSION: 3.3 CM
SL CV LEFT ATRIUM LENGTH A2C: 4.8 CM
SL CV LV EF: 60
SL CV PED ECHO LEFT VENTRICLE DIASTOLIC VOLUME (MOD BIPLANE) 2D: 70 ML
SL CV PED ECHO LEFT VENTRICLE SYSTOLIC VOLUME (MOD BIPLANE) 2D: 37 ML
SP GR UR STRIP.AUTO: 1.05 (ref 1–1.03)
T WAVE AXIS: 30 DEGREES
THC UR QL: NEGATIVE
TR MAX PG: 20 MMHG
TR PEAK VELOCITY: 2.2 M/S
TRICUSPID ANNULAR PLANE SYSTOLIC EXCURSION: 2.1 CM
TRICUSPID VALVE PEAK REGURGITATION VELOCITY: 2.21 M/S
TRIGL SERPL-MCNC: 73 MG/DL (ref ?–150)
UROBILINOGEN UR STRIP-ACNC: 2 MG/DL
VENTRICULAR RATE: 72 BPM
WBC #/AREA URNS AUTO: NORMAL /HPF

## 2025-02-07 PROCEDURE — 80307 DRUG TEST PRSMV CHEM ANLYZR: CPT | Performed by: NURSE PRACTITIONER

## 2025-02-07 PROCEDURE — 99255 IP/OBS CONSLTJ NEW/EST HI 80: CPT | Performed by: PSYCHIATRY & NEUROLOGY

## 2025-02-07 PROCEDURE — NC001 PR NO CHARGE: Performed by: NURSE PRACTITIONER

## 2025-02-07 PROCEDURE — 82948 REAGENT STRIP/BLOOD GLUCOSE: CPT

## 2025-02-07 PROCEDURE — 70450 CT HEAD/BRAIN W/O DYE: CPT

## 2025-02-07 PROCEDURE — 95700 EEG CONT REC W/VID EEG TECH: CPT

## 2025-02-07 PROCEDURE — NC001 PR NO CHARGE: Performed by: ANESTHESIOLOGY

## 2025-02-07 PROCEDURE — 93010 ELECTROCARDIOGRAM REPORT: CPT | Performed by: INTERNAL MEDICINE

## 2025-02-07 PROCEDURE — 80061 LIPID PANEL: CPT | Performed by: NURSE PRACTITIONER

## 2025-02-07 PROCEDURE — 95715 VEEG EA 12-26HR INTMT MNTR: CPT

## 2025-02-07 PROCEDURE — 93306 TTE W/DOPPLER COMPLETE: CPT

## 2025-02-07 PROCEDURE — 81001 URINALYSIS AUTO W/SCOPE: CPT | Performed by: NURSE PRACTITIONER

## 2025-02-07 PROCEDURE — 93306 TTE W/DOPPLER COMPLETE: CPT | Performed by: STUDENT IN AN ORGANIZED HEALTH CARE EDUCATION/TRAINING PROGRAM

## 2025-02-07 PROCEDURE — 92610 EVALUATE SWALLOWING FUNCTION: CPT

## 2025-02-07 RX ORDER — INSULIN LISPRO 100 [IU]/ML
1-5 INJECTION, SOLUTION INTRAVENOUS; SUBCUTANEOUS
Status: DISCONTINUED | OUTPATIENT
Start: 2025-02-08 | End: 2025-02-08

## 2025-02-07 RX ORDER — SODIUM CHLORIDE, SODIUM GLUCONATE, SODIUM ACETATE, POTASSIUM CHLORIDE, MAGNESIUM CHLORIDE, SODIUM PHOSPHATE, DIBASIC, AND POTASSIUM PHOSPHATE .53; .5; .37; .037; .03; .012; .00082 G/100ML; G/100ML; G/100ML; G/100ML; G/100ML; G/100ML; G/100ML
100 INJECTION, SOLUTION INTRAVENOUS CONTINUOUS
Status: DISCONTINUED | OUTPATIENT
Start: 2025-02-07 | End: 2025-02-07

## 2025-02-07 RX ORDER — INSULIN LISPRO 100 [IU]/ML
1-5 INJECTION, SOLUTION INTRAVENOUS; SUBCUTANEOUS
Status: DISCONTINUED | OUTPATIENT
Start: 2025-02-07 | End: 2025-02-12 | Stop reason: HOSPADM

## 2025-02-07 RX ADMIN — SODIUM CHLORIDE, SODIUM GLUCONATE, SODIUM ACETATE, POTASSIUM CHLORIDE, MAGNESIUM CHLORIDE, SODIUM PHOSPHATE, DIBASIC, AND POTASSIUM PHOSPHATE 100 ML/HR: .53; .5; .37; .037; .03; .012; .00082 INJECTION, SOLUTION INTRAVENOUS at 20:29

## 2025-02-07 RX ADMIN — ATORVASTATIN CALCIUM 40 MG: 40 TABLET, FILM COATED ORAL at 17:01

## 2025-02-07 RX ADMIN — INSULIN LISPRO 2 UNITS: 100 INJECTION, SOLUTION INTRAVENOUS; SUBCUTANEOUS at 17:00

## 2025-02-07 RX ADMIN — SODIUM CHLORIDE, SODIUM GLUCONATE, SODIUM ACETATE, POTASSIUM CHLORIDE, MAGNESIUM CHLORIDE, SODIUM PHOSPHATE, DIBASIC, AND POTASSIUM PHOSPHATE 100 ML/HR: .53; .5; .37; .037; .03; .012; .00082 INJECTION, SOLUTION INTRAVENOUS at 10:42

## 2025-02-07 RX ADMIN — SODIUM CHLORIDE, SODIUM GLUCONATE, SODIUM ACETATE, POTASSIUM CHLORIDE, MAGNESIUM CHLORIDE, SODIUM PHOSPHATE, DIBASIC, AND POTASSIUM PHOSPHATE 100 ML/HR: .53; .5; .37; .037; .03; .012; .00082 INJECTION, SOLUTION INTRAVENOUS at 01:07

## 2025-02-07 RX ADMIN — CHLORHEXIDINE GLUCONATE 0.12% ORAL RINSE 15 ML: 1.2 LIQUID ORAL at 09:09

## 2025-02-07 RX ADMIN — INSULIN LISPRO 4 UNITS: 100 INJECTION, SOLUTION INTRAVENOUS; SUBCUTANEOUS at 22:00

## 2025-02-07 NOTE — ED ATTENDING ATTESTATION
2/6/2025  I, David Johnson MD, saw and evaluated the patient. I have discussed the patient with the resident/non-physician practitioner and agree with the resident's/non-physician practitioner's findings, Plan of Care, and MDM as documented in the resident's/non-physician practitioner's note, except where noted. All available labs and Radiology studies were reviewed.  I was present for key portions of any procedure(s) performed by the resident/non-physician practitioner and I was immediately available to provide assistance.     Patient seen immediately upon arrival as prehospital stroke alert.  Patient is a 64-year-old female brought in by fire rescue for strokelike symptoms patient noted to be unable to speak, uncooperative per group home staff.  Per EMS patient is normally conversant lucid and oriented per group home staff    Review of medical records patient is a diabetic normal blood sugar per EMS and on arrival recent peripheral pacemaker placement for syncope last month by EP.  Patient is on aspirin but no DOAC's    On arrival patient is alert but unable to speak she will occasionally smile when asked questions patient will not follow commands such as closing eyes or squeezing hands.    Upon transfer to CT scan table patient sat up independently began to display some purposeful movements but appeared confused.    Patient had episode of nausea and vomiting in ED CAT scan or which resulted in delay of obtaining CTA as patient need to be pulled from scanner and suctioned 4 mg of IV Zofran given.  Patient noted to purposely guard her face when physician used to suction vomitus out of mouth.    Patient taken back to examination room and reassessed NIH stroke scale initially 15 on initial presentation which did appear to be improving over time.  Upon reassessment patient is able to speak her name but is not able to follow simple commands.  Patient appears to move all extremities simultaneously but will not  answer questions.  NIH now 10.    Impression: Stroke-like symptoms  Differential diagnosis: Ischemic stroke hemorrhagic stroke atypical presentation of seizure possible TIA possible behavioral cause of symptoms.    Case discussed with neurology service risk decision made to proceed with TNK administration.  Neurology recommends critical care admission    Case discussed with neurocritical care will admit to critical care service.       At this point I agree with the current assessment done in the Emergency Department.  I have conducted an independent evaluation of this patient a history and physical is as follows:    ED Course  ED Course as of 02/07/25 1442   u Feb 06, 2025 2125 NIHSS 19 s/p TNK  critical care admit          Critical Care Time  Procedures

## 2025-02-07 NOTE — OCCUPATIONAL THERAPY NOTE
Occupational Therapy Cancel Note        Patient Name: Ynes Mendoza  Today's Date: 2/7/2025 02/07/25 1345   OT Last Visit   OT Visit Date 02/07/25   Note Type   Note type Cancelled Session   Cancel Reasons Other-Per Neurology resident, hold evaluation for 24 hour s/p TNK. Will continue to follow and attempt evaluation 2/8  Angelica Mcmahan OTR/L

## 2025-02-07 NOTE — PROGRESS NOTES
Pastoral Care Progress Note          Chaplaincy Interventions Utilized:   Empowerment: Provided anxiety containment and Provided chaplaincy education    Exploration: Facilitated story telling       Relationship Building: Cultivated a relationship of care and support, Listened empathically, Hospitality, and Provided silent and supportive presence       02/06/25 2100   Clinical Encounter Type   Visited With Patient and family together   Crisis Visit ED  (Stroke alert)       did a follow up from an earlier Stroke alert that occurred before shift.  Wanted to see if additional support was needed.    Upon entering the room, pt had a visitor and doctor was doing exam.   provided support for visitor mainly, as she was in need.  Visitor - Liz- is pt's roommate.      If additional support is needed,  remains available.

## 2025-02-07 NOTE — ASSESSMENT & PLAN NOTE
"Assessment:  Patient is a 64-year-old female with past medical history of type 2 diabetes, PTSD, hypercholesterolemia, turn anxiety disorder, history of TIA, history of sick sinus syndrome status post pacemaker.  Patient presented to hospital as a stroke alert on 2/6/2025 for symptoms of aphasia.  Last known well 1800 hrs.  Initial NIH 19 (\"largely due to no command following mother able to observe patient perform certain activities on own volition\".)  Initial blood pressure 138/78, blood sugar 97, noncontrast CT head demonstrated no acute intracranial abnormality, and demonstrated mild microangiopathic change, CTA head and neck demonstrated no large vessel occlusion, after discussion with patient, tenecteplase was administered.  Patient was not a thrombectomy candidate due to no LVO target.  Patient on aspirin 81 daily at home, respecters acute hypertension, diabetes, hyperlipidemia, obesity, possible prior history of TIA,, patient did have similar events in the past without clear etiology.  Hemoglobin A1c 7.3, LDL 64.    Impression:  Considering patient's physical examination findings of left upper extremity elbow flexion and extension mild weakness, and left lower extremity heel-to-shin ataxia, and episode of inability to express herself without loss of consciousness, further workup for stroke utilizing stroke pathway, and seizure utilizing video EEG will be completed    Plan:  - Case discussed with attending neurologist Dr. Olmos  - Admit along the stroke pathway with telemetry monitoring  - Strict NPO prior to dysphagia screen   - Frequent neuro checks per protocol. If there is any acute changes in exam, please obtain STAT [facility] and contact neurology  - BP Goals: s/p TNK <185/105   - Antiplatelet agents: Hold in setting of TNK  - Anticoagulation: Hold due to TNK admin  - Statin: Atorvastatin 40 mg p.o. daily  - Repeat noncontrast CT head in 24 hours status post neck to place  - MRI brain without contrast  - " 2D echocardiogram  - Video EEG  - Maintain euvolemia, euglycemia, normothermia  - DVT prophylaxis per primary team appreciated  - PT/OT/ST/PMR evaluations as able  - Stroke education and counseling  - Rest per primary team appreciated

## 2025-02-07 NOTE — PHYSICAL THERAPY NOTE
PHYSICAL THERAPY CANCEL NOTE          Patient Name: Ynes Mendoza  Today's Date: 2/7/2025 02/07/25 1412   PT Last Visit   PT Visit Date 02/07/25   Note Type   Note Type Cancelled Session   Cancel Reasons Other  (messaged critical care regarding mobilizing pt s/p TNK and MD requesting eval tmrw 2/8. Will hold until then. thank you)     Chai Edwards, PT, DPT, NCS

## 2025-02-07 NOTE — PLAN OF CARE
Problem: PAIN - ADULT  Goal: Verbalizes/displays adequate comfort level or baseline comfort level  Description: Interventions:  - Encourage patient to monitor pain and request assistance  - Assess pain using appropriate pain scale  - Administer analgesics based on type and severity of pain and evaluate response  - Implement non-pharmacological measures as appropriate and evaluate response  - Consider cultural and social influences on pain and pain management  - Notify physician/advanced practitioner if interventions unsuccessful or patient reports new pain  Outcome: Progressing     Problem: INFECTION - ADULT  Goal: Absence or prevention of progression during hospitalization  Description: INTERVENTIONS:  - Assess and monitor for signs and symptoms of infection  - Monitor lab/diagnostic results  - Monitor all insertion sites, i.e. indwelling lines, tubes, and drains  - Monitor endotracheal if appropriate and nasal secretions for changes in amount and color  - [place] appropriate cooling/warming therapies per order  - Administer medications as ordered  - Instruct and encourage patient and family to use good hand hygiene technique  - Identify and instruct in appropriate isolation precautions for identified infection/condition  Outcome: Progressing     Problem: SAFETY ADULT  Goal: Patient will remain free of falls  Description: INTERVENTIONS:  - Educate patient/family on patient safety including physical limitations  - Instruct patient to call for assistance with activity   - Consult OT/PT to assist with strengthening/mobility   - Keep Call bell within reach  - Keep bed low and locked with side rails adjusted as appropriate  - Keep care items and personal belongings within reach  - Initiate and maintain comfort rounds  - Make Fall Risk Sign visible to staff  - Apply yellow socks and bracelet for high fall risk patients  - Consider moving patient to room near nurses station  Outcome: Progressing     Problem: DISCHARGE  PLANNING  Goal: Discharge to home or other facility with appropriate resources  Description: INTERVENTIONS:  - Identify barriers to discharge w/patient and caregiver  - Arrange for needed discharge resources and transportation as appropriate  - Identify discharge learning needs (meds, wound care, etc.)  - Arrange for interpretive services to assist at discharge as needed  - Refer to Case Management Department for coordinating discharge planning if the patient needs post-hospital services based on physician/advanced practitioner order or complex needs related to functional status, cognitive ability, or social support system  Outcome: Progressing     Problem: Knowledge Deficit  Goal: Patient/family/caregiver demonstrates understanding of disease process, treatment plan, medications, and discharge instructions  Description: Complete learning assessment and assess knowledge base.  Interventions:  - Provide teaching at level of understanding  - Provide teaching via preferred learning methods  Outcome: Progressing     Problem: NEUROSENSORY - ADULT  Goal: Achieves stable or improved neurological status  Description: INTERVENTIONS  - Monitor and report changes in neurological status  - Monitor vital signs such as temperature, blood pressure, glucose, and any other labs ordered   - Initiate measures to prevent increased intracranial pressure  - Monitor for seizure activity and implement precautions if appropriate      Outcome: Progressing     Problem: CARDIOVASCULAR - ADULT  Goal: Maintains optimal cardiac output and hemodynamic stability  Description: INTERVENTIONS:  - Monitor I/O, vital signs and rhythm  - Monitor for S/S and trends of decreased cardiac output  - Administer and titrate ordered vasoactive medications to optimize hemodynamic stability  - Assess quality of pulses, skin color and temperature  - Assess for signs of decreased coronary artery perfusion  - Instruct patient to report change in severity of  symptoms  Outcome: Progressing     Problem: MUSCULOSKELETAL - ADULT  Goal: Maintain or return mobility to safest level of function  Description: INTERVENTIONS:  - Assess patient's ability to carry out ADLs; assess patient's baseline for ADL function and identify physical deficits which impact ability to perform ADLs (bathing, care of mouth/teeth, toileting, grooming, dressing, etc.)  - Assess/evaluate cause of self-care deficits   - Assess range of motion  - Assess patient's mobility  - Assess patient's need for assistive devices and provide as appropriate  - Encourage maximum independence but intervene and supervise when necessary  - Involve family in performance of ADLs  - Assess for home care needs following discharge   - Consider OT consult to assist with ADL evaluation and planning for discharge  - Provide patient education as appropriate  Outcome: Progressing

## 2025-02-07 NOTE — QUICK NOTE
Patient seen and examined by the overnight resident for consultation. Preliminary recommendations are as outlined. Formal consultation to follow in the morning.    Neurology Note  Name: Ynes Mendoza 64 y.o. female I MRN: [id number]  Unit/Bed#: [facility] 716-01 I Date of Admission: 2/6/2025   Date of Service: 2/6/2025 I Hospital Day: 0         Physician Requesting Evaluation: Caty López DO   Reason for Evaluation / Principal Problem: stroke alert    Assessment & Plan  Mixed aphasia  64 y.o. female with pertinent PMH of recent pacemaker placement for sick sinus syndrome. Stroke alert activated on 2/6/25 for sxs concerning for stroke consisting of global aphasia. LKW 1800. Initial NIHSS 19 (largely due to no command following although able to observe patient perform certain activities on own volition). Initial BP Blood Pressure: 138/78, FSBG POC Glucose: 97. NC CTH completed and CTA H/N performed, results as noted below. As a result of No contraindications noted patient was determined to be a candidate for thrombolysis (TNK). Given absence of IR target pt was deemed not a candidate for mechanical thrombectomy.   - Home Antiplatelet /Anticoagulants PTA: Aspirin 81 mg QD and No anticoagulants  - Stroke risk factors: HTN, DM, HLD, and Over weight  - Prior Stroke Hx: none although possible TIA hx  - Past Neurological Hx: Yes, similar events in the past w/ unclear etiology    Workup:  - CTH: No acute intracranial abnormality. Mild microangiopathic changes.   - CTA: No large vessel occlusion in the head and neck.   - MRI: Pend  - TTE: Pend  - Labs: Hemoglobin A1c 7.3 (2/6/2025), LDL 78 (8/26/2024)    Impression: 64 y.o. female with history of strange somewhat stereotypical spells/episodes of impaired awareness and aphasia currently on Aspirin who presented with aphasia/AMS/lack of responsiveness, does not fit neatly into any neuro diagnosis for chief concerns are seizures, stroke, PNES, TME, warranting below workup.      Plan: Discussed plan with neurology attending, Dr. Claire  Admit along the stroke pathway with telemetry monitoring  Strict NPO prior to dysphagia screen   Frequent neuro checks per protocol. If there is any acute changes in exam, please obtain stat CT head and notify neurology team  BP Goals: Permissive hypertension - SBP < 220/120 for first 24 hrs.  and SBP <180 for 24 hrs due to recent administration of TNK, then gradual reduction to normotension over days.   Antiplatelet agents: Hold in setting of TNK  Anticoagulation: Hold due to TNK admin  Statin: Change home statin therapy from Rosuvastatin to Atorvastatin 40 mg QHS  Labs: Hgb A1c, LDL   Brain Imaging: Obtain MRI brain without contrast & repeat CTH in 24 hr s/p TNK  TTE, pending  Rec placing on VEEG for sz concern  Euthermic/Euglycemic  DVT PPx: Per primary team, SCDs  PT/OT/ST/PMR evaluations when able  Stroke education and counseling  Rest of other care per primary team appreciated    Disposition: PT Recommendations -      I have discussed with Dr. Claire the above plan to treat pt. He agrees with the plan.  Please contact the SecureChat role for the Neurology service with any questions/concerns.    Thrombolytic Decision: After a discussion of risks, benefits and alternatives reviewing inclusion and exclusion criteria the decision was made to proceed with thrombolytic therapy. Specifically discussed were the potential benefits, risks, and side effects of the proposed stroke intervention(s) and care; the likelihood of the patient achieving their goals; and any potential problems that might occur as a result of the intervention(s); reasonable alternatives to the proposed stroke intervention(s) and care. The discussion encompasses risks, benefits and side effects related to the alternatives and the risks related to not receiving the proposed stroke intervention(s) and care. Verbal consent was obtained from acting surrogate decision maker POA.  Consent was  "obtained by Lori, sister.    Recommendations for outpatient neurological follow up have yet to be determined.    History of Present Illness   Hx and PE limited by: global aphasia  Patient last known well: 1800  Stroke alert called: 1902  Neurology time of arrival: 1905  HPI: Ynes Mendoza is a 64 y.o. female who presents with sudden onset of complete lack of responsiveness to answering questions or following commands from roommates.  Patient last known well witnessed by roommate, states that seconds before the aphasia began patient stated that she could tell her sugars were out of whack, but no further details offered before episode began.  Patient was in this state for several minutes with multiple friends nearby checking in on her but with minimal response, did not have any loss of consciousness nor shaking nor fall to the ground nor known eye deviation, only cessation of apparent verbal comprehension and ability to speak, although patient symptom severity seems to fluctuate as multiple friends noted patient stated allowed the phrase \"I do not know I cannot remember\" before returning back to no speech.  No history obtainable from patient today, initially spoke no words during initial NIH and stroke workup during stroke alert, subsequent exam mildly improved and that patient could state name and appeared to attempt to keep conversation by stating, things such as \"yeah I know, and I can do it\" even though not appropriate during the conversation.  All the friends of the patient have willing on her approximately a year or so, report having not seen such severe over an episode, but perhaps have seen very minor versions where patient becomes notably less communicative for period of time lasting only seconds before returning completely back to baseline quickly.  Chart review as well as discussion with multiple family members on the phone reveal that patient has had somewhat similar experiences over the past few years " without known etiology, once thought to be TIA but then later suspected seizures followed by possibility of cardiac source thus recent pacemaker placed only few weeks ago.  Prior to this ER presentation, patient is received TNK and had EEG studies performed, the prior of uncertain benefit and the latter reportedly did not reveal any abnormalities although uncertain if any episode/event captured during the EEG routine study.    Review of Systems   Unable to perform ROS: Patient nonverbal     I have reviewed the patient's PMH, PSH, Social History, Family History, Meds, and Allergies  Historical Information   Past Medical History:   Diagnosis Date    Abnormal EEG 2023    Anxiety     Aphasia 2023    Condyloma acuminatum     Depression 1968    Therapy    Diabetes mellitus (HCC)     type 2    Genital warts     Herpes simplex     HPV (human papilloma virus) infection     Obesity     Visual impairment      Past Surgical History:   Procedure Laterality Date    CARDIAC ELECTROPHYSIOLOGY PROCEDURE N/A 2025    Procedure: Cardiac pacer implant DUAL CHAMBER;  Surgeon: Patric Sanchez MD;  Location: [facility];  Service: Cardiology     Social History     Tobacco Use    Smoking status: Never    Smokeless tobacco: Never   Vaping Use    Vaping status: Never Used   Substance and Sexual Activity    Alcohol use: Not Currently    Drug use: Yes     Frequency: 1.0 times per week     Types: Marijuana     Comment: Have a medical card    Sexual activity: Not Currently     Partners: Male     Birth control/protection: None     Comment: Not active     E-Cigarette/Vaping    E-Cigarette Use Never User      E-Cigarette/Vaping Substances    Nicotine No     THC No     CBD No     Flavoring No     Other No     Unknown No      Family History   Problem Relation Age of Onset    Alzheimer's disease Mother     Dementia Mother              Diabetes Father              Heart disease Father               ALS Father     Diabetes type II Father             Hypertension Sister     Diabetes Brother     Diabetes type II Brother     No Known Problems Brother     Colon cancer Maternal Grandfather              Cancer Maternal Grandmother         Lymphoma     Social History     Tobacco Use    Smoking status: Never    Smokeless tobacco: Never   Vaping Use    Vaping status: Never Used   Substance and Sexual Activity    Alcohol use: Not Currently    Drug use: Yes     Frequency: 1.0 times per week     Types: Marijuana     Comment: Have a medical card    Sexual activity: Not Currently     Partners: Male     Birth control/protection: None     Comment: Not active       Current Facility-Administered Medications:     atorvastatin (LIPITOR) tablet 40 mg, QPM    chlorhexidine (PERIDEX) 0.12 % oral rinse 15 mL, Q12H DYAN    [START ON 2025] insulin lispro (HumALOG/ADMELOG) 100 units/mL subcutaneous injection 1-6 Units, Q6H DYAN **AND** [START ON 2025] Fingerstick Glucose (POCT), Q6H  Prior to Admission Medications   Prescriptions Last Dose Informant Patient Reported? Taking?   Blood Glucose Monitoring Suppl (Contour Next Monitor) w/Device KIT  Self No No   Sig: Use to monitor blood sugars daily   Cholecalciferol (VITAMIN D PO)  Self Yes No   Sig: Take 5,000 Units by mouth in the morning   Cinnamon 500 MG capsule  Self No No   Sig: Take 1 capsule (500 mg total) by mouth in the morning   Continuous Glucose Sensor (Dexcom G7 Sensor)  Self No No   Sig: Use 1 Device every 10 days   Contour Next Test test strip  Self No No   Sig: Test twice daily   Insulin Glargine Solostar (Lantus SoloStar) 100 UNIT/ML SOPN   No No   Sig: Inject 0.14 mL (14 Units total) under the skin daily   Insulin Pen Needle (B-D ULTRAFINE III SHORT PEN) 31G X 8 MM MISC  Self No No   Sig: Inject under the skin 5 (five) times a day   Lancets (onetouch ultrasoft) lancets  Self No No   Sig: Check sugars 2-4 times a day   Misc.  Devices (Rollator Ultra-Light) MISC  Self No No   Sig: Use daily   Vitamin B Complex-C CAPS  Self Yes No   aspirin 81 mg chewable tablet  Self No No   Sig: Chew 1 tablet (81 mg total) daily   glimepiride (AMARYL) 1 mg tablet  Self No No   Sig: Take 1 tablet (1 mg total) by mouth in the morning   metFORMIN (GLUCOPHAGE-XR) 500 mg 24 hr tablet  Self No No   Sig: TAKE 2 TABLETS (1,000 MG TOTAL) BY MOUTH IN THE MORNING   rosuvastatin (CRESTOR) 5 mg tablet  Self No No   Sig: TAKE 1 TABLET (5 MG TOTAL) BY MOUTH DAILY.   triamcinolone (KENALOG) 0.1 % ointment  Self No No   Sig: APPLY 1 APPLICATION TOPICALLY 2 (TWO) TIMES A DAY TO AFFECTED AREA      Facility-Administered Medications Last Administration Doses Remaining   diphenhydrAMINE (BENADRYL) capsule 25 mg None recorded           Objective :  Temp:  [98.2 °F (36.8 °C)] 98.2 °F (36.8 °C)  HR:  [] 88  BP: (131-172)/() 172/75  Resp:  [13-18] 16  SpO2:  [97 %-100 %] 98 %  O2 Device: None (Room air)    Physical Exam  Vitals and nursing note reviewed. Exam conducted with a chaperone present.   Constitutional:       General: She is awake. She is not in acute distress.     Appearance: She is not diaphoretic.   HENT:      Head: Normocephalic and atraumatic.      Right Ear: External ear normal.      Left Ear: External ear normal.      Nose: Nose normal.      Mouth/Throat:      Pharynx: Oropharynx is clear.   Eyes:      General: Lids are normal. No scleral icterus.        Right eye: No discharge.         Left eye: No discharge.      Extraocular Movements: Extraocular movements intact.      Conjunctiva/sclera: Conjunctivae normal.      Pupils: Pupils are equal, round, and reactive to light.   Neurological:      Mental Status: She is alert.     Neurological Exam  Mental Status  Awake and alert. Patient is nonverbal. Global aphasia present. Unable to follow commands.  Subsequent exam patient could state name only to orientation questions but otherwise speak nonsensically,  no dysarthria noted when able to speak, only followed 1 or 2 simple commands with significant coaching/pantomiming.    Cranial Nerves  CN I: Sense of smell is normal.  CN II: Visual acuity is normal. Visual fields full to confrontation.  CN III, IV, VI: Extraocular movements intact bilaterally. Normal lids and orbits bilaterally. Pupils equal round and reactive to light bilaterally.  CN V: Facial sensation is normal.  CN VII: Full and symmetric facial movement.  CN VIII: Hearing is normal.  CN IX, X: Palate elevates symmetrically. Normal gag reflex.  CN XI: Shoulder shrug strength is normal.  CN XII: Tongue midline without atrophy or fasciculations.  Does not follow commands, but no obvious deficit including no apparent facial droop.    Motor  Normal muscle bulk throughout. No fasciculations present. Normal muscle tone. No abnormal involuntary movements. Strength is 5/5 in all four extremities except as noted.  Does not follow commands, but at least antigravity to all 4 extremities.    Sensory  Patient unable to participate.    Reflexes  No abnormality noted.    Coordination    Did not comprehend.    Gait    Deferred for safety.      NIHSS:  1a.Level of Consciousness: 0 = Alert   1b. LOC Questions: 2 = Answers neither correctly   1c. LOC Commands: 2 = Obeys neither correctly   2. Best Gaze: 0 = Normal   3. Visual: 0 = No visual field loss   4. Facial Palsy: 0=Normal symmetric movement   5a. Motor Right Arm: 2=Some effort against gravity, limb cannot get to or maintain (if cured) 90 (or 45) degrees, drifts down to bed, but has some effort against gravity   5b. Motor Left Arm: 2=Some effort against gravity, limb cannot get to or maintain (if cured) 90 (or 45) degrees, drifts down to bed, but has some effort against gravity   6a. Motor Right Le=Some effort against gravity, limb cannot get to or maintain (if cured) 90 (or 45) degrees, drifts down to bed, but has some effort against gravity   6b. Motor Left Leg:  2=Some effort against gravity, limb cannot get to or maintain (if cured) 90 (or 45) degrees, drifts down to bed, but has some effort against gravity   7. Limb Ataxia:  0=Absent   8. Sensory: 2=Severe to total sensory loss; patient is not aware of being touched in face, arm, leg   9. Best Language:  3=Mute, global aphasia; no usable speech or auditory comprehension   10. Dysarthria: 2=Severe; patient speech is so slurred as to be unintelligible in the absence of or our of proportion to any dysphagia, or is mute/anarthric   11. Extinction and Inattention (formerly Neglect): 0=No abnormality   Total Score: 19     Time NIHSS was completed: 1920        Lab Results: I have reviewed the following results:CBC/BMP:   .     02/06/25 2027   WBC 7.80   HGB 13.4   HCT 41.0      SODIUM 139   K 4.0      CO2 26   BUN 18   CREATININE 0.90   GLUC 71    , Creatinine Clearance: Estimated Creatinine Clearance: 77.1 mL/min (by C-G formula based on SCr of 0.9 mg/dL)., PTT/INR:  .     02/06/25 2124   PTT 18*   INR 0.94        Imaging Results Review: I personally reviewed the following image studies in PACS and associated radiology reports: CT head. My interpretation of the radiology images/reports is: as noted above.  Other Study Results Review: EKG was reviewed.     VTE Prophylaxis: VTE covered by:    None        Code Status: Level 1 - Full Code  Advance Directive and Living Will:      Power of :    POLST:      Administrative Statements   I have spent a total time of 60 minutes in caring for this patient on the day of the visit/encounter including Diagnostic results, Risks and benefits of tx options, Patient and family education, Impressions, Counseling / Coordination of care, Documenting in the medical record, Reviewing / ordering tests, medicine, procedures  , Obtaining or reviewing history  , and Communicating with other healthcare professionals .
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
## 2025-02-07 NOTE — PROGRESS NOTES
Progress Note - Critical Care/ICU   Name: Ynes Culp y.o. female I MRN: [id number]  [facility] 716-01 I Date of Admission: 2/6/2025   Date of Service: 2/7/2025 I Hospital Day: 1      Critical Care Interval Transfer Note:    Brief Hospital Summary: Ynes Culp y.o. with past medical history of diabetes, permanent pacemaker for sick sinus syndrome who presents as a stroke alert on 2/6/2025. Patient presented with global aphasia. Last known well was approximately 6 PM. Initial NIH was reported to be 19 largely due to not being able to follow commands. CT head and CTA head and neck were negative patient received TNK at 2127. Prior to the episode of global aphasia the patient was noted to have had bilateral upper extremity tremors, concern for seizure-like activity however patient was noted to be aware at the time, no loss of consciousness.     Barriers to discharge:   24-hour repeat CT head  MRI brain  Video EEG monitoring     Consults: IP CONSULT TO NEUROLOGY  IP CONSULT TO PHYSICAL MEDICINE REHAB  IP CONSULT TO CASE MANAGEMENT  IP CONSULT TO NUTRITION SERVICES    Recommended to review admission imaging for incidental findings and document in discharge navigator: Chart reviewed, no known incidental findings noted at this time.        Patient seen and evaluated by Critical Care today and deemed to be appropriate for transfer to Stepdown Level 2. Spoke to Dr. Castillo from [facility] to accept transfer. Critical care can be contacted via SecureChat with any questions or concerns. Please use the Critical Care AP Role in Secure Chat for any provider inquires until the patient is transferred out of the ICU or until tomorrow at 0600.

## 2025-02-07 NOTE — PROGRESS NOTES
Pastoral Care Progress Note          Chaplaincy Interventions Utilized:   Empowerment: Clarified, confirmed, or reviewed information from treatment team , Encouraged focus on present, Encouraged self-care, Provided chaplaincy education, and Reframed experience of patient/family    Exploration: Explored hope, Explored emotional needs & resources, Explored relational needs & resources, Explored spiritual needs & resources, and Facilitated story telling    Collaboration: Encouraged adherence to treatment plan     Relationship Building: Cultivated a relationship of care and support and Listened empathically    Ritual: Provided prayer    Patient has deep sense of peace and seeming acceptance of life altering events over the past couple years. Feels her gifts get used wherever she is and speaks articulately and calmly about accepting what comes. Has strong feeling of connection with some nontraditional [employer] leaders.        Chaplaincy Outcomes Achieved:  Expressed gratitude, Expressed peace, Expressed ultimate hope, and Identified meaningful connections          Spiritual Coping Strategies Utilized:   Connectedness, Spiritual comfort, Spiritual gratitude, and Spiritual community         02/07/25 1100   Clinical Encounter Type   Visited With Patient;Patient and family together   Routine Visit Follow-up   Referral From    [employer] Encounters   [employer] Needs Prayer   Patient Spiritual Encounters   Spiritual Encounter Notes  provided follow up visit to support patient who was admitted last night. She spoke calmly about all that is going on. Welcomed prayer. is open to further pastoral care.  remains available.
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
## 2025-02-07 NOTE — PROGRESS NOTES
Progress Note - Critical Care/ICU   Name: Ynes Mendoza 64 y.o. female I MRN: [id number]  Unit/Bed#: [facility] 716-01 I Date of Admission: 2/6/2025   Date of Service: 2/7/2025 I Hospital Day: 1       Assessment & Plan   Neuro:   Diagnosis: Stroke like symptoms  2/6 2127 TNK given  CTH no LVO  Plan:   Stroke pathway  FLP/A1C/Echo  F/u MRI  CTH 2130 post TNK  Neuro checks  SBP <180/105    Diagnosis: Encephalopathy  Plan:   Neuro checks  F/u vEEG  RUDS/UA neg    CV:   Diagnosis: HLD/PPM 1/24/2025  Plan:   Cont home statin   Interrogate ppm    Pulm:  No active issues    GI:   No active issues    :   Diagnosis: Urinary retention  Plan:   SC x 1  Ua neg    F/E/N:   No active issues    Heme/Onc:   No active issues    Endo:   Diagnosis: DM2  Plan:   Check A1C  Hold home metformin/amaryl/asa  Consider resuming lantus if tolerating oral diet or half dose if remains NPO  SSI    ID:   No active issues    MSK/Skin:   No active issues  Disposition: Critical care    ICU Core Measures     A: Assess, Prevent, and Manage Pain Has pain been assessed? Yes  Need for changes to pain regimen? No   B: Both SAT/SAT  N/A   C: Choice of Sedation RASS Goal: 0 Alert and Calm  Need for changes to sedation or analgesia regimen? No   D: Delirium CAM-ICU: Unable to perform secondary to Acute cognitive dysfunction   E: Early Mobility  Plan for early mobility? Yes   F: Family Engagement Plan for family engagement today? Yes         Prophylaxis:  VTE Contraindicated secondary to: s/p tnk   Stress Ulcer  not ordered         24 Hour Events : stroke alert tnk. Concern for seizure like activity eeg ordered. SC x 1 for Bladder scan 580cc     Subjective   Review of Systems: Review of Systems   Unable to perform ROS: Mental status change       Objective :                   Vitals I/O      Most Recent Min/Max in 24hrs   Temp 98.1 °F (36.7 °C) Temp  Min: 98.1 °F (36.7 °C)  Max: 98.2 °F (36.8 °C)   Pulse 63 Pulse  Min: 60  Max: 106   Resp 16 Resp  Min: 13  Max: 18    /58 BP  Min: 118/57  Max: 186/129   O2 Sat 97 % SpO2  Min: 90 %  Max: 100 %      Intake/Output Summary (Last 24 hours) at 2/7/2025 0522  Last data filed at 2/7/2025 0130  Gross per 24 hour   Intake --   Output 500 ml   Net -500 ml       Diet NPO    Invasive Monitoring           Physical Exam   Physical Exam  Eyes:      Pupils: Pupils are equal, round, and reactive to light.   Skin:     General: Skin is warm and dry.   HENT:      Head: Normocephalic and atraumatic.      Mouth/Throat:      Mouth: Mucous membranes are moist.   Cardiovascular:      Rate and Rhythm: Normal rate and regular rhythm.      Pulses: Normal pulses.      Heart sounds: Normal heart sounds.   Musculoskeletal:         General: No swelling. Normal range of motion.      Right lower leg: No edema.      Left lower leg: No edema.   Abdominal: General: Bowel sounds are normal. There is no distension.      Palpations: Abdomen is soft.      Tenderness: There is no abdominal tenderness.   Constitutional:       General: She is not in acute distress.  Pulmonary:      Effort: Pulmonary effort is normal. No respiratory distress.      Breath sounds: Normal breath sounds.   Neurological:      Mental Status: She is disoriented to place and disoriented to situation.      GCS: GCS eye subscore is 4. GCS verbal subscore is 5. GCS motor subscore is 6.      Comments: Susannah inconsistently follows commands and questions          Diagnostic Studies        Lab Results: I have reviewed the following results:     Medications:  Scheduled PRN   atorvastatin, 40 mg, QPM  chlorhexidine, 15 mL, Q12H DYAN  insulin lispro, 1-6 Units, Q6H DYAN          Continuous    multi-electrolyte, 100 mL/hr, Last Rate: 100 mL/hr (02/07/25 0107)         Labs:   CBC    Recent Labs     02/06/25 2027   WBC 7.80   HGB 13.4   HCT 41.0        BMP    Recent Labs     02/06/25 2027   SODIUM 139   K 4.0      CO2 26   AGAP 11   BUN 18   CREATININE 0.90   CALCIUM 10.3*        Coags    Recent Labs     02/06/25 2124   INR 0.94   PTT 18*        Additional Electrolytes  No recent results       Blood Gas    No recent results  No recent results LFTs  No recent results    Infectious  No recent results  Glucose  Recent Labs     02/06/25 2027   GLUC 71
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
## 2025-02-07 NOTE — UTILIZATION REVIEW
Initial Clinical Review    Admission: Date/Time/Statement:   Admission Orders (From admission, onward)       Ordered        02/06/25 2135  INPATIENT ADMISSION  Once                          Orders Placed This Encounter   Procedures    INPATIENT ADMISSION     Standing Status:   Standing     Number of Occurrences:   1     Level of Care:   Level 1 Stepdown [13]     Bed request comments:   Admit to P7 under SD-1     Estimated length of stay:   More than 2 Midnights     Certification:   I certify that inpatient services are medically necessary for this patient for a duration of greater than two midnights. See H&P and MD Progress Notes for additional information about the patient's course of treatment.     ED Arrival Information       Expected   -    Arrival   2/6/2025 19:05    Acuity   Emergent              Means of arrival   Ambulance    Escorted by   [facility]    Service   Critical Care/ICU    Admission type   Emergency              Arrival complaint   STROKE alert             Chief Complaint   Patient presents with    STROKE Alert     Per facility, pt is normally alert and oriented. Found to be nonverbal and not responding        Initial Presentation: 64 y.o. female presents to ed from nursing  home on 2-6-25  via ems for evaluation and treatment of stroke like symptoms. Staff found her with change in mental status from normal alert/orient.  Found to be nonverbal and not responding.  LKW 6 pm.  PMHX:   Clinical assessment significant for hypertension 169/136.  While in CAT scan her, patient sat up, began to display personal movements, and displayed confused speech with inappropriate responses to questions. NIH > 15 on initial presentation.  Calcium 10.3.   CT without acute finding.  Upon reassessment, patient will tell me her name but is still not following commands consistently. She is moving all extremities simultaneously able answer questions with confusion disoriented speech. NIH drastically  improved from presentation is now less than 10.   Initially treated with iv zofran x1, TNK.   Admit to inpatient step down for stroke rule out, possible TIA, possible psychiatric etiology.  Plan includes: V EEG, iv multi electrolyte 100/hr, neuro checks q1 hr, PT/OT/ST .        Date: 2-8-25    Day 2: inpatient step down   GCS 10> 12> 14> 15.  Continue iv multi electrolyte 100/hr.        ED Treatment-Medication Administration from 02/06/2025 1905 to 02/06/2025 2204         Date/Time Order Dose Route Action     02/06/2025 1939 ondansetron (ZOFRAN) 4 mg/2 mL injection  4 mg  Given     02/06/2025 2127 tenecteplase (TNKase) injection 24 mg 24 mg Intravenous Given       Scheduled Medications:  atorvastatin, 40 mg, Oral, QPM  chlorhexidine, 15 mL, Mouth/Throat, Q12H DYAN  insulin lispro, 1-6 Units, Subcutaneous, Q6H DYAN      Continuous IV Infusions:  multi-electrolyte, 100 mL/hr, Intravenous, Continuous      PRN Meds:     ED Triage Vitals   Temperature Pulse Respirations Blood Pressure SpO2 Pain Score   02/06/25 2127 02/06/25 1916 02/06/25 1916 02/06/25 1916 02/06/25 1916 02/06/25 2215   98.2 °F (36.8 °C) 75 18 138/78 97 % No Pain     Weight (last 2 days)       Date/Time Weight    02/07/25 0030 95.7 (211)    02/06/25 1914 95.9 (211.53)            Vital Signs (last 3 days)       Date/Time Temp Pulse Resp BP MAP (mmHg) SpO2 O2 Device Tiki Coma Scale Score Pain    02/07/25 0800 -- -- -- -- -- -- None (Room air) 15 No Pain    02/07/25 0727 98.1 °F (36.7 °C) 75 18 134/63 90 96 % None (Room air) -- --    02/07/25 0700 -- 60 16 122/59 85 -- -- 14 --    02/07/25 0630 -- 60 18 103/57 73 97 % -- 14 --    02/07/25 0600 -- 70 16 111/55 79 97 % -- 14 --    02/07/25 0530 -- 61 16 115/55 79 98 % -- 14 --    02/07/25 0500 -- 60 18 119/62 84 95 % -- 14 --    02/07/25 0430 -- 63 18 117/58 81 94 % -- 14 --    02/07/25 0400 -- 64 16 119/57 83 98 % -- 14 --    02/07/25 0330 -- 63 16 123/58 85 97 % -- 14 --    02/07/25 0300 -- 68 16 118/57 80  97 % -- 14 --    02/07/25 0230 -- 66 16 128/57 85 93 % -- 14 --    02/07/25 0200 -- 67 16 127/60 86 90 % -- 14 --    02/07/25 0130 -- 67 18 138/61 88 99 % -- 14 --    02/07/25 0100 -- 87 17 149/63 91 98 % -- 14 --    02/07/25 0030 98.1 °F (36.7 °C) 84 16 150/69 -- 99 % -- 14 --    02/07/25 0000 -- 77 16 166/74 107 96 % -- 14 --    02/06/25 2345 -- 65 18 154/70 101 97 % -- 14 --    02/06/25 2330 -- 67 18 136/65 93 97 % -- 14 --    02/06/25 2315 -- 76 16 175/76 109 99 % -- 14 --    02/06/25 2300 -- 77 18 170/75 108 97 % -- 14 --    02/06/25 2245 -- 64 16 147/66 95 94 % -- 14 --    02/06/25 2230 -- 67 18 132/63 92 99 % -- 14 --    02/06/25 2215 -- 77 16 186/129 152 99 % None (Room air) 14 No Pain    02/06/25 2157 -- 88 16 172/75 108 -- -- 12 --    02/06/25 2142 -- 62 13 143/65 94 98 % None (Room air) 12 --    02/06/25 2127 98.2 °F (36.8 °C) 63 18 134/63 -- 98 % -- -- --    02/06/25 2115 -- 60 16 134/63 -- 98 % None (Room air) 12 --    02/06/25 2030 -- 106 17 169/136 -- 99 % None (Room air) 12 --    02/06/25 2000 -- 68 17 161/97 -- 99 % None (Room air) 12 --    02/06/25 1952 -- 70 16 161/97 -- 100 % None (Room air) 12 --    02/06/25 19:37:51 -- 70 18 131/60 -- 100 % None (Room air) 12 --    02/06/25 19:22:28 -- 73 18 148/63 -- 99 % None (Room air) 10 --    02/06/25 19:16:14 -- 75 18 138/78 -- 97 % None (Room air) 10 --     NIHSS:  1a.Level of Consciousness: 0 = Alert   1b. LOC Questions: 2 = Answers neither correctly   1c. LOC Commands: 2 = Obeys neither correctly   2. Best Gaze: 0 = Normal   3. Visual: 0 = No visual field loss   4. Facial Palsy: 0=Normal symmetric movement   5a. Motor Right Arm: 2=Some effort against gravity, limb cannot get to or maintain (if cured) 90 (or 45) degrees, drifts down to bed, but has some effort against gravity   5b. Motor Left Arm: 2=Some effort against gravity, limb cannot get to or maintain (if cured) 90 (or 45) degrees, drifts down to bed, but has some effort against gravity   6a.  Motor Right Le=Some effort against gravity, limb cannot get to or maintain (if cured) 90 (or 45) degrees, drifts down to bed, but has some effort against gravity   6b. Motor Left Le=Some effort against gravity, limb cannot get to or maintain (if cured) 90 (or 45) degrees, drifts down to bed, but has some effort against gravity   7. Limb Ataxia:  0=Absent   8. Sensory: 2=Severe to total sensory loss; patient is not aware of being touched in face, arm, leg   9. Best Language:  3=Mute, global aphasia; no usable speech or auditory comprehension   10. Dysarthria: 2=Severe; patient speech is so slurred as to be unintelligible in the absence of or our of proportion to any dysphagia, or is mute/anarthric   11. Extinction and Inattention (formerly Neglect): 0=No abnormality   Total Score: 19      Time NIHSS was completed: 1920      Pertinent Labs/Diagnostic Test Results:     Radiology:  CTA stroke alert (head/neck)   Final (1941)      No large vessel occlusion in the head and neck.         CT stroke alert brain   Final (1935)      No acute intracranial abnormality. Mild microangiopathic changes.      Findings communicated to [facility] at approximately 7:23 p.m. 2025      CT head wo contrast    (Results Pending)   MRI Inpatient Order    (Results Pending)     Cardiology:    GI:  No orders to display           Results from last 7 days   Lab Units 25   WBC Thousand/uL 7.80   HEMOGLOBIN g/dL 13.4   HEMATOCRIT % 41.0   PLATELETS Thousands/uL 175         Results from last 7 days   Lab Units 25   SODIUM mmol/L 139   POTASSIUM mmol/L 4.0   CHLORIDE mmol/L 102   CO2 mmol/L 26   ANION GAP mmol/L 11   BUN mg/dL 18   CREATININE mg/dL 0.90   EGFR ml/min/1.73sq m 68   CALCIUM mg/dL 10.3*         Results from last 7 days   Lab Units 25  0539 25  2342 25  1913   POC GLUCOSE mg/dl 83 172* 97     Results from last 7 days   Lab Units 25   GLUCOSE RANDOM  mg/dL 71         Results from last 7 days   Lab Units 02/06/25 2027   HEMOGLOBIN A1C % 7.3*   EAG mg/dl 163     Results from last 7 days   Lab Units 02/06/25 2027   HS TNI 0HR ng/L 3         Results from last 7 days   Lab Units 02/06/25  2124   PROTIME seconds 12.9   INR  0.94   PTT seconds 18*       Results from last 7 days   Lab Units 02/07/25  0112   CLARITY UA  Clear   COLOR UA  Light Yellow   SPEC GRAV UA  1.047*   PH UA  8.5*   GLUCOSE UA mg/dl Negative   KETONES UA mg/dl Negative   BLOOD UA  Negative   PROTEIN UA mg/dl Trace*   NITRITE UA  Negative   BILIRUBIN UA  Negative   UROBILINOGEN UA (BE) mg/dl 2.0*   LEUKOCYTES UA  Negative   WBC UA /hpf None Seen   RBC UA /hpf 1-2   BACTERIA UA /hpf None Seen   EPITHELIAL CELLS WET PREP /hpf None Seen             Results from last 7 days   Lab Units 02/07/25  0111   AMPH/METH  Negative   BARBITURATE UR  Negative   BENZODIAZEPINE UR  Negative   COCAINE UR  Negative   METHADONE URINE  Negative   OPIATE UR  Negative   PCP UR  Negative   THC UR  Negative       Past Medical History:   Diagnosis Date    Abnormal EEG 11/05/2023    Anxiety     Aphasia 11/05/2023    Condyloma acuminatum     Depression 1968    Therapy    Diabetes mellitus (HCC) 2002    type 2    Genital warts     Herpes simplex     HPV (human papilloma virus) infection     Obesity 1990    Visual impairment 1984     Present on Admission:   Mixed aphasia      Admitting Diagnosis:     Stroke (HCC) [I63.9]  Stroke-like symptoms [R29.90]    Age/Sex: 64 y.o. female    Network Utilization Review Department  ATTENTION: Please call with any questions or concerns to [phone] and carefully listen to the prompts so that you are directed to the right person. All voicemails are confidential.   For Discharge needs, contact Care Management DC Support Team at [phone] opt. 2  Send all requests for admission clinical reviews, approved or denied determinations and any other requests to dedicated fax number below  belonging to the campus where the patient is receiving treatment. List of dedicated fax numbers for the Facilities:  FACILITY NAME UR FAX NUMBER   ADMISSION DENIALS (Administrative/Medical Necessity) [phone]   DISCHARGE SUPPORT TEAM (NETWORK) [phone]   PARENT CHILD HEALTH (Maternity/NICU/Pediatrics) [phone]   [facility] [phone]   [facility] [phone]   Count includes the [facility] [phone]   [facility] [phone]   Critical access hospital [phone]   [facility] [phone]   [facility] [phone]   [facility] [phone]   [facility] [phone]   [facility] [phone]   [facility] [phone]   [facility] [phone]

## 2025-02-08 ENCOUNTER — APPOINTMENT (INPATIENT)
Dept: NEUROLOGY | Facility: CLINIC | Age: 65
DRG: 053 | End: 2025-02-08
Payer: MEDICARE

## 2025-02-08 PROBLEM — I45.2 BIFASCICULAR BUNDLE BRANCH BLOCK: Status: ACTIVE | Noted: 2025-02-08

## 2025-02-08 PROBLEM — R29.90 STROKE-LIKE SYMPTOMS: Status: ACTIVE | Noted: 2025-02-08

## 2025-02-08 LAB
ANION GAP SERPL CALCULATED.3IONS-SCNC: 6 MMOL/L (ref 4–13)
BUN SERPL-MCNC: 15 MG/DL (ref 5–25)
CA-I BLD-SCNC: 1.13 MMOL/L (ref 1.12–1.32)
CALCIUM SERPL-MCNC: 8.9 MG/DL (ref 8.4–10.2)
CHLORIDE SERPL-SCNC: 107 MMOL/L (ref 96–108)
CO2 SERPL-SCNC: 27 MMOL/L (ref 21–32)
CREAT SERPL-MCNC: 0.85 MG/DL (ref 0.6–1.3)
ERYTHROCYTE [DISTWIDTH] IN BLOOD BY AUTOMATED COUNT: 12.6 % (ref 11.6–15.1)
GFR SERPL CREATININE-BSD FRML MDRD: 73 ML/MIN/1.73SQ M
GLUCOSE SERPL-MCNC: 141 MG/DL (ref 65–140)
GLUCOSE SERPL-MCNC: 143 MG/DL (ref 65–140)
GLUCOSE SERPL-MCNC: 230 MG/DL (ref 65–140)
GLUCOSE SERPL-MCNC: 230 MG/DL (ref 65–140)
GLUCOSE SERPL-MCNC: 231 MG/DL (ref 65–140)
HCT VFR BLD AUTO: 36.8 % (ref 34.8–46.1)
HGB BLD-MCNC: 12 G/DL (ref 11.5–15.4)
MAGNESIUM SERPL-MCNC: 2.1 MG/DL (ref 1.9–2.7)
MCH RBC QN AUTO: 28.7 PG (ref 26.8–34.3)
MCHC RBC AUTO-ENTMCNC: 32.6 G/DL (ref 31.4–37.4)
MCV RBC AUTO: 88 FL (ref 82–98)
PHOSPHATE SERPL-MCNC: 3.6 MG/DL (ref 2.3–4.1)
PLATELET # BLD AUTO: 161 THOUSANDS/UL (ref 149–390)
PMV BLD AUTO: 10.8 FL (ref 8.9–12.7)
POTASSIUM SERPL-SCNC: 4.1 MMOL/L (ref 3.5–5.3)
RBC # BLD AUTO: 4.18 MILLION/UL (ref 3.81–5.12)
SODIUM SERPL-SCNC: 140 MMOL/L (ref 135–147)
WBC # BLD AUTO: 6.03 THOUSAND/UL (ref 4.31–10.16)

## 2025-02-08 PROCEDURE — 82330 ASSAY OF CALCIUM: CPT

## 2025-02-08 PROCEDURE — 84100 ASSAY OF PHOSPHORUS: CPT

## 2025-02-08 PROCEDURE — 80048 BASIC METABOLIC PNL TOTAL CA: CPT

## 2025-02-08 PROCEDURE — 95720 EEG PHY/QHP EA INCR W/VEEG: CPT | Performed by: STUDENT IN AN ORGANIZED HEALTH CARE EDUCATION/TRAINING PROGRAM

## 2025-02-08 PROCEDURE — 99232 SBSQ HOSP IP/OBS MODERATE 35: CPT | Performed by: FAMILY MEDICINE

## 2025-02-08 PROCEDURE — 83735 ASSAY OF MAGNESIUM: CPT

## 2025-02-08 PROCEDURE — 97163 PT EVAL HIGH COMPLEX 45 MIN: CPT

## 2025-02-08 PROCEDURE — 85027 COMPLETE CBC AUTOMATED: CPT

## 2025-02-08 PROCEDURE — 97166 OT EVAL MOD COMPLEX 45 MIN: CPT

## 2025-02-08 PROCEDURE — 95715 VEEG EA 12-26HR INTMT MNTR: CPT

## 2025-02-08 PROCEDURE — 82948 REAGENT STRIP/BLOOD GLUCOSE: CPT

## 2025-02-08 RX ORDER — LORAZEPAM 0.5 MG/1
0.5 TABLET ORAL EVERY 8 HOURS PRN
Status: DISCONTINUED | OUTPATIENT
Start: 2025-02-08 | End: 2025-02-08

## 2025-02-08 RX ORDER — ASPIRIN 81 MG/1
81 TABLET ORAL DAILY
Status: DISCONTINUED | OUTPATIENT
Start: 2025-02-08 | End: 2025-02-12 | Stop reason: HOSPADM

## 2025-02-08 RX ORDER — INSULIN GLARGINE 100 [IU]/ML
7 INJECTION, SOLUTION SUBCUTANEOUS
Status: DISCONTINUED | OUTPATIENT
Start: 2025-02-08 | End: 2025-02-09

## 2025-02-08 RX ORDER — INSULIN LISPRO 100 [IU]/ML
2-12 INJECTION, SOLUTION INTRAVENOUS; SUBCUTANEOUS
Status: DISCONTINUED | OUTPATIENT
Start: 2025-02-08 | End: 2025-02-12 | Stop reason: HOSPADM

## 2025-02-08 RX ORDER — LORAZEPAM 0.5 MG/1
0.5 TABLET ORAL
Status: DISCONTINUED | OUTPATIENT
Start: 2025-02-08 | End: 2025-02-12 | Stop reason: HOSPADM

## 2025-02-08 RX ADMIN — ASPIRIN 81 MG: 81 TABLET, COATED ORAL at 08:43

## 2025-02-08 RX ADMIN — INSULIN LISPRO 2 UNITS: 100 INJECTION, SOLUTION INTRAVENOUS; SUBCUTANEOUS at 21:48

## 2025-02-08 RX ADMIN — INSULIN LISPRO 4 UNITS: 100 INJECTION, SOLUTION INTRAVENOUS; SUBCUTANEOUS at 11:35

## 2025-02-08 RX ADMIN — ATORVASTATIN CALCIUM 40 MG: 40 TABLET, FILM COATED ORAL at 17:46

## 2025-02-08 RX ADMIN — INSULIN LISPRO 4 UNITS: 100 INJECTION, SOLUTION INTRAVENOUS; SUBCUTANEOUS at 16:44

## 2025-02-08 RX ADMIN — INSULIN GLARGINE 7 UNITS: 100 INJECTION, SOLUTION SUBCUTANEOUS at 21:47

## 2025-02-08 NOTE — PROGRESS NOTES
Progress Note - Hospitalist   Name: Ynes Mendoza 64 y.o. female I MRN: [id number]  [facility] Date of Admission: 2/6/2025   Date of Service: 2/8/2025 I Hospital Day: 2    Assessment & Plan  Mixed aphasia  Plan as above  Type 2 diabetes mellitus, with long-term current use of insulin (HCC)  Lab Results   Component Value Date    HGBA1C 7.3 (H) 02/06/2025       Recent Labs     02/07/25  2149 02/08/25  0729 02/08/25  1037 02/08/25  1613   POCGLU 409* 143* 230* 230*       Blood Sugar Average: Last 72 hrs:  (P) [id number]    Stroke-like symptoms  64-year-old female with PMH of DM2, TIA, bifascicular block, status post recent PPM placement presented to ED due to global aphasia, unable to follow directions.  Stroke alert was called and patient would exhibit TNK.  Patient was observed in critical care  CT head and CTA head neck negative for IV acute stroke or vascular pathology.    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  Continue aspirin, statin  Bifascicular bundle branch block  status post Medtronic dual chamber pacemaker implantation 1/14/2025     VTE Pharmacologic Prophylaxis:   Low Risk (Score 0-2) - Encourage Ambulation.    Mobility:   Basic Mobility Inpatient Raw Score: 19  JH-HLM Goal: 6: Walk 10 steps or more  JH-HLM Achieved: 7: Walk 25 feet or more  JH-HLM Goal achieved. Continue to encourage appropriate mobility.    Patient Centered Rounds: I performed bedside rounds with nursing staff today.   Discussions with Specialists or Other Care Team Provider: neurology        Current Length of Stay: 2 day(s)  Current Patient Status: Inpatient   Certification Statement: The patient will continue to require additional inpatient hospital stay due to penidng progress  Discharge  Plan: Anticipate discharge in 48-72 hrs to discharge location to be determined pending rehab evaluations.    Code Status: Level 1 - Full Code    Subjective     No aphasia currently. No headache. Alert and oriented x 3. No weakness.     Objective :  Temp:  [97.5 °F (36.4 °C)-98.7 °F (37.1 °C)] 97.5 °F (36.4 °C)  HR:  [62-76] 62  BP: (100-131)/(50-71) 106/51  Resp:  [16-18] 18  SpO2:  [96 %-100 %] 100 %  O2 Device: None (Room air)    Body mass index is 33.04 kg/m².     Input and Output Summary (last 24 hours):     Intake/Output Summary (Last 24 hours) at 2/8/2025 1636  Last data filed at 2/8/2025 1154  Gross per 24 hour   Intake 1928.33 ml   Output --   Net 1928.33 ml       Physical Exam  Constitutional:       Appearance: She is well-developed.   HENT:      Head: Normocephalic and atraumatic.   Pulmonary:      Effort: Pulmonary effort is normal. No respiratory distress.      Breath sounds: Normal breath sounds.   Musculoskeletal:      Cervical back: Normal range of motion.   Skin:     General: Skin is warm and dry.           Lines/Drains:              Lab Results: I have reviewed the following results:   Results from last 7 days   Lab Units 02/08/25  0506   WBC Thousand/uL 6.03   HEMOGLOBIN g/dL 12.0   HEMATOCRIT % 36.8   PLATELETS Thousands/uL 161     Results from last 7 days   Lab Units 02/08/25  0506   SODIUM mmol/L 140   POTASSIUM mmol/L 4.1   CHLORIDE mmol/L 107   CO2 mmol/L 27   BUN mg/dL 15   CREATININE mg/dL 0.85   ANION GAP mmol/L 6   CALCIUM mg/dL 8.9   GLUCOSE RANDOM mg/dL 141*     Results from last 7 days   Lab Units 02/06/25  2124   INR  0.94     Results from last 7 days   Lab Units 02/08/25  1613 02/08/25  1037 02/08/25  0729 02/07/25  2149 02/07/25  1619 02/07/25  1220 02/07/25  0539 02/06/25  2342 02/06/25  1913   POC GLUCOSE mg/dl 230* 230* 143* 409* 215* 102 83 172* 97     Results from last 7 days   Lab Units 02/06/25 2027   HEMOGLOBIN A1C % 7.3*           Recent Cultures (last 7 days):             Last 24 Hours Medication List:     Current Facility-Administered Medications:     aspirin (ECOTRIN LOW STRENGTH) EC tablet 81 mg, Daily    atorvastatin (LIPITOR) tablet 40 mg, QPM    insulin glargine (LANTUS) subcutaneous injection 7 Units 0.07 mL, HS    insulin lispro (HumALOG/ADMELOG) 100 units/mL subcutaneous injection 1-5 Units, HS    insulin lispro (HumALOG/ADMELOG) 100 units/mL subcutaneous injection 2-12 Units, TID AC **AND** Fingerstick Glucose (POCT), TID AC    LORazepam (ATIVAN) tablet 0.5 mg, 30 min pre-procedure    Administrative Statements   Today, Patient Was Seen By: Karolina Jarquin MD      **Please Note: This note may have been constructed using a voice recognition system.**

## 2025-02-08 NOTE — ASSESSMENT & PLAN NOTE
64-year-old female with PMH of DM2, TIA, bifascicular block, status post recent PPM placement presented to ED due to global aphasia, unable to follow directions.  Stroke alert was called and patient would exhibit TNK.  Patient was observed in critical care  CT head and CTA head neck negative for IV acute stroke or vascular pathology.    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  Continue aspirin, statin

## 2025-02-08 NOTE — PLAN OF CARE
Problem: OCCUPATIONAL THERAPY ADULT  Goal: Performs self-care activities at highest level of function for planned discharge setting.  See evaluation for individualized goals.  Description: Treatment Interventions: ADL retraining, Functional transfer training, UE strengthening/ROM, Endurance training, Cognitive reorientation, Patient/family training, Equipment evaluation/education, Compensatory technique education, Continued evaluation, Energy conservation, Activityengagement          See flowsheet documentation for full assessment, interventions and recommendations.   Note: Limitation: Decreased endurance, Decreased high-level ADLs, Decreased self-care trans, Decreased ADL status  Prognosis: Fair  Assessment: Pt is a 65 y/o female seen for OT eval s/p adm to [facility] as a stroke alert w/ global aphasia. Pt is s/p TNK. Pt with active OT orders and activity as tolerated orders. Pt lives with roommates in a house, pt has own room, 0 NILAM, shared bathroom. Pt was I w/  ADLS and IADLS, does not drive ( uses [employer] van), & required use of DME PTA including rollator, RW and SPC. Pt is currently demonstrating the following occupational deficits: S UB ADLS, Min A LB ADLS, S bed mobility, transfers and Min A functional mobility w/ RW. These deficits that are impacting pt's baseline areas of occupation are a result of the following impairments: pain, endurance, activity tolerance, functional mobility, forward functional reach, balance, trunk control, functional standing tolerance, and decreased I w/ ADLS/IADLS.The following Occupational Performance Areas to address include: bathing/shower, toilet hygiene, dressing, medication management, socialization, health maintenance, functional mobility, community mobility, clothing management, cleaning, meal prep, money management, household maintenance, and social participation. Recommend minimum resource intensity, upon D/C. Pt to continue to benefit from acute immediate OT services to  address the following goals 2-3x/week to  w/in 10-14 days:     Rehab Resource Intensity Level, OT: III (Minimum Resource Intensity)        Mildred Morrissey MS, OTR/L

## 2025-02-08 NOTE — PHYSICAL THERAPY NOTE
Pt is 64 y.o. female seen for PT evaluation s/p admit to [facility] on 2/6/2025  w/ Mixed aphasia concerning for stroke versus seizure event.  CTB (-); MRI (P). Pt on v EEG and PT consulted for mobility and d/c planning recommendations. Pt  has a past medical history of Abnormal EEG (11/05/2023), Anxiety, Aphasia (11/05/2023), Condyloma acuminatum, Depression (1968), Diabetes mellitus (HCC) (2002), Genital warts, Herpes simplex, HPV (human papilloma virus) infection, Obesity (1990), and Visual impairment (1984).  Due to acute medical issues, ongoing medical workup for primary dx; pain, fall risk, increased reliance on more restrictive AD compared to baseline;  decreased activity tolerance compared to baseline, increased assistance needed from caregiver at current time, continuous monitoring, trending labs;  multiple lines, decline in overall functional mobility status; health management issues; note unstable clinical picture (high complexity).  At baseline, pt reports living in an apt on [place] w/ 1 lionel; shared bathroom w/ tub shower. Pt has SPC and rollator. Goes to OPPT/ uses [employer] . Currently,  pt  is requiring S A for bed skills; S for functional transfers and S/CGA for ambulation in room 15'x3 (limited by vEEG).   Pt presents functioning below baseline and currently w/ overall mobility deficits 2* to: limited flexibility;  generalized weakness/ deconditioning; decreased endurance; decreased activity tolerance;  impaired balance; fatigue; multiple lines;  (Please find additional objective findings from PT assessment regarding body systems outlined above.) Pt will continue to benefit from skilled PT interventions to address stated impairments; to maximize functional potential; for ongoing pt/ family training; and DME needs.  PT is recommending PT Resource Intensity Level  3  on d/c.      PT will follow for STG as outlined on eval. Pt/ family agreeable to PT POC and goals as stated.     In 14 days pt will:   Complete bed mobility skills with MI to facilitate safe return to previous living environment and decrease burden on caregivers.  Perform all functional transfers with MI  to facilitate safe return to previous living environment.    Ambulate  with least restrictive AD MI > indep' without LOB and stable vitals for safe ambulation in home/ community environment.   Complete stair training up/ down 1 step MI for safe access to previous living environment and to increase community access.    Improve balance by 1 grade in order to decrease fall risk.    Improve LE strength grades by 1 to increase independence w/ all functional mobility, transfers and gait.  Actively participate w/ PT for ongoing pt and family education; DME needs and D/C planning to promote highest level of function in least restrictive environment.

## 2025-02-08 NOTE — PHYSICAL THERAPY NOTE
PHYSICAL THERAPY EVALUATION  NAME:  Ynes Mendoza  DATE: 02/08/25    AGE:   64 y.o.  Mrn:   [id number]  ADMIT DX:  Stroke (Formerly [facility]) [I63.9]  Stroke-like symptoms [R29.90]    Past Medical History:   Diagnosis Date    Abnormal EEG 11/05/2023    Anxiety     Aphasia 11/05/2023    Condyloma acuminatum     Depression 1968    Therapy    Diabetes mellitus (HCC) 2002    type 2    Genital warts     Herpes simplex     HPV (human papilloma virus) infection     Obesity 1990    Visual impairment 1984     Past Surgical History:   Procedure Laterality Date    CARDIAC ELECTROPHYSIOLOGY PROCEDURE N/A 1/24/2025    Procedure: Cardiac pacer implant DUAL CHAMBER;  Surgeon: Patric Sanchez MD;  Location: [facility];  Service: Cardiology       Length Of Stay: 2  PHYSICAL THERAPY EVALUATION :    02/08/25 1000   PT Last Visit   PT Visit Date 02/08/25   Note Type   Note type Evaluation   Pain Assessment   Pain Assessment Tool 0-10   Pain Score No Pain   Restrictions/Precautions   Weight Bearing Precautions Per Order No   Other Precautions Chair Alarm;Bed Alarm;Multiple lines;Telemetry;Fall Risk  (v EEG)   Home Living   Type of Home Apartment   Home Layout One level;Stairs to enter with rails  (1STE)   Bathroom Shower/Tub Tub/shower unit  (shared bathroom)   Bathroom Toilet Standard   Bathroom Equipment Grab bars in shower;Shower chair   Bathroom Accessibility Accessible   Home Equipment Walker;Cane  (rollator)   Additional Comments At baseline, pt reports living in an apt on [place] w/ 1 lionel; shared bathroom w/ tub shower. Pt has SPC and rollator. Goes to OPPT/ uses [facility] .   Prior Function   Level of [place] Independent with ADLs;Independent with functional mobility;Independent with IADLS   Lives With Other (Comment)  (roomates (each have own room + shared bathroom area))   Receives Help From Friend(s)   IADLs Independent with meal prep;Independent with medication management;Family/Friend/Other provides transportation  ([employer] for  transport)   Falls in the last 6 months 0   General   Family/Caregiver Present No   Cognition   Overall Cognitive Status WFL   Arousal/Participation Alert   Orientation Level Oriented X4   Memory Within functional limits   Following Commands Follows all commands and directions without difficulty   Comments good safety; cooperative and pleasant   Subjective   Subjective pt agreeable to PT session   RUE Assessment   RUE Assessment WFL   LUE Assessment   LUE Assessment WFL   RLE Assessment   RLE Assessment WFL  (5/5)   LLE Assessment   LLE Assessment WFL  (5/5)   Coordination   Movements are Fluid and Coordinated 1   Sensation WFL   Finger to Nose & Finger to Finger  Intact   Light Touch   RLE Light Touch Grossly intact   LLE Light Touch Grossly intact   Sharp/Dull   RLE Sharp/Dull Grossly intact   LLE Sharp/Dull Grossly intact   Proprioception   RLE Proprioception Grossly intact   LLE Proprioception Grossly Intact   Bed Mobility   Supine to Sit 5  Supervision   Additional items Increased time required   Additional Comments OOB in reclienr post session w/ all needs inr each adn alarm activated   Transfers   Sit to Stand 5  Supervision   Stand to Sit 5  Supervision   Additional Comments close S + assist for all lines   Ambulation/Elevation   Gait pattern Decreased foot clearance   Gait Assistance 4  Minimal assist  (close S> CGA)   Assistive Device None   Distance 15'x3   Stair Management Assistance Not tested   Balance   Static Sitting Good   Dynamic Sitting Fair +   Static Standing Fair   Dynamic Standing Fair   Ambulatory Fair   Endurance Deficit   Endurance Deficit Yes   Activity Tolerance   Activity Tolerance Patient tolerated treatment well   Medical Staff Made Aware OT and RN  (Pt was seen in conjunction w/ OT 2* unstable presentation; medical complexity; new precautions/ limitations + limited activity tolerance and regression from baseline functional mobility.)   Nurse Made Aware yes   Assessment   Prognosis  Good   Problem List Decreased endurance;Impaired balance;Decreased mobility   Assessment Pt is 64 y.o. female seen for PT evaluation s/p admit to [facility] on 2/6/2025  w/ Mixed aphasia concerning for stroke versus seizure event.  CTB (-); MRI (P). Pt on v EEG and PT consulted for mobility and d/c planning recommendations.     Pt  has a past medical history of Abnormal EEG (11/05/2023), Anxiety, Aphasia (11/05/2023), Condyloma acuminatum, Depression (1968), Diabetes mellitus (HCC) (2002), Genital warts, Herpes simplex, HPV (human papilloma virus) infection, Obesity (1990), and Visual impairment (1984).  Due to acute medical issues, ongoing medical workup for primary dx; pain, fall risk, increased reliance on more restrictive AD compared to baseline;  decreased activity tolerance compared to baseline, increased assistance needed from caregiver at current time, continuous monitoring, trending labs;  multiple lines, decline in overall functional mobility status; health management issues; note unstable clinical picture (high complexity).      At baseline, pt reports living in an apt on [place] w/ 1 lionel; shared bathroom w/ tub shower. Pt has SPC and rollator. Goes to OPPT/ uses [facility] . Currently,  pt  is requiring S A for bed skills; S for functional transfers and S/CGA for ambulation in room 15'x3 (limited by vEEG).       Pt presents currently w/ overall mobility deficits 2* to: limited flexibility;  generalized weakness/ deconditioning; decreased endurance; decreased activity tolerance;  impaired balance; fatigue; multiple lines;  (Please find additional objective findings from PT assessment regarding body systems outlined above.) Pt will continue to benefit from skilled PT interventions to address stated impairments; to maximize functional potential; for ongoing pt/ family training; and DME needs.  PT is recommending PT Resource Intensity Level  3  on d/c.      PT will follow for STG as outlined on eval. Pt/  family agreeable to PT POC and goals as stated.   Goals   Patient Goals to get better and go home   STG Expiration Date 02/22/25   Short Term Goal #1 In 14 days pt will:  Complete bed mobility skills with MI to facilitate safe return to previous living environment and decrease burden on caregivers.  Perform all functional transfers with MI  to facilitate safe return to previous living environment.    Ambulate  with least restrictive AD MI > indep' without LOB and stable vitals for safe ambulation in home/ community environment.   Complete stair training up/ down 1 step MI for safe access to previous living environment and to increase community access.    Improve balance by 1 grade in order to decrease fall risk.    Improve LE strength grades by 1 to increase independence w/ all functional mobility, transfers and gait.  Actively participate w/ PT for ongoing pt and family education; DME needs and D/C planning to promote highest level of function in least restrictive environment.   PT Treatment Day 0   Plan   Treatment/Interventions ADL retraining;Functional transfer training;LE strengthening/ROM;Elevations;Therapeutic exercise;Endurance training;Patient/family training;Equipment eval/education;Bed mobility;Gait training;Compensatory technique education;Spoke to nursing;Spoke to case management;OT   PT Frequency 3-5x/wk   Discharge Recommendation   Rehab Resource Intensity Level, PT III (Minimum Resource Intensity)   Equipment Recommended   (has SPC + rollator for d/c)   AM-PAC Basic Mobility Inpatient   Turning in Flat Bed Without Bedrails 4   Lying on Back to Sitting on Edge of Flat Bed Without Bedrails 4   Moving Bed to Chair 3   Standing Up From Chair Using Arms 3   Walk in Room 3   Climb 3-5 Stairs With Railing 2   Basic Mobility Inpatient Raw Score 19   Basic Mobility Standardized Score 42.48   [facility] Highest Level Of Mobility   -HLM Goal 6: Walk 10 steps or more   -HLM Achieved 7: Walk 25 feet or more    End of Consult   Patient Position at End of Consult Bedside chair;Bed/Chair alarm activated;All needs within reach     The patient's AM-PAC Basic Mobility Inpatient Short Form Raw Score is 19. A Raw score of greater than 16 suggests the patient may benefit from discharge to home. Please also refer to the recommendation of the Physical Therapist for safe discharge planning.

## 2025-02-08 NOTE — PLAN OF CARE
Problem: PHYSICAL THERAPY ADULT  Goal: Performs mobility at highest level of function for planned discharge setting.  See evaluation for individualized goals.  Description: Treatment/Interventions: ADL retraining, Functional transfer training, LE strengthening/ROM, Elevations, Therapeutic exercise, Endurance training, Patient/family training, Equipment eval/education, Bed mobility, Gait training, Compensatory technique education, Spoke to nursing, Spoke to case management, OT  Equipment Recommended:  (has SPC + rollator for d/c)       See flowsheet documentation for full assessment, interventions and recommendations.  Note: Prognosis: Good  Problem List: Decreased endurance, Impaired balance, Decreased mobility  Assessment: Pt is 64 y.o. female seen for PT evaluation s/p admit to [facility] on 2/6/2025  w/ Mixed aphasia concerning for stroke versus seizure event.  CTB (-); MRI (P). Pt on v EEG and PT consulted for mobility and d/c planning recommendations. Pt  has a past medical history of Abnormal EEG (11/05/2023), Anxiety, Aphasia (11/05/2023), Condyloma acuminatum, Depression (1968), Diabetes mellitus (HCC) (2002), Genital warts, Herpes simplex, HPV (human papilloma virus) infection, Obesity (1990), and Visual impairment (1984).  Due to acute medical issues, ongoing medical workup for primary dx; pain, fall risk, increased reliance on more restrictive AD compared to baseline;  decreased activity tolerance compared to baseline, increased assistance needed from caregiver at current time, continuous monitoring, trending labs;  multiple lines, decline in overall functional mobility status; health management issues; note unstable clinical picture (high complexity).  At baseline, pt reports living in an apt on [place] w/ 1 lionel; shared bathroom w/ tub shower. Pt has SPC and rollator. Goes to OPPT/ uses [facility] . Currently,  pt  is requiring S A for bed skills; S for functional transfers and S/CGA for ambulation in  room 15'x3 (limited by vEEG).   Pt presents functioning below baseline and currently w/ overall mobility deficits 2* to: limited flexibility;  generalized weakness/ deconditioning; decreased endurance; decreased activity tolerance;  impaired balance; fatigue; multiple lines;  (Please find additional objective findings from PT assessment regarding body systems outlined above.) Pt will continue to benefit from skilled PT interventions to address stated impairments; to maximize functional potential; for ongoing pt/ family training; and DME needs.  PT is recommending PT Resource Intensity Level  3  on d/c.      PT will follow for STG as outlined on eval. Pt/ family agreeable to PT POC and goals as stated.        Rehab Resource Intensity Level, PT: III (Minimum Resource Intensity)    See flowsheet documentation for full assessment.

## 2025-02-08 NOTE — PLAN OF CARE
Problem: PAIN - ADULT  Goal: Verbalizes/displays adequate comfort level or baseline comfort level  Description: Interventions:  - Encourage patient to monitor pain and request assistance  - Assess pain using appropriate pain scale  - Administer analgesics based on type and severity of pain and evaluate response  - Implement non-pharmacological measures as appropriate and evaluate response  - Consider cultural and social influences on pain and pain management  - Notify physician/advanced practitioner if interventions unsuccessful or patient reports new pain  2/7/2025 2214 by Valery Mercedes RN  Outcome: Progressing  2/7/2025 2210 by Valery Mercedes RN  Outcome: Progressing     Problem: INFECTION - ADULT  Goal: Absence or prevention of progression during hospitalization  Description: INTERVENTIONS:  - Assess and monitor for signs and symptoms of infection  - Monitor lab/diagnostic results  - Monitor all insertion sites, i.e. indwelling lines, tubes, and drains  - Monitor endotracheal if appropriate and nasal secretions for changes in amount and color  - [place] appropriate cooling/warming therapies per order  - Administer medications as ordered  - Instruct and encourage patient and family to use good hand hygiene technique  - Identify and instruct in appropriate isolation precautions for identified infection/condition  2/7/2025 2214 by Valery Mercedes RN  Outcome: Progressing  2/7/2025 2210 by Valery Mercedes RN  Outcome: Progressing  Goal: Absence of fever/infection during neutropenic period  Description: INTERVENTIONS:  - Monitor WBC    2/7/2025 2214 by Valery Mercedes RN  Outcome: Progressing  2/7/2025 2210 by Valery Mercedes RN  Outcome: Progressing     Problem: SAFETY ADULT  Goal: Patient will remain free of falls  Description: INTERVENTIONS:  - Educate patient/family on patient safety including physical limitations  - Instruct patient to call for assistance with activity   - Consult OT/PT to assist  with strengthening/mobility   - Keep Call bell within reach  - Keep bed low and locked with side rails adjusted as appropriate  - Keep care items and personal belongings within reach  - Initiate and maintain comfort rounds  - Make Fall Risk Sign visible to staff  - Offer Toileting every 2 Hours, in advance of need  - Initiate/Maintain bed alarm  - Apply yellow socks and bracelet for high fall risk patients  - Consider moving patient to room near nurses station  2/7/2025 2214 by Valery Mercedes RN  Outcome: Progressing  2/7/2025 2210 by Valery Mercedes RN  Outcome: Progressing  Goal: Maintain or return to baseline ADL function  Description: INTERVENTIONS:  -  Assess patient's ability to carry out ADLs; assess patient's baseline for ADL function and identify physical deficits which impact ability to perform ADLs (bathing, care of mouth/teeth, toileting, grooming, dressing, etc.)  - Assess/evaluate cause of self-care deficits   - Assess range of motion  - Assess patient's mobility; develop plan if impaired  - Assess patient's need for assistive devices and provide as appropriate  - Encourage maximum independence but intervene and supervise when necessary  - Involve family in performance of ADLs  - Assess for home care needs following discharge   - Consider OT consult to assist with ADL evaluation and planning for discharge  - Provide patient education as appropriate  2/7/2025 2214 by Valery Mercedes RN  Outcome: Progressing  2/7/2025 2210 by Valery Mercedes RN  Outcome: Progressing  Goal: Maintains/Returns to pre admission functional level  Description: INTERVENTIONS:  - Perform AM-PAC 6 Click Basic Mobility/ Daily Activity assessment daily.  - Set and communicate daily mobility goal to care team and patient/family/caregiver.   - Collaborate with rehabilitation services on mobility goals if consulted  - Perform Range of Motion 3 times a day.  - Reposition patient every 2 hours.  - Dangle patient 3 times a day  -  Stand patient 3 times a day  - Ambulate patient 3 times a day  - Out of bed to chair 3 times a day   - Out of bed for meals 3 times a day  - Out of bed for toileting  - Record patient progress and toleration of activity level   2/7/2025 2214 by Valery Mercedes RN  Outcome: Progressing  2/7/2025 2210 by Valery Mercedes RN  Outcome: Progressing     Problem: DISCHARGE PLANNING  Goal: Discharge to home or other facility with appropriate resources  Description: INTERVENTIONS:  - Identify barriers to discharge w/patient and caregiver  - Arrange for needed discharge resources and transportation as appropriate  - Identify discharge learning needs (meds, wound care, etc.)  - Arrange for interpretive services to assist at discharge as needed  - Refer to Case Management Department for coordinating discharge planning if the patient needs post-hospital services based on physician/advanced practitioner order or complex needs related to functional status, cognitive ability, or social support system  2/7/2025 2214 by Valery Mercedes RN  Outcome: Progressing  2/7/2025 2210 by Valery Mercedes RN  Outcome: Progressing     Problem: Knowledge Deficit  Goal: Patient/family/caregiver demonstrates understanding of disease process, treatment plan, medications, and discharge instructions  Description: Complete learning assessment and assess knowledge base.  Interventions:  - Provide teaching at level of understanding  - Provide teaching via preferred learning methods  2/7/2025 2214 by Valery Mercedes RN  Outcome: Progressing  2/7/2025 2210 by Valery Mercedes RN  Outcome: Progressing     Problem: NEUROSENSORY - ADULT  Goal: Achieves stable or improved neurological status  Description: INTERVENTIONS  - Monitor and report changes in neurological status  - Monitor vital signs such as temperature, blood pressure, glucose, and any other labs ordered   - Initiate measures to prevent increased intracranial pressure  - Monitor for seizure  activity and implement precautions if appropriate      2/7/2025 2214 by Valery Mercedes RN  Outcome: Progressing  2/7/2025 2210 by Valery Mercedes RN  Outcome: Progressing  Goal: Remains free of injury related to seizures activity  Description: INTERVENTIONS  - Maintain airway, patient safety  and administer oxygen as ordered  - Monitor patient for seizure activity, document and report duration and description of seizure to physician/advanced practitioner  - If seizure occurs,  ensure patient safety during seizure  - Reorient patient post seizure  - Seizure pads on all 4 side rails  - Instruct patient/family to notify RN of any seizure activity including if an aura is experienced  - Instruct patient/family to call for assistance with activity based on nursing assessment  - Administer anti-seizure medications if ordered    2/7/2025 2214 by Valery Mercedes RN  Outcome: Progressing  2/7/2025 2210 by Valery Mercedes RN  Outcome: Progressing  Goal: Achieves maximal functionality and self care  Description: INTERVENTIONS  - Monitor swallowing and airway patency with patient fatigue and changes in neurological status  - Encourage and assist patient to increase activity and self care.   - Encourage visually impaired, hearing impaired and aphasic patients to use assistive/communication devices  2/7/2025 2214 by Valery Mercedes RN  Outcome: Progressing  2/7/2025 2210 by Valery Mercedes RN  Outcome: Progressing     Problem: CARDIOVASCULAR - ADULT  Goal: Maintains optimal cardiac output and hemodynamic stability  Description: INTERVENTIONS:  - Monitor I/O, vital signs and rhythm  - Monitor for S/S and trends of decreased cardiac output  - Administer and titrate ordered vasoactive medications to optimize hemodynamic stability  - Assess quality of pulses, skin color and temperature  - Assess for signs of decreased coronary artery perfusion  - Instruct patient to report change in severity of symptoms  2/7/2025 2214 by  Valery Mercedes RN  Outcome: Progressing  2/7/2025 2210 by Valery Mercedes RN  Outcome: Progressing  Goal: Absence of cardiac dysrhythmias or at baseline rhythm  Description: INTERVENTIONS:  - Continuous cardiac monitoring, vital signs, obtain 12 lead EKG if ordered  - Administer antiarrhythmic and heart rate control medications as ordered  - Monitor electrolytes and administer replacement therapy as ordered  2/7/2025 2214 by Valery Mercedes RN  Outcome: Progressing  2/7/2025 2210 by Valery Mercedes RN  Outcome: Progressing     Problem: GENITOURINARY - ADULT  Goal: Maintains or returns to baseline urinary function  Description: INTERVENTIONS:  - Assess urinary function  - Encourage oral fluids to ensure adequate hydration if ordered  - Administer IV fluids as ordered to ensure adequate hydration  - Administer ordered medications as needed  - Offer frequent toileting  - Follow urinary retention protocol if ordered  2/7/2025 2214 by Valery Mercedes RN  Outcome: Progressing  2/7/2025 2210 by Valery Mercedes RN  Outcome: Progressing  Goal: Absence of urinary retention  Description: INTERVENTIONS:  - Assess patient’s ability to void and empty bladder  - Monitor I/O  - Bladder scan as needed  - Discuss with physician/AP medications to alleviate retention as needed  - Discuss catheterization for long term situations as appropriate  2/7/2025 2214 by Valery Mercedes RN  Outcome: Progressing  2/7/2025 2210 by Valery Mercedes RN  Outcome: Progressing  Goal: Urinary catheter remains patent  Description: INTERVENTIONS:  - Assess patency of urinary catheter  - If patient has a chronic ashby, consider changing catheter if non-functioning  - Follow guidelines for intermittent irrigation of non-functioning urinary catheter  2/7/2025 2214 by Valery Mercedes RN  Outcome: Progressing  2/7/2025 2210 by Valery Mercedes RN  Outcome: Progressing     Problem: METABOLIC, FLUID AND ELECTROLYTES - ADULT  Goal: Electrolytes maintained  within normal limits  Description: INTERVENTIONS:  - Monitor labs and assess patient for signs and symptoms of electrolyte imbalances  - Administer electrolyte replacement as ordered  - Monitor response to electrolyte replacements, including repeat lab results as appropriate  - Instruct patient on fluid and nutrition as appropriate  2/7/2025 2214 by Valery Mercedes RN  Outcome: Progressing  2/7/2025 2210 by Valery Mercedes RN  Outcome: Progressing  Goal: Fluid balance maintained  Description: INTERVENTIONS:  - Monitor labs   - Monitor I/O and WT  - Instruct patient on fluid and nutrition as appropriate  - Assess for signs & symptoms of volume excess or deficit  2/7/2025 2214 by Valery Mercedes RN  Outcome: Progressing  2/7/2025 2210 by Valery Mercedes RN  Outcome: Progressing  Goal: Glucose maintained within target range  Description: INTERVENTIONS:  - Monitor Blood Glucose as ordered  - Assess for signs and symptoms of hyperglycemia and hypoglycemia  - Administer ordered medications to maintain glucose within target range  - Assess nutritional intake and initiate nutrition service referral as needed  2/7/2025 2214 by Valery Mercedes RN  Outcome: Progressing  2/7/2025 2210 by Valery Mercedes RN  Outcome: Progressing     Problem: SKIN/TISSUE INTEGRITY - ADULT  Goal: Skin Integrity remains intact(Skin Breakdown Prevention)  Description: Assess:  -Perform Maikol assessment every shift  -Clean and moisturize skin every shift  -Inspect skin when repositioning, toileting, and assisting with ADLS  -Assess extremities for adequate circulation and sensation     Bed Management:  -Have minimal linens on bed & keep smooth, unwrinkled  -Change linens as needed when moist or perspiring  -Avoid sitting or lying in one position for more than 2 hours while in bed  -Keep HOB at 30 degrees     Activity:  -Mobilize patient 3 times a day  -Encourage activity and walks on unit  -Encourage or provide ROM exercises   -Turn and  reposition patient every 2 Hours  -Use appropriate equipment to lift or move patient in bed  -Consider limitation of chair time 2 hour intervals    Skin Care:  -Avoid use of baby powder, tape, friction and shearing, hot water or constrictive clothing  -Do not massage red bony areas    2/7/2025 2214 by Valery Mercedes RN  Outcome: Progressing  2/7/2025 2210 by Valery Mercedes RN  Outcome: Progressing  Goal: Incision(s), wounds(s) or drain site(s) healing without S/S of infection  Description: INTERVENTIONS  - Assess and document dressing, incision, wound bed, drain sites and surrounding tissue  - Provide patient and family education  - Perform skin care/dressing changes every shift  2/7/2025 2214 by Valery Mercedes RN  Outcome: Progressing  2/7/2025 2210 by Valery Mercedes RN  Outcome: Progressing  Goal: Pressure injury heals and does not worsen  Description: Interventions:  - Implement low air loss mattress or specialty surface (Criteria met)  - Apply silicone foam dressing  - Limit chair time to 2 hour intervals  - Apply fecal or urinary incontinence containment device   - Turn and reposition patient & offload bony prominences every 2 hours   - Utilize friction reducing device or surface for transfers   - Consider nutrition services referral as needed  2/7/2025 2214 by Valery Mercedes RN  Outcome: Progressing  2/7/2025 2210 by Valery Mercedes RN  Outcome: Progressing     Problem: MUSCULOSKELETAL - ADULT  Goal: Maintain or return mobility to safest level of function  Description: INTERVENTIONS:  - Assess patient's ability to carry out ADLs; assess patient's baseline for ADL function and identify physical deficits which impact ability to perform ADLs (bathing, care of mouth/teeth, toileting, grooming, dressing, etc.)  - Assess/evaluate cause of self-care deficits   - Assess range of motion  - Assess patient's mobility  - Assess patient's need for assistive devices and provide as appropriate  - Encourage maximum  independence but intervene and supervise when necessary  - Involve family in performance of ADLs  - Assess for home care needs following discharge   - Consider OT consult to assist with ADL evaluation and planning for discharge  - Provide patient education as appropriate  2/7/2025 2214 by Valery Mercedes RN  Outcome: Progressing  2/7/2025 2210 by Valery Mercedes RN  Outcome: Progressing  Goal: Maintain proper alignment of affected body part  Description: INTERVENTIONS:  - Support, maintain and protect limb and body alignment  - Provide patient/ family with appropriate education  2/7/2025 2214 by Valery Mercedes RN  Outcome: Progressing  2/7/2025 2210 by Valery Mercedes RN  Outcome: Progressing     Problem: Prexisting or High Potential for Compromised Skin Integrity  Goal: Skin integrity is maintained or improved  Description: INTERVENTIONS:  - Identify patients at risk for skin breakdown  - Assess and monitor skin integrity  - Assess and monitor nutrition and hydration status  - Monitor labs   - Assess for incontinence   - Turn and reposition patient  - Assist with mobility/ambulation  - Relieve pressure over bony prominences  - Avoid friction and shearing  - Provide appropriate hygiene as needed including keeping skin clean and dry  - Evaluate need for skin moisturizer/barrier cream  - Collaborate with interdisciplinary team   - Patient/family teaching  - Consider wound care consult   2/7/2025 2214 by Valery Mercedes RN  Outcome: Progressing  2/7/2025 2210 by Valery Mercedes RN  Outcome: Progressing     Problem: Nutrition/Hydration-ADULT  Goal: Nutrient/Hydration intake appropriate for improving, restoring or maintaining nutritional needs  Description: Monitor and assess patient's nutrition/hydration status for malnutrition. Collaborate with interdisciplinary team and initiate plan and interventions as ordered.  Monitor patient's weight and dietary intake as ordered or per policy. Utilize nutrition screening  tool and intervene as necessary. Determine patient's food preferences and provide high-protein, high-caloric foods as appropriate.     INTERVENTIONS:  - Monitor oral intake, urinary output, labs, and treatment plans  - Assess nutrition and hydration status and recommend course of action  - Evaluate amount of meals eaten  - Assist patient with eating if necessary   - Allow adequate time for meals  - Recommend/ encourage appropriate diets, oral nutritional supplements, and vitamin/mineral supplements  - Order, calculate, and assess calorie counts as needed  - Recommend, monitor, and adjust tube feedings and TPN/PPN based on assessed needs  - Assess need for intravenous fluids  - Provide specific nutrition/hydration education as appropriate  - Include patient/family/caregiver in decisions related to nutrition  2/7/2025 2214 by Valery Mercedes RN  Outcome: Progressing  2/7/2025 2210 by Valery Mercedes, RN  Outcome: Progressing

## 2025-02-08 NOTE — OCCUPATIONAL THERAPY NOTE
Occupational Therapy Evaluation     Patient Name: Ynes Mendoza  Today's Date: 2/8/2025  Problem List  Principal Problem:    Mixed aphasia  Active Problems:    Type 2 diabetes mellitus, with long-term current use of insulin ([facility])    Stroke-like symptoms    Bifascicular bundle branch block    Past Medical History  Past Medical History:   Diagnosis Date    Abnormal EEG 11/05/2023    Anxiety     Aphasia 11/05/2023    Condyloma acuminatum     Depression 1968    Therapy    Diabetes mellitus (HCC) 2002    type 2    Genital warts     Herpes simplex     HPV (human papilloma virus) infection     Obesity 1990    Visual impairment 1984     Past Surgical History  Past Surgical History:   Procedure Laterality Date    CARDIAC ELECTROPHYSIOLOGY PROCEDURE N/A 1/24/2025    Procedure: Cardiac pacer implant DUAL CHAMBER;  Surgeon: Patric Sanchez MD;  Location: [facility];  Service: Cardiology         02/08/25 0959   OT Last Visit   OT Visit Date 02/08/25   Note Type   Note type Evaluation   Pain Assessment   Pain Assessment Tool 0-10   Pain Score No Pain   Restrictions/Precautions   Weight Bearing Precautions Per Order No   Other Precautions Chair Alarm;Bed Alarm;Multiple lines;Telemetry;Fall Risk;Pain  (vEEG)   Home Living   Type of Home House;Other (Comment)  (rents room in a rooming house)   Home Layout One level;Other (Comment)  (0 NILAM)   Bathroom Shower/Tub Tub/shower unit   Bathroom Toilet Standard   Bathroom Equipment Shower chair;Grab bars in shower   Home Equipment Walker;Cane;Other (Comment)  (rollator)   Additional Comments shares bathroom w/ 6 other roommates   Prior Function   Level of [place] Independent with ADLs;Independent with functional mobility;Independent with IADLS  (assist PRN for ADLS)   Lives With Other (Comment)  (6 other roommates; each have own room but shared bathroom area)   Receives Help From Friend(s)   IADLs Family/Friend/Other provides transportation;Independent with medication  management;Independent with meal prep  (uses [employer] bus for transportation)   Falls in the last 6 months 0   Vocational Retired   Lifestyle   Autonomy I w/ ADLS, IADLS, Mod I w/ transfers and functional mobility PTA   Reciprocal Relationships Pt lives w/ roommates; receives OP PT   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; use to help seniors   Intrinsic Gratification crafts   ADL   Eating Deficit Supervision/safety   Grooming Assistance 4  Minimal Assistance   UB Bathing Assistance 4  Minimal Assistance   LB Bathing Assistance 4  Minimal Assistance   UB Dressing Assistance 5  Supervision/Setup   LB Dressing Assistance 4  Minimal Assistance   Toileting Assistance  5  Supervision/Setup   Functional Assistance 4  Minimal Assistance   Functional Deficit Steadying;Verbal cueing;Supervision/safety;Increased time to complete   Bed Mobility   Supine to Sit 5  Supervision   Additional items HOB elevated;Increased time required;Verbal cues   Sit to Supine Unable to assess   Additional Comments pt sat EOB w/ Fair sitting balance/trunk control   Transfers   Sit to Stand 5  Supervision   Additional items Increased time required;Verbal cues   Stand to Sit 5  Supervision   Additional items Increased time required;Verbal cues   Additional Comments w/ RW\   Functional Mobility   Functional Mobility 4  Minimal assistance   Additional Comments pt took few small steps from EOB to recliner w/ min A x1; w/ RW   Additional items Rolling walker   Balance   Static Sitting Good   Dynamic Sitting Fair +   Static Standing Fair   Dynamic Standing Fair   Ambulatory Fair -   Activity Tolerance   Activity Tolerance Patient limited by fatigue;Patient limited by pain   Medical Staff Made Aware PT,RN   Nurse Made Aware yes   RUE Assessment   RUE Assessment WFL   LUE Assessment   LUE Assessment WFL   Hand Function   Gross Motor Coordination Functional   Fine Motor Coordination Functional   Sensation   Light Touch No apparent deficits   Vision-Basic Assessment    Current Vision No visual deficits   Psychosocial   Psychosocial (WDL) WDL   Cognition   Overall Cognitive Status WFL   Arousal/Participation Responsive;Cooperative   Attention Within functional limits   Orientation Level Oriented X4   Memory Within functional limits   Following Commands Follows all commands and directions without difficulty   Comments pt is pleasant and cooperative   Assessment   Limitation Decreased endurance;Decreased high-level ADLs;Decreased self-care trans;Decreased ADL status   Prognosis Fair   Assessment Pt is a 65 y/o female seen for OT eval s/p adm to B as a stroke alert w/ global aphasia. Pt is s/p TNK. Pt with active OT orders and activity as tolerated orders. Pt lives with roommates in a house, pt has own room, 0 NILAM, shared bathroom. Pt was I w/  ADLS and IADLS, does not drive ( uses [employer] van), & required use of DME PTA including rollator, RW and SPC. Pt is currently demonstrating the following occupational deficits: S UB ADLS, Min A LB ADLS, S bed mobility, transfers and Min A functional mobility w/ RW. These deficits that are impacting pt's baseline areas of occupation are a result of the following impairments: pain, endurance, activity tolerance, functional mobility, forward functional reach, balance, trunk control, functional standing tolerance, and decreased I w/ ADLS/IADLS.The following Occupational Performance Areas to address include: bathing/shower, toilet hygiene, dressing, medication management, socialization, health maintenance, functional mobility, community mobility, clothing management, cleaning, meal prep, money management, household maintenance, and social participation. Recommend minimum resource intensity, upon D/C. Pt to continue to benefit from acute immediate OT services to address the following goals 2-3x/week to  w/in 10-14 days:   Goals   Patient Goals to be independent   LTG Time Frame 10-14   Long Term Goal #1 see below listed goals   Plan    Treatment Interventions ADL retraining;Functional transfer training;UE strengthening/ROM;Endurance training;Cognitive reorientation;Patient/family training;Equipment evaluation/education;Compensatory technique education;Continued evaluation;Energy conservation;Activityengagement   Goal Expiration Date 02/22/25   OT Frequency 2-3x/wk   Discharge Recommendation   Rehab Resource Intensity Level, OT III (Minimum Resource Intensity)   Additional Comments  The patient's raw score on the AM-PAC Daily Activity Inpatient Short Form is 21. A raw score of greater than or equal to 19 suggests the patient may benefit from discharge to home. Please refer to the recommendation of the Occupational Therapist for safe discharge planning.   Additional Comments 2 Pt seen as a co-session due to the patient's co-morbidities, clinically unstable presentation, and present impairments which are a regression from the patient's baseline.   AM-[facility] Daily Activity Inpatient   Lower Body Dressing 3   Bathing 3   Toileting 3   Upper Body Dressing 4   Grooming 4   Eating 4   Daily Activity Raw Score 21   Daily Activity Standardized Score (Calc for Raw Score >=11) 44.27   AM-PAC Applied Cognition Inpatient   Following a Speech/Presentation 4   Understanding Ordinary Conversation 4   Taking Medications 4   Remembering Where Things Are Placed or Put Away 4   Remembering List of 4-5 Errands 4   Taking Care of Complicated Tasks 3   Applied Cognition Raw Score 23   Applied Cognition Standardized Score 53.08   End of Consult   Education Provided Yes   Patient Position at End of Consult Bedside chair;Bed/Chair alarm activated;All needs within reach   Nurse Communication Nurse aware of consult      GOALS    1) Pt will improve activity tolerance to G for 30 min txment sessions for increase engagement in functional tasks  2) Pt will complete UB/LB dressing/self care w/ mod I using adaptive device and DME as needed  3) Pt will complete bathing w/ Mod I w/  use of AE and DME as needed  4) Pt will complete toileting w/ mod I w/ G hygiene/thoroughness using DME as needed  5) Pt will improve functional transfers to Mod I on/off all surfaces using DME as needed w/ G balance/safety   6) Pt will improve functional mobility during ADL/IADL/leisure tasks to Mod I using DME as needed w/ G balance/safety   7) Pt will participate in simulated IADL management task to increase independence to Mod I w/ G safety and endurance  8) Pt will be attentive 100% of the time during ongoing cognitive assessment w/ G participation to assist w/ safe d/c planning/recommendations  9) Pt will demonstrate G carryover of pt/caregiver education and training as appropriate w/o cues w/ good tolerance to increase safety during functional tasks  10) Pt will demonstrate 100% carryover of energy conservation techniques t/o functional I/ADL/leisure tasks w/o cues s/p skilled education to increase endurance during functional tasks     Mildred Morrissey MS, OTR/L

## 2025-02-08 NOTE — ASSESSMENT & PLAN NOTE
Lab Results   Component Value Date    HGBA1C 7.3 (H) 02/06/2025       Recent Labs     02/07/25  2149 02/08/25  0729 02/08/25  1037 02/08/25  1613   POCGLU 409* 143* 230* 230*       Blood Sugar Average: Last 72 hrs:  (P) 186.8720298230525030

## 2025-02-09 LAB
GLUCOSE SERPL-MCNC: 102 MG/DL (ref 65–140)
GLUCOSE SERPL-MCNC: 162 MG/DL (ref 65–140)
GLUCOSE SERPL-MCNC: 222 MG/DL (ref 65–140)
GLUCOSE SERPL-MCNC: 222 MG/DL (ref 65–140)

## 2025-02-09 PROCEDURE — 99232 SBSQ HOSP IP/OBS MODERATE 35: CPT | Performed by: FAMILY MEDICINE

## 2025-02-09 PROCEDURE — 82948 REAGENT STRIP/BLOOD GLUCOSE: CPT

## 2025-02-09 PROCEDURE — 95720 EEG PHY/QHP EA INCR W/VEEG: CPT | Performed by: STUDENT IN AN ORGANIZED HEALTH CARE EDUCATION/TRAINING PROGRAM

## 2025-02-09 RX ORDER — INSULIN GLARGINE 100 [IU]/ML
14 INJECTION, SOLUTION SUBCUTANEOUS
Status: DISCONTINUED | OUTPATIENT
Start: 2025-02-09 | End: 2025-02-12 | Stop reason: HOSPADM

## 2025-02-09 RX ADMIN — INSULIN GLARGINE 14 UNITS: 100 INJECTION, SOLUTION SUBCUTANEOUS at 21:19

## 2025-02-09 RX ADMIN — INSULIN LISPRO 1 UNITS: 100 INJECTION, SOLUTION INTRAVENOUS; SUBCUTANEOUS at 21:17

## 2025-02-09 RX ADMIN — INSULIN LISPRO 2 UNITS: 100 INJECTION, SOLUTION INTRAVENOUS; SUBCUTANEOUS at 08:02

## 2025-02-09 RX ADMIN — INSULIN LISPRO 4 UNITS: 100 INJECTION, SOLUTION INTRAVENOUS; SUBCUTANEOUS at 12:00

## 2025-02-09 RX ADMIN — ASPIRIN 81 MG: 81 TABLET, COATED ORAL at 08:02

## 2025-02-09 RX ADMIN — ATORVASTATIN CALCIUM 40 MG: 40 TABLET, FILM COATED ORAL at 17:50

## 2025-02-09 NOTE — PROGRESS NOTES
Progress Note - Hospitalist   Name: Ynes Mendoza 64 y.o. female I MRN: [id number]  Unit/Bed#: [facility] 716-01 I Date of Admission: 2/6/2025   Date of Service: 2/9/2025 I Hospital Day: 3    Assessment & Plan  Mixed aphasia  Plan as below  Type 2 diabetes mellitus, with long-term current use of insulin (HCC)  Lab Results   Component Value Date    HGBA1C 7.3 (H) 02/06/2025       Recent Labs     02/08/25  1613 02/08/25  2109 02/09/25  0617 02/09/25  1053   POCGLU 230* 231* 162* 222*       Blood Sugar Average: Last 72 hrs:  (P) 191.0437887465884286  Hold home oral antidiabetic meds  Persistent hyperglycemia, resume home Lantus 14 units at bedtime, and sliding scale.    Stroke-like symptoms  64-year-old female with PMH of DM2, TIA, bifascicular block, status post recent PPM placement presented to ED due to global aphasia, unable to follow directions.  Stroke alert was called and patient would exhibit TNK.  Patient was observed in critical care  CT head and CTA head neck negative for IV acute stroke or vascular pathology.   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  Continue aspirin, statin  PT/OT recommends level 3  Bifascicular bundle branch block  status post Medtronic dual chamber pacemaker implantation 1/14/2025     VTE Pharmacologic Prophylaxis:   Moderate Risk (Score 3-4) - Pharmacological DVT Prophylaxis Ordered: aspirin.    Mobility:   Basic Mobility Inpatient Raw Score: 19  JH-HLM Goal: 6: Walk 10 steps or more  JH-HLM Achieved: 7: Walk 25 feet or more  JH-HLM Goal achieved. Continue to encourage appropriate mobility.    Patient Centered Rounds: I performed bedside rounds with nursing staff today.      Current Length of Stay: 3 day(s)  Current Patient Status: Inpatient   Certification Statement: The patient will continue to require  additional inpatient hospital stay due to pendign MRI  Discharge Plan: Anticipate discharge in 24-48 hrs to home with home services.    Code Status: Level 1 - Full Code    Subjective     Patient reports offered no complaints.  Comfortable, no nausea or vomiting.  No headache.    Objective :  Temp:  [97.5 °F (36.4 °C)-98.3 °F (36.8 °C)] 98.1 °F (36.7 °C)  HR:  [60-69] 60  BP: (106-121)/(51-59) 119/58  Resp:  [14-18] 14  SpO2:  [97 %-100 %] 99 %  O2 Device: None (Room air)    Body mass index is 33.04 kg/m².     Input and Output Summary (last 24 hours):     Intake/Output Summary (Last 24 hours) at 2/9/2025 1534  Last data filed at 2/9/2025 1146  Gross per 24 hour   Intake 650 ml   Output --   Net 650 ml       Physical Exam  Constitutional:       Appearance: She is well-developed.   HENT:      Head: Normocephalic and atraumatic.   Pulmonary:      Effort: Pulmonary effort is normal. No respiratory distress.      Breath sounds: Normal breath sounds.   Musculoskeletal:      Cervical back: Normal range of motion.   Skin:     General: Skin is warm and dry.           Lines/Drains:              Lab Results: I have reviewed the following results:   Results from last 7 days   Lab Units 02/08/25  0506   WBC Thousand/uL 6.03   HEMOGLOBIN g/dL 12.0   HEMATOCRIT % 36.8   PLATELETS Thousands/uL 161     Results from last 7 days   Lab Units 02/08/25  0506   SODIUM mmol/L 140   POTASSIUM mmol/L 4.1   CHLORIDE mmol/L 107   CO2 mmol/L 27   BUN mg/dL 15   CREATININE mg/dL 0.85   ANION GAP mmol/L 6   CALCIUM mg/dL 8.9   GLUCOSE RANDOM mg/dL 141*     Results from last 7 days   Lab Units 02/06/25  2124   INR  0.94     Results from last 7 days   Lab Units 02/09/25  1053 02/09/25  0617 02/08/25  2109 02/08/25  1613 02/08/25  1037 02/08/25  0729 02/07/25  2149 02/07/25  1619 02/07/25  1220 02/07/25  0539 02/06/25  2342 02/06/25  1913   POC GLUCOSE mg/dl 222* 162* 231* 230* 230* 143* 409* 215* 102 83 172* 97     Results from last 7 days   Lab  Units 02/06/25 2027   HEMOGLOBIN A1C % 7.3*           Recent Cultures (last 7 days):            Last 24 Hours Medication List:     Current Facility-Administered Medications:     aspirin (ECOTRIN LOW STRENGTH) EC tablet 81 mg, Daily    atorvastatin (LIPITOR) tablet 40 mg, QPM    insulin glargine (LANTUS) subcutaneous injection 14 Units 0.14 mL, HS    insulin lispro (HumALOG/ADMELOG) 100 units/mL subcutaneous injection 1-5 Units, HS    insulin lispro (HumALOG/ADMELOG) 100 units/mL subcutaneous injection 2-12 Units, TID AC **AND** Fingerstick Glucose (POCT), TID AC    LORazepam (ATIVAN) tablet 0.5 mg, 30 min pre-procedure    Administrative Statements   Today, Patient Was Seen By: Karolina Jarquin MD      **Please Note: This note may have been constructed using a voice recognition system.**

## 2025-02-09 NOTE — ASSESSMENT & PLAN NOTE
64-year-old female with PMH of DM2, TIA, bifascicular block, status post recent PPM placement presented to ED due to global aphasia, unable to follow directions.  Stroke alert was called and patient would exhibit TNK.  Patient was observed in critical care  CT head and CTA head neck negative for IV acute stroke or vascular pathology.   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  Continue aspirin, statin  PT/OT recommends level 3

## 2025-02-09 NOTE — ASSESSMENT & PLAN NOTE
Lab Results   Component Value Date    HGBA1C 7.3 (H) 02/06/2025       Recent Labs     02/08/25  1613 02/08/25  2109 02/09/25  0617 02/09/25  1053   POCGLU 230* 231* 162* 222*       Blood Sugar Average: Last 72 hrs:  (P) [id number]  - Current management by internal medicine  - Monitor for signs and symptoms of hypoglycemia   - Current meds: per primary  - Consistent carb diabetic diet  - Recommend close monitoring and optimal management during recovery/rehab phase as well

## 2025-02-09 NOTE — PLAN OF CARE
Problem: PAIN - ADULT  Goal: Verbalizes/displays adequate comfort level or baseline comfort level  Description: Interventions:  - Encourage patient to monitor pain and request assistance  - Assess pain using appropriate pain scale  - Administer analgesics based on type and severity of pain and evaluate response  - Implement non-pharmacological measures as appropriate and evaluate response  - Consider cultural and social influences on pain and pain management  - Notify physician/advanced practitioner if interventions unsuccessful or patient reports new pain  Outcome: Progressing     Problem: INFECTION - ADULT  Goal: Absence or prevention of progression during hospitalization  Description: INTERVENTIONS:  - Assess and monitor for signs and symptoms of infection  - Monitor lab/diagnostic results  - Monitor all insertion sites, i.e. indwelling lines, tubes, and drains  - Monitor endotracheal if appropriate and nasal secretions for changes in amount and color  - Randolph appropriate cooling/warming therapies per order  - Administer medications as ordered  - Instruct and encourage patient and family to use good hand hygiene technique  - Identify and instruct in appropriate isolation precautions for identified infection/condition  Outcome: Progressing  Goal: Absence of fever/infection during neutropenic period  Description: INTERVENTIONS:  - Monitor WBC    Outcome: Progressing     Problem: SAFETY ADULT  Goal: Patient will remain free of falls  Description: INTERVENTIONS:  - Educate patient/family on patient safety including physical limitations  - Instruct patient to call for assistance with activity   - Consult OT/PT to assist with strengthening/mobility   - Keep Call bell within reach  - Keep bed low and locked with side rails adjusted as appropriate  - Keep care items and personal belongings within reach  - Initiate and maintain comfort rounds  - Make Fall Risk Sign visible to staff  - Offer Toileting every 2 Hours,  in advance of need  - Initiate/Maintain bed alarm  - Apply yellow socks and bracelet for high fall risk patients  - Consider moving patient to room near nurses station  Outcome: Progressing  Goal: Maintain or return to baseline ADL function  Description: INTERVENTIONS:  -  Assess patient's ability to carry out ADLs; assess patient's baseline for ADL function and identify physical deficits which impact ability to perform ADLs (bathing, care of mouth/teeth, toileting, grooming, dressing, etc.)  - Assess/evaluate cause of self-care deficits   - Assess range of motion  - Assess patient's mobility; develop plan if impaired  - Assess patient's need for assistive devices and provide as appropriate  - Encourage maximum independence but intervene and supervise when necessary  - Involve family in performance of ADLs  - Assess for home care needs following discharge   - Consider OT consult to assist with ADL evaluation and planning for discharge  - Provide patient education as appropriate  Outcome: Progressing  Goal: Maintains/Returns to pre admission functional level  Description: INTERVENTIONS:  - Perform AM-PAC 6 Click Basic Mobility/ Daily Activity assessment daily.  - Set and communicate daily mobility goal to care team and patient/family/caregiver.   - Collaborate with rehabilitation services on mobility goals if consulted  - Perform Range of Motion 3 times a day.  - Reposition patient every 2 hours.  - Dangle patient 3 times a day  - Stand patient 3 times a day  - Ambulate patient 3 times a day  - Out of bed to chair 3 times a day   - Out of bed for meals 3 times a day  - Out of bed for toileting  - Record patient progress and toleration of activity level   Outcome: Progressing     Problem: DISCHARGE PLANNING  Goal: Discharge to home or other facility with appropriate resources  Description: INTERVENTIONS:  - Identify barriers to discharge w/patient and caregiver  - Arrange for needed discharge resources and  transportation as appropriate  - Identify discharge learning needs (meds, wound care, etc.)  - Arrange for interpretive services to assist at discharge as needed  - Refer to Case Management Department for coordinating discharge planning if the patient needs post-hospital services based on physician/advanced practitioner order or complex needs related to functional status, cognitive ability, or social support system  Outcome: Progressing     Problem: Knowledge Deficit  Goal: Patient/family/caregiver demonstrates understanding of disease process, treatment plan, medications, and discharge instructions  Description: Complete learning assessment and assess knowledge base.  Interventions:  - Provide teaching at level of understanding  - Provide teaching via preferred learning methods  Outcome: Progressing     Problem: NEUROSENSORY - ADULT  Goal: Achieves stable or improved neurological status  Description: INTERVENTIONS  - Monitor and report changes in neurological status  - Monitor vital signs such as temperature, blood pressure, glucose, and any other labs ordered   - Initiate measures to prevent increased intracranial pressure  - Monitor for seizure activity and implement precautions if appropriate      Outcome: Progressing  Goal: Remains free of injury related to seizures activity  Description: INTERVENTIONS  - Maintain airway, patient safety  and administer oxygen as ordered  - Monitor patient for seizure activity, document and report duration and description of seizure to physician/advanced practitioner  - If seizure occurs,  ensure patient safety during seizure  - Reorient patient post seizure  - Seizure pads on all 4 side rails  - Instruct patient/family to notify RN of any seizure activity including if an aura is experienced  - Instruct patient/family to call for assistance with activity based on nursing assessment  - Administer anti-seizure medications if ordered    Outcome: Progressing  Goal: Achieves maximal  functionality and self care  Description: INTERVENTIONS  - Monitor swallowing and airway patency with patient fatigue and changes in neurological status  - Encourage and assist patient to increase activity and self care.   - Encourage visually impaired, hearing impaired and aphasic patients to use assistive/communication devices  Outcome: Progressing     Problem: CARDIOVASCULAR - ADULT  Goal: Maintains optimal cardiac output and hemodynamic stability  Description: INTERVENTIONS:  - Monitor I/O, vital signs and rhythm  - Monitor for S/S and trends of decreased cardiac output  - Administer and titrate ordered vasoactive medications to optimize hemodynamic stability  - Assess quality of pulses, skin color and temperature  - Assess for signs of decreased coronary artery perfusion  - Instruct patient to report change in severity of symptoms  Outcome: Progressing  Goal: Absence of cardiac dysrhythmias or at baseline rhythm  Description: INTERVENTIONS:  - Continuous cardiac monitoring, vital signs, obtain 12 lead EKG if ordered  - Administer antiarrhythmic and heart rate control medications as ordered  - Monitor electrolytes and administer replacement therapy as ordered  Outcome: Progressing     Problem: GENITOURINARY - ADULT  Goal: Maintains or returns to baseline urinary function  Description: INTERVENTIONS:  - Assess urinary function  - Encourage oral fluids to ensure adequate hydration if ordered  - Administer IV fluids as ordered to ensure adequate hydration  - Administer ordered medications as needed  - Offer frequent toileting  - Follow urinary retention protocol if ordered  Outcome: Progressing  Goal: Absence of urinary retention  Description: INTERVENTIONS:  - Assess patient’s ability to void and empty bladder  - Monitor I/O  - Bladder scan as needed  - Discuss with physician/AP medications to alleviate retention as needed  - Discuss catheterization for long term situations as appropriate  Outcome:  Progressing  Goal: Urinary catheter remains patent  Description: INTERVENTIONS:  - Assess patency of urinary catheter  - If patient has a chronic ashby, consider changing catheter if non-functioning  - Follow guidelines for intermittent irrigation of non-functioning urinary catheter  Outcome: Progressing     Problem: METABOLIC, FLUID AND ELECTROLYTES - ADULT  Goal: Electrolytes maintained within normal limits  Description: INTERVENTIONS:  - Monitor labs and assess patient for signs and symptoms of electrolyte imbalances  - Administer electrolyte replacement as ordered  - Monitor response to electrolyte replacements, including repeat lab results as appropriate  - Instruct patient on fluid and nutrition as appropriate  Outcome: Progressing  Goal: Fluid balance maintained  Description: INTERVENTIONS:  - Monitor labs   - Monitor I/O and WT  - Instruct patient on fluid and nutrition as appropriate  - Assess for signs & symptoms of volume excess or deficit  Outcome: Progressing  Goal: Glucose maintained within target range  Description: INTERVENTIONS:  - Monitor Blood Glucose as ordered  - Assess for signs and symptoms of hyperglycemia and hypoglycemia  - Administer ordered medications to maintain glucose within target range  - Assess nutritional intake and initiate nutrition service referral as needed  Outcome: Progressing     Problem: SKIN/TISSUE INTEGRITY - ADULT  Goal: Skin Integrity remains intact(Skin Breakdown Prevention)  Description: Assess:  -Perform Maikol assessment every shift  -Clean and moisturize skin every shift  -Inspect skin when repositioning, toileting, and assisting with ADLS  -Assess extremities for adequate circulation and sensation     Bed Management:  -Have minimal linens on bed & keep smooth, unwrinkled  -Change linens as needed when moist or perspiring  -Avoid sitting or lying in one position for more than 2 hours while in bed  -Keep HOB at 30 degrees     Activity:  -Mobilize patient 3 times a  day  -Encourage activity and walks on unit  -Encourage or provide ROM exercises   -Turn and reposition patient every 2 Hours  -Use appropriate equipment to lift or move patient in bed  -Consider limitation of chair time 2 hour intervals    Skin Care:  -Avoid use of baby powder, tape, friction and shearing, hot water or constrictive clothing  -Do not massage red bony areas    Outcome: Progressing  Goal: Incision(s), wounds(s) or drain site(s) healing without S/S of infection  Description: INTERVENTIONS  - Assess and document dressing, incision, wound bed, drain sites and surrounding tissue  - Provide patient and family education  - Perform skin care/dressing changes every shift  Outcome: Progressing  Goal: Pressure injury heals and does not worsen  Description: Interventions:  - Implement low air loss mattress or specialty surface (Criteria met)  - Apply silicone foam dressing  - Limit chair time to 2 hour intervals  - Apply fecal or urinary incontinence containment device   - Turn and reposition patient & offload bony prominences every 2 hours   - Utilize friction reducing device or surface for transfers   - Consider nutrition services referral as needed  Outcome: Progressing     Problem: MUSCULOSKELETAL - ADULT  Goal: Maintain or return mobility to safest level of function  Description: INTERVENTIONS:  - Assess patient's ability to carry out ADLs; assess patient's baseline for ADL function and identify physical deficits which impact ability to perform ADLs (bathing, care of mouth/teeth, toileting, grooming, dressing, etc.)  - Assess/evaluate cause of self-care deficits   - Assess range of motion  - Assess patient's mobility  - Assess patient's need for assistive devices and provide as appropriate  - Encourage maximum independence but intervene and supervise when necessary  - Involve family in performance of ADLs  - Assess for home care needs following discharge   - Consider OT consult to assist with ADL evaluation  and planning for discharge  - Provide patient education as appropriate  Outcome: Progressing  Goal: Maintain proper alignment of affected body part  Description: INTERVENTIONS:  - Support, maintain and protect limb and body alignment  - Provide patient/ family with appropriate education  Outcome: Progressing     Problem: Prexisting or High Potential for Compromised Skin Integrity  Goal: Skin integrity is maintained or improved  Description: INTERVENTIONS:  - Identify patients at risk for skin breakdown  - Assess and monitor skin integrity  - Assess and monitor nutrition and hydration status  - Monitor labs   - Assess for incontinence   - Turn and reposition patient  - Assist with mobility/ambulation  - Relieve pressure over bony prominences  - Avoid friction and shearing  - Provide appropriate hygiene as needed including keeping skin clean and dry  - Evaluate need for skin moisturizer/barrier cream  - Collaborate with interdisciplinary team   - Patient/family teaching  - Consider wound care consult   Outcome: Progressing     Problem: Nutrition/Hydration-ADULT  Goal: Nutrient/Hydration intake appropriate for improving, restoring or maintaining nutritional needs  Description: Monitor and assess patient's nutrition/hydration status for malnutrition. Collaborate with interdisciplinary team and initiate plan and interventions as ordered.  Monitor patient's weight and dietary intake as ordered or per policy. Utilize nutrition screening tool and intervene as necessary. Determine patient's food preferences and provide high-protein, high-caloric foods as appropriate.     INTERVENTIONS:  - Monitor oral intake, urinary output, labs, and treatment plans  - Assess nutrition and hydration status and recommend course of action  - Evaluate amount of meals eaten  - Assist patient with eating if necessary   - Allow adequate time for meals  - Recommend/ encourage appropriate diets, oral nutritional supplements, and vitamin/mineral  supplements  - Order, calculate, and assess calorie counts as needed  - Recommend, monitor, and adjust tube feedings and TPN/PPN based on assessed needs  - Assess need for intravenous fluids  - Provide specific nutrition/hydration education as appropriate  - Include patient/family/caregiver in decisions related to nutrition  Outcome: Progressing
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
## 2025-02-09 NOTE — ASSESSMENT & PLAN NOTE
Lab Results   Component Value Date    HGBA1C 7.3 (H) 02/06/2025       Recent Labs     02/08/25  1613 02/08/25  2109 02/09/25  0617 02/09/25  1053   POCGLU 230* 231* 162* 222*       Blood Sugar Average: Last 72 hrs:  (P) [id number]  Hold home oral antidiabetic meds  Persistent hyperglycemia, resume home Lantus 14 units at bedtime, and sliding scale.
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
## 2025-02-10 LAB
ANION GAP SERPL CALCULATED.3IONS-SCNC: 9 MMOL/L (ref 4–13)
BUN SERPL-MCNC: 15 MG/DL (ref 5–25)
CALCIUM SERPL-MCNC: 9.7 MG/DL (ref 8.4–10.2)
CHLORIDE SERPL-SCNC: 104 MMOL/L (ref 96–108)
CO2 SERPL-SCNC: 29 MMOL/L (ref 21–32)
CREAT SERPL-MCNC: 0.86 MG/DL (ref 0.6–1.3)
ERYTHROCYTE [DISTWIDTH] IN BLOOD BY AUTOMATED COUNT: 12.4 % (ref 11.6–15.1)
GFR SERPL CREATININE-BSD FRML MDRD: 72 ML/MIN/1.73SQ M
GLUCOSE SERPL-MCNC: 116 MG/DL (ref 65–140)
GLUCOSE SERPL-MCNC: 118 MG/DL (ref 65–140)
GLUCOSE SERPL-MCNC: 136 MG/DL (ref 65–140)
GLUCOSE SERPL-MCNC: 167 MG/DL (ref 65–140)
GLUCOSE SERPL-MCNC: 231 MG/DL (ref 65–140)
GLUCOSE SERPL-MCNC: 292 MG/DL (ref 65–140)
HCT VFR BLD AUTO: 43.6 % (ref 34.8–46.1)
HGB BLD-MCNC: 14 G/DL (ref 11.5–15.4)
MCH RBC QN AUTO: 28.7 PG (ref 26.8–34.3)
MCHC RBC AUTO-ENTMCNC: 32.1 G/DL (ref 31.4–37.4)
MCV RBC AUTO: 89 FL (ref 82–98)
PLATELET # BLD AUTO: 203 THOUSANDS/UL (ref 149–390)
PMV BLD AUTO: 11 FL (ref 8.9–12.7)
POTASSIUM SERPL-SCNC: 3.8 MMOL/L (ref 3.5–5.3)
RBC # BLD AUTO: 4.88 MILLION/UL (ref 3.81–5.12)
SODIUM SERPL-SCNC: 142 MMOL/L (ref 135–147)
WBC # BLD AUTO: 7.24 THOUSAND/UL (ref 4.31–10.16)

## 2025-02-10 PROCEDURE — 99254 IP/OBS CNSLTJ NEW/EST MOD 60: CPT

## 2025-02-10 PROCEDURE — 82948 REAGENT STRIP/BLOOD GLUCOSE: CPT

## 2025-02-10 PROCEDURE — 99232 SBSQ HOSP IP/OBS MODERATE 35: CPT | Performed by: FAMILY MEDICINE

## 2025-02-10 PROCEDURE — 85027 COMPLETE CBC AUTOMATED: CPT | Performed by: FAMILY MEDICINE

## 2025-02-10 PROCEDURE — 80048 BASIC METABOLIC PNL TOTAL CA: CPT | Performed by: FAMILY MEDICINE

## 2025-02-10 RX ADMIN — ATORVASTATIN CALCIUM 40 MG: 40 TABLET, FILM COATED ORAL at 16:36

## 2025-02-10 RX ADMIN — INSULIN LISPRO 2 UNITS: 100 INJECTION, SOLUTION INTRAVENOUS; SUBCUTANEOUS at 16:36

## 2025-02-10 RX ADMIN — INSULIN GLARGINE 14 UNITS: 100 INJECTION, SOLUTION SUBCUTANEOUS at 21:19

## 2025-02-10 RX ADMIN — ASPIRIN 81 MG: 81 TABLET, COATED ORAL at 08:33

## 2025-02-10 RX ADMIN — INSULIN LISPRO 2 UNITS: 100 INJECTION, SOLUTION INTRAVENOUS; SUBCUTANEOUS at 21:19

## 2025-02-10 NOTE — PLAN OF CARE
Problem: PAIN - ADULT  Goal: Verbalizes/displays adequate comfort level or baseline comfort level  Description: Interventions:  - Encourage patient to monitor pain and request assistance  - Assess pain using appropriate pain scale  - Administer analgesics based on type and severity of pain and evaluate response  - Implement non-pharmacological measures as appropriate and evaluate response  - Consider cultural and social influences on pain and pain management  - Notify physician/advanced practitioner if interventions unsuccessful or patient reports new pain  Outcome: Progressing     Problem: INFECTION - ADULT  Goal: Absence or prevention of progression during hospitalization  Description: INTERVENTIONS:  - Assess and monitor for signs and symptoms of infection  - Monitor lab/diagnostic results  - Monitor all insertion sites, i.e. indwelling lines, tubes, and drains  - Monitor endotracheal if appropriate and nasal secretions for changes in amount and color  - [place] appropriate cooling/warming therapies per order  - Administer medications as ordered  - Instruct and encourage patient and family to use good hand hygiene technique  - Identify and instruct in appropriate isolation precautions for identified infection/condition  Outcome: Progressing     Problem: SAFETY ADULT  Goal: Patient will remain free of falls  Description: INTERVENTIONS:  - Educate patient/family on patient safety including physical limitations  - Instruct patient to call for assistance with activity   - Consult OT/PT to assist with strengthening/mobility   - Keep Call bell within reach  - Keep bed low and locked with side rails adjusted as appropriate  - Keep care items and personal belongings within reach  - Initiate and maintain comfort rounds  - Make Fall Risk Sign visible to staff  - Offer Toileting every 2 Hours, in advance of need  - Initiate/Maintain bed alarm  - Apply yellow socks and bracelet for high fall risk patients  - Consider  moving patient to room near nurses station  Outcome: Progressing     Problem: Knowledge Deficit  Goal: Patient/family/caregiver demonstrates understanding of disease process, treatment plan, medications, and discharge instructions  Description: Complete learning assessment and assess knowledge base.  Interventions:  - Provide teaching at level of understanding  - Provide teaching via preferred learning methods  Outcome: Progressing     Problem: NEUROSENSORY - ADULT  Goal: Achieves stable or improved neurological status  Description: INTERVENTIONS  - Monitor and report changes in neurological status  - Monitor vital signs such as temperature, blood pressure, glucose, and any other labs ordered   - Initiate measures to prevent increased intracranial pressure  - Monitor for seizure activity and implement precautions if appropriate      Outcome: Progressing     Problem: MUSCULOSKELETAL - ADULT  Goal: Maintain or return mobility to safest level of function  Description: INTERVENTIONS:  - Assess patient's ability to carry out ADLs; assess patient's baseline for ADL function and identify physical deficits which impact ability to perform ADLs (bathing, care of mouth/teeth, toileting, grooming, dressing, etc.)  - Assess/evaluate cause of self-care deficits   - Assess range of motion  - Assess patient's mobility  - Assess patient's need for assistive devices and provide as appropriate  - Encourage maximum independence but intervene and supervise when necessary  - Involve family in performance of ADLs  - Assess for home care needs following discharge   - Consider OT consult to assist with ADL evaluation and planning for discharge  - Provide patient education as appropriate  Outcome: Progressing
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
## 2025-02-10 NOTE — PROGRESS NOTES
Progress Note - Hospitalist   Name: Ynes Mendoza 64 y.o. female I MRN: [id number]  [facility] I Date of Admission: 2/6/2025   Date of Service: 2/10/2025 I Hospital Day: 4    Assessment & Plan  Mixed aphasia  64-year-old female with PMH of DM2, TIA, bifascicular block, status post recent PPM placement presented to ED due to global aphasia, unable to follow directions.    Stroke alert was called and patient administered TNK.  Patient was observed in critical care  CT head and CTA head neck negative for acute stroke or vascular pathology.   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  Continue aspirin, statin  PT/OT recommends home with OP therapy  Type 2 diabetes mellitus, with long-term current use of insulin ([facility])  Lab Results   Component Value Date    HGBA1C 7.3 (H) 02/06/2025       Recent Labs     02/09/25  2100 02/10/25  0004 02/10/25  0607 02/10/25  1114   POCGLU 222* 136 118 292*       Blood Sugar Average: Last 72 hrs:  (P) [id number]  Hold home oral antidiabetic meds  Persistent hyperglycemia secondary to holding insulin during critical care stay, resume home Lantus 14 units at bedtime, and sliding scale.    Stroke-like symptoms  Plan as above  Bifascicular bundle branch block  status post Medtronic dual chamber pacemaker implantation 1/14/2025     VTE Pharmacologic Prophylaxis:   Low Risk (Score 0-2) - Encourage Ambulation.    Mobility:   Basic Mobility Inpatient Raw Score: 24  JH-HLM Goal: 8: Walk 250 feet or more  JH-HLM Achieved: 8: Walk 250 feet ot more  JH-HLM Goal achieved. Continue to encourage appropriate mobility.    Patient Centered Rounds: I performed bedside rounds with nursing staff today.   Discussions with Specialists or Other Care Team Provider:      Education and Discussions with Family / Patient:  Attempted to update  (sister) via phone. Left voicemail.     Current Length of Stay: 4 day(s)  Current Patient Status: Inpatient   Certification Statement: The patient will continue to require additional inpatient hospital stay due to pending MRI  Discharge Plan: Anticipate discharge tomorrow to home. If MRI negative    Code Status: Level 1 - Full Code    Subjective     Patient completely back to baseline.  Denies any headache.  Denies any complaints.    Objective :  Temp:  [98.1 °F (36.7 °C)-98.6 °F (37 °C)] 98.6 °F (37 °C)  HR:  [60-88] 68  BP: (119-131)/(58-67) 123/65  Resp:  [14-16] 16  SpO2:  [99 %] 99 %  O2 Device: None (Room air)    Body mass index is 33.04 kg/m².     Input and Output Summary (last 24 hours):     Intake/Output Summary (Last 24 hours) at 2/10/2025 1406  Last data filed at 2/10/2025 1100  Gross per 24 hour   Intake 420 ml   Output 0 ml   Net 420 ml       Physical Exam  Constitutional:       Appearance: She is well-developed.   HENT:      Head: Normocephalic and atraumatic.   Pulmonary:      Effort: Pulmonary effort is normal. No respiratory distress.      Breath sounds: Normal breath sounds.   Musculoskeletal:      Cervical back: Normal range of motion.   Skin:     General: Skin is warm and dry.           Lines/Drains:              Lab Results: I have reviewed the following results:   Results from last 7 days   Lab Units 02/10/25  0441   WBC Thousand/uL 7.24   HEMOGLOBIN g/dL 14.0   HEMATOCRIT % 43.6   PLATELETS Thousands/uL 203     Results from last 7 days   Lab Units 02/10/25  0441   SODIUM mmol/L 142   POTASSIUM mmol/L 3.8   CHLORIDE mmol/L 104   CO2 mmol/L 29   BUN mg/dL 15   CREATININE mg/dL 0.86   ANION GAP mmol/L 9   CALCIUM mg/dL 9.7   GLUCOSE RANDOM mg/dL 116     Results from last 7 days   Lab Units 02/06/25  2124   INR  0.94     Results from last 7 days   Lab Units 02/10/25  1114 02/10/25  0607 02/10/25  0004 02/09/25  2100 02/09/25  1552 02/09/25  1053 02/09/25  0617  02/08/25  2109 02/08/25  1613 02/08/25  1037 02/08/25  0729 02/07/25  2149   POC GLUCOSE mg/dl 292* 118 136 222* 102 222* 162* 231* 230* 230* 143* 409*     Results from last 7 days   Lab Units 02/06/25 2027   HEMOGLOBIN A1C % 7.3*           Recent Cultures (last 7 days):          Last 24 Hours Medication List:     Current Facility-Administered Medications:     aspirin (ECOTRIN LOW STRENGTH) EC tablet 81 mg, Daily    atorvastatin (LIPITOR) tablet 40 mg, QPM    insulin glargine (LANTUS) subcutaneous injection 14 Units 0.14 mL, HS    insulin lispro (HumALOG/ADMELOG) 100 units/mL subcutaneous injection 1-5 Units, HS    insulin lispro (HumALOG/ADMELOG) 100 units/mL subcutaneous injection 2-12 Units, TID AC **AND** Fingerstick Glucose (POCT), TID AC    LORazepam (ATIVAN) tablet 0.5 mg, 30 min pre-procedure    Administrative Statements   Today, Patient Was Seen By: Karolina Jarquin MD      **Please Note: This note may have been constructed using a voice recognition system.**

## 2025-02-10 NOTE — ASSESSMENT & PLAN NOTE
Lab Results   Component Value Date    HGBA1C 7.3 (H) 02/06/2025       Recent Labs     02/09/25  2100 02/10/25  0004 02/10/25  0607 02/10/25  1114   POCGLU 222* 136 118 292*       Blood Sugar Average: Last 72 hrs:  (P) 193.4166463275677692  Hold home oral antidiabetic meds  Persistent hyperglycemia secondary to holding insulin during critical care stay, resume home Lantus 14 units at bedtime, and sliding scale.

## 2025-02-10 NOTE — RESTORATIVE TECHNICIAN NOTE
Restorative Technician Note      Patient Name: Ynes Mendoza     Note Type: Mobility  Patient Position Upon Consult: Bedside chair  Activity Performed: Ambulated; Dangled; Stood  Assistive Device: Other (Comment) (none)  Education Provided: Yes  Patient Position at End of Consult: Bedside chair; All needs within reach; Bed/Chair alarm activated    Jesus SILVERMAN, Restorative Technician,

## 2025-02-10 NOTE — ASSESSMENT & PLAN NOTE
64-year-old female with PMH of DM2, TIA, bifascicular block, status post recent PPM placement presented to ED due to global aphasia, unable to follow directions.    Stroke alert was called and patient administered TNK.  Patient was observed in critical care  CT head and CTA head neck negative for acute stroke or vascular pathology.   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  Continue aspirin, statin  PT/OT recommends home with OP therapy

## 2025-02-11 ENCOUNTER — APPOINTMENT (INPATIENT)
Dept: RADIOLOGY | Facility: HOSPITAL | Age: 65
DRG: 053 | End: 2025-02-11
Payer: MEDICARE

## 2025-02-11 DIAGNOSIS — R56.9 WITNESSED SEIZURE-LIKE ACTIVITY (HCC): ICD-10-CM

## 2025-02-11 LAB
GLUCOSE SERPL-MCNC: 132 MG/DL (ref 65–140)
GLUCOSE SERPL-MCNC: 136 MG/DL (ref 65–140)
GLUCOSE SERPL-MCNC: 149 MG/DL (ref 65–140)
GLUCOSE SERPL-MCNC: 212 MG/DL (ref 65–140)
GLUCOSE SERPL-MCNC: 233 MG/DL (ref 65–140)

## 2025-02-11 PROCEDURE — 99232 SBSQ HOSP IP/OBS MODERATE 35: CPT | Performed by: INTERNAL MEDICINE

## 2025-02-11 PROCEDURE — 82948 REAGENT STRIP/BLOOD GLUCOSE: CPT

## 2025-02-11 PROCEDURE — 70450 CT HEAD/BRAIN W/O DYE: CPT

## 2025-02-11 PROCEDURE — 99233 SBSQ HOSP IP/OBS HIGH 50: CPT | Performed by: PSYCHIATRY & NEUROLOGY

## 2025-02-11 PROCEDURE — 97116 GAIT TRAINING THERAPY: CPT

## 2025-02-11 RX ORDER — LAMOTRIGINE 25 MG/1
25 TABLET ORAL
Status: DISCONTINUED | OUTPATIENT
Start: 2025-02-11 | End: 2025-02-11

## 2025-02-11 RX ORDER — LAMOTRIGINE 25 MG/1
TABLET ORAL
Qty: 238 TABLET | Refills: 0 | Status: SHIPPED | OUTPATIENT
Start: 2025-02-11 | End: 2025-04-08

## 2025-02-11 RX ORDER — LAMOTRIGINE 150 MG/1
150 TABLET ORAL 2 TIMES DAILY
Qty: 180 TABLET | Refills: 0 | Status: SHIPPED | OUTPATIENT
Start: 2025-04-08 | End: 2025-07-07

## 2025-02-11 RX ORDER — LAMOTRIGINE 25 MG/1
25 TABLET ORAL
Status: DISCONTINUED | OUTPATIENT
Start: 2025-02-11 | End: 2025-02-12 | Stop reason: HOSPADM

## 2025-02-11 RX ADMIN — LAMOTRIGINE 25 MG: 25 TABLET ORAL at 16:30

## 2025-02-11 RX ADMIN — INSULIN GLARGINE 14 UNITS: 100 INJECTION, SOLUTION SUBCUTANEOUS at 21:15

## 2025-02-11 RX ADMIN — INSULIN LISPRO 4 UNITS: 100 INJECTION, SOLUTION INTRAVENOUS; SUBCUTANEOUS at 16:30

## 2025-02-11 RX ADMIN — ASPIRIN 81 MG: 81 TABLET, COATED ORAL at 08:28

## 2025-02-11 RX ADMIN — INSULIN LISPRO 4 UNITS: 100 INJECTION, SOLUTION INTRAVENOUS; SUBCUTANEOUS at 11:34

## 2025-02-11 RX ADMIN — ATORVASTATIN CALCIUM 40 MG: 40 TABLET, FILM COATED ORAL at 17:26

## 2025-02-11 NOTE — RESTORATIVE TECHNICIAN NOTE
Restorative Technician Note      Patient Name: Ynes Mendoza     Note Type: Mobility  Patient Position Upon Consult: Supine  Activity Performed: Ambulated; Dangled; Stood  Assistive Device: Other (Comment) (none)  Education Provided: Yes  Patient Position at End of Consult: Supine; All needs within reach; Bed/Chair alarm activated    Jesus SILVERMAN, Restorative Technician,
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
## 2025-02-11 NOTE — PLAN OF CARE
Problem: PHYSICAL THERAPY ADULT  Goal: Performs mobility at highest level of function for planned discharge setting.  See evaluation for individualized goals.  Description: Treatment/Interventions: ADL retraining, Functional transfer training, LE strengthening/ROM, Elevations, Therapeutic exercise, Endurance training, Patient/family training, Equipment eval/education, Bed mobility, Gait training, Compensatory technique education, Spoke to nursing, Spoke to case management, OT  Equipment Recommended:  (has SPC + rollator for d/c)       See flowsheet documentation for full assessment, interventions and recommendations.  Outcome: Adequate for Discharge  Note: Prognosis: Good  Problem List: Impaired balance, Decreased endurance  Assessment: Pt agreeable to participate in PT session. Pt performed functional mobility and therex as outlined above. No LOB throughout session. Functioning at a level suitable to return home. Pt left supine in bed with bed alarm donned, call bell, phone, and all personal needs within reach. No further acute care PT needs due to pt being S level and functioning near her baseline.The patient's AM-PAC Basic Mobility Inpatient Short Form Raw Score is 18. A Raw score of greater than 16 suggests the patient may benefit from discharge to home. Please also refer to the recommendation of the Physical Therapist for safe discharge planning.  Barriers to Discharge: None     Rehab Resource Intensity Level, PT: III (Minimum Resource Intensity)    See flowsheet documentation for full assessment.

## 2025-02-11 NOTE — PROGRESS NOTES
Progress Note - Hospitalist   Name: Ynes Mendoza 64 y.o. female I MRN: [id number]  [facility] I Date of Admission: 2/6/2025   Date of Service: 2/11/2025 I Hospital Day: 5    Assessment & Plan  Witnessed seizure-like activity (HCC)  64-year-old female with PMH of DM2, TIA, bifascicular block, status post recent PPM placement presented to ED due to global aphasia, unable to follow directions.    Stroke alert was called and patient administered TNK.  Patient was observed in critical care  CT head and CTA head neck negative for acute stroke or vascular pathology.   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  Continue aspirin, statin  PT/OT recommends home with OP therapy  Lamictal started as per neurology ramping schedule  Type 2 diabetes mellitus, with long-term current use of insulin ([facility])  Lab Results   Component Value Date    HGBA1C 7.3 (H) 02/06/2025       Recent Labs     02/11/25  0410 02/11/25  0620 02/11/25  1057 02/11/25  1607   POCGLU 149* 136 233* 212*       Blood Sugar Average: Last 72 hrs:  (P) 189.5233861460548243  Hold home oral antidiabetic meds  Persistent hyperglycemia secondary to holding insulin during critical care stay, resume home Lantus 14 units at bedtime, and sliding scale.  Resume metformin on discharge    Stroke-like symptoms  Unable to obtain an MRI due to a new pacemaker implantation  Will obtain outpatient within 6 weeks  Bifascicular bundle branch block  status post [employer] dual chamber pacemaker implantation 1/14/2025     VTE Pharmacologic Prophylaxis:   Moderate Risk (Score 3-4) - Pharmacological DVT Prophylaxis Ordered: heparin.    Mobility:   Basic Mobility Inpatient Raw Score: 24  JH-HLM Goal: 8: Walk 250 feet or more  JH-HLM Achieved: 8: Walk 250 feet ot more  JH-HLM Goal NOT achieved. Continue with multidisciplinary rounding and  encourage appropriate mobility to improve upon [facility] goals.    Patient Centered Rounds: I performed bedside rounds with nursing staff today.   Discussions with Specialists or Other Care Team Provider: nurse, CM, neurology    Education and Discussions with Family / Patient: Updated  (sister) via phone.    Current Length of Stay: 5 day(s)  Current Patient Status: Inpatient   Certification Statement: The patient will continue to require additional inpatient hospital stay due to discharge planning  Discharge Plan: Anticipate discharge tomorrow to home with home services.    Code Status: Level 1 - Full Code    Subjective   Reports some vertigo-like feeling when walking today, but improved    Objective :  Temp:  [97.3 °F (36.3 °C)-97.7 °F (36.5 °C)] 97.7 °F (36.5 °C)  HR:  [63-72] 68  BP: (108-141)/() 138/75  Resp:  [12-18] 18  SpO2:  [96 %-98 %] 97 %  O2 Device: None (Room air)    Body mass index is 33.04 kg/m².     Input and Output Summary (last 24 hours):     Intake/Output Summary (Last 24 hours) at 2/11/2025 1723  Last data filed at 2/11/2025 1231  Gross per 24 hour   Intake 780 ml   Output --   Net 780 ml       Physical Exam  Constitutional:       Appearance: Normal appearance.   HENT:      Head: Normocephalic and atraumatic.      Nose: Nose normal.   Eyes:      Extraocular Movements: Extraocular movements intact.      Conjunctiva/sclera: Conjunctivae normal.   Pulmonary:      Effort: Pulmonary effort is normal.   Neurological:      Mental Status: She is alert and oriented to person, place, and time.      Cranial Nerves: No cranial nerve deficit.   Psychiatric:         Mood and Affect: Mood normal.         Behavior: Behavior normal.           Lines/Drains:              Lab Results: I have reviewed the following results:   Results from last 7 days   Lab Units 02/10/25  0441   WBC Thousand/uL 7.24   HEMOGLOBIN g/dL 14.0   HEMATOCRIT % 43.6   PLATELETS Thousands/uL 203     Results from last 7 days    Lab Units 02/10/25  0441   SODIUM mmol/L 142   POTASSIUM mmol/L 3.8   CHLORIDE mmol/L 104   CO2 mmol/L 29   BUN mg/dL 15   CREATININE mg/dL 0.86   ANION GAP mmol/L 9   CALCIUM mg/dL 9.7   GLUCOSE RANDOM mg/dL 116     Results from last 7 days   Lab Units 02/06/25  2124   INR  0.94     Results from last 7 days   Lab Units 02/11/25  1607 02/11/25  1057 02/11/25  0620 02/11/25  0410 02/10/25  2107 02/10/25  1630 02/10/25  1114 02/10/25  0607 02/10/25  0004 02/09/25  2100 02/09/25  1552 02/09/25  1053   POC GLUCOSE mg/dl 212* 233* 136 149* 231* 167* 292* 118 136 222* 102 222*     Results from last 7 days   Lab Units 02/06/25 2027   HEMOGLOBIN A1C % 7.3*           Recent Cultures (last 7 days):         Imaging Results Review: I reviewed radiology reports from this admission including: CT head.  Other Study Results Review: No additional pertinent studies reviewed.    Last 24 Hours Medication List:     Current Facility-Administered Medications:     aspirin (ECOTRIN LOW STRENGTH) EC tablet 81 mg, Daily    atorvastatin (LIPITOR) tablet 40 mg, QPM    insulin glargine (LANTUS) subcutaneous injection 14 Units 0.14 mL, HS    insulin lispro (HumALOG/ADMELOG) 100 units/mL subcutaneous injection 1-5 Units, HS    insulin lispro (HumALOG/ADMELOG) 100 units/mL subcutaneous injection 2-12 Units, TID AC **AND** Fingerstick Glucose (POCT), TID AC    lamoTRIgine (LaMICtal) tablet 25 mg, Daily With Dinner    LORazepam (ATIVAN) tablet 0.5 mg, 30 min pre-procedure    Administrative Statements   Today, Patient Was Seen By: Luis Antonio Christine MD  I have spent a total time of 40 minutes in caring for this patient on the day of the visit/encounter including Diagnostic results, Instructions for management, Patient and family education, Impressions, Counseling / Coordination of care, Documenting in the medical record, Reviewing / ordering tests, medicine, procedures  , Obtaining or reviewing history  , and Communicating with other healthcare  professionals .    **Please Note: This note may have been constructed using a voice recognition system.**

## 2025-02-11 NOTE — ASSESSMENT & PLAN NOTE
Assessment:  Patient is a 64-year-old female with past medical history of type 2 diabetes, PTSD, hypercholesterolemia, turn anxiety disorder, history of TIA, history of sick sinus syndrome status post pacemaker.  Patient presented to hospital as a stroke alert on 2/6/2025 for symptoms of aphasia.  Last known well 1800 hrs.  Initial NIH 19 (largely due to no command, patient perform certain activities on own volition.)  Initial blood pressure 138/78, blood sugar 97, noncontrast CT head demonstrated no acute intracranial abnormality, and demonstrated mild microangiopathic change, CTA head and neck demonstrated no large vessel occlusion, after discussion with patient, tenecteplase was administered.  Patient was not a thrombectomy candidate due to no LVO target.  Patient on aspirin 81 daily at home; acute hypertension, diabetes, hyperlipidemia, obesity, possible prior history of TIA,, patient did have similar but not identical events in the past without clear etiology.  Hemoglobin A1c 7.3, LDL 64. Unable to obtain MRIB due to recent PPM placement for safety in pausing PPM.    Impression:  Following vEEG & discussion w/ epilepsy team, highest suspicion of epilepsy.    Plan:  - Case discussed with attending neurologist Dr. Claire  - Repeat noncon CT head  - MRI brain without contrast o/p 6 weeks post op from PPM placement  - Video EEG completed, suspicious for epilepsy  - PT/OT/ST/PMR  - Rest per primary team appreciated  - Beginning Lamotrigine at 25 mg daily, increasing slowly until 150 mg Q12.  - Schedule of med increase as noted below using 25 mg tablets:  Week 1-2:  0 AM / 1 PM  Week 3-4:  1 AM / 1 PM  Week 5: 2 AM / 2 PM  Week 6:  3 AM / 3 PM  Week 7:  4 AM / 4 PM  Week 8:  5 AM / 5 PM  Week 9: begin taking 150 mg twice daily

## 2025-02-11 NOTE — ASSESSMENT & PLAN NOTE
64-year-old female with PMH of DM2, TIA, bifascicular block, status post recent PPM placement presented to ED due to global aphasia, unable to follow directions.    Stroke alert was called and patient administered TNK.  Patient was observed in critical care  CT head and CTA head neck negative for acute stroke or vascular pathology.   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  Continue aspirin, statin  PT/OT recommends home with OP therapy  Lamictal started as per neurology ramping schedule

## 2025-02-11 NOTE — ASSESSMENT & PLAN NOTE
Unable to obtain an MRI due to a new pacemaker implantation  Will obtain outpatient within 6 weeks

## 2025-02-11 NOTE — PROGRESS NOTES
Progress Note - Neurology   Name: Ynes Mendoza 64 y.o. female I MRN: [id number]  [facility] I Date of Admission: 2/6/2025   Date of Service: 2/11/2025 I Hospital Day: 5    Assessment & Plan  Witnessed seizure-like activity (HCC)  Assessment:  Patient is a 64-year-old female with past medical history of type 2 diabetes, PTSD, hypercholesterolemia, turn anxiety disorder, history of TIA, history of sick sinus syndrome status post pacemaker.  Patient presented to hospital as a stroke alert on 2/6/2025 for symptoms of aphasia.  Last known well 1800 hrs.  Initial NIH 19 (largely due to no command, patient perform certain activities on own volition.)  Initial blood pressure 138/78, blood sugar 97, noncontrast CT head demonstrated no acute intracranial abnormality, and demonstrated mild microangiopathic change, CTA head and neck demonstrated no large vessel occlusion, after discussion with patient, tenecteplase was administered.  Patient was not a thrombectomy candidate due to no LVO target.  Patient on aspirin 81 daily at home; acute hypertension, diabetes, hyperlipidemia, obesity, possible prior history of TIA,, patient did have similar but not identical events in the past without clear etiology.  Hemoglobin A1c 7.3, LDL 64. Unable to obtain MRIB due to recent PPM placement for safety in pausing PPM.    Impression:  Following vEEG & discussion w/ epilepsy team, highest suspicion of epilepsy.    Plan:  - Case discussed with attending neurologist Dr. Claire  - Repeat noncon CT head  - MRI brain without contrast o/p 6 weeks post op from PPM placement  - Video EEG completed, suspicious for epilepsy  - PT/OT/ST/PMR  - Rest per primary team appreciated  - Beginning Lamotrigine at 25 mg daily, increasing slowly until 150 mg Q12.  - Schedule of med increase as noted below using 25 mg tablets:  Week 1-2:  0 AM / 1 PM  Week 3-4:  1 AM / 1 PM  Week 5: 2 AM / 2 PM  Week 6:  3 AM / 3 PM  Week 7:  4 AM / 4 PM  Week 8:  5 AM  / 5 PM  Week 9: begin taking 150 mg twice daily    Recommendations for outpatient neurological follow up have yet to be determined.  I have discussed with Dr. Claire the above plan to treat pt. He agrees with the plan.  Please contact the SecureChat role for the Neurology service with any questions/concerns.    Subjective   Patient significantly proved from a few days ago, reports complete return to baseline, although remains completely amnestic to several hours around ER presentation.    Review of Systems   Neurological:  Positive for seizures and weakness. Negative for dizziness, tremors, syncope, facial asymmetry, speech difficulty, light-headedness, numbness and headaches.       Objective :  Temp:  [97.3 °F (36.3 °C)-97.7 °F (36.5 °C)] 97.3 °F (36.3 °C)  HR:  [63-68] 63  BP: (111-141)/() 141/114  Resp:  [12-16] 12  SpO2:  [96 %-98 %] 96 %  O2 Device: None (Room air)    Physical Exam  Vitals and nursing note reviewed.   Constitutional:       General: She is not in acute distress.     Appearance: She is not ill-appearing, toxic-appearing or diaphoretic.   HENT:      Head: Normocephalic and atraumatic.      Right Ear: External ear normal.      Left Ear: External ear normal.      Nose: Nose normal.      Mouth/Throat:      Pharynx: Oropharynx is clear.   Eyes:      General: Lids are normal. No scleral icterus.        Right eye: No discharge.         Left eye: No discharge.      Extraocular Movements: Extraocular movements intact.      Conjunctiva/sclera: Conjunctivae normal.      Pupils: Pupils are equal, round, and reactive to light.   Neurological:      Mental Status: She is alert.   Psychiatric:         Speech: Speech normal.     Neurological Exam  Mental Status  Alert. Oriented to person, place, time and situation. Speech is normal. Language is fluent with no aphasia.    Cranial Nerves  CN I: Sense of smell is normal.  CN II: Visual acuity is normal. Visual fields full to confrontation.  CN III, IV, VI:  Extraocular movements intact bilaterally. Normal lids and orbits bilaterally. Pupils equal round and reactive to light bilaterally.  CN V: Facial sensation is normal.  CN VII: Full and symmetric facial movement.  CN VIII: Hearing is normal.  CN IX, X: Palate elevates symmetrically. Normal gag reflex.  CN XI: Shoulder shrug strength is normal.  CN XII: Tongue midline without atrophy or fasciculations.    Motor  Normal muscle bulk throughout. No fasciculations present. Normal muscle tone. No abnormal involuntary movements. Strength is 5/5 in all four extremities except as noted.  Various locations of 4/5 strength such as RLE & random other locations to other limbs reportedly pain limiting.    Sensory  Light touch is normal in upper and lower extremities.     Reflexes  Deep tendon reflexes are 2+ and symmetric except as noted.    Coordination  Right: Finger-to-nose normal.Left: Finger-to-nose normal.    Gait    Defer.        Lab Results: I have reviewed the following results:  Imaging Results Review: I personally reviewed the following image studies in PACS and associated radiology reports: CT head. My interpretation of the radiology images/reports is: as noted above.  Other Study Results Review: EKG was reviewed.     VTE Pharmacologic Prophylaxis: VTE covered by:    None       Administrative Statements   I have spent a total time of 50 minutes in caring for this patient on the day of the visit/encounter including Diagnostic results, Prognosis, Risks and benefits of tx options, Instructions for management, Patient and family education, Importance of tx compliance, Risk factor reductions, Impressions, Counseling / Coordination of care, Documenting in the medical record, Reviewing / ordering tests, medicine, procedures  , Obtaining or reviewing history  , and Communicating with other healthcare professionals .

## 2025-02-11 NOTE — UTILIZATION REVIEW
"    Continued Stay Review    Date: 2-11-25                           Current Patient Class: Inpatient Current Level of Care: med surg    HPI:64 y.o. female initially admitted on 2-6-25      Current Diagnosis:  Admitted as stroke alert with aphasia with an NIH stroke scale of 19 as unable to follow commands.   CT head CTA head and neck did not show any acute abnormalities.   Patient underwent TNK     EEG was showing \"right frontocentral spike and wave discharges, sometimes with a more generalized field of spread.  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     Assessment/Plan:     Patient has improved to baseline. Completely amnestic to events of admission.  Neurology plan for MRI brain in 6 weeks.  Plan for CT head today.  Lamictal started with schedule to titrate dosage upward.  PMR consult completed :recommend home discharge with outpatient PT.       Scheduled Medications:    aspirin, 81 mg, Oral, Daily  atorvastatin, 40 mg, Oral, QPM  insulin glargine, 14 Units, Subcutaneous, HS  insulin lispro, 1-5 Units, Subcutaneous, HS  insulin lispro, 2-12 Units, Subcutaneous, TID AC  lamoTRIgine, 25 mg, Oral, Daily With Dinner  LORazepam, 0.5 mg, Oral, 30 min pre-procedure      Continuous IV Infusions:     PRN Meds:     Discharge Plan: to be determined     Vital Signs (last 3 days)       Date/Time Temp Pulse Resp BP MAP (mmHg) SpO2 O2 Device Tiki Coma Scale Score Pain    02/11/25 1200 -- -- -- -- -- -- -- 15 --    02/11/25 0900 -- -- -- -- -- -- -- -- No Pain    02/11/25 08:30:18 -- 72 -- 108/59 75 98 % -- -- --    02/11/25 0800 -- -- -- -- -- -- -- 15 --    02/11/25 07:55:06 97.3 °F (36.3 °C) 63 12 141/114 123 96 % -- -- --    02/11/25 0420 -- -- -- -- -- -- -- 15 --    02/11/25 04:06:27 -- 64 -- 111/61 78 98 % -- -- --    02/11/25 0005 -- 64 " -- 117/64 82 96 % -- 15 --    02/10/25 21:23:06 97.7 °F (36.5 °C) 68 16 122/65 84 96 % None (Room air) -- --    02/10/25 2000 -- -- -- -- -- -- -- 15 --    02/10/25 1600 -- -- -- -- -- -- -- 15 --    02/10/25 0800 -- -- -- -- -- -- -- 15 No Pain    02/10/25 0445 -- 68 16 123/65 88 -- -- -- --    02/10/25 0400 -- -- -- -- -- -- -- 15 --    02/10/25 0000 -- -- -- -- -- -- -- 15 --    02/09/25 21:20:14 98.6 °F (37 °C) 88 16 131/67 93 -- -- -- --    02/09/25 2000 -- -- -- -- -- -- None (Room air) 15 No Pain    02/09/25 1841 -- 63 -- 125/58 84 99 % -- -- --    02/09/25 14:33:04 98.1 °F (36.7 °C) 60 14 119/58 63 99 % None (Room air) -- --    02/09/25 1055 98.1 °F (36.7 °C) 69 18 121/58 83 99 % None (Room air) -- --    02/09/25 08:02:35 98.3 °F (36.8 °C) 64 18 107/55 77 97 % None (Room air) -- No Pain    02/09/25 0800 -- -- -- -- -- -- -- 15 --    02/09/25 0400 -- -- -- -- -- -- -- 15 --    02/09/25 0000 -- -- -- -- -- -- -- 15 --    02/08/25 20:08:47 98.2 °F (36.8 °C) 66 18 107/59 70 100 % None (Room air) -- --    02/08/25 2000 -- -- -- -- -- -- -- 15 No Pain    02/08/25 1600 97.5 °F (36.4 °C) 62 18 106/51 74 100 % None (Room air) -- --    02/08/25 10:54:25 98 °F (36.7 °C) 76 16 123/67 85 97 % None (Room air) -- --    02/08/25 1000 -- -- -- -- -- -- -- -- No Pain    02/08/25 0959 -- -- -- -- -- -- -- -- No Pain    02/08/25 0800 -- -- -- -- -- -- None (Room air) 15 No Pain    02/08/25 0719 98.4 °F (36.9 °C) 67 16 123/60 86 96 % -- -- --    02/08/25 0400 -- -- -- -- -- -- -- 15 --    02/08/25 0000 -- -- -- -- -- -- -- 15 --       Weight (last 2 days)       None            Pertinent Labs/Diagnostic Results:   Radiology:    Cardiology:    GI:  No orders to display           Results from last 7 days   Lab Units 02/10/25  0441 02/08/25  0506 02/06/25  2027   WBC Thousand/uL 7.24 6.03 7.80   HEMOGLOBIN g/dL 14.0 12.0 13.4   HEMATOCRIT % 43.6 36.8 41.0   PLATELETS Thousands/uL 203 161 175         Results from last 7 days   Lab  Units 02/10/25  0441 02/08/25  0506 02/06/25 2027   SODIUM mmol/L 142 140 139   POTASSIUM mmol/L 3.8 4.1 4.0   CHLORIDE mmol/L 104 107 102   CO2 mmol/L 29 27 26   ANION GAP mmol/L 9 6 11   BUN mg/dL 15 15 18   CREATININE mg/dL 0.86 0.85 0.90   EGFR ml/min/1.73sq m 72 73 68   CALCIUM mg/dL 9.7 8.9 10.3*   CALCIUM, IONIZED mmol/L  --  1.13  --    MAGNESIUM mg/dL  --  2.1  --    PHOSPHORUS mg/dL  --  3.6  --          Results from last 7 days   Lab Units 02/11/25  1057 02/11/25  0620 02/11/25  0410 02/10/25  2107 02/10/25  1630 02/10/25  1114 02/10/25  0607 02/10/25  0004 02/09/25  2100 02/09/25  1552 02/09/25  1053 02/09/25  0617   POC GLUCOSE mg/dl 233* 136 149* 231* 167* 292* 118 136 222* 102 222* 162*     Results from last 7 days   Lab Units 02/10/25  0441 02/08/25  0506 02/06/25 2027   GLUCOSE RANDOM mg/dL 116 141* 71         Results from last 7 days   Lab Units 02/06/25 2027   HEMOGLOBIN A1C % 7.3*   EAG mg/dl 163     Results from last 7 days   Lab Units 02/06/25 2027   HS TNI 0HR ng/L 3         Results from last 7 days   Lab Units 02/06/25 2124   PROTIME seconds 12.9   INR  0.94   PTT seconds 18*     Results from last 7 days   Lab Units 02/07/25  0112   CLARITY UA  Clear   COLOR UA  Light Yellow   SPEC GRAV UA  1.047*   PH UA  8.5*   GLUCOSE UA mg/dl Negative   KETONES UA mg/dl Negative   BLOOD UA  Negative   PROTEIN UA mg/dl Trace*   NITRITE UA  Negative   BILIRUBIN UA  Negative   UROBILINOGEN UA (BE) mg/dl 2.0*   LEUKOCYTES UA  Negative   WBC UA /hpf None Seen   RBC UA /hpf 1-2   BACTERIA UA /hpf None Seen   EPITHELIAL CELLS WET PREP /hpf None Seen       Results from last 7 days   Lab Units 02/07/25  0111   AMPH/METH  Negative   BARBITURATE UR  Negative   BENZODIAZEPINE UR  Negative   COCAINE UR  Negative   METHADONE URINE  Negative   OPIATE UR  Negative   PCP UR  Negative   THC UR  Negative     Network Utilization Review Department  ATTENTION: Please call with any questions or concerns to [phone] and  carefully listen to the prompts so that you are directed to the right person. All voicemails are confidential.   For Discharge needs, contact Care Management DC Support Team at [phone] opt. [phone]  Send all requests for admission clinical reviews, approved or denied determinations and any other requests to dedicated fax number below belonging to the campus where the patient is receiving treatment. List of dedicated fax numbers for the Facilities:  FACILITY NAME UR FAX NUMBER   ADMISSION DENIALS (Administrative/Medical Necessity) [phone]   DISCHARGE SUPPORT TEAM (NETWORK) [phone]   PARENT CHILD HEALTH (Maternity/NICU/Pediatrics) [phone]   [facility] [phone]   [facility] [phone]   [facility] [phone]   [facility] [phone]   [facility] [phone]   [facility] [phone]   [facility] [phone]   [facility] [phone]   [facility] [phone]   [facility] [phone]   [facility] [phone]   [facility] [phone]

## 2025-02-11 NOTE — ASSESSMENT & PLAN NOTE
Lab Results   Component Value Date    HGBA1C 7.3 (H) 02/06/2025       Recent Labs     02/10/25  1630 02/10/25  2107 02/11/25  0410 02/11/25  0620   POCGLU 167* 231* 149* 136       Blood Sugar Average: Last 72 hrs:  (P) [id number]

## 2025-02-12 ENCOUNTER — TELEPHONE (OUTPATIENT)
Age: 65
End: 2025-02-12

## 2025-02-12 VITALS
HEART RATE: 66 BPM | DIASTOLIC BLOOD PRESSURE: 64 MMHG | BODY MASS INDEX: 33.11 KG/M2 | OXYGEN SATURATION: 98 % | SYSTOLIC BLOOD PRESSURE: 113 MMHG | TEMPERATURE: 97.8 F | HEIGHT: 67 IN | RESPIRATION RATE: 18 BRPM | WEIGHT: 210.98 LBS

## 2025-02-12 LAB
GLUCOSE SERPL-MCNC: 121 MG/DL (ref 65–140)
GLUCOSE SERPL-MCNC: 143 MG/DL (ref 65–140)
GLUCOSE SERPL-MCNC: 154 MG/DL (ref 65–140)
GLUCOSE SERPL-MCNC: 230 MG/DL (ref 65–140)

## 2025-02-12 PROCEDURE — 97530 THERAPEUTIC ACTIVITIES: CPT

## 2025-02-12 PROCEDURE — 99239 HOSP IP/OBS DSCHRG MGMT >30: CPT | Performed by: INTERNAL MEDICINE

## 2025-02-12 PROCEDURE — 82948 REAGENT STRIP/BLOOD GLUCOSE: CPT

## 2025-02-12 RX ORDER — LAMOTRIGINE 25 MG/1
TABLET ORAL
Qty: 238 TABLET | Refills: 0 | OUTPATIENT
Start: 2025-02-12 | End: 2025-04-09

## 2025-02-12 RX ADMIN — INSULIN LISPRO 4 UNITS: 100 INJECTION, SOLUTION INTRAVENOUS; SUBCUTANEOUS at 11:31

## 2025-02-12 RX ADMIN — ASPIRIN 81 MG: 81 TABLET, COATED ORAL at 10:24

## 2025-02-12 NOTE — PLAN OF CARE
Problem: PAIN - ADULT  Goal: Verbalizes/displays adequate comfort level or baseline comfort level  Description: Interventions:  - Encourage patient to monitor pain and request assistance  - Assess pain using appropriate pain scale  - Administer analgesics based on type and severity of pain and evaluate response  - Implement non-pharmacological measures as appropriate and evaluate response  - Consider cultural and social influences on pain and pain management  - Notify physician/advanced practitioner if interventions unsuccessful or patient reports new pain  Outcome: Progressing     Problem: INFECTION - ADULT  Goal: Absence or prevention of progression during hospitalization  Description: INTERVENTIONS:  - Assess and monitor for signs and symptoms of infection  - Monitor lab/diagnostic results  - Monitor all insertion sites, i.e. indwelling lines, tubes, and drains  - Monitor endotracheal if appropriate and nasal secretions for changes in amount and color  - [place] appropriate cooling/warming therapies per order  - Administer medications as ordered  - Instruct and encourage patient and family to use good hand hygiene technique  - Identify and instruct in appropriate isolation precautions for identified infection/condition  Outcome: Progressing     Problem: SAFETY ADULT  Goal: Patient will remain free of falls  Description: INTERVENTIONS:  - Educate patient/family on patient safety including physical limitations  - Instruct patient to call for assistance with activity   - Consult OT/PT to assist with strengthening/mobility   - Keep Call bell within reach  - Keep bed low and locked with side rails adjusted as appropriate  - Keep care items and personal belongings within reach  - Initiate and maintain comfort rounds  - Make Fall Risk Sign visible to staff  - Offer Toileting every 2 Hours, in advance of need  - Initiate/Maintain bed alarm  - Apply yellow socks and bracelet for high fall risk patients  - Consider  moving patient to room near nurses station  Outcome: Progressing     Problem: Knowledge Deficit  Goal: Patient/family/caregiver demonstrates understanding of disease process, treatment plan, medications, and discharge instructions  Description: Complete learning assessment and assess knowledge base.  Interventions:  - Provide teaching at level of understanding  - Provide teaching via preferred learning methods  Outcome: Progressing     Problem: NEUROSENSORY - ADULT  Goal: Achieves stable or improved neurological status  Description: INTERVENTIONS  - Monitor and report changes in neurological status  - Monitor vital signs such as temperature, blood pressure, glucose, and any other labs ordered   - Initiate measures to prevent increased intracranial pressure  - Monitor for seizure activity and implement precautions if appropriate      Outcome: Progressing

## 2025-02-12 NOTE — DISCHARGE SUMMARY
Discharge Summary - Hospitalist   Name: Ynes Mendoza 64 y.o. female I MRN: [id number]  Unit/Bed#: [facility] 733-01 I Date of Admission: 2/6/2025   Date of Service: 2/12/2025 I Hospital Day: 6     Assessment & Plan  Witnessed seizure-like activity (HCC)  64-year-old female with PMH of DM2, TIA, bifascicular block, status post recent PPM placement presented to ED due to global aphasia, unable to follow directions.    Stroke alert was called and patient administered TNK.  Patient was observed in critical care  CT head and CTA head neck negative for acute stroke or vascular pathology.   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  Continue aspirin, statin  PT/OT recommends home with OP therapy  Lamictal started as per neurology ramping schedule  Type 2 diabetes mellitus, with long-term current use of insulin (HCC)  Lab Results   Component Value Date    HGBA1C 7.3 (H) 02/06/2025       Recent Labs     02/11/25  1607 02/11/25  2103 02/12/25  0007 02/12/25  0620   POCGLU 212* 132 154* 121       Blood Sugar Average: Last 72 hrs:  (P) 174.3125  Hold home oral antidiabetic meds  Persistent hyperglycemia secondary to holding insulin during critical care stay, resume home Lantus 14 units at bedtime, and sliding scale.  Resume metformin on discharge    Stroke-like symptoms  Unable to obtain an MRI due to a new pacemaker implantation  Will obtain outpatient within 6 weeks  Bifascicular bundle branch block  status post [employer] dual chamber pacemaker implantation 1/14/2025      Medical Problems       Resolved Problems  Date Reviewed: 2/5/2025   None       Discharging Physician / Practitioner: Luis Antonio Christine MD  PCP: Saloni Landon DO  Admission Date:   Admission Orders (From admission, onward)       Ordered        02/06/25 2135  INPATIENT ADMISSION  Once                          Discharge Date:  "02/12/25    Consultations During Hospital Stay:  Neurology  PMR    Procedures Performed:   Pacemaker interrogation  Video EEG monitoring    Significant Findings / Test Results:   EEG: Abnormal III  State of Consciousness: Awake and Sleep  - Humble and wave complex, Right frontocentral  - Continuous Slow, Generalized  - PDR 7Hz    CTH: negative for acute intracranial findings  CTA head and neck: no large vessel occlusion in the head and neck    Incidental Findings:   Stable expanded CSF space in the region of the right superior cerebellar cistern, which may represent arachnoid cyst or other cystic lesion. Findings are similar to prior dating back to 10/31/2023.     Test Results Pending at Discharge (will require follow up):   None     Outpatient Tests Requested:  MRI brain in 6 weeks    Complications:  None    Reason for Admission: seizure likely activity    Hospital Course:   Ynes Mendoza is a 64 y.o. female patient who originally presented to the hospital on 2/6/2025 due to seizure like activity. She was admitted to ICU and placed on video EEG monitoring. She was seen by neurology and started on lamotrigine for prophylaxis. During her hospital course an MRI of the brain could not obtained due to her recent pacemaker. This will be done outpatient. She stabilized and was discharged home.      Please see above list of diagnoses and related plan for additional information.     Condition at Discharge: stable    Discharge Day Visit / Exam:   Subjective:  denies any new complaints  Vitals: Blood Pressure: 108/61 (02/12/25 0822)  Pulse: 81 (02/12/25 0822)  Temperature: 97.7 °F (36.5 °C) (02/12/25 0822)  Temp Source: Oral (02/12/25 0822)  Respirations: 18 (02/12/25 0822)  Height: 5' 7\" (170.2 cm) (02/07/25 1420)  Weight - Scale: 95.7 kg (210 lb 15.7 oz) (02/07/25 1420)  SpO2: 96 % (02/12/25 0822)  Physical Exam  Constitutional:       Appearance: Normal appearance.   HENT:      Head: Normocephalic and atraumatic.      Nose: " Nose normal.   Eyes:      Extraocular Movements: Extraocular movements intact.   Pulmonary:      Effort: Pulmonary effort is normal.   Neurological:      Mental Status: She is alert and oriented to person, place, and time. Mental status is at baseline.      Cranial Nerves: No cranial nerve deficit.   Psychiatric:         Mood and Affect: Mood normal.         Behavior: Behavior normal.        Discussion with Family:  discharge plan discussed with her sister on 2/11 in the evening.     Discharge instructions/Information to patient and family:   See after visit summary for information provided to patient and family.      Provisions for Follow-Up Care:  See after visit summary for information related to follow-up care and any pertinent home health orders.      Mobility at time of Discharge:   Basic Mobility Inpatient Raw Score: 24  JH-HLM Goal: 8: Walk 250 feet or more  JH-HLM Achieved: 8: Walk 250 feet ot more  HLM Goal achieved. Continue to encourage appropriate mobility.     Disposition:   Home with VNA Services (Reminder: Complete face to face encounter)    Planned Readmission: No    Discharge Medications:  See after visit summary for reconciled discharge medications provided to patient and/or family.      Administrative Statements   Discharge Statement:  I have spent a total time of 45 minutes in caring for this patient on the day of the visit/encounter. >30 minutes of time was spent on: Diagnostic results, Instructions for management, Patient and family education, Impressions, Counseling / Coordination of care, Documenting in the medical record, Reviewing / ordering tests, medicine, procedures  , and Communicating with other healthcare professionals .    **Please Note: This note may have been constructed using a voice recognition system**

## 2025-02-12 NOTE — ASSESSMENT & PLAN NOTE
Lab Results   Component Value Date    HGBA1C 7.3 (H) 02/06/2025       Recent Labs     02/11/25  1607 02/11/25  2103 02/12/25  0007 02/12/25  0620   POCGLU 212* 132 154* 121       Blood Sugar Average: Last 72 hrs:  (P) 174.3125  Hold home oral antidiabetic meds  Persistent hyperglycemia secondary to holding insulin during critical care stay, resume home Lantus 14 units at bedtime, and sliding scale.  Resume metformin on discharge

## 2025-02-12 NOTE — QUICK NOTE
Repeat CTH several days after initial stroke alert presentation did not reveal any new significant findings including no appearance of an acute stroke.    Pt stable for dc from neuro standpoint on new Lamictal w/ ramp up as ordered., & is aware to obtain MRI brain in o/p setting 6 wks s/p PPM placement.    Ynes Mendoza will need neuro follow-up in 6 weeks specifically with epilepsy team in 60 minute appointment; will not require outpatient neurological testing.
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
## 2025-02-12 NOTE — TELEPHONE ENCOUNTER
STILL ADMITTED:2/6/2025 - present (6 days)  [facility]        HFU/ SL JOO / Witnessed seizure-like activity     DC-     ----- Message from Hosea Thornton DO sent at 2/12/2025 10:35 AM EST -----  Regarding: TERRANCE Mendoza will need follow-up in in 6 weeks specifically with epilepsy team after discussion w/ prior established Dr. Rodriguez, for Other in 60 minute appointment. They will not require outpatient neurological testing. Thank you

## 2025-02-12 NOTE — OCCUPATIONAL THERAPY NOTE
"  Occupational Therapy Progress Note     Patient Name: Ynes Mendoza  Today's Date: 2/12/2025  Problem List  Principal Problem:    Witnessed seizure-like activity (HCC)  Active Problems:    Type 2 diabetes mellitus, with long-term current use of insulin (HCC)    Stroke-like symptoms    Bifascicular bundle branch block            02/12/25 0957   OT Last Visit   OT Visit Date 02/12/25   Note Type   Note Type Treatment   Pain Assessment   Pain Assessment Tool 0-10   Pain Score No Pain   Hospital Pain Intervention(s) Repositioned;Ambulation/increased activity;Emotional support   Restrictions/Precautions   Weight Bearing Precautions Per Order No   Other Precautions Bed Alarm;Multiple lines;Fall Risk   Lifestyle   Autonomy I w/ ADLS, IADLS, Mod I w/ transfers and functional mobility PTA   Reciprocal Relationships Pt lives w/ roommates; receives OP PT   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; use to help seniors   Intrinsic Gratification crafts   ADL   Where Assessed Edge of bed   Eating Assistance 7  Independent   Grooming Assistance 7  Independent   UB Dressing Assistance 7  Independent   Bed Mobility   Supine to Sit 7  Independent   Sit to Supine 7  Independent   Additional Comments At end of session, pt left lying supine in bed with all functional needs in reach wit bed alarm activated   Transfers   Sit to Stand 6  Modified independent   Stand to Sit 6  Modified independent   Additional Comments w/ no DME   Functional Mobility   Functional Mobility 5  Supervision   Additional Comments Pt completed long household functional mobility distances @ supervision level w/ no DME   Subjective   Subjective \"I have a really good friend that always checks on me and worries about me\"   Cognition   Arousal/Participation Alert;Responsive;Arousable;Cooperative   Attention Within functional limits   Orientation Level Oriented X4   Memory Within functional limits   Following Commands Follows one step commands without difficulty   Comments (S)  " Pt very pleasant and cooperative; during discussion, pt reporting that there have been multiple episodes in which she can't get her words out and that she believes when this happens that she just needs to get some rest, reporting that people will be talking to her and she cannot respond properly; when prompted, pt repotring that she originally thought they were because of a heatwave in the summer however has been happening more frequently, reporting that the last episode was during this hospitalization when a PCA/RN came in over night to take her vitals   Activity Tolerance   Activity Tolerance Patient tolerated treatment well   Medical Staff Made Aware RN cleared   Assessment   Assessment Pt is a 63 yo female who actively participated in skilled OT session on 2/12/2024. Treatment focused to improve functional transfers with fall prevention strategies, static/dynamic balance, postural/trunk control, proper body mechanics, functional use of b/l UE's, higher level cognitive functions, safety awareness, and overall increased activity tolerance in ADL/IADL/leisure tasks. Upon arrival, pt found lying supine in bed and was agreeable to OT session. Pt performed all bed mobility and functional transfers @ an independent level and completed long household functional mobility distances @ supervision level w/ no DME. From an OT standpoint, recommend discharge to home with outpatient OT + increased support once medically stable. Pt was receptive regarding education on returning home safely with energy conservation techniques and demonstrated good carryover during occupational/functional performance. At this time, pt demonstrates good insight/safety awareness and does not express any concerns regarding performing ADL/IADL/functional mobility tasks. No further skilled acute care OT services are needed at this time. The patient's raw score on the AM-PAC Daily Activity Inpatient Short Form is 24. A raw score of greater than or  equal to 19 suggests the patient may benefit from discharge to home. Please refer to the recommendation of the Occupational Therapist for safe discharge planning.  Recommend continued engagement in ADL/functional mobility tasks with nursing and restorative therapy staff as appropriate to promote the highest level of independence prior to discharge. OT is discharging pt from caseload at this time, please reconsult if needed.   Plan   Goal Expiration Date 02/22/25   OT Treatment Day 1   Discharge Recommendation   Rehab Resource Intensity Level, OT III (Minimum Resource Intensity)  (Outpatient OT for cognition + increased support/assistance @ home from roommates prn, reporting that some roommates are home during the day and that she has good support)   AM-PAC Daily Activity Inpatient   Lower Body Dressing 4   Bathing 4   Toileting 4   Upper Body Dressing 4   Grooming 4   Eating 4   Daily Activity Raw Score 24   Daily Activity Standardized Score (Calc for Raw Score >=11) 57.54   AM-PAC Applied Cognition Inpatient   Following a Speech/Presentation 3   Understanding Ordinary Conversation 4   Taking Medications 4   Remembering Where Things Are Placed or Put Away 4   Remembering List of 4-5 Errands 4   Taking Care of Complicated Tasks 3   Applied Cognition Raw Score 22   Applied Cognition Standardized Score 47.83   End of Consult   Education Provided Yes   Patient Position at End of Consult Supine;All needs within reach;Bed/Chair alarm activated   Nurse Communication Nurse aware of consult           Allyson Roberto MS, OTR/L

## 2025-02-13 ENCOUNTER — TRANSITIONAL CARE MANAGEMENT (OUTPATIENT)
Dept: FAMILY MEDICINE CLINIC | Facility: CLINIC | Age: 65
End: 2025-02-13

## 2025-02-13 NOTE — UTILIZATION REVIEW
NOTIFICATION OF ADMISSION DISCHARGE   This is a Notification of Discharge from [facility]. Please be advised that this patient has been discharge from our facility. Below you will find the admission and discharge date and time including the patient’s disposition.   UTILIZATION REVIEW CONTACT:  Ros Emerson  Utilization   Network Utilization Review Department  Phone: [phone] x carefully listen to the prompts. All voicemails are confidential.  Email: [email or web address]     ADMISSION INFORMATION  PRESENTATION DATE: 2/6/2025  7:05 PM  OBERVATION ADMISSION DATE: N/A  INPATIENT ADMISSION DATE: 2/6/25  9:35 PM   DISCHARGE DATE: 2/12/2025  5:18 PM   DISPOSITION:Home/Self Care    Network Utilization Review Department  ATTENTION: Please call with any questions or concerns to [phone] and carefully listen to the prompts so that you are directed to the right person. All voicemails are confidential.   For Discharge needs, contact Care Management DC Support Team at [phone] opt. 2  Send all requests for admission clinical reviews, approved or denied determinations and any other requests to dedicated fax number below belonging to the campus where the patient is receiving treatment. List of dedicated fax numbers for the Facilities:  FACILITY NAME UR FAX NUMBER   ADMISSION DENIALS (Administrative/Medical Necessity) [phone]   DISCHARGE SUPPORT TEAM ([facility]) [phone]   PARENT CHILD HEALTH (Maternity/NICU/Pediatrics) [phone]   [facility] [phone]   [facility] [phone]   [facility] [phone]   [facility] [phone]   [facility] [phone]   [facility] [phone]   [facility] [phone]   [facility] [phone]    [facility] [phone]   [facility] [phone]   [facility] [phone]   [facility] [phone]

## 2025-02-14 ENCOUNTER — OFFICE VISIT (OUTPATIENT)
Dept: FAMILY MEDICINE CLINIC | Facility: CLINIC | Age: 65
End: 2025-02-14
Payer: MEDICARE

## 2025-02-14 VITALS
BODY MASS INDEX: 31.55 KG/M2 | HEART RATE: 93 BPM | OXYGEN SATURATION: 99 % | SYSTOLIC BLOOD PRESSURE: 116 MMHG | TEMPERATURE: 97.4 F | DIASTOLIC BLOOD PRESSURE: 68 MMHG | WEIGHT: 201 LBS | HEIGHT: 67 IN

## 2025-02-14 DIAGNOSIS — R29.90 STROKE-LIKE SYMPTOMS: ICD-10-CM

## 2025-02-14 DIAGNOSIS — Z79.4 TYPE 2 DIABETES MELLITUS WITHOUT COMPLICATION, WITH LONG-TERM CURRENT USE OF INSULIN (HCC): Primary | ICD-10-CM

## 2025-02-14 DIAGNOSIS — E11.9 TYPE 2 DIABETES MELLITUS WITHOUT COMPLICATION, WITH LONG-TERM CURRENT USE OF INSULIN (HCC): Primary | ICD-10-CM

## 2025-02-14 DIAGNOSIS — Z86.73 HX-TIA (TRANSIENT ISCHEMIC ATTACK): ICD-10-CM

## 2025-02-14 DIAGNOSIS — R56.9 WITNESSED SEIZURE-LIKE ACTIVITY (HCC): ICD-10-CM

## 2025-02-14 PROCEDURE — 99496 TRANSJ CARE MGMT HIGH F2F 7D: CPT | Performed by: FAMILY MEDICINE

## 2025-02-14 NOTE — ASSESSMENT & PLAN NOTE
Lab Results   Component Value Date    HGBA1C 7.3 (H) 02/06/2025     7.3 hba1c, repeat in 3 months. Pt stated back on glimepiride and metformin 1000mg daily  Lantus 14 units daily

## 2025-02-14 NOTE — PROGRESS NOTES
Transition of Care Visit  Name: Ynes Mendoza      : 1960      MRN: [id number]  Encounter Provider: Saloni Landon DO  Encounter Date: 2025   Encounter department: [facility]    Assessment & Plan  Witnessed seizure-like activity (HCC)  Starting on lamictal 25mg daily for 2 weeks then 25 mg twice a day for 2 weeks then 50mg twice a day for 1 week then 75mg twice a day for 1 week then 100mg twice a day for 1 week then 125mg twice a day for 1 week then 150mg twice a day  Follow up with neurology       Hx-TIA (transient ischemic attack)  Pt taking rosuvastatin 5mg daily and aspirin 81mg daily       Stroke-like symptoms  Pt taking rosuvastatin 5mg daily and aspirin 81mg daily       Type 2 diabetes mellitus without complication, with long-term current use of insulin (HCC)    Lab Results   Component Value Date    HGBA1C 7.3 (H) 2025     7.3 hba1c, repeat in 3 months. Pt stated back on glimepiride and metformin 1000mg daily  Lantus 14 units daily          Depression Screening and Follow-up Plan: Patient was screened for depression during today's encounter. They screened negative with a PHQ-2 score of 0.          History of Present Illness     Transitional Care Management Review:   Ynes Mendoza is a 64 y.o. female here for TCM follow up.     During the TCM phone call patient stated:  TCM Call       Date and time call was made  2025 11:05 AM    Hospital care reviewed  Records not available    Patient was hospitialized at  [facility]    Date of Admission  25    Date of discharge  25    Diagnosis  Witnessed seizure-like activity (HCC)    Disposition  Home    Were the patients medications reviewed and updated  Yes    Current Symptoms  None          TCM Call       Post hospital issues  None    Should patient be enrolled in anticoag monitoring?  No    Scheduled for follow up?  Yes    Patients specialists  Neurologist    Did you obtain  "your prescribed medications  Yes    Do you need help managing your prescriptions or medications  No    Is transportation to your appointment needed  No    I have advised the patient to call PCP with any new or worsening symptoms  Daysiris D, QL    Are you recieving any outpatient services  No    Are you recieving home care services  No    Are you using any community resources  No    Have you fallen in the last 12 months  No    Interperter language line needed  No          Pt is here for a follow up from hospital.   Prior to hospitalization, Caretaker noticed she was not responding. Thought maybe her sugar was low. Taken to hospital   Pt now Has a pacemaker- healed area  Now taking lamictal - for seizure like activity   No metformin in the hospital, only insulin  Sugars have been high 214-   131 this am. 92 low blood sugar   Tires easily, some sour stomach   Checking in to [facility].       Review of Systems   Constitutional:  Positive for fatigue. Negative for fever.   HENT: Negative.     Eyes: Negative.    Respiratory: Negative.  Negative for cough.    Cardiovascular: Negative.    Gastrointestinal: Negative.    Endocrine: Negative.    Genitourinary: Negative.    Musculoskeletal: Negative.    Skin: Negative.    Allergic/Immunologic: Negative.    Neurological: Negative.    Psychiatric/Behavioral: Negative.       Objective   /68   Pulse 93   Temp (!) 97.4 °F (36.3 °C) (Tympanic)   Ht 5' 7\" (1.702 m)   Wt 91.2 kg (201 lb)   SpO2 99%   BMI 31.48 kg/m²     Physical Exam  Vitals and nursing note reviewed.   Constitutional:       Appearance: She is well-developed.   HENT:      Head: Normocephalic and atraumatic.   Cardiovascular:      Rate and Rhythm: Normal rate and regular rhythm.      Heart sounds: Normal heart sounds.   Pulmonary:      Effort: Pulmonary effort is normal.      Breath sounds: Normal breath sounds.   Abdominal:      General: Bowel sounds are normal.      Palpations: Abdomen is soft. "   Skin:     General: Skin is warm and dry.   Neurological:      Mental Status: She is alert and oriented to person, place, and time.   Psychiatric:         Behavior: Behavior normal.         Thought Content: Thought content normal.         Judgment: Judgment normal.       Medications have been reviewed by provider in current encounter

## 2025-02-14 NOTE — ASSESSMENT & PLAN NOTE
Starting on lamictal 25mg daily for 2 weeks then 25 mg twice a day for 2 weeks then 50mg twice a day for 1 week then 75mg twice a day for 1 week then 100mg twice a day for 1 week then 125mg twice a day for 1 week then 150mg twice a day  Follow up with neurology

## 2025-02-14 NOTE — TELEPHONE ENCOUNTER
ID- [place]- 2/12/2025    1ST ATTEMPT,     Called pt no answer, NA, NVM, [email or web address] MESSAGE SENT AS WELL.     Thank you,     Matilde

## 2025-02-18 ENCOUNTER — TELEPHONE (OUTPATIENT)
Age: 65
End: 2025-02-18

## 2025-02-18 NOTE — TELEPHONE ENCOUNTER
Patient called; stated there was a discussion with a  during her recent hospital admission 2/6/25 about a visiting nurse and was advised someone would call her.  Advised will forward and request someone call her.     Please assist,    Thank you

## 2025-02-18 NOTE — TELEPHONE ENCOUNTER
MSW phoned pt and she informed that last office visit with PCP office was on 2/14. She has not heard anything from PT and OT.       Last visit with neuro 11/11.     Pt will follow up with PCP regarding PT and OT regarding hospital follow up orders.

## 2025-02-24 DIAGNOSIS — Z79.4 TYPE 2 DIABETES MELLITUS WITH HYPERGLYCEMIA, WITH LONG-TERM CURRENT USE OF INSULIN (HCC): ICD-10-CM

## 2025-02-24 DIAGNOSIS — E11.65 TYPE 2 DIABETES MELLITUS WITH HYPERGLYCEMIA, WITH LONG-TERM CURRENT USE OF INSULIN (HCC): ICD-10-CM

## 2025-02-24 RX ORDER — ACYCLOVIR 400 MG/1
1 TABLET ORAL
Qty: 3 EACH | Refills: 5 | Status: SHIPPED | OUTPATIENT
Start: 2025-02-24 | End: 2025-02-25

## 2025-02-25 DIAGNOSIS — E11.9 TYPE 2 DIABETES MELLITUS WITHOUT COMPLICATION, WITH LONG-TERM CURRENT USE OF INSULIN (HCC): ICD-10-CM

## 2025-02-25 DIAGNOSIS — Z79.4 TYPE 2 DIABETES MELLITUS WITHOUT COMPLICATION, WITH LONG-TERM CURRENT USE OF INSULIN (HCC): ICD-10-CM

## 2025-02-25 DIAGNOSIS — Z79.4 TYPE 2 DIABETES MELLITUS WITH HYPERGLYCEMIA, WITH LONG-TERM CURRENT USE OF INSULIN (HCC): ICD-10-CM

## 2025-02-25 DIAGNOSIS — E11.65 TYPE 2 DIABETES MELLITUS WITH HYPERGLYCEMIA, WITH LONG-TERM CURRENT USE OF INSULIN (HCC): ICD-10-CM

## 2025-02-25 RX ORDER — BLOOD-GLUCOSE METER
EACH MISCELLANEOUS
Qty: 1 KIT | Refills: 0 | Status: SHIPPED | OUTPATIENT
Start: 2025-02-25

## 2025-02-25 RX ORDER — LANCETS
EACH MISCELLANEOUS
Qty: 100 EACH | Refills: 5 | Status: SHIPPED | OUTPATIENT
Start: 2025-02-25

## 2025-02-25 RX ORDER — ACYCLOVIR 400 MG/1
1 TABLET ORAL
Qty: 3 EACH | Refills: 5 | Status: SHIPPED | OUTPATIENT
Start: 2025-02-25 | End: 2025-03-06 | Stop reason: SDUPTHER

## 2025-02-25 NOTE — TELEPHONE ENCOUNTER
Patient calling in stating the [facility] does not have Dexcom G7 sensors in stock and unsure how long it will be until available. I advised Patient to contact local pharmacies near here to determine who has sensors in stock. Asked patient to call office back with new pharmacy information once obtained so script for Dexcom G7 sensors can be sent. Patient verbalized understanding will call back with new pharmacy if able to find one.

## 2025-02-25 NOTE — TELEPHONE ENCOUNTER
Patient said dexcom g7 sensors are on back order. Will not be available until middle of next month. She needs a glucometer, test strips, and lancets sent to [facility] in [place].

## 2025-02-26 ENCOUNTER — TELEPHONE (OUTPATIENT)
Age: 65
End: 2025-02-26

## 2025-02-26 DIAGNOSIS — R26.89 BALANCE PROBLEM: ICD-10-CM

## 2025-02-26 DIAGNOSIS — Z79.4 TYPE 2 DIABETES MELLITUS WITHOUT COMPLICATION, WITH LONG-TERM CURRENT USE OF INSULIN (HCC): ICD-10-CM

## 2025-02-26 DIAGNOSIS — R56.9 WITNESSED SEIZURE-LIKE ACTIVITY (HCC): Primary | ICD-10-CM

## 2025-02-26 DIAGNOSIS — M62.81 GENERALIZED MUSCLE WEAKNESS: ICD-10-CM

## 2025-02-26 DIAGNOSIS — R29.90 STROKE-LIKE SYMPTOMS: ICD-10-CM

## 2025-02-26 DIAGNOSIS — E11.9 TYPE 2 DIABETES MELLITUS WITHOUT COMPLICATION, WITH LONG-TERM CURRENT USE OF INSULIN (HCC): ICD-10-CM

## 2025-02-26 DIAGNOSIS — Z86.73 HX-TIA (TRANSIENT ISCHEMIC ATTACK): ICD-10-CM

## 2025-02-26 NOTE — TELEPHONE ENCOUNTER
Called patient and she is currently going to [facility]'s PT on [address] in [place]. Patient has an appointment on March 5th

## 2025-02-26 NOTE — TELEPHONE ENCOUNTER
Pt called to get some more clarification on the medication lamoTRIgine (LaMICtal) 25 mg tablet . Started tomorrow she'll be taking 2 tablets 50 mg total 2 times a day and just wanted to clarify it that means 2 tablets (50 mg total) in the morning and 2 at night. Az talleye.

## 2025-02-27 RX ORDER — METFORMIN HYDROCHLORIDE 500 MG/1
1000 TABLET, EXTENDED RELEASE ORAL DAILY
Qty: 180 TABLET | Refills: 1 | Status: SHIPPED | OUTPATIENT
Start: 2025-02-27

## 2025-02-28 ENCOUNTER — TELEPHONE (OUTPATIENT)
Age: 65
End: 2025-02-28

## 2025-02-28 NOTE — TELEPHONE ENCOUNTER
Pt called to inquire about her refill request for: metFORMIN (GLUCOPHAGE-XR) 500 mg 24 hr tablet. I explained to pt that it was dispensed by [facility] on 1/29/25 and 2/27/25. Pt will call [facility] again to confirm.   Note for call documentation purposes.

## 2025-02-28 NOTE — TELEPHONE ENCOUNTER
PA for Contour Next Test SUBMITTED to [employer]    via    [x]CMM-KEY: [id number]  [x]PA sent as URGENT    All office notes, labs and other pertaining documents and studies sent. Clinical questions answered. Awaiting determination from insurance company.     Turnaround time for your insurance to make a decision on your Prior Authorization can take 7-21 business days.

## 2025-03-05 ENCOUNTER — TELEPHONE (OUTPATIENT)
Age: 65
End: 2025-03-05

## 2025-03-05 ENCOUNTER — EVALUATION (OUTPATIENT)
Dept: PHYSICAL THERAPY | Facility: CLINIC | Age: 65
End: 2025-03-05
Payer: MEDICARE

## 2025-03-05 DIAGNOSIS — R42 DIZZINESS: ICD-10-CM

## 2025-03-05 DIAGNOSIS — Z74.09 IMPAIRED FUNCTIONAL MOBILITY, BALANCE, AND ENDURANCE: Primary | ICD-10-CM

## 2025-03-05 DIAGNOSIS — R40.4 ALTERED SENSORIUM: ICD-10-CM

## 2025-03-05 PROCEDURE — 97164 PT RE-EVAL EST PLAN CARE: CPT

## 2025-03-05 PROCEDURE — 97112 NEUROMUSCULAR REEDUCATION: CPT

## 2025-03-05 NOTE — TELEPHONE ENCOUNTER
Patient calling stating she has PT today and a bus picks her up at 0830 and the labs are fasting. Her appt is 3/6 and She is asking if it is okay that she have her labs done 3/6 the morning of her appt? Please advise.

## 2025-03-05 NOTE — PROGRESS NOTES
Re-Evaluation - Follow-up in 6 months    Today's date: 3/5/2025  Patient name: Ynes Mendoza  : 1960  MRN: [id number]  Referring provider: Spenser Rodriguez MD  Dx:   Encounter Diagnosis     ICD-10-CM    1. Impaired functional mobility, balance, and endurance  Z74.09       2. Altered sensorium  R40.4       3. Dizziness  R42             Start Time: 0930  Stop Time: 1015  Total time in clinic (min): 45 minutes    Subjective: Doing well today! Returns after 1-month hold from PT. In this time, early February, experienced what was classified as seizure-like activity - was admitted with multiple tests done - ultimately now started on seizure medication. Has had a history of these episodes in the past with her prior POC. She otherwise feels she is at her baseline today.    Pacemaker implant 25    Pain: No c/o pain today.    Goals: To maximize her balance and functional mobility.     Objective: See treatment diary below          IE: 10/11/24  11/11/24 12/9/24 1/10/25 2/5/25 3/5/25   SSGS 0.49m/s 0.69m/s SSGS no AD  0.77 m/s 1.06m/s SSGS no AD 0.93 m/s without AD 1.10 m/s no AD 1.20 m/s no AD   TUG 19.8 seconds with rollator  22.0 seconds no AD 13.6 seconds with rollator  14.6 seconds no AD   10.7 seconds no AD  Manual: 12.3 seconds (15%)  Cognitive: 20.9 seconds (95%) 9.49 sec without AD  Manual: 10.37 sec  Cognitive: 10.6 sec 6.53s no AD  Manual: 6.53s   Co.37s   6.84s no AD  Manual: 6.40s   Co.72s    FGA    3MBWT 7.9 seconds no AD 7.3 seconds no AD   6.0 seconds no AD 5.85 sec without AD 2.94s no AD 3.75s no AD    4-square step test       16.8 seconds no hits  12.7 seconds 1 cane hits 11.77 sec 0 hits 10.06s 0 hits 7.91s           Assessment: Reassessment completed today s/p 1 month hold from PT noting overall stability in function despite hospitalization with additional improvements in Tug Cog dual tasking abilities and balance per FGA and 4-Square Step Test.  Reviewed HEP efficacy. Based on my assessment does not need further skilled PT at this time nor OT assessment. Plan to follow up in 6 months or sooner as needed.     Plan:  Patient would benefit from: Reassessment in six months.     Planned therapy interventions: abdominal trunk stabilization, balance, neuromuscular re-education, canalith repositioning, patient/caregiver education, motor coordination training, strengthening, stretching, therapeutic activities, therapeutic exercise, gait training and home exercise program     Plan of Care beginning date: 3/5/2025  Plan of Care expiration date: 2025  Treatment plan discussed with: patient       Goals  Goals:  LT. Pt will improve DHI to at least 30 points within 8 weeks needed to decrease dizziness and improve function PROGRESSING  2. Pt will decrease dizziness to 5/10 at worst and 3/10 average in 8 weeks to improve activity tolerance PROGRESSING (MET for average)  3. Patient will report improved tolerance to all ADL tasks with minimal symptoms noted MET  4. Pt will demonstrate independence with HEP within 8 weeks. MET  5. Pt will improve FGA to at least 23/30 in 8 weeks ALMOST MET     NEW GOALS (24)  Pt will improve DHI score to at least 54 points in 4 weeks to decrease impairment from dizziness. PROGRESSING  Pt will improve FGA score to at least 25/30 points in 4 weeks to improve balance and decrease fall risk. MET  Pt will improve 3MBWT to at least 4.5 seconds in 4 weeks to decrease fall risk MET  Pt will improve 4-square step test to <15 seconds with no cane hits in 4 weeks to decrease risk of falls MET  Pt will decrease cognitive dual task cost to at least 75% in 4 weeks to decrease fall risk MET                POC expires Unit limit Auth Expiration date PT/OT/ST + Visit Limit?   24 N/a 12/15 BOMN   9/5/25  3/11/25                      Visit/Unit Tracking  8 authorized    2/3 2/5 3/5         8   3 4 5 6 7            5 4 3 2 1                Precautions: DMII, PTSD, anxiety, syncope    Access Code: [email or web address]  URL: [email or web address]  Date: 02/05/2025  Prepared by: Cristi Hsieh    Exercises  - Daily walking - 15-30 minutes with LRAD  - Heel Walking  - 1 x daily - 6 x weekly - 3 sets - 20 reps  - Sidestepping  - 1 x daily - 6 x weekly - 3 sets - 10 reps  - Walking with head turns then walking with head nods at kitchen countertop  - 1 x daily - 6 x weekly - 3 sets - 20 reps  - Sit to Stand with Armchair  - 1 x daily - 5 x weekly - 5 sets - 10 reps  - Heel Toe Raises with Counter Support  - 1 x daily - 5 x weekly - 3 sets - 10-20 reps  - Standing Hip Abduction with Counter Support  - 1 x daily - 5 x weekly - 2 sets - 10 reps    Manuals 1/13 1/20/25 1/31 2/3 2/5 PN 3/5/25 RE                                            Neuro Re-Ed   5xSTS and TUG  FGA  3 MBWT  TUG Manual  TUG Cog  4 Square Step Test  HEP HEP review as above x10 min    VOR x 1          Imaginary targets          HT/HN ambulation SOLO  HT/HN 1/4 lap x 4 laps each direction Solo  HT/HN  Fwd/bwd by length  14' length  X4 laps ea  // bars 3 laps ea HT/HN      sidesteps    3 laps // bars      HKM SOLO  1/4 lap x 3 laps with 10# tidal tank   3 laps // bars      hurdles          Static balance   Standing with HT/HN  2x10 ea direction       backward    3 laps // bars      tandem SOLO  1/4 lap x 4 laps with tidal tank Solo  14' length  R walking stick  Backwards  X2 laps        Blaze pods          River rocks SOLO  Small and medium forward x 4 laps         foam  Solo  Foam beams x2 in length  Side stepping   X2 laps  W/HT x2 laps  W/HN x2 laps with R UE walking stick STS with feet on foam pad   2x10 RUE only     Removed foam pad x10 RUE only       Ther Ex     HEP x10 min     nustep   Level 5 (minimal LUE use due to procedure precautions) Level 1 LE only due to procedure      treadmill SOLO 1.7mph for 10 min watching out window Solo  BUE  1.7 mph x10 min                                                 Ther Activity                              Gait Training                              Education

## 2025-03-05 NOTE — TELEPHONE ENCOUNTER
Patient called in states that [facility] was [facility]

## 2025-03-06 ENCOUNTER — OFFICE VISIT (OUTPATIENT)
Dept: ENDOCRINOLOGY | Facility: CLINIC | Age: 65
End: 2025-03-06
Payer: MEDICARE

## 2025-03-06 VITALS
BODY MASS INDEX: 32.33 KG/M2 | HEART RATE: 92 BPM | SYSTOLIC BLOOD PRESSURE: 108 MMHG | OXYGEN SATURATION: 98 % | DIASTOLIC BLOOD PRESSURE: 78 MMHG | HEIGHT: 67 IN | WEIGHT: 206 LBS

## 2025-03-06 DIAGNOSIS — E11.65 TYPE 2 DIABETES MELLITUS WITH HYPERGLYCEMIA, WITH LONG-TERM CURRENT USE OF INSULIN (HCC): Primary | ICD-10-CM

## 2025-03-06 DIAGNOSIS — E55.9 VITAMIN D DEFICIENCY: ICD-10-CM

## 2025-03-06 DIAGNOSIS — Z79.4 TYPE 2 DIABETES MELLITUS WITH HYPERGLYCEMIA, WITH LONG-TERM CURRENT USE OF INSULIN (HCC): Primary | ICD-10-CM

## 2025-03-06 DIAGNOSIS — I10 ESSENTIAL HYPERTENSION: ICD-10-CM

## 2025-03-06 DIAGNOSIS — E78.2 MIXED HYPERLIPIDEMIA: ICD-10-CM

## 2025-03-06 LAB
ALBUMIN SERPL-MCNC: 4.5 G/DL (ref 3.9–4.9)
ALP SERPL-CCNC: 66 IU/L (ref 44–121)
ALT SERPL-CCNC: 11 IU/L (ref 0–32)
AST SERPL-CCNC: 15 IU/L (ref 0–40)
BASOPHILS # BLD AUTO: 0.1 X10E3/UL (ref 0–0.2)
BASOPHILS NFR BLD AUTO: 1 %
BILIRUB SERPL-MCNC: 0.6 MG/DL (ref 0–1.2)
BUN SERPL-MCNC: 13 MG/DL (ref 8–27)
BUN/CREAT SERPL: 13 (ref 12–28)
CALCIUM SERPL-MCNC: 9.9 MG/DL (ref 8.7–10.3)
CHLORIDE SERPL-SCNC: 102 MMOL/L (ref 96–106)
CHOLEST SERPL-MCNC: 155 MG/DL (ref 100–199)
CHOLEST/HDLC SERPL: 2.5 RATIO (ref 0–4.4)
CO2 SERPL-SCNC: 25 MMOL/L (ref 20–29)
CREAT SERPL-MCNC: 0.98 MG/DL (ref 0.57–1)
EGFR: 65 ML/MIN/1.73
EOSINOPHIL # BLD AUTO: 0.2 X10E3/UL (ref 0–0.4)
EOSINOPHIL NFR BLD AUTO: 3 %
ERYTHROCYTE [DISTWIDTH] IN BLOOD BY AUTOMATED COUNT: 12.6 % (ref 11.7–15.4)
EST. AVERAGE GLUCOSE BLD GHB EST-MCNC: 163 MG/DL
GLOBULIN SER-MCNC: 2.7 G/DL (ref 1.5–4.5)
GLUCOSE SERPL-MCNC: 85 MG/DL (ref 70–99)
HBA1C MFR BLD: 7.3 % (ref 4.8–5.6)
HCT VFR BLD AUTO: 37 % (ref 34–46.6)
HDLC SERPL-MCNC: 61 MG/DL
HGB BLD-MCNC: 12.4 G/DL (ref 11.1–15.9)
IMM GRANULOCYTES # BLD: 0 X10E3/UL (ref 0–0.1)
IMM GRANULOCYTES NFR BLD: 1 %
LDLC SERPL CALC-MCNC: 75 MG/DL (ref 0–99)
LYMPHOCYTES # BLD AUTO: 1.4 X10E3/UL (ref 0.7–3.1)
LYMPHOCYTES NFR BLD AUTO: 21 %
MCH RBC QN AUTO: 29.2 PG (ref 26.6–33)
MCHC RBC AUTO-ENTMCNC: 33.5 G/DL (ref 31.5–35.7)
MCV RBC AUTO: 87 FL (ref 79–97)
MONOCYTES # BLD AUTO: 0.5 X10E3/UL (ref 0.1–0.9)
MONOCYTES NFR BLD AUTO: 8 %
NEUTROPHILS # BLD AUTO: 4.2 X10E3/UL (ref 1.4–7)
NEUTROPHILS NFR BLD AUTO: 66 %
PLATELET # BLD AUTO: 191 X10E3/UL (ref 150–450)
POTASSIUM SERPL-SCNC: 4.4 MMOL/L (ref 3.5–5.2)
PROT SERPL-MCNC: 7.2 G/DL (ref 6–8.5)
RBC # BLD AUTO: 4.24 X10E6/UL (ref 3.77–5.28)
SL AMB VLDL CHOLESTEROL CALC: 19 MG/DL (ref 5–40)
SODIUM SERPL-SCNC: 142 MMOL/L (ref 134–144)
TRIGL SERPL-MCNC: 103 MG/DL (ref 0–149)
TSH SERPL DL<=0.005 MIU/L-ACNC: 0.74 UIU/ML (ref 0.45–4.5)
WBC # BLD AUTO: 6.4 X10E3/UL (ref 3.4–10.8)

## 2025-03-06 PROCEDURE — 99214 OFFICE O/P EST MOD 30 MIN: CPT | Performed by: INTERNAL MEDICINE

## 2025-03-06 PROCEDURE — 95251 CONT GLUC MNTR ANALYSIS I&R: CPT | Performed by: INTERNAL MEDICINE

## 2025-03-06 RX ORDER — ACYCLOVIR 400 MG/1
1 TABLET ORAL
Qty: 3 EACH | Refills: 5 | Status: SHIPPED | OUTPATIENT
Start: 2025-03-06

## 2025-03-06 NOTE — PROGRESS NOTES
Name: Ynes Mendoza      : 1960      MRN: [id number]  Encounter Provider: Gayle Collazo MD  Encounter Date: 3/6/2025   Encounter department: [facility] [place]    No chief complaint on file.  :  Assessment & Plan  Type 2 diabetes mellitus with hyperglycemia, with long-term current use of insulin ([facility])    Lab Results   Component Value Date    HGBA1C 7.3 (H) 2025   Last A1c 7.3%, well-controlled.  Continue Lantus 14 units daily, glimepiride 1 mg daily  Continue to keep carbohydrate consistent with meals.  Educated about hypoglycemia symptoms and treatment.  Discussed the importance of follow-up with ophthalmology and podiatry    Orders:    Albumin / creatinine urine ratio; Future    Hemoglobin A1C; Future    Basic metabolic panel; Future    Mixed hyperlipidemia  Lab Results   Component Value Date    LDLCALC 75 2025   LDL is at goal, continue current management as per PCP currently taking Crestor 5 mg daily  Continue lifestyle modifications       Vitamin D deficiency  Continue current dose of vitamin D3 supplementation  Orders:    Vitamin D 25 hydroxy; Future    Essential hypertension    BP Readings from Last 3 Encounters:   25 108/78   25 116/68   25 113/64   Continue current management for hypertension, as per PCP.  Blood pressure at goal           History of Present Illness     Ynes Mendoza is a 64 y.o. female with type 2 diabetes for 20 years is here for follow-up.  She was seen by nurse practitioner Issa Rashid previously.  She takes metformin XR 1000 mg daily, glimepiride 0.5 mg daily at lunch and Levemir 14 units at bedtime.  Her last A1c from 2024 is 8.1%.  She denies polyuria polydipsia nocturia and blurry vision.  She denies neuropathy nephropathy and retinopathy.  She denies hypoglycemic episodes.  Her last eye exam was in 2024.  She checks blood sugars with continuous glucose monitor sensor.    He uses  continuous glucose monitor sensor by Dexcom G7, 2 weeks overview from February 21, 2025 to March 6, 2025 reviewed  Average glucose is 162 mg per DL, GMI 7.2%  Standard deviation is 57 mg per DL, coefficient of variation is 35.2%  65% times blood sugars are in target range, 0% time blood sugars are low, less than 1% time blood sugars are very low, 28% times blood sugars are high, 7% times blood sugars are very high  Interpretation of data-patient has usually elevated blood sugars after lunch during the day blood sugars are in 70 to 180 mg per DL range  Fasting blood sugars are well-controlled in 80 to 100 mg per DL range    Last Eye Exam: 03/22/2024  Last Foot Exam: 11/08/2024  Health Maintenance   Topic Date Due    Diabetic Foot Exam  11/08/2025    Diabetic Eye Exam  03/22/2026     Pertinent Medical History   For hyperlipidemia she takes Crestor 5 mg daily  For vitamin D deficiency, she takes vitamin D3 2000 IU daily, vitamin D level is normal        Review of Systems as per HPI    Current Outpatient Medications on File Prior to Visit   Medication Sig Dispense Refill    aspirin 81 mg chewable tablet Chew 1 tablet (81 mg total) daily      Blood Glucose Monitoring Suppl (Contour Next Monitor) w/Device KIT Use to monitor blood sugars daily 1 kit 0    Cholecalciferol (VITAMIN D PO) Take 5,000 Units by mouth in the morning      Cinnamon 500 MG capsule Take 1 capsule (500 mg total) by mouth in the morning 90 capsule 3    Continuous Glucose Sensor (Dexcom G7 Sensor) USE 1 DEVICE EVERY 10 DAYS 3 each 5    Contour Next Test test strip Test twice daily 200 strip 3    glimepiride (AMARYL) 1 mg tablet Take 1 tablet (1 mg total) by mouth in the morning 90 tablet 0    Insulin Glargine Solostar (Lantus SoloStar) 100 UNIT/ML SOPN Inject 0.14 mL (14 Units total) under the skin daily 6 mL 2    [START ON 4/8/2025] lamoTRIgine (LaMICtal) 150 MG tablet Take 1 tablet (150 mg total) by mouth 2 (two) times a day Do not start before April  8, 2025. 180 tablet 0    lamoTRIgine (LaMICtal) 25 mg tablet Take 1 tablet (25 mg total) by mouth daily with dinner for 14 days, THEN 1 tablet (25 mg total) 2 (two) times a day for 14 days, THEN 2 tablets (50 mg total) 2 (two) times a day for 7 days, THEN 3 tablets (75 mg total) 2 (two) times a day for 7 days, THEN 4 tablets (100 mg total) 2 (two) times a day for 7 days, THEN 5 tablets (125 mg total) 2 (two) times a day for 7 days. 238 tablet 0    Lancets (onetouch ultrasoft) lancets Check sugars 2-4 times a day 100 each 5    metFORMIN (GLUCOPHAGE-XR) 500 mg 24 hr tablet Take 2 tablets (1,000 mg total) by mouth in the morning 180 tablet 1    Misc. Devices (Rollator Ultra-Light) MISC Use daily 1 each 0    rosuvastatin (CRESTOR) 5 mg tablet TAKE 1 TABLET (5 MG TOTAL) BY MOUTH DAILY. 90 tablet 1    triamcinolone (KENALOG) 0.1 % ointment APPLY 1 APPLICATION TOPICALLY 2 (TWO) TIMES A DAY TO AFFECTED AREA 80 g 0    Vitamin B Complex-C CAPS       Insulin Pen Needle (B-D ULTRAFINE III SHORT PEN) 31G X 8 MM MISC Inject under the skin 5 (five) times a day 500 each 1     Current Facility-Administered Medications on File Prior to Visit   Medication Dose Route Frequency Provider Last Rate Last Admin    diphenhydrAMINE (BENADRYL) capsule 25 mg  25 mg Oral Q6H PRN              Medical History Reviewed by provider this encounter:  Tobacco  Allergies  Meds  Problems  Med Hx  Surg Hx  Fam Hx     .    Objective   There were no vitals taken for this visit.     There is no height or weight on file to calculate BMI.  Wt Readings from Last 3 Encounters:   02/14/25 91.2 kg (201 lb)   02/07/25 95.7 kg (210 lb 15.7 oz)   01/25/25 92.7 kg (204 lb 5.9 oz)     Physical Exam  Constitutional:       Appearance: Normal appearance.   Musculoskeletal:         General: Normal range of motion.      Cervical back: Normal range of motion.   Skin:     General: Skin is warm and dry.   Neurological:      General: No focal deficit present.      Mental  "Status: She is alert and oriented to person, place, and time.   Psychiatric:         Mood and Affect: Mood normal.         Behavior: Behavior normal.         Labs:   Lab Results   Component Value Date    HGBA1C 7.3 (H) 03/05/2025    HGBA1C 7.3 (H) 02/06/2025    HGBA1C 8.1 (H) 12/13/2024     Lab Results   Component Value Date    CREATININE 0.98 03/05/2025    CREATININE 0.86 02/10/2025    CREATININE 0.85 02/08/2025    BUN 13 03/05/2025    K 4.4 03/05/2025     03/05/2025    CO2 25 03/05/2025     eGFR   Date Value Ref Range Status   03/05/2025 65 >59 mL/min/1.73 Final   02/10/2025 72 ml/min/1.73sq m Final     Lab Results   Component Value Date    HDL 61 03/05/2025    TRIG 103 03/05/2025    CHOLHDL 2.5 03/05/2025     Lab Results   Component Value Date    ALT 11 03/05/2025    AST 15 03/05/2025     No results found for: \"[id number]\"    There are no Patient Instructions on file for this visit.    Discussed with the patient and all questioned fully answered. She will call me if any problems arise.      "

## 2025-03-06 NOTE — ASSESSMENT & PLAN NOTE
Lab Results   Component Value Date    HGBA1C 7.3 (H) 03/05/2025   Last A1c 7.3%, well-controlled.  Continue Lantus 14 units daily, glimepiride 1 mg daily  Continue to keep carbohydrate consistent with meals.  Educated about hypoglycemia symptoms and treatment.  Discussed the importance of follow-up with ophthalmology and podiatry    Orders:    Albumin / creatinine urine ratio; Future    Hemoglobin A1C; Future    Basic metabolic panel; Future

## 2025-03-12 ENCOUNTER — TELEPHONE (OUTPATIENT)
Age: 65
End: 2025-03-12

## 2025-03-12 ENCOUNTER — TELEPHONE (OUTPATIENT)
Dept: FAMILY MEDICINE CLINIC | Facility: CLINIC | Age: 65
End: 2025-03-12

## 2025-03-12 ENCOUNTER — OFFICE VISIT (OUTPATIENT)
Dept: FAMILY MEDICINE CLINIC | Facility: CLINIC | Age: 65
End: 2025-03-12
Payer: MEDICARE

## 2025-03-12 ENCOUNTER — NURSE TRIAGE (OUTPATIENT)
Age: 65
End: 2025-03-12

## 2025-03-12 VITALS
OXYGEN SATURATION: 100 % | SYSTOLIC BLOOD PRESSURE: 124 MMHG | HEART RATE: 77 BPM | BODY MASS INDEX: 32.11 KG/M2 | WEIGHT: 205 LBS | TEMPERATURE: 98.7 F | DIASTOLIC BLOOD PRESSURE: 80 MMHG

## 2025-03-12 DIAGNOSIS — E11.9 TYPE 2 DIABETES MELLITUS WITHOUT COMPLICATION, WITH LONG-TERM CURRENT USE OF INSULIN (HCC): Primary | ICD-10-CM

## 2025-03-12 DIAGNOSIS — E11.9 TYPE 2 DIABETES MELLITUS WITHOUT COMPLICATION, WITH LONG-TERM CURRENT USE OF INSULIN (HCC): ICD-10-CM

## 2025-03-12 DIAGNOSIS — E78.5 HYPERLIPIDEMIA LDL GOAL <100: ICD-10-CM

## 2025-03-12 DIAGNOSIS — Z79.4 TYPE 2 DIABETES MELLITUS WITHOUT COMPLICATION, WITH LONG-TERM CURRENT USE OF INSULIN (HCC): ICD-10-CM

## 2025-03-12 DIAGNOSIS — R56.9 WITNESSED SEIZURE-LIKE ACTIVITY (HCC): ICD-10-CM

## 2025-03-12 DIAGNOSIS — Z79.4 TYPE 2 DIABETES MELLITUS WITHOUT COMPLICATION, WITH LONG-TERM CURRENT USE OF INSULIN (HCC): Primary | ICD-10-CM

## 2025-03-12 PROCEDURE — 99213 OFFICE O/P EST LOW 20 MIN: CPT

## 2025-03-12 RX ORDER — LAMOTRIGINE 25 MG/1
TABLET ORAL
Qty: 196 TABLET | Refills: 0 | Status: SHIPPED | OUTPATIENT
Start: 2025-03-12 | End: 2025-04-08

## 2025-03-12 RX ORDER — LAMOTRIGINE 25 MG/1
TABLET ORAL
Qty: 238 TABLET | Refills: 0 | Status: CANCELLED | OUTPATIENT
Start: 2025-03-12 | End: 2025-05-07

## 2025-03-12 NOTE — TELEPHONE ENCOUNTER
Patient will be schedule for appointment today , lyft will be needed . Arrangement from office will be made .     Lyft has been booked

## 2025-03-12 NOTE — ASSESSMENT & PLAN NOTE
Lab Results   Component Value Date    HGBA1C 7.3 (H) 03/05/2025        reviewed endocrinologist recommendations with patient: decreasing lantus to 10u qhs, checking blood sugar with a glucometer to verify the sensor is accurate, drinking 40oz of juice or taking 4 glucose tabs when hypoglycemic. Patient was able to see endocrinologist response to her [employer] message and expresses understanding of the plan.

## 2025-03-12 NOTE — TELEPHONE ENCOUNTER
"  FOLLOW UP: office visit    REASON FOR CONVERSATION: Hypoglycemia    SYMPTOMS: fluctuating sugar level, shaky hands    OTHER: sugar readings..  0200    56  0915    88  1200    237    DISPOSITION: See Today in Office                Reason for Disposition   Patient wants to be seen    Answer Assessment - Initial Assessment Questions  1. SYMPTOMS: \"What symptoms are you concerned about?\"      Low blood sugar, shaky hands  2. ONSET:  \"When did the symptoms start?\"      Last night (this episode)  3. BLOOD GLUCOSE: \"What is your blood glucose level?\"       237 during triage  4. USUAL RANGE: \"What is your blood glucose level usually?\" (e.g., usual fasting morning value, usual evening value)      All over  5. TYPE 1 or 2:  \"Do you know what type of diabetes you have?\"  (e.g., Type 1, Type 2, Gestational; doesn't know)       Type 2  6. INSULIN: \"Do you take insulin?\" \"What type of insulin(s) do you use? What is the mode of delivery? (syringe, pen; injection or pump) \"When did you last give yourself an insulin dose?\" (i.e., time or hours/minutes ago) \"How much did you give?\" (i.e., how many units)      Lantus  7. DIABETES PILLS: \"Do you take any pills for your diabetes?\" If Yes, ask: \"What is the name of the medicine(s) that you take for high blood sugar?\"      metformin  8. OTHER SYMPTOMS: \"Do you have any symptoms?\" (e.g., fever, frequent urination, difficulty breathing, vomiting)      Shaky hands  9. LOW BLOOD GLUCOSE TREATMENT: \"What have you done so far to treat the low blood glucose level?\"      Juice and cookies  10. FOOD: \"When did you last eat or drink?\"        Did not say    11. ALONE: \"Are you alone right now or is someone with you?\"         yes    Protocols used: Diabetes - Low Blood Sugar-Adult-[place]    "

## 2025-03-12 NOTE — TELEPHONE ENCOUNTER
Patient returned call after leaving the office, her preferred pharmacy is now [facility], [place] [address]. If we could please resend the Lamictal medication to [facility] she would be able to  today. Thank you   Please call patient once medication has been sent to new pharmacy thank you

## 2025-03-12 NOTE — PROGRESS NOTES
Name: Ynes Mendoza      : 1960      MRN: [id number]  Encounter Provider: Taylor Durand DO  Encounter Date: 3/12/2025   Encounter department: [facility]  :  Assessment & Plan  Type 2 diabetes mellitus without complication, with long-term current use of insulin (HCC)    Lab Results   Component Value Date    HGBA1C 7.3 (H) 2025        reviewed endocrinologist recommendations with patient: decreasing lantus to 10u qhs, checking blood sugar with a glucometer to verify the sensor is accurate, drinking 40oz of juice or taking 4 glucose tabs when hypoglycemic. Patient was able to see endocrinologist response to her [employer] message and expresses understanding of the plan.   Witnessed seizure-like activity (HCC)  -has lamictal regimen instructed by neurology, has been taking 25mg BID and on 3/11 was supposed to increase to two 25mg tablets (50mg tota)l twice a day for 1 week, before increasing to 75mg (three 25mg tablets) BID x1 week, etc. Patient only has three 25mg tablets left and [facility] can not give her refill. Upon calling [facility], I was informed that patient's insurance will only cover two lamictal tablets daily, although patient needs to be taking four of the 25mg lamictal tablets to get to 50mg BID as prescribed by her neurologist. Per [facility] pharmacist, a prior auth was sent to her neurologist office , who prescribed the medication over 1 month ago, however the prior auth was never completed. [facility] informed me there is nothing they can do unless patient is willing to pay out of pocket for an additional prescription of the 25mg tablets. Reached out to her neurologist office regarding prior auth, was told they will attempt to expedite the prior auth so patient does not have to go without the medication.  -provided patient with good rx code, to  remaining prescription. Is agreeable to [facility]  Orders:    lamoTRIgine (LaMICtal) 25 mg tablet; Take 2 tablets (50 mg  total) by mouth 2 (two) times a day for 7 days, THEN 3 tablets (75 mg total) 2 (two) times a day for 7 days, THEN 4 tablets (100 mg total) 2 (two) times a day for 7 days, THEN 5 tablets (125 mg total) 2 (two) times a day for 7 days.           History of Present Illness   Patient presents for acute visit.   Is having episodes overnight where her blood glucose drops, as low as 40. Her sensor wakes her up and she has a snack or has a few sips of juice. She states she has never been symptomatic of hypoglycemia when she is woken up by the sensor. She reached out to her endocrinologist and just received her recommendations over mychart to increase her nightime lantus and check her blood sugar with a glucometer to confirm that the reading on the sensor is accurate.   Also has an issue with her lamictal. Is supposed to be taking 50mg of lamictal twice a day now, increased form 25mg BID on 3/11. States she has been taking 25mg BID yesterday and today because she does not have enough of the 25 tablets to increase to taking 50mg twice a day. She only has 3 tablets left and was told by [facility] that she cannot  her refill.    Review of Systems   Constitutional:  Negative for chills and fever.   HENT:  Negative for ear pain and sore throat.    Eyes:  Negative for pain and visual disturbance.   Respiratory:  Negative for cough and shortness of breath.    Cardiovascular:  Negative for chest pain and palpitations.   Gastrointestinal:  Negative for abdominal pain and vomiting.   Genitourinary:  Negative for dysuria and hematuria.   Musculoskeletal:  Negative for arthralgias and back pain.   Skin:  Negative for color change and rash.   Neurological:  Negative for seizures and syncope.   All other systems reviewed and are negative.      Objective   /80   Pulse 77   Temp 98.7 °F (37.1 °C) (Tympanic)   Wt 93 kg (205 lb)   SpO2 100%   BMI 32.11 kg/m²      Physical Exam  Constitutional:       General: She is not in acute  distress.     Appearance: Normal appearance. She is not ill-appearing or toxic-appearing.   HENT:      Head: Normocephalic and atraumatic.      Right Ear: External ear normal.      Left Ear: External ear normal.      Nose: Nose normal.   Eyes:      Conjunctiva/sclera: Conjunctivae normal.   Cardiovascular:      Rate and Rhythm: Normal rate and regular rhythm.      Heart sounds: Normal heart sounds. No murmur heard.  Pulmonary:      Effort: Pulmonary effort is normal. No respiratory distress.      Breath sounds: Normal breath sounds. No wheezing, rhonchi or rales.   Musculoskeletal:         General: Normal range of motion.   Skin:     General: Skin is warm and dry.   Neurological:      General: No focal deficit present.      Mental Status: She is alert and oriented to person, place, and time.   Psychiatric:         Mood and Affect: Mood normal.         Behavior: Behavior normal.

## 2025-03-12 NOTE — ASSESSMENT & PLAN NOTE
-has lamictal regimen instructed by neurology, has been taking 25mg BID and on 3/11 was supposed to increase to two 25mg tablets (50mg tota)l twice a day for 1 week, before increasing to 75mg (three 25mg tablets) BID x1 week, etc. Patient only has three 25mg tablets left and [facility] can not give her refill. Upon calling [facility], I was informed that patient's insurance will only cover two lamictal tablets daily, although patient needs to be taking four of the 25mg lamictal tablets to get to 50mg BID as prescribed by her neurologist. Per [facility] pharmacist, a prior auth was sent to her neurologist office , who prescribed the medication over 1 month ago, however the prior auth was never completed. [facility] informed me there is nothing they can do unless patient is willing to pay out of pocket for an additional prescription of the 25mg tablets. Reached out to her neurologist office regarding prior auth, was told they will attempt to expedite the prior auth so patient does not have to go without the medication.  -provided patient with good rx code, to  remaining prescription. [facility]  Orders:    lamoTRIgine (LaMICtal) 25 mg tablet; Take 2 tablets (50 mg total) by mouth 2 (two) times a day for 7 days, THEN 3 tablets (75 mg total) 2 (two) times a day for 7 days, THEN 4 tablets (100 mg total) 2 (two) times a day for 7 days, THEN 5 tablets (125 mg total) 2 (two) times a day for 7 days.

## 2025-03-13 DIAGNOSIS — E11.9 TYPE 2 DIABETES MELLITUS WITHOUT COMPLICATION, WITH LONG-TERM CURRENT USE OF INSULIN (HCC): ICD-10-CM

## 2025-03-13 DIAGNOSIS — Z79.4 TYPE 2 DIABETES MELLITUS WITHOUT COMPLICATION, WITH LONG-TERM CURRENT USE OF INSULIN (HCC): ICD-10-CM

## 2025-03-13 RX ORDER — INSULIN GLARGINE 100 [IU]/ML
INJECTION, SOLUTION SUBCUTANEOUS
Qty: 15 ML | Refills: 5 | Status: SHIPPED | OUTPATIENT
Start: 2025-03-13

## 2025-03-13 RX ORDER — ROSUVASTATIN CALCIUM 5 MG/1
5 TABLET, COATED ORAL DAILY
Qty: 30 TABLET | Refills: 5 | Status: SHIPPED | OUTPATIENT
Start: 2025-03-13

## 2025-03-14 ENCOUNTER — TELEPHONE (OUTPATIENT)
Age: 65
End: 2025-03-14

## 2025-03-14 DIAGNOSIS — Z79.4 TYPE 2 DIABETES MELLITUS WITHOUT COMPLICATION, WITH LONG-TERM CURRENT USE OF INSULIN (HCC): ICD-10-CM

## 2025-03-14 DIAGNOSIS — E11.9 TYPE 2 DIABETES MELLITUS WITHOUT COMPLICATION, WITH LONG-TERM CURRENT USE OF INSULIN (HCC): ICD-10-CM

## 2025-03-14 RX ORDER — INSULIN GLARGINE 100 [IU]/ML
INJECTION, SOLUTION SUBCUTANEOUS
Refills: 0 | OUTPATIENT
Start: 2025-03-14

## 2025-03-14 NOTE — TELEPHONE ENCOUNTER
Med refill  One touch lancets  Contour next test strip  [facility]

## 2025-03-17 DIAGNOSIS — Z86.73 HISTORY OF TIA (TRANSIENT ISCHEMIC ATTACK): ICD-10-CM

## 2025-03-17 RX ORDER — LANCETS
EACH MISCELLANEOUS
Qty: 100 EACH | Refills: 5 | Status: SHIPPED | OUTPATIENT
Start: 2025-03-17 | End: 2025-03-18 | Stop reason: SDUPTHER

## 2025-03-17 NOTE — TELEPHONE ENCOUNTER
Lantus SoloStar Pen 15 mL was just dispensed on 03/11/25 for a 107 day supply. Appears insurance only covers the brand and not the generic.

## 2025-03-18 ENCOUNTER — NURSE TRIAGE (OUTPATIENT)
Age: 65
End: 2025-03-18

## 2025-03-18 ENCOUNTER — TELEPHONE (OUTPATIENT)
Age: 65
End: 2025-03-18

## 2025-03-18 DIAGNOSIS — Z79.4 TYPE 2 DIABETES MELLITUS WITHOUT COMPLICATION, WITH LONG-TERM CURRENT USE OF INSULIN (HCC): ICD-10-CM

## 2025-03-18 DIAGNOSIS — E11.9 TYPE 2 DIABETES MELLITUS WITHOUT COMPLICATION, WITH LONG-TERM CURRENT USE OF INSULIN (HCC): ICD-10-CM

## 2025-03-18 RX ORDER — LANCETS
EACH MISCELLANEOUS
Qty: 100 EACH | Refills: 5 | Status: SHIPPED | OUTPATIENT
Start: 2025-03-18

## 2025-03-18 RX ORDER — ASPIRIN 81 MG/1
81 TABLET, CHEWABLE ORAL DAILY
Qty: 90 TABLET | Refills: 3 | Status: SHIPPED | OUTPATIENT
Start: 2025-03-18

## 2025-03-18 NOTE — TELEPHONE ENCOUNTER
Lancets (onetouch ultrasoft) lancets     [facility]      Please resend script for one touch ultrasoft lancets to [facility] pharmacy- Pharmacy called in stating that they were having issues with their system, and did not receive script sent in yesterday. Thank you

## 2025-03-18 NOTE — TELEPHONE ENCOUNTER
"FOLLOW UP: none    REASON FOR CONVERSATION: Medication Problem    DISPOSITION: Home Care    Per telephone note from today:      \"Lancets (onetouch ultrasoft) lancets      [facility]        Please resend script for one touch ultrasoft lancets to [facility] pharmacy- Pharmacy called in stating that they were having issues with their system, and did not receive script sent in yesterday. Thank you\"     Resending as ordered per protocol.     Reason for Disposition   Prescription prescribed recently is not at pharmacy and triager has access to patient's EMR and prescription is recorded in the EMR    Answer Assessment - Initial Assessment Questions  1. NAME of MEDICINE: \"What medicine(s) are you calling about?\"      Lancets  2. QUESTION: \"What is your question?\" (e.g., double dose of medicine, side effect)      Scripts did not go through to pharmacy  3. PRESCRIBER: \"Who prescribed the medicine?\" Reason: if prescribed by specialist, call should be referred to that group.      Issa Rashid    Protocols used: Medication Question Call-Adult-[place]    "

## 2025-03-20 ENCOUNTER — TELEPHONE (OUTPATIENT)
Dept: OTHER | Facility: HOSPITAL | Age: 65
End: 2025-03-20

## 2025-03-20 ENCOUNTER — TELEPHONE (OUTPATIENT)
Age: 65
End: 2025-03-20

## 2025-03-20 NOTE — TELEPHONE ENCOUNTER
Spoke to Leland at [facility]. Confirmed patient goes here and that patient has informed us that she is using [facility] services. Roseanne is going to be sending forms over and this will be placed on the doctor's desk.

## 2025-03-20 NOTE — TELEPHONE ENCOUNTER
Patient's pharmacy called the RX Refill Line. Message is being forwarded to the office.     Cassidy called, wanted to put in refill requests for 5 medications, but this would be the first time they'll be sent to [facility]  I asked that pt give us a call so we know for sure she's changing pharmacies as one of the medications was just sent to her local pharmacy

## 2025-03-27 ENCOUNTER — TELEPHONE (OUTPATIENT)
Dept: NEUROLOGY | Facility: CLINIC | Age: 65
End: 2025-03-27

## 2025-03-31 ENCOUNTER — TELEPHONE (OUTPATIENT)
Age: 65
End: 2025-03-31

## 2025-03-31 NOTE — TELEPHONE ENCOUNTER
Pt called in stating that her transportation [facility] was cancelled that  was not registered with them at that location. Pt needs transportation    Pt HFU appt is on 04/03/2025 with  in [place] .

## 2025-03-31 NOTE — TELEPHONE ENCOUNTER
MSW phoned Reji at [phone] and spoke with  Rep and scheduled upcoming medical trip for :   4/3  [facility], [address],  [address], [place], [place], [address] [phone]   11:45 arrival   Return:  from neuro after appt is   1:15pm      MSW phoned pt and lvm with trip info

## 2025-04-01 ENCOUNTER — TELEPHONE (OUTPATIENT)
Age: 65
End: 2025-04-01

## 2025-04-01 NOTE — TELEPHONE ENCOUNTER
MSW received incoming call from pt, she is aware that  [facility] will provide the transportation.   This writer will contact [facility] tomorrow to confirm the  time for 4/3

## 2025-04-01 NOTE — TELEPHONE ENCOUNTER
Patient called to follow up on previous home health services referral for PT,OT, Nursing back from d/c from ED on 2/12. Patient does not recall declining those services at that time, and really needs a new referral for home health to be placed as she is having some trouble at home. Patient has spoken with her Neurology team, regarding her double vision, feeling off balance, and thinking it may be related to Lamictal. In the mean time, please consult with PCP for orders to be placed. Patient needs PT, OT, and Skilled Nursing especially to help with her medications. Please follow up with patient after consulting with PCP.   Patient also uses [employer] for transportation if office visit is needed, that number is [phone]. That is approved for 1 year.

## 2025-04-02 ENCOUNTER — TELEPHONE (OUTPATIENT)
Age: 65
End: 2025-04-02

## 2025-04-02 ENCOUNTER — OFFICE VISIT (OUTPATIENT)
Dept: FAMILY MEDICINE CLINIC | Facility: CLINIC | Age: 65
End: 2025-04-02
Payer: MEDICARE

## 2025-04-02 VITALS
DIASTOLIC BLOOD PRESSURE: 58 MMHG | TEMPERATURE: 96.4 F | HEIGHT: 68 IN | HEART RATE: 88 BPM | SYSTOLIC BLOOD PRESSURE: 100 MMHG | WEIGHT: 202 LBS | BODY MASS INDEX: 30.62 KG/M2 | OXYGEN SATURATION: 98 %

## 2025-04-02 DIAGNOSIS — G40.109 FOCAL EPILEPSY (HCC): Primary | ICD-10-CM

## 2025-04-02 PROCEDURE — 99213 OFFICE O/P EST LOW 20 MIN: CPT

## 2025-04-02 NOTE — TELEPHONE ENCOUNTER
JANET phoned tristan at [phone] and was informed   That Tristan  from home to tomorrows appt with Neuro   Will be between 10:15am to 10:20am      JANET phoned pt and made aware of same.

## 2025-04-02 NOTE — PROGRESS NOTES
"Name: Ynes Mendoza      : 1960      MRN: [id number]  Encounter Provider: Taylor Durand DO  Encounter Date: 2025   Encounter department: [facility]  :  Assessment & Plan  Focal epilepsy (HCC)  -continue taking lamictal 75mg BID, as this is the highest dose she tolerated without side effects, until follow up with neurology tomorrow.   -ED precautions for signs/sx of seizure and stroke reviewed               History of Present Illness   Presents for acute same day visit. Has been working with neurology to gradually increase the dose of her lamictal, which she takes for seizure, by 25mg weekly. Is currently supposed to be taking 125mg BID, explains she took her first day of this dosing 2 days ago and started with nausea, vomiting. She had been feeling off-balance since increasing to 100mg BID last week. States the last dose she tolerated without any side-effects was 75mg BID.    Made her neurologist aware of these symptoms. They instructed her to follow up with her PCP and will see her in the office tomorrow. Nausea is now subsided.     Only took 25mg this AM.          Review of Systems   Constitutional:  Negative for chills and fever.   HENT:  Negative for ear pain and sore throat.    Eyes:  Negative for pain and visual disturbance.   Respiratory:  Negative for cough and shortness of breath.    Cardiovascular:  Negative for chest pain and palpitations.   Gastrointestinal:  Negative for abdominal pain and vomiting.   Genitourinary:  Negative for dysuria and hematuria.   Musculoskeletal:  Negative for arthralgias and back pain.   Skin:  Negative for color change and rash.   Neurological:  Positive for dizziness and light-headedness. Negative for seizures and syncope.   All other systems reviewed and are negative.      Objective   /58 (BP Location: Right arm, Patient Position: Sitting, Cuff Size: Large)   Pulse 88   Temp (!) 96.4 °F (35.8 °C) (Tympanic)   Ht 5' 7.75\" (1.721 m)   " Wt 91.6 kg (202 lb)   SpO2 98%   BMI 30.94 kg/m²      Physical Exam  Constitutional:       General: She is not in acute distress.     Appearance: Normal appearance. She is not ill-appearing or toxic-appearing.   HENT:      Head: Normocephalic and atraumatic.      Right Ear: External ear normal.      Left Ear: External ear normal.      Nose: Nose normal.   Eyes:      Conjunctiva/sclera: Conjunctivae normal.   Cardiovascular:      Rate and Rhythm: Normal rate and regular rhythm.      Heart sounds: Normal heart sounds. No murmur heard.  Pulmonary:      Effort: Pulmonary effort is normal. No respiratory distress.      Breath sounds: Normal breath sounds. No wheezing, rhonchi or rales.   Musculoskeletal:         General: Normal range of motion.   Skin:     General: Skin is warm and dry.   Neurological:      General: No focal deficit present.      Mental Status: She is alert and oriented to person, place, and time. Mental status is at baseline.      Cranial Nerves: No cranial nerve deficit.      Motor: No weakness.   Psychiatric:         Mood and Affect: Mood normal.         Behavior: Behavior normal.

## 2025-04-02 NOTE — TELEPHONE ENCOUNTER
Pt called to report side effects of a medication lamoTRIgine (LaMICtal) 150 MG tablet, that is prescribed by Neuro. She is vomiting,  and losing her balance. Pt spoke to neuro that she has appt with tomorrow and they advised to speak to PCP. Please advise

## 2025-04-03 ENCOUNTER — TELEPHONE (OUTPATIENT)
Age: 65
End: 2025-04-03

## 2025-04-03 ENCOUNTER — OFFICE VISIT (OUTPATIENT)
Dept: NEUROLOGY | Facility: CLINIC | Age: 65
End: 2025-04-03
Payer: MEDICARE

## 2025-04-03 VITALS
HEIGHT: 68 IN | SYSTOLIC BLOOD PRESSURE: 104 MMHG | BODY MASS INDEX: 30.31 KG/M2 | DIASTOLIC BLOOD PRESSURE: 56 MMHG | WEIGHT: 200 LBS | HEART RATE: 84 BPM

## 2025-04-03 DIAGNOSIS — G40.109 FOCAL EPILEPSY (HCC): Primary | ICD-10-CM

## 2025-04-03 DIAGNOSIS — E11.65 TYPE 2 DIABETES MELLITUS WITH HYPERGLYCEMIA, WITH LONG-TERM CURRENT USE OF INSULIN (HCC): ICD-10-CM

## 2025-04-03 DIAGNOSIS — Z79.4 TYPE 2 DIABETES MELLITUS WITH HYPERGLYCEMIA, WITH LONG-TERM CURRENT USE OF INSULIN (HCC): ICD-10-CM

## 2025-04-03 PROCEDURE — G2212 PROLONG OUTPT/OFFICE VIS: HCPCS | Performed by: PSYCHIATRY & NEUROLOGY

## 2025-04-03 PROCEDURE — 99215 OFFICE O/P EST HI 40 MIN: CPT | Performed by: PSYCHIATRY & NEUROLOGY

## 2025-04-03 RX ORDER — GLIMEPIRIDE 1 MG/1
1 TABLET ORAL DAILY
Qty: 90 TABLET | Refills: 0 | Status: SHIPPED | OUTPATIENT
Start: 2025-04-03

## 2025-04-03 RX ORDER — LEVETIRACETAM 500 MG/1
TABLET ORAL
Qty: 360 TABLET | Refills: 3 | Status: SHIPPED | OUTPATIENT
Start: 2025-04-03

## 2025-04-03 NOTE — ASSESSMENT & PLAN NOTE
Overall, the description of her events are consistent with focal impaired awareness seizures, and because they have occurred on a recurrent basis, she does meet criteria for epilepsy.  She has not been tolerating lamotrigine due to side effects, as detailed below.  Because she is not able to tolerate higher/effective doses of lamotrigine, it will be important to change this to a different medication.  We discussed potential options, and I will have her change to levetiracetam.    --She will start taking levetiracetam 500 mg twice a day and stop taking lamotrigine.  When she is taking levetiracetam for 1 week, she will increase her dose to be 1000 mg twice a day.  We discussed the risks and rationale of starting levetiracetam including potential side effects.  If she would have difficulty with mood, she will give our office a call    --We discussed seizure first-aid, seizure safety and driving restrictions in [place].    --She preferred to keep her appointment with Dr. Rodriguez for May.  Ultimately she likely would only need to follow-up with 1 neurologist.  I will plan on her seeing me back in about 3 months  Orders:    levETIRAcetam (Keppra) 500 mg tablet; Take 1 tab (500 mg) by mouth twice a day for 1 week, then take 2 tabs (500 mg) twice a day.

## 2025-04-03 NOTE — TELEPHONE ENCOUNTER
Pt arrived to neuro appt.     Pt active with transportation w/ [employer] and she is aware of  time after appt.     MSW remains available for future social needs.
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
## 2025-04-03 NOTE — PATIENT INSTRUCTIONS
-- Start taking Levetiracetam (Keppra) 1 tab (500 mg) twice a day for 1 week, then take 2 tabs (1000 mg) twice a day.     -- when you take your second dose of Levetiracetam, you can stop taking Lamotrigine.     -- Keep track of any additional episodes. If these continue to occur, we could consider increasing your dose.

## 2025-04-03 NOTE — PROGRESS NOTES
Review of Systems   Constitutional:  Negative for appetite change, fatigue and fever.   HENT: Negative.  Negative for hearing loss, tinnitus, trouble swallowing and voice change.    Eyes: Negative.  Negative for photophobia, pain and visual disturbance.   Respiratory: Negative.  Negative for shortness of breath.    Cardiovascular: Negative.  Negative for palpitations.   Gastrointestinal:  Positive for diarrhea, nausea (due to medication at a higer dose) and vomiting (due to higher dose of medication).   Endocrine: Negative.  Negative for cold intolerance.   Genitourinary: Negative.  Negative for dysuria, frequency and urgency.   Musculoskeletal:  Negative for back pain, gait problem, myalgias, neck pain and neck stiffness.   Skin: Negative.  Negative for rash.   Allergic/Immunologic: Negative.    Neurological:  Negative for dizziness, tremors, seizures, syncope, facial asymmetry, speech difficulty, weakness, light-headedness, numbness and headaches.        Whole body shaking   Hematological: Negative.  Does not bruise/bleed easily.   Psychiatric/Behavioral: Negative.  Negative for confusion, hallucinations and sleep disturbance.

## 2025-04-03 NOTE — TELEPHONE ENCOUNTER
Patient called following up on office visit yesterday. Patient stated Dr. Landon was going to discuss with Dr. Rodriguez (neuro) if patient would qualify for disability. [facility] that would need to be signed by her physician. Please review and advise.

## 2025-04-03 NOTE — ASSESSMENT & PLAN NOTE
-continue taking lamictal 75mg BID, as this is the highest dose she tolerated without side effects, until follow up with neurology tomorrow.   -ED precautions for signs/sx of seizure and stroke reviewed

## 2025-04-04 DIAGNOSIS — E11.9 TYPE 2 DIABETES MELLITUS WITHOUT COMPLICATION, WITH LONG-TERM CURRENT USE OF INSULIN (HCC): ICD-10-CM

## 2025-04-04 DIAGNOSIS — Z79.4 TYPE 2 DIABETES MELLITUS WITHOUT COMPLICATION, WITH LONG-TERM CURRENT USE OF INSULIN (HCC): ICD-10-CM

## 2025-04-05 RX ORDER — INSULIN GLARGINE 100 [IU]/ML
INJECTION, SOLUTION SUBCUTANEOUS
Qty: 15 ML | Refills: 11 | Status: SHIPPED | OUTPATIENT
Start: 2025-04-05

## 2025-04-06 RX ORDER — LAMOTRIGINE 25 MG/1
25 TABLET ORAL DAILY
COMMUNITY
Start: 2025-04-05

## 2025-04-08 NOTE — TELEPHONE ENCOUNTER
Patient calling in regarding disability paperwork. I called office and forms are not available in office. She has computer access and we can see what needs to be completed. Patient will start to work on completing forms and then schedule OV with PCP to complete process.

## 2025-04-10 DIAGNOSIS — E55.9 VITAMIN D DEFICIENCY: ICD-10-CM

## 2025-04-10 DIAGNOSIS — E53.9 VITAMIN B DEFICIENCY: Primary | ICD-10-CM

## 2025-04-11 ENCOUNTER — TELEPHONE (OUTPATIENT)
Age: 65
End: 2025-04-11

## 2025-04-11 DIAGNOSIS — G40.109 FOCAL EPILEPSY (HCC): Primary | ICD-10-CM

## 2025-04-11 RX ORDER — B-COMPLEX WITH VITAMIN C
1 TABLET ORAL DAILY
Qty: 90 CAPSULE | Refills: 3 | Status: SHIPPED | OUTPATIENT
Start: 2025-04-11

## 2025-04-11 NOTE — TELEPHONE ENCOUNTER
"Inbound call received from Patient to follow up on her prescription for keppra with directions: Take 1 tab (500 mg) by mouth twice a day for 1 week, then take 2 tabs (500 mg) twice a day.    Steve states she has not received the medication yet. CTS confirmed pharmacy:   [facility]     Per chart they received the script:    E-Prescribing Status    Outpatient Medication Detail    levETIRAcetam (Keppra) 500 mg tablet        Sig: Take 1 tab (500 mg) by mouth twice a day for 1 week, then take 2 tabs (500 mg) twice a day.        Sent to pharmacy as: levETIRAcetam 500 MG Oral Tablet (Keppra)        Class: Normal        E-Prescribing Status: Receipt confirmed by pharmacy (4/3/2025 12:51 PM EDT)          CTS apologized they have not received the medication yet and we will call out to the Pharmacy to see how we can assist. Patient prefers to be called back with an update at [phone] and we may leave a voicemail.     Char would like to start Keppra as soon as possible, she is still taking her old medication that gives her side effects. Patient side effects are her legs will shake, cannot walk straight, dizziness, and cannot remember what she is saying.    Outbound call made to [facility] and CTS spoke with representative Ian. Ian states Pre verification needs to be done by their pharmacist. Ian clarified it is not a Prior Auth and there is nothing for the office to do. CTS requested it to be marked urgent/rush so that the Patient can receive it because she has been waiting since 04/03/2025. Ian suggested following up again later today and states once pre verification is completed they can rush ship it.      Outbound call made to Patient and she was provided update. Char was provided [facility] pharmacy phone number for follow up. Char verbalized understanding. Char encouraged to call us with any updates.     Dr. Soto please be aware patient is still taking her \"old " "Medication\" and has not started the keppra yet due to Pharmacy. Patient is still experiencing the same side effects discussed at office visit but they have not worsened, just continued. Please advise if there is anything else you can think of at this time. Thank you!      "

## 2025-04-14 RX ORDER — LEVETIRACETAM 500 MG/1
500 TABLET ORAL EVERY 12 HOURS SCHEDULED
Qty: 14 TABLET | Refills: 0 | Status: SHIPPED | OUTPATIENT
Start: 2025-04-14

## 2025-04-14 NOTE — TELEPHONE ENCOUNTER
Called re: below and spoke with kary, who verbalized an understanding. She will have someone  her script for her as she doesn't feel she should be out walking around.

## 2025-04-14 NOTE — TELEPHONE ENCOUNTER
"Pt called on the triage line to follow up on her call from Friday, as she was still experiencing some pretty intense side effects from her lamotrigine (same c/o as from 4/11/25). Informed pt that this writer would reach back out to [facility] to see what the issue is.     Spoke with Alfredo at [facility] and he stated that the levetiracetam would be at the pt's residence in 3-5 business days. When asked if this could be expedited, the answer was no, that this was just there internal process of \"Pre verification\" and that the pt should have the medication soon, and there was nothing for our office to do.    Called pt back with this information and offered to have a temporary supply sent to [facility] for the pt until the other script arrives, and she was agreeable.    Please sign off if agreeable. Thank you.  "

## 2025-04-23 ENCOUNTER — TELEPHONE (OUTPATIENT)
Age: 65
End: 2025-04-23

## 2025-04-23 DIAGNOSIS — E78.5 HYPERLIPIDEMIA LDL GOAL <100: ICD-10-CM

## 2025-04-23 DIAGNOSIS — G40.109 FOCAL EPILEPSY (HCC): ICD-10-CM

## 2025-04-23 NOTE — TELEPHONE ENCOUNTER
Patient wants to have a three way virtual visit with doctor and her medical liason sister who lives in [facility]. Can the link be shared? Or is there a way for her sister to join in just to hear the conversation?    Please call to discuss.     She is currently scheduled for a virtual on 5/16 and she is in the state of [place].     Thank you!

## 2025-04-24 RX ORDER — ROSUVASTATIN CALCIUM 5 MG/1
5 TABLET, COATED ORAL DAILY
Qty: 90 TABLET | Refills: 1 | Status: ON HOLD | OUTPATIENT
Start: 2025-04-24

## 2025-04-25 RX ORDER — LEVETIRACETAM 500 MG/1
500 TABLET ORAL EVERY 12 HOURS SCHEDULED
Qty: 28 TABLET | Refills: 0 | Status: ON HOLD | OUTPATIENT
Start: 2025-04-25

## 2025-04-30 ENCOUNTER — TELEPHONE (OUTPATIENT)
Age: 65
End: 2025-04-30

## 2025-04-30 DIAGNOSIS — Z79.4 TYPE 2 DIABETES MELLITUS WITH HYPERGLYCEMIA, WITH LONG-TERM CURRENT USE OF INSULIN (HCC): ICD-10-CM

## 2025-04-30 DIAGNOSIS — E11.65 TYPE 2 DIABETES MELLITUS WITH HYPERGLYCEMIA, WITH LONG-TERM CURRENT USE OF INSULIN (HCC): ICD-10-CM

## 2025-04-30 RX ORDER — ACYCLOVIR 400 MG/1
1 TABLET ORAL
Qty: 3 EACH | Refills: 5 | Status: ON HOLD | OUTPATIENT
Start: 2025-04-30

## 2025-04-30 RX ORDER — ACYCLOVIR 400 MG/1
1 TABLET ORAL ONCE
Qty: 1 EACH | Refills: 0 | Status: SHIPPED | OUTPATIENT
Start: 2025-04-30 | End: 2025-04-30

## 2025-04-30 NOTE — TELEPHONE ENCOUNTER
Patient called asking for new Dexcom G7  and . Her  went through the wash and she cannot find her . She called the Dexcom company and they said to have PCP put new orders in. She gets her supplies from [facility] pharmacy.

## 2025-04-30 NOTE — TELEPHONE ENCOUNTER
Contacted patient has been notify ok for sister danyel to join on virtual visit on communication consent. When day comes patient will get ready by staff and send sister an invite .

## 2025-05-01 ENCOUNTER — APPOINTMENT (EMERGENCY)
Dept: RADIOLOGY | Facility: HOSPITAL | Age: 65
End: 2025-05-01
Payer: MEDICARE

## 2025-05-01 ENCOUNTER — HOSPITAL ENCOUNTER (INPATIENT)
Facility: HOSPITAL | Age: 65
LOS: 7 days | Discharge: HOME HEALTH | End: 2025-05-09
Attending: EMERGENCY MEDICINE | Admitting: STUDENT IN AN ORGANIZED HEALTH CARE EDUCATION/TRAINING PROGRAM
Payer: MEDICARE

## 2025-05-01 DIAGNOSIS — G40.109 FOCAL EPILEPSY (HCC): ICD-10-CM
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
## 2025-05-01 DIAGNOSIS — M62.81 GENERALIZED MUSCLE WEAKNESS: ICD-10-CM

## 2025-05-01 DIAGNOSIS — R29.90 STROKE-LIKE SYMPTOMS: Primary | ICD-10-CM

## 2025-05-01 DIAGNOSIS — R56.9 SEIZURE-LIKE ACTIVITY (HCC): ICD-10-CM

## 2025-05-01 LAB
ERYTHROCYTE [DISTWIDTH] IN BLOOD BY AUTOMATED COUNT: 12.2 % (ref 11.6–15.1)
GLUCOSE SERPL-MCNC: 163 MG/DL (ref 65–140)
HCT VFR BLD AUTO: 39.5 % (ref 34.8–46.1)
HGB BLD-MCNC: 12.9 G/DL (ref 11.5–15.4)
MCH RBC QN AUTO: 28.9 PG (ref 26.8–34.3)
MCHC RBC AUTO-ENTMCNC: 32.7 G/DL (ref 31.4–37.4)
MCV RBC AUTO: 89 FL (ref 82–98)
PLATELET # BLD AUTO: 152 THOUSANDS/UL (ref 149–390)
PMV BLD AUTO: 10.7 FL (ref 8.9–12.7)
RBC # BLD AUTO: 4.46 MILLION/UL (ref 3.81–5.12)
WBC # BLD AUTO: 6.3 THOUSAND/UL (ref 4.31–10.16)

## 2025-05-01 PROCEDURE — 70498 CT ANGIOGRAPHY NECK: CPT

## 2025-05-01 PROCEDURE — 85730 THROMBOPLASTIN TIME PARTIAL: CPT | Performed by: EMERGENCY MEDICINE

## 2025-05-01 PROCEDURE — 85027 COMPLETE CBC AUTOMATED: CPT | Performed by: EMERGENCY MEDICINE

## 2025-05-01 PROCEDURE — 84484 ASSAY OF TROPONIN QUANT: CPT | Performed by: EMERGENCY MEDICINE

## 2025-05-01 PROCEDURE — 70496 CT ANGIOGRAPHY HEAD: CPT

## 2025-05-01 PROCEDURE — 99285 EMERGENCY DEPT VISIT HI MDM: CPT | Performed by: EMERGENCY MEDICINE

## 2025-05-01 PROCEDURE — 80048 BASIC METABOLIC PNL TOTAL CA: CPT | Performed by: EMERGENCY MEDICINE

## 2025-05-01 PROCEDURE — 99285 EMERGENCY DEPT VISIT HI MDM: CPT

## 2025-05-01 PROCEDURE — 85610 PROTHROMBIN TIME: CPT | Performed by: EMERGENCY MEDICINE

## 2025-05-01 PROCEDURE — 36415 COLL VENOUS BLD VENIPUNCTURE: CPT | Performed by: EMERGENCY MEDICINE

## 2025-05-01 PROCEDURE — 93005 ELECTROCARDIOGRAM TRACING: CPT

## 2025-05-01 PROCEDURE — 82948 REAGENT STRIP/BLOOD GLUCOSE: CPT

## 2025-05-01 RX ORDER — ONDANSETRON 2 MG/ML
1 INJECTION INTRAMUSCULAR; INTRAVENOUS ONCE
Status: COMPLETED | OUTPATIENT
Start: 2025-05-01 | End: 2025-05-01

## 2025-05-01 RX ADMIN — IOHEXOL 85 ML: 350 INJECTION, SOLUTION INTRAVENOUS at 23:56

## 2025-05-02 PROBLEM — R56.9 SEIZURE-LIKE ACTIVITY (HCC): Status: ACTIVE | Noted: 2025-02-08

## 2025-05-02 LAB
2HR DELTA HS TROPONIN: 0 NG/L
ANION GAP SERPL CALCULATED.3IONS-SCNC: 9 MMOL/L (ref 4–13)
APTT PPP: 27 SECONDS (ref 23–34)
ATRIAL RATE: 394 BPM
BUN SERPL-MCNC: 16 MG/DL (ref 5–25)
CALCIUM SERPL-MCNC: 9.7 MG/DL (ref 8.4–10.2)
CARDIAC TROPONIN I PNL SERPL HS: 5 NG/L (ref ?–50)
CARDIAC TROPONIN I PNL SERPL HS: 5 NG/L (ref ?–50)
CHLORIDE SERPL-SCNC: 105 MMOL/L (ref 96–108)
CO2 SERPL-SCNC: 27 MMOL/L (ref 21–32)
CREAT SERPL-MCNC: 1.04 MG/DL (ref 0.6–1.3)
GFR SERPL CREATININE-BSD FRML MDRD: 57 ML/MIN/1.73SQ M
GLUCOSE SERPL-MCNC: 127 MG/DL (ref 65–140)
GLUCOSE SERPL-MCNC: 147 MG/DL (ref 65–140)
GLUCOSE SERPL-MCNC: 155 MG/DL (ref 65–140)
GLUCOSE SERPL-MCNC: 88 MG/DL (ref 65–140)
GLUCOSE SERPL-MCNC: 98 MG/DL (ref 65–140)
INR PPP: 0.97 (ref 0.85–1.19)
LEVETIRACETAM SERPL-MCNC: 12.9 UG/ML (ref 12–46)
POTASSIUM SERPL-SCNC: 4 MMOL/L (ref 3.5–5.3)
PROTHROMBIN TIME: 13.2 SECONDS (ref 12.3–15)
QRS AXIS: 22 DEGREES
QRSD INTERVAL: 96 MS
QT INTERVAL: 410 MS
QTC INTERVAL: 445 MS
SODIUM SERPL-SCNC: 141 MMOL/L (ref 135–147)
T WAVE AXIS: 14 DEGREES
VENTRICULAR RATE: 71 BPM

## 2025-05-02 PROCEDURE — 99223 1ST HOSP IP/OBS HIGH 75: CPT | Performed by: PSYCHIATRY & NEUROLOGY

## 2025-05-02 PROCEDURE — 83520 IMMUNOASSAY QUANT NOS NONAB: CPT

## 2025-05-02 PROCEDURE — 82948 REAGENT STRIP/BLOOD GLUCOSE: CPT

## 2025-05-02 PROCEDURE — 84484 ASSAY OF TROPONIN QUANT: CPT

## 2025-05-02 PROCEDURE — 93010 ELECTROCARDIOGRAM REPORT: CPT | Performed by: INTERNAL MEDICINE

## 2025-05-02 PROCEDURE — 92610 EVALUATE SWALLOWING FUNCTION: CPT

## 2025-05-02 RX ORDER — PRAVASTATIN SODIUM 40 MG
40 TABLET ORAL
Status: DISCONTINUED | OUTPATIENT
Start: 2025-05-02 | End: 2025-05-09 | Stop reason: HOSPADM

## 2025-05-02 RX ORDER — INSULIN LISPRO 100 [IU]/ML
1-6 INJECTION, SOLUTION INTRAVENOUS; SUBCUTANEOUS
Status: DISCONTINUED | OUTPATIENT
Start: 2025-05-02 | End: 2025-05-09 | Stop reason: HOSPADM

## 2025-05-02 RX ORDER — LEVETIRACETAM 500 MG/5ML
2000 INJECTION, SOLUTION, CONCENTRATE INTRAVENOUS ONCE
Status: COMPLETED | OUTPATIENT
Start: 2025-05-02 | End: 2025-05-02

## 2025-05-02 RX ORDER — ENOXAPARIN SODIUM 100 MG/ML
40 INJECTION SUBCUTANEOUS DAILY
Status: DISCONTINUED | OUTPATIENT
Start: 2025-05-02 | End: 2025-05-09 | Stop reason: HOSPADM

## 2025-05-02 RX ORDER — LEVETIRACETAM 500 MG/5ML
1000 INJECTION, SOLUTION, CONCENTRATE INTRAVENOUS EVERY 12 HOURS SCHEDULED
Status: DISCONTINUED | OUTPATIENT
Start: 2025-05-02 | End: 2025-05-06

## 2025-05-02 RX ORDER — LORAZEPAM 2 MG/ML
2 INJECTION INTRAMUSCULAR ONCE
Status: COMPLETED | OUTPATIENT
Start: 2025-05-02 | End: 2025-05-02

## 2025-05-02 RX ORDER — DIPHENHYDRAMINE HCL 25 MG
25 TABLET ORAL EVERY 6 HOURS PRN
Status: DISCONTINUED | OUTPATIENT
Start: 2025-05-02 | End: 2025-05-09 | Stop reason: HOSPADM

## 2025-05-02 RX ORDER — INSULIN GLARGINE 100 [IU]/ML
14 INJECTION, SOLUTION SUBCUTANEOUS
Status: DISCONTINUED | OUTPATIENT
Start: 2025-05-02 | End: 2025-05-09 | Stop reason: HOSPADM

## 2025-05-02 RX ORDER — ASPIRIN 81 MG/1
81 TABLET, CHEWABLE ORAL DAILY
Status: DISCONTINUED | OUTPATIENT
Start: 2025-05-02 | End: 2025-05-09 | Stop reason: HOSPADM

## 2025-05-02 RX ORDER — LEVETIRACETAM 500 MG/5ML
1000 INJECTION, SOLUTION, CONCENTRATE INTRAVENOUS EVERY 12 HOURS SCHEDULED
Status: DISCONTINUED | OUTPATIENT
Start: 2025-05-02 | End: 2025-05-02

## 2025-05-02 RX ADMIN — LEVETIRACETAM 1000 MG: 100 INJECTION, SOLUTION INTRAVENOUS at 08:42

## 2025-05-02 RX ADMIN — LORAZEPAM 2 MG: 2 INJECTION INTRAMUSCULAR; INTRAVENOUS at 00:31

## 2025-05-02 RX ADMIN — LEVETIRACETAM 1000 MG: 100 INJECTION, SOLUTION INTRAVENOUS at 21:45

## 2025-05-02 RX ADMIN — INSULIN GLARGINE 14 UNITS: 100 INJECTION, SOLUTION SUBCUTANEOUS at 21:45

## 2025-05-02 RX ADMIN — LEVETIRACETAM 2000 MG: 100 INJECTION, SOLUTION INTRAVENOUS at 00:40

## 2025-05-02 RX ADMIN — INSULIN GLARGINE 14 UNITS: 100 INJECTION, SOLUTION SUBCUTANEOUS at 01:43

## 2025-05-02 RX ADMIN — PRAVASTATIN SODIUM 40 MG: 40 TABLET ORAL at 17:18

## 2025-05-02 RX ADMIN — ENOXAPARIN SODIUM 40 MG: 40 INJECTION SUBCUTANEOUS at 08:42

## 2025-05-02 NOTE — QUICK NOTE
Patient seen and examined by the overnight resident. Preliminary recommendations are as outlined. Formal H&P to follow in the morning and will be seen by the Neurology teaching service.    Overnight H&P - Neurology   Name: Ynes Mendoza 64 y.o. female I MRN: [id number]  Unit/Bed#: ED 06 I Date of Admission: 5/1/2025   Date of Service: 5/2/2025 I Hospital Day: 0     Assessment & Plan  Seizure-like activity (HCC)  Neurology consulted to address stroke alert for concern of mixed aphasia of uncertain duration/last known well, which appears to be nearly identical to multiple prior presentations which ultimately never revealed stroke but rather EEG findings which were consistent with seizure predisposition although no event captured  Since most recent prior presentation for similar symptoms in 02/2025, patient has visited with outpatient epileptologist as requested, who reports that the description of her events were consistent with focal impaired awareness seizures, even warranting diagnosis of epilepsy due to the recurrent nature.  Patient was initially placed on Lamictal during the prior hospitalization, but due to intolerable side effects was moved over to Keppra approximately 1 month ago, was supposed to take 500 every 12 for 1 week then moved to 1 g every 12 indefinitely, however patient unable to describe if medication being taken appropriately.    Workup:  Initial evaluation includes SBP in 140s, BG of 160s, all other VSS, NIH 13, near complete inability to follow simple commands, and inability to meaningfully participate in conversation or history although can state an occasional yes or no which may or may not be accurate.  CTH NAIA  CTAHN No large vessel arterial occlusion, significant flow-limiting stenosis, or focal saccular aneurysm identified. Mildly diminished caliber of the distal right vertebral artery/V4 segment and fetal origin of the right posterior cerebral artery noted. No enhancing brain mass/fluid  collection or evidence of vascular malformation. No enhancing soft tissue neck mass, fluid collection, or cervical lymphadenopathy.  MRIB w/o, 08/2024: No acute intracranial lesion. Atrophy and nonspecific white matter T2 hyperintensities likely secondary to chronic small vessel ischemia.   VEEG, 02/2025: This 23 hour vEEG captures right frontocentral spike and wave discharges, sometimes with a more generalized field of spread.  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 In addition, this study is indicative of a mild diffuse encephalopathy. No seizures are recorded.     Impression:  Due to stereotyped recurrent events at least occurring over the past many months if not longer, in addition to outpatient epileptologist evaluation, much higher suspicion for focal impaired awareness seizure over lower suspicion of TIA/CVA as had been worked up on multiple occasions in the past each time to negative results, reason for possible seizure unclear at this time but will undergo below workup.    Plan:  2 mg Ativan trial  Obtain Keppra level  Subsequently loaded with Keppra 2 g  Continue with Keppra 1G every 12 hours  Will obtain the outpatient ordered MRI brain seizure with and without  Will hold off on EEG but defer to day team final decision  Seizure precautions placed  Begun on telemetry for 24+ hours  OT/PT/ST requested  Okay to continue aspirin and hospital equivalent statin to home    I have discussed with Dr. Coreas the above plan to treat pt. She agrees with the plan.  Please contact the SecureChat role for the Neurology service with any questions/concerns.     Thrombolytic Decision: Patient not a candidate. Unclear time of onset outside appropriate time window.     Recommendations for outpatient neurological follow up have yet to be determined.      History of Present Illness   Hx and PE limited by: aphasia  Patient last known  "well: unknown  Stroke alert called: 2332  Neurology time of arrival: 2336  HPI: Ynes Mendoza is a 64 y.o. female who presents with evidence of mixed aphasia for unknown duration, consistent with description of prior events in the past for which patient has undergone significant workup including several stroke pathway workups including receiving TNK multiple times, obtain video EEG, and had pacemaker placement, all in attempts to workup and then subsequently treat the underlying condition.  Patient's symptoms include difficulty with following commands, only following the most simple commands and not consistently in this, as well as inability to meaningfully communicate such as naming objects or providing any pertinent history such as when symptoms began or if in any pain.  Patient last presented in 02/2025 with near identical presentation, which was post PPM, received TNK at that time but repeat CT head multiple days after presentation did not reveal any particular related abnormal finding, instead video EEG results revealed \"This 23 hour vEEG captures right frontocentral spike and wave discharges, sometimes with a more generalized field of spread.  This is favored to support a diagnosis of right hemispheric focal epilepsy, versus less likely a fragmented generalized epilepsy.\"  It was noted that patient did not have the symptoms of presentation during the time of the EEG reading, thus the above quotation was presumably interictal.  Patient was then placed on Lamictal with slow ramp-up to be cautious of potential side effects, had not made it to full dose when side effects are described as intolerable and outpatient epileptologist switched over to Keppra, which records show patient initially had some concerns over side effects but after discussion with same specialist was agreeable to continue, however has had difficulty on at least 1 or 2 occasions in obtaining the medication, leading to concern that patient may " have now missed doses or otherwise be subtherapeutic, will evaluate with lab draw prior to loading in hospital.  Patient initially presented as a stroke alert due to the obvious aphasia, NIH 13 as noted below, most pertinent is lower extremities equally weak on exam although limited by command following, upper extremities bilaterally no drift.      Review of Systems   Unable to perform ROS: Acuity of condition   Patient's prior history (PMH, PSH, SH, FH, etc) not obtainable due to aphasia & no reachable contact    Objective :  Temp:  [98.1 °F (36.7 °C)] 98.1 °F (36.7 °C)  HR:  [63-69] 63  BP: (142-147)/(65-76) 143/65  Resp:  [18] 18  SpO2:  [95 %-100 %] 95 %  O2 Device: None (Room air)    Physical Exam  Vitals reviewed.   Constitutional:       General: She is awake. She is not in acute distress.     Appearance: She is not ill-appearing, toxic-appearing or diaphoretic.   HENT:      Head: Normocephalic and atraumatic.      Right Ear: External ear normal.      Left Ear: External ear normal.      Nose: Nose normal.      Mouth/Throat:      Pharynx: Oropharynx is clear.   Eyes:      General: Lids are normal. No scleral icterus.        Right eye: No discharge.         Left eye: No discharge.      Extraocular Movements: Extraocular movements intact.      Conjunctiva/sclera: Conjunctivae normal.      Pupils: Pupils are equal, round, and reactive to light.   Neurological:      Mental Status: She is alert.      Cranial Nerves: No dysarthria.      Neurological Exam  Mental Status  Awake and alert. no dysarthria present. Mixed aphasia present. Unable to follow commands.  Patient does not answer any orientation questions, not able to identify any objects, may have followed a couple simple commands such as close eyes and open.     Cranial Nerves  CN II: Visual fields full to confrontation.  CN III, IV, VI: Extraocular movements intact bilaterally. Normal lids and orbits bilaterally. Pupils equal round and reactive to light  bilaterally.  CN VII: Full and symmetric facial movement.  CN VIII: Hearing is normal.     Motor   The following abnormal movements were seen:  High frequency low amplitude tremulousness to bilateral upper extremities and head fluctuating intensity throughout exam, although moves BLE will not keep antigravity.          NIHSS:  1a.Level of Consciousness: 0 = Alert   1b. LOC Questions: 2 = Answers neither correctly   1c. LOC Commands: 1 = Obeys one correctly   2. Best Gaze: 0 = Normal   3. Visual: 0 = No visual field loss   4. Facial Palsy: 0=Normal symmetric movement   5a. Motor Right Arm: 0=No drift, limb holds 90 (or 45) degrees for full 10 seconds   5b. Motor Left Arm: 0=No drift, limb holds 90 (or 45) degrees for full 10 seconds   6a. Motor Right Leg: 3=No effort against gravity, limb falls   6b. Motor Left Leg: 3=No effort against gravity, limb falls   7. Limb Ataxia:  0=Absent   8. Sensory: 2=Severe to total sensory loss; patient is not aware of being touched in face, arm, leg   9. Best Language:  2=Severe aphasia; fragmentary expression, inference needed, cannot identify materials   10. Dysarthria: 0=Normal articulation   11. Extinction and Inattention (formerly Neglect): 0=No abnormality   Total Score: 13      Time NIHSS was completed: At time of stroke alert     Modified Searcy Score:  Unable to determine currently, will gather additional data       Lab Results: I have reviewed the following results:CBC/BMP:       .     05/01/25 2342   WBC 6.30   HGB 12.9   HCT 39.5      SODIUM 141   K 4.0      CO2 27   BUN 16   CREATININE 1.04   GLUC 147*    , Creatinine Clearance: Estimated Creatinine Clearance: 68.4 mL/min (by C-G formula based on SCr of 1.04 mg/dL)., PTT/INR:      .     05/01/25 2342   PTT 27   INR 0.97    , Troponin,BNP:      .     05/01/25 2342   HSTNI0 5         Imaging Results Review: I personally reviewed the following image studies in PACS and associated radiology reports: CT head.  My interpretation of the radiology images/reports is: NAIA.  Other Study Results Review: EKG was reviewed.      VTE Prophylaxis: VTE covered by:     None           Code Status: Prior  Advance Directive and Living Will:      Power of :    POLST:      I have spent a total time of 60 minutes in caring for this patient on the day of the visit/encounter including Diagnostic results, Risks and benefits of tx options, Impressions, Counseling / Coordination of care, Documenting in the medical record, Reviewing/placing orders in the medical record (including tests, medications, and/or procedures), Obtaining or reviewing history  , and Communicating with other healthcare professionals .

## 2025-05-02 NOTE — HOSPITAL COURSE
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 Patient originally presented as stroke alert. CTH NAIA. CTA no LVO or significant stenosis but did note mildly diminished caliber or distal R vertebral artery/V4 segment. Patient not TNK candidate d/t unknown LKW. After initial stroke workup negative, higher suspicion for seizure v. TIA/CVA. Per chart review, patient has presented to ED with multiple similar episodes since 2023. Patient recently seen by Dr. Soto and was instructed to switch from Lamictal to Keppra 1000 mg, but unclear if patient was able to be compliant with this medication regimen. No signs of infection or electrolyte abnormalities. Keppra level low normal at 12.9 with BMI of 33. Suspect breakthrough seizures in the setting of poor compliance with Keppra. Due to sterotyped description and negative stroke workup, held off an obtaining additional EEG as last vEEG was completed 2/2025 >45 hours and demonstrated right frontocentral spike and wave discharges with occasional generalized field of spread. Suggested R hemispheric focal epilepsy, versus less likely fragmented generalized epilepsy. Held off on MRI brain as patient had one already in 8/2024 and she appears back to her baseline.     Will discharge on Keppra 1000 mg BID and encourage close follow-up with outpatient neurology and f/u Keppra trough level in 2-4 weeks.     Of note, patient's sister expressed concerns over patient's memory as she comes from housing with 5 other people and she and her brother live far from her. OT met with patient ***

## 2025-05-02 NOTE — PLAN OF CARE
Problem: PAIN - ADULT  Goal: Verbalizes/displays adequate comfort level or baseline comfort level  Description: Interventions:- Encourage patient to monitor pain and request assistance- Assess pain using appropriate pain scale- Administer analgesics based on type and severity of pain and evaluate response- Implement non-pharmacological measures as appropriate and evaluate response- Consider cultural and social influences on pain and pain management- Notify physician/advanced practitioner if interventions unsuccessful or patient reports new pain  Outcome: Progressing     Problem: INFECTION - ADULT  Goal: Absence or prevention of progression during hospitalization  Description: INTERVENTIONS:- Assess and monitor for signs and symptoms of infection- Monitor lab/diagnostic results- Monitor all insertion sites, i.e. indwelling lines, tubes, and drains- Monitor endotracheal if appropriate and nasal secretions for changes in amount and color- Mills appropriate cooling/warming therapies per order- Administer medications as ordered- Instruct and encourage patient and family to use good hand hygiene technique- Identify and instruct in appropriate isolation precautions for identified infection/condition  Outcome: Progressing  Goal: Absence of fever/infection during neutropenic period  Description: INTERVENTIONS:- Monitor WBC  Outcome: Progressing     Problem: SAFETY ADULT  Goal: Patient will remain free of falls  Description: INTERVENTIONS:- Educate patient/family on patient safety including physical limitations- Instruct patient to call for assistance with activity - Consult OT/PT to assist with strengthening/mobility - Keep Call bell within reach- Keep bed low and locked with side rails adjusted as appropriate- Keep care items and personal belongings within reach- Initiate and maintain comfort rounds- Make Fall Risk Sign visible to staff- Offer Toileting every  Hours, in advance of need- Initiate/Maintain alarm- Obtain  necessary fall risk management equipment: - Apply yellow socks and bracelet for high fall risk patients- Consider moving patient to room near nurses station  Outcome: Progressing  Goal: Maintain or return to baseline ADL function  Description: INTERVENTIONS:-  Assess patient's ability to carry out ADLs; assess patient's baseline for ADL function and identify physical deficits which impact ability to perform ADLs (bathing, care of mouth/teeth, toileting, grooming, dressing, etc.)- Assess/evaluate cause of self-care deficits - Assess range of motion- Assess patient's mobility; develop plan if impaired- Assess patient's need for assistive devices and provide as appropriate- Encourage maximum independence but intervene and supervise when necessary- Involve family in performance of ADLs- Assess for home care needs following discharge - Consider OT consult to assist with ADL evaluation and planning for discharge- Provide patient education as appropriate  Outcome: Progressing  Goal: Maintains/Returns to pre admission functional level  Description: INTERVENTIONS:- Perform AM-PAC 6 Click Basic Mobility/ Daily Activity assessment daily.- Set and communicate daily mobility goal to care team and patient/family/caregiver. - Collaborate with rehabilitation services on mobility goals if consulted- Perform Range of Motion  times a day.- Reposition patient every  hours.- Dangle patient  times a day- Stand patient  times a day- Ambulate patient  times a day- Out of bed to chair  times a day - Out of bed for meals  times a day- Out of bed for toileting- Record patient progress and toleration of activity level   Outcome: Progressing     Problem: DISCHARGE PLANNING  Goal: Discharge to home or other facility with appropriate resources  Description: INTERVENTIONS:- Identify barriers to discharge w/patient and caregiver- Arrange for needed discharge resources and transportation as appropriate- Identify discharge learning needs (meds, wound  care, etc.)- Arrange for interpretive services to assist at discharge as needed- Refer to Case Management Department for coordinating discharge planning if the patient needs post-hospital services based on physician/advanced practitioner order or complex needs related to functional status, cognitive ability, or social support system  Outcome: Progressing     Problem: Knowledge Deficit  Goal: Patient/family/caregiver demonstrates understanding of disease process, treatment plan, medications, and discharge instructions  Description: Complete learning assessment and assess knowledge base.Interventions:- Provide teaching at level of understanding- Provide teaching via preferred learning methods  Outcome: Progressing     Problem: Neurological Deficit  Goal: Neurological status is stable or improving  Description: Interventions:- Monitor and assess patient's level of consciousness, motor function, sensory function, and level of assistance needed for ADLs. - Monitor and report changes from baseline. Collaborate with interdisciplinary team to initiate plan and implement interventions as ordered. - Provide and maintain a safe environment.- Consider seizure precautions.- Consider fall precautions.- Consider aspiration precautions.- Consider bleeding precautions.  Outcome: Progressing     Problem: Activity Intolerance/Impaired Mobility  Goal: Mobility/activity is maintained at optimum level for patient  Description: Interventions:- Assess and monitor patient  barriers to mobility and need for assistive/adaptive devices.- Assess patient's emotional response to limitations.- Collaborate with interdisciplinary team and initiate plans and interventions as ordered.- Encourage independent activity per ability.- Maintain proper body alignment.- Perform active/passive rom as tolerated/ordered.- Plan activities to conserve energy.- Turn patient as appropriate  Outcome: Progressing     Problem: Communication Impairment  Goal: Ability to  express needs and understand communication  Description: Assess patient's communication skills and ability to understand information.  Patient will demonstrate use of effective communication techniques, alternative methods of communication and understanding even if not able to speak. - Encourage communication and provide alternate methods of communication as needed.- Collaborate with case management/ for discharge needs.- Include patient/family/caregiver in decisions related to communication.  Outcome: Progressing     Problem: Potential for Aspiration  Goal: Non-ventilated patient's risk of aspiration is minimized  Description: Assess and monitor vital signs, respiratory status, and labs (WBC).  Monitor for signs of aspiration (tachypnea, cough, rales, wheezing, cyanosis, fever).- Assess and monitor patient's ability to swallow.- Place patient up in chair to eat if possible.- HOB up at 90 degrees to eat if unable to get patient up into chair.- Supervise patient during oral intake. - Instruct patient/ family to take small bites.- Instruct patient/ family to take small single sips when taking liquids.- Follow patient-specific strategies generated by speech pathologist.  Outcome: Progressing  Goal: Ventilated patient's risk of aspiration is minimized  Description: Assess and monitor vital signs, respiratory status, airway cuff pressure, and labs (WBC).  Monitor for signs of aspiration (tachypnea, cough, rales, wheezing, cyanosis, fever).- Elevate head of bed 30 degrees if patient has tube feeding.- Monitor tube feeding.  Outcome: Progressing     Problem: Nutrition  Goal: Nutrition/Hydration status is improving  Description: Monitor and assess patient's nutrition/hydration status for malnutrition (ex- brittle hair, bruises, dry skin, pale skin and conjunctiva, muscle wasting, smooth red tongue, and disorientation). Collaborate with interdisciplinary team and initiate plan and interventions as ordered.   Monitor patient's weight and dietary intake as ordered or per policy. Utilize nutrition screening tool and intervene per policy. Determine patient's food preferences and provide high-protein, high-caloric foods as appropriate. - Assist patient with eating.- Allow adequate time for meals.- Encourage patient to take dietary supplement as ordered.- Collaborate with clinical nutritionist.- Include patient/family/caregiver in decisions related to nutrition.  Outcome: Progressing

## 2025-05-02 NOTE — ED PROVIDER NOTES
Time reflects when diagnosis was documented in both MDM as applicable and the Disposition within this note       Time User Action Codes Description Comment    5/1/2025 11:30 PM Starr Nielsen Add [R29.90] Stroke-like symptoms           ED Disposition       ED Disposition   Admit    Condition   Stable    Date/Time   Fri May 2, 2025 12:16 AM    Comment   Case was discussed with neurology               Assessment & Plan       Medical Decision Making  Amount and/or Complexity of Data Reviewed  Labs: ordered.  Radiology: ordered.    Risk  Prescription drug management.  Decision regarding hospitalization.    64-year-old female with past medical history for seizures and altered mental status comes in with similar symptoms to her last presentation on February 6, 2025 with expressive aphasia lower extremity weakness and altered mental status.  Patient is not oriented to person place or time.  NIH 13.  No facial drooping no hemotympanum no cervical spine tenderness.  Patient does not have any signs of trauma.  Pupils anisocoria 1+ on the left 2+ on the right bilaterally reactive.  Patient not following instructions to squeeze hands but was able to smile and puff out her cheeks and shrug her shoulders and lift her hands up.  Patient had no ability to keep her legs in the air.  Patient lives with 6 roommates.  Last known well unknown known.    Differential diagnosis, stroke versus postictal state versus metabolic encephalopathy    Stroke alert called  Patient found to have mild changes without acute abnormality.   Neurology will be taking primary on this patient and she will be admitted for further stroke workup/seizure pathway      ED Course as of 05/02/25 0019   Thu May 01, 2025   2348 NIH 13   Fri May 02, 2025   0009 Mild senescent changes without acute intracranial abnormality. If clinical concern for acute ischemia, consider more sensitive MRI brain for better evaluation       Medications   LORazepam (ATIVAN) injection 2  mg (has no administration in time range)   levETIRAcetam (KEPPRA) injection 2,000 mg (has no administration in time range)   ondansetron (FOR EMS ONLY) (ZOFRAN) 4 mg/2 mL injection 4 mg (0 mg Does not apply Given to EMS 25 2338)   iohexol (OMNIPAQUE) 350 MG/ML injection (SINGLE-DOSE) 85 mL (85 mL Intravenous Given 25 2356)       ED Risk Strat Scores         Stroke Assessment       Row Name 25 2346             NIH Stroke Scale    Interval Baseline  unknown lkw      Level of Consciousness (1a.) 0      LOC Questions (1b.) 2      LOC Commands (1c.) 1      Best Gaze (2.) 0      Visual (3.) 0      Facial Palsy (4.) 0      Motor Arm, Left (5a.) 0      Motor Arm, Right (5b.) 0      Motor Leg, Left (6a.) 3      Motor Leg, Right (6b.) 3      Limb Ataxia (7.) 0      Sensory (8.) 2      Best Language (9.) 2      Dysarthria (10.) 0      Extinction and Inattention (11.) (Formerly Neglect) 0      Total 13                              No data recorded                            History of Present Illness       Chief Complaint   Patient presents with    Altered Mental Status     Pt presents with altered mental status, unable to answer questions, no facial droop, able to move all extremities       Past Medical History:   Diagnosis Date    Abnormal EEG 2023    Anxiety     Aphasia 2023    Condyloma acuminatum     Depression 1968    Therapy    Diabetes mellitus (HCC) 2002    type 2    Genital warts     Herpes simplex     HPV (human papilloma virus) infection     Obesity 1990    Visual impairment       Past Surgical History:   Procedure Laterality Date    CARDIAC ELECTROPHYSIOLOGY PROCEDURE N/A 2025    Procedure: Cardiac pacer implant DUAL CHAMBER;  Surgeon: Patric Sanchez MD;  Location: [facility];  Service: Cardiology      Family History   Problem Relation Age of Onset    Alzheimer's disease Mother     Dementia Mother              Diabetes Father              Heart  disease Father              ALS Father     Diabetes type II Father             Hypertension Sister     Diabetes Brother     Diabetes type II Brother     No Known Problems Brother     Cancer Maternal Grandmother         Lymphoma    Colon cancer Maternal Grandfather              Seizures Neg Hx       Social History     Tobacco Use    Smoking status: Never     Passive exposure: Never    Smokeless tobacco: Never   Vaping Use    Vaping status: Never Used   Substance Use Topics    Alcohol use: Not Currently    Drug use: Yes     Frequency: 1.0 times per week     Types: Marijuana     Comment: Have a medical card      E-Cigarette/Vaping    E-Cigarette Use Never User       E-Cigarette/Vaping Substances    Nicotine No     THC No     CBD No     Flavoring No     Other No     Unknown No       I have reviewed and agree with the history as documented.     64-year-old female comes in for altered mental status        Review of Systems   Unable to perform ROS: Mental status change           Objective       ED Triage Vitals   Temperature Pulse Blood Pressure Respirations SpO2 Patient Position - Orthostatic VS   25   98.1 °F (36.7 °C) 69 142/76 18 98 % Sitting      Temp Source Heart Rate Source BP Location FiO2 (%) Pain Score    255 25 -- -- --    Oral Monitor         Vitals      Date and Time Temp Pulse SpO2 Resp BP Pain Score FACES Pain Rating User   25 0000 -- 64 100 % 18 147/66 -- -- PS   25 98.1 °F (36.7 °C) 63 99 % 18 142/65 -- -- PS   25 -- 65 98 % 18 142/76 -- -- PS   25 -- 69 98 % 18 142/76 -- -- PS            Physical Exam  Vitals and nursing note reviewed.   Constitutional:       General: She is not in acute distress.     Appearance: She is well-developed.   HENT:      Head: Normocephalic and atraumatic.   Eyes:      General: No visual field deficit.      Extraocular Movements:      Right eye: Normal extraocular motion and no nystagmus.      Left eye: Normal extraocular motion and no nystagmus.      Conjunctiva/sclera: Conjunctivae normal.   Neck:      Meningeal: Brudzinski's sign and Kernig's sign absent.   Cardiovascular:      Rate and Rhythm: Normal rate and regular rhythm.      Heart sounds: No murmur heard.  Pulmonary:      Effort: Pulmonary effort is normal. No respiratory distress.      Breath sounds: Normal breath sounds. No wheezing.   Abdominal:      Palpations: Abdomen is soft.      Tenderness: There is no abdominal tenderness.   Musculoskeletal:         General: No swelling.      Cervical back: Neck supple.   Skin:     General: Skin is warm and dry.      Capillary Refill: Capillary refill takes less than 2 seconds.   Neurological:      Mental Status: She is alert. She is disoriented and confused.      GCS: GCS eye subscore is 4. GCS verbal subscore is 3. GCS motor subscore is 5.      Cranial Nerves: Dysarthria present. No cranial nerve deficit or facial asymmetry.      Motor: Weakness present.      Coordination: Coordination abnormal.      Deep Tendon Reflexes: Reflexes normal.      Reflex Scores:       Tricep reflexes are 2+ on the right side and 2+ on the left side.       Bicep reflexes are 2+ on the right side and 2+ on the left side.       Brachioradialis reflexes are 2+ on the right side and 2+ on the left side.       Patellar reflexes are 2+ on the right side and 2+ on the left side.       Achilles reflexes are 2+ on the right side and 2+ on the left side.     Comments: Bilateral lower extremity 0 out of 5 strength  Upper extremity 5 out of 5  Patient localizes the pain  Expressive aphasia   Psychiatric:         Mood and Affect: Mood normal.         Results Reviewed       Procedure Component Value Units Date/Time    HS Troponin 0hr (reflex protocol) [[id number]]  (Normal) Collected: 05/01/25 2323    Lab Status: Final result Specimen: Blood from Arm,  Right Updated: 05/02/25 0014     hs TnI 0hr 5 ng/L     HS Troponin I 2hr [[id number]]     Lab Status: No result Specimen: Blood     HS Troponin I 4hr [[id number]]     Lab Status: No result Specimen: Blood     Basic metabolic panel [[id number]]  (Abnormal) Collected: 05/01/25 2342    Lab Status: Final result Specimen: Blood from Arm, Right Updated: 05/02/25 0011     Sodium 141 mmol/L      Potassium 4.0 mmol/L      Chloride 105 mmol/L      CO2 27 mmol/L      ANION GAP 9 mmol/L      BUN 16 mg/dL      Creatinine 1.04 mg/dL      Glucose 147 mg/dL      Calcium 9.7 mg/dL      eGFR 57 ml/min/1.73sq m     Narrative:      National Kid[employer] guidelines for Chronic Kidney Disease (CKD):     Stage 1 with normal or high GFR (GFR > 90 mL/min/1.73 square meters)    Stage 2 Mild CKD (GFR = 60-89 mL/min/1.73 square meters)    Stage 3A Moderate CKD (GFR = 45-59 mL/min/1.73 square meters)    Stage 3B Moderate CKD (GFR = 30-44 mL/min/1.73 square meters)    Stage 4 Severe CKD (GFR = 15-29 mL/min/1.73 square meters)    Stage 5 End Stage CKD (GFR <15 mL/min/1.73 square meters)  Note: GFR calculation is accurate only with a steady state creatinine    Levetiracetam level [[id number]]     Lab Status: No result Specimen: Blood     Protime-INR [[id number]]  (Normal) Collected: 05/01/25 2342    Lab Status: Final result Specimen: Blood from Arm, Right Updated: 05/02/25 0001     Protime 13.2 seconds      INR 0.97    Narrative:      INR Therapeutic Range    Indication                                             INR Range      Atrial Fibrillation                                               2.0-3.0  Hypercoagulable State                                    2.0.2.3  Left Ventricular Asist Device                            2.0-3.0  Mechanical Heart Valve                                  -    Aortic(with afib, MI, embolism, HF, LA enlargement,    and/or coagulopathy)                                     2.0-3.0 (2.5-3.5)     Mitral                                                              2.5-3.5  Prosthetic/Bioprosthetic Heart Valve               2.0-3.0  Venous thromboembolism (VTE: VT, PE        2.0-3.0    APTT [[id number]]  (Normal) Collected: 05/01/25 2342    Lab Status: Final result Specimen: Blood from Arm, Right Updated: 05/02/25 0001     PTT 27 seconds     CBC and Platelet [[id number]]  (Normal) Collected: 05/01/25 2342    Lab Status: Final result Specimen: Blood from Arm, Right Updated: 05/01/25 2350     WBC 6.30 Thousand/uL      RBC 4.46 Million/uL      Hemoglobin 12.9 g/dL      Hematocrit 39.5 %      MCV 89 fL      MCH 28.9 pg      MCHC 32.7 g/dL      RDW 12.2 %      Platelets 152 Thousands/uL      MPV 10.7 fL     Fingerstick Glucose (POCT) [[id number]]  (Abnormal) Collected: 05/01/25 2333    Lab Status: Final result Specimen: Blood Updated: 05/01/25 2334     POC Glucose 163 mg/dl             CT stroke alert brain   Final Interpretation by Christopher Richard MD (05/02 0004)      Mild senescent changes without acute intracranial abnormality. If clinical concern for acute ischemia, consider more sensitive MRI brain for better evaluation         Workstation performed: [id number]         CTA stroke alert (head/neck)    (Results Pending)       Procedures    ED Medication and Procedure Management   Prior to Admission Medications   Prescriptions Last Dose Informant Patient Reported? Taking?   Blood Glucose Monitoring Suppl (Contour Next Monitor) w/Device KIT   No No   Sig: Use to monitor blood sugars daily   Cholecalciferol (VITAMIN D PO)  Self Yes No   Sig: Take 5,000 Units by mouth in the morning   Cinnamon 500 MG capsule  Self No No   Sig: Take 1 capsule (500 mg total) by mouth in the morning   Continuous Glucose  (Dexcom G7 ) MIKE   No No   Sig: Use 1 each once for 1 dose   Continuous Glucose Sensor (Dexcom G7 Sensor)   No No   Sig: Use 1 Device every 10 days   Contour Next Test test strip   No No   Sig: Test twice daily   Insulin Pen  Needle (B-D ULTRAFINE III SHORT PEN) 31G X 8 MM MISC  Self No No   Sig: Inject under the skin 5 (five) times a day   Lancets (onetouch ultrasoft) lancets   No No   Sig: Check sugars 2-4 times a day   Lantus SoloStar 100 units/mL SOPN   No No   Sig: INJECT 14 UNITS SUBCUTANEOUSLY DAILY   Misc. Devices (Rollator Ultra-Light) MISC  Self No No   Sig: Use daily   QUERCETIN PO   Yes No   Sig: Take by mouth if needed   Vitamin B Complex-C CAPS   No No   Sig: Take 1 capsule by mouth in the morning   aspirin 81 mg chewable tablet   No No   Sig: Chew 1 tablet (81 mg total) daily   glimepiride (AMARYL) 1 mg tablet   No No   Sig: TAKE 1 TABLET (1 MG TOTAL) BY MOUTH IN THE MORNING   lamoTRIgine (LaMICtal) 25 mg tablet   Yes No   Sig: Take 25 mg by mouth daily   levETIRAcetam (KEPPRA) 500 mg tablet   No No   Sig: Take 1 tablet (500 mg total) by mouth every 12 (twelve) hours   levETIRAcetam (Keppra) 500 mg tablet   No No   Sig: Take 1 tab (500 mg) by mouth twice a day for 1 week, then take 2 tabs (500 mg) twice a day.   metFORMIN (GLUCOPHAGE-XR) 500 mg 24 hr tablet   No No   Sig: Take 2 tablets (1,000 mg total) by mouth in the morning   rosuvastatin (CRESTOR) 5 mg tablet   No No   Sig: TAKE 1 TABLET BY MOUTH DAILY   triamcinolone (KENALOG) 0.1 % ointment  Self No No   Sig: APPLY 1 APPLICATION TOPICALLY 2 (TWO) TIMES A DAY TO AFFECTED AREA      Facility-Administered Medications Last Administration Doses Remaining   diphenhydrAMINE (BENADRYL) capsule 25 mg None recorded         Patient's Medications   Discharge Prescriptions    No medications on file     No discharge procedures on file.  ED SEPSIS DOCUMENTATION   Time reflects when diagnosis was documented in both MDM as applicable and the Disposition within this note       Time User Action Codes Description Comment    5/1/2025 11:30 PM Starr Nielsen Add [R29.90] Stroke-like symptoms                  Anurag Coleman MD  05/02/25 0019

## 2025-05-02 NOTE — ASSESSMENT & PLAN NOTE
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  Patient was initially placed on Lamictal during the prior hospitalization, but due to intolerable side effects was moved over to Keppra approximately 1 month ago, was supposed to take 500 every 12 for 1 week then moved to 1 g every 12 indefinitely, however patient unable to describe if medication being taken appropriately.    Workup:  Initial evaluation includes SBP in 140s, BG of 160s, all other VSS, NIH 13, near complete inability to follow simple commands, and inability to meaningfully participate in conversation or history although can state an occasional yes or no which may or may not be accurate.  No evidence of infectious process or electrolyte abnormalities  CTH NAIA  CTAHN No large vessel arterial occlusion, significant flow-limiting stenosis, or focal saccular aneurysm identified. Mildly diminished caliber of the distal right vertebral artery/V4 segment and fetal origin of the right posterior cerebral artery noted. No enhancing brain mass/fluid collection or evidence of vascular malformation. No enhancing soft tissue neck mass, fluid collection, or cervical lymphadenopathy.  MRIB w/o, 08/2024: No acute intracranial lesion. Atrophy and nonspecific white matter T2 hyperintensities likely secondary to chronic small vessel ischemia.   VEEG, 02/2025: This 23 hour vEEG captures right  frontocentral spike and wave discharges, sometimes with a more generalized field of spread.  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 In addition, this study is indicative of a mild diffuse encephalopathy. No seizures are recorded.   Echo 2/2025: EF 60%, G1DD, no evidence of PFO  A1c 4/2025: 7.3%  LDL 4/2025: 75  Keppra level low normal at 12.9; level obtained prior to 2 g Keppra load    Impression:  Due to stereotyped recurrent events at least occurring over the past many months if not longer, in addition to outpatient epileptologist evaluation, much higher suspicion for focal impaired awareness seizure over lower suspicion of TIA/CVA as had been worked up on multiple occasions in the past each time to negative results, reason for possible seizure unclear at this time but suspect most likely due to difficulties with Keppra compliance.     Plan:  Discussed plan with neurology attending Dr. Coyne  2 mg Ativan trial given at 0031  Continue with Keppra 1G every 12 hours  Recommend repeat troughs in 2-4 weeks for Keppra in outpatient setting   Will obtain MRI brain seizure with and without contrast  Will hold off on EEG at this time  Seizure precautions placed  NPO d/t somnolence  ST eval requested   Continue telemetry   OT/PT  Patient's sister, Lori, requesting phone call from PT/OT for further discussion regarding patient's safety at home  Per sister, patient has MCI at baseline and has concerns in regard to patient's medication compliance and ability to care for self at home  Okay to continue aspirin and hospital equivalent statin to home

## 2025-05-02 NOTE — H&P
H&P - Neurology   Name: Ynes Mendoza 64 y.o. female I MRN: [id number]  Unit/Bed#: ED 06 I Date of Admission: 5/1/2025   Date of Service: 5/2/2025 I Hospital Day: 0     Assessment & Plan  Seizure-like activity (HCC)  Neurology consulted to address stroke alert for concern of mixed aphasia of uncertain duration/last known well, which appears to be nearly identical to multiple prior presentations which ultimately never revealed stroke but rather EEG findings which were consistent with seizure predisposition although no event captured  Since most recent prior presentation for similar symptoms in 02/2025, patient has visited with outpatient epileptologist as requested, who reports that the description of her events were consistent with focal impaired awareness seizures, even warranting diagnosis of epilepsy due to the recurrent nature.  Patient was initially placed on Lamictal during the prior hospitalization, but due to intolerable side effects was moved over to Keppra approximately 1 month ago, was supposed to take 500 every 12 for 1 week then moved to 1 g every 12 indefinitely, however patient unable to describe if medication being taken appropriately.    Workup:  Initial evaluation includes SBP in 140s, BG of 160s, all other VSS, NIH 13, near complete inability to follow simple commands, and inability to meaningfully participate in conversation or history although can state an occasional yes or no which may or may not be accurate.  No evidence of infectious process or electrolyte abnormalities  CTH NAIA  CTAHN No large vessel arterial occlusion, significant flow-limiting stenosis, or focal saccular aneurysm identified. Mildly diminished caliber of the distal right vertebral artery/V4 segment and fetal origin of the right posterior cerebral artery noted. No enhancing brain mass/fluid collection or evidence of vascular malformation. No enhancing soft tissue neck mass, fluid collection, or cervical  lymphadenopathy.  MRIB w/o, 08/2024: No acute intracranial lesion. Atrophy and nonspecific white matter T2 hyperintensities likely secondary to chronic small vessel ischemia.   VEEG, 02/2025: This 23 hour vEEG captures right frontocentral spike and wave discharges, sometimes with a more generalized field of spread.  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 In addition, this study is indicative of a mild diffuse encephalopathy. No seizures are recorded.   Echo 2/2025: EF 60%, G1DD, no evidence of PFO  A1c 4/2025: 7.3%  LDL 4/2025: 75  Keppra level low normal at 12.9; level obtained prior to 2 g Keppra load    Impression:  Due to stereotyped recurrent events at least occurring over the past many months if not longer, in addition to outpatient epileptologist evaluation, much higher suspicion for focal impaired awareness seizure over lower suspicion of TIA/CVA as had been worked up on multiple occasions in the past each time to negative results, reason for possible seizure unclear at this time but suspect most likely due to difficulties with Keppra compliance.     Plan:  Discussed plan with neurology attending Dr. Coyne  2 mg Ativan trial given at 0031  Continue with Keppra 1G every 12 hours  Recommend repeat troughs in 2-4 weeks for Keppra in outpatient setting   Will obtain MRI brain seizure with and without contrast  Will hold off on EEG at this time  Seizure precautions placed  NPO d/t somnolence  ST eval requested   Continue telemetry   OT/PT  Patient's sister, Lori, requesting phone call from PT/OT for further discussion regarding patient's safety at home  Per sister, patient has MCI at baseline and has concerns in regard to patient's medication compliance and ability to care for self at home  Okay to continue aspirin and hospital equivalent statin to home    Please contact the SecureChat role for the Neurology service with  "any questions/concerns.     Thrombolytic Decision: Patient not a candidate. Unclear time of onset outside appropriate time window.     Has f/u appointment w/ Dr. Rodriguez 5/14/2025.       History of Present Illness   Hx and PE limited by: aphasia  Patient last known well: unknown  Stroke alert called: 2332  Neurology time of arrival: 2336  HPI: Ynes Mendoza is a 64 y.o. female who presents with evidence of mixed aphasia for unknown duration, consistent with description of prior events in the past for which patient has undergone significant workup including several stroke pathway workups including receiving TNK multiple times, video EEG, and had pacemaker placement, all in attempts to workup and then subsequently treat the underlying condition.  Patient's symptoms include difficulty with following commands, only following the most simple commands and not consistently in this, as well as inability to meaningfully communicate such as naming objects or providing any pertinent history such as when symptoms began or if in any pain.  Patient last presented in 02/2025 with near identical presentation, which was post PPM, received TNK at that time but repeat CT head multiple days after presentation did not reveal any particular related abnormal finding, instead video EEG results revealed \"This 23 hour vEEG captures right frontocentral spike and wave discharges, sometimes with a more generalized field of spread.  This is favored to support a diagnosis of right hemispheric focal epilepsy, versus less likely a fragmented generalized epilepsy.\"  It was noted that patient did not have the symptoms of presentation during the time of the EEG reading, thus the above quotation was presumably interictal.  Patient was then placed on Lamictal with slow ramp-up to be cautious of potential side effects, had not made it to full dose when side effects are described as intolerable and outpatient epileptologist switched over to Keppra, which " records show patient initially had some concerns over side effects but after discussion with same specialist was agreeable to continue, however has had difficulty on at least 1 or 2 occasions in obtaining the medication, leading to concern that patient may have now missed doses or otherwise be subtherapeutic, will evaluate with lab draw prior to loading in hospital.  Patient initially presented as a stroke alert due to the obvious aphasia, NIH 13 as noted below, most pertinent is lower extremities equally weak on exam although limited by command following, upper extremities bilaterally no drift.      Review of Systems   Unable to perform ROS: Acuity of condition   Patient's prior history (PMH, PSH, SH, FH, etc) not obtainable due to aphasia & no reachable contact    Objective :  Temp:  [98.1 °F (36.7 °C)] 98.1 °F (36.7 °C)  HR:  [63-69] 63  BP: (142-147)/(65-76) 143/65  Resp:  [18] 18  SpO2:  [95 %-100 %] 95 %  O2 Device: None (Room air)    Physical Exam  Vitals reviewed.   Constitutional:       General: She is awake. She is not in acute distress.     Appearance: She is not ill-appearing, toxic-appearing or diaphoretic.   HENT:      Head: Normocephalic and atraumatic.      Right Ear: External ear normal.      Left Ear: External ear normal.      Nose: Nose normal.      Mouth/Throat:      Pharynx: Oropharynx is clear.   Eyes:      General: Lids are normal. No scleral icterus.        Right eye: No discharge.         Left eye: No discharge.      Extraocular Movements: Extraocular movements intact.      Conjunctiva/sclera: Conjunctivae normal.      Pupils: Pupils are equal, round, and reactive to light.   Neurological:      Mental Status: She is alert.      Cranial Nerves: No dysarthria.      Neurological Exam  Mental Status  Awake and alert. no dysarthria present. Mixed aphasia present. Unable to follow commands.  Patient does not answer any orientation questions, not able to identify any objects, may have followed a  couple simple commands such as close eyes and open.     Cranial Nerves  CN II: Visual fields full to confrontation.  CN III, IV, VI: Extraocular movements intact bilaterally. Normal lids and orbits bilaterally. Pupils equal round and reactive to light bilaterally.  CN VII: Full and symmetric facial movement.  CN VIII: Hearing is normal.     Motor   The following abnormal movements were seen:  High frequency low amplitude tremulousness to bilateral upper extremities and head fluctuating intensity throughout exam, although moves BLE will not keep antigravity.        NIHSS:  1a.Level of Consciousness: 0 = Alert   1b. LOC Questions: 2 = Answers neither correctly   1c. LOC Commands: 1 = Obeys one correctly   2. Best Gaze: 0 = Normal   3. Visual: 0 = No visual field loss   4. Facial Palsy: 0=Normal symmetric movement   5a. Motor Right Arm: 0=No drift, limb holds 90 (or 45) degrees for full 10 seconds   5b. Motor Left Arm: 0=No drift, limb holds 90 (or 45) degrees for full 10 seconds   6a. Motor Right Leg: 3=No effort against gravity, limb falls   6b. Motor Left Leg: 3=No effort against gravity, limb falls   7. Limb Ataxia:  0=Absent   8. Sensory: 2=Severe to total sensory loss; patient is not aware of being touched in face, arm, leg   9. Best Language:  2=Severe aphasia; fragmentary expression, inference needed, cannot identify materials   10. Dysarthria: 0=Normal articulation   11. Extinction and Inattention (formerly Neglect): 0=No abnormality   Total Score: 13      Time NIHSS was completed: At time of stroke alert     Modified Tello Score:  Unable to determine currently, will gather additional data       Lab Results: I have reviewed the following results:CBC/BMP:       .     05/01/25  2342   WBC 6.30   HGB 12.9   HCT 39.5      SODIUM 141   K 4.0      CO2 27   BUN 16   CREATININE 1.04   GLUC 147*    , Creatinine Clearance: Estimated Creatinine Clearance: 68.4 mL/min (by C-G formula based on SCr of 1.04  mg/dL)., PTT/INR:      .     05/01/25  2342   PTT 27   INR 0.97    , Troponin,BNP:      .     05/01/25  2342   HSTNI0 5         Imaging Results Review: I personally reviewed the following image studies in PACS and associated radiology reports: CT head. My interpretation of the radiology images/reports is: NAIA.  Other Study Results Review: EKG was reviewed.      VTE Prophylaxis: VTE covered by:     None           Code Status: Prior  Advance Directive and Living Will:      Power of :    POLST:      I have spent a total time of 60 minutes in caring for this patient on the day of the visit/encounter including Diagnostic results, Risks and benefits of tx options, Impressions, Counseling / Coordination of care, Documenting in the medical record, Reviewing/placing orders in the medical record (including tests, medications, and/or procedures), Obtaining or reviewing history  , and Communicating with other healthcare professionals .

## 2025-05-02 NOTE — SPEECH THERAPY NOTE
Speech Language/Pathology  Speech-Language Pathology Bedside Swallow Evaluation      Patient Name: Ynes Mendoza    Today's Date: 5/2/2025     Problem List  Principal Problem:    Seizure-like activity (HCC)      Past Medical History  Past Medical History:   Diagnosis Date    Abnormal EEG 11/05/2023    Anxiety     Aphasia 11/05/2023    Condyloma acuminatum     Depression 1968    Therapy    Diabetes mellitus (HCC) 2002    type 2    Genital warts     Herpes simplex     HPV (human papilloma virus) infection     Obesity 1990    Visual impairment 1984       Past Surgical History  Past Surgical History:   Procedure Laterality Date    CARDIAC ELECTROPHYSIOLOGY PROCEDURE N/A 1/24/2025    Procedure: Cardiac pacer implant DUAL CHAMBER;  Surgeon: Patric Sanchez MD;  Location: [facility];  Service: Cardiology       Summary   The pt is significantly confused.  Moderate oral and mild pharyngeal dysphagia exacerbated by distractibility.  Pt with intermittent holding of PO in mouth.  No overt s/s aspiration or penetration but pt remains higher aspiration risk based on mental status.  1:1 Feed assist recommended.  Support provided.  The results and recommendations were reviewed with the pt, Nurse, and MD.  Will follow.     Recommended Diet: puree/level 1 diet and thin liquids   Recommended Form of Meds: whole with puree and crushed with puree   Aspiration precautions and swallowing strategies: upright posture, slow rate of feeding, small bites/sips, alternating bites and sips, and 1:1 Feed assist  Other Recommendations: Continue frequent oral care at minimum three times daily.    Current Medical Status  Charting reports, Pt is a 64 y.o. female who presented to [facility] with past medical history for seizures and altered mental status comes in with similar symptoms to her last presentation on February 6, 2025 with expressive aphasia lower extremity weakness and altered mental status.    Current Precautions:  Fall   Aspiration  Delirium    Allergies:  No known food allergies    Past medical history:  Please see H&P for details    Special Studies:  CT Head 5/1/2025: IMPRESSION:   No large vessel arterial occlusion, significant flow-limiting stenosis, or focal saccular aneurysm identified. Mildly diminished caliber of the distal right vertebral artery/V4 segment and fetal origin of the right posterior cerebral artery noted. No enhancing brain mass/fluid collection or evidence of vascular malformation. No enhancing soft tissue neck mass, fluid collection, or cervical lymphadenopathy.    Social/Education/Vocational Hx:  Pt lives alone per charting.     Swallow Information   Current Risks for Dysphagia & Aspiration: AMS  Current Symptoms/Concerns:  Pt has had significant confusion.  Periods of alertness and fatigue.  Current Diet: NPO   Baseline Diet: regular diet and thin liquids    Baseline Assessment   Behavior/Cognition: alert  Speech/Language Status: able to follow commands inconsistently and limited verbal output  Patient Positioning:  sitting upright in the stretcher; Pt is confused.  She lies down then sits upright.  A belt is in place to ensure safety.    Pain Status/Interventions/Response to Interventions: Pt cannot report.  FLACC 2.       Swallow Mechanism Exam  Facial: symmetrical  Labial: unable to test 2/2 limited command following  Lingual: unable to test 2/2 limited command following- pt is currently confused and distractible.   Velum: unable to visualize  Mandible:  decreased ROM  Dentition: adequate  Vocal quality:clear/adequate   Volitional Cough: unable to initiate volitional cough   Respiratory Status: on RA     Consistencies Assessed and Performance   Consistencies Administered: 1oz applesauce, 2oz nectar via cup and straw, 2oz thins via cup and straw, attempted 1/2 reina doone-needed liquid and applesauce wash due to distractibility and decreased attempts to masticate.     Oral Stage: moderate  The pt had  significant difficulty masticating due to distractibility and confusion.  Provided cues.  Breakdown was noted but pt with pocketing on right side of mouth. Provided liquid wash and puree to assist in clearing.  Mild to moderate oral transit delay depending on severity of distractibility/confusion.  The pt has oral holding at times.  Cues to swallow are successful. Suspect reduced oral control.    Pharyngeal Stage: mild  Swallowing initiation appeared delayed.  Laryngeal rise was palpated and judged to be reduced.  No coughing, throat clearing, change in vocal quality or respiratory status noted today. When pt is distracted, aspiration risk increases.     Esophageal Concerns: none reported      Summary and Recommendations (see above)    Results Reviewed with: patient, RN, and MD     Treatment Recommended: yes, dysphagia therapy     Frequency of treatment: 2-3x/week    Patient Stated Goal:  Pt is currently confused and cannot provide adequate goal.     Dysphagia LTG  -Patient will demonstrate safe and effective oral intake (without overt s/s significant oral/pharyngeal dysphagia including s/s penetration or aspiration) for the highest appropriate diet level.     Short Term Goals:  -Pt will tolerate Dysphagia 1/pureed diet and thin liquid with no significant s/s oral or pharyngeal dysphagia across 1-3 diagnostic session/s    -Patient will tolerate trials of upgraded food and/or liquid texture with no significant s/s of oral or pharyngeal dysphagia including aspiration across 1-3 diagnostic sessions         Speech Therapy Prognosis   Prognosis: good    Prognosis Considerations: medical status, prior medical history, and cognitive status

## 2025-05-02 NOTE — ED NOTES
This Rn went to check on pt. Pt was found trying to get out of bed, Pt was safely put back into bed and cleaned.     Felisha Jimenez RN  05/02/25 [id number]

## 2025-05-02 NOTE — ED NOTES
Pt continues to try and get out of the bed. This RN offered pt bedpan and educated pt about the need to stay in the bed.      Felisha Jimenez RN  05/02/25 [id number]

## 2025-05-02 NOTE — ED ATTENDING ATTESTATION
5/1/2025  I, Higinio Newman MD, saw and evaluated the patient. I have discussed the patient with the resident/non-physician practitioner and agree with the resident's/non-physician practitioner's findings, Plan of Care, and MDM as documented in the resident's/non-physician practitioner's note, except where noted. All available labs and Radiology studies were reviewed.  I was present for key portions of any procedure(s) performed by the resident/non-physician practitioner and I was immediately available to provide assistance.       At this point I agree with the current assessment done in the Emergency Department.  I have conducted an independent evaluation of this patient a history and physical is as follows:      Final Diagnosis:  1. Stroke-like symptoms      Chief Complaint   Patient presents with    Altered Mental Status     Pt presents with altered mental status, unable to answer questions, no facial droop, able to move all extremities           A:  - 64-year-old female presents with altered mental status.      P:  - Point-of-care glucose to evaluate for hypoglycemia.  - CBC to evaluate for evidence of marked leukocytosis or anemia.  - BMP to evaluate electrolytes and renal function.  - EKG/troponin to evaluate for ACS.  - CTA head and neck to evaluate for intracranial abnormality including CVA, hemorrhage, mass.  - Follow-up neurology consult.  - On chart review, patient does have a history of focal epilepsy, this is likely the cause of the patient's altered mental status.      H:    64-year-old female who presents with altered mental status.  Per EMS, they were called by patient's roommates secondary to altered mental status.  Here, patient unable to answer questions.  Inconsistently following commands.  Stroke alert called.      PMH:  Past Medical History:   Diagnosis Date    Abnormal EEG 11/05/2023    Anxiety     Aphasia 11/05/2023    Condyloma acuminatum     Depression 1968    Therapy    Diabetes mellitus  ([facility]) 2002    type 2    Genital warts     Herpes simplex     HPV (human papilloma virus) infection     Obesity 1990    Visual impairment 1984       PSH:  Past Surgical History:   Procedure Laterality Date    CARDIAC ELECTROPHYSIOLOGY PROCEDURE N/A 1/24/2025    Procedure: Cardiac pacer implant DUAL CHAMBER;  Surgeon: Patric Sanchez MD;  Location:  CARDIAC CATH LAB;  Service: Cardiology         PE:   Vitals:    05/01/25 2345 05/02/25 0000 05/02/25 0001 05/02/25 0019   BP: 142/65 147/66  143/65   Pulse: 63 64  63   Resp: 18 18  18   Temp: 98.1 °F (36.7 °C)      TempSrc: Oral      SpO2: 99% 100%  95%   Weight:   98.1 kg (216 lb 4.3 oz)          Constitutional: Vital signs are normal. She appears well-developed. She is cooperative. No distress.   Cardiovascular: Normal rate, regular rhythm, normal heart sounds.   No murmur heard.  Pulmonary/Chest: Effort normal and breath sounds normal.   Abdominal: Soft. Normal appearance and bowel sounds are normal. There is no tenderness. There is no rebound, no guarding.   Neurological: She is alert.  Moving all extremities with 5 out of 5 strength.  Appears to have a receptive and expressive aphasia.  Skin: Skin is warm, dry and intact.   Psychiatric: She has a normal mood and affect. Her speech is normal and behavior is normal. Thought content normal.          - 13 point ROS was performed and all are normal unless stated in the history above.   - Nursing note reviewed. Vitals reviewed.   - Orders placed by myself and/or advanced practitioner / resident.    - Previous chart was reviewed  - No language barrier.   - History obtained from patient.   - There are no limitations to the history obtained. Reasons ROS could not be obtained:  N/A         Medications   levETIRAcetam (KEPPRA) injection 2,000 mg (has no administration in time range)   ondansetron (FOR EMS ONLY) (ZOFRAN) 4 mg/2 mL injection 4 mg (0 mg Does not apply Given to EMS 5/1/25 8399)   iohexol (OMNIPAQUE) 350 MG/ML  injection (SINGLE-DOSE) 85 mL (85 mL Intravenous Given 5/1/25 9593)   LORazepam (ATIVAN) injection 2 mg (2 mg Intravenous Given 5/2/25 0031)     CT stroke alert brain   Final Result      Mild senescent changes without acute intracranial abnormality. If clinical concern for acute ischemia, consider more sensitive MRI brain for better evaluation         Workstation performed: [id number]         CTA stroke alert (head/neck)   Final Result      No large vessel arterial occlusion, significant flow-limiting stenosis, or focal saccular aneurysm identified. Mildly diminished caliber of the distal right vertebral artery/V4 segment and fetal origin of the right posterior cerebral artery noted. No    enhancing brain mass/fluid collection or evidence of vascular malformation. No enhancing soft tissue neck mass, fluid collection, or cervical lymphadenopathy.               Workstation performed: [id number]           Orders Placed This Encounter   Procedures    CT stroke alert brain    CTA stroke alert (head/neck)    Basic metabolic panel    CBC and Platelet    Protime-INR    APTT    HS Troponin 0hr (reflex protocol)    Levetiracetam level    HS Troponin I 2hr    HS Troponin I 4hr    Vital Signs    Continuous cardiac monitoring    Continuous pulse oximetry    Neuro checks    Weigh patient and record    Insert peripheral IV    Nursing dysphagia Screen    Nasal cannula oxygen    Fingerstick Glucose (POCT)    24 Hour Telemetry Monitoring    Consult to Neurology    ECG 12 lead    INPATIENT ADMISSION     Labs Reviewed   BASIC METABOLIC PANEL - Abnormal       Result Value Ref Range Status    Sodium 141  135 - 147 mmol/L Final    Potassium 4.0  3.5 - 5.3 mmol/L Final    Chloride 105  96 - 108 mmol/L Final    CO2 27  21 - 32 mmol/L Final    ANION GAP 9  4 - 13 mmol/L Final    BUN 16  5 - 25 mg/dL Final    Creatinine 1.04  0.60 - 1.30 mg/dL Final    Comment: Standardized to IDMS reference method    Glucose 147 (*) 65 - 140 mg/dL Final     Comment: If the patient is fasting, the ADA then defines impaired fasting glucose as > 100 mg/dL and diabetes as > or equal to 123 mg/dL.    Calcium 9.7  8.4 - 10.2 mg/dL Final    eGFR 57  ml/min/1.73sq m Final    Narrative:     National Kidney Disease Foundation guidelines for Chronic Kidney Disease (CKD):     Stage 1 with normal or high GFR (GFR > 90 mL/min/1.73 square meters)    Stage 2 Mild CKD (GFR = 60-89 mL/min/1.73 square meters)    Stage 3A Moderate CKD (GFR = 45-59 mL/min/1.73 square meters)    Stage 3B Moderate CKD (GFR = 30-44 mL/min/1.73 square meters)    Stage 4 Severe CKD (GFR = 15-29 mL/min/1.73 square meters)    Stage 5 End Stage CKD (GFR <15 mL/min/1.73 square meters)  Note: GFR calculation is accurate only with a steady state creatinine   POCT GLUCOSE - Abnormal    POC Glucose 163 (*) 65 - 140 mg/dl Final   CBC AND PLATELET - Normal    WBC 6.30  4.31 - 10.16 Thousand/uL Final    RBC 4.46  3.81 - 5.12 Million/uL Final    Hemoglobin 12.9  11.5 - 15.4 g/dL Final    Hematocrit 39.5  34.8 - 46.1 % Final    MCV 89  82 - 98 fL Final    MCH 28.9  26.8 - 34.3 pg Final    MCHC 32.7  31.4 - 37.4 g/dL Final    RDW 12.2  11.6 - 15.1 % Final    Platelets 152  149 - 390 Thousands/uL Final    MPV 10.7  8.9 - 12.7 fL Final   PROTIME-INR - Normal    Protime 13.2  12.3 - 15.0 seconds Final    INR 0.97  0.85 - 1.19 Final    Narrative:     INR Therapeutic Range    Indication                                             INR Range      Atrial Fibrillation                                               2.0-3.0  Hypercoagulable State                                    2.0.2.3  Left Ventricular Asist Device                            2.0-3.0  Mechanical Heart Valve                                  -    Aortic(with afib, MI, embolism, HF, LA enlargement,    and/or coagulopathy)                                     2.0-3.0 (2.5-3.5)     Mitral                                                              "2.5-3.5  Prosthetic/Bioprosthetic Heart Valve               2.0-3.0  Venous thromboembolism (VTE: VT, PE        2.0-3.0   APTT - Normal    PTT 27  23 - 34 seconds Final    Comment: Therapeutic Heparin Range =  60-90 seconds   HS TROPONIN I 0HR - Normal    hs TnI 0hr 5  \"Refer to ACS Flowchart\"- see link ng/L Final    Comment:                                              Initial (time 0) result  If >=50 ng/L, Myocardial injury suggested ;  Type of myocardial injury and treatment strategy  to be determined.  If 5-49 ng/L, a delta result at 2 hours and or 4 hours will be needed to further evaluate.  If <4 ng/L, and chest pain has been >3 hours since onset, patient may qualify for discharge based on the HEART score in the ED.  If <5 ng/L and <3hours since onset of chest pain, a delta result at 2 hours will be needed to further evaluate.    HS Troponin 99th Percentile URL of a Health Population=12 ng/L with a 95% Confidence Interval of 8-18 ng/L.    Second Troponin (time 2 hours)  If calculated delta >= 20 ng/L,  Myocardial injury suggested ; Type of myocardial injury and treatment strategy to be determined.  If 5-49 ng/L and the calculated delta is 5-19 ng/L, consult medical service for evaluation.  Continue evaluation for ischemia on ecg and other possible etiology and repeat hs troponin at 4 hours.  If delta is <5 ng/L at 2 hours, consider discharge based on risk stratification via the HEART score (if in ED), or WON risk score in IP/Observation.    HS Troponin 99th Percentile URL of a Health Population=12 ng/L with a 95% Confidence Interval of 8-18 ng/L.   LEVETIRACETAM LEVEL   HS TROPONIN I 2HR   HS TROPONIN I 4HR     Time reflects when diagnosis was documented in both MDM as applicable and the Disposition within this note       Time User Action Codes Description Comment    5/1/2025 11:30 PM Starr Nielsen Add [R29.90] Stroke-like symptoms           ED Disposition       ED Disposition   Admit    Condition   Stable "    Date/Time   Fri May 2, 2025 12:16 AM    Comment   Case was discussed with neurology               Follow-up Information    None       Patient's Medications   Discharge Prescriptions    No medications on file     No discharge procedures on file.  Prior to Admission Medications   Prescriptions Last Dose Informant Patient Reported? Taking?   Blood Glucose Monitoring Suppl (Contour Next Monitor) w/Device KIT   No No   Sig: Use to monitor blood sugars daily   Cholecalciferol (VITAMIN D PO)  Self Yes No   Sig: Take 5,000 Units by mouth in the morning   Cinnamon 500 MG capsule  Self No No   Sig: Take 1 capsule (500 mg total) by mouth in the morning   Continuous Glucose  (Dexcom G7 ) MIKE   No No   Sig: Use 1 each once for 1 dose   Continuous Glucose Sensor (Dexcom G7 Sensor)   No No   Sig: Use 1 Device every 10 days   Contour Next Test test strip   No No   Sig: Test twice daily   Insulin Pen Needle (B-D ULTRAFINE III SHORT PEN) 31G X 8 MM MISC  Self No No   Sig: Inject under the skin 5 (five) times a day   Lancets (onetouch ultrasoft) lancets   No No   Sig: Check sugars 2-4 times a day   Lantus SoloStar 100 units/mL SOPN   No No   Sig: INJECT 14 UNITS SUBCUTANEOUSLY DAILY   Misc. Devices (Rollator Ultra-Light) MISC  Self No No   Sig: Use daily   QUERCETIN PO   Yes No   Sig: Take by mouth if needed   Vitamin B Complex-C CAPS   No No   Sig: Take 1 capsule by mouth in the morning   aspirin 81 mg chewable tablet   No No   Sig: Chew 1 tablet (81 mg total) daily   glimepiride (AMARYL) 1 mg tablet   No No   Sig: TAKE 1 TABLET (1 MG TOTAL) BY MOUTH IN THE MORNING   lamoTRIgine (LaMICtal) 25 mg tablet   Yes No   Sig: Take 25 mg by mouth daily   levETIRAcetam (KEPPRA) 500 mg tablet   No No   Sig: Take 1 tablet (500 mg total) by mouth every 12 (twelve) hours   levETIRAcetam (Keppra) 500 mg tablet   No No   Sig: Take 1 tab (500 mg) by mouth twice a day for 1 week, then take 2 tabs (500 mg) twice a day.   metFORMIN  "(GLUCOPHAGE-XR) 500 mg 24 hr tablet   No No   Sig: Take 2 tablets (1,000 mg total) by mouth in the morning   rosuvastatin (CRESTOR) 5 mg tablet   No No   Sig: TAKE 1 TABLET BY MOUTH DAILY   triamcinolone (KENALOG) 0.1 % ointment  Self No No   Sig: APPLY 1 APPLICATION TOPICALLY 2 (TWO) TIMES A DAY TO AFFECTED AREA      Facility-Administered Medications Last Administration Doses Remaining   diphenhydrAMINE (BENADRYL) capsule 25 mg None recorded           Portions of the record may have been created with voice recognition software. Occasional wrong word or \"sound a like\" substitutions may have occurred due to the inherent limitations of voice recognition software. Read the chart carefully and recognize, using context, where substitutions have occurred.       ED Course         Critical Care Time  Procedures      "

## 2025-05-02 NOTE — ED NOTES
Per neurology resident, Hosea Thornton, MRI screening form deferred due to patient mental status. Hiram placed for patient safety order placed by provider     Felisha Jimenez RN  05/02/25 0136       Felisha Jimenez RN  05/02/25 0418

## 2025-05-03 LAB
ANION GAP SERPL CALCULATED.3IONS-SCNC: 10 MMOL/L (ref 4–13)
BUN SERPL-MCNC: 14 MG/DL (ref 5–25)
CALCIUM SERPL-MCNC: 9.4 MG/DL (ref 8.4–10.2)
CHLORIDE SERPL-SCNC: 106 MMOL/L (ref 96–108)
CO2 SERPL-SCNC: 26 MMOL/L (ref 21–32)
CREAT SERPL-MCNC: 0.93 MG/DL (ref 0.6–1.3)
ERYTHROCYTE [DISTWIDTH] IN BLOOD BY AUTOMATED COUNT: 12.1 % (ref 11.6–15.1)
GFR SERPL CREATININE-BSD FRML MDRD: 66 ML/MIN/1.73SQ M
GLUCOSE SERPL-MCNC: 148 MG/DL (ref 65–140)
GLUCOSE SERPL-MCNC: 158 MG/DL (ref 65–140)
GLUCOSE SERPL-MCNC: 197 MG/DL (ref 65–140)
GLUCOSE SERPL-MCNC: 79 MG/DL (ref 65–140)
GLUCOSE SERPL-MCNC: 84 MG/DL (ref 65–140)
HCT VFR BLD AUTO: 38.4 % (ref 34.8–46.1)
HGB BLD-MCNC: 12.9 G/DL (ref 11.5–15.4)
MAGNESIUM SERPL-MCNC: 2 MG/DL (ref 1.9–2.7)
MCH RBC QN AUTO: 29.3 PG (ref 26.8–34.3)
MCHC RBC AUTO-ENTMCNC: 33.6 G/DL (ref 31.4–37.4)
MCV RBC AUTO: 87 FL (ref 82–98)
PLATELET # BLD AUTO: 153 THOUSANDS/UL (ref 149–390)
PMV BLD AUTO: 10.8 FL (ref 8.9–12.7)
POTASSIUM SERPL-SCNC: 3.7 MMOL/L (ref 3.5–5.3)
RBC # BLD AUTO: 4.41 MILLION/UL (ref 3.81–5.12)
SODIUM SERPL-SCNC: 142 MMOL/L (ref 135–147)
WBC # BLD AUTO: 5.27 THOUSAND/UL (ref 4.31–10.16)

## 2025-05-03 PROCEDURE — 85027 COMPLETE CBC AUTOMATED: CPT

## 2025-05-03 PROCEDURE — 82948 REAGENT STRIP/BLOOD GLUCOSE: CPT

## 2025-05-03 PROCEDURE — 83735 ASSAY OF MAGNESIUM: CPT

## 2025-05-03 PROCEDURE — 97167 OT EVAL HIGH COMPLEX 60 MIN: CPT

## 2025-05-03 PROCEDURE — 99233 SBSQ HOSP IP/OBS HIGH 50: CPT | Performed by: PSYCHIATRY & NEUROLOGY

## 2025-05-03 PROCEDURE — 80048 BASIC METABOLIC PNL TOTAL CA: CPT

## 2025-05-03 PROCEDURE — 97163 PT EVAL HIGH COMPLEX 45 MIN: CPT

## 2025-05-03 RX ADMIN — LEVETIRACETAM 1000 MG: 100 INJECTION, SOLUTION INTRAVENOUS at 21:47

## 2025-05-03 RX ADMIN — INSULIN GLARGINE 14 UNITS: 100 INJECTION, SOLUTION SUBCUTANEOUS at 21:47

## 2025-05-03 RX ADMIN — ENOXAPARIN SODIUM 40 MG: 40 INJECTION SUBCUTANEOUS at 08:32

## 2025-05-03 RX ADMIN — LEVETIRACETAM 1000 MG: 100 INJECTION, SOLUTION INTRAVENOUS at 08:32

## 2025-05-03 RX ADMIN — PRAVASTATIN SODIUM 40 MG: 40 TABLET ORAL at 16:34

## 2025-05-03 RX ADMIN — B-COMPLEX W/ C & FOLIC ACID TAB 1 TABLET: TAB at 08:32

## 2025-05-03 RX ADMIN — INSULIN LISPRO 1 UNITS: 100 INJECTION, SOLUTION INTRAVENOUS; SUBCUTANEOUS at 11:59

## 2025-05-03 RX ADMIN — ASPIRIN 81 MG CHEWABLE TABLET 81 MG: 81 TABLET CHEWABLE at 08:32
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
## 2025-05-03 NOTE — PROGRESS NOTES
Progress Note - Neurology   Name: Ynes Mendoza 64 y.o. female I MRN: [id number]  [facility] I Date of Admission: 5/1/2025   Date of Service: 5/3/2025 I Hospital Day: 1    Assessment & Plan  Seizure-like activity (HCC)  Neurology consulted to address stroke alert for concern of mixed aphasia of uncertain duration/last known well, which appears to be nearly identical to multiple prior presentations which ultimately never revealed stroke but rather EEG findings which were consistent with seizure predisposition although no event captured  Since most recent prior presentation for similar symptoms in 02/2025, patient has visited with outpatient epileptologist as requested, who reports that the description of her events were consistent with focal impaired awareness seizures, even warranting diagnosis of epilepsy due to the recurrent nature.  Patient was initially placed on Lamictal during the prior hospitalization, but due to intolerable side effects was moved over to Keppra approximately 1 month ago, was supposed to take 500 every 12 for 1 week then moved to 1 g every 12 indefinitely, however patient unable to describe if medication being taken appropriately.    Workup:  Initial evaluation includes SBP in 140s, BG of 160s, all other VSS, NIH 13, near complete inability to follow simple commands, and inability to meaningfully participate in conversation or history although can state an occasional yes or no which may or may not be accurate.  No evidence of infectious process or electrolyte abnormalities  CTH NAIA  CTAHN No large vessel arterial occlusion, significant flow-limiting stenosis, or focal saccular aneurysm identified. Mildly diminished caliber of the distal right vertebral artery/V4 segment and fetal origin of the right posterior cerebral artery noted. No enhancing brain mass/fluid collection or evidence of vascular malformation. No enhancing soft tissue neck mass, fluid collection, or cervical  lymphadenopathy.  MRIB w/o, 08/2024: No acute intracranial lesion. Atrophy and nonspecific white matter T2 hyperintensities likely secondary to chronic small vessel ischemia.   VEEG, 02/2025: This 23 hour vEEG captures right frontocentral spike and wave discharges, sometimes with a more generalized field of spread.  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 In addition, this study is indicative of a mild diffuse encephalopathy. No seizures are recorded.   Echo 2/2025: EF 60%, G1DD, no evidence of PFO  A1c 4/2025: 7.3%  LDL 4/2025: 75  Keppra level low normal at 12.9; level obtained prior to 2 g Keppra load    Impression:  Due to stereotyped recurrent events at least occurring over the past many months if not longer, in addition to outpatient epileptologist evaluation, much higher suspicion for focal impaired awareness seizure over lower suspicion of TIA/CVA as had been worked up on multiple occasions in the past each time to negative results, reason for possible seizure unclear at this time but suspect most likely due to difficulties with Keppra compliance.     Plan:  Discussed plan with neurology attending Dr. Coyne  2 mg Ativan trial given at 0031  Continue with Keppra 1G every 12 hours  Recommend repeat troughs in 2-4 weeks for Keppra in outpatient setting   Will obtain MRI brain seizure with and without contrast  Will hold off on EEG at this time  Seizure precautions placed  NPO d/t somnolence  ST eval requested   Continue telemetry   OT/PT  Per sister, patient has MCI at baseline and has concerns in regard to patient's medication compliance and ability to care for self at home  Okay to continue aspirin and hospital equivalent statin to home    Has f/u appointment w/ Dr. Rodriguez 5/14/2025.       Subjective   Patient seen evaluated bedside.  She was reported to be agitated overnight requiring wrist restraints and Posey belt.   This morning she worked with PT was cooperative, did well.    Called and updated sister Lori    Review of Systems   Unable to perform ROS: Dementia         Objective :  Temp:  [96.7 °F (35.9 °C)-97.8 °F (36.6 °C)] 97.7 °F (36.5 °C)  HR:  [61-87] 61  BP: (101-141)/(55-74) 122/70  Resp:  [15-20] 18  SpO2:  [89 %-100 %] 96 %  O2 Device: None (Room air)    Physical Exam  Constitutional:       General: She is not in acute distress.     Appearance: She is not ill-appearing.   HENT:      Head: Normocephalic.      Mouth/Throat:      Mouth: Mucous membranes are moist.   Eyes:      General: Lids are normal.      Extraocular Movements: Extraocular movements intact.      Conjunctiva/sclera: Conjunctivae normal.      Pupils: Pupils are equal, round, and reactive to light.   Neurological:      Mental Status: She is alert.      Motor: Motor strength is normal.     Deep Tendon Reflexes: Reflexes are normal and symmetric.   Psychiatric:         Speech: Speech normal.     Neurological Exam  Mental Status  Alert. Oriented only to person. Orientation: Knows she is in hospital but unable to say the name or what city    Knows president    Knows year not month. Speech is normal.    Cranial Nerves  CN II: VF appear intact based on blink to threat, was following commands but attention seems impaired as she did not seem to understand my questions fully as I was asking her how many fingers I am holding up.  CN III, IV, VI: Extraocular movements intact bilaterally. Normal lids and orbits bilaterally. Pupils equal round and reactive to light bilaterally.  CN V: Facial sensation is normal.  CN VII: Full and symmetric facial movement.  CN IX, X: Palate elevates symmetrically  CN XI: Shoulder shrug strength is normal.  CN XII: Tongue midline without atrophy or fasciculations.    Motor   Strength is 5/5 throughout all four extremities.    Sensory  Light touch is normal in upper and lower extremities.     Reflexes  Deep tendon reflexes are 2+ and  symmetric in all four extremities.        Lab Results: I have reviewed the following results:  Imaging Results Review: No pertinent imaging studies reviewed.  Other Study Results Review: No additional pertinent studies reviewed.    VTE Pharmacologic Prophylaxis: VTE covered by:  enoxaparin, Subcutaneous, 40 mg at 05/03/25 08

## 2025-05-03 NOTE — PLAN OF CARE
Problem: OCCUPATIONAL THERAPY ADULT  Goal: Performs self-care activities at highest level of function for planned discharge setting.  See evaluation for individualized goals.  Description: Treatment Interventions: ADL retraining, Functional transfer training, Endurance training, Cognitive reorientation, Patient/family training, Equipment evaluation/education, Compensatory technique education, Continued evaluation, Energy conservation, Activityengagement          See flowsheet documentation for full assessment, interventions and recommendations.   Note: Limitation: Decreased ADL status, Decreased Safe judgement during ADL, Decreased cognition, Decreased endurance, Decreased self-care trans, Decreased high-level ADLs     Assessment: Pt is a 64 y.o. female seen for OT evaluation s/p admission to [facility] on 5/1/2025 due to altered mental status. Pt diagnosed with Seizure-like activity (HCC). Pt has a significant PMH impacting occupational performance including: Abnormal EEG, Anxiety, Aphasia, Condyloma acuminatum, Depression, Diabetes mellitus (HCC), Genital warts, Herpes simplex, HPV (human papilloma virus) infection, Obesity, and Visual impairment. Pt with active OT evaluation and treatment orders and activity orders. PTA, pt living with roommates in a [facility]. Per chart review, pt is I w/ ADLs, IADLs, and fxnl mobility at baseline; - driving. Pt agreeable and willing to participate in OT evaluation. During evaluation, pt was S for eating and grooming, min A for UB ADLs and toileting, and mod A for LB ADLs. Pt also required min Ax1 for bed mobility, transfers, and fxnl mobility w/ HHA. Performance deficits that affect the pt’s occupational performance during the initial evaluation include decreased ADL status, decreased activity tolerance, decreased endurance, decreased standing tolerance, decreased standing balance, decreased transfer skills, decreased fxnl mobility, decreased safety awareness, decreased  insight into deficits, and impaired cognition . Based on pt’s functional performance and deficits the following occupations will be addressed in OT treatments in order to maximize pt’s independence and overall occupational performance: grooming, bathing/showering, toileting and toilet hygiene, dressing, and functional mobility. Goals are listed below.  From OT perspective, recommend Level I (Maximum Resource Intensity) upon d/c when pt medically stable to d/c from acute care. Will continue to follow.     Rehab Resource Intensity Level, OT: I (Maximum Resource Intensity)

## 2025-05-03 NOTE — PLAN OF CARE
Problem: PAIN - ADULT  Goal: Verbalizes/displays adequate comfort level or baseline comfort level  Description: Interventions:- Encourage patient to monitor pain and request assistance- Assess pain using appropriate pain scale- Administer analgesics based on type and severity of pain and evaluate response- Implement non-pharmacological measures as appropriate and evaluate response- Consider cultural and social influences on pain and pain management- Notify physician/advanced practitioner if interventions unsuccessful or patient reports new pain  5/3/2025 0828 by Hakeem Reina RN  Outcome: Progressing  5/2/2025 1911 by Hakeem Reina RN  Outcome: Progressing     Problem: INFECTION - ADULT  Goal: Absence or prevention of progression during hospitalization  Description: INTERVENTIONS:- Assess and monitor for signs and symptoms of infection- Monitor lab/diagnostic results- Monitor all insertion sites, i.e. indwelling lines, tubes, and drains- Monitor endotracheal if appropriate and nasal secretions for changes in amount and color- [place] appropriate cooling/warming therapies per order- Administer medications as ordered- Instruct and encourage patient and family to use good hand hygiene technique- Identify and instruct in appropriate isolation precautions for identified infection/condition  5/3/2025 0828 by Hakeem Reina RN  Outcome: Progressing  5/2/2025 1911 by Hakeem Reina RN  Outcome: Progressing  Goal: Absence of fever/infection during neutropenic period  Description: INTERVENTIONS:- Monitor WBC  5/3/2025 0828 by Hakeem Reina RN  Outcome: Progressing  5/2/2025 1911 by Hakeem Reina RN  Outcome: Progressing     Problem: SAFETY ADULT  Goal: Patient will remain free of falls  Description: INTERVENTIONS:- Educate patient/family on patient safety including physical limitations- Instruct patient to call for assistance with activity - Consult OT/PT to assist with strengthening/mobility -  Keep Call bell within reach- Keep bed low and locked with side rails adjusted as appropriate- Keep care items and personal belongings within reach- Initiate and maintain comfort rounds- Make Fall Risk Sign visible to staff- Offer Toileting every  Hours, in advance of need- Initiate/Maintain alarm- Obtain necessary fall risk management equipment: - Apply yellow socks and bracelet for high fall risk patients- Consider moving patient to room near nurses station  5/3/2025 0828 by Hakeem Reina RN  Outcome: Progressing  5/2/2025 1911 by Hakeem Reina RN  Outcome: Progressing  Goal: Maintain or return to baseline ADL function  Description: INTERVENTIONS:-  Assess patient's ability to carry out ADLs; assess patient's baseline for ADL function and identify physical deficits which impact ability to perform ADLs (bathing, care of mouth/teeth, toileting, grooming, dressing, etc.)- Assess/evaluate cause of self-care deficits - Assess range of motion- Assess patient's mobility; develop plan if impaired- Assess patient's need for assistive devices and provide as appropriate- Encourage maximum independence but intervene and supervise when necessary- Involve family in performance of ADLs- Assess for home care needs following discharge - Consider OT consult to assist with ADL evaluation and planning for discharge- Provide patient education as appropriate  5/3/2025 0828 by Hakeem Reina RN  Outcome: Progressing  5/2/2025 1911 by Hakeem Renia RN  Outcome: Progressing  Goal: Maintains/Returns to pre admission functional level  Description: INTERVENTIONS:- Perform AM-PAC 6 Click Basic Mobility/ Daily Activity assessment daily.- Set and communicate daily mobility goal to care team and patient/family/caregiver. - Collaborate with rehabilitation services on mobility goals if consulted- Perform Range of Motion  times a day.- Reposition patient every  hours.- Dangle patient  times a day- Stand patient  times a day-  Ambulate patient  times a day- Out of bed to chair  times a day - Out of bed for meals  times a day- Out of bed for toileting- Record patient progress and toleration of activity level   5/3/2025 0828 by Hakeem Reina RN  Outcome: Progressing  5/2/2025 1911 by Hakeem Reina RN  Outcome: Progressing     Problem: DISCHARGE PLANNING  Goal: Discharge to home or other facility with appropriate resources  Description: INTERVENTIONS:- Identify barriers to discharge w/patient and caregiver- Arrange for needed discharge resources and transportation as appropriate- Identify discharge learning needs (meds, wound care, etc.)- Arrange for interpretive services to assist at discharge as needed- Refer to Case Management Department for coordinating discharge planning if the patient needs post-hospital services based on physician/advanced practitioner order or complex needs related to functional status, cognitive ability, or social support system  5/3/2025 0828 by Hakeem Reina RN  Outcome: Progressing  5/2/2025 1911 by Hakeem Reina RN  Outcome: Progressing     Problem: Knowledge Deficit  Goal: Patient/family/caregiver demonstrates understanding of disease process, treatment plan, medications, and discharge instructions  Description: Complete learning assessment and assess knowledge base.Interventions:- Provide teaching at level of understanding- Provide teaching via preferred learning methods  5/3/2025 0828 by Hakeem Reina RN  Outcome: Progressing  5/2/2025 1911 by Hakeem Reina RN  Outcome: Progressing     Problem: Neurological Deficit  Goal: Neurological status is stable or improving  Description: Interventions:- Monitor and assess patient's level of consciousness, motor function, sensory function, and level of assistance needed for ADLs. - Monitor and report changes from baseline. Collaborate with interdisciplinary team to initiate plan and implement interventions as ordered. - Provide and maintain  a safe environment.- Consider seizure precautions.- Consider fall precautions.- Consider aspiration precautions.- Consider bleeding precautions.  5/3/2025 0828 by Hakeem Reina RN  Outcome: Progressing  5/2/2025 1911 by Hakeem Reina RN  Outcome: Progressing     Problem: Activity Intolerance/Impaired Mobility  Goal: Mobility/activity is maintained at optimum level for patient  Description: Interventions:- Assess and monitor patient  barriers to mobility and need for assistive/adaptive devices.- Assess patient's emotional response to limitations.- Collaborate with interdisciplinary team and initiate plans and interventions as ordered.- Encourage independent activity per ability.- Maintain proper body alignment.- Perform active/passive rom as tolerated/ordered.- Plan activities to conserve energy.- Turn patient as appropriate  5/3/2025 0828 by Hakeem Reina RN  Outcome: Progressing  5/2/2025 1911 by Hakeem Reina RN  Outcome: Progressing     Problem: Communication Impairment  Goal: Ability to express needs and understand communication  Description: Assess patient's communication skills and ability to understand information.  Patient will demonstrate use of effective communication techniques, alternative methods of communication and understanding even if not able to speak. - Encourage communication and provide alternate methods of communication as needed.- Collaborate with case management/ for discharge needs.- Include patient/family/caregiver in decisions related to communication.  5/3/2025 0828 by Hakeem Reina RN  Outcome: Progressing  5/2/2025 1911 by Hakeem Reina RN  Outcome: Progressing     Problem: Potential for Aspiration  Goal: Non-ventilated patient's risk of aspiration is minimized  Description: Assess and monitor vital signs, respiratory status, and labs (WBC).  Monitor for signs of aspiration (tachypnea, cough, rales, wheezing, cyanosis, fever).- Assess and  monitor patient's ability to swallow.- Place patient up in chair to eat if possible.- HOB up at 90 degrees to eat if unable to get patient up into chair.- Supervise patient during oral intake. - Instruct patient/ family to take small bites.- Instruct patient/ family to take small single sips when taking liquids.- Follow patient-specific strategies generated by speech pathologist.  5/3/2025 0828 by Hakeem Reina RN  Outcome: Progressing  5/2/2025 1911 by Hakeem Reina RN  Outcome: Progressing  Goal: Ventilated patient's risk of aspiration is minimized  Description: Assess and monitor vital signs, respiratory status, airway cuff pressure, and labs (WBC).  Monitor for signs of aspiration (tachypnea, cough, rales, wheezing, cyanosis, fever).- Elevate head of bed 30 degrees if patient has tube feeding.- Monitor tube feeding.  5/3/2025 0828 by Hakeem Reina RN  Outcome: Progressing  5/2/2025 1911 by Hakeem Reina RN  Outcome: Progressing     Problem: Nutrition  Goal: Nutrition/Hydration status is improving  Description: Monitor and assess patient's nutrition/hydration status for malnutrition (ex- brittle hair, bruises, dry skin, pale skin and conjunctiva, muscle wasting, smooth red tongue, and disorientation). Collaborate with interdisciplinary team and initiate plan and interventions as ordered.  Monitor patient's weight and dietary intake as ordered or per policy. Utilize nutrition screening tool and intervene per policy. Determine patient's food preferences and provide high-protein, high-caloric foods as appropriate. - Assist patient with eating.- Allow adequate time for meals.- Encourage patient to take dietary supplement as ordered.- Collaborate with clinical nutritionist.- Include patient/family/caregiver in decisions related to nutrition.  5/3/2025 0828 by Hakeem Reina RN  Outcome: Progressing  5/2/2025 1911 by Hakeem Reina RN  Outcome: Progressing     Problem: Prexisting or High  Potential for Compromised Skin Integrity  Goal: Skin integrity is maintained or improved  Description: INTERVENTIONS:- Identify patients at risk for skin breakdown- Assess and monitor skin integrity- Assess and monitor nutrition and hydration status- Monitor labs - Assess for incontinence - Turn and reposition patient- Assist with mobility/ambulation- Relieve pressure over bony prominences- Avoid friction and shearing- Provide appropriate hygiene as needed including keeping skin clean and dry- Evaluate need for skin moisturizer/barrier cream- Collaborate with interdisciplinary team - Patient/family teaching- Consider wound care consult   Outcome: Progressing     Problem: SAFETY,RESTRAINT: NV/NON-SELF DESTRUCTIVE BEHAVIOR  Goal: Remains free of harm/injury (restraint for non violent/non self-detsructive behavior)  Description: INTERVENTIONS:- Instruct patient/family regarding restraint use - Assess and monitor physiologic and psychological status - Provide interventions and comfort measures to meet assessed patient needs - Identify and implement measures to help patient regain control- Assess readiness for release of restraint   Outcome: Progressing  Goal: Returns to optimal restraint-free functioning  Description: INTERVENTIONS:- Assess the patient's behavior and symptoms that indicate continued need for restraint- Identify and implement measures to help patient regain control- Assess readiness for release of restraint   Outcome: Progressing

## 2025-05-03 NOTE — OCCUPATIONAL THERAPY NOTE
Occupational Therapy Evaluation     Patient Name: Ynes Mendoza  Today's Date: 5/3/2025  Problem List  Principal Problem:    Seizure-like activity (HCC)    Past Medical History  Past Medical History:   Diagnosis Date    Abnormal EEG 11/05/2023    Anxiety     Aphasia 11/05/2023    Condyloma acuminatum     Depression 1968    Therapy    Diabetes mellitus (HCC) 2002    type 2    Genital warts     Herpes simplex     HPV (human papilloma virus) infection     Obesity 1990    Visual impairment 1984     Past Surgical History  Past Surgical History:   Procedure Laterality Date    CARDIAC ELECTROPHYSIOLOGY PROCEDURE N/A 1/24/2025    Procedure: Cardiac pacer implant DUAL CHAMBER;  Surgeon: Patric Sanchez MD;  [facility]: [facility];  Service: Cardiology        05/03/25 0937   OT Last Visit   OT Visit Date 05/03/25   Note Type   Note type Evaluation   Pain Assessment   Pain Assessment Tool 0-10   Pain Score No Pain   Restrictions/Precautions   Weight Bearing Precautions Per Order No   Other Precautions Impulsive;Cognitive;Chair Alarm;Bed Alarm;Restraints;Multiple lines;Fall Risk  (posey belt and b/l wrist restraints)   Home Living   Type of Home Apartment   Home Layout One level;Performs ADLs on one level;Able to live on main level with bedroom/bathroom  (1 NILAM)   Bathroom Shower/Tub Tub/shower unit   Bathroom Toilet Standard   Bathroom Equipment Grab bars in shower;Shower chair   Home Equipment Walker;Cane  (rollator)   Additional Comments Pt is a poor historian. Information gathered via chart review.   Prior Function   Level of [place] Independent with ADLs;Independent with functional mobility;Independent with IADLS   Lives With Other (Comment)  (roommates)   Receives Help From Friend(s)   IADLs Independent with meal prep;Independent with medication management;Family/Friend/Other provides transportation   Lifestyle   Autonomy Per chart review, pt is I w/ ADLs, IADLs, and fxnl mobility at baseline; - driving. Pt  "uses the [employer] for transportation.   Reciprocal Relationships Pt lives w/ roommates; supportive sister.   Intrinsic Gratification none stated; will continue to assess   General   Family/Caregiver Present No   Subjective   Subjective \"I should know but I don't know.\" - re: orientation and home setup questions   ADL   Where Assessed Edge of bed  (+ standing at the sink)   Eating Assistance 5  Supervision/Setup   Grooming Assistance 5  Supervision/Setup   Grooming Deficit Setup;Verbal cueing;Supervision/safety;Increased time to complete;Wash/dry face   UB Bathing Assistance 4  Minimal Assistance   LB Bathing Assistance 3  Moderate Assistance   UB Dressing Assistance 4  Minimal Assistance   LB Dressing Assistance 3  Moderate Assistance   Toileting Assistance  4  Minimal Assistance   Toileting Deficit Setup;Verbal cueing;Supervison/safety;Increased time to complete;Grab bar use;Clothing management up;Clothing management down;Perineal hygiene   Bed Mobility   Supine to Sit 4  Minimal assistance   Additional items Assist x 1;HOB elevated;Bedrails;Increased time required;Verbal cues   Sit to Supine 4  Minimal assistance   Additional items Assist x 1;HOB elevated;Bedrails;Increased time required;Verbal cues;LE management   Additional Comments Pt supine in bed at end of OT evaluation w/ alarm activated and all needs within reach.   Transfers   Sit to Stand 4  Minimal assistance   Additional items Assist x 1;Increased time required;Verbal cues   Stand to Sit 4  Minimal assistance   Additional items Assist x 1;Increased time required;Verbal cues   Toilet transfer 4  Minimal assistance   Additional items Assist x 1;Increased time required;Verbal cues;Standard toilet;Other  (grab bar use)   Additional Comments w/ HHA for support   Functional Mobility   Functional Mobility 4  Minimal assistance   Additional Comments Pt ambulated short household distance w/ min Ax1 using HHA for support.   Additional items Hand hold assistance "   Balance   Static Sitting Fair +   Dynamic Sitting Fair   Static Standing Fair -   Dynamic Standing Poor +   Ambulatory Poor +   Activity Tolerance   Activity Tolerance Patient limited by fatigue;Other (Comment)  (impaired cognition)   Medical Staff Made Aware PTNaa, due to pt's medical complexity and multiple comorbidities   Nurse Made Aware RN clearance prior to session   RUE Assessment   RUE Assessment WFL   LUE Assessment   LUE Assessment WFL   Hand Function   Gross Motor Coordination Functional   Fine Motor Coordination Functional   Cognition   Overall Cognitive Status Impaired   Arousal/Participation Alert;Responsive;Cooperative   Attention Attends with cues to redirect   Orientation Level Oriented to person;Oriented to time;Disoriented to place;Disoriented to situation   Memory Decreased recall of precautions;Decreased recall of recent events;Decreased short term memory   Following Commands Follows one step commands with increased time or repetition   Comments Pt was identified by name and . Pt was pleasantly confused, cooperative, and willing to participate in OT evaluation. Pt w/ mixed aphasia; word finding difficulties. Pt required increased processing and response time. Pt was distractable and impulsive at times but re-directable w/ cues.   Assessment   Limitation Decreased ADL status;Decreased Safe judgement during ADL;Decreased cognition;Decreased endurance;Decreased self-care trans;Decreased high-level ADLs   Assessment Pt is a 64 y.o. female seen for OT evaluation s/p admission [facility] on [facility] due to altered mental status. Pt diagnosed with Seizure-like activity (HCC). Pt has a significant PMH impacting occupational performance including: Abnormal EEG, Anxiety, Aphasia, Condyloma acuminatum, Depression, Diabetes mellitus (HCC), Genital warts, Herpes simplex, HPV (human papilloma virus) infection, Obesity, and Visual impairment. Pt with active OT evaluation and treatment  orders and activity orders. PTA, pt living with roommates in a 1 SH. Per chart review, pt is I w/ ADLs, IADLs, and fxnl mobility at baseline; - driving. Pt agreeable and willing to participate in OT evaluation. During evaluation, pt was S for eating and grooming, min A for UB ADLs and toileting, and mod A for LB ADLs. Pt also required min Ax1 for bed mobility, transfers, and fxnl mobility w/ HHA. Performance deficits that affect the pt’s occupational performance during the initial evaluation include decreased ADL status, decreased activity tolerance, decreased endurance, decreased standing tolerance, decreased standing balance, decreased transfer skills, decreased fxnl mobility, decreased safety awareness, decreased insight into deficits, and impaired cognition . Based on pt’s functional performance and deficits the following occupations will be addressed in OT treatments in order to maximize pt’s independence and overall occupational performance: grooming, bathing/showering, toileting and toilet hygiene, dressing, and functional mobility. Goals are listed below.  From OT perspective, recommend Level I (Maximum Resource Intensity) upon d/c when pt medically stable to d/c from acute care. Will continue to follow.   Goals   Patient Goals to go to the bathroom   LTG Time Frame 10-14   Plan   Treatment Interventions ADL retraining;Functional transfer training;Endurance training;Cognitive reorientation;Patient/family training;Equipment evaluation/education;Compensatory technique education;Continued evaluation;Energy conservation;Activityengagement   Goal Expiration Date 05/17/25   OT Treatment Day 0   OT Frequency 2-3x/wk   Discharge Recommendation   Rehab Resource Intensity Level, OT I (Maximum Resource Intensity)   AM-PAC Daily Activity Inpatient   Lower Body Dressing 2   Bathing 2   Toileting 3   Upper Body Dressing 3   Grooming 3   Eating 4   Daily Activity Raw Score 17   Daily Activity Standardized Score (Calc for  Raw Score >=11) 37.26   AM-PAC Applied Cognition Inpatient   Following a Speech/Presentation 1   Understanding Ordinary Conversation 3   Taking Medications 2   Remembering Where Things Are Placed or Put Away 3   Remembering List of 4-5 Errands 1   Taking Care of Complicated Tasks 1   Applied Cognition Raw Score 11   Applied Cognition Standardized Score 27.03       The patient's raw score on the AM-PAC Daily Activity Inpatient Short Form is 17. A raw score of less than 19 suggests the patient may benefit from discharge to post-acute rehabilitation services. Please refer to the recommendation of the Occupational Therapist for safe discharge planning.    Goals:      - Pt will complete UB ADLs w/ mod I to maximize independence and return home.    - Pt will complete LB ADLs w/ mod I to maximize independence and return home.     - Pt will complete toileting routine (transfers, hygiene, and clothing management) w/ mod I to maximize independence and return to prior level of function.    - Pt will complete bed mobility supine >< sit w/ mod I to maximize independence and return home.    - Pt will transfer to bed, chair, and toilet w/ mod I using AD / DME as needed to maximize independence and reduce burden of care.     - Pt will ambulate household distances w/ mod I using least restrictive device to maximize independence and return home.     - Pt will increase activity tolerance (and sitting tolerance) by eating all meals OOB in the chair.     - Pt will increase standing tolerance to 4-5 minutes to maximize independence w/ ADLs and/or leisure activities.      - Pt will tolerate therapeutic activities for greater than 30 minutes in order to increase tolerance for functional activities.     - Pt will participate in ongoing OT evaluation of cognitive skills to assist with safe d/c planning/recommendations.      ANGELINA Block, OTR/L

## 2025-05-03 NOTE — PLAN OF CARE
Problem: PHYSICAL THERAPY ADULT  Goal: Performs mobility at highest level of function for planned discharge setting.  See evaluation for individualized goals.  Description: Treatment/Interventions: ADL retraining, Functional transfer training, LE strengthening/ROM, Elevations, Therapeutic exercise, Endurance training, Cognitive reorientation, Patient/family training, Equipment eval/education, Bed mobility, Gait training, Spoke to nursing, Spoke to case management, OT          See flowsheet documentation for full assessment, interventions and recommendations.  5/3/2025 1420 by Naa Sandoval PT  Note: Prognosis: Fair  Problem List: Decreased strength, Decreased endurance, Impaired balance, Decreased mobility, Decreased coordination, Decreased cognition, Impaired judgement, Decreased safety awareness  Assessment: Pt is a 64 y.o. female seen for PT evaluation s/p admit to [facility] on 5/1/2025. Pt was admitted with a primary dx of: seizure- like activity found to have AMS upon arrival to ED.  PT now consulted for assessment of mobility and d/c needs. Pt with Activity as tolerated, OOB to chair orders.  Pt  has a past medical history of Abnormal EEG (11/05/2023), Anxiety, Aphasia (11/05/2023), Condyloma acuminatum, Depression (1968), Diabetes mellitus (HCC) (2002), Genital warts, Herpes simplex, HPV (human papilloma virus) infection, Obesity (1990), and Visual impairment (1984). Pts current clinical presentation is Unstable/ Unpredictable (high complexity) due to Ongoing medical management for primary dx, Increased reliance on more restrictive AD compared to baseline, Decreased activity tolerance compared to baseline, Fall risk, Increased assistance needed from caregiver at current time, Cog status, Trending lab values. Prior to admission, pt was residing with roommates in 1  with 1 [facility] and was independent. Upon evaluation, pt currently is requiring min A for bed mobility; min A for transfers; min A for  ambulation w/  HHA . Pt presents at PT eval functioning below baseline and currently w/ overall mobility deficits 2* to: BLE weakness, impaired balance, decreased endurance, gait deviations, decreased activity tolerance compared to baseline, decreased functional mobility tolerance compared to baseline, decreased safety awareness, impaired judgement, fall risk, decreased cognition. Pt currently at a fall risk 2* to impairments listed above.  Pt will continue to benefit from skilled acute PT interventions to address stated impairments; to maximize functional mobility; for ongoing pt/ family training; and DME needs. At conclusion of PT session pt returned BTB and bed alarm engaged with phone and call bell within reach. Pt denies any further questions at this time. The patient's AM-PAC Basic Mobility Inpatient Short Form Raw Score is 16. A Raw score of less than or equal to 16 suggests the patient may benefit from discharge to post-acute rehabilitation services. Please also refer to the recommendation of the Physical Therapist for safe discharge planning. PT to continue to follow pt t/o hospital stay, recommend level 2 resource intensity upon hospital D/C.  Barriers to Discharge: Inaccessible home environment, Decreased caregiver support     Rehab Resource Intensity Level, PT: I (Maximum Resource Intensity)    See flowsheet documentation for full assessment.

## 2025-05-03 NOTE — ASSESSMENT & PLAN NOTE
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  Patient was initially placed on Lamictal during the prior hospitalization, but due to intolerable side effects was moved over to Keppra approximately 1 month ago, was supposed to take 500 every 12 for 1 week then moved to 1 g every 12 indefinitely, however patient unable to describe if medication being taken appropriately.    Workup:  Initial evaluation includes SBP in 140s, BG of 160s, all other VSS, NIH 13, near complete inability to follow simple commands, and inability to meaningfully participate in conversation or history although can state an occasional yes or no which may or may not be accurate.  No evidence of infectious process or electrolyte abnormalities  CTH NAIA  CTAHN No large vessel arterial occlusion, significant flow-limiting stenosis, or focal saccular aneurysm identified. Mildly diminished caliber of the distal right vertebral artery/V4 segment and fetal origin of the right posterior cerebral artery noted. No enhancing brain mass/fluid collection or evidence of vascular malformation. No enhancing soft tissue neck mass, fluid collection, or cervical lymphadenopathy.  MRIB w/o, 08/2024: No acute intracranial lesion. Atrophy and nonspecific white matter T2 hyperintensities likely secondary to chronic small vessel ischemia.   VEEG, 02/2025: This 23 hour vEEG captures right  frontocentral spike and wave discharges, sometimes with a more generalized field of spread.  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 In addition, this study is indicative of a mild diffuse encephalopathy. No seizures are recorded.   Echo 2/2025: EF 60%, G1DD, no evidence of PFO  A1c 4/2025: 7.3%  LDL 4/2025: 75  Keppra level low normal at 12.9; level obtained prior to 2 g Keppra load    Impression:  Due to stereotyped recurrent events at least occurring over the past many months if not longer, in addition to outpatient epileptologist evaluation, much higher suspicion for focal impaired awareness seizure over lower suspicion of TIA/CVA as had been worked up on multiple occasions in the past each time to negative results, reason for possible seizure unclear at this time but suspect most likely due to difficulties with Keppra compliance.     Plan:  Discussed plan with neurology attending Dr. Coyne  2 mg Ativan trial given at 0031  Continue with Keppra 1G every 12 hours  Recommend repeat troughs in 2-4 weeks for Keppra in outpatient setting   Will obtain MRI brain seizure with and without contrast  Will hold off on EEG at this time  Seizure precautions placed  NPO d/t somnolence  ST eval requested   Continue telemetry   OT/PT  Per sister, patient has MCI at baseline and has concerns in regard to patient's medication compliance and ability to care for self at home  Okay to continue aspirin and hospital equivalent statin to home

## 2025-05-04 LAB
GLUCOSE SERPL-MCNC: 120 MG/DL (ref 65–140)
GLUCOSE SERPL-MCNC: 128 MG/DL (ref 65–140)
GLUCOSE SERPL-MCNC: 158 MG/DL (ref 65–140)
GLUCOSE SERPL-MCNC: 206 MG/DL (ref 65–140)

## 2025-05-04 PROCEDURE — 82948 REAGENT STRIP/BLOOD GLUCOSE: CPT

## 2025-05-04 PROCEDURE — 92526 ORAL FUNCTION THERAPY: CPT

## 2025-05-04 PROCEDURE — 99233 SBSQ HOSP IP/OBS HIGH 50: CPT | Performed by: PSYCHIATRY & NEUROLOGY

## 2025-05-04 RX ORDER — LORAZEPAM 2 MG/ML
1 INJECTION INTRAMUSCULAR
Status: DISCONTINUED | OUTPATIENT
Start: 2025-05-04 | End: 2025-05-09 | Stop reason: HOSPADM

## 2025-05-04 RX ORDER — LORAZEPAM 2 MG/ML
1 INJECTION INTRAMUSCULAR ONCE AS NEEDED
Status: DISCONTINUED | OUTPATIENT
Start: 2025-05-04 | End: 2025-05-09 | Stop reason: HOSPADM

## 2025-05-04 RX ADMIN — PRAVASTATIN SODIUM 40 MG: 40 TABLET ORAL at 16:15

## 2025-05-04 RX ADMIN — ENOXAPARIN SODIUM 40 MG: 40 INJECTION SUBCUTANEOUS at 08:38

## 2025-05-04 RX ADMIN — LEVETIRACETAM 1000 MG: 100 INJECTION, SOLUTION INTRAVENOUS at 22:14

## 2025-05-04 RX ADMIN — B-COMPLEX W/ C & FOLIC ACID TAB 1 TABLET: TAB at 08:37

## 2025-05-04 RX ADMIN — INSULIN LISPRO 2 UNITS: 100 INJECTION, SOLUTION INTRAVENOUS; SUBCUTANEOUS at 11:43

## 2025-05-04 RX ADMIN — LEVETIRACETAM 1000 MG: 100 INJECTION, SOLUTION INTRAVENOUS at 08:37

## 2025-05-04 RX ADMIN — INSULIN GLARGINE 14 UNITS: 100 INJECTION, SOLUTION SUBCUTANEOUS at 22:14

## 2025-05-04 RX ADMIN — ASPIRIN 81 MG CHEWABLE TABLET 81 MG: 81 TABLET CHEWABLE at 08:38

## 2025-05-04 RX ADMIN — INSULIN LISPRO 1 UNITS: 100 INJECTION, SOLUTION INTRAVENOUS; SUBCUTANEOUS at 16:15

## 2025-05-04 NOTE — UTILIZATION REVIEW
Initial Clinical Review    Admission: Date/Time/Statement:   Admission Orders (From admission, onward)       Ordered        05/02/25 0016  INPATIENT ADMISSION  Once                          Orders Placed This Encounter   Procedures    INPATIENT ADMISSION     Standing Status:   Standing     Number of Occurrences:   1     Level of Care:   Med Surg [16]     Estimated length of stay:   More than 2 Midnights     Certification:   I certify that inpatient services are medically necessary for this patient for a duration of greater than two midnights. See H&P and MD Progress Notes for additional information about the patient's course of treatment.     ED Arrival Information       Expected   -    Arrival   5/1/2025 23:26    Acuity   Emergent              Means of arrival   Ambulance    Escorted by   [facility]    Service   Neurology    Admission type   Emergency              Arrival complaint   Altered Mental Status             Chief Complaint   Patient presents with    Altered Mental Status     Pt presents with altered mental status, unable to answer questions, no facial droop, able to move all extremities       Initial Presentation: 64 y.o. female who presented by EMS to [facility] ED.  Patient arrived on 5/1/2025 11:27 PM. Care then began on 5/1 @ 2331 when CT stroke alert obtained. The patient has already surpassed 1 midnight with active ongoing care.        Date: 5/2   Day 2: Pertinent PMHx: psychiatric disorder, T2DM, visual impairment. Presented w/ mixed aphasia for unknown duration. Difficulty following commands, NIHSS 13. EXAM: b/l LE equally weak -- no effort against gravity b/l; sensory loss on face, arm, leg; fragmented expression. Near complete inability to to follow simple commands.  Events may be consistent w/ focal impaired awareness seizures. Admitted as Inpatient for evaluation and treatment of stroke-like symptoms vs seizure-like activity. PLAN: goal SBP 140s,  BG 160s, PRN Ativan for seizure-like activity. IV keppra 1 g q12h, MRI brain, EEG. NPO. Speech eval. Telemetry. PT/OT evals. Continue other current meds.     Date: 5/3  Day 3: Has surpassed a 2nd midnight with active treatments and services. Agitated overnight, required wrist restraints and posey belt for safety. Cooperative w/ PT this morning. Exam: oriented to self only, know she is in a hospital. Continue current meds, Trend labs, replete electrolytes as needed. IV Keppra, following tending tests.       ED Treatment-Medication Administration from 05/01/2025 2326 to 05/02/2025 1413         Date/Time Order Dose Route Action     05/01/2025 2338 ondansetron (FOR EMS ONLY) (ZOFRAN) 4 mg/2 mL injection 4 mg 0 mg Does not apply Given to EMS     05/01/2025 2356 iohexol (OMNIPAQUE) 350 MG/ML injection (SINGLE-DOSE) 85 mL 85 mL Intravenous Given     05/02/2025 0031 LORazepam (ATIVAN) injection 2 mg 2 mg Intravenous Given     05/02/2025 0040 levETIRAcetam (KEPPRA) injection 2,000 mg 2,000 mg Intravenous Given     05/02/2025 0842 levETIRAcetam (KEPPRA) injection 1,000 mg 1,000 mg Intravenous Given     05/02/2025 0143 insulin glargine (LANTUS) subcutaneous injection 14 Units 0.14 mL 14 Units Subcutaneous Given     05/02/2025 0842 enoxaparin (LOVENOX) subcutaneous injection 40 mg 40 mg Subcutaneous Given            Scheduled Medications:  aspirin, 81 mg, Oral, Daily  enoxaparin, 40 mg, Subcutaneous, Daily  insulin glargine, 14 Units, Subcutaneous, HS  insulin lispro, 1-6 Units, Subcutaneous, TID AC  levETIRAcetam, 1,000 mg, Intravenous, Q12H DYAN  multivitamin stress formula, 1 tablet, Oral, Daily  pravastatin, 40 mg, Oral, Daily With Dinner    Continuous IV Infusions: none    PRN Meds:  diphenhydrAMINE, 25 mg, Oral, Q6H PRN  LORazepam, 1 mg, Intravenous, Once PRN  LORazepam, 1 mg, Intravenous, Once in imaging      ED Triage Vitals   Temperature Pulse Respirations Blood Pressure SpO2 Pain Score   05/01/25 2345 05/01/25 5720  05/01/25 2332 05/01/25 2332 05/01/25 2332 05/02/25 1200   98.1 °F (36.7 °C) 69 18 142/76 98 % No Pain     Weight (last 2 days)       Date/Time Weight    05/02/25 0001 98.1 (216.27)            Vital Signs (last 3 days)       Date/Time Temp Pulse Resp BP MAP (mmHg) SpO2 O2 Device Patient Position - Orthostatic VS Tiki Coma Scale Score Pain    05/04/25 07:41:57 97.7 °F (36.5 °C) 59 18 122/69 87 99 % -- -- -- --    05/04/25 0400 -- -- -- -- -- -- -- -- 13 --    05/04/25 03:31:51 -- 60 -- 121/69 86 95 % -- -- -- --    05/04/25 01:07:40 -- 65 -- 139/84 102 98 % -- -- -- --    05/04/25 01:00:29 97.2 °F (36.2 °C) 70 -- 79/61 67 97 % -- -- -- --    05/04/25 0000 -- -- -- -- -- -- -- -- 13 --    05/03/25 2000 -- -- -- -- -- -- -- -- 13 No Pain    05/03/25 19:33:56 98 °F (36.7 °C) 73 17 123/64 84 95 % None (Room air) Lying -- --    05/03/25 15:50:32 98.2 °F (36.8 °C) 60 18 119/82 94 97 % None (Room air) Lying -- --    05/03/25 15:49:46 -- 60 -- 119/82 94 95 % -- -- -- --    05/03/25 14:48:26 -- 75 -- 128/77 94 98 % -- -- -- --    05/03/25 1100 -- -- -- -- -- -- -- -- -- No Pain    05/03/25 0937 -- -- -- -- -- -- -- -- -- No Pain    05/03/25 0936 -- -- -- -- -- -- -- -- -- No Pain    05/03/25 0800 -- -- -- -- -- -- -- -- 13 --    05/03/25 07:56:48 97.7 °F (36.5 °C) 61 18 122/70 87 96 % -- -- -- --    05/03/25 07:16:05 97.2 °F (36.2 °C) 64 -- 123/72 89 96 % -- -- -- --    05/03/25 04:18:02 97.4 °F (36.3 °C) 61 -- 121/74 90 96 % -- -- -- --    05/03/25 01:17:33 97.8 °F (36.6 °C) 62 -- 115/61 79 89 % -- -- -- --    05/02/25 22:23:37 97.1 °F (36.2 °C) 87 16 119/70 86 97 % -- -- -- --    05/02/25 22:00:27 -- 66 -- 116/71 86 99 % -- -- -- --    05/02/25 2000 -- -- -- -- -- -- -- -- 13 No Pain    05/02/25 19:14:42 97.3 °F (36.3 °C) 62 18 115/68 84 98 % -- -- -- --    05/02/25 1900 -- -- -- -- -- -- -- -- 14 --    05/02/25 1740 -- 63 -- 141/71 94 96 % -- -- -- --    05/02/25 1700 -- -- -- -- -- -- -- -- 14 --    05/02/25 1500 -- --  -- -- -- -- -- -- 13 No Pain    05/02/25 14:34:50 96.7 °F (35.9 °C) 64 -- 119/59 79 94 % -- -- -- --    05/02/25 1400 -- 63 20 112/55 79 100 % None (Room air) Sitting -- No Pain    05/02/25 1330 -- 63 20 112/55 79 99 % None (Room air) Sitting -- No Pain    05/02/25 1300 -- 62 20 112/58 -- 94 % None (Room air) Sitting 14 No Pain    05/02/25 1230 -- 85 20 119/64 84 94 % None (Room air) Sitting -- No Pain    05/02/25 1200 -- 63 20 101/58 77 95 % None (Room air) Sitting -- No Pain    05/02/25 1103 -- -- -- -- -- -- -- -- 14 --    05/02/25 1100 -- 64 15 112/55 75 95 % None (Room air) Sitting -- --    05/02/25 1000 -- 64 20 113/59 82 95 % None (Room air) Sitting -- --    05/02/25 0900 -- 63 20 102/54 -- 94 % None (Room air) Sitting 11 --    05/02/25 0800 -- 65 20 104/51 74 94 % None (Room air) Sitting -- --    05/02/25 0700 -- 67 18 138/63 90 94 % None (Room air) Sitting 11 --    05/02/25 0600 -- 71 18 128/61 -- 96 % None (Room air) Lying -- --    05/02/25 0500 -- 69 18 125/63 -- 100 % None (Room air) Lying 13 --    05/02/25 0402 -- 75 18 113/56 80 95 % None (Room air) Lying -- --    05/02/25 0311 -- 82 18 131/62 -- 96 % None (Room air) -- 13 --    05/02/25 0236 -- 69 16 127/60 -- 96 % -- Lying -- --    05/02/25 0118 -- 63 18 141/63 -- 98 % None (Room air) Lying 10 --    05/02/25 0015 -- 63 18 143/65 -- 95 % None (Room air) Lying 13 --    05/02/25 0012 -- -- -- -- -- -- -- -- 13 --    05/02/25 0000 -- 64 18 147/66 -- 100 % None (Room air) Sitting 13 --    05/01/25 2345 98.1 °F (36.7 °C) 63 18 142/65 -- 99 % None (Room air) Lying 13 --    05/01/25 23:33:20 -- 65 18 142/76 -- 98 % None (Room air) Sitting 12 --    05/01/25 23:32:50 -- 69 18 142/76 -- 98 % -- -- -- --              Pertinent Labs/Diagnostic Test Results:   Radiology:  CT stroke alert brain   Final Interpretation by Christopher Richard MD (05/02 0004)      Mild senescent changes without acute intracranial abnormality. If clinical concern for acute ischemia, consider  more sensitive MRI brain for better evaluation         Workstation performed: [id number]         CTA stroke alert (head/neck)   Final Interpretation by Christopher Richard MD (05/02 0042)   Addendum (preliminary) 1 of 1 by Christopher Richard MD (05/02 0042)   ADDENDUM:      Findings discussed with Dr. Thornton from neurology at 12:31 a.m.      Final      No large vessel arterial occlusion, significant flow-limiting stenosis, or focal saccular aneurysm identified. Mildly diminished caliber of the distal right vertebral artery/V4 segment and fetal origin of the right posterior cerebral artery noted. No    enhancing brain mass/fluid collection or evidence of vascular malformation. No enhancing soft tissue neck mass, fluid collection, or cervical lymphadenopathy.               Workstation performed: [id number]         MRI brain seizure wo and w contrast    (Results Pending)     Cardiology:  ECG 12 lead   Final Result by Ean Ward MD (05/02 1249)   Electronic ventricular pacemaker   Confirmed by Ean Ward ([id number]) on 5/2/2025 12:49:19 PM               Results from last 7 days   Lab Units 05/03/25  0543 05/01/25  2342   WBC Thousand/uL 5.27 6.30   HEMOGLOBIN g/dL 12.9 12.9   HEMATOCRIT % 38.4 39.5   PLATELETS Thousands/uL 153 152         Results from last 7 days   Lab Units 05/03/25  0543 05/01/25  2342   SODIUM mmol/L 142 141   POTASSIUM mmol/L 3.7 4.0   CHLORIDE mmol/L 106 105   CO2 mmol/L 26 27   ANION GAP mmol/L 10 9   BUN mg/dL 14 16   CREATININE mg/dL 0.93 1.04   EGFR ml/min/1.73sq m 66 57   CALCIUM mg/dL 9.4 9.7   MAGNESIUM mg/dL 2.0  --          Results from last 7 days   Lab Units 05/04/25  1114 05/04/25  0624 05/03/25  2059 05/03/25  1551 05/03/25  1157 05/03/25  0614 05/02/25  2039 05/02/25  1629 05/02/25  1111 05/02/25  0746 05/01/25  2333   POC GLUCOSE mg/dl 206* 120 197* 148* 158* 84 88 98 127 155* 163*     Results from last 7 days   Lab Units 05/03/25  0543 05/01/25  2342   GLUCOSE RANDOM mg/dL 79 147*      Results  from last 7 days   Lab Units 05/02/25  0207 05/01/25  2342   HS TNI 0HR ng/L  --  5   HS TNI 2HR ng/L 5  --    HSTNI D2 ng/L 0  --          Results from last 7 days   Lab Units 05/01/25  2342   PROTIME seconds 13.2   INR  0.97   PTT seconds 27              Past Medical History:   Diagnosis Date    Abnormal EEG 11/05/2023    Anxiety     Aphasia 11/05/2023    Condyloma acuminatum     Depression 1968    Therapy    Diabetes mellitus (HCC) 2002    type 2    Genital warts     Herpes simplex     HPV (human papilloma virus) infection     Obesity 1990    Visual impairment 1984     Present on Admission:   Seizure-like activity (HCC)      Admitting Diagnosis: Altered mental status [R41.82]  Stroke-like symptoms [R29.90]  Bug bite, initial encounter [W57.XXXA]  Age/Sex: 64 y.o. female    Network Utilization Review Department  ATTENTION: Please call with any questions or concerns to [phone] and carefully listen to the prompts so that you are directed to the right person. All voicemails are confidential.   For Discharge needs, contact Care Management DC Support Team at [phone] opt. 2  Send all requests for admission clinical reviews, approved or denied determinations and any other requests to dedicated fax number below belonging to the campus where the patient is receiving treatment. List of dedicated fax numbers for the Facilities:  FACILITY NAME UR FAX NUMBER   ADMISSION DENIALS (Administrative/Medical Necessity) [phone]   DISCHARGE SUPPORT TEAM (NETWORK) [phone]   PARENT CHILD HEALTH (Maternity/NICU/Pediatrics) [phone]   [facility] [phone]   [facility] [phone]   Critical access hospital [phone]   [facility] [phone]   [facility] [phone]   [facility] [phone]   [facility] [phone]   [facility] [phone]   [facility] [phone]   [facility] [phone]   [facility] [phone]   [facility] [phone]

## 2025-05-04 NOTE — PROGRESS NOTES
Progress Note - Neurology   Name: Ynes Mendoza 64 y.o. female I MRN: [id number]  [facility] Date of Admission: 5/1/2025   Date of Service: 5/4/2025 I Hospital Day: 2    Assessment & Plan  Seizure-like activity (HCC)  Neurology consulted to address stroke alert for concern of mixed aphasia of uncertain duration/last known well, which appears to be nearly identical to multiple prior presentations which ultimately never revealed stroke but rather EEG findings which were consistent with seizure predisposition although no event captured  Since most recent prior presentation for similar symptoms in 02/2025, patient has visited with outpatient epileptologist as requested, who reports that the description of her events were consistent with focal impaired awareness seizures, even warranting diagnosis of epilepsy due to the recurrent nature.  Patient was initially placed on Lamictal during the prior hospitalization, but due to intolerable side effects was moved over to Keppra approximately 1 month ago, was supposed to take 500 every 12 for 1 week then moved to 1 g every 12 indefinitely, however patient unable to describe if medication being taken appropriately.    Workup:  Initial evaluation includes SBP in 140s, BG of 160s, all other VSS, NIH 13, near complete inability to follow simple commands, and inability to meaningfully participate in conversation or history although can state an occasional yes or no which may or may not be accurate.  No evidence of infectious process or electrolyte abnormalities  CTH NAIA  CTAHN No large vessel arterial occlusion, significant flow-limiting stenosis, or focal saccular aneurysm identified. Mildly diminished caliber of the distal right vertebral artery/V4 segment and fetal origin of the right posterior cerebral artery noted. No enhancing brain mass/fluid collection or evidence of vascular malformation. No enhancing soft tissue neck mass, fluid collection, or cervical  lymphadenopathy.  MRIB w/o, 08/2024: No acute intracranial lesion. Atrophy and nonspecific white matter T2 hyperintensities likely secondary to chronic small vessel ischemia.   VEEG, 02/2025: This 23 hour vEEG captures right frontocentral spike and wave discharges, sometimes with a more generalized field of spread.  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 In addition, this study is indicative of a mild diffuse encephalopathy. No seizures are recorded.   Echo 2/2025: EF 60%, G1DD, no evidence of PFO  A1c 4/2025: 7.3%  LDL 4/2025: 75  Keppra level low normal at 12.9; level obtained prior to 2 g Keppra load    Impression: Due to stereotyped recurrent events at least occurring over the past many months if not longer, in addition to outpatient epileptologist evaluation, much higher suspicion for focal impaired awareness seizure over lower suspicion of TIA/CVA as had been worked up on multiple occasions in the past each time to negative results, reason for possible seizure unclear at this time but suspect most likely due to difficulties with Keppra compliance.     Plan:  Discussed plan with neurology attending Dr. Coyne  Continue with Keppra 1000 mg every 12 hours  Recommend repeat troughs in 2-4 weeks for Keppra in outpatient setting   MRI brain seizure with and without contrast pending  Will hold off on EEG at this time  Seizure precautions placed  Continue telemetry   OT/PT  Per sister, patient has MCI at baseline and has concerns in regard to patient's medication compliance and ability to care for self at home  Okay to continue aspirin and hospital equivalent statin to home    Patient has follow-up appointment with Dr. Rodriguez on 5/14/2025.      Subjective   Patient seen and examined at the bedside. No acute overnight events. Patient reports that she feels well with no new or worsening concerns today. Patient denies chest pain,  shortness of breath, abdominal pain, nausea, vomiting, diarrhea, constipation, fever, and chills.    Review of Systems  A 12 point ROS was completed. Other than the above mentioned  complaints, all remaining systems were negative.    Objective :  Temp:  [97.2 °F (36.2 °C)-98.2 °F (36.8 °C)] 97.7 °F (36.5 °C)  HR:  [59-75] 59  BP: ()/(61-84) 122/69  Resp:  [17-18] 18  SpO2:  [95 %-99 %] 99 %  O2 Device: None (Room air)    Physical Exam  Constitutional:       General: She is awake. She is not in acute distress.     Appearance: Normal appearance. She is obese. She is not ill-appearing or toxic-appearing.   HENT:      Head: Normocephalic and atraumatic.      Right Ear: Hearing normal.      Left Ear: Hearing normal.      Nose: Nose normal.   Eyes:      General: Lids are normal.      Extraocular Movements: Extraocular movements intact.      Pupils: Pupils are equal, round, and reactive to light.   Cardiovascular:      Rate and Rhythm: Normal rate and regular rhythm.   Pulmonary:      Effort: Pulmonary effort is normal. No respiratory distress.      Breath sounds: Normal breath sounds. No stridor.   Abdominal:      General: Bowel sounds are normal. There is no distension.      Palpations: Abdomen is soft.      Tenderness: There is no abdominal tenderness.   Neurological:      Mental Status: She is alert.      Deep Tendon Reflexes:      Reflex Scores:       Tricep reflexes are 2+ on the right side and 2+ on the left side.       Bicep reflexes are 2+ on the right side and 2+ on the left side.       Brachioradialis reflexes are 2+ on the right side and 2+ on the left side.       Patellar reflexes are 2+ on the right side and 2+ on the left side.       Achilles reflexes are 2+ on the right side and 2+ on the left side.  Psychiatric:         Speech: Speech normal.     Neurological Exam  Mental Status  Awake and alert. Speech is normal. Language is fluent with no aphasia.  Oriented to month, year, place, state  Disoriented  [place]  Correctly identifies gloves, watch, eyeglasses, pen.    Cranial Nerves  CN II: Visual fields full to confrontation.  CN III, IV, VI: Extraocular movements intact bilaterally. Normal lids and orbits bilaterally. Pupils equal round and reactive to light bilaterally.  CN V:  Right: Facial sensation is normal.  Left: Facial sensation is normal on the left.  CN VII:  Right: There is no facial weakness.  Left: There is no facial weakness.  CN VIII:  Right: Hearing is normal.  Left: Hearing is normal.  CN IX, X: Palate elevates symmetrically  CN XI:  Right: Sternocleidomastoid strength is normal. Trapezius strength is normal.  Left: Sternocleidomastoid strength is normal. Trapezius strength is normal.  CN XII: Tongue midline without atrophy or fasciculations.    Motor  Normal muscle bulk throughout. No fasciculations present. Normal muscle tone. No abnormal involuntary movements.                                               Right                     Left   Shoulder abduction               5                          5  Elbow flexion                         5                          5  Elbow extension                    5                          5  Hip flexion                              5                          5  Knee flexion                           5                          5  Knee extension                      5                          5  Plantarflexion                         5                          5  Dorsiflexion                            5                          5    Sensory  Light touch is normal in upper and lower extremities.     Reflexes                                            Right                      Left  Brachioradialis                    2+                         2+  Biceps                                 2+                         2+  Triceps                                2+                         2+  Patellar                                2+                          2+  Achilles                                2+                         2+    Right pathological reflexes: Yesenia's absent.  Left pathological reflexes: Yesenia's absent.    Coordination  Right: Heel-to-shin normal.Left: Heel-to-shin normal.    Gait    Deferred.        Lab Results: I have reviewed the following results:        VTE Pharmacologic Prophylaxis: VTE covered by:  enoxaparin, Subcutaneous, 40 mg at 05/03/25 0832    and Sequential compression device (Venodyne)         Bravo Thakkar DO  [facility]  Neurology Residency PGY-II

## 2025-05-04 NOTE — SPEECH THERAPY NOTE
Speech Language/Pathology    Speech/Language Pathology Progress Note    Patient Name: Ynes Mendoza  Today's Date: 5/4/2025     Problem List  Principal Problem:    Seizure-like activity (HCC)       Past Medical History  Past Medical History:   Diagnosis Date    Abnormal EEG 11/05/2023    Anxiety     Aphasia 11/05/2023    Condyloma acuminatum     Depression 1968    Therapy    Diabetes mellitus (HCC) 2002    type 2    Genital warts     Herpes simplex     HPV (human papilloma virus) infection     Obesity 1990    Visual impairment 1984        Past Surgical History  Past Surgical History:   Procedure Laterality Date    CARDIAC ELECTROPHYSIOLOGY PROCEDURE N/A 1/24/2025    Procedure: Cardiac pacer implant DUAL CHAMBER;  Surgeon: Patric Sanchez MD;  Location: [facility];  Service: Cardiology         Subjective:  Pt awake, alert just got back to bed after ambulating with RN. Currently on pureed diet with thin liquids 2/2 seizures, lethargy and confusion.    Objective:  Pt reports she feels back to baseline, self fed regular solids and thin liquids at an appropriate rate. Appropriate retrieval, complete mastication and oral clearance, no overt s/s of aspiration across trials.     Assessment:  Significantly improved swallow function, appears grossly WFL.    Plan/Recommendations:  -Upgrade to regular diet, thin liquids  -Monitor cognitive status  -SLP to follow up x1 given recent seizure and concerns re mentation which now appears improved

## 2025-05-04 NOTE — ASSESSMENT & PLAN NOTE
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  Patient was initially placed on Lamictal during the prior hospitalization, but due to intolerable side effects was moved over to Keppra approximately 1 month ago, was supposed to take 500 every 12 for 1 week then moved to 1 g every 12 indefinitely, however patient unable to describe if medication being taken appropriately.    Workup:  Initial evaluation includes SBP in 140s, BG of 160s, all other VSS, NIH 13, near complete inability to follow simple commands, and inability to meaningfully participate in conversation or history although can state an occasional yes or no which may or may not be accurate.  No evidence of infectious process or electrolyte abnormalities  CTH NAIA  CTAHN No large vessel arterial occlusion, significant flow-limiting stenosis, or focal saccular aneurysm identified. Mildly diminished caliber of the distal right vertebral artery/V4 segment and fetal origin of the right posterior cerebral artery noted. No enhancing brain mass/fluid collection or evidence of vascular malformation. No enhancing soft tissue neck mass, fluid collection, or cervical lymphadenopathy.  MRIB w/o, 08/2024: No acute intracranial lesion. Atrophy and nonspecific white matter T2 hyperintensities likely secondary to chronic small vessel ischemia.   VEEG, 02/2025: This 23 hour vEEG captures right  frontocentral spike and wave discharges, sometimes with a more generalized field of spread.  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 In addition, this study is indicative of a mild diffuse encephalopathy. No seizures are recorded.   Echo 2/2025: EF 60%, G1DD, no evidence of PFO  A1c 4/2025: 7.3%  LDL 4/2025: 75  Keppra level low normal at 12.9; level obtained prior to 2 g Keppra load    Impression: Due to stereotyped recurrent events at least occurring over the past many months if not longer, in addition to outpatient epileptologist evaluation, much higher suspicion for focal impaired awareness seizure over lower suspicion of TIA/CVA as had been worked up on multiple occasions in the past each time to negative results, reason for possible seizure unclear at this time but suspect most likely due to difficulties with Keppra compliance.     Plan:  Discussed plan with neurology attending Dr. Coyne  Continue with Keppra 1000 mg every 12 hours  Recommend repeat troughs in 2-4 weeks for Keppra in outpatient setting   MRI brain seizure with and without contrast pending  Will hold off on EEG at this time  Seizure precautions placed  Continue telemetry   OT/PT  Per sister, patient has MCI at baseline and has concerns in regard to patient's medication compliance and ability to care for self at home  Okay to continue aspirin and hospital equivalent statin to home

## 2025-05-04 NOTE — PLAN OF CARE
Problem: PAIN - ADULT  Goal: Verbalizes/displays adequate comfort level or baseline comfort level  Description: Interventions:- Encourage patient to monitor pain and request assistance- Assess pain using appropriate pain scale- Administer analgesics based on type and severity of pain and evaluate response- Implement non-pharmacological measures as appropriate and evaluate response- Consider cultural and social influences on pain and pain management- Notify physician/advanced practitioner if interventions unsuccessful or patient reports new pain  5/4/2025 0706 by Hakeem Reina RN  Outcome: Progressing  5/4/2025 0706 by Hakeem Reina RN  Outcome: Progressing     Problem: INFECTION - ADULT  Goal: Absence or prevention of progression during hospitalization  Description: INTERVENTIONS:- Assess and monitor for signs and symptoms of infection- Monitor lab/diagnostic results- Monitor all insertion sites, i.e. indwelling lines, tubes, and drains- Monitor endotracheal if appropriate and nasal secretions for changes in amount and color- [place] appropriate cooling/warming therapies per order- Administer medications as ordered- Instruct and encourage patient and family to use good hand hygiene technique- Identify and instruct in appropriate isolation precautions for identified infection/condition  5/4/2025 0706 by Hakeem Reina RN  Outcome: Progressing  5/4/2025 0706 by Hakeem Reina RN  Outcome: Progressing  Goal: Absence of fever/infection during neutropenic period  Description: INTERVENTIONS:- Monitor WBC  Outcome: Progressing     Problem: SAFETY ADULT  Goal: Patient will remain free of falls  Description: INTERVENTIONS:- Educate patient/family on patient safety including physical limitations- Instruct patient to call for assistance with activity - Consult OT/PT to assist with strengthening/mobility - Keep Call bell within reach- Keep bed low and locked with side rails adjusted as appropriate- Keep  care items and personal belongings within reach- Initiate and maintain comfort rounds- Make Fall Risk Sign visible to staff- Offer Toileting every  Hours, in advance of need- Initiate/Maintain alarm- Obtain necessary fall risk management equipment: - Apply yellow socks and bracelet for high fall risk patients- Consider moving patient to room near nurses station  Outcome: Progressing  Goal: Maintain or return to baseline ADL function  Description: INTERVENTIONS:-  Assess patient's ability to carry out ADLs; assess patient's baseline for ADL function and identify physical deficits which impact ability to perform ADLs (bathing, care of mouth/teeth, toileting, grooming, dressing, etc.)- Assess/evaluate cause of self-care deficits - Assess range of motion- Assess patient's mobility; develop plan if impaired- Assess patient's need for assistive devices and provide as appropriate- Encourage maximum independence but intervene and supervise when necessary- Involve family in performance of ADLs- Assess for home care needs following discharge - Consider OT consult to assist with ADL evaluation and planning for discharge- Provide patient education as appropriate  Outcome: Progressing  Goal: Maintains/Returns to pre admission functional level  Description: INTERVENTIONS:- Perform AM-PAC 6 Click Basic Mobility/ Daily Activity assessment daily.- Set and communicate daily mobility goal to care team and patient/family/caregiver. - Collaborate with rehabilitation services on mobility goals if consulted- Perform Range of Motion  times a day.- Reposition patient every  hours.- Dangle patient  times a day- Stand patient  times a day- Ambulate patient  times a day- Out of bed to chair  times a day - Out of bed for meals  times a day- Out of bed for toileting- Record patient progress and toleration of activity level   Outcome: Progressing     Problem: DISCHARGE PLANNING  Goal: Discharge to home or other facility with appropriate  resources  Description: INTERVENTIONS:- Identify barriers to discharge w/patient and caregiver- Arrange for needed discharge resources and transportation as appropriate- Identify discharge learning needs (meds, wound care, etc.)- Arrange for interpretive services to assist at discharge as needed- Refer to Case Management Department for coordinating discharge planning if the patient needs post-hospital services based on physician/advanced practitioner order or complex needs related to functional status, cognitive ability, or social support system  Outcome: Progressing     Problem: Knowledge Deficit  Goal: Patient/family/caregiver demonstrates understanding of disease process, treatment plan, medications, and discharge instructions  Description: Complete learning assessment and assess knowledge base.Interventions:- Provide teaching at level of understanding- Provide teaching via preferred learning methods  Outcome: Progressing     Problem: Neurological Deficit  Goal: Neurological status is stable or improving  Description: Interventions:- Monitor and assess patient's level of consciousness, motor function, sensory function, and level of assistance needed for ADLs. - Monitor and report changes from baseline. Collaborate with interdisciplinary team to initiate plan and implement interventions as ordered. - Provide and maintain a safe environment.- Consider seizure precautions.- Consider fall precautions.- Consider aspiration precautions.- Consider bleeding precautions.  Outcome: Progressing     Problem: Activity Intolerance/Impaired Mobility  Goal: Mobility/activity is maintained at optimum level for patient  Description: Interventions:- Assess and monitor patient  barriers to mobility and need for assistive/adaptive devices.- Assess patient's emotional response to limitations.- Collaborate with interdisciplinary team and initiate plans and interventions as ordered.- Encourage independent activity per ability.- Maintain  proper body alignment.- Perform active/passive rom as tolerated/ordered.- Plan activities to conserve energy.- Turn patient as appropriate  Outcome: Progressing     Problem: Communication Impairment  Goal: Ability to express needs and understand communication  Description: Assess patient's communication skills and ability to understand information.  Patient will demonstrate use of effective communication techniques, alternative methods of communication and understanding even if not able to speak. - Encourage communication and provide alternate methods of communication as needed.- Collaborate with case management/ for discharge needs.- Include patient/family/caregiver in decisions related to communication.  Outcome: Progressing     Problem: Potential for Aspiration  Goal: Non-ventilated patient's risk of aspiration is minimized  Description: Assess and monitor vital signs, respiratory status, and labs (WBC).  Monitor for signs of aspiration (tachypnea, cough, rales, wheezing, cyanosis, fever).- Assess and monitor patient's ability to swallow.- Place patient up in chair to eat if possible.- HOB up at 90 degrees to eat if unable to get patient up into chair.- Supervise patient during oral intake. - Instruct patient/ family to take small bites.- Instruct patient/ family to take small single sips when taking liquids.- Follow patient-specific strategies generated by speech pathologist.  Outcome: Progressing  Goal: Ventilated patient's risk of aspiration is minimized  Description: Assess and monitor vital signs, respiratory status, airway cuff pressure, and labs (WBC).  Monitor for signs of aspiration (tachypnea, cough, rales, wheezing, cyanosis, fever).- Elevate head of bed 30 degrees if patient has tube feeding.- Monitor tube feeding.  Outcome: Progressing     Problem: Nutrition  Goal: Nutrition/Hydration status is improving  Description: Monitor and assess patient's nutrition/hydration status for  malnutrition (ex- brittle hair, bruises, dry skin, pale skin and conjunctiva, muscle wasting, smooth red tongue, and disorientation). Collaborate with interdisciplinary team and initiate plan and interventions as ordered.  Monitor patient's weight and dietary intake as ordered or per policy. Utilize nutrition screening tool and intervene per policy. Determine patient's food preferences and provide high-protein, high-caloric foods as appropriate. - Assist patient with eating.- Allow adequate time for meals.- Encourage patient to take dietary supplement as ordered.- Collaborate with clinical nutritionist.- Include patient/family/caregiver in decisions related to nutrition.  Outcome: Progressing     Problem: Prexisting or High Potential for Compromised Skin Integrity  Goal: Skin integrity is maintained or improved  Description: INTERVENTIONS:- Identify patients at risk for skin breakdown- Assess and monitor skin integrity- Assess and monitor nutrition and hydration status- Monitor labs - Assess for incontinence - Turn and reposition patient- Assist with mobility/ambulation- Relieve pressure over bony prominences- Avoid friction and shearing- Provide appropriate hygiene as needed including keeping skin clean and dry- Evaluate need for skin moisturizer/barrier cream- Collaborate with interdisciplinary team - Patient/family teaching- Consider wound care consult   Outcome: Progressing     Problem: SAFETY,RESTRAINT: NV/NON-SELF DESTRUCTIVE BEHAVIOR  Goal: Remains free of harm/injury (restraint for non violent/non self-detsructive behavior)  Description: INTERVENTIONS:- Instruct patient/family regarding restraint use - Assess and monitor physiologic and psychological status - Provide interventions and comfort measures to meet assessed patient needs - Identify and implement measures to help patient regain control- Assess readiness for release of restraint   Outcome: Progressing  Goal: Returns to optimal restraint-free  functioning  Description: INTERVENTIONS:- Assess the patient's behavior and symptoms that indicate continued need for restraint- Identify and implement measures to help patient regain control- Assess readiness for release of restraint   Outcome: Progressing     Problem: Nutrition/Hydration-ADULT  Goal: Nutrient/Hydration intake appropriate for improving, restoring or maintaining nutritional needs  Description: Monitor and assess patient's nutrition/hydration status for malnutrition. Collaborate with interdisciplinary team and initiate plan and interventions as ordered.  Monitor patient's weight and dietary intake as ordered or per policy. Utilize nutrition screening tool and intervene as necessary. Determine patient's food preferences and provide high-protein, high-caloric foods as appropriate. INTERVENTIONS:- Monitor oral intake, urinary output, labs, and treatment plans- Assess nutrition and hydration status and recommend course of action- Evaluate amount of meals eaten- Assist patient with eating if necessary - Allow adequate time for meals- Recommend/ encourage appropriate diets, oral nutritional supplements, and vitamin/mineral supplements- Order, calculate, and assess calorie counts as needed- Recommend, monitor, and adjust tube feedings and TPN/PPN based on assessed needs- Assess need for intravenous fluids- Provide specific nutrition/hydration education as appropriate- Include patient/family/caregiver in decisions related to nutrition  Outcome: Progressing
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
## 2025-05-05 LAB
ANION GAP SERPL CALCULATED.3IONS-SCNC: 8 MMOL/L (ref 4–13)
BUN SERPL-MCNC: 10 MG/DL (ref 5–25)
CALCIUM SERPL-MCNC: 9.3 MG/DL (ref 8.4–10.2)
CHLORIDE SERPL-SCNC: 108 MMOL/L (ref 96–108)
CO2 SERPL-SCNC: 25 MMOL/L (ref 21–32)
CREAT SERPL-MCNC: 0.83 MG/DL (ref 0.6–1.3)
ERYTHROCYTE [DISTWIDTH] IN BLOOD BY AUTOMATED COUNT: 12.1 % (ref 11.6–15.1)
GFR SERPL CREATININE-BSD FRML MDRD: 75 ML/MIN/1.73SQ M
GLUCOSE SERPL-MCNC: 107 MG/DL (ref 65–140)
GLUCOSE SERPL-MCNC: 180 MG/DL (ref 65–140)
GLUCOSE SERPL-MCNC: 212 MG/DL (ref 65–140)
GLUCOSE SERPL-MCNC: 230 MG/DL (ref 65–140)
GLUCOSE SERPL-MCNC: 99 MG/DL (ref 65–140)
HCT VFR BLD AUTO: 41.6 % (ref 34.8–46.1)
HGB BLD-MCNC: 13.5 G/DL (ref 11.5–15.4)
MCH RBC QN AUTO: 28.5 PG (ref 26.8–34.3)
MCHC RBC AUTO-ENTMCNC: 32.5 G/DL (ref 31.4–37.4)
MCV RBC AUTO: 88 FL (ref 82–98)
PLATELET # BLD AUTO: 160 THOUSANDS/UL (ref 149–390)
PMV BLD AUTO: 11 FL (ref 8.9–12.7)
POTASSIUM SERPL-SCNC: 3.6 MMOL/L (ref 3.5–5.3)
RBC # BLD AUTO: 4.74 MILLION/UL (ref 3.81–5.12)
SODIUM SERPL-SCNC: 141 MMOL/L (ref 135–147)
WBC # BLD AUTO: 5.36 THOUSAND/UL (ref 4.31–10.16)

## 2025-05-05 PROCEDURE — 82948 REAGENT STRIP/BLOOD GLUCOSE: CPT

## 2025-05-05 PROCEDURE — 80048 BASIC METABOLIC PNL TOTAL CA: CPT

## 2025-05-05 PROCEDURE — 85027 COMPLETE CBC AUTOMATED: CPT

## 2025-05-05 PROCEDURE — 97116 GAIT TRAINING THERAPY: CPT

## 2025-05-05 PROCEDURE — 99233 SBSQ HOSP IP/OBS HIGH 50: CPT | Performed by: STUDENT IN AN ORGANIZED HEALTH CARE EDUCATION/TRAINING PROGRAM

## 2025-05-05 RX ADMIN — INSULIN GLARGINE 14 UNITS: 100 INJECTION, SOLUTION SUBCUTANEOUS at 22:13

## 2025-05-05 RX ADMIN — B-COMPLEX W/ C & FOLIC ACID TAB 1 TABLET: TAB at 08:04

## 2025-05-05 RX ADMIN — ASPIRIN 81 MG CHEWABLE TABLET 81 MG: 81 TABLET CHEWABLE at 08:04

## 2025-05-05 RX ADMIN — INSULIN LISPRO 2 UNITS: 100 INJECTION, SOLUTION INTRAVENOUS; SUBCUTANEOUS at 11:24

## 2025-05-05 RX ADMIN — LEVETIRACETAM 1000 MG: 100 INJECTION, SOLUTION INTRAVENOUS at 08:04

## 2025-05-05 RX ADMIN — INSULIN LISPRO 3 UNITS: 100 INJECTION, SOLUTION INTRAVENOUS; SUBCUTANEOUS at 17:00

## 2025-05-05 RX ADMIN — PRAVASTATIN SODIUM 40 MG: 40 TABLET ORAL at 17:00

## 2025-05-05 RX ADMIN — ENOXAPARIN SODIUM 40 MG: 40 INJECTION SUBCUTANEOUS at 08:04

## 2025-05-05 RX ADMIN — LEVETIRACETAM 1000 MG: 100 INJECTION, SOLUTION INTRAVENOUS at 22:13
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
## 2025-05-05 NOTE — PHYSICAL THERAPY NOTE
Physical Therapy Evaluation    Patient Name: Ynes Mendoza    Today's Date: 5/5/2025     Problem List  Principal Problem:    Seizure-like activity (HCC)       Past Medical History  Past Medical History:   Diagnosis Date    Abnormal EEG 11/05/2023    Anxiety     Aphasia 11/05/2023    Condyloma acuminatum     Depression 1968    Therapy    Diabetes mellitus (HCC) 2002    type 2    Genital warts     Herpes simplex     HPV (human papilloma virus) infection     Obesity 1990    Visual impairment 1984        Past Surgical History  Past Surgical History:   Procedure Laterality Date    CARDIAC ELECTROPHYSIOLOGY PROCEDURE N/A 1/24/2025    Procedure: Cardiac pacer implant DUAL CHAMBER;  Surgeon: Patric Sanchez MD;  Location:  CARDIAC CATH LAB;  Service: Cardiology      05/05/25 1344   PT Last Visit   PT Visit Date 05/05/25   Note Type   Note Type Treatment   Pain Assessment   Pain Assessment Tool 0-10   Pain Score No Pain   Restrictions/Precautions   Weight Bearing Precautions Per Order No   Other Precautions Cognitive;Chair Alarm;Bed Alarm;Multiple lines;Telemetry;Fall Risk  (baseline MCI)   General   Chart Reviewed Yes   Family/Caregiver Present No   Cognition   Overall Cognitive Status Impaired   Arousal/Participation Cooperative   Attention Attends with cues to redirect   Orientation Level Oriented X4   Memory Decreased short term memory;Decreased long term memory;Decreased recall of recent events   Following Commands Follows one step commands without difficulty   Subjective   Subjective agreeable   Bed Mobility   Supine to Sit 5  Supervision   Additional items HOB elevated;Increased time required   Sit to Supine 5  Supervision   Additional items Increased time required;Verbal cues   Transfers   Sit to Stand 5  Supervision   Additional items Increased time required   Stand to Sit 5  Supervision   Additional items Increased time required   Additional Comments no AD    Ambulation/Elevation   Gait pattern Improper Weight shift;Decreased foot clearance;Inconsistent parris   Gait Assistance 5  Supervision   Additional items Verbal cues   Assistive Device None   Distance 200'   Stair Management Assistance 5  Supervision   Additional items Verbal cues   Stair Management Technique One rail R;Step to pattern;Foreward   Number of Stairs 1   Ambulation/Elevation Additional Comments no overt LOB, inconsitent step length and parris-recommend RW use at all times   Balance   Static Sitting Fair +   Dynamic Sitting Fair   Static Standing Fair -   Dynamic Standing Fair -   Ambulatory Fair -   Endurance Deficit   Endurance Deficit Yes   Endurance Deficit Description fatigue   Activity Tolerance   Activity Tolerance Patient limited by fatigue   Medical Staff Made Aware CM and OT-OT to assess cog and ADLS tmrw   Nurse Made Aware yes-cleared   Assessment   Prognosis Good   Problem List Decreased strength;Decreased endurance;Impaired balance;Decreased cognition;Impaired judgement;Decreased safety awareness   Assessment Pt agreeable to participate in PT session. Pt performed functional mobility as outlined above. Mobilizing at a level very close if not the same as her baseline. She reports using RW in community and no AD in house. Due to inconsistent parris and step length she would benefit from RW use at all times. Defer to OT for cognition assessment to determine level of A required at home. Pt left supine in bed with bed alarm, call bell, phone, and all personal needs within reach. Pt will continue to benefit from skilled acute care PT to further address their functional mobility limitations.   Barriers to Discharge Inaccessible home environment;Decreased caregiver support   Goals   Patient Goals to go home   STG Expiration Date 05/17/25   PT Treatment Day 1   Plan   Treatment/Interventions Functional transfer training;LE strengthening/ROM;Therapeutic exercise;Elevations;Endurance  training;Cognitive reorientation;Patient/family training;Equipment eval/education;Bed mobility;Gait training;Spoke to nursing;Spoke to case management;OT   Progress Progressing toward goals   PT Frequency 1-2x/wk  (decreased frequency as restorative can continue to mobilize during hospital stay)   Discharge Recommendation   Rehab Resource Intensity Level, PT III (Minimum Resource Intensity)  (defer to OT for cognition-anticipate she needs to live in a more supportive environment for baseline cognition as she has baseline MCI)   Equipment Recommended Walker   Walker Package Recommended Wheeled walker   AM-PAC Basic Mobility Inpatient   Turning in Flat Bed Without Bedrails 3   Lying on Back to Sitting on Edge of Flat Bed Without Bedrails 3   Moving Bed to Chair 3   Standing Up From Chair Using Arms 3   Walk in Room 3   Climb 3-5 Stairs With Railing 3   Basic Mobility Inpatient Raw Score 18   Basic Mobility Standardized Score 41.05   [facility] Highest Level Of Mobility   -HLM Goal 6: Walk 10 steps or more   JH-HLM Achieved 7: Walk 25 feet or more   Chai Edwards, PT, DPT, NCS
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
## 2025-05-05 NOTE — PLAN OF CARE
Problem: PAIN - ADULT  Goal: Verbalizes/displays adequate comfort level or baseline comfort level  Description: Interventions:- Encourage patient to monitor pain and request assistance- Assess pain using appropriate pain scale- Administer analgesics based on type and severity of pain and evaluate response- Implement non-pharmacological measures as appropriate and evaluate response- Notify physician/advanced practitioner if interventions unsuccessful or patient reports new pain  Outcome: Progressing     Problem: INFECTION - ADULT  Goal: Absence or prevention of progression during hospitalization  Description: INTERVENTIONS:- Assess and monitor for signs and symptoms of infection- Monitor lab/diagnostic results- Monitor all insertion sites, i.e. indwelling lines, tubes, and drains- [place] appropriate cooling/warming therapies per order- Administer medications as ordered- Instruct and encourage patient and family to use good hand hygiene technique- Identify and instruct in appropriate isolation precautions for identified infection/condition  Outcome: Progressing     Problem: SAFETY ADULT  Goal: Patient will remain free of falls  Description: INTERVENTIONS:- Educate patient/family on patient safety including physical limitations- Instruct patient to call for assistance with activity - Consult OT/PT to assist with strengthening/mobility - Keep Call bell within reach- Keep bed low and locked with side rails adjusted as appropriate- Keep care items and personal belongings within reach- Initiate and maintain comfort rounds- Make Fall Risk Sign visible to staff- Offer Toileting every 2 Hours, in advance of need- Initiate/Maintain bed/chair alarm- Obtain necessary fall risk management equipment/- Apply yellow socks and bracelet for high fall risk patients- Consider moving patient to room near nurses station  Outcome: Progressing  Goal: Maintain or return to baseline ADL function  Description: INTERVENTIONS:-  Assess  patient's ability to carry out ADLs; assess patient's baseline for ADL function and identify physical deficits which impact ability to perform ADLs (bathing, care of mouth/teeth, toileting, grooming, dressing, etc.)- Assess/evaluate cause of self-care deficits - Assess range of motion- Assess patient's mobility; develop plan if impaired- Assess patient's need for assistive devices and provide as appropriate- Encourage maximum independence but intervene and supervise when necessary- Involve family in performance of ADLs- Assess for home care needs following discharge - Consider OT consult to assist with ADL evaluation and planning for discharge- Provide patient education as appropriate  Outcome: Progressing  Goal: Maintains/Returns to pre admission functional level  Description: INTERVENTIONS:- Perform AM-PAC 6 Click Basic Mobility/ Daily Activity assessment daily.- Set and communicate daily mobility goal to care team and patient/family/caregiver. - Collaborate with rehabilitation services on mobility goals if consulted- Dangle patient 3 times a day- Stand patient 3 times a day- Ambulate patient 3 times a day- Out of bed to chair 3 times a day - Out of bed for meals 3 times a day- Out of bed for toileting- Record patient progress and toleration of activity level   Outcome: Progressing     Problem: DISCHARGE PLANNING  Goal: Discharge to home or other facility with appropriate resources  Description: INTERVENTIONS:- Identify barriers to discharge w/patient and caregiver- Arrange for needed discharge resources and transportation as appropriate- Identify discharge learning needs (meds, wound care, etc.)- Refer to Case Management Department for coordinating discharge planning if the patient needs post-hospital services based on physician/advanced practitioner order or complex needs related to functional status, cognitive ability, or social support system  Outcome: Progressing     Problem: Knowledge Deficit  Goal:  Patient/family/caregiver demonstrates understanding of disease process, treatment plan, medications, and discharge instructions  Description: Complete learning assessment and assess knowledge base.Interventions:- Provide teaching at level of understanding- Provide teaching via preferred learning methods  Outcome: Progressing     Problem: Neurological Deficit  Goal: Neurological status is stable or improving  Description: Interventions:- Monitor and assess patient's level of consciousness, motor function, sensory function, and level of assistance needed for ADLs. - Monitor and report changes from baseline. Collaborate with interdisciplinary team to initiate plan and implement interventions as ordered. - Provide and maintain a safe environment.- Consider seizure precautions.- Consider fall precautions.- Consider aspiration precautions.- Consider bleeding precautions.  Outcome: Progressing     Problem: Activity Intolerance/Impaired Mobility  Goal: Mobility/activity is maintained at optimum level for patient  Description: Interventions:- Assess and monitor patient  barriers to mobility and need for assistive/adaptive devices.- Assess patient's emotional response to limitations.- Collaborate with interdisciplinary team and initiate plans and interventions as ordered.- Encourage independent activity per ability.- Maintain proper body alignment.- Perform active/passive rom as tolerated/ordered.- Plan activities to conserve energy.- Turn patient as appropriate  Outcome: Progressing     Problem: Communication Impairment  Goal: Ability to express needs and understand communication  Description: Assess patient's communication skills and ability to understand information.  Patient will demonstrate use of effective communication techniques, alternative methods of communication and understanding even if not able to speak. - Encourage communication and provide alternate methods of communication as needed.- Collaborate with case  management/ for discharge needs.- Include patient/family/caregiver in decisions related to communication.  Outcome: Progressing     Problem: Nutrition  Goal: Nutrition/Hydration status is improving  Description: Monitor and assess patient's nutrition/hydration status for malnutrition (ex- brittle hair, bruises, dry skin, pale skin and conjunctiva, muscle wasting, smooth red tongue, and disorientation). Collaborate with interdisciplinary team and initiate plan and interventions as ordered.  Monitor patient's weight and dietary intake as ordered or per policy. Utilize nutrition screening tool and intervene per policy. Determine patient's food preferences and provide high-protein, high-caloric foods as appropriate. - Assist patient with eating.- Allow adequate time for meals.- Encourage patient to take dietary supplement as ordered.- Collaborate with clinical nutritionist.- Include patient/family/caregiver in decisions related to nutrition.  Outcome: Progressing     Problem: Prexisting or High Potential for Compromised Skin Integrity  Goal: Skin integrity is maintained or improved  Description: INTERVENTIONS:- Identify patients at risk for skin breakdown- Assess and monitor skin integrity- Assess and monitor nutrition and hydration status- Monitor labs - Assess for incontinence - Turn and reposition patient- Assist with mobility/ambulation- Relieve pressure over bony prominences- Avoid friction and shearing- Provide appropriate hygiene as needed including keeping skin clean and dry- Evaluate need for skin moisturizer/barrier cream- Collaborate with interdisciplinary team - Patient/family teaching- Consider wound care consult   Outcome: Progressing     Problem: SAFETY,RESTRAINT: NV/NON-SELF DESTRUCTIVE BEHAVIOR  Goal: Remains free of harm/injury (restraint for non violent/non self-detsructive behavior)  Description: INTERVENTIONS:- Instruct patient/family regarding restraint use - Assess and monitor  physiologic and psychological status - Provide interventions and comfort measures to meet assessed patient needs - Identify and implement measures to help patient regain control- Assess readiness for release of restraint   Outcome: Completed  Goal: Returns to optimal restraint-free functioning  Description: INTERVENTIONS:- Assess the patient's behavior and symptoms that indicate continued need for restraint- Identify and implement measures to help patient regain control- Assess readiness for release of restraint   Outcome: Completed     Problem: Nutrition/Hydration-ADULT  Goal: Nutrient/Hydration intake appropriate for improving, restoring or maintaining nutritional needs  Description: Monitor and assess patient's nutrition/hydration status for malnutrition. Collaborate with interdisciplinary team and initiate plan and interventions as ordered.  Monitor patient's weight and dietary intake as ordered or per policy. Utilize nutrition screening tool and intervene as necessary. Determine patient's food preferences and provide high-protein, high-caloric foods as appropriate. INTERVENTIONS:- Monitor oral intake, urinary output, labs, and treatment plans- Assess nutrition and hydration status and recommend course of action- Evaluate amount of meals eaten- Assist patient with eating if necessary - Allow adequate time for meals- Recommend/ encourage appropriate diets, oral nutritional supplements, and vitamin/mineral supplements- Order, calculate, and assess calorie counts as needed- Assess need for intravenous fluids- Provide specific nutrition/hydration education as appropriate- Include patient/family/caregiver in decisions related to nutrition  Outcome: Progressing

## 2025-05-05 NOTE — PLAN OF CARE
Problem: PHYSICAL THERAPY ADULT  Goal: Performs mobility at highest level of function for planned discharge setting.  See evaluation for individualized goals.  Description: Treatment/Interventions: ADL retraining, Functional transfer training, LE strengthening/ROM, Elevations, Therapeutic exercise, Endurance training, Cognitive reorientation, Patient/family training, Equipment eval/education, Bed mobility, Gait training, Spoke to nursing, Spoke to case management, OT          See flowsheet documentation for full assessment, interventions and recommendations.  Outcome: Progressing  Note: Prognosis: Good  Problem List: Decreased strength, Decreased endurance, Impaired balance, Decreased cognition, Impaired judgement, Decreased safety awareness  Assessment: Pt agreeable to participate in PT session. Pt performed functional mobility as outlined above. Mobilizing at a level very close if not the same as her baseline. She reports using RW in community and no AD in house. Due to inconsistent parris and step length she would benefit from RW use at all times. Defer to OT for cognition assessment to determine level of A required at home. Pt left supine in bed with bed alarm, call bell, phone, and all personal needs within reach. Pt will continue to benefit from skilled acute care PT to further address their functional mobility limitations.  Barriers to Discharge: Inaccessible home environment, Decreased caregiver support     Rehab Resource Intensity Level, PT: III (Minimum Resource Intensity) (defer to OT for cognition-anticipate she needs to live in a more supportive environment for baseline cognition as she has baseline MCI)    See flowsheet documentation for full assessment.

## 2025-05-05 NOTE — PROGRESS NOTES
Progress Note - Neurology   Name: Ynes Mendoza 64 y.o. female I MRN: [id number]  [facility] I Date of Admission: 5/1/2025   Date of Service: 5/5/2025 I Hospital Day: 3    Assessment & Plan  Seizure-like activity (HCC)  Neurology consulted to address stroke alert for concern of mixed aphasia of uncertain duration/last known well, which appears to be nearly identical to multiple prior presentations which ultimately never revealed stroke but rather EEG findings which were consistent with seizure predisposition although no event captured  Since most recent prior presentation for similar symptoms in 02/2025, patient has visited with outpatient epileptologist as requested, who reports that the description of her events were consistent with focal impaired awareness seizures, even warranting diagnosis of epilepsy due to the recurrent nature.  Patient was initially placed on Lamictal during the prior hospitalization, but due to intolerable side effects was moved over to Keppra approximately 1 month ago, was supposed to take 500 every 12 for 1 week then moved to 1 g every 12 indefinitely, however patient unable to describe if medication being taken appropriately.    Workup:  Initial evaluation includes SBP in 140s, BG of 160s, all other VSS, NIH 13, near complete inability to follow simple commands, and inability to meaningfully participate in conversation or history although can state an occasional yes or no which may or may not be accurate.  No evidence of infectious process or electrolyte abnormalities  CTH NAIA  CTAHN No large vessel arterial occlusion, significant flow-limiting stenosis, or focal saccular aneurysm identified. Mildly diminished caliber of the distal right vertebral artery/V4 segment and fetal origin of the right posterior cerebral artery noted. No enhancing brain mass/fluid collection or evidence of vascular malformation. No enhancing soft tissue neck mass, fluid collection, or cervical  lymphadenopathy.  MRI Brain w/o, 08/2024: No acute intracranial lesion. Atrophy and nonspecific white matter T2 hyperintensities likely secondary to chronic small vessel ischemia.   VEEG, 02/2025: This 23 hour vEEG captures right frontocentral spike and wave discharges, sometimes with a more generalized field of spread.  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 In addition, this study is indicative of a mild diffuse encephalopathy. No seizures are recorded.   Echo 2/2025: EF 60%, G1DD, no evidence of PFO  A1c 4/2025: 7.3%  LDL 4/2025: 75  Keppra level low normal at 12.9; level obtained prior to 2 g Keppra load    Impression: Due to stereotyped recurrent events at least occurring over the past many months if not longer, in addition to outpatient epileptologist evaluation, much higher suspicion for focal impaired awareness seizure over lower suspicion of TIA/CVA as had been worked up on multiple occasions in the past each time to negative results, reason for possible seizure unclear at this time but suspect most likely due to difficulties with Keppra compliance.     Plan:  Discussed plan with neurology attending Dr. Mckeon  Continue with Keppra 1000 mg every 12 hours  Recommend repeat troughs in 2-4 weeks for Keppra in outpatient setting   No need for MRI imaging at this time, order discontinued  Will hold off on EEG at this time  Seizure precautions placed  OT/PT  Per sister, patient has MCI at baseline and has concerns in regard to patient's medication compliance and ability to care for self at home  Recommended for level 1, reached back out to PT/OT to reassess given improvement in symptoms  Will touch base with CM regarding discharge planning  Okay to continue aspirin and hospital equivalent statin to home  Would benefit from outpatient neurocognitive assessment     Patient has follow-up appointment with Dr. Rodriguez on  5/14/2025.    Subjective   Today patient denies any complaints. She has not had any further seizure like activity. She is conversational today. She denies numbness, loss of sensation, pain, N/V.     Review of Systems   Constitutional:  Negative for chills and fever.   Respiratory:  Negative for shortness of breath.    Cardiovascular:  Negative for chest pain.   Gastrointestinal:  Negative for diarrhea, nausea and vomiting.   Neurological:  Negative for dizziness, seizures, weakness and numbness.       Objective :  Temp:  [97.2 °F (36.2 °C)-97.9 °F (36.6 °C)] 97.2 °F (36.2 °C)  HR:  [59-76] 63  BP: ()/(65-73) 122/69  Resp:  [17-18] 18  SpO2:  [93 %-99 %] 98 %  O2 Device: None (Room air)    Physical Exam  Vitals reviewed.   Constitutional:       Appearance: She is not diaphoretic.   HENT:      Head: Normocephalic and atraumatic.   Eyes:      General: Lids are normal.      Extraocular Movements: Extraocular movements intact.      Conjunctiva/sclera: Conjunctivae normal.      Pupils: Pupils are equal, round, and reactive to light.   Cardiovascular:      Rate and Rhythm: Normal rate and regular rhythm.   Pulmonary:      Effort: Pulmonary effort is normal. No respiratory distress.      Breath sounds: Normal breath sounds.   Abdominal:      Palpations: Abdomen is soft.      Tenderness: There is no abdominal tenderness. There is no guarding.   Skin:     General: Skin is warm and dry.   Neurological:      Mental Status: She is alert.      Cranial Nerves: No dysarthria.      Motor: Motor strength is normal.     Deep Tendon Reflexes:      Reflex Scores:       Bicep reflexes are 2+ on the right side and 2+ on the left side.       Brachioradialis reflexes are 2+ on the right side and 2+ on the left side.       Patellar reflexes are 2+ on the right side and 2+ on the left side.    Neurological Exam  Mental Status  Alert. Oriented only to person, place and time. no dysarthria present. Language is fluent with no  aphasia.    Cranial Nerves  CN II: Visual acuity is normal. Visual fields full to confrontation.  CN III, IV, VI: Extraocular movements intact bilaterally. Normal lids and orbits bilaterally. Pupils equal round and reactive to light bilaterally.  CN V: Facial sensation is normal.  CN VII: Full and symmetric facial movement.  CN VIII: Hearing is normal.  CN IX, X: Palate elevates symmetrically. Normal gag reflex.  CN XI: Shoulder shrug strength is normal.  CN XII: Tongue midline without atrophy or fasciculations.    Motor   No abnormal involuntary movements. Strength is 5/5 throughout all four extremities.    Sensory  Light touch is normal in upper and lower extremities.     Reflexes                                            Right                      Left  Brachioradialis                    2+                         2+  Biceps                                 2+                         2+  Patellar                                2+                         2+    Coordination  Right: Finger-to-nose normal.Left: Finger-to-nose normal.        Lab Results: I have reviewed the following results:    VTE Pharmacologic Prophylaxis: Enoxaparin (Lovenox)

## 2025-05-05 NOTE — ASSESSMENT & PLAN NOTE
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  Patient was initially placed on Lamictal during the prior hospitalization, but due to intolerable side effects was moved over to Keppra approximately 1 month ago, was supposed to take 500 every 12 for 1 week then moved to 1 g every 12 indefinitely, however patient unable to describe if medication being taken appropriately.    Workup:  Initial evaluation includes SBP in 140s, BG of 160s, all other VSS, NIH 13, near complete inability to follow simple commands, and inability to meaningfully participate in conversation or history although can state an occasional yes or no which may or may not be accurate.  No evidence of infectious process or electrolyte abnormalities  CTH NAIA  CTAHN No large vessel arterial occlusion, significant flow-limiting stenosis, or focal saccular aneurysm identified. Mildly diminished caliber of the distal right vertebral artery/V4 segment and fetal origin of the right posterior cerebral artery noted. No enhancing brain mass/fluid collection or evidence of vascular malformation. No enhancing soft tissue neck mass, fluid collection, or cervical lymphadenopathy.  MRI Brain w/o, 08/2024: No acute intracranial lesion. Atrophy and nonspecific white matter T2 hyperintensities likely secondary to chronic small vessel ischemia.   VEEG, 02/2025: This 23 hour vEEG captures right  frontocentral spike and wave discharges, sometimes with a more generalized field of spread.  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 In addition, this study is indicative of a mild diffuse encephalopathy. No seizures are recorded.   Echo 2/2025: EF 60%, G1DD, no evidence of PFO  A1c 4/2025: 7.3%  LDL 4/2025: 75  Keppra level low normal at 12.9; level obtained prior to 2 g Keppra load    Impression: Due to stereotyped recurrent events at least occurring over the past many months if not longer, in addition to outpatient epileptologist evaluation, much higher suspicion for focal impaired awareness seizure over lower suspicion of TIA/CVA as had been worked up on multiple occasions in the past each time to negative results, reason for possible seizure unclear at this time but suspect most likely due to difficulties with Keppra compliance.     Plan:  Discussed plan with neurology attending Dr. Mckeon  Continue with Keppra 1000 mg every 12 hours  Recommend repeat troughs in 2-4 weeks for Keppra in outpatient setting   No need for MRI imaging at this time, order discontinued  Will hold off on EEG at this time  Seizure precautions placed  OT/PT  Per sister, patient has MCI at baseline and has concerns in regard to patient's medication compliance and ability to care for self at home  Recommended for level 1, reached back out to PT/OT to reassess given improvement in symptoms  Will touch base with CM regarding discharge planning  Okay to continue aspirin and hospital equivalent statin to home  Would benefit from outpatient neurocognitive assessment

## 2025-05-05 NOTE — RESTORATIVE TECHNICIAN NOTE
Restorative Technician Note      Patient Name: Ynes Mendoza     Note Type: Mobility  Patient Position Upon Consult: Supine  Activity Performed: Ambulated; Dangled; Stood  Assistive Device: Other (Comment) (none, Assisted PT Chai who continued walking pt in the lopes.)  Education Provided: Yes  Patient Position at End of Consult: Supine; All needs within reach; Bed/Chair alarm activated  Jesus SILVERMAN, Restorative Technician,

## 2025-05-06 LAB
GLUCOSE SERPL-MCNC: 123 MG/DL (ref 65–140)
GLUCOSE SERPL-MCNC: 157 MG/DL (ref 65–140)
GLUCOSE SERPL-MCNC: 199 MG/DL (ref 65–140)
GLUCOSE SERPL-MCNC: 276 MG/DL (ref 65–140)

## 2025-05-06 PROCEDURE — 97129 THER IVNTJ 1ST 15 MIN: CPT

## 2025-05-06 PROCEDURE — 99233 SBSQ HOSP IP/OBS HIGH 50: CPT | Performed by: STUDENT IN AN ORGANIZED HEALTH CARE EDUCATION/TRAINING PROGRAM

## 2025-05-06 PROCEDURE — 97130 THER IVNTJ EA ADDL 15 MIN: CPT

## 2025-05-06 PROCEDURE — 82948 REAGENT STRIP/BLOOD GLUCOSE: CPT

## 2025-05-06 RX ORDER — LEVETIRACETAM 500 MG/1
1000 TABLET ORAL EVERY 12 HOURS SCHEDULED
Status: DISCONTINUED | OUTPATIENT
Start: 2025-05-06 | End: 2025-05-09 | Stop reason: HOSPADM

## 2025-05-06 RX ADMIN — INSULIN LISPRO 1 UNITS: 100 INJECTION, SOLUTION INTRAVENOUS; SUBCUTANEOUS at 08:32

## 2025-05-06 RX ADMIN — LEVETIRACETAM 1000 MG: 100 INJECTION, SOLUTION INTRAVENOUS at 08:32

## 2025-05-06 RX ADMIN — INSULIN LISPRO 2 UNITS: 100 INJECTION, SOLUTION INTRAVENOUS; SUBCUTANEOUS at 12:10

## 2025-05-06 RX ADMIN — INSULIN GLARGINE 14 UNITS: 100 INJECTION, SOLUTION SUBCUTANEOUS at 21:28

## 2025-05-06 RX ADMIN — ENOXAPARIN SODIUM 40 MG: 40 INJECTION SUBCUTANEOUS at 08:32

## 2025-05-06 RX ADMIN — ASPIRIN 81 MG CHEWABLE TABLET 81 MG: 81 TABLET CHEWABLE at 08:32

## 2025-05-06 RX ADMIN — B-COMPLEX W/ C & FOLIC ACID TAB 1 TABLET: TAB at 08:32

## 2025-05-06 RX ADMIN — LEVETIRACETAM 1000 MG: 500 TABLET, FILM COATED ORAL at 21:28

## 2025-05-06 RX ADMIN — PRAVASTATIN SODIUM 40 MG: 40 TABLET ORAL at 16:56

## 2025-05-06 NOTE — OCCUPATIONAL THERAPY NOTE
Occupational Therapy Progress Note     Patient Name: Ynes Mendoza  Today's Date: 5/6/2025  Problem List  Principal Problem:    Seizure-like activity (HCC)       OCCUPATIONAL THERAPY         05/06/25 1025   OT Last Visit   OT Visit Date 05/06/25   Note Type   Note Type Treatment   Pain Assessment   Pain Assessment Tool 0-10   Pain Score No Pain   Restrictions/Precautions   Weight Bearing Precautions Per Order No   Other Precautions Cognitive;Bed Alarm;Fall Risk   Lifestyle   Autonomy Per chart review, pt is I w/ ADLs, IADLs, and fxnl mobility at baseline; - driving. Pt uses the [employer] for transportation.   Reciprocal Relationships Pt lives w/ roommates; supportive sister.   Intrinsic Gratification none stated; will continue to assess   ADL   Where Assessed Standing at sink   Grooming Assistance 5  Supervision/Setup   Grooming Deficit Setup;Wash/dry hands   Toileting Assistance  5  Supervision/Setup   Toileting Deficit Setup   Bed Mobility   Supine to Sit 5  Supervision   Additional items HOB elevated   Sit to Supine 5  Supervision   Additional items Increased time required   Transfers   Sit to Stand 5  Supervision   Additional items Increased time required   Stand to Sit 5  Supervision   Additional items Increased time required   Additional Comments No AD   Functional Mobility   Functional Mobility 5  Supervision   Additional Comments to/from bathroom   Additional items   (No AD)   Cognition   Overall Cognitive Status Impaired   Arousal/Participation Cooperative   Attention Attends with cues to redirect   Orientation Level Oriented X4   Memory Decreased short term memory;Decreased long term memory;Decreased recall of recent events   Following Commands Follows one step commands without difficulty   Comments Pt pleasant and cooperative with all therapy requests. Word finding difficulty noted   Cognition Assessment Tools ACLS   Score 4.8   Activity Tolerance   Activity Tolerance Patient tolerated treatment well    Assessment   Assessment Pt seen for Occupational Therapy session with focus on activity tolerance, bed mob, functional transfers/mob, sitting balance and tolerance and standing tolerance and balance for pt engagement in UB/LB self-care tasks and formal cognitive screening. Pt cleared by RN/Michelle  for pt participated in OT session. Pt presented supine/HOB raised pt awake/alert and agreeable to participate in therapy following pt identifiers confirmed. Pt did not report a therapy goal this session however pt was pleasant and cooperative with all therapy requests.  Pt required assist for  functional mob and toileting/toilet transfers 2* limited safety awareness.  She was able to tolerate participation in formal cognitive screen Michael Cognitive Level Screen and scored 4.8/6. Based on this score it is  indicated that the pt may live alone with daily assistance to monitor personal safety and check problem solving effectiveness. Below is a brief listing of pt functional abilities and performance expectations. Pt case management informed of pt cognitive performance results. Pt remains appropriate for  I ([employer]). Pt return to bed post session bed alarm activated and all needs within reach.   Plan   Treatment Interventions ADL retraining;Functional transfer training   Goal Expiration Date 05/17/25   OT Treatment Day 1   OT Frequency 2-3x/wk   Discharge Recommendation   Rehab Resource Intensity Level, OT I (Maximum Resource Intensity)   AM-PAC Daily Activity Inpatient   Lower Body Dressing 3   Bathing 3   Toileting 3   Upper Body Dressing 3   Grooming 3   Eating 4   Daily Activity Raw Score 19   Daily Activity Standardized Score (Calc for Raw Score >=11) 40.22   AM-PAC Applied Cognition Inpatient   Following a Speech/Presentation 1   Understanding Ordinary Conversation 3   Taking Medications 2   Remembering Where Things Are Placed or Put Away 3   Remembering List of 4-5 Errands 1   Taking Care of Complicated  Tasks 1   Applied Cognition Raw Score 11   Applied Cognition Standardized Score 27.03   Modified Candler Scale   Modified Tello Scale 4     4.8    Administered Michael Cognitive Level Screen (ACLS).  The patient scored 4.8/6.0 indicating that they may live alone with daily assistance to monitor personal safety and check problem solving effectiveness.      Behavior:  Asks for verification.  Knows how 2 concurrent schedules fit together.  Fits daily routine into schedule of others.  Can avoid obvious hazards.  More flexible and willing to adapt to new ideas.  Insight into disability is fair.  Processing is delayed.  May be able to get to a regularly scheduled appointment without need for assistance. May frequently stop a task to examine objects. Seeks verification from others.  Grooming:  Checks results of grooming, hair styling and make up application.  Learns new grooming procedures slowly.  May rigidly follow prescribed routines.  Dressing: Considers all striking features of garments including color, pattern, condition and fit.  May not attend to cleanliness or consider social standards.  May don clothing slowly and check results in mirror.  Bathing:  May coordinate bathing schedule with others.  Attends to all features of bathing environment.  May check results and vary rate.  Reports low supplies to caregivers.   Walking/Exercising:  Learns new routes over several days.  Scans environment, reads street signs and attempts to use this information but may still get lost.  Understands explanations of safety hazards.  Learns an exercise program and does it without variation.  Eating:  Eats and attends to social conversation though may not be able to talk and eat at the same time.  Manages all utensils.  Follows a meal time schedule.  May be reminded to check food temperatures.  Toileting:  Independently performs usual toileting routine.  May not anticipate use of bathroom before trips.  May learn bathroom etiquette like  conserving paper.  Medications:  Follows new prescriptions slowly.  Considers variables like time of day and amount but with difficulty.  Follows verbal explanations.  May follow a set schedule set up by others for renewing prescriptions.  Utilize a daily pill box (set up by someone else).  Use of adaptive equipment:  Recognizes loss of strength, sensation and balance.  May learn a series of new actions slowly.    Housekeeping:  Initiates and completes a routine of housekeeping activities.  Learns new procedures slowly.  Notes all visible effects of cleaning and checking results.  Corrects visible errors one at a time.  May not anticipate supplies but reports low supplies to caregiver.  May not identify potential hazards related to electric, gas, toxins, poisons and improper storage/disposal of items.  Food preparation:  Does not plan for long term food needs.  May learn to follow a plan for purchasing food items.  May follow a weekly schedule for shopping.  Memorizes a new sequence of actions to prepare dishes.  Follows a written recipe in a rigid manner.  Check stove after use.  Spending Money:  Slowly follows budget or learns money management procedures.  May need assistance to adjust to change in income or usual banking procedures.  Shopping: Learns directions to new shopping areas.  May learn to use a shopping list or use money saving procedures like coupons.  May not read labels or consider whether it is practical or affordable.  Laundry:  Completes laundry routine in a fixed manner.  Memorizes procedures like using a new [employer].  May try to read new labels but needs assistance in interpreting them.  May overload machine.  Traveling:  Learns new routes or travel procedures slowly.  May learn to use a wheelchair but has difficulty maneuvering or estimating space requirements.  Does not anticipate hazards.  May follow safety precautions pointed out by others.  Telephone:  Learns to use a new telephone  answering machine or pay phone slowly.  Does not vary actions. May memorize correct times to call others.   Driving:  Should NOT operate a motor vehicle         Zahra Jimenes OTR/L

## 2025-05-06 NOTE — PLAN OF CARE
Problem: PAIN - ADULT  Goal: Verbalizes/displays adequate comfort level or baseline comfort level  Description: Interventions:- Encourage patient to monitor pain and request assistance- Assess pain using appropriate pain scale- Administer analgesics based on type and severity of pain and evaluate response- Implement non-pharmacological measures as appropriate and evaluate response- Notify physician/advanced practitioner if interventions unsuccessful or patient reports new pain  Outcome: Progressing     Problem: INFECTION - ADULT  Goal: Absence or prevention of progression during hospitalization  Description: INTERVENTIONS:- Assess and monitor for signs and symptoms of infection- Monitor lab/diagnostic results- Monitor all insertion sites, i.e. indwelling lines, tubes, and drains- [place] appropriate cooling/warming therapies per order- Administer medications as ordered- Instruct and encourage patient and family to use good hand hygiene technique- Identify and instruct in appropriate isolation precautions for identified infection/condition  Outcome: Progressing     Problem: SAFETY ADULT  Goal: Patient will remain free of falls  Description: INTERVENTIONS:- Educate patient/family on patient safety including physical limitations- Instruct patient to call for assistance with activity - Consult OT/PT to assist with strengthening/mobility - Keep Call bell within reach- Keep bed low and locked with side rails adjusted as appropriate- Keep care items and personal belongings within reach- Initiate and maintain comfort rounds- Make Fall Risk Sign visible to staff- Offer Toileting every 2 Hours, in advance of need- Initiate/Maintain bed/chair alarm- Obtain necessary fall risk management equipment/- Apply yellow socks and bracelet for high fall risk patients- Consider moving patient to room near nurses station  Outcome: Progressing  Goal: Maintain or return to baseline ADL function  Description: INTERVENTIONS:-  Assess  patient's ability to carry out ADLs; assess patient's baseline for ADL function and identify physical deficits which impact ability to perform ADLs (bathing, care of mouth/teeth, toileting, grooming, dressing, etc.)- Assess/evaluate cause of self-care deficits - Assess range of motion- Assess patient's mobility; develop plan if impaired- Assess patient's need for assistive devices and provide as appropriate- Encourage maximum independence but intervene and supervise when necessary- Involve family in performance of ADLs- Assess for home care needs following discharge - Consider OT consult to assist with ADL evaluation and planning for discharge- Provide patient education as appropriate  Outcome: Progressing  Goal: Maintains/Returns to pre admission functional level  Description: INTERVENTIONS:- Perform AM-PAC 6 Click Basic Mobility/ Daily Activity assessment daily.- Set and communicate daily mobility goal to care team and patient/family/caregiver. - Collaborate with rehabilitation services on mobility goals if consulted- Dangle patient 3 times a day- Stand patient 3 times a day- Ambulate patient 3 times a day- Out of bed to chair 3 times a day - Out of bed for meals 3 times a day- Out of bed for toileting- Record patient progress and toleration of activity level   Outcome: Progressing     Problem: DISCHARGE PLANNING  Goal: Discharge to home or other facility with appropriate resources  Description: INTERVENTIONS:- Identify barriers to discharge w/patient and caregiver- Arrange for needed discharge resources and transportation as appropriate- Identify discharge learning needs (meds, wound care, etc.)- Refer to Case Management Department for coordinating discharge planning if the patient needs post-hospital services based on physician/advanced practitioner order or complex needs related to functional status, cognitive ability, or social support system  Outcome: Progressing     Problem: Knowledge Deficit  Goal:  Patient/family/caregiver demonstrates understanding of disease process, treatment plan, medications, and discharge instructions  Description: Complete learning assessment and assess knowledge base.Interventions:- Provide teaching at level of understanding- Provide teaching via preferred learning methods  Outcome: Progressing     Problem: Neurological Deficit  Goal: Neurological status is stable or improving  Description: Interventions:- Monitor and assess patient's level of consciousness, motor function, sensory function, and level of assistance needed for ADLs. - Monitor and report changes from baseline. Collaborate with interdisciplinary team to initiate plan and implement interventions as ordered. - Provide and maintain a safe environment.- Consider seizure precautions.- Consider fall precautions.- Consider aspiration precautions.- Consider bleeding precautions.  Outcome: Progressing     Problem: Activity Intolerance/Impaired Mobility  Goal: Mobility/activity is maintained at optimum level for patient  Description: Interventions:- Assess and monitor patient  barriers to mobility and need for assistive/adaptive devices.- Assess patient's emotional response to limitations.- Collaborate with interdisciplinary team and initiate plans and interventions as ordered.- Encourage independent activity per ability.- Maintain proper body alignment.- Perform active/passive rom as tolerated/ordered.- Plan activities to conserve energy.- Turn patient as appropriate  Outcome: Progressing     Problem: Communication Impairment  Goal: Ability to express needs and understand communication  Description: Assess patient's communication skills and ability to understand information.  Patient will demonstrate use of effective communication techniques, alternative methods of communication and understanding even if not able to speak. - Encourage communication and provide alternate methods of communication as needed.- Collaborate with case  management/ for discharge needs.- Include patient/family/caregiver in decisions related to communication.  Outcome: Progressing     Problem: Nutrition  Goal: Nutrition/Hydration status is improving  Description: Monitor and assess patient's nutrition/hydration status for malnutrition (ex- brittle hair, bruises, dry skin, pale skin and conjunctiva, muscle wasting, smooth red tongue, and disorientation). Collaborate with interdisciplinary team and initiate plan and interventions as ordered.  Monitor patient's weight and dietary intake as ordered or per policy. Utilize nutrition screening tool and intervene per policy. Determine patient's food preferences and provide high-protein, high-caloric foods as appropriate. - Assist patient with eating.- Allow adequate time for meals.- Encourage patient to take dietary supplement as ordered.- Collaborate with clinical nutritionist.- Include patient/family/caregiver in decisions related to nutrition.  Outcome: Progressing     Problem: Prexisting or High Potential for Compromised Skin Integrity  Goal: Skin integrity is maintained or improved  Description: INTERVENTIONS:- Identify patients at risk for skin breakdown- Assess and monitor skin integrity- Assess and monitor nutrition and hydration status- Monitor labs - Assess for incontinence - Turn and reposition patient- Assist with mobility/ambulation- Relieve pressure over bony prominences- Avoid friction and shearing- Provide appropriate hygiene as needed including keeping skin clean and dry- Evaluate need for skin moisturizer/barrier cream- Collaborate with interdisciplinary team - Patient/family teaching- Consider wound care consult   Outcome: Progressing     Problem: Nutrition/Hydration-ADULT  Goal: Nutrient/Hydration intake appropriate for improving, restoring or maintaining nutritional needs  Description: Monitor and assess patient's nutrition/hydration status for malnutrition. Collaborate with  interdisciplinary team and initiate plan and interventions as ordered.  Monitor patient's weight and dietary intake as ordered or per policy. Utilize nutrition screening tool and intervene as necessary. Determine patient's food preferences and provide high-protein, high-caloric foods as appropriate. INTERVENTIONS:- Monitor oral intake, urinary output, labs, and treatment plans- Assess nutrition and hydration status and recommend course of action- Evaluate amount of meals eaten- Assist patient with eating if necessary - Allow adequate time for meals- Recommend/ encourage appropriate diets, oral nutritional supplements, and vitamin/mineral supplements- Order, calculate, and assess calorie counts as needed- Assess need for intravenous fluids- Provide specific nutrition/hydration education as appropriate- Include patient/family/caregiver in decisions related to nutrition  Outcome: Progressing

## 2025-05-06 NOTE — UTILIZATION REVIEW
Continued Stay Review    Date: 5-6-25                            Current Patient Class: Inpatient   Current Level of Care: med surg    HPI:64 y.o. female initially admitted on 5-1-25      Current Diagnosis:    Seizure like activity      Assessment/Plan:     PT continues to treat functional deficits and recommending continued rehab services.  OT to reassess given improvement in symptoms and for cognitive evaluation.      Medications:   Scheduled Medications:  aspirin, 81 mg, Oral, Daily  enoxaparin, 40 mg, Subcutaneous, Daily  insulin glargine, 14 Units, Subcutaneous, HS  insulin lispro, 1-6 Units, Subcutaneous, TID AC  levETIRAcetam, 1,000 mg, Oral, Q12H DYAN  multivitamin stress formula, 1 tablet, Oral, Daily  pravastatin, 40 mg, Oral, Daily With Dinner      Continuous IV Infusions:     PRN Meds:  diphenhydrAMINE, 25 mg, Oral, Q6H PRN  LORazepam, 1 mg, Intravenous, Once PRN  LORazepam, 1 mg, Intravenous, Once in imaging      Discharge Plan: to be determined     Vital Signs (last 3 days)       Date/Time Temp Pulse Resp BP MAP (mmHg) SpO2 O2 Device Tiki Coma Scale Score Pain    05/06/25 1025 -- -- -- -- -- -- -- -- No Pain    05/06/25 0800 -- -- -- -- -- -- -- 15 No Pain    05/06/25 06:56:47 97.3 °F (36.3 °C) 67 18 114/68 83 98 % -- -- --    05/06/25 04:15:38 97.1 °F (36.2 °C) 89 -- 116/68 84 99 % -- -- --    05/06/25 0400 -- -- -- -- -- -- -- 15 --    05/06/25 0100 -- -- 18 -- -- -- -- -- No Pain    05/06/25 0000 -- -- -- -- -- -- -- 15 --    05/05/25 22:33:28 97.7 °F (36.5 °C) 66 19 125/69 88 97 % -- -- --    05/05/25 22:02:11 97.1 °F (36.2 °C) 63 18 127/69 88 96 % -- -- No Pain    05/05/25 2000 -- -- -- -- -- -- -- 15 --    05/05/25 1600 -- -- -- -- -- -- -- 14 --    05/05/25 14:44:42 97.4 °F (36.3 °C) 65 16 105/57 73 97 % None (Room air) -- --    05/05/25 1344 -- -- -- -- -- -- -- -- No Pain    05/05/25 1200 -- -- -- -- -- -- -- 14 --    05/05/25 11:14:30 -- 64 17 120/70 87 97 % -- -- --    05/05/25 0800 --  -- -- -- -- -- -- 14 No Pain    05/05/25 07:29:24 97.2 °F (36.2 °C) 63 18 122/69 87 98 % -- -- --    05/05/25 03:57:55 -- 76 -- 121/68 86 99 % -- -- --    05/05/25 00:44:41 -- 65 -- 98/65 76 93 % -- -- --    05/05/25 0000 -- -- -- -- -- -- -- 13 --    05/04/25 20:52:19 97.9 °F (36.6 °C) 70 -- 130/73 92 98 % -- -- --    05/04/25 2000 -- -- -- -- -- -- -- 13 No Pain    05/04/25 19:45:36 97.9 °F (36.6 °C) 75 17 120/70 87 97 % None (Room air) -- --    05/04/25 1600 -- -- -- -- -- -- -- 13 --    05/04/25 15:55:48 97.7 °F (36.5 °C) 64 17 122/70 87 98 % None (Room air) -- --    05/04/25 1200 -- -- -- -- -- -- -- 13 --    05/04/25 07:41:57 97.7 °F (36.5 °C) 59 18 122/69 87 99 % -- -- --    05/04/25 0400 -- -- -- -- -- -- -- 13 --    05/04/25 03:31:51 -- 60 -- 121/69 86 95 % -- -- --    05/04/25 01:07:40 -- 65 -- 139/84 102 98 % -- -- --    05/04/25 01:00:29 97.2 °F (36.2 °C) 70 -- 79/61 67 97 % -- -- --    05/04/25 0000 -- -- -- -- -- -- -- 13 --    05/03/25 2000 -- -- -- -- -- -- -- 13 No Pain    05/03/25 19:33:56 98 °F (36.7 °C) 73 17 123/64 84 95 % None (Room air) -- --    05/03/25 1600 -- -- -- -- -- -- -- 13 --    05/03/25 15:50:32 98.2 °F (36.8 °C) 60 18 119/82 94 97 % None (Room air) -- --    05/03/25 15:49:46 -- 60 -- 119/82 94 95 % -- -- --    05/03/25 14:48:26 -- 75 -- 128/77 94 98 % -- -- --    05/03/25 1400 -- -- -- -- -- -- -- 13 --    05/03/25 1200 -- -- -- -- -- -- -- 13 --    05/03/25 1100 -- -- -- -- -- -- -- -- No Pain    05/03/25 1000 -- -- -- -- -- -- -- 13 --    05/03/25 0937 -- -- -- -- -- -- -- -- No Pain    05/03/25 0936 -- -- -- -- -- -- -- -- No Pain    05/03/25 0800 -- -- -- -- -- -- -- 13 --    05/03/25 07:56:48 97.7 °F (36.5 °C) 61 18 122/70 87 96 % -- -- --    05/03/25 07:16:05 97.2 °F (36.2 °C) 64 -- 123/72 89 96 % -- -- --    05/03/25 04:18:02 97.4 °F (36.3 °C) 61 -- 121/74 90 96 % -- -- --    05/03/25 01:17:33 97.8 °F (36.6 °C) 62 -- 115/61 79 89 % -- -- --          Weight (last 2 days)        None            Pertinent Labs/Diagnostic Results:   Radiology:  CT stroke alert brain   Final (05/02 0004)      Mild senescent changes without acute intracranial abnormality. If clinical concern for acute ischemia, consider more sensitive MRI brain for better evaluation         CTA stroke alert (head/neck)   Final  (05/02 0042)   Final      No large vessel arterial occlusion, significant flow-limiting stenosis, or focal saccular aneurysm identified. Mildly diminished caliber of the distal right vertebral artery/V4 segment and fetal origin of the right posterior cerebral artery noted. No enhancing brain mass/fluid collection or evidence of vascular malformation. No enhancing soft tissue neck mass, fluid collection, or cervical lymphadenopathy.           Cardiology:  ECG 12 lead   Final  (05/02 1249)   Electronic ventricular pacemaker   Confirmed by Ean Ward ([id number]) on 5/2/2025 12:49:19 PM            Results from last 7 days   Lab Units 05/05/25 0525 05/03/25  0543 05/01/25  2342   WBC Thousand/uL 5.36 5.27 6.30   HEMOGLOBIN g/dL 13.5 12.9 12.9   HEMATOCRIT % 41.6 38.4 39.5   PLATELETS Thousands/uL 160 153 152         Results from last 7 days   Lab Units 05/05/25  0525 05/03/25  0543 05/01/25  2342   SODIUM mmol/L 141 142 141   POTASSIUM mmol/L 3.6 3.7 4.0   CHLORIDE mmol/L 108 106 105   CO2 mmol/L 25 26 27   ANION GAP mmol/L 8 10 9   BUN mg/dL 10 14 16   CREATININE mg/dL 0.83 0.93 1.04   EGFR ml/min/1.73sq m 75 66 57   CALCIUM mg/dL 9.3 9.4 9.7   MAGNESIUM mg/dL  --  2.0  --          Results from last 7 days   Lab Units 05/06/25  1057 05/06/25  0611 05/05/25  2120 05/05/25  1623 05/05/25  1102 05/05/25  0604 05/04/25 2050 05/04/25  1555 05/04/25  1114 05/04/25  0624 05/03/25 2059 05/03/25  1551   POC GLUCOSE mg/dl 199* 157* 180* 230* 212* 107 128 158* 206* 120 197* 148*     Results from last 7 days   Lab Units 05/05/25 0525 05/03/25  0543 05/01/25  2342   GLUCOSE RANDOM mg/dL 99 79 147*     Results from  last 7 days   Lab Units 05/02/25  0207 05/01/25  2342   HS TNI 0HR ng/L  --  5   HS TNI 2HR ng/L 5  --    HSTNI D2 ng/L 0  --          Results from last 7 days   Lab Units 05/01/25  2342   PROTIME seconds 13.2   INR  0.97   PTT seconds 27         Network Utilization Review Department  ATTENTION: Please call with any questions or concerns to [phone] and carefully listen to the prompts so that you are directed to the right person. All voicemails are confidential.   For Discharge needs, contact Care Management DC Support Team at [phone] opt. [phone]  Send all requests for admission clinical reviews, approved or denied determinations and any other requests to dedicated fax number below belonging to the campus where the patient is receiving treatment. List of dedicated fax numbers for the Facilities:  FACILITY NAME UR FAX NUMBER   ADMISSION DENIALS (Administrative/Medical Necessity) [phone]   DISCHARGE SUPPORT TEAM (NETWORK) [phone]   PARENT CHILD HEALTH (Maternity/NICU/Pediatrics) [phone]   [facility] [phone]   [facility] [phone]   [facility] [phone]   [facility] [phone]   [facility] [phone]   [facility] [phone]   [facility] [phone]   [facility] [phone]   [facility] [phone]   [facility] [phone]   [facility] [phone]   [facility] [phone]

## 2025-05-06 NOTE — PLAN OF CARE
Problem: PAIN - ADULT  Goal: Verbalizes/displays adequate comfort level or baseline comfort level  Description: Interventions:- Encourage patient to monitor pain and request assistance- Assess pain using appropriate pain scale- Administer analgesics based on type and severity of pain and evaluate response- Implement non-pharmacological measures as appropriate and evaluate response- Notify physician/advanced practitioner if interventions unsuccessful or patient reports new pain  Outcome: Progressing     Problem: INFECTION - ADULT  Goal: Absence or prevention of progression during hospitalization  Description: INTERVENTIONS:- Assess and monitor for signs and symptoms of infection- Monitor lab/diagnostic results- Monitor all insertion sites, i.e. indwelling lines, tubes, and drains- [place] appropriate cooling/warming therapies per order- Administer medications as ordered- Instruct and encourage patient and family to use good hand hygiene technique- Identify and instruct in appropriate isolation precautions for identified infection/condition  Outcome: Progressing     Problem: SAFETY ADULT  Goal: Patient will remain free of falls  Description: INTERVENTIONS:- Educate patient/family on patient safety including physical limitations- Instruct patient to call for assistance with activity - Consult OT/PT to assist with strengthening/mobility - Keep Call bell within reach- Keep bed low and locked with side rails adjusted as appropriate- Keep care items and personal belongings within reach- Initiate and maintain comfort rounds- Make Fall Risk Sign visible to staff- Offer Toileting every 2 Hours, in advance of need- Initiate/Maintain bed/chair alarm- Obtain necessary fall risk management equipment/- Apply yellow socks and bracelet for high fall risk patients- Consider moving patient to room near nurses station  Outcome: Progressing  Goal: Maintain or return to baseline ADL function  Description: INTERVENTIONS:-  Assess  patient's ability to carry out ADLs; assess patient's baseline for ADL function and identify physical deficits which impact ability to perform ADLs (bathing, care of mouth/teeth, toileting, grooming, dressing, etc.)- Assess/evaluate cause of self-care deficits - Assess range of motion- Assess patient's mobility; develop plan if impaired- Assess patient's need for assistive devices and provide as appropriate- Encourage maximum independence but intervene and supervise when necessary- Involve family in performance of ADLs- Assess for home care needs following discharge - Consider OT consult to assist with ADL evaluation and planning for discharge- Provide patient education as appropriate  Outcome: Progressing  Goal: Maintains/Returns to pre admission functional level  Description: INTERVENTIONS:- Perform AM-PAC 6 Click Basic Mobility/ Daily Activity assessment daily.- Set and communicate daily mobility goal to care team and patient/family/caregiver. - Collaborate with rehabilitation services on mobility goals if consulted- Dangle patient 3 times a day- Stand patient 3 times a day- Ambulate patient 3 times a day- Out of bed to chair 3 times a day - Out of bed for meals 3 times a day- Out of bed for toileting- Record patient progress and toleration of activity level   Outcome: Progressing     Problem: DISCHARGE PLANNING  Goal: Discharge to home or other facility with appropriate resources  Description: INTERVENTIONS:- Identify barriers to discharge w/patient and caregiver- Arrange for needed discharge resources and transportation as appropriate- Identify discharge learning needs (meds, wound care, etc.)- Refer to Case Management Department for coordinating discharge planning if the patient needs post-hospital services based on physician/advanced practitioner order or complex needs related to functional status, cognitive ability, or social support system  Outcome: Progressing     Problem: Knowledge Deficit  Goal:  Patient/family/caregiver demonstrates understanding of disease process, treatment plan, medications, and discharge instructions  Description: Complete learning assessment and assess knowledge base.Interventions:- Provide teaching at level of understanding- Provide teaching via preferred learning methods  Outcome: Progressing     Problem: Neurological Deficit  Goal: Neurological status is stable or improving  Description: Interventions:- Monitor and assess patient's level of consciousness, motor function, sensory function, and level of assistance needed for ADLs. - Monitor and report changes from baseline. Collaborate with interdisciplinary team to initiate plan and implement interventions as ordered. - Provide and maintain a safe environment.- Consider seizure precautions.- Consider fall precautions.- Consider aspiration precautions.- Consider bleeding precautions.  Outcome: Progressing     Problem: Activity Intolerance/Impaired Mobility  Goal: Mobility/activity is maintained at optimum level for patient  Description: Interventions:- Assess and monitor patient  barriers to mobility and need for assistive/adaptive devices.- Assess patient's emotional response to limitations.- Collaborate with interdisciplinary team and initiate plans and interventions as ordered.- Encourage independent activity per ability.- Maintain proper body alignment.- Perform active/passive rom as tolerated/ordered.- Plan activities to conserve energy.- Turn patient as appropriate  Outcome: Progressing     Problem: Communication Impairment  Goal: Ability to express needs and understand communication  Description: Assess patient's communication skills and ability to understand information.  Patient will demonstrate use of effective communication techniques, alternative methods of communication and understanding even if not able to speak. - Encourage communication and provide alternate methods of communication as needed.- Collaborate with case  management/ for discharge needs.- Include patient/family/caregiver in decisions related to communication.  Outcome: Progressing     Problem: Nutrition  Goal: Nutrition/Hydration status is improving  Description: Monitor and assess patient's nutrition/hydration status for malnutrition (ex- brittle hair, bruises, dry skin, pale skin and conjunctiva, muscle wasting, smooth red tongue, and disorientation). Collaborate with interdisciplinary team and initiate plan and interventions as ordered.  Monitor patient's weight and dietary intake as ordered or per policy. Utilize nutrition screening tool and intervene per policy. Determine patient's food preferences and provide high-protein, high-caloric foods as appropriate. - Assist patient with eating.- Allow adequate time for meals.- Encourage patient to take dietary supplement as ordered.- Collaborate with clinical nutritionist.- Include patient/family/caregiver in decisions related to nutrition.  Outcome: Progressing     Problem: Prexisting or High Potential for Compromised Skin Integrity  Goal: Skin integrity is maintained or improved  Description: INTERVENTIONS:- Identify patients at risk for skin breakdown- Assess and monitor skin integrity- Assess and monitor nutrition and hydration status- Monitor labs - Assess for incontinence - Turn and reposition patient- Assist with mobility/ambulation- Relieve pressure over bony prominences- Avoid friction and shearing- Provide appropriate hygiene as needed including keeping skin clean and dry- Evaluate need for skin moisturizer/barrier cream- Collaborate with interdisciplinary team - Patient/family teaching- Consider wound care consult   Outcome: Progressing     Problem: Nutrition/Hydration-ADULT  Goal: Nutrient/Hydration intake appropriate for improving, restoring or maintaining nutritional needs  Description: Monitor and assess patient's nutrition/hydration status for malnutrition. Collaborate with  interdisciplinary team and initiate plan and interventions as ordered.  Monitor patient's weight and dietary intake as ordered or per policy. Utilize nutrition screening tool and intervene as necessary. Determine patient's food preferences and provide high-protein, high-caloric foods as appropriate. INTERVENTIONS:- Monitor oral intake, urinary output, labs, and treatment plans- Assess nutrition and hydration status and recommend course of action- Evaluate amount of meals eaten- Assist patient with eating if necessary - Allow adequate time for meals- Recommend/ encourage appropriate diets, oral nutritional supplements, and vitamin/mineral supplements- Order, calculate, and assess calorie counts as needed- Assess need for intravenous fluids- Provide specific nutrition/hydration education as appropriate- Include patient/family/caregiver in decisions related to nutrition  Outcome: Progressing

## 2025-05-06 NOTE — PROGRESS NOTES
Progress Note - Neurology   Name: Ynes Mendoza 64 y.o. female I MRN: [id number]  [facility] Date of Admission: 5/1/2025   Date of Service: 5/6/2025 I Hospital Day: 4    Assessment & Plan  Seizure-like activity (HCC)  Neurology consulted to address stroke alert for concern of mixed aphasia of uncertain duration/last known well, which appears to be nearly identical to multiple prior presentations which ultimately never revealed stroke but rather EEG findings which were consistent with seizure predisposition although no event captured  Since most recent prior presentation for similar symptoms in 02/2025, patient has visited with outpatient epileptologist as requested, who reports that the description of her events were consistent with focal impaired awareness seizures, even warranting diagnosis of epilepsy due to the recurrent nature.  Patient was initially placed on Lamictal during the prior hospitalization, but due to intolerable side effects was moved over to Keppra approximately 1 month ago, was supposed to take 500 every 12 for 1 week then moved to 1 g every 12 indefinitely, however patient unable to describe if medication being taken appropriately.    Workup:  Initial evaluation includes SBP in 140s, BG of 160s, all other VSS, NIH 13, near complete inability to follow simple commands, and inability to meaningfully participate in conversation or history although can state an occasional yes or no which may or may not be accurate.  No evidence of infectious process or electrolyte abnormalities  CTH NAIA  CTAHN No large vessel arterial occlusion, significant flow-limiting stenosis, or focal saccular aneurysm identified. Mildly diminished caliber of the distal right vertebral artery/V4 segment and fetal origin of the right posterior cerebral artery noted. No enhancing brain mass/fluid collection or evidence of vascular malformation. No enhancing soft tissue neck mass, fluid collection, or cervical  lymphadenopathy.  MRI Brain w/o, 08/2024: No acute intracranial lesion. Atrophy and nonspecific white matter T2 hyperintensities likely secondary to chronic small vessel ischemia.   VEEG, 02/2025: This 23 hour vEEG captures right frontocentral spike and wave discharges, sometimes with a more generalized field of spread.  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 In addition, this study is indicative of a mild diffuse encephalopathy. No seizures are recorded.   Echo 2/2025: EF 60%, G1DD, no evidence of PFO  A1c 4/2025: 7.3%  LDL 4/2025: 75  Keppra level low normal at 12.9; level obtained prior to 2 g Keppra load    Impression: Due to stereotyped recurrent events at least occurring over the past many months if not longer, in addition to outpatient epileptologist evaluation, much higher suspicion for focal impaired awareness seizure over lower suspicion of TIA/CVA as had been worked up on multiple occasions in the past each time to negative results, reason for possible seizure unclear at this time but suspect most likely due to difficulties with Keppra compliance.     Plan:  Discussed plan with neurology attending Dr. Mckeon  Continue with Keppra 1000 mg every 12 hours  Recommend repeat troughs in 2-4 weeks for Keppra in outpatient setting   No need for MRI imaging at this time, order discontinued  Will hold off on EEG at this time  Seizure precautions placed  OT/PT  Per sister, patient has MCI at baseline and has concerns in regard to patient's medication compliance and ability to care for self at home  Recommended for level 3 by PT, OT to reassess given improvement in symptoms and for cognitive evaluation   Will touch base with CM regarding discharge planning  Okay to continue aspirin and hospital equivalent statin to home  Would benefit from outpatient neurocognitive assessment     Patient has an appointment made for 5/14/25 with  Dr. Rodriguez    Subjective   Today patient seen eating her breakfast in her room.  She denies any complaints.  No further seizure-like activities.  She is conversational today and able to answer more of my questions compared to yesterday.    Review of Systems   Constitutional:  Negative for chills and fever.   Neurological:  Negative for dizziness, seizures, weakness and numbness.   Psychiatric/Behavioral:  Negative for dysphoric mood. The patient is not nervous/anxious.          Objective :  Temp:  [97.1 °F (36.2 °C)-97.7 °F (36.5 °C)] 97.3 °F (36.3 °C)  HR:  [63-89] 67  BP: (105-127)/(57-70) 114/68  Resp:  [16-19] 18  SpO2:  [96 %-99 %] 98 %  O2 Device: None (Room air)    Physical Exam  HENT:      Head: Normocephalic and atraumatic.   Eyes:      General: Lids are normal.      Extraocular Movements: Extraocular movements intact.      Pupils: Pupils are equal, round, and reactive to light.   Cardiovascular:      Rate and Rhythm: Normal rate and regular rhythm.   Pulmonary:      Effort: Pulmonary effort is normal. No respiratory distress.   Abdominal:      Tenderness: There is no abdominal tenderness. There is no guarding.   Neurological:      Cranial Nerves: No dysarthria.      Motor: Motor strength is normal.  Psychiatric:         Mood and Affect: Mood normal.         Behavior: Behavior normal.     Neurological Exam  Mental Status  Awake, alert and oriented to person, place and time. no dysarthria present.  Oriented to self, place, and time.  Able to identify pen and tip of it.  Able to identify watch and identify watch face.  No dysarthria  Able to tell me her landlord's name  Knew current president and prior president's name.    Cranial Nerves  CN II: Visual acuity is normal. Visual fields full to confrontation.  CN III, IV, VI: Extraocular movements intact bilaterally. Normal lids and orbits bilaterally. Pupils equal round and reactive to light bilaterally.  CN V: Facial sensation is normal.  CN VII: Full and  symmetric facial movement.  CN VIII: Hearing is normal.  CN IX, X: Palate elevates symmetrically. Normal gag reflex.  CN XI: Shoulder shrug strength is normal.  CN XII: Tongue midline without atrophy or fasciculations.    Motor   Strength is 5/5 throughout all four extremities.    Sensory  Light touch is normal in upper and lower extremities.         Lab Results: I have reviewed the following results:        VTE Pharmacologic Prophylaxis: Enoxaparin (Lovenox)

## 2025-05-06 NOTE — PLAN OF CARE
Problem: OCCUPATIONAL THERAPY ADULT  Goal: Performs self-care activities at highest level of function for planned discharge setting.  See evaluation for individualized goals.  Description: Treatment Interventions: ADL retraining, Functional transfer training, Endurance training, Cognitive reorientation, Patient/family training, Equipment evaluation/education, Compensatory technique education, Continued evaluation, Energy conservation, Activityengagement          See flowsheet documentation for full assessment, interventions and recommendations.   Outcome: Progressing  Note: Limitation: Decreased ADL status, Decreased Safe judgement during ADL, Decreased cognition, Decreased endurance, Decreased self-care trans, Decreased high-level ADLs     Assessment: Pt seen for Occupational Therapy session with focus on activity tolerance, bed mob, functional transfers/mob, sitting balance and tolerance and standing tolerance and balance for pt engagement in UB/LB self-care tasks and formal cognitive screening. Pt cleared by RN/Michelle  for pt participated in OT session. Pt presented supine/HOB raised pt awake/alert and agreeable to participate in therapy following pt identifiers confirmed. Pt did not report a therapy goal this session however pt was pleasant and cooperative with all therapy requests.  Pt required assist for  functional mob and toileting/toilet transfers 2* limited safety awareness.  She was able to tolerate participation in formal cognitive screen Michael Cognitive Level Screen and scored 4.8/6. Based on this score it is  indicated that the pt may live alone with daily assistance to monitor personal safety and check problem solving effectiveness. Below is a brief listing of pt functional abilities and performance expectations. Pt case management informed of pt cognitive performance results. Pt remains appropriate for  I (Maximum Resources). Pt return to bed post session bed alarm activated and all needs within  reach.     Rehab Resource Intensity Level, OT: I (Maximum Resource Intensity)

## 2025-05-06 NOTE — ASSESSMENT & PLAN NOTE
Group Topic:  Group OT    Date: 2/26/2023  Start Time: 1445  End Time: 1545  Facilitators: Catherine S Fahres, OT    Focus: 4 Agreements  Number in attendance: 9    Method: Group  Attendance: Present  Participation: Active  Patient Response: Attentive and Interested in topic  Mood: Anxious  Affect: Type: Anxious   Range: Restricted   Congruency: Congruent   Stability: Stable  Behavior/Socialization: Appropriate to group and Cooperative  Thought Process: Tracking  Task Performance: Follows directions  Patient Evaluation: Independent - full participation                 Neurology consulted to address stroke alert for concern of mixed aphasia of uncertain duration/last known well, which appears to be nearly identical to multiple prior presentations which ultimately never revealed stroke but rather EEG findings which were consistent with seizure predisposition although no event captured  Since most recent prior presentation for similar symptoms in 02/2025, patient has visited with outpatient epileptologist as requested, who reports that the description of her events were consistent with focal impaired awareness seizures, even warranting diagnosis of epilepsy due to the recurrent nature.  Patient was initially placed on Lamictal during the prior hospitalization, but due to intolerable side effects was moved over to Keppra approximately 1 month ago, was supposed to take 500 every 12 for 1 week then moved to 1 g every 12 indefinitely, however patient unable to describe if medication being taken appropriately.    Workup:  Initial evaluation includes SBP in 140s, BG of 160s, all other VSS, NIH 13, near complete inability to follow simple commands, and inability to meaningfully participate in conversation or history although can state an occasional yes or no which may or may not be accurate.  No evidence of infectious process or electrolyte abnormalities  CTH NAIA  CTAHN No large vessel arterial occlusion, significant flow-limiting stenosis, or focal saccular aneurysm identified. Mildly diminished caliber of the distal right vertebral artery/V4 segment and fetal origin of the right posterior cerebral artery noted. No enhancing brain mass/fluid collection or evidence of vascular malformation. No enhancing soft tissue neck mass, fluid collection, or cervical lymphadenopathy.  MRI Brain w/o, 08/2024: No acute intracranial lesion. Atrophy and nonspecific white matter T2 hyperintensities likely secondary to chronic small vessel ischemia.   VEEG, 02/2025: This 23 hour vEEG captures right  frontocentral spike and wave discharges, sometimes with a more generalized field of spread.  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 In addition, this study is indicative of a mild diffuse encephalopathy. No seizures are recorded.   Echo 2/2025: EF 60%, G1DD, no evidence of PFO  A1c 4/2025: 7.3%  LDL 4/2025: 75  Keppra level low normal at 12.9; level obtained prior to 2 g Keppra load    Impression: Due to stereotyped recurrent events at least occurring over the past many months if not longer, in addition to outpatient epileptologist evaluation, much higher suspicion for focal impaired awareness seizure over lower suspicion of TIA/CVA as had been worked up on multiple occasions in the past each time to negative results, reason for possible seizure unclear at this time but suspect most likely due to difficulties with Keppra compliance.     Plan:  Discussed plan with neurology attending Dr. Mckeon  Continue with Keppra 1000 mg every 12 hours  Recommend repeat troughs in 2-4 weeks for Keppra in outpatient setting   No need for MRI imaging at this time, order discontinued  Will hold off on EEG at this time  Seizure precautions placed  OT/PT  Per sister, patient has MCI at baseline and has concerns in regard to patient's medication compliance and ability to care for self at home  Recommended for level 3 by PT, OT to reassess given improvement in symptoms and for cognitive evaluation   Will touch base with CM regarding discharge planning  Okay to continue aspirin and hospital equivalent statin to home  Would benefit from outpatient neurocognitive assessment

## 2025-05-07 LAB
GLUCOSE SERPL-MCNC: 177 MG/DL (ref 65–140)
GLUCOSE SERPL-MCNC: 195 MG/DL (ref 65–140)
GLUCOSE SERPL-MCNC: 233 MG/DL (ref 65–140)
GLUCOSE SERPL-MCNC: 270 MG/DL (ref 65–140)

## 2025-05-07 PROCEDURE — 82948 REAGENT STRIP/BLOOD GLUCOSE: CPT

## 2025-05-07 PROCEDURE — 99233 SBSQ HOSP IP/OBS HIGH 50: CPT | Performed by: STUDENT IN AN ORGANIZED HEALTH CARE EDUCATION/TRAINING PROGRAM

## 2025-05-07 RX ADMIN — PRAVASTATIN SODIUM 40 MG: 40 TABLET ORAL at 16:28

## 2025-05-07 RX ADMIN — ASPIRIN 81 MG CHEWABLE TABLET 81 MG: 81 TABLET CHEWABLE at 08:41

## 2025-05-07 RX ADMIN — INSULIN LISPRO 1 UNITS: 100 INJECTION, SOLUTION INTRAVENOUS; SUBCUTANEOUS at 07:34

## 2025-05-07 RX ADMIN — LEVETIRACETAM 1000 MG: 500 TABLET, FILM COATED ORAL at 22:04

## 2025-05-07 RX ADMIN — LEVETIRACETAM 1000 MG: 500 TABLET, FILM COATED ORAL at 08:42

## 2025-05-07 RX ADMIN — B-COMPLEX W/ C & FOLIC ACID TAB 1 TABLET: TAB at 08:42

## 2025-05-07 RX ADMIN — INSULIN LISPRO 2 UNITS: 100 INJECTION, SOLUTION INTRAVENOUS; SUBCUTANEOUS at 11:19

## 2025-05-07 RX ADMIN — INSULIN GLARGINE 14 UNITS: 100 INJECTION, SOLUTION SUBCUTANEOUS at 23:09

## 2025-05-07 RX ADMIN — INSULIN LISPRO 4 UNITS: 100 INJECTION, SOLUTION INTRAVENOUS; SUBCUTANEOUS at 16:28

## 2025-05-07 RX ADMIN — ENOXAPARIN SODIUM 40 MG: 40 INJECTION SUBCUTANEOUS at 08:42
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
## 2025-05-07 NOTE — PROGRESS NOTES
Progress Note - Neurology   Name: Ynes Mendoza 64 y.o. female I MRN: [id number]  [facility] I Date of Admission: 5/1/2025   Date of Service: 5/7/2025 I Hospital Day: 5    Assessment & Plan  Seizure-like activity (HCC)  Neurology consulted to address stroke alert for concern of mixed aphasia of uncertain duration/last known well, which appears to be nearly identical to multiple prior presentations which ultimately never revealed stroke but rather EEG findings which were consistent with seizure predisposition although no event captured  Since most recent prior presentation for similar symptoms in 02/2025, patient has visited with outpatient epileptologist as requested, who reports that the description of her events were consistent with focal impaired awareness seizures, even warranting diagnosis of epilepsy due to the recurrent nature.  Patient was initially placed on Lamictal during the prior hospitalization, but due to intolerable side effects was moved over to Keppra approximately 1 month ago, was supposed to take 500 every 12 for 1 week then moved to 1 g every 12 indefinitely, however patient unable to describe if medication being taken appropriately.    Workup:  Initial evaluation includes SBP in 140s, BG of 160s, all other VSS, NIH 13, near complete inability to follow simple commands, and inability to meaningfully participate in conversation or history although can state an occasional yes or no which may or may not be accurate.  No evidence of infectious process or electrolyte abnormalities  CTH NAIA  CTAHN No large vessel arterial occlusion, significant flow-limiting stenosis, or focal saccular aneurysm identified. Mildly diminished caliber of the distal right vertebral artery/V4 segment and fetal origin of the right posterior cerebral artery noted. No enhancing brain mass/fluid collection or evidence of vascular malformation. No enhancing soft tissue neck mass, fluid collection, or cervical  lymphadenopathy.  MRI Brain w/o, 08/2024: No acute intracranial lesion. Atrophy and nonspecific white matter T2 hyperintensities likely secondary to chronic small vessel ischemia.   VEEG, 02/2025: This 23 hour vEEG captures right frontocentral spike and wave discharges, sometimes with a more generalized field of spread.  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 In addition, this study is indicative of a mild diffuse encephalopathy. No seizures are recorded.   Echo 2/2025: EF 60%, G1DD, no evidence of PFO  A1c 4/2025: 7.3%  LDL 4/2025: 75  Keppra level low normal at 12.9; level obtained prior to 2 g Keppra load    Impression: Due to stereotyped recurrent events at least occurring over the past many months if not longer, in addition to outpatient epileptologist evaluation, much higher suspicion for focal impaired awareness seizure over lower suspicion of TIA/CVA as had been worked up on multiple occasions in the past each time to negative results, reason for possible seizure unclear at this time but suspect most likely due to difficulties with Keppra compliance.     Plan:  Discussed plan with neurology attending Dr. Mckeon  Continue with Keppra 1000 mg every 12 hours  Recommend repeat troughs in 2-4 weeks for Keppra in outpatient setting   No need for MRI imaging at this time, order discontinued  Will hold off on EEG at this time  Seizure precautions placed  OT/PT  Per sister, patient has MCI at baseline and has concerns in regard to patient's medication compliance and ability to care for self at home  Recommended for level 3 by PT, OT to reassess given improvement in symptoms and for cognitive evaluation  OT formal assessment ACLS 4.8/6. Recommended for level 1   Okay to continue aspirin and hospital equivalent statin to home  MOCA testing 5/7: 13/30. Patient struggled especially with visuospatial/executive function, language,  attention, recall.     Patient has an appointment already scheduled with Dr. Rodriguez 5/14/25.     Subjective   Patient reports doing well today. She worked with OT yesterday for a cognitive evaluation. She is tolerating PO intake well. No further seizure like activity.     Review of Systems   Constitutional:  Negative for chills and fever.   Respiratory:  Negative for cough and shortness of breath.    Cardiovascular:  Negative for chest pain.   Gastrointestinal:  Negative for diarrhea and nausea.   Neurological:  Negative for seizures, speech difficulty and weakness.   Psychiatric/Behavioral:  The patient is not nervous/anxious.          Objective :  Temp:  [97.1 °F (36.2 °C)-97.3 °F (36.3 °C)] 97.3 °F (36.3 °C)  HR:  [60-67] 60  BP: (114-116)/(71-73) 114/71  Resp:  [16-18] 18  SpO2:  [98 %-100 %] 100 %    Physical Exam  HENT:      Head: Normocephalic and atraumatic.   Eyes:      General: Lids are normal.      Extraocular Movements: Extraocular movements intact.      Pupils: Pupils are equal, round, and reactive to light.   Cardiovascular:      Rate and Rhythm: Normal rate and regular rhythm.   Pulmonary:      Effort: Pulmonary effort is normal. No respiratory distress.   Abdominal:      Palpations: Abdomen is soft.      Tenderness: There is no abdominal tenderness. There is no guarding.   Skin:     General: Skin is warm and dry.   Neurological:      Cranial Nerves: No dysarthria.      Motor: Motor strength is normal.    Neurological Exam  Mental Status  Awake, alert and oriented to person, place and time. no dysarthria present. Unable to perform serial calculations.    Cranial Nerves  CN II: Visual acuity is normal. Visual fields full to confrontation.  CN III, IV, VI: Extraocular movements intact bilaterally. Normal lids and orbits bilaterally. Pupils equal round and reactive to light bilaterally.  CN V: Facial sensation is normal.  CN VII: Full and symmetric facial movement.  CN VIII: Hearing is normal.  CN IX,  X: Palate elevates symmetrically. Normal gag reflex.  CN XI: Shoulder shrug strength is normal.  CN XII: Tongue midline without atrophy or fasciculations.    Motor   No abnormal involuntary movements. Strength is 5/5 throughout all four extremities.    Sensory  Light touch is normal in upper and lower extremities.     Coordination  Right: Finger-to-nose normal.Left: Finger-to-nose normal.        Lab Results: I have reviewed the following results:        VTE Pharmacologic Prophylaxis: Enoxaparin (Lovenox)

## 2025-05-07 NOTE — ASSESSMENT & PLAN NOTE
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  Patient was initially placed on Lamictal during the prior hospitalization, but due to intolerable side effects was moved over to Keppra approximately 1 month ago, was supposed to take 500 every 12 for 1 week then moved to 1 g every 12 indefinitely, however patient unable to describe if medication being taken appropriately.    Workup:  Initial evaluation includes SBP in 140s, BG of 160s, all other VSS, NIH 13, near complete inability to follow simple commands, and inability to meaningfully participate in conversation or history although can state an occasional yes or no which may or may not be accurate.  No evidence of infectious process or electrolyte abnormalities  CTH NAIA  CTAHN No large vessel arterial occlusion, significant flow-limiting stenosis, or focal saccular aneurysm identified. Mildly diminished caliber of the distal right vertebral artery/V4 segment and fetal origin of the right posterior cerebral artery noted. No enhancing brain mass/fluid collection or evidence of vascular malformation. No enhancing soft tissue neck mass, fluid collection, or cervical lymphadenopathy.  MRI Brain w/o, 08/2024: No acute intracranial lesion. Atrophy and nonspecific white matter T2 hyperintensities likely secondary to chronic small vessel ischemia.   VEEG, 02/2025: This 23 hour vEEG captures right  frontocentral spike and wave discharges, sometimes with a more generalized field of spread.  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 In addition, this study is indicative of a mild diffuse encephalopathy. No seizures are recorded.   Echo 2/2025: EF 60%, G1DD, no evidence of PFO  A1c 4/2025: 7.3%  LDL 4/2025: 75  Keppra level low normal at 12.9; level obtained prior to 2 g Keppra load    Impression: Due to stereotyped recurrent events at least occurring over the past many months if not longer, in addition to outpatient epileptologist evaluation, much higher suspicion for focal impaired awareness seizure over lower suspicion of TIA/CVA as had been worked up on multiple occasions in the past each time to negative results, reason for possible seizure unclear at this time but suspect most likely due to difficulties with Keppra compliance.     Plan:  Discussed plan with neurology attending Dr. Mckeon  Continue with Keppra 1000 mg every 12 hours  Recommend repeat troughs in 2-4 weeks for Keppra in outpatient setting   No need for MRI imaging at this time, order discontinued  Will hold off on EEG at this time  Seizure precautions placed  OT/PT  Per sister, patient has MCI at baseline and has concerns in regard to patient's medication compliance and ability to care for self at home  Recommended for level 3 by PT, OT to reassess given improvement in symptoms and for cognitive evaluation  OT formal assessment ACLS 4.8/6. Recommended for level 1   Okay to continue aspirin and hospital equivalent statin to home  MOCA testing 5/7: 13/30. Patient struggled especially with visuospatial/executive function, language, attention, recall.

## 2025-05-07 NOTE — DISCHARGE INSTR - AVS FIRST PAGE
Continue taking Keppra 1000 mg twice a day, please stop taking Lamotrigine    Please make sure to attend your follow-up Neurology appointment on 5/14/25 at 10:00 am with Dr. Rodriguez at the [facility].     Please go to any [facility] center to have your blood work drawn testing your Keppra levels in about 2 weeks.

## 2025-05-07 NOTE — PLAN OF CARE
Problem: PAIN - ADULT  Goal: Verbalizes/displays adequate comfort level or baseline comfort level  Description: Interventions:- Encourage patient to monitor pain and request assistance- Assess pain using appropriate pain scale- Administer analgesics based on type and severity of pain and evaluate response- Implement non-pharmacological measures as appropriate and evaluate response- Notify physician/advanced practitioner if interventions unsuccessful or patient reports new pain  Outcome: Progressing     Problem: INFECTION - ADULT  Goal: Absence or prevention of progression during hospitalization  Description: INTERVENTIONS:- Assess and monitor for signs and symptoms of infection- Monitor lab/diagnostic results- Monitor all insertion sites, i.e. indwelling lines, tubes, and drains- Cascade appropriate cooling/warming therapies per order- Administer medications as ordered- Instruct and encourage patient and family to use good hand hygiene technique- Identify and instruct in appropriate isolation precautions for identified infection/condition  Outcome: Progressing     Problem: SAFETY ADULT  Goal: Patient will remain free of falls  Description: INTERVENTIONS:- Educate patient/family on patient safety including physical limitations- Instruct patient to call for assistance with activity - Consult OT/PT to assist with strengthening/mobility - Keep Call bell within reach- Keep bed low and locked with side rails adjusted as appropriate- Keep care items and personal belongings within reach- Initiate and maintain comfort rounds- Make Fall Risk Sign visible to staff- Offer Toileting every 2 Hours, in advance of need- Initiate/Maintain bed/chair alarm- Obtain necessary fall risk management equipment/- Apply yellow socks and bracelet for high fall risk patients- Consider moving patient to room near nurses station  Outcome: Progressing  Goal: Maintain or return to baseline ADL function  Description: INTERVENTIONS:-  Assess  patient's ability to carry out ADLs; assess patient's baseline for ADL function and identify physical deficits which impact ability to perform ADLs (bathing, care of mouth/teeth, toileting, grooming, dressing, etc.)- Assess/evaluate cause of self-care deficits - Assess range of motion- Assess patient's mobility; develop plan if impaired- Assess patient's need for assistive devices and provide as appropriate- Encourage maximum independence but intervene and supervise when necessary- Involve family in performance of ADLs- Assess for home care needs following discharge - Consider OT consult to assist with ADL evaluation and planning for discharge- Provide patient education as appropriate  Outcome: Progressing  Goal: Maintains/Returns to pre admission functional level  Description: INTERVENTIONS:- Perform AM-PAC 6 Click Basic Mobility/ Daily Activity assessment daily.- Set and communicate daily mobility goal to care team and patient/family/caregiver. - Collaborate with rehabilitation services on mobility goals if consulted- Dangle patient 3 times a day- Stand patient 3 times a day- Ambulate patient 3 times a day- Out of bed to chair 3 times a day - Out of bed for meals 3 times a day- Out of bed for toileting- Record patient progress and toleration of activity level   Outcome: Progressing     Problem: DISCHARGE PLANNING  Goal: Discharge to home or other facility with appropriate resources  Description: INTERVENTIONS:- Identify barriers to discharge w/patient and caregiver- Arrange for needed discharge resources and transportation as appropriate- Identify discharge learning needs (meds, wound care, etc.)- Refer to Case Management Department for coordinating discharge planning if the patient needs post-hospital services based on physician/advanced practitioner order or complex needs related to functional status, cognitive ability, or social support system  Outcome: Progressing     Problem: Knowledge Deficit  Goal:  Patient/family/caregiver demonstrates understanding of disease process, treatment plan, medications, and discharge instructions  Description: Complete learning assessment and assess knowledge base.Interventions:- Provide teaching at level of understanding- Provide teaching via preferred learning methods  Outcome: Progressing     Problem: Neurological Deficit  Goal: Neurological status is stable or improving  Description: Interventions:- Monitor and assess patient's level of consciousness, motor function, sensory function, and level of assistance needed for ADLs. - Monitor and report changes from baseline. Collaborate with interdisciplinary team to initiate plan and implement interventions as ordered. - Provide and maintain a safe environment.- Consider seizure precautions.- Consider fall precautions.- Consider aspiration precautions.- Consider bleeding precautions.  Outcome: Progressing     Problem: Activity Intolerance/Impaired Mobility  Goal: Mobility/activity is maintained at optimum level for patient  Description: Interventions:- Assess and monitor patient  barriers to mobility and need for assistive/adaptive devices.- Assess patient's emotional response to limitations.- Collaborate with interdisciplinary team and initiate plans and interventions as ordered.- Encourage independent activity per ability.- Maintain proper body alignment.- Perform active/passive rom as tolerated/ordered.- Plan activities to conserve energy.- Turn patient as appropriate  Outcome: Progressing     Problem: Communication Impairment  Goal: Ability to express needs and understand communication  Description: Assess patient's communication skills and ability to understand information.  Patient will demonstrate use of effective communication techniques, alternative methods of communication and understanding even if not able to speak. - Encourage communication and provide alternate methods of communication as needed.- Collaborate with case  management/ for discharge needs.- Include patient/family/caregiver in decisions related to communication.  Outcome: Progressing     Problem: Nutrition  Goal: Nutrition/Hydration status is improving  Description: Monitor and assess patient's nutrition/hydration status for malnutrition (ex- brittle hair, bruises, dry skin, pale skin and conjunctiva, muscle wasting, smooth red tongue, and disorientation). Collaborate with interdisciplinary team and initiate plan and interventions as ordered.  Monitor patient's weight and dietary intake as ordered or per policy. Utilize nutrition screening tool and intervene per policy. Determine patient's food preferences and provide high-protein, high-caloric foods as appropriate. - Assist patient with eating.- Allow adequate time for meals.- Encourage patient to take dietary supplement as ordered.- Collaborate with clinical nutritionist.- Include patient/family/caregiver in decisions related to nutrition.  Outcome: Progressing     Problem: Prexisting or High Potential for Compromised Skin Integrity  Goal: Skin integrity is maintained or improved  Description: INTERVENTIONS:- Identify patients at risk for skin breakdown- Assess and monitor skin integrity- Assess and monitor nutrition and hydration status- Monitor labs - Assess for incontinence - Turn and reposition patient- Assist with mobility/ambulation- Relieve pressure over bony prominences- Avoid friction and shearing- Provide appropriate hygiene as needed including keeping skin clean and dry- Evaluate need for skin moisturizer/barrier cream- Collaborate with interdisciplinary team - Patient/family teaching- Consider wound care consult   Outcome: Progressing     Problem: Nutrition/Hydration-ADULT  Goal: Nutrient/Hydration intake appropriate for improving, restoring or maintaining nutritional needs  Description: Monitor and assess patient's nutrition/hydration status for malnutrition. Collaborate with  interdisciplinary team and initiate plan and interventions as ordered.  Monitor patient's weight and dietary intake as ordered or per policy. Utilize nutrition screening tool and intervene as necessary. Determine patient's food preferences and provide high-protein, high-caloric foods as appropriate. INTERVENTIONS:- Monitor oral intake, urinary output, labs, and treatment plans- Assess nutrition and hydration status and recommend course of action- Evaluate amount of meals eaten- Assist patient with eating if necessary - Allow adequate time for meals- Recommend/ encourage appropriate diets, oral nutritional supplements, and vitamin/mineral supplements- Order, calculate, and assess calorie counts as needed- Assess need for intravenous fluids- Provide specific nutrition/hydration education as appropriate- Include patient/family/caregiver in decisions related to nutrition  Outcome: Progressing

## 2025-05-07 NOTE — DISCHARGE SUMMARY
"Discharge Summary - Neurology   Name: Ynes Mendoza 64 y.o. female I MRN: [id number]  Unit/Bed#: [facility] 724-01 I Date of Admission: 5/1/2025   Date of Service: 5/7/2025 I Hospital Day: 5    Admission Date: 5/1/2025 2327  Discharge Date: 05/07/25  Admitting Diagnosis: Altered mental status [R41.82]  Stroke-like symptoms [R29.90]  Bug bite, initial encounter [W57.XXXA]  Discharge Diagnosis:   Medical Problems       Resolved Problems  Date Reviewed: 4/3/2025   None         HPI: Per Dr. Jimenez's H & P: \"Ynes Mendoza is a 64 y.o. female who presents with evidence of mixed aphasia for unknown duration, consistent with description of prior events in the past for which patient has undergone significant workup including several stroke pathway workups including receiving TNK multiple times, video EEG, and had pacemaker placement, all in attempts to workup and then subsequently treat the underlying condition.  Patient's symptoms include difficulty with following commands, only following the most simple commands and not consistently in this, as well as inability to meaningfully communicate such as naming objects or providing any pertinent history such as when symptoms began or if in any pain.  Patient last presented in 02/2025 with near identical presentation, which was post PPM, received TNK at that time but repeat CT head multiple days after presentation did not reveal any particular related abnormal finding, instead video EEG results revealed \"This 23 hour vEEG captures right frontocentral spike and wave discharges, sometimes with a more generalized field of spread.  This is favored to support a diagnosis of right hemispheric focal epilepsy, versus less likely a fragmented generalized epilepsy.\"  It was noted that patient did not have the symptoms of presentation during the time of the EEG reading, thus the above quotation was presumably interictal.  Patient was then placed on Lamictal with slow ramp-up to be cautious of " "potential side effects, had not made it to full dose when side effects are described as intolerable and outpatient epileptologist switched over to Keppra, which records show patient initially had some concerns over side effects but after discussion with same specialist was agreeable to continue, however has had difficulty on at least 1 or 2 occasions in obtaining the medication, leading to concern that patient may have now missed doses or otherwise be subtherapeutic, will evaluate with lab draw prior to loading in hospital.  Patient initially presented as a stroke alert due to the obvious aphasia, NIH 13 as noted below, most pertinent is lower extremities equally weak on exam although limited by command following, upper extremities bilaterally no drift.\"    Procedures Performed: No orders of the defined types were placed in this encounter.      Summary of Hospital Course: 63 y/o F PMHx of focal aware seizures currently prescribed Keppra and trifascicular block s/p pacemaker 1/2025 presented to B 5/1 w/ cc of mixed aphasia and AMS. Patient originally presented as stroke alert. CTH NAIA. CTA no LVO or significant stenosis but did note mildly diminished caliber or distal R vertebral artery/V4 segment. Patient not TNK candidate d/t unknown LKW. After initial stroke workup negative, higher suspicion for seizure v. TIA/CVA. Per chart review, patient has presented to ED with multiple similar episodes since 2023. Patient recently seen by Dr. Soto and was instructed to switch from Lamictal to Keppra 1000 mg, but unclear if patient was able to be compliant with this medication regimen. No signs of infection or electrolyte abnormalities. Keppra level low normal at 12.9 with BMI of 33. Suspect breakthrough seizures in the setting of poor compliance with Keppra. Due to sterotyped description and negative stroke workup, held off an obtaining additional EEG as last vEEG was completed 2/2025 >45 hours and demonstrated right " frontocentral spike and wave discharges with occasional generalized field of spread. Suggested R hemispheric focal epilepsy, versus less likely fragmented generalized epilepsy. Held off on MRI brain as patient had one already in 8/2024 and she appears back to her baseline. Will discharge on Keppra 1000 mg BID and encourage close follow-up with outpatient neurology and f/u Keppra trough level in 2-4 weeks. Of note, patient's sister expressed concerns over patient's memory as she comes from housing with 5 other people and she and her brother live far from her. OT met with patient and performed testing with Michael Cognitive Level screening in which she scored 4.8/6. She was recommended for level III by PT and level I by OT- per OT assessment,  patient's score on testing indicates that she can live alone with daily assistance to monitor personal safety and check problem solving effectiveness. Patient has an appointment with Dr. Rodriguez in the next few days. She scored 13/30 on Jay assessment while in the hospital. Patient was noted to be AAO x 3 in the hospital on day of discharge.       Instructions:   Continue Keppra 1000 mg BID, stop taking lamictal. Patient expressed understanding of medication compliance  Have lab work drawn in about 2 weeks to test Keppra levels  Make sure to attend all upcoming appointments    Significant Findings, Care, Treatment and Services Provided: Keppra level 12.9  CT head showing mild senescent changes without acute intracranial abnormality  CTA h/n: No large vessel arterial occlusion, significant flow-limiting stenosis, or focal saccular aneurysm identified. Mildly diminished caliber of the distal right vertebral artery/V4 segment and fetal origin of the right posterior cerebral artery noted. No   enhancing brain mass/fluid collection or evidence of vascular malformation. No enhancing soft tissue neck mass, fluid collection, or cervical lymphadenopathy.    Complications:  None    Condition at Discharge: good     Subjective: Patient denies any complaints or symptoms this morning. She is looking forward to leaving the hospital. No further seizure like activity. [facility] and she said she will make sure to  her Keppra today with her brother.  She reports that she has been walking around and tolerating PO intake.     Physical Exam  HENT:      Head: Normocephalic and atraumatic.      Mouth/Throat:      Mouth: Mucous membranes are moist.   Eyes:      Conjunctiva/sclera: Conjunctivae normal.      Pupils: Pupils are equal, round, and reactive to light.   Cardiovascular:      Rate and Rhythm: Normal rate and regular rhythm.   Pulmonary:      Effort: Pulmonary effort is normal. No respiratory distress.   Abdominal:      General: Bowel sounds are normal.      Palpations: Abdomen is soft.   Musculoskeletal:      Right lower leg: No edema.      Left lower leg: No edema.   Skin:     General: Skin is warm and dry.   Neurological:      Mental Status: She is alert and oriented to person, place, and time.      GCS: GCS eye subscore is 4. GCS verbal subscore is 5. GCS motor subscore is 6.      Cranial Nerves: Cranial nerves 2-12 are intact.      Sensory: No sensory deficit.      Motor: Motor function is intact.      Coordination: Coordination is intact. Finger-Nose-Finger Test normal.   Psychiatric:         Mood and Affect: Mood normal.         Behavior: Behavior normal.         Thought Content: Thought content normal.          Discharge instructions/Information to patient and family:   See After Visit Summary (AVS) for information provided to patient and family.      Provisions for Follow-Up Care:  See after visit summary for information related to follow-up care and any pertinent home health orders.      PCP: Saloni Landon DO    Disposition: Home    Planned Readmission: No     Discharge Medications:  See after visit summary for reconciled discharge medications provided  to patient and family.      Discharge Statement:  I have spent a total time of 30 minutes in caring for this patient on the day of the visit/encounter.

## 2025-05-08 LAB
GLUCOSE SERPL-MCNC: 127 MG/DL (ref 65–140)
GLUCOSE SERPL-MCNC: 178 MG/DL (ref 65–140)
GLUCOSE SERPL-MCNC: 185 MG/DL (ref 65–140)
GLUCOSE SERPL-MCNC: 298 MG/DL (ref 65–140)

## 2025-05-08 PROCEDURE — 82948 REAGENT STRIP/BLOOD GLUCOSE: CPT

## 2025-05-08 PROCEDURE — 99233 SBSQ HOSP IP/OBS HIGH 50: CPT | Performed by: STUDENT IN AN ORGANIZED HEALTH CARE EDUCATION/TRAINING PROGRAM

## 2025-05-08 RX ADMIN — ENOXAPARIN SODIUM 40 MG: 40 INJECTION SUBCUTANEOUS at 09:20

## 2025-05-08 RX ADMIN — LEVETIRACETAM 1000 MG: 500 TABLET, FILM COATED ORAL at 20:39

## 2025-05-08 RX ADMIN — LEVETIRACETAM 1000 MG: 500 TABLET, FILM COATED ORAL at 09:20

## 2025-05-08 RX ADMIN — INSULIN LISPRO 4 UNITS: 100 INJECTION, SOLUTION INTRAVENOUS; SUBCUTANEOUS at 11:22

## 2025-05-08 RX ADMIN — INSULIN GLARGINE 14 UNITS: 100 INJECTION, SOLUTION SUBCUTANEOUS at 20:39

## 2025-05-08 RX ADMIN — INSULIN LISPRO 1 UNITS: 100 INJECTION, SOLUTION INTRAVENOUS; SUBCUTANEOUS at 16:34

## 2025-05-08 RX ADMIN — ASPIRIN 81 MG CHEWABLE TABLET 81 MG: 81 TABLET CHEWABLE at 09:20

## 2025-05-08 RX ADMIN — PRAVASTATIN SODIUM 40 MG: 40 TABLET ORAL at 16:34

## 2025-05-08 RX ADMIN — B-COMPLEX W/ C & FOLIC ACID TAB 1 TABLET: TAB at 09:20
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
## 2025-05-08 NOTE — PROGRESS NOTES
Progress Note - Neurology   Name: Ynes Mendoza 64 y.o. female I MRN: [id number]  [facility] I Date of Admission: 5/1/2025   Date of Service: 5/8/2025 I Hospital Day: 6    Assessment & Plan  Seizure-like activity (HCC)  Neurology consulted to address stroke alert for concern of mixed aphasia of uncertain duration/last known well, which appears to be nearly identical to multiple prior presentations which ultimately never revealed stroke but rather EEG findings which were consistent with seizure predisposition although no event captured  Since most recent prior presentation for similar symptoms in 02/2025, patient has visited with outpatient epileptologist as requested, who reports that the description of her events were consistent with focal impaired awareness seizures, even warranting diagnosis of epilepsy due to the recurrent nature.  Patient was initially placed on Lamictal during the prior hospitalization, but due to intolerable side effects was moved over to Keppra approximately 1 month ago, was supposed to take 500 every 12 for 1 week then moved to 1 g every 12 indefinitely, however patient unable to describe if medication being taken appropriately.    Workup:  Initial evaluation includes SBP in 140s, BG of 160s, all other VSS, NIH 13, near complete inability to follow simple commands, and inability to meaningfully participate in conversation or history although can state an occasional yes or no which may or may not be accurate.  No evidence of infectious process or electrolyte abnormalities  CTH NAIA  CTAHN No large vessel arterial occlusion, significant flow-limiting stenosis, or focal saccular aneurysm identified. Mildly diminished caliber of the distal right vertebral artery/V4 segment and fetal origin of the right posterior cerebral artery noted. No enhancing brain mass/fluid collection or evidence of vascular malformation. No enhancing soft tissue neck mass, fluid collection, or cervical  lymphadenopathy.  MRI Brain w/o, 08/2024: No acute intracranial lesion. Atrophy and nonspecific white matter T2 hyperintensities likely secondary to chronic small vessel ischemia.   VEEG, 02/2025: This 23 hour vEEG captures right frontocentral spike and wave discharges, sometimes with a more generalized field of spread.  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 In addition, this study is indicative of a mild diffuse encephalopathy. No seizures are recorded.   Echo 2/2025: EF 60%, G1DD, no evidence of PFO  A1c 4/2025: 7.3%  LDL 4/2025: 75  Keppra level low normal at 12.9; level obtained prior to 2 g Keppra load    Impression: Due to stereotyped recurrent events at least occurring over the past many months if not longer, in addition to outpatient epileptologist evaluation, much higher suspicion for focal impaired awareness seizure over lower suspicion of TIA/CVA as had been worked up on multiple occasions in the past each time to negative results, reason for possible seizure unclear at this time but suspect most likely due to difficulties with Keppra compliance.     Plan:  Discussed plan with neurology attending Dr. Mckeon  Continue with Keppra 1000 mg every 12 hours  Recommend repeat troughs in 2-4 weeks for Keppra in outpatient setting   No need for MRI imaging at this time, order discontinued  Will hold off on EEG at this time  Seizure precautions placed  OT/PT  Per sister, patient has MCI at baseline and has concerns in regard to patient's medication compliance and ability to care for self at home  Recommended for level 3 by PT, OT to reassess given improvement in symptoms and for cognitive evaluation  OT formal assessment ACLS 4.8/6. Recommended for level 1 but patient can live alone with daily assistance to monitor personal safety and check problem solving effectiveness.   Okay to continue aspirin and hospital equivalent  statin to home  MOCA testing 5/7: 13/30. Patient struggled especially with visuospatial/executive function, language, attention, recall.   Medically stable for discharge    Patient has an appointment with Dr. Rodriguez already      Subjective   Patient denies any complaints today. She feels slightly constipated, bowel regimen was ordered yesterday. No seizure like activity.     Review of Systems   Constitutional:  Negative for chills and fever.   Respiratory:  Negative for shortness of breath.    Neurological:  Negative for seizures, speech difficulty, weakness, light-headedness and numbness.   Psychiatric/Behavioral:  Negative for agitation. The patient is not nervous/anxious.          Objective :  Temp:  [97.2 °F (36.2 °C)-97.7 °F (36.5 °C)] 97.2 °F (36.2 °C)  HR:  [67-83] 67  BP: (100-125)/(44-75) 125/75  Resp:  [18] 18  SpO2:  [97 %-99 %] 99 %    Physical Exam  HENT:      Head: Normocephalic and atraumatic.      Mouth/Throat:      Mouth: Mucous membranes are moist.   Eyes:      General: Lids are normal.      Extraocular Movements: Extraocular movements intact.      Pupils: Pupils are equal, round, and reactive to light.   Cardiovascular:      Rate and Rhythm: Normal rate and regular rhythm.   Pulmonary:      Effort: Pulmonary effort is normal. No respiratory distress.   Abdominal:      Tenderness: There is no guarding.   Neurological:      Cranial Nerves: No dysarthria.      Motor: Motor strength is normal.  Psychiatric:         Mood and Affect: Mood normal.         Behavior: Behavior normal.         Thought Content: Thought content normal.     Neurological Exam  Mental Status  Awake, alert and oriented to person, place and time. no dysarthria present. Able to name objects.    Cranial Nerves  CN II: Visual acuity is normal. Visual fields full to confrontation.  CN III, IV, VI: Extraocular movements intact bilaterally. Normal lids and orbits bilaterally. Pupils equal round and reactive to light bilaterally.  CN V:  Facial sensation is normal.  CN VII: Full and symmetric facial movement.  CN VIII: Hearing is normal.  CN IX, X: Palate elevates symmetrically. Normal gag reflex.  CN XI: Shoulder shrug strength is normal.  CN XII: Tongue midline without atrophy or fasciculations.    Motor   Strength is 5/5 throughout all four extremities.    Sensory  Light touch is normal in upper and lower extremities.     Coordination  Right: Finger-to-nose normal.Left: Finger-to-nose normal.        Lab Results: I have reviewed the following results:      VTE Pharmacologic Prophylaxis: Enoxaparin (Lovenox)
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
## 2025-05-08 NOTE — SPEECH THERAPY NOTE
Reviewed chart, and spoke with pt at bedside. She had just finished her breakfast (bagel, eggs, thin liquids). She denies any difficulty with the regular diet since it was upgraded. Speech is WNL, and pt reports she feels back to baseline. Messaged with RN regarding diet toleration and med tolerance; RN reports no difficulty taking meds and no difficulty with regular diet. No further ST indicated at this time. Please reconsult if needed.

## 2025-05-08 NOTE — PLAN OF CARE
Problem: PAIN - ADULT  Goal: Verbalizes/displays adequate comfort level or baseline comfort level  Description: Interventions:- Encourage patient to monitor pain and request assistance- Assess pain using appropriate pain scale- Administer analgesics based on type and severity of pain and evaluate response- Implement non-pharmacological measures as appropriate and evaluate response- Notify physician/advanced practitioner if interventions unsuccessful or patient reports new pain  Outcome: Progressing     Problem: INFECTION - ADULT  Goal: Absence or prevention of progression during hospitalization  Description: INTERVENTIONS:- Assess and monitor for signs and symptoms of infection- Monitor lab/diagnostic results- Monitor all insertion sites, i.e. indwelling lines, tubes, and drains- [place] appropriate cooling/warming therapies per order- Administer medications as ordered- Instruct and encourage patient and family to use good hand hygiene technique- Identify and instruct in appropriate isolation precautions for identified infection/condition  Outcome: Progressing     Problem: SAFETY ADULT  Goal: Patient will remain free of falls  Description: INTERVENTIONS:- Educate patient/family on patient safety including physical limitations- Instruct patient to call for assistance with activity - Consult OT/PT to assist with strengthening/mobility - Keep Call bell within reach- Keep bed low and locked with side rails adjusted as appropriate- Keep care items and personal belongings within reach- Initiate and maintain comfort rounds- Make Fall Risk Sign visible to staff- Offer Toileting every 2 Hours, in advance of need- Initiate/Maintain bed/chair alarm- Obtain necessary fall risk management equipment/- Apply yellow socks and bracelet for high fall risk patients- Consider moving patient to room near nurses station  Outcome: Progressing  Goal: Maintain or return to baseline ADL function  Description: INTERVENTIONS:-  Assess  patient's ability to carry out ADLs; assess patient's baseline for ADL function and identify physical deficits which impact ability to perform ADLs (bathing, care of mouth/teeth, toileting, grooming, dressing, etc.)- Assess/evaluate cause of self-care deficits - Assess range of motion- Assess patient's mobility; develop plan if impaired- Assess patient's need for assistive devices and provide as appropriate- Encourage maximum independence but intervene and supervise when necessary- Involve family in performance of ADLs- Assess for home care needs following discharge - Consider OT consult to assist with ADL evaluation and planning for discharge- Provide patient education as appropriate  Outcome: Progressing  Goal: Maintains/Returns to pre admission functional level  Description: INTERVENTIONS:- Perform AM-PAC 6 Click Basic Mobility/ Daily Activity assessment daily.- Set and communicate daily mobility goal to care team and patient/family/caregiver. - Collaborate with rehabilitation services on mobility goals if consulted- Dangle patient 3 times a day- Stand patient 3 times a day- Ambulate patient 3 times a day- Out of bed to chair 3 times a day - Out of bed for meals 3 times a day- Out of bed for toileting- Record patient progress and toleration of activity level   Outcome: Progressing     Problem: DISCHARGE PLANNING  Goal: Discharge to home or other facility with appropriate resources  Description: INTERVENTIONS:- Identify barriers to discharge w/patient and caregiver- Arrange for needed discharge resources and transportation as appropriate- Identify discharge learning needs (meds, wound care, etc.)- Refer to Case Management Department for coordinating discharge planning if the patient needs post-hospital services based on physician/advanced practitioner order or complex needs related to functional status, cognitive ability, or social support system  Outcome: Progressing

## 2025-05-08 NOTE — ASSESSMENT & PLAN NOTE
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  Patient was initially placed on Lamictal during the prior hospitalization, but due to intolerable side effects was moved over to Keppra approximately 1 month ago, was supposed to take 500 every 12 for 1 week then moved to 1 g every 12 indefinitely, however patient unable to describe if medication being taken appropriately.    Workup:  Initial evaluation includes SBP in 140s, BG of 160s, all other VSS, NIH 13, near complete inability to follow simple commands, and inability to meaningfully participate in conversation or history although can state an occasional yes or no which may or may not be accurate.  No evidence of infectious process or electrolyte abnormalities  CTH NAIA  CTAHN No large vessel arterial occlusion, significant flow-limiting stenosis, or focal saccular aneurysm identified. Mildly diminished caliber of the distal right vertebral artery/V4 segment and fetal origin of the right posterior cerebral artery noted. No enhancing brain mass/fluid collection or evidence of vascular malformation. No enhancing soft tissue neck mass, fluid collection, or cervical lymphadenopathy.  MRI Brain w/o, 08/2024: No acute intracranial lesion. Atrophy and nonspecific white matter T2 hyperintensities likely secondary to chronic small vessel ischemia.   VEEG, 02/2025: This 23 hour vEEG captures right  frontocentral spike and wave discharges, sometimes with a more generalized field of spread.  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 In addition, this study is indicative of a mild diffuse encephalopathy. No seizures are recorded.   Echo 2/2025: EF 60%, G1DD, no evidence of PFO  A1c 4/2025: 7.3%  LDL 4/2025: 75  Keppra level low normal at 12.9; level obtained prior to 2 g Keppra load    Impression: Due to stereotyped recurrent events at least occurring over the past many months if not longer, in addition to outpatient epileptologist evaluation, much higher suspicion for focal impaired awareness seizure over lower suspicion of TIA/CVA as had been worked up on multiple occasions in the past each time to negative results, reason for possible seizure unclear at this time but suspect most likely due to difficulties with Keppra compliance.     Plan:  Discussed plan with neurology attending Dr. Mckeon  Continue with Keppra 1000 mg every 12 hours  Recommend repeat troughs in 2-4 weeks for Keppra in outpatient setting   No need for MRI imaging at this time, order discontinued  Will hold off on EEG at this time  Seizure precautions placed  OT/PT  Per sister, patient has MCI at baseline and has concerns in regard to patient's medication compliance and ability to care for self at home  Recommended for level 3 by PT, OT to reassess given improvement in symptoms and for cognitive evaluation  OT formal assessment ACLS 4.8/6. Recommended for level 1 but patient can live alone with daily assistance to monitor personal safety and check problem solving effectiveness.   Okay to continue aspirin and hospital equivalent statin to home  MOCA testing 5/7: 13/30. Patient struggled especially with visuospatial/executive function, language, attention, recall.   Medically stable for discharge

## 2025-05-08 NOTE — PROGRESS NOTES
Patient:    MRN:  [id number]    Bassem Request ID:  [id number]    Level of care reserved:  Home Health Agency    Partner Reserved:  [facility], [place] [address] [phone]    Clinical needs requested:    Geography searched:  [phone]    Start of Service:    Request sent:  8:31am EDT on 5/7/2025 by Cleopatra Redd    Partner reserved:  11:23am EDT on 5/8/2025 by Cleopatra Redd    Choice list shared:  11:22am EDT on 5/8/2025 by Cleopatra Redd

## 2025-05-09 ENCOUNTER — HOME CARE VISIT (OUTPATIENT)
Dept: HOME HEALTH SERVICES | Facility: HOME HEALTHCARE | Age: 65
End: 2025-05-09

## 2025-05-09 VITALS
TEMPERATURE: 97.7 F | DIASTOLIC BLOOD PRESSURE: 74 MMHG | BODY MASS INDEX: 33.13 KG/M2 | SYSTOLIC BLOOD PRESSURE: 116 MMHG | OXYGEN SATURATION: 97 % | RESPIRATION RATE: 18 BRPM | WEIGHT: 216.27 LBS | HEART RATE: 72 BPM

## 2025-05-09 LAB
GLUCOSE SERPL-MCNC: 140 MG/DL (ref 65–140)
GLUCOSE SERPL-MCNC: 208 MG/DL (ref 65–140)

## 2025-05-09 PROCEDURE — 82948 REAGENT STRIP/BLOOD GLUCOSE: CPT

## 2025-05-09 PROCEDURE — 99238 HOSP IP/OBS DSCHRG MGMT 30/<: CPT | Performed by: STUDENT IN AN ORGANIZED HEALTH CARE EDUCATION/TRAINING PROGRAM

## 2025-05-09 RX ORDER — LEVETIRACETAM 1000 MG/1
1000 TABLET ORAL EVERY 12 HOURS SCHEDULED
Qty: 60 TABLET | Refills: 0 | Status: SHIPPED | OUTPATIENT
Start: 2025-05-09 | End: 2025-05-09

## 2025-05-09 RX ORDER — LEVETIRACETAM 1000 MG/1
1000 TABLET ORAL EVERY 12 HOURS SCHEDULED
Qty: 60 TABLET | Refills: 2 | Status: ON HOLD | OUTPATIENT
Start: 2025-05-09 | End: 2025-08-07

## 2025-05-09 RX ADMIN — B-COMPLEX W/ C & FOLIC ACID TAB 1 TABLET: TAB at 09:18

## 2025-05-09 RX ADMIN — ASPIRIN 81 MG CHEWABLE TABLET 81 MG: 81 TABLET CHEWABLE at 09:18

## 2025-05-09 RX ADMIN — LEVETIRACETAM 1000 MG: 500 TABLET, FILM COATED ORAL at 09:18

## 2025-05-09 RX ADMIN — INSULIN LISPRO 2 UNITS: 100 INJECTION, SOLUTION INTRAVENOUS; SUBCUTANEOUS at 11:49

## 2025-05-09 RX ADMIN — ENOXAPARIN SODIUM 40 MG: 40 INJECTION SUBCUTANEOUS at 09:18

## 2025-05-09 NOTE — QUICK NOTE
Attempted to call sister to provide updates and discharge instructions for patient. I did not receive an answer, the phone continued to ring without going to voicemail. Called patient's brother (who will be picking her up later around noon) and provided him with discharge instructions. All questions and concerns addressed.
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
## 2025-05-09 NOTE — PLAN OF CARE
Problem: PAIN - ADULT  Goal: Verbalizes/displays adequate comfort level or baseline comfort level  Description: Interventions:- Encourage patient to monitor pain and request assistance- Assess pain using appropriate pain scale- Administer analgesics based on type and severity of pain and evaluate response- Implement non-pharmacological measures as appropriate and evaluate response- Notify physician/advanced practitioner if interventions unsuccessful or patient reports new pain  Outcome: Progressing     Problem: INFECTION - ADULT  Goal: Absence or prevention of progression during hospitalization  Description: INTERVENTIONS:- Assess and monitor for signs and symptoms of infection- Monitor lab/diagnostic results- Monitor all insertion sites, i.e. indwelling lines, tubes, and drains- [place] appropriate cooling/warming therapies per order- Administer medications as ordered- Instruct and encourage patient and family to use good hand hygiene technique- Identify and instruct in appropriate isolation precautions for identified infection/condition  Outcome: Progressing     Problem: SAFETY ADULT  Goal: Patient will remain free of falls  Description: INTERVENTIONS:- Educate patient/family on patient safety including physical limitations- Instruct patient to call for assistance with activity - Consult OT/PT to assist with strengthening/mobility - Keep Call bell within reach- Keep bed low and locked with side rails adjusted as appropriate- Keep care items and personal belongings within reach- Initiate and maintain comfort rounds- Make Fall Risk Sign visible to staff- Offer Toileting every 2 Hours, in advance of need- Initiate/Maintain bed/chair alarm- Obtain necessary fall risk management equipment/- Apply yellow socks and bracelet for high fall risk patients- Consider moving patient to room near nurses station  Outcome: Progressing  Goal: Maintain or return to baseline ADL function  Description: INTERVENTIONS:-  Assess  patient's ability to carry out ADLs; assess patient's baseline for ADL function and identify physical deficits which impact ability to perform ADLs (bathing, care of mouth/teeth, toileting, grooming, dressing, etc.)- Assess/evaluate cause of self-care deficits - Assess range of motion- Assess patient's mobility; develop plan if impaired- Assess patient's need for assistive devices and provide as appropriate- Encourage maximum independence but intervene and supervise when necessary- Involve family in performance of ADLs- Assess for home care needs following discharge - Consider OT consult to assist with ADL evaluation and planning for discharge- Provide patient education as appropriate  Outcome: Progressing  Goal: Maintains/Returns to pre admission functional level  Description: INTERVENTIONS:- Perform AM-PAC 6 Click Basic Mobility/ Daily Activity assessment daily.- Set and communicate daily mobility goal to care team and patient/family/caregiver. - Collaborate with rehabilitation services on mobility goals if consulted- Dangle patient 3 times a day- Stand patient 3 times a day- Ambulate patient 3 times a day- Out of bed to chair 3 times a day - Out of bed for meals 3 times a day- Out of bed for toileting- Record patient progress and toleration of activity level   Outcome: Progressing     Problem: DISCHARGE PLANNING  Goal: Discharge to home or other facility with appropriate resources  Description: INTERVENTIONS:- Identify barriers to discharge w/patient and caregiver- Arrange for needed discharge resources and transportation as appropriate- Identify discharge learning needs (meds, wound care, etc.)- Refer to Case Management Department for coordinating discharge planning if the patient needs post-hospital services based on physician/advanced practitioner order or complex needs related to functional status, cognitive ability, or social support system  Outcome: Progressing     Problem: Knowledge Deficit  Goal:  Patient/family/caregiver demonstrates understanding of disease process, treatment plan, medications, and discharge instructions  Description: Complete learning assessment and assess knowledge base.Interventions:- Provide teaching at level of understanding- Provide teaching via preferred learning methods  Outcome: Progressing     Problem: Neurological Deficit  Goal: Neurological status is stable or improving  Description: Interventions:- Monitor and assess patient's level of consciousness, motor function, sensory function, and level of assistance needed for ADLs. - Monitor and report changes from baseline. Collaborate with interdisciplinary team to initiate plan and implement interventions as ordered. - Provide and maintain a safe environment.- Consider seizure precautions.- Consider fall precautions.- Consider aspiration precautions.- Consider bleeding precautions.  Outcome: Progressing     Problem: Activity Intolerance/Impaired Mobility  Goal: Mobility/activity is maintained at optimum level for patient  Description: Interventions:- Assess and monitor patient  barriers to mobility and need for assistive/adaptive devices.- Assess patient's emotional response to limitations.- Collaborate with interdisciplinary team and initiate plans and interventions as ordered.- Encourage independent activity per ability.- Maintain proper body alignment.- Perform active/passive rom as tolerated/ordered.- Plan activities to conserve energy.- Turn patient as appropriate  Outcome: Progressing     Problem: Communication Impairment  Goal: Ability to express needs and understand communication  Description: Assess patient's communication skills and ability to understand information.  Patient will demonstrate use of effective communication techniques, alternative methods of communication and understanding even if not able to speak. - Encourage communication and provide alternate methods of communication as needed.- Collaborate with case  management/ for discharge needs.- Include patient/family/caregiver in decisions related to communication.  Outcome: Progressing     Problem: Nutrition  Goal: Nutrition/Hydration status is improving  Description: Monitor and assess patient's nutrition/hydration status for malnutrition (ex- brittle hair, bruises, dry skin, pale skin and conjunctiva, muscle wasting, smooth red tongue, and disorientation). Collaborate with interdisciplinary team and initiate plan and interventions as ordered.  Monitor patient's weight and dietary intake as ordered or per policy. Utilize nutrition screening tool and intervene per policy. Determine patient's food preferences and provide high-protein, high-caloric foods as appropriate. - Assist patient with eating.- Allow adequate time for meals.- Encourage patient to take dietary supplement as ordered.- Collaborate with clinical nutritionist.- Include patient/family/caregiver in decisions related to nutrition.  Outcome: Progressing     Problem: Prexisting or High Potential for Compromised Skin Integrity  Goal: Skin integrity is maintained or improved  Description: INTERVENTIONS:- Identify patients at risk for skin breakdown- Assess and monitor skin integrity- Assess and monitor nutrition and hydration status- Monitor labs - Assess for incontinence - Turn and reposition patient- Assist with mobility/ambulation- Relieve pressure over bony prominences- Avoid friction and shearing- Provide appropriate hygiene as needed including keeping skin clean and dry- Evaluate need for skin moisturizer/barrier cream- Collaborate with interdisciplinary team - Patient/family teaching- Consider wound care consult   Outcome: Progressing     Problem: Nutrition/Hydration-ADULT  Goal: Nutrient/Hydration intake appropriate for improving, restoring or maintaining nutritional needs  Description: Monitor and assess patient's nutrition/hydration status for malnutrition. Collaborate with  interdisciplinary team and initiate plan and interventions as ordered.  Monitor patient's weight and dietary intake as ordered or per policy. Utilize nutrition screening tool and intervene as necessary. Determine patient's food preferences and provide high-protein, high-caloric foods as appropriate. INTERVENTIONS:- Monitor oral intake, urinary output, labs, and treatment plans- Assess nutrition and hydration status and recommend course of action- Evaluate amount of meals eaten- Assist patient with eating if necessary - Allow adequate time for meals- Recommend/ encourage appropriate diets, oral nutritional supplements, and vitamin/mineral supplements- Order, calculate, and assess calorie counts as needed- Assess need for intravenous fluids- Provide specific nutrition/hydration education as appropriate- Include patient/family/caregiver in decisions related to nutrition  Outcome: Progressing

## 2025-05-09 NOTE — NURSING NOTE
Discharge paperwork reviewed and sent with patient. IV and masdonovano removed. Patient taken in wheelchair with all belongings to lobby for discharge to home by her son.

## 2025-05-10 ENCOUNTER — HOME CARE VISIT (OUTPATIENT)
Dept: HOME HEALTH SERVICES | Facility: HOME HEALTHCARE | Age: 65
End: 2025-05-10

## 2025-05-10 NOTE — CASE COMMUNICATION
5.10.25   Pt to be seen on 5.12.25       Spoke with pt this morning and she states she will not be available this weekend for a SN visit.   States she had to go to [facility] with brother so she wasnt alone.   Prefers Am visit

## 2025-05-10 NOTE — UTILIZATION REVIEW
NOTIFICATION OF ADMISSION DISCHARGE   This is a Notification of Discharge from [facility]. Please be advised that this patient has been discharge from our facility. Below you will find the admission and discharge date and time including the patient’s disposition.   UTILIZATION REVIEW CONTACT:  Utilization Review Assistants  Network Utilization Review Department  Phone: [phone] x carefully listen to the prompts. All voicemails are confidential.  Email: [email or web address]     ADMISSION INFORMATION  PRESENTATION DATE: 5/1/2025 11:27 PM  OBERVATION ADMISSION DATE: N/A  INPATIENT ADMISSION DATE: 5/2/25 12:16 AM   DISCHARGE DATE: 5/9/2025  3:27 PM   DISPOSITION:Home with Home Health Care    [facility] Utilization Review Department  ATTENTION: Please call with any questions or concerns to [phone] and carefully listen to the prompts so that you are directed to the right person. All voicemails are confidential.   For Discharge needs, contact Care Management DC Support Team at [phone] opt. 2  Send all requests for admission clinical reviews, approved or denied determinations and any other requests to dedicated fax number below belonging to the campus where the patient is receiving treatment. List of dedicated fax numbers for the Facilities:  FACILITY NAME UR FAX NUMBER   ADMISSION DENIALS (Administrative/Medical Necessity) [phone]   DISCHARGE SUPPORT TEAM ([facility]) [phone]   PARENT CHILD HEALTH (Maternity/NICU/Pediatrics) [phone]   [facility] [phone]   [facility] [phone]   [facility] [phone]   [facility] [phone]   [facility] [phone]   [facility] [phone]   [facility] [phone]   [facility] [phone]   [facility]  [facility] [phone]   [facility] [phone]   [facility] [phone]   [facility] [phone]

## 2025-05-12 ENCOUNTER — TELEPHONE (OUTPATIENT)
Dept: NEUROLOGY | Facility: CLINIC | Age: 65
End: 2025-05-12

## 2025-05-12 ENCOUNTER — HOME CARE VISIT (OUTPATIENT)
Dept: HOME HEALTH SERVICES | Facility: HOME HEALTHCARE | Age: 65
End: 2025-05-12

## 2025-05-12 ENCOUNTER — HOSPITAL ENCOUNTER (INPATIENT)
Facility: HOSPITAL | Age: 65
LOS: 21 days | Discharge: NURSING, REHAB OR LONG-TERM CARE | DRG: 421 | End: 2025-06-04
Attending: EMERGENCY MEDICINE | Admitting: INTERNAL MEDICINE
Payer: MEDICARE

## 2025-05-12 DIAGNOSIS — N30.00 ACUTE CYSTITIS WITHOUT HEMATURIA: ICD-10-CM

## 2025-05-12 DIAGNOSIS — R41.82 ALTERED MENTAL STATUS: ICD-10-CM

## 2025-05-12 DIAGNOSIS — R62.7 FAILURE TO THRIVE IN ADULT: ICD-10-CM

## 2025-05-12 DIAGNOSIS — Z59.82 TRANSPORTATION INSECURITY: Primary | ICD-10-CM

## 2025-05-12 DIAGNOSIS — G93.40 ENCEPHALOPATHY: ICD-10-CM

## 2025-05-12 DIAGNOSIS — F41.1 GENERALIZED ANXIETY DISORDER: ICD-10-CM

## 2025-05-12 DIAGNOSIS — Z86.69 HX OF SEIZURE DISORDER: Primary | ICD-10-CM

## 2025-05-12 DIAGNOSIS — K59.00 CONSTIPATION: ICD-10-CM

## 2025-05-12 DIAGNOSIS — E11.9 TYPE 2 DIABETES MELLITUS, WITH LONG-TERM CURRENT USE OF INSULIN (HCC): ICD-10-CM

## 2025-05-12 DIAGNOSIS — Z02.79 ENCOUNTER FOR ASSESSMENT OF HEALTHCARE DECISION-MAKING CAPACITY: ICD-10-CM

## 2025-05-12 DIAGNOSIS — Z79.4 TYPE 2 DIABETES MELLITUS, WITH LONG-TERM CURRENT USE OF INSULIN (HCC): ICD-10-CM

## 2025-05-12 DIAGNOSIS — R41.89 COGNITIVE IMPAIRMENT: ICD-10-CM

## 2025-05-12 DIAGNOSIS — G40.109 FOCAL EPILEPSY (HCC): ICD-10-CM

## 2025-05-12 DIAGNOSIS — Z78.9 UNABLE TO CARE FOR SELF: ICD-10-CM

## 2025-05-12 LAB
ANION GAP SERPL CALCULATED.3IONS-SCNC: 5 MMOL/L (ref 4–13)
BASOPHILS # BLD AUTO: 0.06 THOUSANDS/ÂΜL (ref 0–0.1)
BASOPHILS NFR BLD AUTO: 1 % (ref 0–1)
BUN SERPL-MCNC: 13 MG/DL (ref 5–25)
CALCIUM SERPL-MCNC: 9.3 MG/DL (ref 8.4–10.2)
CHLORIDE SERPL-SCNC: 107 MMOL/L (ref 96–108)
CO2 SERPL-SCNC: 28 MMOL/L (ref 21–32)
CREAT SERPL-MCNC: 0.95 MG/DL (ref 0.6–1.3)
EOSINOPHIL # BLD AUTO: 0.17 THOUSAND/ÂΜL (ref 0–0.61)
EOSINOPHIL NFR BLD AUTO: 3 % (ref 0–6)
ERYTHROCYTE [DISTWIDTH] IN BLOOD BY AUTOMATED COUNT: 12.3 % (ref 11.6–15.1)
GFR SERPL CREATININE-BSD FRML MDRD: 64 ML/MIN/1.73SQ M
GLUCOSE SERPL-MCNC: 172 MG/DL (ref 65–140)
GLUCOSE SERPL-MCNC: 197 MG/DL (ref 65–140)
GLUCOSE SERPL-MCNC: 94 MG/DL (ref 65–140)
HCT VFR BLD AUTO: 35.7 % (ref 34.8–46.1)
HGB BLD-MCNC: 11.7 G/DL (ref 11.5–15.4)
IMM GRANULOCYTES # BLD AUTO: 0.05 THOUSAND/UL (ref 0–0.2)
IMM GRANULOCYTES NFR BLD AUTO: 1 % (ref 0–2)
LEVETIRACETAM SERPL-MCNC: 44.2 UG/ML (ref 12–46)
LYMPHOCYTES # BLD AUTO: 1.39 THOUSANDS/ÂΜL (ref 0.6–4.47)
LYMPHOCYTES NFR BLD AUTO: 25 % (ref 14–44)
MCH RBC QN AUTO: 28.6 PG (ref 26.8–34.3)
MCHC RBC AUTO-ENTMCNC: 32.8 G/DL (ref 31.4–37.4)
MCV RBC AUTO: 87 FL (ref 82–98)
MONOCYTES # BLD AUTO: 0.54 THOUSAND/ÂΜL (ref 0.17–1.22)
MONOCYTES NFR BLD AUTO: 10 % (ref 4–12)
NEUTROPHILS # BLD AUTO: 3.45 THOUSANDS/ÂΜL (ref 1.85–7.62)
NEUTS SEG NFR BLD AUTO: 60 % (ref 43–75)
NRBC BLD AUTO-RTO: 0 /100 WBCS
PLATELET # BLD AUTO: 156 THOUSANDS/UL (ref 149–390)
PMV BLD AUTO: 11.7 FL (ref 8.9–12.7)
POTASSIUM SERPL-SCNC: 4.1 MMOL/L (ref 3.5–5.3)
RBC # BLD AUTO: 4.09 MILLION/UL (ref 3.81–5.12)
SODIUM SERPL-SCNC: 140 MMOL/L (ref 135–147)
WBC # BLD AUTO: 5.66 THOUSAND/UL (ref 4.31–10.16)

## 2025-05-12 PROCEDURE — 99285 EMERGENCY DEPT VISIT HI MDM: CPT | Performed by: EMERGENCY MEDICINE

## 2025-05-12 PROCEDURE — 82948 REAGENT STRIP/BLOOD GLUCOSE: CPT

## 2025-05-12 PROCEDURE — 80048 BASIC METABOLIC PNL TOTAL CA: CPT

## 2025-05-12 PROCEDURE — 85025 COMPLETE CBC W/AUTO DIFF WBC: CPT

## 2025-05-12 PROCEDURE — 99283 EMERGENCY DEPT VISIT LOW MDM: CPT

## 2025-05-12 PROCEDURE — 83520 IMMUNOASSAY QUANT NOS NONAB: CPT

## 2025-05-12 PROCEDURE — 36415 COLL VENOUS BLD VENIPUNCTURE: CPT

## 2025-05-12 PROCEDURE — 99223 1ST HOSP IP/OBS HIGH 75: CPT

## 2025-05-12 RX ORDER — ASPIRIN 81 MG/1
81 TABLET, CHEWABLE ORAL DAILY
Status: DISCONTINUED | OUTPATIENT
Start: 2025-05-13 | End: 2025-06-04 | Stop reason: HOSPADM

## 2025-05-12 RX ORDER — PRAVASTATIN SODIUM 40 MG
40 TABLET ORAL
Status: DISCONTINUED | OUTPATIENT
Start: 2025-05-12 | End: 2025-06-04 | Stop reason: HOSPADM

## 2025-05-12 RX ORDER — LEVETIRACETAM 500 MG/1
1000 TABLET ORAL EVERY 12 HOURS SCHEDULED
Status: DISCONTINUED | OUTPATIENT
Start: 2025-05-12 | End: 2025-05-31

## 2025-05-12 RX ORDER — ENOXAPARIN SODIUM 100 MG/ML
40 INJECTION SUBCUTANEOUS DAILY
Status: DISCONTINUED | OUTPATIENT
Start: 2025-05-13 | End: 2025-06-04 | Stop reason: HOSPADM

## 2025-05-12 RX ORDER — INSULIN GLARGINE 100 [IU]/ML
14 INJECTION, SOLUTION SUBCUTANEOUS
Status: DISCONTINUED | OUTPATIENT
Start: 2025-05-12 | End: 2025-05-24

## 2025-05-12 RX ORDER — INSULIN LISPRO 100 [IU]/ML
1-6 INJECTION, SOLUTION INTRAVENOUS; SUBCUTANEOUS
Status: DISCONTINUED | OUTPATIENT
Start: 2025-05-12 | End: 2025-05-16

## 2025-05-12 RX ORDER — ACETAMINOPHEN 325 MG/1
650 TABLET ORAL EVERY 6 HOURS PRN
Status: DISCONTINUED | OUTPATIENT
Start: 2025-05-12 | End: 2025-06-04 | Stop reason: HOSPADM

## 2025-05-12 RX ADMIN — PRAVASTATIN SODIUM 40 MG: 40 TABLET ORAL at 17:44

## 2025-05-12 RX ADMIN — LEVETIRACETAM 1000 MG: 500 TABLET, FILM COATED ORAL at 20:16

## 2025-05-12 RX ADMIN — INSULIN GLARGINE 14 UNITS: 100 INJECTION, SOLUTION SUBCUTANEOUS at 22:17

## 2025-05-12 SDOH — ECONOMIC STABILITY - TRANSPORTATION SECURITY: TRANSPORTATION INSECURITY: Z59.82

## 2025-05-12 NOTE — ED PROVIDER NOTES
Time reflects when diagnosis was documented in both MDM as applicable and the Disposition within this note       Time User Action Codes Description Comment    5/12/2025  3:24 PM Pilar Acosta Add [Z86.69] Hx of seizure disorder     5/12/2025  3:24 PM Pilar Acosta Add [Z78.9] Unable to care for self     5/13/2025  2:19 PM Heather Negron Add [G93.40] Encephalopathy     5/13/2025  2:19 PM Heather Negron Add [G40.109] Focal epilepsy (HCC)     5/14/2025 10:14 AM Heather Negron Add [Z02.79] Encounter for assessment of healthcare decision-making capacity           ED Disposition       ED Disposition   Admit    Condition   Stable    Date/Time   Mon May 12, 2025  3:24 PM    Comment                  Assessment & Plan       Medical Decision Making  Amount and/or Complexity of Data Reviewed  Labs: ordered.    Risk  Decision regarding hospitalization.    Pt is a 64-year-old, history of focal liver seizures, pacemaker, on Keppra, recently discharged home with daily assistance, comes in for evaluation of unable to take care of herself.  Per visiting nurse notes today, upon her arrival while patient was administering long-acting insulin but she was not sure how much and what her blood glucose was.  Patient currently denies any symptoms but states that she is not able to take care of herself.    Initial presentation pt is NAD    On exam   General: VSS, NAD, awake, alert. Well-nourished, well-developed. Appears stated age.   Speaking normally in full sentences.   Head: Normocephalic, atraumatic, nontender.  Eyes: PERRL, EOM-I. No diplopia.   No hyphema.   No subconjunctival hemorrhages.  Symmetrical lids.   ENT: Atraumatic external nose and ears.    MMM  No malocclusion. No stridor. Normal phonation. No drooling. Normal swallowing.   Neck: Symmetric, trachea midline. No JVD.  CV: RRR. +S1/S2  No murmurs or gallops  Peripheral pulses +2 throughout. No chest wall tenderness.   Lungs:   Unlabored No retractions  CTAB, lungs  sounds equal bilateral.   No tachypnea.   Abd: +BS, soft, NT/ND.   MSK:   FROM   Back:   No rashes  Skin: Dry, intact.   Neuro: AAOx3, GCS 15, CN II-XII grossly intact.   Motor grossly intact.  Psychiatric/Behavioral: Appropriate mood and affect   Exam: deferred      Plan: Patient was evaluated with basic labs, Keppra level and admitted to medicine for further evaluation management and likely placement.          ED Course as of 05/20/25 1403   Mon May 12, 2025   1510 Texted SLIM for admission       Medications   aspirin chewable tablet 81 mg (has no administration in time range)   insulin glargine (LANTUS) subcutaneous injection 14 Units 0.14 mL (has no administration in time range)   levETIRAcetam (KEPPRA) tablet 1,000 mg (has no administration in time range)   pravastatin (PRAVACHOL) tablet 40 mg (40 mg Oral Given 5/12/25 1744)   acetaminophen (TYLENOL) tablet 650 mg (has no administration in time range)   enoxaparin (LOVENOX) subcutaneous injection 40 mg (has no administration in time range)       ED Risk Strat Scores                    No data recorded                            History of Present Illness       Chief Complaint   Patient presents with    Personal Problem     Pt recently d/c from the hospital. Pt was seen by home care nurse today and was told to come in for re-admission d/t living conditions. Pt feels she is not able to care for herself following recent hospital admission. Pt looking for placement into rehab        Past Medical History:   Diagnosis Date    Abnormal EEG 11/05/2023    Anxiety     Aphasia 11/05/2023    Condyloma acuminatum     Depression 1968    Therapy    Diabetes mellitus (HCC) 2002    type 2    Genital warts     Herpes simplex     HPV (human papilloma virus) infection     Obesity 1990    Visual impairment 1984      Past Surgical History:   Procedure Laterality Date    CARDIAC ELECTROPHYSIOLOGY PROCEDURE N/A 1/24/2025    Procedure: Cardiac pacer implant DUAL CHAMBER;  Surgeon:  Patric Sanchez MD;  Location: [facility];  Service: Cardiology      Family History   Problem Relation Age of Onset    Alzheimer's disease Mother     Dementia Mother          2016    Diabetes Father              Heart disease Father              ALS Father     Diabetes type II Father             Hypertension Sister     Diabetes Brother     Diabetes type II Brother     No Known Problems Brother     Cancer Maternal Grandmother         Lymphoma    Colon cancer Maternal Grandfather              Seizures Neg Hx       Social History     Tobacco Use    Smoking status: Never     Passive exposure: Never    Smokeless tobacco: Never   Vaping Use    Vaping status: Never Used   Substance Use Topics    Alcohol use: Not Currently    Drug use: Yes     Frequency: 1.0 times per week     Types: Marijuana     Comment: Have a medical card      E-Cigarette/Vaping    E-Cigarette Use Never User       E-Cigarette/Vaping Substances    Nicotine No     THC No     CBD No     Flavoring No     Other No     Unknown No       I have reviewed and agree with the history as documented.     HPI    Review of Systems        Objective       ED Triage Vitals [25 1223]   Temperature Pulse Blood Pressure Respirations SpO2 Patient Position - Orthostatic VS   97.9 °F (36.6 °C) 85 128/62 17 98 % Lying      Temp Source Heart Rate Source BP Location FiO2 (%) Pain Score    Temporal Monitor Right arm -- No Pain      Vitals      Date and Time Temp Pulse SpO2 Resp BP Pain Score FACES Pain Rating User   25 -- -- 98 % -- -- No Pain -- AS   25 0658 97.8 °F (36.6 °C) 76 97 % 18 126/76 -- -- DII   25 -- -- -- 18 -- -- -- EB   25 99.1 °F (37.3 °C) 62 96 % -- 110/63 -- -- DII   25 1938 -- -- -- -- -- 2 -- EB   25 1527 98.2 °F (36.8 °C) 64 97 % 18 108/63 -- -- DII   25 1035 -- -- -- -- -- No Pain -- RM   25 0800 -- -- 91 % -- -- No Pain -- AS    05/19/25 0718 -- -- -- 17 -- -- -- JS   05/19/25 0704 98.3 °F (36.8 °C) 82 98 % 18 120/72 -- -- DII   05/18/25 2100 -- -- -- -- -- No Pain -- EB   05/18/25 2028 -- -- -- 17 -- -- -- EB   05/18/25 2028 98.9 °F (37.2 °C) 89 98 % -- 122/73 -- -- DII   05/18/25 1629 -- 71 97 % -- 126/73 -- -- DII   05/18/25 0800 -- -- 96 % -- -- No Pain -- AS   05/18/25 0711 98 °F (36.7 °C) 76 95 % 18 127/73 -- -- DII   05/17/25 2240 -- -- -- -- -- No Pain -- EB   05/17/25 2240 -- -- -- 17 -- -- -- EB   05/17/25 2240 97.8 °F (36.6 °C) 71 98 % -- 132/73 -- -- DII   05/17/25 1434 98.7 °F (37.1 °C) 69 97 % 18 107/64 -- -- DII   05/17/25 0845 -- -- -- -- -- No Pain -- MM   05/17/25 0659 98.5 °F (36.9 °C) 67 97 % 18 112/65 -- -- DII   05/16/25 2207 98.6 °F (37 °C) 62 98 % 16 121/69 -- -- DII   05/16/25 1444 98.3 °F (36.8 °C) 67 98 % 18 93/61 -- -- DII   05/16/25 0835 -- -- -- -- -- No Pain -- KW   05/16/25 0628 98.4 °F (36.9 °C) 85 100 % 16 115/78 -- -- DII   05/15/25 2143 98.9 °F (37.2 °C) 91 99 % 18 119/59 -- -- DII   05/15/25 2019 -- -- 100 % -- -- No Pain -- IZV   05/15/25 1500 -- -- -- -- 108/72 -- -- KS   05/15/25 1420 98.7 °F (37.1 °C) 64 99 % 17 95/58 -- -- DII   05/15/25 0842 -- -- -- -- -- No Pain -- KW   05/15/25 0735 98.4 °F (36.9 °C) 72 99 % 16 134/78 -- -- DII   05/14/25 2215 -- 63 97 % -- -- -- -- CM   05/14/25 2210 98.5 °F (36.9 °C) -- -- 18 134/65 -- -- DII   05/14/25 1945 -- -- -- -- -- No Pain -- CM   05/14/25 1459 98.4 °F (36.9 °C) 71 99 % 18 120/76 -- -- DII   05/14/25 1053 -- -- -- -- -- No Pain -- RE   05/14/25 0915 -- -- -- -- -- No Pain --    05/14/25 0654 98.3 °F (36.8 °C) 62 100 % 18 120/71 -- -- DII   05/13/25 2214 98.8 °F (37.1 °C) 86 99 % -- 133/75 -- -- DII   05/13/25 2030 -- -- -- -- -- No Pain -- CM   05/13/25 1605 99.7 °F (37.6 °C) 64 98 % 18 123/64 -- -- DII   05/13/25 1530 -- -- -- -- -- No Pain -- KH   05/13/25 0705 -- -- -- -- -- No Pain -- DLS   05/13/25 0658 98.5 °F (36.9 °C) 61 97 % 16 117/70 --  -- DII   05/12/25 2240 98.3 °F (36.8 °C) 60 98 % -- 119/71 -- -- DII   05/12/25 2103 -- -- -- -- -- No Pain -- MK   05/12/25 2103 99.2 °F (37.3 °C) 93 97 % 16 139/82 -- -- DII   05/12/25 1758 -- -- -- -- -- No Pain -- KM   05/12/25 1747 -- 82 100 % 18 126/68 -- -- KM   05/12/25 1223 97.9 °F (36.6 °C) 85 98 % 17 128/62 No Pain -- KV            Physical Exam    Results Reviewed       Procedure Component Value Units Date/Time    Comprehensive metabolic panel [[id number]]  (Abnormal) Collected: 05/13/25 0628    Lab Status: Final result Specimen: Blood from Arm, Right Updated: 05/13/25 0748     Sodium 141 mmol/L      Potassium 3.8 mmol/L      Chloride 108 mmol/L      CO2 26 mmol/L      ANION GAP 7 mmol/L      BUN 9 mg/dL      Creatinine 0.71 mg/dL      Glucose 132 mg/dL      Calcium 9.3 mg/dL      AST 11 U/L      ALT 14 U/L      Alkaline Phosphatase 47 U/L      Total Protein 6.5 g/dL      Albumin 3.7 g/dL      Total Bilirubin 0.64 mg/dL      eGFR 91 ml/min/1.73sq m     Narrative:      National Kid[employer] Foundation guidelines for Chronic Kidney Disease (CKD):     Stage 1 with normal or high GFR (GFR > 90 mL/min/1.73 square meters)    Stage 2 Mild CKD (GFR = 60-89 mL/min/1.73 square meters)    Stage 3A Moderate CKD (GFR = 45-59 mL/min/1.73 square meters)    Stage 3B Moderate CKD (GFR = 30-44 mL/min/1.73 square meters)    Stage 4 Severe CKD (GFR = 15-29 mL/min/1.73 square meters)    Stage 5 End Stage CKD (GFR <15 mL/min/1.73 square meters)  Note: GFR calculation is accurate only with a steady state creatinine    Magnesium [[id number]]  (Abnormal) Collected: 05/13/25 0628    Lab Status: Final result Specimen: Blood from Arm, Right Updated: 05/13/25 0748     Magnesium 1.8 mg/dL     CBC (With Platelets) [[id number]]  (Normal) Collected: 05/13/25 0628    Lab Status: Final result Specimen: Blood from Arm, Right Updated: 05/13/25 0727     WBC 4.92 Thousand/uL      RBC 4.18 Million/uL      Hemoglobin 12.1 g/dL      Hematocrit  "36.3 %      MCV 87 fL      MCH 28.9 pg      MCHC 33.3 g/dL      RDW 12.3 %      Platelets 164 Thousands/uL      MPV 11.9 fL     Fingerstick Glucose (POCT) [[id number]]  (Normal) Collected: 05/12/25 1655    Lab Status: Final result Specimen: Blood Updated: 05/12/25 1656     POC Glucose 94 mg/dl     Basic metabolic panel [[id number]]  (Abnormal) Collected: 05/12/25 1347    Lab Status: Final result Specimen: Blood from Arm, Left Updated: 05/12/25 1427     Sodium 140 mmol/L      Potassium 4.1 mmol/L      Chloride 107 mmol/L      CO2 28 mmol/L      ANION GAP 5 mmol/L      BUN 13 mg/dL      Creatinine 0.95 mg/dL      Glucose 172 mg/dL      Calcium 9.3 mg/dL      eGFR 64 ml/min/1.73sq m     Narrative:      National Kidney Disease Foundation guidelines for Chronic Kidney Disease (CKD):     Stage 1 with normal or high GFR (GFR > 90 mL/min/1.73 square meters)    Stage 2 Mild CKD (GFR = 60-89 mL/min/1.73 square meters)    Stage 3A Moderate CKD (GFR = 45-59 mL/min/1.73 square meters)    Stage 3B Moderate CKD (GFR = 30-44 mL/min/1.73 square meters)    Stage 4 Severe CKD (GFR = 15-29 mL/min/1.73 square meters)    Stage 5 End Stage CKD (GFR <15 mL/min/1.73 square meters)  Note: GFR calculation is accurate only with a steady state creatinine    Levetiracetam level [[id number]]  (Normal) Collected: 05/12/25 1347    Lab Status: Final result Specimen: Blood from Arm, Left Updated: 05/12/25 1425     Levetiracetam Lvl 44.2 ug/mL     Narrative:      \"Brivaracetam (Briviact) interferes with measurements of levetiracetam in the ARK Levetiracetam Assay. Patients undergoing a switch in drug therapy involving Keppra and Briviact should not be monitored for levetiracetam using the ARK assay. Serum levels of levetiracetam and or brivaracetam should be confirmed by a valid chromatographic method if there is a possibility these drugs are co-present in circulation.\"    CBC and differential [[id number]] Collected: 05/12/25 1347    Lab Status: Final " result Specimen: Blood from Arm, Left Updated: 05/12/25 1407     WBC 5.66 Thousand/uL      RBC 4.09 Million/uL      Hemoglobin 11.7 g/dL      Hematocrit 35.7 %      MCV 87 fL      MCH 28.6 pg      MCHC 32.8 g/dL      RDW 12.3 %      MPV 11.7 fL      Platelets 156 Thousands/uL      nRBC 0 /100 WBCs      Segmented % 60 %      Immature Grans % 1 %      Lymphocytes % 25 %      Monocytes % 10 %      Eosinophils Relative 3 %      Basophils Relative 1 %      Absolute Neutrophils 3.45 Thousands/µL      Absolute Immature Grans 0.05 Thousand/uL      Absolute Lymphocytes 1.39 Thousands/µL      Absolute Monocytes 0.54 Thousand/µL      Eosinophils Absolute 0.17 Thousand/µL      Basophils Absolute 0.06 Thousands/µL             MRI brain NeuroQuant wo and w contrast   Final Interpretation by E. Alec Schoenberger, MD (05/16 3555)      No acute intracranial pathology. Chronic microangiopathy.      NeuroQuant analysis was performed: Normal hippocampal volume and enlarged ventricular system: Findings do not support hippocampal degeneration. Possible expansion of ventricular system without medial temporal lobe focused ex-vacuo process.         Workstation performed: [id number]         CTA head and neck w wo contrast   Final Interpretation by Zion Smith MD (05/14 4172)   CT Brain:   1.  No CT evidence of an acute cortical infarct.   2.  Artifact versus trace products layering in the right ventricular atrium. Recommend further evaluation with a noncontrast enhanced brain MRI.      CT Angiography:   3.  Severe stenosis at the P2/P3 junction of the right PCA, new from prior.   4.  No large vessel occlusion.         Other:   5.  Patulous and fluid-filled esophagus, with the patient at risk for aspiration.   6.  A few prominent upper mediastinal lymph nodes, likely reactive.                  Workstation performed: [id number]             Procedures    ED Medication and Procedure Management   Prior to Admission Medications   Prescriptions  Last Dose Informant Patient Reported? Taking?   Blood Glucose Monitoring Suppl (Contour Next Monitor) w/Device KIT   No No   Sig: Use to monitor blood sugars daily   Cholecalciferol (VITAMIN D PO)  Self Yes No   Sig: Take 5,000 Units by mouth in the morning   Cinnamon 500 MG capsule  Self No No   Sig: Take 1 capsule (500 mg total) by mouth in the morning   Continuous Glucose Sensor (Dexcom G7 Sensor)   No No   Sig: Use 1 Device every 10 days   Contour Next Test test strip   No No   Sig: Test twice daily   Insulin Pen Needle (B-D ULTRAFINE III SHORT PEN) 31G X 8 MM MISC  Self No No   Sig: Inject under the skin 5 (five) times a day   Lancets (onetouch ultrasoft) lancets   No No   Sig: Check sugars 2-4 times a day   Lantus SoloStar 100 units/mL SOPN   No No   Sig: INJECT 14 UNITS SUBCUTANEOUSLY DAILY   Misc. Devices (Rollator Ultra-Light) MISC  Self No No   Sig: Use daily   Vitamin B Complex-C CAPS   No No   Sig: Take 1 capsule by mouth in the morning   aspirin 81 mg chewable tablet   No No   Sig: Chew 1 tablet (81 mg total) daily   glimepiride (AMARYL) 1 mg tablet   No No   Sig: TAKE 1 TABLET (1 MG TOTAL) BY MOUTH IN THE MORNING   levETIRAcetam (KEPPRA) 1000 MG tablet   No No   Sig: Take 1 tablet (1,000 mg total) by mouth every 12 (twelve) hours   metFORMIN (GLUCOPHAGE-XR) 500 mg 24 hr tablet   No No   Sig: Take 2 tablets (1,000 mg total) by mouth in the morning   rosuvastatin (CRESTOR) 5 mg tablet   No No   Sig: TAKE 1 TABLET BY MOUTH DAILY      Facility-Administered Medications: None     Current Discharge Medication List        CONTINUE these medications which have NOT CHANGED    Details   aspirin 81 mg chewable tablet Chew 1 tablet (81 mg total) daily  Qty: 90 tablet, Refills: 3    Associated Diagnoses: History of TIA (transient ischemic attack)      Blood Glucose Monitoring Suppl (Contour Next Monitor) w/Device KIT Use to monitor blood sugars daily  Qty: 1 kit, Refills: 0    Associated Diagnoses: Type 2 diabetes  mellitus without complication, with long-term current use of insulin ([facility])      Cholecalciferol (VITAMIN D PO) Take 5,000 Units by mouth in the morning      Cinnamon 500 MG capsule Take 1 capsule (500 mg total) by mouth in the morning  Qty: 90 capsule, Refills: 3    Associated Diagnoses: Type 2 diabetes mellitus without complication, with long-term current use of insulin ([facility])      Continuous Glucose Sensor (Dexcom G7 Sensor) Use 1 Device every 10 days  Qty: 3 each, Refills: 5    Associated Diagnoses: Type 2 diabetes mellitus with hyperglycemia, with long-term current use of insulin ([facility])      Contour Next Test test strip Test twice daily  Qty: 200 strip, Refills: 1    Associated Diagnoses: Type 2 diabetes mellitus without complication, with long-term current use of insulin ([facility])      glimepiride (AMARYL) 1 mg tablet TAKE 1 TABLET (1 MG TOTAL) BY MOUTH IN THE MORNING  Qty: 90 tablet, Refills: 0    Associated Diagnoses: Type 2 diabetes mellitus with hyperglycemia, with long-term current use of insulin ([facility])      Insulin Pen Needle (B-D ULTRAFINE III SHORT PEN) 31G X 8 MM MISC Inject under the skin 5 (five) times a day  Qty: 500 each, Refills: 1    Associated Diagnoses: Type 2 diabetes mellitus without complication, with long-term current use of insulin ([facility])      Lancets (onetouch ultrasoft) lancets Check sugars 2-4 times a day  Qty: 100 each, Refills: 5    Associated Diagnoses: Type 2 diabetes mellitus without complication, with long-term current use of insulin ([facility])      Lantus SoloStar 100 units/mL SOPN INJECT 14 UNITS SUBCUTANEOUSLY DAILY  Qty: 15 mL, Refills: 11    Comments: Msg From [facility]: Dr. Serrano Requested  Associated Diagnoses: Type 2 diabetes mellitus without complication, with long-term current use of insulin ([facility])      levETIRAcetam (KEPPRA) 1000 MG tablet Take 1 tablet (1,000 mg total) by mouth every 12 (twelve) hours  Qty: 60 tablet, Refills: 2    Associated Diagnoses: Focal epilepsy ([facility]);  Seizure-like activity (HCC)      metFORMIN (GLUCOPHAGE-XR) 500 mg 24 hr tablet Take 2 tablets (1,000 mg total) by mouth in the morning  Qty: 180 tablet, Refills: 1    Associated Diagnoses: Type 2 diabetes mellitus without complication, with long-term current use of insulin (HCC)      Misc. Devices (Rollator Ultra-Light) MISC Use daily  Qty: 1 each, Refills: 0    Associated Diagnoses: Balance problem; Dizziness      rosuvastatin (CRESTOR) 5 mg tablet TAKE 1 TABLET BY MOUTH DAILY  Qty: 90 tablet, Refills: 1    Associated Diagnoses: Hyperlipidemia LDL goal <100      Vitamin B Complex-C CAPS Take 1 capsule by mouth in the morning  Qty: 90 capsule, Refills: 3    Associated Diagnoses: Vitamin B deficiency           No discharge procedures on file.  ED SEPSIS DOCUMENTATION   Time reflects when diagnosis was documented in both MDM as applicable and the Disposition within this note       Time User Action Codes Description Comment    5/12/2025  3:24 PM Pilar Acosta [Z86.69] Hx of seizure disorder     5/12/2025  3:24 PM Pilar Acosta [Z78.9] Unable to care for self     5/13/2025  2:19 PM Heather Negron [G93.40] Encephalopathy     5/13/2025  2:19 PM Heather Negron [G40.109] Focal epilepsy (HCC)     5/14/2025 10:14 AM Heather Negron [Z02.79] Encounter for assessment of healthcare decision-making capacity                  Pilar Acosta DO  05/20/25 2251

## 2025-05-12 NOTE — ASSESSMENT & PLAN NOTE
Patient was recently admitted from 5/1- - 5/9/2025  Was under the neurology team.  During that time patient came in for strokelike symptoms.  And received TNK.  Video EEG was done.  Patient appeared altered and had difficulty following commands.  Video EEG captured right frontal central spike and wave discharges.  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  Per home aide she was not able to administer her insulin and did not know how to check glucometer.  Had poor comprehension's  They ultimately felt that patient was not safe to stay there based unable to care for self.  In the emergency department patient was oriented.  However she had similar story to 1 that was noted above.  Plan:   consult  PT OT  Added nursing comm to teach patient to take insulin administration  Obs admit

## 2025-05-12 NOTE — CASE COMMUNICATION
Spoke with pt on 5.10.25 to follow up from Triage call on 5.9.25 regarding not coming home from [facility] until Monday as she will be staying at brothers home.      Instructed she will be out of 48 hr window for [employer] insurance to cover visits and that she will need to get another referral for Home Health Services for timely intiation of care.    As per previous note on 5.9.25 brother was made aware of need for home health to see pt by 5 .11.25

## 2025-05-12 NOTE — ASSESSMENT & PLAN NOTE
"Lab Results   Component Value Date    HGBA1C 7.3 (H) 03/05/2025       No results for input(s): \"POCGLU\" in the last 72 hours.    Blood Sugar Average: Last 72 hrs:  Patient has history of type 2 diabetes, currently on Lantus 14 units daily, glimepiride and metformin  Discontinue oral antihyperglycemic agent  Sliding scale  Continue    "

## 2025-05-12 NOTE — CASE COMMUNICATION
Notification of Assess not Admit    [facility] and has determined the patient is not eligible for service due to the following  Needs Beyond Intermittent Home Health:     Upon arrival patient was going to administer long acting insulin. RN asked what blood glucose was now. Patient reports not knowing where glucometer is. RN educated patient it is dangerous to administer insulin with out knowing blood glucose. RN asked if patient knew insulins purpose and if knew how to administer. Patient could only say insulin balances blood sugar and that it is administer into the vein pointing to her antecubital area. Patients brother present stated patient is having difficulty with memory. RN asked for clarification and an example. Patient reports not knowing how to do daily ADL's not that she isn't confident doing them but marly harrell like it is the first time doing them with no memory of how to take a shower for example. Patients brother reports jere ocasio reports patient burning food due to cooking to long and is fearful of patients safety due to not able to remember to take medications or safely cook for herself, especially having a gas stove. Patient agrees not safe at home and agrees to go to the ED now to be evaluated and placed into a rehab for cognitive ther apy due to living alone and no one able to keep patient safe. Needs are beyond Home Health Care. Report will be made to Adult protective services [facility] Hotline [phone].

## 2025-05-12 NOTE — H&P
"H&P - Hospitalist   Name: Ynes Mendoza 64 y.o. female I MRN: [id number]  Unit/Bed#: ED 29 I Date of Admission: 5/12/2025   Date of Service: 5/12/2025 I Hospital Day: 0     Assessment & Plan  Failure to thrive in adult  Patient was recently admitted from 5/1- - 5/9/2025  Was under the neurology team.  During that time patient came in for strokelike symptoms.  And received TNK.  Video EEG was done.  Patient appeared altered and had difficulty following commands.  Video EEG captured right frontal central spike and wave discharges.  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  Per home aide she was not able to administer her insulin and did not know how to check glucometer.  Had poor comprehension's  They ultimately felt that patient was not safe to stay there based unable to care for self.  In the emergency department patient was oriented.  However she had similar story to 1 that was noted above.  Plan:   consult  PT OT  Added nursing comm to teach patient to take insulin administration  Obs admit  Type 2 diabetes mellitus, with long-term current use of insulin (HCC)  Lab Results   Component Value Date    HGBA1C 7.3 (H) 03/05/2025       No results for input(s): \"POCGLU\" in the last 72 hours.    Blood Sugar Average: Last 72 hrs:  Patient has history of type 2 diabetes, currently on Lantus 14 units daily, glimepiride and metformin  Discontinue oral antihyperglycemic agent  Sliding scale  Continue    Focal epilepsy (HCC)  Plan noted above        VTE Pharmacologic Prophylaxis:   Moderate Risk (Score 3-4) - Pharmacological DVT Prophylaxis Ordered: enoxaparin (Lovenox).  Code Status: Level 1 full code  Discussion with family: Patient " declined call to .     Anticipated Length of Stay: Patient will be admitted on an inpatient basis with an anticipated length of stay of greater than 2 midnights secondary to failure to thrive.    History of Present Illness   Chief Complaint: Failure to thrive at group home.    Ynes Mendoza is a 64 y.o. female with a PMH of recent seizure-like history who was recently discharged and unable to take care of herself and unable to give her own medications.  Had poor understanding of how to administer insulin.  Aides were worried that the group home and sent her back.  Detailed plan noted above.  Will need to look for placement for this patient      Review of Systems   Constitutional:  Negative for chills and fever.   HENT:  Negative for ear pain and sore throat.    Eyes:  Negative for pain and visual disturbance.   Respiratory:  Negative for cough and shortness of breath.    Cardiovascular:  Negative for chest pain and palpitations.   Gastrointestinal:  Negative for abdominal pain and vomiting.   Genitourinary:  Negative for dysuria and hematuria.   Musculoskeletal:  Negative for arthralgias and back pain.   Skin:  Negative for color change and rash.   Neurological:  Negative for seizures and syncope.   All other systems reviewed and are negative.      Historical Information   Past Medical History:   Diagnosis Date    Abnormal EEG 11/05/2023    Anxiety     Aphasia 11/05/2023    Condyloma acuminatum     Depression 1968    Therapy    Diabetes mellitus (HCC) 2002    type 2    Genital warts     Herpes simplex     HPV (human papilloma virus) infection     Obesity 1990    Visual impairment 1984     Past Surgical History:   Procedure Laterality Date    CARDIAC ELECTROPHYSIOLOGY PROCEDURE N/A 1/24/2025    Procedure: Cardiac pacer implant DUAL CHAMBER;  Surgeon: Patric Sanchez MD;  Location: [facility];  Service: Cardiology     Social History     Tobacco Use    Smoking status: Never     Passive exposure:  Never    Smokeless tobacco: Never   Vaping Use    Vaping status: Never Used   Substance and Sexual Activity    Alcohol use: Not Currently    Drug use: Yes     Frequency: 1.0 times per week     Types: Marijuana     Comment: Have a medical card    Sexual activity: Not Currently     Partners: Male     Birth control/protection: None     Comment: Not active     E-Cigarette/Vaping    E-Cigarette Use Never User      E-Cigarette/Vaping Substances    Nicotine No     THC No     CBD No     Flavoring No     Other No     Unknown No      Family history non-contributory  Social History:  Marital Status:    Occupation: None  Patient Pre-hospital Living Situation: Group home  Patient Pre-hospital Level of Mobility: walks  Patient Pre-hospital Diet Restrictions: None    Meds/Allergies   I have reviewed home medications with patient personally.  Prior to Admission medications    Medication Sig Start Date End Date Taking? Authorizing Provider   aspirin 81 mg chewable tablet Chew 1 tablet (81 mg total) daily 3/18/25   Spenser Rodriguez MD   Blood Glucose Monitoring Suppl (Contour Next Monitor) w/Device KIT Use to monitor blood sugars daily 2/25/25   NAYA Le   Cholecalciferol (VITAMIN D PO) Take 5,000 Units by mouth in the morning    Historical Provider, MD   Cinnamon 500 MG capsule Take 1 capsule (500 mg total) by mouth in the morning 1/6/25   Saloni Landon, DO   Continuous Glucose Sensor (Dexcom G7 Sensor) Use 1 Device every 10 days 4/30/25   Saloni Landon, DO   Contour Next Test test strip Test twice daily 3/17/25   NAYA Le   glimepiride (AMARYL) 1 mg tablet TAKE 1 TABLET (1 MG TOTAL) BY MOUTH IN THE MORNING 4/3/25   NAYA Le   Insulin Pen Needle (B-D ULTRAFINE III SHORT PEN) 31G X 8 MM MISC Inject under the skin 5 (five) times a day 12/13/24 4/3/25  Saloni Landon DO   Lancets (onetouch ultrasoft) lancets Check sugars 2-4 times a day 3/18/25   NAYA Le   Lantus  SoloStar 100 units/mL SOPN INJECT 14 UNITS SUBCUTANEOUSLY DAILY 4/5/25   Saloni Landon DO   levETIRAcetam (KEPPRA) 1000 MG tablet Take 1 tablet (1,000 mg total) by mouth every 12 (twelve) hours 5/9/25 8/7/25  Genoveva Palma MD   metFORMIN (GLUCOPHAGE-XR) 500 mg 24 hr tablet Take 2 tablets (1,000 mg total) by mouth in the morning 2/27/25   Saloni Landon DO   Misc. Devices (Rollator Ultra-Light) MISC Use daily 9/2/24   Saloni Landon DO   rosuvastatin (CRESTOR) 5 mg tablet TAKE 1 TABLET BY MOUTH DAILY 4/24/25   NAYA Le   Vitamin B Complex-C CAPS Take 1 capsule by mouth in the morning 4/11/25   Saloni Landon DO     Allergies   Allergen Reactions    Cephalexin Confusion    Latex      Added based on information entered during case entry, please review and add reactions, type, and severity as needed    Milk-Related Compounds - Food Allergy Diarrhea    Tomato (Diagnostic) - Food Allergy Other (See Comments)     Reflux       Objective :  Temp:  [97.9 °F (36.6 °C)] 97.9 °F (36.6 °C)  HR:  [85] 85  BP: (128)/(62) 128/62  Resp:  [17] 17  SpO2:  [98 %] 98 %  O2 Device: None (Room air)    Physical Exam  Vitals and nursing note reviewed.   Constitutional:       General: She is not in acute distress.     Appearance: She is well-developed.   HENT:      Head: Normocephalic and atraumatic.   Eyes:      Conjunctiva/sclera: Conjunctivae normal.   Cardiovascular:      Rate and Rhythm: Normal rate and regular rhythm.      Heart sounds: No murmur heard.  Pulmonary:      Effort: Pulmonary effort is normal. No respiratory distress.      Breath sounds: Normal breath sounds.   Abdominal:      Palpations: Abdomen is soft.      Tenderness: There is no abdominal tenderness.   Musculoskeletal:         General: No swelling.      Cervical back: Neck supple.   Skin:     General: Skin is warm and dry.      Capillary Refill: Capillary refill takes less than 2 seconds.   Neurological:      Mental Status: She is alert.    Psychiatric:         Mood and Affect: Mood normal.          Lines/Drains:            Lab Results: I have reviewed the following results:  Results from last 7 days   Lab Units 05/12/25  1347   WBC Thousand/uL 5.66   HEMOGLOBIN g/dL 11.7   HEMATOCRIT % 35.7   PLATELETS Thousands/uL 156   SEGS PCT % 60   LYMPHO PCT % 25   MONO PCT % 10   EOS PCT % 3     Results from last 7 days   Lab Units 05/12/25  1347   SODIUM mmol/L 140   POTASSIUM mmol/L 4.1   CHLORIDE mmol/L 107   CO2 mmol/L 28   BUN mg/dL 13   CREATININE mg/dL 0.95   ANION GAP mmol/L 5   CALCIUM mg/dL 9.3   GLUCOSE RANDOM mg/dL 172*         Results from last 7 days   Lab Units 05/09/25  1057 05/09/25  0609 05/08/25  2101 05/08/25  1630 05/08/25  1052 05/08/25  0605 05/07/25  2112 05/07/25  1533 05/07/25  1056 05/07/25  0630 05/06/25  2110 05/06/25  1651   POC GLUCOSE mg/dl 208* 140 178* 185* 298* 127 233* 270* 195* 177* 276* 123     Lab Results   Component Value Date    HGBA1C 7.3 (H) 03/05/2025    HGBA1C 7.3 (H) 02/06/2025    HGBA1C 8.1 (H) 12/13/2024           Imaging Results Review: I personally reviewed the following image studies/reports in PACS and discussed pertinent findings with Radiology: CT head. My interpretation of the radiology images/reports is: Unremarkable.  Other Study Results Review: EKG was reviewed.     Administrative Statements   I have spent a total time of 80 minutes in caring for this patient on the day of the visit/encounter including Diagnostic results, Prognosis, Risks and benefits of tx options, Instructions for management, Patient and family education, Importance of tx compliance, Risk factor reductions, Impressions, Counseling / Coordination of care, Documenting in the medical record, Reviewing/placing orders in the medical record (including tests, medications, and/or procedures), Obtaining or reviewing history  , and Communicating with other healthcare professionals .    ** Please Note: This note has been constructed using a  voice recognition system. **

## 2025-05-12 NOTE — ED ATTENDING ATTESTATION
"5/12/2025  I, Jay Pimentel DO, saw and evaluated the patient. I have discussed the patient with the resident/non-physician practitioner and agree with the resident's/non-physician practitioner's findings, Plan of Care, and MDM as documented in the resident's/non-physician practitioner's note, except where noted. All available labs and Radiology studies were reviewed.  I was present for key portions of any procedure(s) performed by the resident/non-physician practitioner and I was immediately available to provide assistance.       At this point I agree with the current assessment done in the Emergency Department.  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  CTA showed no large vessel occlusion or significant stenosis, it was felt that she had a suspicion of seizure versus TIA versus CVA, patient was discharged on Keppra per OT and PT evaluation she reportedly could live alone with daily assistance.      Upon discharge in the hospital, patient was discharged with her brother who lives in [place], per review of May 9, 2025 telephone records, mother was not able to bring the patient back to this area until today in time for a appointment with aging services.  Patient says that she does remember being in [place] with her brother, came back to the area today and was dropped off at her apartment.    Per review of notes from visiting nurse today, \"Upon arrival patient was going to administer long acting insulin. RN asked what blood glucose was now. Patient reports not knowing where glucometer is. RN educated patient it is dangerous to administer insulin with out " "knowing blood glucose. RN asked if patient knew insulins purpose and if knew how to administer. Patient could only say insulin balances blood sugar and that it is administer into the vein pointing to her antecubital area. Patients brother present stated patient is having difficulty with memory. RN asked for clarification and an example. Patient reports not knowing how to do daily ADL's not that she isn't confident doing them but marly harrell like it is the first time doing them with no memory of how to take a shower for example. Patients brother reports jere ocasio reports patient burning food due to cooking to long and is fearful of patients safety due to not able to remember to take medications or safely cook for herself, especially having a gas stove. Patient agrees not safe at home and agrees to go to the ED now to be evaluated and placed into a rehab for cognitive ther apy due to living alone and no one able to keep patient safe. Needs are beyond Home Health Care. Report will be made to Adult protective services [facility] Wide Hotline [phone].\"  I reviewed additional notes which indicated that visiting nurses did report this to Adult Protective Services.    Patient says right now she feels fine, denies headache, no chest pain, shortness of breath, notes fevers, denies chills, denies pain anywhere.  She says that her brother dropped her off after being called by nurses, says that she thinks she is dropped off at the hospital because she was having recurrent seizures.    General:  Patient is well-appearing  Head:  Atraumatic  Eyes:  Conjunctiva pink, Extraocular muscle intact, PERRL  ENT:  Mucous membranes are moist  Neck:  Supple  Cardiac:  S1-S2, without murmurs  Lungs:  Clear to auscultation bilaterally  Abdomen:  Soft, nontender, normal bowel sounds, no CVA tenderness, no tympany, no rigidity, no guarding  Extremities:  Normal range of motion  Neurologic:  Awake, fluent speech,  AAOx3. Cranial nerves 2-12 are " "intact, strength is 5/5 in the bilateral upper & lower extremities, no slurred speech, no facial droop, no deficit on finger-to-nose testing, no pronator drift.  Sensation to light touch is equal and symmetric throughout the whole body  Skin:  Pink warm and dry, no rash  Psychiatric:  Alert, pleasant, cooperative          ED Course     Labs Reviewed   BASIC METABOLIC PANEL - Abnormal       Result Value Ref Range Status    Sodium 140  135 - 147 mmol/L Final    Potassium 4.1  3.5 - 5.3 mmol/L Final    Chloride 107  96 - 108 mmol/L Final    CO2 28  21 - 32 mmol/L Final    ANION GAP 5  4 - 13 mmol/L Final    BUN 13  5 - 25 mg/dL Final    Creatinine 0.95  0.60 - 1.30 mg/dL Final    Comment: Standardized to IDMS reference method    Glucose 172 (*) 65 - 140 mg/dL Final    Comment: If the patient is fasting, the ADA then defines impaired fasting glucose as > 100 mg/dL and diabetes as > or equal to 123 mg/dL.    Calcium 9.3  8.4 - 10.2 mg/dL Final    eGFR 64  ml/min/1.73sq m Final    Narrative:     National Kidney Disease Foundation guidelines for Chronic Kidney Disease (CKD):     Stage 1 with normal or high GFR (GFR > 90 mL/min/1.73 square meters)    Stage 2 Mild CKD (GFR = 60-89 mL/min/1.73 square meters)    Stage 3A Moderate CKD (GFR = 45-59 mL/min/1.73 square meters)    Stage 3B Moderate CKD (GFR = 30-44 mL/min/1.73 square meters)    Stage 4 Severe CKD (GFR = 15-29 mL/min/1.73 square meters)    Stage 5 End Stage CKD (GFR <15 mL/min/1.73 square meters)  Note: GFR calculation is accurate only with a steady state creatinine   LEVETIRACETAM LEVEL - Normal    Levetiracetam Lvl 44.2  12.0 - 46.0 ug/mL Final    Narrative:     \"Brivaracetam (Briviact) interferes with measurements of levetiracetam in the ARK Levetiracetam Assay. Patients undergoing a switch in drug therapy involving Keppra and Briviact should not be monitored for levetiracetam using the ARK assay. Serum levels of levetiracetam and or brivaracetam should be " "confirmed by a valid chromatographic method if there is a possibility these drugs are co-present in circulation.\"   CBC AND DIFFERENTIAL    WBC 5.66  4.31 - 10.16 Thousand/uL Final    RBC 4.09  3.81 - 5.12 Million/uL Final    Hemoglobin 11.7  11.5 - 15.4 g/dL Final    Hematocrit 35.7  34.8 - 46.1 % Final    MCV 87  82 - 98 fL Final    MCH 28.6  26.8 - 34.3 pg Final    MCHC 32.8  31.4 - 37.4 g/dL Final    RDW 12.3  11.6 - 15.1 % Final    MPV 11.7  8.9 - 12.7 fL Final    Platelets 156  149 - 390 Thousands/uL Final    nRBC 0  /100 WBCs Final    Segmented % 60  43 - 75 % Final    Immature Grans % 1  0 - 2 % Final    Lymphocytes % 25  14 - 44 % Final    Monocytes % 10  4 - 12 % Final    Eosinophils Relative 3  0 - 6 % Final    Basophils Relative 1  0 - 1 % Final    Absolute Neutrophils 3.45  1.85 - 7.62 Thousands/µL Final    Absolute Immature Grans 0.05  0.00 - 0.20 Thousand/uL Final    Absolute Lymphocytes 1.39  0.60 - 4.47 Thousands/µL Final    Absolute Monocytes 0.54  0.17 - 1.22 Thousand/µL Final    Eosinophils Absolute 0.17  0.00 - 0.61 Thousand/µL Final    Basophils Absolute 0.06  0.00 - 0.10 Thousands/µL Final     On reassessment there was no change to the above findings.  Patient appears to be having some difficulty understanding how to take care of herself properly, could be due to her chronic medical issues.  She has no evidence of life-threatening metabolic abnormality, no focal neurodeficits, do not believe this is an acute stroke or that a head CT indicated.  Case discussed with admitting medicine physician    The patient was evaluated for sepsis in the emergency department. After that assessment, at the time of admission, there was no evidence of severe sepsis or septic shock or indication that the patient should be included in the SEP-1 CMS quality measure.    IMPRESSIONS:  Acute inability to provide needed medical care for herself, history of neurologic changes    MEDICAL DECISION MAKING " CODING    COLLECTION AND INTERPRETATION OF DATA  I reviewed prior external notes, including hospital discharge summary as noted above    I ordered each unique test  Tests reviewed personally by me:  Labs: See above            Critical Care Time  Procedures

## 2025-05-12 NOTE — CASE COMMUNICATION
Report made with Nakita at Adult protective services [facility] Wide Hotline [phone]. For [facility] the fax # is [phone].

## 2025-05-13 PROBLEM — R41.89 COGNITIVE IMPAIRMENT: Status: ACTIVE | Noted: 2025-05-13

## 2025-05-13 PROBLEM — Z91.89 AT RISK FOR DELIRIUM: Status: ACTIVE | Noted: 2025-05-13

## 2025-05-13 PROBLEM — R26.2 AMBULATORY DYSFUNCTION: Status: ACTIVE | Noted: 2025-05-13

## 2025-05-13 PROBLEM — R54 FRAILTY: Status: ACTIVE | Noted: 2025-05-13

## 2025-05-13 LAB
ALBUMIN SERPL BCG-MCNC: 3.7 G/DL (ref 3.5–5)
ALP SERPL-CCNC: 47 U/L (ref 34–104)
ALT SERPL W P-5'-P-CCNC: 14 U/L (ref 7–52)
ANION GAP SERPL CALCULATED.3IONS-SCNC: 7 MMOL/L (ref 4–13)
AST SERPL W P-5'-P-CCNC: 11 U/L (ref 13–39)
BILIRUB SERPL-MCNC: 0.64 MG/DL (ref 0.2–1)
BUN SERPL-MCNC: 9 MG/DL (ref 5–25)
CALCIUM SERPL-MCNC: 9.3 MG/DL (ref 8.4–10.2)
CHLORIDE SERPL-SCNC: 108 MMOL/L (ref 96–108)
CO2 SERPL-SCNC: 26 MMOL/L (ref 21–32)
CREAT SERPL-MCNC: 0.71 MG/DL (ref 0.6–1.3)
ERYTHROCYTE [DISTWIDTH] IN BLOOD BY AUTOMATED COUNT: 12.3 % (ref 11.6–15.1)
FOLATE SERPL-MCNC: >22.3 NG/ML
GFR SERPL CREATININE-BSD FRML MDRD: 91 ML/MIN/1.73SQ M
GLUCOSE SERPL-MCNC: 125 MG/DL (ref 65–140)
GLUCOSE SERPL-MCNC: 132 MG/DL (ref 65–140)
GLUCOSE SERPL-MCNC: 158 MG/DL (ref 65–140)
GLUCOSE SERPL-MCNC: 188 MG/DL (ref 65–140)
GLUCOSE SERPL-MCNC: 298 MG/DL (ref 65–140)
HCT VFR BLD AUTO: 36.3 % (ref 34.8–46.1)
HGB BLD-MCNC: 12.1 G/DL (ref 11.5–15.4)
MAGNESIUM SERPL-MCNC: 1.8 MG/DL (ref 1.9–2.7)
MCH RBC QN AUTO: 28.9 PG (ref 26.8–34.3)
MCHC RBC AUTO-ENTMCNC: 33.3 G/DL (ref 31.4–37.4)
MCV RBC AUTO: 87 FL (ref 82–98)
PLATELET # BLD AUTO: 164 THOUSANDS/UL (ref 149–390)
PMV BLD AUTO: 11.9 FL (ref 8.9–12.7)
POTASSIUM SERPL-SCNC: 3.8 MMOL/L (ref 3.5–5.3)
PROT SERPL-MCNC: 6.5 G/DL (ref 6.4–8.4)
RBC # BLD AUTO: 4.18 MILLION/UL (ref 3.81–5.12)
SODIUM SERPL-SCNC: 141 MMOL/L (ref 135–147)
TSH SERPL DL<=0.05 MIU/L-ACNC: 0.99 UIU/ML (ref 0.45–4.5)
VIT B12 SERPL-MCNC: 476 PG/ML (ref 180–914)
WBC # BLD AUTO: 4.92 THOUSAND/UL (ref 4.31–10.16)

## 2025-05-13 PROCEDURE — 99223 1ST HOSP IP/OBS HIGH 75: CPT | Performed by: NURSE PRACTITIONER

## 2025-05-13 PROCEDURE — 99255 IP/OBS CONSLTJ NEW/EST HI 80: CPT | Performed by: STUDENT IN AN ORGANIZED HEALTH CARE EDUCATION/TRAINING PROGRAM

## 2025-05-13 PROCEDURE — 80053 COMPREHEN METABOLIC PANEL: CPT

## 2025-05-13 PROCEDURE — 83735 ASSAY OF MAGNESIUM: CPT

## 2025-05-13 PROCEDURE — 84207 ASSAY OF VITAMIN B-6: CPT | Performed by: PHYSICIAN ASSISTANT

## 2025-05-13 PROCEDURE — 99232 SBSQ HOSP IP/OBS MODERATE 35: CPT | Performed by: PHYSICIAN ASSISTANT

## 2025-05-13 PROCEDURE — 82746 ASSAY OF FOLIC ACID SERUM: CPT | Performed by: PHYSICIAN ASSISTANT

## 2025-05-13 PROCEDURE — 87081 CULTURE SCREEN ONLY: CPT | Performed by: INTERNAL MEDICINE

## 2025-05-13 PROCEDURE — 82948 REAGENT STRIP/BLOOD GLUCOSE: CPT

## 2025-05-13 PROCEDURE — 84425 ASSAY OF VITAMIN B-1: CPT | Performed by: PHYSICIAN ASSISTANT

## 2025-05-13 PROCEDURE — 86780 TREPONEMA PALLIDUM: CPT | Performed by: PHYSICIAN ASSISTANT

## 2025-05-13 PROCEDURE — 97167 OT EVAL HIGH COMPLEX 60 MIN: CPT

## 2025-05-13 PROCEDURE — 84443 ASSAY THYROID STIM HORMONE: CPT | Performed by: PHYSICIAN ASSISTANT

## 2025-05-13 PROCEDURE — 82607 VITAMIN B-12: CPT | Performed by: PHYSICIAN ASSISTANT

## 2025-05-13 PROCEDURE — 85027 COMPLETE CBC AUTOMATED: CPT

## 2025-05-13 RX ADMIN — INSULIN LISPRO 1 UNITS: 100 INJECTION, SOLUTION INTRAVENOUS; SUBCUTANEOUS at 11:45

## 2025-05-13 RX ADMIN — INSULIN GLARGINE 14 UNITS: 100 INJECTION, SOLUTION SUBCUTANEOUS at 21:03

## 2025-05-13 RX ADMIN — LEVETIRACETAM 1000 MG: 500 TABLET, FILM COATED ORAL at 08:49

## 2025-05-13 RX ADMIN — ENOXAPARIN SODIUM 40 MG: 40 INJECTION SUBCUTANEOUS at 08:49

## 2025-05-13 RX ADMIN — PRAVASTATIN SODIUM 40 MG: 40 TABLET ORAL at 18:08

## 2025-05-13 RX ADMIN — LEVETIRACETAM 1000 MG: 500 TABLET, FILM COATED ORAL at 21:03

## 2025-05-13 RX ADMIN — ASPIRIN 81 MG CHEWABLE TABLET 81 MG: 81 TABLET CHEWABLE at 08:49

## 2025-05-13 RX ADMIN — INSULIN LISPRO 1 UNITS: 100 INJECTION, SOLUTION INTRAVENOUS; SUBCUTANEOUS at 18:08

## 2025-05-13 NOTE — PLAN OF CARE
Problem: PAIN - ADULT  Goal: Verbalizes/displays adequate comfort level or baseline comfort level  Description: Interventions:- Encourage patient to monitor pain and request assistance- Assess pain using appropriate pain scale- Administer analgesics based on type and severity of pain and evaluate response- Implement non-pharmacological measures as appropriate and evaluate response- Consider cultural and social influences on pain and pain management- Notify physician/advanced practitioner if interventions unsuccessful or patient reports new pain  Outcome: Progressing     Problem: INFECTION - ADULT  Goal: Absence or prevention of progression during hospitalization  Description: INTERVENTIONS:- Assess and monitor for signs and symptoms of infection- Monitor lab/diagnostic results- Monitor all insertion sites, i.e. indwelling lines, tubes, and drains- Monitor endotracheal if appropriate and nasal secretions for changes in amount and color- [place] appropriate cooling/warming therapies per order- Administer medications as ordered- Instruct and encourage patient and family to use good hand hygiene technique- Identify and instruct in appropriate isolation precautions for identified infection/condition  Outcome: Progressing  Goal: Absence of fever/infection during neutropenic period  Description: INTERVENTIONS:- Monitor WBC  Outcome: Progressing     Problem: SAFETY ADULT  Goal: Patient will remain free of falls  Description: INTERVENTIONS:- Educate patient/family on patient safety including physical limitations- Instruct patient to call for assistance with activity - Consult OT/PT to assist with strengthening/mobility - Keep Call bell within reach- Keep bed low and locked with side rails adjusted as appropriate- Keep care items and personal belongings within reach- Initiate and maintain comfort rounds- Make Fall Risk Sign visible to staff- Offer Toileting every 2 Hours, in advance of need- Initiate/Maintain alarm-  Obtain necessary fall risk management equipment: - Apply yellow socks and bracelet for high fall risk patients- Consider moving patient to room near nurses station  Outcome: Progressing  Goal: Maintain or return to baseline ADL function  Description: INTERVENTIONS:-  Assess patient's ability to carry out ADLs; assess patient's baseline for ADL function and identify physical deficits which impact ability to perform ADLs (bathing, care of mouth/teeth, toileting, grooming, dressing, etc.)- Assess/evaluate cause of self-care deficits - Assess range of motion- Assess patient's mobility; develop plan if impaired- Assess patient's need for assistive devices and provide as appropriate- Encourage maximum independence but intervene and supervise when necessary- Involve family in performance of ADLs- Assess for home care needs following discharge - Consider OT consult to assist with ADL evaluation and planning for discharge- Provide patient education as appropriate  Outcome: Progressing  Goal: Maintains/Returns to pre admission functional level  Description: INTERVENTIONS:- Perform AM-PAC 6 Click Basic Mobility/ Daily Activity assessment daily.- Set and communicate daily mobility goal to care team and patient/family/caregiver. - Collaborate with rehabilitation services on mobility goals if consulted- Perform Range of Motion 3 times a day.- Reposition patient every 2 hours.- Dangle patient 3 times a day- Stand patient 3 times a day- Ambulate patient 3 times a day- Out of bed to chair 3 times a day - Out of bed for meals 3 times a day- Out of bed for toileting- Record patient progress and toleration of activity level   Outcome: Progressing     Problem: DISCHARGE PLANNING  Goal: Discharge to home or other facility with appropriate resources  Description: INTERVENTIONS:- Identify barriers to discharge w/patient and caregiver- Arrange for needed discharge resources and transportation as appropriate- Identify discharge learning  needs (meds, wound care, etc.)- Arrange for interpretive services to assist at discharge as needed- Refer to Case Management Department for coordinating discharge planning if the patient needs post-hospital services based on physician/advanced practitioner order or complex needs related to functional status, cognitive ability, or social support system  Outcome: Progressing     Problem: Knowledge Deficit  Goal: Patient/family/caregiver demonstrates understanding of disease process, treatment plan, medications, and discharge instructions  Description: Complete learning assessment and assess knowledge base.Interventions:- Provide teaching at level of understanding- Provide teaching via preferred learning methods  Outcome: Progressing

## 2025-05-13 NOTE — CONSULTS
Consultation - Neurology   Name: Ynes Mendoza 64 y.o. 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    Strong suspicion for underlying dementia process which will ultimately require work-up in the outpatient setting. Will request imaging and lab work to rule out any reversible causes.     Plan:   - Check CTA head and neck with and without contrast.   - Check MRI brain with and without contrast with NeuroQuant.  - Check Vit B1, B6, B12, folate, TSH, RPR.  - Supportive care as per primary team.   - Patient will need formal neuro-cognitive assessment as an outpatient.  - Monitor exam and notify with changes.  Focal epilepsy (HCC)  - Continue on Keppra 1000 mg BID.  - Maintain seizure precautions.  Type 2 diabetes mellitus, with long-term current use of insulin (HCC)  Lab Results   Component Value Date    HGBA1C 7.3 (H) 03/05/2025       Recent Labs     05/12/25  1655 05/12/25  2221 05/13/25  0656 05/13/25  1108   POCGLU 94 197* 125 188*       Blood Sugar Average: Last 72 hrs:  (P) 151    - Goal euglycemia.   - Management as per primary team.  I have discussed the above management plan in detail with the primary service.     Recommendations for outpatient neurological follow up have yet to be determined.    History of Present Illness   Ynes Mendoza is a 64 y.o.  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    Patient well-known to neurology  service. She was admitted to the hospital in February 2025 with aphasia. Stroke work-up at that time was unremarkable per review of notes, but MRI was unable to be performed. She was placed on vEEG monitoring and this demonstrated right frontocentral spoke and wave discharges, sometimes with a more generalized field of spread. MRI was unable to be performed due to recent pacemaker placement. She was started on Lamictal titration with goal of 150 mg Q12H.    She was seen in follow-up by Dr. Garry Soto in the neurology office outpatient and ultimately was changed from Lamictal to Keppra due to having tremor.    She was recently discharged from hospital on 5/9/2025. Patient with diagnosis of focal aware seizures and maintained on Keppra. She presented to the hospital on 5/1/2025 with mixed aphasia and altered mental status. Stroke alert was initially activated and imaging demonstrated mild senescent changes without acute intracranial abnormality and CTA demonstrated no large vessel arterial occlusion, significant flow-limiting stenosis, or focal saccular aneurysm identified but did note mildly diminished caliber of the distal right vertebral artery and fetal origin of the right posterior cerebral artery. She was not an IV thrombolytics candidate due to unknown last known well. Higher suspicion was for seizure and she was admitted for work-up. She was ultimately discharged on Keppra 1000 mg BID and encouarged to follow-up with outpatient neurology team. Of note, there was concern from patient's family regarding patient's memory and ability to care for herself. Per notes, MoCA score was 13/30 in the hospital. She was discharged to her brother's home for the weekend and ultimately plan was for her to return home after that once [facility] was set up.    Per review of notes, when [facility] arrived to open care for patient, patient was not able to administer her insulin and did not know where her glucometer was. Per notes, it appears  "Adult Protective Services was contacted.    Patient presented to the hospital on 2025 at recommendation of [facility] for re-admission due to living conditions. She was not able to care for herself and neurology is asked to evaluate in this regard. Patient seen and examined at bedside and she initially was being evaluated by geriatrics team but geriatrics CRNP came into the hallway and asked neurology team to come evaluate as patient was complaining of dizziness. Per discussion with geriatrics CRNP, she was asking patient difficult questions regarding sexual health and patient all of a sudden complained of dizziness. We immediately entered the room and patient had her eyes closed and said that she occasionally will have dizziness which she describes as room-spinning and worse with head movement. She reports that this has happened to her many times in the past and generally self resolved. BP at that time was 122/66. Symptoms lasted 1 minute and resolved spontaneously and patient did not have any additional complaints of symptoms.     Patient reports she has had memory issues and her family has been concerned regarding her ability to care for herself over the past several months. She notes that her mother was diagnosed with dementia in her early 60s and  in her late 70s. She notes that initially this was termed \"dementia\" but she thinks later in the process, the term \"Alzheimer's\" was used to describe her mother's diagnosis.    Patient reports that she thinks she is taking her medications properly and does not feel that she has missed any doses. She denies any recent seizures.    Review of Systems   Constitutional:  Negative for chills, fatigue and fever.   HENT:  Negative for trouble swallowing.    Eyes:  Negative for visual disturbance.   Respiratory:  Negative for shortness of breath.    Cardiovascular:  Negative for chest pain.   Gastrointestinal:  Negative for abdominal pain, nausea and vomiting. "   Genitourinary:  Negative for difficulty urinating and dysuria.   Musculoskeletal:  Negative for back pain, gait problem and neck pain.   Skin:  Negative for rash.   Neurological:  Positive for dizziness. Negative for seizures, speech difficulty, weakness, light-headedness, numbness and headaches.   Psychiatric/Behavioral:  Negative for confusion.         Memory issues      Medications  Scheduled Meds:  Current Facility-Administered Medications   Medication Dose Route Frequency Provider Last Rate    acetaminophen  650 mg Oral Q6H PRN Oliver Leiva MD      aspirin  81 mg Oral Daily Oliver Leiva MD      enoxaparin  40 mg Subcutaneous Daily Oliver Leiva MD      insulin glargine  14 Units Subcutaneous HS Oliver Leiva MD      insulin lispro  1-6 Units Subcutaneous TID AC Oliver Leiva MD      levETIRAcetam  1,000 mg Oral Q12H DYAN Oliver Leiva MD      pravastatin  40 mg Oral Daily With Dinner Olievr Leiva MD       Continuous Infusions:   PRN Meds:.  acetaminophen    Historical Information   Past Medical History:   Diagnosis Date    Abnormal EEG 11/05/2023    Anxiety     Aphasia 11/05/2023    Condyloma acuminatum     Depression 1968    Therapy    Diabetes mellitus (HCC) 2002    type 2    Genital warts     Herpes simplex     HPV (human papilloma virus) infection     Obesity 1990    Visual impairment 1984     Past Surgical History:   Procedure Laterality Date    CARDIAC ELECTROPHYSIOLOGY PROCEDURE N/A 1/24/2025    Procedure: Cardiac pacer implant DUAL CHAMBER;  Surgeon: Patric Sanchez MD;  Location: [facility];  Service: Cardiology     Social History     Tobacco Use    Smoking status: Never     Passive exposure: Never    Smokeless tobacco: Never   Vaping Use    Vaping status: Never Used   Substance and Sexual Activity    Alcohol use: Not Currently    Drug use: Yes     Frequency: 1.0 times per week     Types: Marijuana     Comment: Have a  medical card    Sexual activity: Not Currently     Partners: Male     Birth control/protection: None     Comment: Not active     E-Cigarette/Vaping    E-Cigarette Use Never User      E-Cigarette/Vaping Substances    Nicotine No     THC No     CBD No     Flavoring No     Other No     Unknown No      Family History   Problem Relation Age of Onset    Alzheimer's disease Mother     Dementia Mother          2016    Diabetes Father              Heart disease Father              ALS Father     Diabetes type II Father             Hypertension Sister     Diabetes Brother     Diabetes type II Brother     No Known Problems Brother     Cancer Maternal Grandmother         Lymphoma    Colon cancer Maternal Grandfather              Seizures Neg Hx        Objective :  Temp:  [98.3 °F (36.8 °C)-99.2 °F (37.3 °C)] 98.5 °F (36.9 °C)  HR:  [60-93] 61  BP: (117-139)/(68-82) 117/70  Resp:  [16-18] 16  SpO2:  [97 %-100 %] 97 %  O2 Device: None (Room air)    Physical Exam  Constitutional:       General: She is not in acute distress.     Appearance: Normal appearance. She is not ill-appearing, toxic-appearing or diaphoretic.   HENT:      Head: Normocephalic and atraumatic.      Mouth/Throat:      Mouth: Mucous membranes are moist.      Pharynx: Oropharynx is clear. No oropharyngeal exudate or posterior oropharyngeal erythema.   Eyes:      General: No scleral icterus.        Right eye: No discharge.         Left eye: No discharge.      Extraocular Movements: EOM normal. No nystagmus.      Conjunctiva/sclera: Conjunctivae normal.   Cardiovascular:      Rate and Rhythm: Normal rate and regular rhythm.   Pulmonary:      Effort: Pulmonary effort is normal. No respiratory distress.      Breath sounds: Normal breath sounds.   Musculoskeletal:         General: Normal range of motion.      Cervical back: Normal range of motion and neck supple.      Right lower leg: No edema.      Left lower leg: No  edema.   Skin:     General: Skin is warm and dry.      Findings: No erythema or rash.   Neurological:      Mental Status: She is alert and oriented to person, place, and time.      Deep Tendon Reflexes:      Reflex Scores:       Bicep reflexes are 2+ on the right side and 2+ on the left side.       Brachioradialis reflexes are 2+ on the right side and 2+ on the left side.       Patellar reflexes are 1+ on the right side and 1+ on the left side.       Achilles reflexes are 1+ on the right side and 1+ on the left side.  Psychiatric:         Mood and Affect: Mood normal.         Speech: Speech normal.         Behavior: Behavior normal.     Neurological Exam  Mental Status  Alert. Oriented to person, place, time and situation. Oriented to person, place, and time. Speech is normal. Language is fluent with no aphasia. Attention and concentration are normal.    Cranial Nerves  CN II: Right normal visual field. Left normal visual field.  CN III, IV, VI: Extraocular movements intact bilaterally. No nystagmus. Normal saccades.   Right pupil: 4 mm. Round. Reactive to light.   Left pupil: 4 mm. Round. Reactive to light.  CN V:  Right: Facial sensation is normal.  Left: Facial sensation is normal on the left.  CN VII:  Right: There is no facial weakness.  Left: There is no facial weakness.  CN XII: Tongue midline without atrophy or fasciculations.    Motor  Normal muscle bulk throughout. No fasciculations present. Normal muscle tone. No abnormal involuntary movements.  Motor strength 5/5 B/L UE and LE.    Sensory  Light touch is normal in upper and lower extremities.     Reflexes                                            Right                      Left  Brachioradialis                    2+                         2+  Biceps                                 2+                         2+  Patellar                                1+                         1+  Achilles                                1+                          1+    Coordination  Right: Finger-to-nose normal. Heel-to-shin normal.Left: Finger-to-nose normal. Heel-to-shin normal.    Gait    Deferred for safety..        Lab Results: I have reviewed the following results:  Recent Results (from the past 24 hours)   Fingerstick Glucose (POCT)    Collection Time: 05/12/25  4:55 PM   Result Value Ref Range    POC Glucose 94 65 - 140 mg/dl   Fingerstick Glucose (POCT)    Collection Time: 05/12/25 10:21 PM   Result Value Ref Range    POC Glucose 197 (H) 65 - 140 mg/dl   Comprehensive metabolic panel    Collection Time: 05/13/25  6:28 AM   Result Value Ref Range    Sodium 141 135 - 147 mmol/L    Potassium 3.8 3.5 - 5.3 mmol/L    Chloride 108 96 - 108 mmol/L    CO2 26 21 - 32 mmol/L    ANION GAP 7 4 - 13 mmol/L    BUN 9 5 - 25 mg/dL    Creatinine 0.71 0.60 - 1.30 mg/dL    Glucose 132 65 - 140 mg/dL    Calcium 9.3 8.4 - 10.2 mg/dL    AST 11 (L) 13 - 39 U/L    ALT 14 7 - 52 U/L    Alkaline Phosphatase 47 34 - 104 U/L    Total Protein 6.5 6.4 - 8.4 g/dL    Albumin 3.7 3.5 - 5.0 g/dL    Total Bilirubin 0.64 0.20 - 1.00 mg/dL    eGFR 91 ml/min/1.73sq m   Magnesium    Collection Time: 05/13/25  6:28 AM   Result Value Ref Range    Magnesium 1.8 (L) 1.9 - 2.7 mg/dL   CBC (With Platelets)    Collection Time: 05/13/25  6:28 AM   Result Value Ref Range    WBC 4.92 4.31 - 10.16 Thousand/uL    RBC 4.18 3.81 - 5.12 Million/uL    Hemoglobin 12.1 11.5 - 15.4 g/dL    Hematocrit 36.3 34.8 - 46.1 %    MCV 87 82 - 98 fL    MCH 28.9 26.8 - 34.3 pg    MCHC 33.3 31.4 - 37.4 g/dL    RDW 12.3 11.6 - 15.1 %    Platelets 164 149 - 390 Thousands/uL    MPV 11.9 8.9 - 12.7 fL   Fingerstick Glucose (POCT)    Collection Time: 05/13/25  6:56 AM   Result Value Ref Range    POC Glucose 125 65 - 140 mg/dl   Fingerstick Glucose (POCT)    Collection Time: 05/13/25 11:08 AM   Result Value Ref Range    POC Glucose 188 (H) 65 - 140 mg/dl     Imaging  No new neuro imaging available for review.    VTE Prophylaxis: Enoxaparin  (Lovenox)

## 2025-05-13 NOTE — ASSESSMENT & PLAN NOTE
Patient was recently admitted from 5/1-5/9 to neurology service for breakthrough seizures in the setting of medication noncompliance.   There were concerns brought up by family that admission regarding cognitive impairment/memory loss and ability to care for herself.    Scored 13/30 on MOCA done 5/7.  Was overall deemed capable to live alone  Pt was initially discharged and stayed with her brother before he took her back to her home.  She does not recall details of this.  Additionally, [facility] came out to her home and pt could not properly use her insulin or use glucometer (she has been on insulin for years) so came back to the hospital for safety concerns  Will re-involve neurology as with hx of seizures she gets confused with aphasia, and will also ask geriatrics to evaluate.    PT/OT

## 2025-05-13 NOTE — ASSESSMENT & PLAN NOTE
See above.  Sister notes progressive decline over 6-8 months   F/u neuro and geriatrics consults to start    incision wound from hernia repair

## 2025-05-13 NOTE — ASSESSMENT & PLAN NOTE
Lab Results   Component Value Date    HGBA1C 7.3 (H) 03/05/2025       Recent Labs     05/12/25  1655 05/12/25  2221 05/13/25  0656 05/13/25  1108   POCGLU 94 197* 125 188*       Blood Sugar Average: Last 72 hrs:  (P) 151

## 2025-05-13 NOTE — UTILIZATION REVIEW
Initial Clinical Review    WAS OBSERVATION 5/12 CONVERTED TO INPATIENT ADMISSION 5/14 DUE TO CONTINUED STAY REQUIRED TO CARE FOR PATIENT WITH Failure to thrive in adult, Dementia workup    Admission: Date/Time/Statement:   Admission Orders (From admission, onward)       Ordered        05/14/25 1140  INPATIENT ADMISSION  Once            05/12/25 1603  Place in Observation  Once                          Orders Placed This Encounter   Procedures    Place in Observation     Standing Status:   Standing     Number of Occurrences:   1     Level of Care:   Med Surg [16]    INPATIENT ADMISSION     Standing Status:   Standing     Number of Occurrences:   1     Level of Care:   Med Surg [16]     Estimated length of stay:   More than 2 Midnights     Certification:   I certify that inpatient services are medically necessary for this patient for a duration of greater than two midnights. See H&P and MD Progress Notes for additional information about the patient's course of treatment.     ED Arrival Information       Expected   -    Arrival   5/12/2025 12:21    Acuity   Urgent              Means of arrival   Walk-In    Escorted by   Family Member    Service   Hospitalist    Admission type   Emergency              Arrival complaint   -             Chief Complaint   Patient presents with    Personal Problem     Pt recently d/c from the hospital. Pt was seen by home care nurse today and was told to come in for re-admission d/t living conditions. Pt feels she is not able to care for herself following recent hospital admission. Pt looking for placement into rehab        Initial Presentation: 64 y.o. female to ED presents for f recent seizure-like history who was recently discharged and unable to take care of herself and unable to give her own medications.  Had poor understanding of how to administer insulin.  Aides were worried that the group home and sent her back. Will need placement.  PMH for recent seizure-like history   Admit to  Observation Dx; Failure to thrive in adult  Plan; PT/OT eval and treat. Case management consult.     5/13  Neurology cons; Strong suspicion for underlying dementia process which will ultimately require work-up in the outpatient setting   Check CTA head and neck with and without contrast.   Check MRI brain with and without contrast with NeuroQuant.  Check Vit B1, B6, B12, folate, TSH, RPR.  Continue Keppra Bid. Seizure precautions.     Progress notes; Neurology following with workup.    5/14 Changed to Inpatient status  Progress notes; F/u MRI Brain. Neurology and geriatrics ordered preliminary work up to identify any reversible causes   Neuropsych consult for capacity evaluation  Certification Statement: The patient will continue to require additional inpatient hospital stay due to pending safe dispo/capacity eval     Neuropsychology/ Behavioral cons;  Exam revealed diffuse cognitive dysfunction and on a measure assessing awareness of personal health status and ability to evaluate health problems, handle medical emergencies and take safety precautions, patient performed in the IMPAIRED range of functioning. During this encounter, patient does not appear to have capacity to make fully informed medical decisions.     ED Treatment-Medication Administration from 05/12/2025 1221 to 05/12/2025 1939         Date/Time Order Dose Route Action     05/12/2025 7478 pravastatin (PRAVACHOL) tablet 40 mg 40 mg Oral Given            Scheduled Medications:  aspirin, 81 mg, Oral, Daily  enoxaparin, 40 mg, Subcutaneous, Daily  insulin glargine, 14 Units, Subcutaneous, HS  insulin lispro, 1-6 Units, Subcutaneous, TID AC  levETIRAcetam, 1,000 mg, Oral, Q12H DYAN  pravastatin, 40 mg, Oral, Daily With Dinner  senna-docusate sodium, 1 tablet, Oral, HS      Continuous IV Infusions: None     PRN Meds:  acetaminophen, 650 mg, Oral, Q6H PRN  polyethylene glycol, 17 g, Oral, Daily PRN      ED Triage Vitals [05/12/25 1223]   Temperature Pulse  Respirations Blood Pressure SpO2 Pain Score   97.9 °F (36.6 °C) 85 17 128/62 98 % No Pain     Weight (last 2 days)       None            Vital Signs (last 3 days)       Date/Time Temp Pulse Resp BP MAP (mmHg) SpO2 O2 Device Patient Position - Orthostatic VS Tiki Coma Scale Score Pain    05/14/25 14:59:21 98.4 °F (36.9 °C) 71 18 120/76 91 99 % -- -- -- --    05/14/25 1053 -- -- -- -- -- -- -- -- -- No Pain    05/14/25 0915 -- -- -- -- -- -- None (Room air) -- 15 No Pain    05/14/25 06:54:28 98.3 °F (36.8 °C) 62 18 120/71 87 100 % -- -- -- --    05/13/25 22:14:48 98.8 °F (37.1 °C) 86 -- 133/75 94 99 % -- -- -- --    05/13/25 2030 -- -- -- -- -- -- None (Room air) -- 15 No Pain    05/13/25 16:05:44 99.7 °F (37.6 °C) 64 18 123/64 84 98 % -- -- -- --    05/13/25 1530 -- -- -- -- -- -- -- -- -- No Pain    05/13/25 0705 -- -- -- -- -- -- None (Room air) -- -- No Pain    05/13/25 06:58:57 98.5 °F (36.9 °C) 61 16 117/70 86 97 % -- -- -- --    05/12/25 22:40:22 98.3 °F (36.8 °C) 60 -- 119/71 87 98 % -- -- -- --    05/12/25 21:03:14 99.2 °F (37.3 °C) 93 16 139/82 101 97 % None (Room air) -- -- No Pain    05/12/25 9798 -- -- -- -- -- -- -- -- -- No Pain    05/12/25 1747 -- 82 18 126/68 -- 100 % None (Room air) -- -- --    05/12/25 1223 97.9 °F (36.6 °C) 85 17 128/62 -- 98 % None (Room air) Lying -- No Pain              Pertinent Labs/Diagnostic Test Results:   Radiology:  CTA head and neck w wo contrast   Final Interpretation by Zion Smith MD (05/14 0814)   CT Brain:   1.  No CT evidence of an acute cortical infarct.   2.  Artifact versus trace products layering in the right ventricular atrium. Recommend further evaluation with a noncontrast enhanced brain MRI.      CT Angiography:   3.  Severe stenosis at the P2/P3 junction of the right PCA, new from prior.   4.  No large vessel occlusion.         Other:   5.  Patulous and fluid-filled esophagus, with the patient at risk for aspiration.   6.  A few prominent upper  mediastinal lymph nodes, likely reactive.                  Workstation performed: [id number]         MRI inpatient order    (Results Pending)     Cardiology:  No orders to display     GI:  No orders to display           Results from last 7 days   Lab Units 05/13/25  0628 05/12/25  1347   WBC Thousand/uL 4.92 5.66   HEMOGLOBIN g/dL 12.1 11.7   HEMATOCRIT % 36.3 35.7   PLATELETS Thousands/uL 164 156   TOTAL NEUT ABS Thousands/µL  --  3.45         Results from last 7 days   Lab Units 05/13/25  0628 05/12/25  1347   SODIUM mmol/L 141 140   POTASSIUM mmol/L 3.8 4.1   CHLORIDE mmol/L 108 107   CO2 mmol/L 26 28   ANION GAP mmol/L 7 5   BUN mg/dL 9 13   CREATININE mg/dL 0.71 0.95   EGFR ml/min/1.73sq m 91 64   CALCIUM mg/dL 9.3 9.3   MAGNESIUM mg/dL 1.8*  --      Results from last 7 days   Lab Units 05/13/25  0628   AST U/L 11*   ALT U/L 14   ALK PHOS U/L 47   TOTAL PROTEIN g/dL 6.5   ALBUMIN g/dL 3.7   TOTAL BILIRUBIN mg/dL 0.64     Results from last 7 days   Lab Units 05/14/25  1635 05/14/25  1103 05/14/25  0734 05/13/25  2039 05/13/25  1641 05/13/25  1108 05/13/25  0656 05/12/25  2221 05/12/25  1655 05/09/25  1057 05/09/25  0609 05/08/25  2101   POC GLUCOSE mg/dl 214* 229* 132 298* 158* 188* 125 197* 94 208* 140 178*     Results from last 7 days   Lab Units 05/13/25  0628 05/12/25  1347   GLUCOSE RANDOM mg/dL 132 172*       Results from last 7 days   Lab Units 05/13/25  0628   TSH 3RD GENERATON uIU/mL 0.989         Past Medical History:   Diagnosis Date    Abnormal EEG 11/05/2023    Anxiety     Aphasia 11/05/2023    Condyloma acuminatum     Depression 1968    Therapy    Diabetes mellitus (HCC) 2002    type 2    Genital warts     Herpes simplex     HPV (human papilloma virus) infection     Obesity 1990    Visual impairment 1984     Present on Admission:   Focal epilepsy (HCC)   Generalized anxiety disorder      Admitting Diagnosis: Hx of seizure disorder [Z86.69]  Unable to care for self [Z78.9]  Age/Sex: 64 y.o.  female    Network Utilization Review Department  ATTENTION: Please call with any questions or concerns to [phone] and carefully listen to the prompts so that you are directed to the right person. All voicemails are confidential.   For Discharge needs, contact Care Management DC Support Team at [phone] opt. 2  Send all requests for admission clinical reviews, approved or denied determinations and any other requests to dedicated fax number below belonging to the campus where the patient is receiving treatment. List of dedicated fax numbers for the Facilities:  FACILITY NAME UR FAX NUMBER   ADMISSION DENIALS (Administrative/Medical Necessity) [phone]   DISCHARGE SUPPORT TEAM (NETWORK) [phone]   PARENT CHILD HEALTH (Maternity/NICU/Pediatrics) [phone]   [facility] [phone]   [facility] [phone]   [facility] [phone]   [facility] [phone]   [facility] [phone]   [facility] [phone]   [facility] [phone]   [facility] [phone]   [facility] [phone]   [facility] [phone]   [facility] [phone]   [facility] [phone]

## 2025-05-13 NOTE — ASSESSMENT & PLAN NOTE
Lab Results   Component Value Date    HGBA1C 7.3 (H) 03/05/2025       Recent Labs     05/12/25  1655 05/12/25  2221 05/13/25  0656 05/13/25  1108   POCGLU 94 197* 125 188*       Blood Sugar Average: Last 72 hrs:  (P) 151    - Goal euglycemia.   - Management as per primary team.

## 2025-05-13 NOTE — ASSESSMENT & PLAN NOTE
progressing  Needing assistance with IADLs and ADLs   MOCA 13/30 (5/7/25) deficits in recall, visual spatial, language, attention, recall  ACLS eval 4.8 /6/0 (5/7/25)  CT head (2/11/25) chronic microangiopathic changes    Recommend testing for latent syphilis, Hep C, since symptoms can appear 10-30 years later as memory loss, confusion, impaired judgement dysarthria, seizures, tremors, gait disturbances, transient hearing loss
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
## 2025-05-13 NOTE — PROGRESS NOTES
Progress Note - Hospitalist   Name: Ynes Mendoza 64 y.o. female I MRN: [id number]  Unit/Bed#: [facility] 626-01 I Date of Admission: 5/12/2025   Date of Service: 5/13/2025 I Hospital Day: 1    Assessment & Plan  Failure to thrive in adult  Patient was recently admitted from 5/1-5/9 to neurology service for breakthrough seizures in the setting of medication noncompliance.   There were concerns brought up by family that admission regarding cognitive impairment/memory loss and ability to care for herself.    Scored 13/30 on MOCA done 5/7.  Was overall deemed capable to live alone  Pt was initially discharged and stayed with her brother before he took her back to her home.  She does not recall details of this.  Additionally, VNA came out to her home and pt could not properly use her insulin or use glucometer (she has been on insulin for years) so came back to the hospital for safety concerns  Will re-involve neurology as with hx of seizures she gets confused with aphasia, and will also ask geriatrics to evaluate.    PT/OT   Type 2 diabetes mellitus, with long-term current use of insulin (HCC)  Lab Results   Component Value Date    HGBA1C 7.3 (H) 03/05/2025       Recent Labs     05/12/25  1655 05/12/25  2221 05/13/25  0656 05/13/25  1108   POCGLU 94 197* 125 188*       Blood Sugar Average: Last 72 hrs:  (P) 151  Patient has history of type 2 diabetes, currently on Lantus 14 units daily, glimepiride and metformin  Discontinue oral antihyperglycemic agent  Sliding scale  Continue  Cognitive impairment  See above.  Sister notes progressive decline over 6-8 months   F/u neuro and geriatrics consults to start   Focal epilepsy (HCC)  Plan noted above  Generalized anxiety disorder      VTE Pharmacologic Prophylaxis: VTE Score: 6 High Risk (Score >/= 5) - Pharmacological DVT Prophylaxis Ordered: enoxaparin (Lovenox). Sequential Compression Devices Ordered.    Mobility:   -HLM Achieved: 7: Walk 25 feet or more      Patient Centered  "Rounds: I performed bedside rounds with nursing staff today.   Discussions with Specialists or Other Care Team Provider: CM, geriatrics, neurology     Education and Discussions with Family / Patient: Updated  (sister) via phone.    Current Length of Stay: 1 day(s)  Current Patient Status: Observation   Certification Statement: The patient, admitted on an observation basis, will now require > 2 midnight hospital stay due to cognitive impairment pending neuro and geriatrics evals for safety  Discharge Plan: Anticipate discharge in 48-72 hrs to discharge location to be determined pending rehab evaluations.    Code Status: Level 1 - Full Code    Subjective   No acute complaints.  When asked if she stayed with her brother initially after recent discharge, she states \"What did I do?\" And goes off on tangents that are unrelated to the question asked     Objective :  Temp:  [98.3 °F (36.8 °C)-99.2 °F (37.3 °C)] 98.5 °F (36.9 °C)  HR:  [60-93] 61  BP: (117-139)/(68-82) 117/70  Resp:  [16-18] 16  SpO2:  [97 %-100 %] 97 %  O2 Device: None (Room air)    There is no height or weight on file to calculate BMI.     Input and Output Summary (last 24 hours):     Intake/Output Summary (Last 24 hours) at 5/13/2025 1515  Last data filed at 5/13/2025 1302  Gross per 24 hour   Intake 840 ml   Output --   Net 840 ml       Physical Exam  Vitals reviewed.   Constitutional:       General: She is not in acute distress.     Appearance: She is not toxic-appearing.   HENT:      Head: Normocephalic and atraumatic.   Eyes:      Extraocular Movements: Extraocular movements intact.   Cardiovascular:      Rate and Rhythm: Normal rate and regular rhythm.   Pulmonary:      Effort: Pulmonary effort is normal. No respiratory distress.   Abdominal:      General: Bowel sounds are normal. There is no distension.      Palpations: Abdomen is soft.   Musculoskeletal:         General: Normal range of motion.   Neurological:      General: No focal " deficit present.      Mental Status: She is alert.   Psychiatric:         Mood and Affect: Mood normal.         Behavior: Behavior normal.         Cognition and Memory: She exhibits impaired recent memory.         Lines/Drains:          Lab Results: I have reviewed the following results:   Results from last 7 days   Lab Units 05/13/25  0628 05/12/25  1347   WBC Thousand/uL 4.92 5.66   HEMOGLOBIN g/dL 12.1 11.7   HEMATOCRIT % 36.3 35.7   PLATELETS Thousands/uL 164 156   SEGS PCT %  --  60   LYMPHO PCT %  --  25   MONO PCT %  --  10   EOS PCT %  --  3     Results from last 7 days   Lab Units 05/13/25  0628   SODIUM mmol/L 141   POTASSIUM mmol/L 3.8   CHLORIDE mmol/L 108   CO2 mmol/L 26   BUN mg/dL 9   CREATININE mg/dL 0.71   ANION GAP mmol/L 7   CALCIUM mg/dL 9.3   ALBUMIN g/dL 3.7   TOTAL BILIRUBIN mg/dL 0.64   ALK PHOS U/L 47   ALT U/L 14   AST U/L 11*   GLUCOSE RANDOM mg/dL 132         Results from last 7 days   Lab Units 05/13/25  1108 05/13/25  0656 05/12/25  2221 05/12/25  1655 05/09/25  1057 05/09/25  0609 05/08/25  2101 05/08/25  1630 05/08/25  1052 05/08/25  0605 05/07/25  2112 05/07/25  1533   POC GLUCOSE mg/dl 188* 125 197* 94 208* 140 178* 185* 298* 127 233* 270*               Recent Cultures (last 7 days):         Imaging Results Review: No pertinent imaging studies reviewed.  Other Study Results Review: No additional pertinent studies reviewed.    Last 24 Hours Medication List:     Current Facility-Administered Medications:     acetaminophen (TYLENOL) tablet 650 mg, Q6H PRN    aspirin chewable tablet 81 mg, Daily    enoxaparin (LOVENOX) subcutaneous injection 40 mg, Daily    insulin glargine (LANTUS) subcutaneous injection 14 Units 0.14 mL, HS    insulin lispro (HumALOG/ADMELOG) 100 units/mL subcutaneous injection 1-6 Units, TID AC **AND** Fingerstick Glucose (POCT), TID AC    levETIRAcetam (KEPPRA) tablet 1,000 mg, Q12H DYAN    pravastatin (PRAVACHOL) tablet 40 mg, Daily With Dinner    Administrative  Statements   Today, Patient Was Seen By: Heather Negron PA-C      **Please Note: This note may have been constructed using a voice recognition system.**

## 2025-05-13 NOTE — ASSESSMENT & PLAN NOTE
Lab Results   Component Value Date    HGBA1C 7.3 (H) 03/05/2025       Recent Labs     05/12/25  1655 05/12/25  2221 05/13/25  0656 05/13/25  1108   POCGLU 94 197* 125 188*       Blood Sugar Average: Last 72 hrs:  (P) 151  Patient has history of type 2 diabetes, currently on Lantus 14 units daily, glimepiride and metformin  Discontinue oral antihyperglycemic agent  Sliding scale  Continue

## 2025-05-13 NOTE — PLAN OF CARE
Problem: OCCUPATIONAL THERAPY ADULT  Goal: Performs self-care activities at highest level of function for planned discharge setting.  See evaluation for individualized goals.  Description: Treatment Interventions: ADL retraining, Functional transfer training, Endurance training, Cognitive reorientation, Patient/family training, Equipment evaluation/education, Compensatory technique education, Activityengagement          See flowsheet documentation for full assessment, interventions and recommendations.   Note: Limitation: Decreased ADL status, Decreased endurance, Decreased cognition, Decreased Safe judgement during ADL, Decreased self-care trans, Decreased high-level ADLs  Prognosis: Good  Assessment: Pt is a 64 y.o. female who was admitted to [facility] on 5/12/2025 with Failure to thrive in adult . Patient  has a past medical history of Abnormal EEG, Anxiety, Aphasia, Condyloma acuminatum, Depression, Diabetes mellitus (HCC), Genital warts, Herpes simplex, HPV (human papilloma virus) infection, Obesity, and Visual impairment.   At baseline pt was completing adls and mobility independently with rollator outside and no AD in home- denies falls - I iadls however family/landlord and [facility] nurse express concern re: pt's ability to manage meal prep/medications. Pt lives alone in home on first floor with 2 NILAM - rents room and shares kitchen,BR with other residents. Currently pt requires min assist for overall ADLS and cga/min a for functional mobility/transfers. Pt currently presents with impairments in the following categories -steps to enter environment, limited home support, difficulty performing ADLS, difficulty performing IADLS , limited insight into deficits, health management , and environment activity tolerance, endurance, standing balance/tolerance, memory, insight, safety , judgement , attention , and sequencing . These impairments, as well as pt's fatigue, decreased caregiver support, risk for  falls, and home environment  limit pt's ability to safely engage in all baseline areas of occupation, includingbathing, dressing, toileting, functional mobility/transfers, community mobility, laundry , house maintenance, medication management, meal prep, cleaning, social participation , and leisure activities  From OT standpoint, recommend Level II resources upon D/C. OT will continue to follow to address the below stated goals.     Rehab Resource Intensity Level, OT: II (Moderate Resource Intensity)
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
## 2025-05-13 NOTE — ASSESSMENT & PLAN NOTE
Oriented x 3  Maintain delirium precautions:  Provide frequent redirection, reorientation, distraction techniques  Avoid deliriogenic medications such as tramadol, benzodiazepines, anticholinergics,  Benadryl  Treat pain, See geriatric pain protocol  Monitor for constipation and urinary retention  Encourage early and frequent moblization, OOB  Encourage Hydration/ Nutrition  Implement sleep hygiene, limit night time interuptions, group activities

## 2025-05-13 NOTE — OCCUPATIONAL THERAPY NOTE
Occupational Therapy Evaluation     Patient Name: Ynes Mendoza  Today's Date: 5/13/2025  Problem List  Principal Problem:    Failure to thrive in adult  Active Problems:    Type 2 diabetes mellitus, with long-term current use of insulin (HCC)    Generalized anxiety disorder    Focal epilepsy (HCC)    Cognitive impairment    Past Medical History  Past Medical History:   Diagnosis Date    Abnormal EEG 11/05/2023    Anxiety     Aphasia 11/05/2023    Condyloma acuminatum     Depression 1968    Therapy    Diabetes mellitus (HCC) 2002    type 2    Genital warts     Herpes simplex     HPV (human papilloma virus) infection     Obesity 1990    Visual impairment 1984     Past Surgical History  Past Surgical History:   Procedure Laterality Date    CARDIAC ELECTROPHYSIOLOGY PROCEDURE N/A 1/24/2025    Procedure: Cardiac pacer implant DUAL CHAMBER;  Surgeon: Patric Sanchez MD;  Location: [facility];  Service: Cardiology         05/13/25 4317   OT Last Visit   OT Visit Date 05/13/25   Note Type   Note type Evaluation   Pain Assessment   Pain Assessment Tool 0-10   Pain Score No Pain   Restrictions/Precautions   Weight Bearing Precautions Per Order No   Other Precautions Cognitive;Chair Alarm;Bed Alarm   Home Living   Type of Home House   Home Layout Two level;Able to live on main level with bedroom/bathroom;Stairs to enter with rails  (2 [address]IL[address] - resides on FF)   Bathroom Shower/Tub Tub/shower unit   Prior Function   Level of [place] Independent with ADLs;Independent with functional mobility;Independent with IADLS   Lives With (S)  Alone   Receives Help From Family  (not local)   IADLs Independent with meal prep;Independent with medication management  (does not drive - walks to grocery store)   Falls in the last 6 months 0   Vocational Unemployed   Lifestyle   Autonomy I adls and mobility - furniture cruises in home and use rollator outside - denies falls - i iadls (communal living area) - reports she prepares own  "meals by typically makes \"easy to make\" items like hot dogs - reports she uses CGM but cannot find the reader and her phone is not compatible - reports she does take prefilled insulin pens nightly   Reciprocal Relationships limited local support - states her family is more involved in her care recently and are helping her look for alternate living arrangements ([employer])   Service to Others not working   Intrinsic Gratification mostly sedentary   Subjective   Subjective \"I keep coming into the hospital\"   ADL   Eating Assistance 7  Independent   Grooming Assistance 5  Supervision/Setup   UB Bathing Assistance 5  Supervision/Setup   LB Bathing Assistance 4  Minimal Assistance   UB Dressing Assistance 5  Supervision/Setup   LB Dressing Assistance 4  Minimal Assistance   Toileting Assistance  5  Supervision/Setup   Bed Mobility   Supine to Sit 5  Supervision   Transfers   Sit to Stand 5  Supervision   Stand to Sit 5  Supervision   Functional Mobility   Functional Mobility 4  Minimal assistance   Additional Comments sba with rollator, min a in room w/o ad (reports she does not use AD in home)   Additional items   (rollator)   Balance   Static Sitting Good   Dynamic Sitting Fair +   Static Standing Fair   Dynamic Standing Fair -   Ambulatory Poor +   Activity Tolerance   Activity Tolerance Patient limited by fatigue;Treatment limited secondary to medical complications (Comment)   RUE Assessment   RUE Assessment WFL   LUE Assessment   LUE Assessment WFL   Cognition   Overall Cognitive Status Impaired   Arousal/Participation Arousable;Cooperative   Attention Attends with cues to redirect   Orientation Level Oriented to person;Oriented to place;Oriented to time;Oriented to situation   Memory Decreased recall of recent events;Decreased recall of precautions   Following Commands Follows one step commands without difficulty   Comments noted to be distractable and tangential - limited insight/safety awareness - ACLS " administered on 5/6/25 with score of 4.8 indicating pt should have daily checks and assistance for higher level iadls such as cooking and med management to ensure safety (ie: stove turned off, compliance with meds)   Assessment   Limitation Decreased ADL status;Decreased endurance;Decreased cognition;Decreased Safe judgement during ADL;Decreased self-care trans;Decreased high-level ADLs   Prognosis Good   Assessment Pt is a 64 y.o. female who was admitted to [facility] on 5/12/2025 with Failure to thrive in adult . Patient  has a past medical history of Abnormal EEG, Anxiety, Aphasia, Condyloma acuminatum, Depression, Diabetes mellitus (HCC), Genital warts, Herpes simplex, HPV (human papilloma virus) infection, Obesity, and Visual impairment.   At baseline pt was completing adls and mobility independently with rollator outside and no AD in home- denies falls - I iadls however family/landlord and [facility] nurse express concern re: pt's ability to manage meal prep/medications. Pt lives alone in home on first floor with 2 NILAM - rents room and shares kitchen,BR with other residents. Currently pt requires min assist for overall ADLS and cga/min a for functional mobility/transfers. Pt currently presents with impairments in the following categories -steps to enter environment, limited home support, difficulty performing ADLS, difficulty performing IADLS , limited insight into deficits, health management , and environment activity tolerance, endurance, standing balance/tolerance, memory, insight, safety , judgement , attention , and sequencing . These impairments, as well as pt's fatigue, decreased caregiver support, risk for falls, and home environment  limit pt's ability to safely engage in all baseline areas of occupation, includingbathing, dressing, toileting, functional mobility/transfers, community mobility, laundry , house maintenance, medication management, meal prep, cleaning, social participation , and  leisure activities  From OT standpoint, recommend Level II resources upon D/C. OT will continue to follow to address the below stated goals.   Goals   Patient Goals feel better   LTG Time Frame 10-14   Long Term Goal #1 1) Mod I UB/LB adls after setup   2)  Mod I toileting and clothing management  3) Mod I bed mobility  4) Mod I functional mob/transfers to and from all surfaces with fair+ to good balance/safety   5) Increase activity tolerance to 30-35min for participation in adls and enjoyable activities  6) Assess DME needs   7) Demonstrate good carryover with safe use of AD during functional tasks   8) Assess DME needs   9) Ongoing functional cognitive assessment to assist with safe d/c recommendations   Plan   Treatment Interventions ADL retraining;Functional transfer training;Endurance training;Cognitive reorientation;Patient/family training;Equipment evaluation/education;Compensatory technique education;Activityengagement   Goal Expiration Date 05/27/25   OT Frequency 2-3x/wk   Discharge Recommendation   Rehab Resource Intensity Level, OT II (Moderate Resource Intensity)   AM-PAC Daily Activity Inpatient   Lower Body Dressing 3   Bathing 3   Toileting 3   Upper Body Dressing 4   Grooming 4   Eating 4   Daily Activity Raw Score 21   Daily Activity Standardized Score (Calc for Raw Score >=11) 44.27   AM-PAC Applied Cognition Inpatient   Following a Speech/Presentation 3   Understanding Ordinary Conversation 4   Taking Medications 3   Remembering Where Things Are Placed or Put Away 3   Remembering List of 4-5 Errands 2   Taking Care of Complicated Tasks 2   Applied Cognition Raw Score 17   Applied Cognition Standardized Score 36.52   End of Consult   Education Provided Yes   Patient Position at End of Consult Bedside chair;Bed/Chair alarm activated;All needs within reach   Nurse Communication Nurse aware of consult       The patient's raw score on the AM-PAC Daily Activity Inpatient Short Form is 21. A raw score  of greater than or equal to 19 suggests the patient may benefit from discharge to home however pt has limited social support and repeated admissions to hospital. Please refer to the recommendation of the Occupational Therapist for safe discharge planning.        Documentation Completed By:    NETO Pfeiffer/L  [employer] Certified - [id number]
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
## 2025-05-13 NOTE — PLAN OF CARE
Problem: PAIN - ADULT  Goal: Verbalizes/displays adequate comfort level or baseline comfort level  Description: Interventions:- Encourage patient to monitor pain and request assistance- Assess pain using appropriate pain scale- Administer analgesics based on type and severity of pain and evaluate response- Implement non-pharmacological measures as appropriate and evaluate response- Consider cultural and social influences on pain and pain management- Notify physician/advanced practitioner if interventions unsuccessful or patient reports new pain  Outcome: Progressing     Problem: INFECTION - ADULT  Goal: Absence or prevention of progression during hospitalization  Description: INTERVENTIONS:- Assess and monitor for signs and symptoms of infection- Monitor lab/diagnostic results- Monitor all insertion sites, i.e. indwelling lines, tubes, and drains- Monitor endotracheal if appropriate and nasal secretions for changes in amount and color- [place] appropriate cooling/warming therapies per order- Administer medications as ordered- Instruct and encourage patient and family to use good hand hygiene technique- Identify and instruct in appropriate isolation precautions for identified infection/condition  Outcome: Progressing  Goal: Absence of fever/infection during neutropenic period  Description: INTERVENTIONS:- Monitor WBC  Outcome: Progressing     Problem: SAFETY ADULT  Goal: Patient will remain free of falls  Description: INTERVENTIONS:- Educate patient/family on patient safety including physical limitations- Instruct patient to call for assistance with activity - Consult OT/PT to assist with strengthening/mobility - Keep Call bell within reach- Keep bed low and locked with side rails adjusted as appropriate- Keep care items and personal belongings within reach- Initiate and maintain comfort rounds- Make Fall Risk Sign visible to staff- Offer Toileting every 2 Hours, in advance of need- Initiate/Maintain bed/chair  alarm- Obtain necessary fall risk management equipment: - Apply yellow socks and bracelet for high fall risk patients- Consider moving patient to room near nurses station  Outcome: Progressing  Goal: Maintain or return to baseline ADL function  Description: INTERVENTIONS:-  Assess patient's ability to carry out ADLs; assess patient's baseline for ADL function and identify physical deficits which impact ability to perform ADLs (bathing, care of mouth/teeth, toileting, grooming, dressing, etc.)- Assess/evaluate cause of self-care deficits - Assess range of motion- Assess patient's mobility; develop plan if impaired- Assess patient's need for assistive devices and provide as appropriate- Encourage maximum independence but intervene and supervise when necessary- Involve family in performance of ADLs- Assess for home care needs following discharge - Consider OT consult to assist with ADL evaluation and planning for discharge- Provide patient education as appropriate  Outcome: Progressing  Goal: Maintains/Returns to pre admission functional level  Description: INTERVENTIONS:- Perform AM-PAC 6 Click Basic Mobility/ Daily Activity assessment daily.- Set and communicate daily mobility goal to care team and patient/family/caregiver. - Collaborate with rehabilitation services on mobility goals if consulted- Perform Range of Motion 3 times a day.- Reposition patient every 2 hours.- Dangle patient 3 times a day- Stand patient 3 times a day- Ambulate patient 3 times a day- Out of bed to chair 3 times a day - Out of bed for meals 3 times a day- Out of bed for toileting- Record patient progress and toleration of activity level   Outcome: Progressing     Problem: DISCHARGE PLANNING  Goal: Discharge to home or other facility with appropriate resources  Description: INTERVENTIONS:- Identify barriers to discharge w/patient and caregiver- Arrange for needed discharge resources and transportation as appropriate- Identify discharge  learning needs (meds, wound care, etc.)- Arrange for interpretive services to assist at discharge as needed- Refer to Case Management Department for coordinating discharge planning if the patient needs post-hospital services based on physician/advanced practitioner order or complex needs related to functional status, cognitive ability, or social support system  Outcome: Progressing     Problem: Knowledge Deficit  Goal: Patient/family/caregiver demonstrates understanding of disease process, treatment plan, medications, and discharge instructions  Description: Complete learning assessment and assess knowledge base.Interventions:- Provide teaching at level of understanding- Provide teaching via preferred learning methods  Outcome: Progressing

## 2025-05-13 NOTE — ASSESSMENT & PLAN NOTE
63 yo female with DM2, focal aware seizures, trifascicular block s/p PPM January 2025 who presents with inability to care for herself.    Strong suspicion for underlying dementia process which will ultimately require work-up in the outpatient setting. Will request imaging and lab work to rule out any reversible causes.     Plan:   - Check CTA head and neck with and without contrast.   - Check MRI brain with and without contrast with NeuroQuant.  - Check Vit B1, B6, B12, folate, TSH, RPR.  - Supportive care as per primary team.   - Patient will need formal neuro-cognitive assessment as an outpatient.  - Monitor exam and notify with changes.

## 2025-05-14 ENCOUNTER — APPOINTMENT (OUTPATIENT)
Dept: RADIOLOGY | Facility: HOSPITAL | Age: 65
DRG: 421 | End: 2025-05-14
Payer: MEDICARE

## 2025-05-14 LAB
GLUCOSE SERPL-MCNC: 132 MG/DL (ref 65–140)
GLUCOSE SERPL-MCNC: 151 MG/DL (ref 65–140)
GLUCOSE SERPL-MCNC: 214 MG/DL (ref 65–140)
GLUCOSE SERPL-MCNC: 229 MG/DL (ref 65–140)
MRSA NOSE QL CULT: NORMAL
TREPONEMA PALLIDUM IGG+IGM AB [PRESENCE] IN SERUM OR PLASMA BY IMMUNOASSAY: NORMAL

## 2025-05-14 PROCEDURE — 70498 CT ANGIOGRAPHY NECK: CPT

## 2025-05-14 PROCEDURE — 99232 SBSQ HOSP IP/OBS MODERATE 35: CPT | Performed by: NURSE PRACTITIONER

## 2025-05-14 PROCEDURE — 70496 CT ANGIOGRAPHY HEAD: CPT

## 2025-05-14 PROCEDURE — 97163 PT EVAL HIGH COMPLEX 45 MIN: CPT

## 2025-05-14 PROCEDURE — 99232 SBSQ HOSP IP/OBS MODERATE 35: CPT | Performed by: PHYSICIAN ASSISTANT

## 2025-05-14 PROCEDURE — 82948 REAGENT STRIP/BLOOD GLUCOSE: CPT

## 2025-05-14 RX ORDER — AMOXICILLIN 250 MG
1 CAPSULE ORAL
Status: DISCONTINUED | OUTPATIENT
Start: 2025-05-14 | End: 2025-05-26

## 2025-05-14 RX ORDER — POLYETHYLENE GLYCOL 3350 17 G/17G
17 POWDER, FOR SOLUTION ORAL DAILY PRN
Status: DISCONTINUED | OUTPATIENT
Start: 2025-05-14 | End: 2025-05-26

## 2025-05-14 RX ADMIN — ENOXAPARIN SODIUM 40 MG: 40 INJECTION SUBCUTANEOUS at 09:18

## 2025-05-14 RX ADMIN — PRAVASTATIN SODIUM 40 MG: 40 TABLET ORAL at 17:39

## 2025-05-14 RX ADMIN — SENNOSIDES AND DOCUSATE SODIUM 1 TABLET: 50; 8.6 TABLET ORAL at 21:18

## 2025-05-14 RX ADMIN — IOHEXOL 85 ML: 350 INJECTION, SOLUTION INTRAVENOUS at 05:27

## 2025-05-14 RX ADMIN — ASPIRIN 81 MG CHEWABLE TABLET 81 MG: 81 TABLET CHEWABLE at 09:18

## 2025-05-14 RX ADMIN — LEVETIRACETAM 1000 MG: 500 TABLET, FILM COATED ORAL at 09:18

## 2025-05-14 RX ADMIN — LEVETIRACETAM 1000 MG: 500 TABLET, FILM COATED ORAL at 21:18

## 2025-05-14 RX ADMIN — INSULIN LISPRO 2 UNITS: 100 INJECTION, SOLUTION INTRAVENOUS; SUBCUTANEOUS at 17:39

## 2025-05-14 RX ADMIN — INSULIN LISPRO 2 UNITS: 100 INJECTION, SOLUTION INTRAVENOUS; SUBCUTANEOUS at 12:24

## 2025-05-14 RX ADMIN — INSULIN GLARGINE 14 UNITS: 100 INJECTION, SOLUTION SUBCUTANEOUS at 21:18

## 2025-05-14 NOTE — PLAN OF CARE
Problem: PAIN - ADULT  Goal: Verbalizes/displays adequate comfort level or baseline comfort level  Description: Interventions:- Encourage patient to monitor pain and request assistance- Assess pain using appropriate pain scale- Administer analgesics based on type and severity of pain and evaluate response- Implement non-pharmacological measures as appropriate and evaluate response- Consider cultural and social influences on pain and pain management- Notify physician/advanced practitioner if interventions unsuccessful or patient reports new pain  Outcome: Progressing     Problem: INFECTION - ADULT  Goal: Absence or prevention of progression during hospitalization  Description: INTERVENTIONS:- Assess and monitor for signs and symptoms of infection- Monitor lab/diagnostic results- Monitor all insertion sites, i.e. indwelling lines, tubes, and drains- Monitor endotracheal if appropriate and nasal secretions for changes in amount and color- [place] appropriate cooling/warming therapies per order- Administer medications as ordered- Instruct and encourage patient and family to use good hand hygiene technique- Identify and instruct in appropriate isolation precautions for identified infection/condition  Outcome: Progressing  Goal: Absence of fever/infection during neutropenic period  Description: INTERVENTIONS:- Monitor WBC  Outcome: Progressing     Problem: SAFETY ADULT  Goal: Patient will remain free of falls  Description: INTERVENTIONS:- Educate patient/family on patient safety including physical limitations- Instruct patient to call for assistance with activity - Consult OT/PT to assist with strengthening/mobility - Keep Call bell within reach- Keep bed low and locked with side rails adjusted as appropriate- Keep care items and personal belongings within reach- Initiate and maintain comfort rounds- Make Fall Risk Sign visible to staff- Offer Toileting every  Hours, in advance of need- Initiate/Maintain alarm- Obtain  necessary fall risk management equipment: - Apply yellow socks and bracelet for high fall risk patients- Consider moving patient to room near nurses station  Outcome: Progressing  Goal: Maintain or return to baseline ADL function  Description: INTERVENTIONS:-  Assess patient's ability to carry out ADLs; assess patient's baseline for ADL function and identify physical deficits which impact ability to perform ADLs (bathing, care of mouth/teeth, toileting, grooming, dressing, etc.)- Assess/evaluate cause of self-care deficits - Assess range of motion- Assess patient's mobility; develop plan if impaired- Assess patient's need for assistive devices and provide as appropriate- Encourage maximum independence but intervene and supervise when necessary- Involve family in performance of ADLs- Assess for home care needs following discharge - Consider OT consult to assist with ADL evaluation and planning for discharge- Provide patient education as appropriate  Outcome: Progressing  Goal: Maintains/Returns to pre admission functional level  Description: INTERVENTIONS:- Perform AM-PAC 6 Click Basic Mobility/ Daily Activity assessment daily.- Set and communicate daily mobility goal to care team and patient/family/caregiver. - Collaborate with rehabilitation services on mobility goals if consulted- Perform Range of Motion  times a day.- Reposition patient every  hours.- Dangle patient  times a day- Stand patient  times a day- Ambulate patient  times a day- Out of bed to chair  times a day - Out of bed for meals times a day- Out of bed for toileting- Record patient progress and toleration of activity level   Outcome: Progressing     Problem: DISCHARGE PLANNING  Goal: Discharge to home or other facility with appropriate resources  Description: INTERVENTIONS:- Identify barriers to discharge w/patient and caregiver- Arrange for needed discharge resources and transportation as appropriate- Identify discharge learning needs (meds, wound  care, etc.)- Arrange for interpretive services to assist at discharge as needed- Refer to Case Management Department for coordinating discharge planning if the patient needs post-hospital services based on physician/advanced practitioner order or complex needs related to functional status, cognitive ability, or social support system  Outcome: Progressing     Problem: Knowledge Deficit  Goal: Patient/family/caregiver demonstrates understanding of disease process, treatment plan, medications, and discharge instructions  Description: Complete learning assessment and assess knowledge base.Interventions:- Provide teaching at level of understanding- Provide teaching via preferred learning methods  Outcome: Progressing
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
## 2025-05-14 NOTE — ASSESSMENT & PLAN NOTE
Oriented x 3, periods of confusion  Maintain delirium precautions:  Provide frequent redirection, reorientation, distraction techniques  Avoid deliriogenic medications such as tramadol, benzodiazepines, anticholinergics,  Benadryl  Treat pain, See geriatric pain protocol  Monitor for constipation - no BM since admission, ordered senna and miralax  Monitor urinary retention  Encourage early and frequent moblization, OOB  Encourage Hydration/ Nutrition  Implement sleep hygiene, limit night time interuptions, group activities
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
## 2025-05-14 NOTE — PLAN OF CARE
Problem: PHYSICAL THERAPY ADULT  Goal: Performs mobility at highest level of function for planned discharge setting.  See evaluation for individualized goals.  Description: Treatment/Interventions: Functional transfer training, LE strengthening/ROM, Elevations, Therapeutic exercise, Endurance training, Patient/family training, Bed mobility, Equipment eval/education, Gait training, Spoke to nursing, OT          See flowsheet documentation for full assessment, interventions and recommendations.  Note: Prognosis: Good  Problem List: Decreased strength, Decreased endurance, Impaired balance, Decreased mobility, Decreased safety awareness, Decreased cognition, Impaired judgement  Assessment: Pt is 64 y.o. female seen for PT evaluation s/p admit to [facility] on 5/12/2025. Two pt identifiers were used to confirm. Pt presented w/ inability to care for herself at home.  Pt was admitted with a primary dx of: failure to thrive in adult, and other active problems including type 2 DM, cognitive impairment, focal epilepsy.  PT now consulted for assessment of mobility and d/c needs. Pt with OOB to chair orders.  Pts current co morbidities affecting treatment include: .. Pts current clinical presentation is Unstable/ Unpredictable (high complexity) due to Ongoing medical management for primary dx, Fall risk, Increased assistance needed from caregiver at current time, Cog status, Continuous pulse oximetry monitoring   . Upon evaluation, pt currently is requiring; Supervision for transfers and CGA for ambulation w/  rollator  and min Ax1 when ambulating without the use of an AD. Pt presents at PT eval functioning below baseline and currently w/ overall mobility deficits 2* to: BLE weakness, impaired balance, decreased endurance, decreased activity tolerance compared to baseline, decreased safety awareness, impaired judgement, fall risk, decreased cognition. Pt currently at a fall risk 2* to impairments listed above.   Based on the aforementioned PT evaluation, pt will continue to benefit from skilled Acute PT interventions to address stated impairments; to maximize functional mobility; for ongoing pt/ family training; and DME needs. At conclusion of PT session pt returned back in chair and chair alarm engaged with phone and call bell within reach. Pt denies any further questions at this time. PT is currently recommending Level II resource intensity. PT will continue to follow during hospital stay.        Rehab Resource Intensity Level, PT: II (Moderate Resource Intensity)    See flowsheet documentation for full assessment.
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
## 2025-05-14 NOTE — ASSESSMENT & PLAN NOTE
Lab Results   Component Value Date    HGBA1C 7.3 (H) 03/05/2025       Recent Labs     05/13/25  1641 05/13/25  2039 05/14/25  0734 05/14/25  1103   POCGLU 158* 298* 132 229*       Blood Sugar Average: Last 72 hrs:  (P) 177.625  Patient has history of type 2 diabetes, currently on Lantus 14 units daily, glimepiride and metformin  Discontinue oral antihyperglycemic agent  Sliding scale  Continue
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
## 2025-05-14 NOTE — PROGRESS NOTES
Progress Note - Hospitalist   Name: Ynes Mendoza 64 y.o. female I MRN: [id number]  [facility] I Date of Admission: 5/12/2025   Date of Service: 5/14/2025 I Hospital Day: 1    Assessment & Plan  Failure to thrive in adult  Patient was recently admitted from 5/1-5/9 to neurology service for breakthrough seizures in the setting of medication noncompliance.   There were concerns brought up by family that admission regarding cognitive impairment/memory loss and ability to care for herself.    Scored 13/30 on MOCA done 5/7.  Was overall deemed capable to live alone  Pt was initially discharged and stayed with her brother before he took her back to her home.  She does not recall details of this.  Additionally, VNA came out to her home and pt could not properly use her insulin or use glucometer (she has been on insulin for years) so came back to the hospital for safety concerns  Will re-involve neurology as with hx of seizures she gets confused with aphasia, and will also ask geriatrics to evaluate.    Neurology and geriatrics ordered preliminary work up to identify any reversible causes  CTA head/neck reviewed  F/u MRI brain   Will consult neuropsych for capacity evaluation   PT/OT   Type 2 diabetes mellitus, with long-term current use of insulin (HCC)  Lab Results   Component Value Date    HGBA1C 7.3 (H) 03/05/2025       Recent Labs     05/13/25  1641 05/13/25  2039 05/14/25  0734 05/14/25  1103   POCGLU 158* 298* 132 229*       Blood Sugar Average: Last 72 hrs:  (P) 177.625  Patient has history of type 2 diabetes, currently on Lantus 14 units daily, glimepiride and metformin  Discontinue oral antihyperglycemic agent  Sliding scale  Continue  Cognitive impairment  See above.  Sister notes progressive decline over 6-8 months   F/u neuro and geriatrics consults to start   Focal epilepsy (HCC)  Plan noted above    VTE Pharmacologic Prophylaxis: VTE Score: 6 Moderate Risk (Score 3-4) - Pharmacological DVT  Prophylaxis Ordered: enoxaparin (Lovenox).    Mobility:   Basic Mobility Inpatient Raw Score: 18  JH-HLM Goal: 6: Walk 10 steps or more  JH-HLM Achieved: 7: Walk 25 feet or more  JH-HLM Goal achieved. Continue to encourage appropriate mobility.    Patient Centered Rounds: I performed bedside rounds with nursing staff today.   Discussions with Specialists or Other Care Team Provider: CARRILLO.    Education and Discussions with Family / Patient: Attempted to update  (sister) via phone. Left voicemail.     Current Length of Stay: 1 day(s)  Current Patient Status: Inpatient   Certification Statement: The patient will continue to require additional inpatient hospital stay due to pending safe dispo/capacity eval  Discharge Plan: Anticipate discharge in 48-72 hrs to discharge location to be determined pending rehab evaluations.    Code Status: Level 1 - Full Code    Subjective   No acute complaints. She is trying to give her sister information on a storage unit she has, though she cannot recall the name or location of the storage unit.     Objective :  Temp:  [98.3 °F (36.8 °C)-99.7 °F (37.6 °C)] 98.3 °F (36.8 °C)  HR:  [62-86] 62  BP: (120-133)/(64-75) 120/71  Resp:  [18] 18  SpO2:  [98 %-100 %] 100 %  O2 Device: None (Room air)    There is no height or weight on file to calculate BMI.     Input and Output Summary (last 24 hours):     Intake/Output Summary (Last 24 hours) at 5/14/2025 1140  Last data filed at 5/14/2025 0847  Gross per 24 hour   Intake 1544 ml   Output --   Net 1544 ml       Physical Exam  Vitals reviewed.   Constitutional:       General: She is not in acute distress.     Appearance: She is not toxic-appearing.   HENT:      Head: Normocephalic and atraumatic.     Eyes:      Extraocular Movements: Extraocular movements intact.       Cardiovascular:      Rate and Rhythm: Normal rate and regular rhythm.   Pulmonary:      Effort: Pulmonary effort is normal. No respiratory distress.      Breath sounds:  No wheezing or rales.     Musculoskeletal:         General: Normal range of motion.     Neurological:      General: No focal deficit present.      Mental Status: She is alert.     Psychiatric:         Mood and Affect: Mood normal.         Cognition and Memory: She exhibits impaired recent memory.         Lines/Drains:              Lab Results: I have reviewed the following results:   Results from last 7 days   Lab Units 05/13/25  0628 05/12/25  1347   WBC Thousand/uL 4.92 5.66   HEMOGLOBIN g/dL 12.1 11.7   HEMATOCRIT % 36.3 35.7   PLATELETS Thousands/uL 164 156   SEGS PCT %  --  60   LYMPHO PCT %  --  25   MONO PCT %  --  10   EOS PCT %  --  3     Results from last 7 days   Lab Units 05/13/25  0628   SODIUM mmol/L 141   POTASSIUM mmol/L 3.8   CHLORIDE mmol/L 108   CO2 mmol/L 26   BUN mg/dL 9   CREATININE mg/dL 0.71   ANION GAP mmol/L 7   CALCIUM mg/dL 9.3   ALBUMIN g/dL 3.7   TOTAL BILIRUBIN mg/dL 0.64   ALK PHOS U/L 47   ALT U/L 14   AST U/L 11*   GLUCOSE RANDOM mg/dL 132         Results from last 7 days   Lab Units 05/14/25  1103 05/14/25  0734 05/13/25  2039 05/13/25  1641 05/13/25  1108 05/13/25  0656 05/12/25  2221 05/12/25  1655 05/09/25  1057 05/09/25  0609 05/08/25  2101 05/08/25  1630   POC GLUCOSE mg/dl 229* 132 298* 158* 188* 125 197* 94 208* 140 178* 185*               Recent Cultures (last 7 days):         Imaging Results Review: No pertinent imaging studies reviewed.  Other Study Results Review: No additional pertinent studies reviewed.    Last 24 Hours Medication List:     Current Facility-Administered Medications:     acetaminophen (TYLENOL) tablet 650 mg, Q6H PRN    aspirin chewable tablet 81 mg, Daily    enoxaparin (LOVENOX) subcutaneous injection 40 mg, Daily    insulin glargine (LANTUS) subcutaneous injection 14 Units 0.14 mL, HS    insulin lispro (HumALOG/ADMELOG) 100 units/mL subcutaneous injection 1-6 Units, TID AC **AND** Fingerstick Glucose (POCT), TID AC    levETIRAcetam (KEPPRA) tablet  1,000 mg, Q12H DYAN    pravastatin (PRAVACHOL) tablet 40 mg, Daily With Dinner    Administrative Statements   Today, Patient Was Seen By: Heather Negron PA-C      **Please Note: This note may have been constructed using a voice recognition system.**

## 2025-05-14 NOTE — PHYSICAL THERAPY NOTE
PHYSICAL THERAPY EVALUATION  NAME:  Ynes Mendoza  DATE: 05/14/25    AGE:   64 y.o.  Mrn:   [id number]  ADMIT DX:  Hx of seizure disorder [Z86.69]  Unable to care for self [Z78.9]    Past Medical History:   Diagnosis Date    Abnormal EEG 11/05/2023    Anxiety     Aphasia 11/05/2023    Condyloma acuminatum     Depression 1968    Therapy    Diabetes mellitus (HCC) 2002    type 2    Genital warts     Herpes simplex     HPV (human papilloma virus) infection     Obesity 1990    Visual impairment 1984       Past Surgical History:   Procedure Laterality Date    CARDIAC ELECTROPHYSIOLOGY PROCEDURE N/A 1/24/2025    Procedure: Cardiac pacer implant DUAL CHAMBER;  Surgeon: Patric Sanchez MD;  Location: [facility];  Service: Cardiology       Length Of Stay: 1    PHYSICAL THERAPY EVALUATION:        05/14/25 1053   Note Type   Note type Evaluation   Pain Assessment   Pain Assessment Tool 0-10   Pain Score No Pain   Restrictions/Precautions   Weight Bearing Precautions Per Order No   Other Precautions Cognitive;Chair Alarm;Bed Alarm;Fall Risk   Home Living   Type of Home House   Home Layout Two level;Able to live on main level with bedroom/bathroom;Stairs to enter with rails   Home Equipment Walker;Other (Comment)  (rollator)   Additional Comments Pt reports renting a room in a home on the first floor. Pt states she does not have local support and none of the other renters in the home are able to assist.   Prior Function   Level of [place] Independent with functional mobility   Lives With Alone   Receives Help From Family   Falls in the last 6 months 0   Comments Pt reports the use of a rollator for ambulation PTA   Cognition   Overall Cognitive Status Impaired   Arousal/Participation Alert   Attention Attends with cues to redirect   Orientation Level Oriented X4   Memory Decreased recall of precautions   Following Commands Follows one step commands without difficulty   RUE Assessment   RUE Assessment WFL   LUE  Assessment   LUE Assessment WFL   RLE Assessment   RLE Assessment WFL   Strength RLE   RLE Overall Strength 4/5   LLE Assessment   LLE Assessment WFL   Strength LLE   LLE Overall Strength 4/5   Bed Mobility   Supine to Sit 5  Supervision   Additional items Increased time required   Transfers   Sit to Stand 5  Supervision   Stand to Sit 5  Supervision   Ambulation/Elevation   Gait pattern Short stride;Foward flexed   Gait Assistance 4  Minimal assist  (CGA with use of Rollator, Min A x1 without use of AD)   Additional items Assist x 1   Assistive Device Other (Comment)  (rollator)   Distance 65ft x 2   Balance   Static Sitting Fair +   Static Standing Fair   Ambulatory Poor +   Activity Tolerance   Activity Tolerance Patient limited by fatigue   Nurse Made Aware Pt appropriate to be seen and mobilize per nsg   Assessment   Prognosis Good   Problem List Decreased strength;Decreased endurance;Impaired balance;Decreased mobility;Decreased safety awareness;Decreased cognition;Impaired judgement   Assessment Pt is 64 y.o. female seen for PT evaluation s/p admit to [facility] on 5/12/2025. Two pt identifiers were used to confirm. Pt presented w/ inability to care for herself at home.  Pt was admitted with a primary dx of: failure to thrive in adult, and other active problems including type 2 DM, cognitive impairment, focal epilepsy.  PT now consulted for assessment of mobility and d/c needs. Pt with OOB to chair orders.  Pts current co morbidities affecting treatment include: .. Pts current clinical presentation is Unstable/ Unpredictable (high complexity) due to Ongoing medical management for primary dx, Fall risk, Increased assistance needed from caregiver at current time, Cog status, Continuous pulse oximetry monitoring   . Upon evaluation, pt currently is requiring; Supervision for transfers and CGA for ambulation w/  rollator  and min Ax1 when ambulating without the use of an AD. Pt [facility] at [facility]  "functioning below baseline and currently w/ overall mobility deficits 2* to: BLE weakness, impaired balance, decreased endurance, decreased activity tolerance compared to baseline, decreased safety awareness, impaired judgement, fall risk, decreased cognition. Pt currently at a fall risk 2* to impairments listed above.  Based on the aforementioned PT evaluation, pt will continue to benefit from skilled Acute PT interventions to address stated impairments; to maximize functional mobility; for ongoing pt/ family training; and DME needs. At conclusion of PT session pt returned back in chair and chair alarm engaged with phone and call bell within reach. Pt denies any further questions at this time. PT is currently recommending Level II resource intensity. PT will continue to follow during hospital stay.   Goals   Patient Goals \"to find a better place to live\"   STG Expiration Date 05/28/25   Short Term Goal #1 In 14 days pt will complete: 1) Bed mobility skills with mod I to increase safety and independence as well as decrease caregiver burden. 2) Functional transfers with mod I to promote increased independence, safety, and QOL. 3) Ambulate 300' using least restrictive AD with mod I without LOB and stable vitals so that pt can negotiate previous living environment safely and promote independence with functional mobility and return to PLOF. 4) Stair training up/ down 5 step/s using rail/s with mod I so that pt can enter/negotiate previous living environment safely and decrease fall risk. 5) Improve balance grades by 1/2 grade to increase safety with all mobility and decrease fall risk.  6) Improve BLE strength by 1/2 grade to help increase overall functional mobility and decrease fall risk.   Plan   Treatment/Interventions Functional transfer training;LE strengthening/ROM;Elevations;Therapeutic exercise;Endurance training;Patient/family training;Bed mobility;Equipment eval/education;Gait training;Spoke to nursing;OT "   PT Frequency 2-3x/wk   Discharge Recommendation   Rehab Resource Intensity Level, PT II (Moderate Resource Intensity)   AM-PAC Basic Mobility Inpatient   Turning in Flat Bed Without Bedrails 3   Lying on Back to Sitting on Edge of Flat Bed Without Bedrails 3   Moving Bed to Chair 3   Standing Up From Chair Using Arms 3   Walk in Room 3   Climb 3-5 Stairs With Railing 3   Basic Mobility Inpatient Raw Score 18   Basic Mobility Standardized Score 41.05   [facility] Highest Level Of Mobility   -HLM Goal 6: Walk 10 steps or more   -HLM Achieved 7: Walk 25 feet or more   Modified Tello Scale   Modified Tello Scale 4     Portions of the documentation may have been created using voice recognition software.Occasional wrong word or sound alike substitutions may have occurred due to the inherent limitations of the voice recognition software. Read the chart carefully and recognize, using context, where substitutions have occurred.    Ronald Leon, PT, DPT

## 2025-05-14 NOTE — ASSESSMENT & PLAN NOTE
See above.  Sister notes progressive decline over 6-8 months   F/u neuro and geriatrics consults to start

## 2025-05-14 NOTE — ASSESSMENT & PLAN NOTE
progressing  Needing assistance with IADLs and ADLs   MOCA 13/30 (5/7/25) deficits in recall, visual spatial, language, attention, recall  ACLS eval 4.8 /6/0 (5/7/25)  CT head (2/11/25) chronic microangiopathic changes  Seen by Dr Bradley MMSE 11/28 (5/14/25) does NOT appear to have capacity to make medical decisions  MRI Neuroquant pending

## 2025-05-15 LAB
GLUCOSE SERPL-MCNC: 166 MG/DL (ref 65–140)
GLUCOSE SERPL-MCNC: 209 MG/DL (ref 65–140)
GLUCOSE SERPL-MCNC: 294 MG/DL (ref 65–140)
GLUCOSE SERPL-MCNC: 95 MG/DL (ref 65–140)

## 2025-05-15 PROCEDURE — 99232 SBSQ HOSP IP/OBS MODERATE 35: CPT | Performed by: NURSE PRACTITIONER

## 2025-05-15 PROCEDURE — 82948 REAGENT STRIP/BLOOD GLUCOSE: CPT

## 2025-05-15 PROCEDURE — 99232 SBSQ HOSP IP/OBS MODERATE 35: CPT | Performed by: PHYSICIAN ASSISTANT

## 2025-05-15 RX ORDER — POLYETHYLENE GLYCOL 3350 17 G/17G
17 POWDER, FOR SOLUTION ORAL ONCE
Status: COMPLETED | OUTPATIENT
Start: 2025-05-15 | End: 2025-05-15

## 2025-05-15 RX ADMIN — LEVETIRACETAM 1000 MG: 500 TABLET, FILM COATED ORAL at 20:28

## 2025-05-15 RX ADMIN — SENNOSIDES AND DOCUSATE SODIUM 1 TABLET: 50; 8.6 TABLET ORAL at 21:54

## 2025-05-15 RX ADMIN — INSULIN GLARGINE 14 UNITS: 100 INJECTION, SOLUTION SUBCUTANEOUS at 21:54

## 2025-05-15 RX ADMIN — ENOXAPARIN SODIUM 40 MG: 40 INJECTION SUBCUTANEOUS at 08:42

## 2025-05-15 RX ADMIN — PRAVASTATIN SODIUM 40 MG: 40 TABLET ORAL at 17:42

## 2025-05-15 RX ADMIN — LEVETIRACETAM 1000 MG: 500 TABLET, FILM COATED ORAL at 08:42

## 2025-05-15 RX ADMIN — INSULIN LISPRO 4 UNITS: 100 INJECTION, SOLUTION INTRAVENOUS; SUBCUTANEOUS at 12:12

## 2025-05-15 RX ADMIN — INSULIN LISPRO 1 UNITS: 100 INJECTION, SOLUTION INTRAVENOUS; SUBCUTANEOUS at 17:44

## 2025-05-15 RX ADMIN — ASPIRIN 81 MG CHEWABLE TABLET 81 MG: 81 TABLET CHEWABLE at 08:42

## 2025-05-15 RX ADMIN — POLYETHYLENE GLYCOL 3350 17 G: 17 POWDER, FOR SOLUTION ORAL at 12:15

## 2025-05-15 NOTE — UTILIZATION REVIEW
NOTIFICATION OF INPATIENT ADMISSION      AUTHORIZATION REQUEST   SERVICING FACILITY:   [facility]  Address: [address] [place] [address]  Tax ID: [id number]  NPI: [id number] ATTENDING PROVIDER:  Attending Name and NPI#: Kristina Castillo Do [[id number]]  Address: [address] [place] [address]  Phone: [phone]   ADMISSION INFORMATION:  Place of Service: Inpatient [facility]  Place of Service Code: 21  Inpatient Admission Date/Time: 5/12/25  3:25 PM  Discharge Date/Time: No discharge date for patient encounter.  Admitting Diagnosis Code/Description:  Hx of seizure disorder [Z86.69]  Unable to care for self [Z78.9]     UTILIZATION REVIEW CONTACT:  [facility] Utilization   Network Utilization Review Department  Phone: [phone]  Fax: [phone]  Email: [email or web address]  Contact for approvals/pending authorizations, clinical reviews, and discharge.     PHYSICIAN ADVISORY SERVICES:  Medical Necessity Denial & Cvhx-ah-Ykwp Review  Phone: [phone]  Fax: [phone]  Email: [email or web address]     DISCHARGE SUPPORT TEAM:  For Patients Discharge Needs & Updates  Phone: [phone] opt. 2 Fax: [phone]  Email: [email or web address]

## 2025-05-15 NOTE — ASSESSMENT & PLAN NOTE
Lab Results   Component Value Date    HGBA1C 7.3 (H) 03/05/2025       Recent Labs     05/14/25  1103 05/14/25  1635 05/14/25  2044 05/15/25  0734   POCGLU 229* 214* 151* 95       Blood Sugar Average: Last 72 hrs:  (P) 171  Patient has history of type 2 diabetes, currently on Lantus 14 units daily, glimepiride and metformin  Discontinue oral antihyperglycemic agent  Sliding scale  CCD

## 2025-05-15 NOTE — RESTORATIVE TECHNICIAN NOTE
Restorative Technician Note      Patient Name: Ynes Mendoza     Restorative Tech Visit Date: 05/15/25  Note Type: Mobility  Patient Position Upon Consult: Bedside chair  Activity Performed: Ambulated  Assistive Device: Other (Comment) (Rollator)  Patient Position at End of Consult: Bedside chair; All needs within reach; Bed/Chair alarm activated  Nurse Communication: Nurse aware of consult, application of brace    HERRERA Saldana

## 2025-05-15 NOTE — UTILIZATION REVIEW
Initial Clinical Review - Continued    SEE INITIAL REVIEW AT BOTTOM    Date: 5/15/25                      Day 2    Current Patient Class: Inpatient    Current Level of Care: med surg    HPI:64 y.o. female initially admitted on 5/12/2025 for Failure to thrive in adult.      Assessment/Plan: DAY 2  A&O x2. Pt with progressive cognitive impairment, failure to thrive. Geriatric & neuro following. Per neuro-psyche: pt does not appear to have capacity to make medical decisions. Continue to monitor for seizures, monitor functional ability for safety. Monitor VS, follow labs, continue therapies to determine safe d/c plan.     Medications:   Scheduled Medications:  aspirin, 81 mg, Oral, Daily  enoxaparin, 40 mg, Subcutaneous, Daily  insulin glargine, 14 Units, Subcutaneous, HS  insulin lispro, 1-6 Units, Subcutaneous, TID AC  levETIRAcetam, 1,000 mg, Oral, Q12H DYAN  pravastatin, 40 mg, Oral, Daily With Dinner  senna-docusate sodium, 1 tablet, Oral, HS    PRN Meds:  acetaminophen, 650 mg, Oral, Q6H PRN  polyethylene glycol, 17 g, Oral, Daily PRN        Vital Signs:   Vital Signs (last 3 days)       Date/Time Temp Pulse Resp BP MAP (mmHg) SpO2 O2 Device Patient Position - Orthostatic VS [place] Coma Scale Score Pain    05/15/25 14:20:34 98.7 °F (37.1 °C) 64 17 95/58 70 99 % -- -- -- --    05/15/25 0842 -- -- -- -- -- -- -- -- 15 No Pain    05/15/25 07:35:18 98.4 °F (36.9 °C) 72 16 134/78 97 99 % -- -- -- --    05/14/25 2215 -- 63 -- -- -- 97 % -- -- -- --    05/14/25 22:10:49 98.5 °F (36.9 °C) -- 18 134/65 88 -- -- -- -- --    05/14/25 1945 -- -- -- -- -- -- None (Room air) -- 15 No Pain    05/14/25 14:59:21 98.4 °F (36.9 °C) 71 18 120/76 91 99 % -- -- -- --    05/14/25 1053 -- -- -- -- -- -- -- -- -- No Pain    05/14/25 0915 -- -- -- -- -- -- None (Room air) -- 15 No Pain    05/14/25 06:54:28 98.3 °F (36.8 °C) 62 18 120/71 87 100 % -- -- -- --    05/13/25 22:14:48 98.8 °F (37.1 °C) 86 -- 133/75 94 99 % -- -- -- --    05/13/25  2030 -- -- -- -- -- -- None (Room air) -- 15 No Pain    05/13/25 16:05:44 99.7 °F (37.6 °C) 64 18 123/64 84 98 % -- -- -- --    05/13/25 1530 -- -- -- -- -- -- -- -- -- No Pain    05/13/25 0705 -- -- -- -- -- -- None (Room air) -- -- No Pain    05/13/25 06:58:57 98.5 °F (36.9 °C) 61 16 117/70 86 97 % -- -- -- --    05/12/25 22:40:22 98.3 °F (36.8 °C) 60 -- 119/71 87 98 % -- -- -- --    05/12/25 21:03:14 99.2 °F (37.3 °C) 93 16 139/82 101 97 % None (Room air) -- -- No Pain    05/12/25 1758 -- -- -- -- -- -- -- -- -- No Pain    05/12/25 1747 -- 82 18 126/68 -- 100 % None (Room air) -- -- --    05/12/25 1223 97.9 °F (36.6 °C) 85 17 128/62 -- 98 % None (Room air) Lying -- No Pain            Pertinent Labs/Diagnostic Results:   Radiology:  CTA head and neck w wo contrast   Final Interpretation by Zion Smith MD (05/14 0814)   CT Brain:   1.  No CT evidence of an acute cortical infarct.   2.  Artifact versus trace products layering in the right ventricular atrium. Recommend further evaluation with a noncontrast enhanced brain MRI.      CT Angiography:   3.  Severe stenosis at the P2/P3 junction of the right PCA, new from prior.   4.  No large vessel occlusion.         Other:   5.  Patulous and fluid-filled esophagus, with the patient at risk for aspiration.   6.  A few prominent upper mediastinal lymph nodes, likely reactive.                  Workstation performed: [id number]         MRI inpatient order    (Results Pending)     Cardiology:  No orders to display     GI:  No orders to display         Results from last 7 days   Lab Units 05/13/25  0628 05/12/25  1347   WBC Thousand/uL 4.92 5.66   HEMOGLOBIN g/dL 12.1 11.7   HEMATOCRIT % 36.3 35.7   PLATELETS Thousands/uL 164 156   TOTAL NEUT ABS Thousands/µL  --  3.45         Results from last 7 days   Lab Units 05/13/25  0628 05/12/25  1347   SODIUM mmol/L 141 140   POTASSIUM mmol/L 3.8 4.1   CHLORIDE mmol/L 108 107   CO2 mmol/L 26 28   ANION GAP mmol/L 7 5   BUN mg/dL 9  "13   CREATININE mg/dL 0.71 0.95   EGFR ml/min/1.73sq m 91 64   CALCIUM mg/dL 9.3 9.3   MAGNESIUM mg/dL 1.8*  --      Results from last 7 days   Lab Units 05/13/25  0628   AST U/L 11*   ALT U/L 14   ALK PHOS U/L 47   TOTAL PROTEIN g/dL 6.5   ALBUMIN g/dL 3.7   TOTAL BILIRUBIN mg/dL 0.64     Results from last 7 days   Lab Units 05/15/25  1132 05/15/25  0734 05/14/25  2044 05/14/25  1635 05/14/25  1103 05/14/25  0734 05/13/25  2039 05/13/25  1641 05/13/25  1108 05/13/25  0656 05/12/25  2221 05/12/25  1655   POC GLUCOSE mg/dl 294* 95 151* 214* 229* 132 298* 158* 188* 125 197* 94     Results from last 7 days   Lab Units 05/13/25  0628 05/12/25  1347   GLUCOSE RANDOM mg/dL 132 172*             No results found for: \"BETA-HYDROXYBUTYRATE\"                               Results from last 7 days   Lab Units 05/13/25  0628   TSH 3RD GENERATON uIU/mL 0.989                                                                                                           Network Utilization Review Department  ATTENTION: Please call with any questions or concerns to [phone] and carefully listen to the prompts so that you are directed to the right person. All voicemails are confidential.   For Discharge needs, contact Care Management DC Support Team at [phone] opt. 2  Send all requests for admission clinical reviews, approved or denied determinations and any other requests to dedicated fax number below belonging to the campus where the patient is receiving treatment. List of dedicated fax numbers for the Facilities:  FACILITY NAME UR FAX NUMBER   ADMISSION DENIALS (Administrative/Medical Necessity) [phone]   DISCHARGE SUPPORT TEAM (NETWORK) [phone]   PARENT CHILD HEALTH (Maternity/NICU/Pediatrics) [phone]   [facility] [phone]   [facility] [phone]   [facility] [phone]   [facility] [phone] "   [facility] [phone]   [facility] [phone]   [facility] [phone]   [facility] [phone]   [facility] [phone]   [facility] [phone]   [facility] [phone]   [facility] [phone]
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
## 2025-05-15 NOTE — TELEPHONE ENCOUNTER
Patient called back and I was not aware of the questions that need to be answered .     Patient is currently inpatient, and She is requesting a call back at , may leave a detailed msg with exact questions need answered if she needs to call back.

## 2025-05-15 NOTE — ASSESSMENT & PLAN NOTE
Lab Results   Component Value Date    HGBA1C 7.3 (H) 03/05/2025       Recent Labs     05/14/25  1635 05/14/25  2044 05/15/25  0734 05/15/25  1132   POCGLU 214* 151* 95 294*       Blood Sugar Average: Last 72 hrs:  (P) 181.25  Patient has history of type 2 diabetes, currently on Lantus 14 units daily, glimepiride and metformin  Discontinue oral antihyperglycemic agent  Sliding scale  Continue
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
## 2025-05-15 NOTE — PROGRESS NOTES
Progress Note - Hospitalist   Name: Ynes Mendoza 64 y.o. female I MRN: [id number]  Unit/Bed#: [facility] 626-01 I Date of Admission: 5/12/2025   Date of Service: 5/15/2025 I Hospital Day: 2    Assessment & Plan  Failure to thrive in adult  Patient was recently admitted from 5/1-5/9 to neurology service for breakthrough seizures in the setting of medication noncompliance.   There were concerns brought up by family that admission regarding cognitive impairment/memory loss and ability to care for herself.    Scored 13/30 on MOCA done 5/7.  Was overall deemed capable to live alone  Pt was initially discharged and stayed with her brother before he took her back to her home.  She does not recall details of this.  Additionally, VNA came out to her home and pt could not properly use her insulin or use glucometer (she has been on insulin for years) so came back to the hospital for safety concerns  Will re-involve neurology as with hx of seizures she gets confused with aphasia, and will also ask geriatrics to evaluate.    Neurology and geriatrics ordered preliminary work up to identify any reversible causes  CTA head/neck reviewed  F/u MRI brain   Will consult neuropsych for capacity evaluation   PT/OT   Type 2 diabetes mellitus, with long-term current use of insulin (HCC)  Lab Results   Component Value Date    HGBA1C 7.3 (H) 03/05/2025       Recent Labs     05/14/25  1635 05/14/25  2044 05/15/25  0734 05/15/25  1132   POCGLU 214* 151* 95 294*       Blood Sugar Average: Last 72 hrs:  (P) 181.25  Patient has history of type 2 diabetes, currently on Lantus 14 units daily, glimepiride and metformin  Discontinue oral antihyperglycemic agent  Sliding scale  Continue  Cognitive impairment  See above.  Sister notes progressive decline over 6-8 months   See above   Focal epilepsy (HCC)  Plan noted above    VTE Pharmacologic Prophylaxis: VTE Score: 6 High Risk (Score >/= 5) - Pharmacological DVT Prophylaxis Ordered: enoxaparin (Lovenox).  Sequential Compression Devices Ordered.    Mobility:   Basic Mobility Inpatient Raw Score: 18  JH-HLM Goal: 6: Walk 10 steps or more  JH-HLM Achieved: 6: Walk 10 steps or more  JH-HLM Goal achieved. Continue to encourage appropriate mobility.    Patient Centered Rounds: I performed bedside rounds with nursing staff today.   Discussions with Specialists or Other Care Team Provider: alin Jimenez d/w CM    Education and Discussions with Family / Patient: Updated  (sister) via phone.    Current Length of Stay: 2 day(s)  Current Patient Status: Inpatient   Certification Statement: The patient will continue to require additional inpatient hospital stay due to safe dc planning  Discharge Plan: Anticipate discharge in 48-72 hrs to rehab facility.    Code Status: Level 1 - Full Code    Subjective   No acute complaints.     Objective :  Temp:  [98.4 °F (36.9 °C)-98.5 °F (36.9 °C)] 98.4 °F (36.9 °C)  HR:  [63-72] 72  BP: (120-134)/(65-78) 134/78  Resp:  [16-18] 16  SpO2:  [97 %-99 %] 99 %  O2 Device: None (Room air)    There is no height or weight on file to calculate BMI.     Input and Output Summary (last 24 hours):     Intake/Output Summary (Last 24 hours) at 5/15/2025 1223  Last data filed at 5/15/2025 0900  Gross per 24 hour   Intake 550 ml   Output --   Net 550 ml       Physical Exam  Vitals reviewed.   Constitutional:       General: She is not in acute distress.     Appearance: She is not toxic-appearing.   HENT:      Head: Normocephalic and atraumatic.     Eyes:      Extraocular Movements: Extraocular movements intact.     Pulmonary:      Effort: Pulmonary effort is normal. No respiratory distress.     Musculoskeletal:         General: Normal range of motion.     Neurological:      General: No focal deficit present.      Mental Status: She is alert.     Psychiatric:         Mood and Affect: Mood normal.         Behavior: Behavior normal.       Lines/Drains:              Lab Results: I have reviewed the  following results:   Results from last 7 days   Lab Units 05/13/25  0628 05/12/25  1347   WBC Thousand/uL 4.92 5.66   HEMOGLOBIN g/dL 12.1 11.7   HEMATOCRIT % 36.3 35.7   PLATELETS Thousands/uL 164 156   SEGS PCT %  --  60   LYMPHO PCT %  --  25   MONO PCT %  --  10   EOS PCT %  --  3     Results from last 7 days   Lab Units 05/13/25  0628   SODIUM mmol/L 141   POTASSIUM mmol/L 3.8   CHLORIDE mmol/L 108   CO2 mmol/L 26   BUN mg/dL 9   CREATININE mg/dL 0.71   ANION GAP mmol/L 7   CALCIUM mg/dL 9.3   ALBUMIN g/dL 3.7   TOTAL BILIRUBIN mg/dL 0.64   ALK PHOS U/L 47   ALT U/L 14   AST U/L 11*   GLUCOSE RANDOM mg/dL 132         Results from last 7 days   Lab Units 05/15/25  1132 05/15/25  0734 05/14/25  2044 05/14/25  1635 05/14/25  1103 05/14/25  0734 05/13/25  2039 05/13/25  1641 05/13/25  1108 05/13/25  0656 05/12/25  2221 05/12/25  1655   POC GLUCOSE mg/dl 294* 95 151* 214* 229* 132 298* 158* 188* 125 197* 94               Recent Cultures (last 7 days):         Imaging Results Review: No pertinent imaging studies reviewed.  Other Study Results Review: No additional pertinent studies reviewed.    Last 24 Hours Medication List:     Current Facility-Administered Medications:     acetaminophen (TYLENOL) tablet 650 mg, Q6H PRN    aspirin chewable tablet 81 mg, Daily    enoxaparin (LOVENOX) subcutaneous injection 40 mg, Daily    insulin glargine (LANTUS) subcutaneous injection 14 Units 0.14 mL, HS    insulin lispro (HumALOG/ADMELOG) 100 units/mL subcutaneous injection 1-6 Units, TID AC **AND** Fingerstick Glucose (POCT), TID AC    levETIRAcetam (KEPPRA) tablet 1,000 mg, Q12H DYAN    polyethylene glycol (MIRALAX) packet 17 g, Daily PRN    pravastatin (PRAVACHOL) tablet 40 mg, Daily With Dinner    senna-docusate sodium (SENOKOT S) 8.6-50 mg per tablet 1 tablet, HS    Administrative Statements   Today, Patient Was Seen By: Heather Negron PA-C      **Please Note: This note may have been constructed using a voice recognition  system.**

## 2025-05-16 ENCOUNTER — PATIENT OUTREACH (OUTPATIENT)
Dept: CASE MANAGEMENT | Facility: OTHER | Age: 65
End: 2025-05-16

## 2025-05-16 ENCOUNTER — APPOINTMENT (INPATIENT)
Dept: RADIOLOGY | Facility: HOSPITAL | Age: 65
DRG: 421 | End: 2025-05-16
Payer: MEDICARE

## 2025-05-16 LAB
GLUCOSE SERPL-MCNC: 160 MG/DL (ref 65–140)
GLUCOSE SERPL-MCNC: 180 MG/DL (ref 65–140)
GLUCOSE SERPL-MCNC: 204 MG/DL (ref 65–140)
GLUCOSE SERPL-MCNC: 208 MG/DL (ref 65–140)
VIT B1 BLD-SCNC: 123 NMOL/L (ref 66.5–200)

## 2025-05-16 PROCEDURE — A9585 GADOBUTROL INJECTION: HCPCS | Performed by: FAMILY MEDICINE

## 2025-05-16 PROCEDURE — 70553 MRI BRAIN STEM W/O & W/DYE: CPT

## 2025-05-16 PROCEDURE — 99232 SBSQ HOSP IP/OBS MODERATE 35: CPT | Performed by: STUDENT IN AN ORGANIZED HEALTH CARE EDUCATION/TRAINING PROGRAM

## 2025-05-16 PROCEDURE — 82948 REAGENT STRIP/BLOOD GLUCOSE: CPT

## 2025-05-16 PROCEDURE — 99232 SBSQ HOSP IP/OBS MODERATE 35: CPT | Performed by: NURSE PRACTITIONER

## 2025-05-16 RX ORDER — INSULIN LISPRO 100 [IU]/ML
1-6 INJECTION, SOLUTION INTRAVENOUS; SUBCUTANEOUS
Status: DISCONTINUED | OUTPATIENT
Start: 2025-05-16 | End: 2025-06-04 | Stop reason: HOSPADM

## 2025-05-16 RX ORDER — LORAZEPAM 2 MG/ML
0.5 INJECTION INTRAMUSCULAR ONCE AS NEEDED
Status: COMPLETED | OUTPATIENT
Start: 2025-05-16 | End: 2025-05-16

## 2025-05-16 RX ORDER — GADOBUTROL 604.72 MG/ML
9 INJECTION INTRAVENOUS
Status: COMPLETED | OUTPATIENT
Start: 2025-05-16 | End: 2025-05-16

## 2025-05-16 RX ADMIN — LORAZEPAM 0.5 MG: 2 INJECTION INTRAMUSCULAR; INTRAVENOUS at 10:15

## 2025-05-16 RX ADMIN — ENOXAPARIN SODIUM 40 MG: 40 INJECTION SUBCUTANEOUS at 08:35

## 2025-05-16 RX ADMIN — INSULIN LISPRO 2 UNITS: 100 INJECTION, SOLUTION INTRAVENOUS; SUBCUTANEOUS at 17:28

## 2025-05-16 RX ADMIN — INSULIN LISPRO 1 UNITS: 100 INJECTION, SOLUTION INTRAVENOUS; SUBCUTANEOUS at 08:35

## 2025-05-16 RX ADMIN — PRAVASTATIN SODIUM 40 MG: 40 TABLET ORAL at 17:28

## 2025-05-16 RX ADMIN — ASPIRIN 81 MG CHEWABLE TABLET 81 MG: 81 TABLET CHEWABLE at 08:35

## 2025-05-16 RX ADMIN — INSULIN LISPRO 1 UNITS: 100 INJECTION, SOLUTION INTRAVENOUS; SUBCUTANEOUS at 21:41

## 2025-05-16 RX ADMIN — SENNOSIDES AND DOCUSATE SODIUM 1 TABLET: 50; 8.6 TABLET ORAL at 21:41

## 2025-05-16 RX ADMIN — INSULIN LISPRO 2 UNITS: 100 INJECTION, SOLUTION INTRAVENOUS; SUBCUTANEOUS at 12:34

## 2025-05-16 RX ADMIN — LEVETIRACETAM 1000 MG: 500 TABLET, FILM COATED ORAL at 21:41

## 2025-05-16 RX ADMIN — POLYETHYLENE GLYCOL 3350 17 G: 17 POWDER, FOR SOLUTION ORAL at 08:35

## 2025-05-16 RX ADMIN — GADOBUTROL 9 ML: 604.72 INJECTION INTRAVENOUS at 10:53

## 2025-05-16 RX ADMIN — INSULIN GLARGINE 14 UNITS: 100 INJECTION, SOLUTION SUBCUTANEOUS at 21:41

## 2025-05-16 RX ADMIN — LEVETIRACETAM 1000 MG: 500 TABLET, FILM COATED ORAL at 08:35

## 2025-05-16 NOTE — PROGRESS NOTES
DENIS VIZCAINO received an [facility] referral for patient for transportation. However, per patient's chart it is documented that patient is is registered with Katty Nelson under [facility] until 10/1/27. For out of county transportation [place] out of county form to be completed by provider's office. Patient is currently admitted at [facility]. DENIS VIZCAINO will follow up for initial outreach when patient is discharged.

## 2025-05-16 NOTE — PROGRESS NOTES
"Progress Note - Neurology   Name: Ynes Mendoza 64 y.o. 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    Workup  - CTA H/N wwo contrast 5/14/25:   \"CT Brain: 1.  No CT evidence of an acute cortical infarct. 2.  Artifact versus trace products layering in the right ventricular atrium. Recommend further evaluation with a noncontrast enhanced brain MRI.   CT Angiography: 3.  Severe stenosis at the P2/P3 junction of the right PCA, new from prior. 4.  No large vessel occlusion.   Other: 5.  Patulous and fluid-filled esophagus, with the patient at risk for aspiration. 6.  A few prominent upper mediastinal lymph nodes, likely reactive.\"  - MRI brain NeuroQuant wwo contrast 5/16/25:   \"No acute intracranial pathology. Chronic microangiopathy. NeuroQuant analysis was performed: Normal hippocampal volume and enlarged ventricular system: Findings do not support hippocampal degeneration. Possible expansion of ventricular system without medial temporal lobe focused ex-vacuo process.\"  - Labs   - vitamin B6 pending   - RPR non-reactive   - vitamin B1 123   - vitamin B12 476  - folate >22.3  - TSH 0.989    Strong suspicion for underlying dementia process which will ultimately require work-up in the outpatient setting.  Imaging and lab work obtained to rule out any reversible causes.     Plan:   - continue home ASA 81 mg daily   - continue home statin   - Supportive care as per primary team.   - Patient will need formal neuro-cognitive assessment as an outpatient.  - Monitor exam and notify with changes.  Focal epilepsy (HCC)  - Continue on Keppra 1000 mg BID.  - Maintain seizure precautions.  Type 2 diabetes mellitus, with long-term current use of insulin (HCC)  Lab " Results   Component Value Date    HGBA1C 7.3 (H) 03/05/2025       Recent Labs     05/15/25  1627 05/15/25  2030 05/16/25  0723 05/16/25  1139   POCGLU 166* 209* 160* 204*       Blood Sugar Average: Last 72 hrs:  (P) 187.234196262777    - Goal euglycemia.   - Management as per primary team.    Case and plan discussed with attending neurologist.  Please see attending attestation for any further recommendations and/or changes to plan.      Ynes Mendoza will need follow-up in in 6 weeks with general neurology team for Other in 60 minute appointment. They will not require outpatient neurological testing.       Subjective   Pt reports no pain today. Pt denies dizziness. Pt denies headache today.     Review of Systems   Neurological:  Negative for dizziness and headaches.         Objective :  Temp:  [98.4 °F (36.9 °C)-98.9 °F (37.2 °C)] 98.4 °F (36.9 °C)  HR:  [64-91] 85  BP: ()/(58-78) 115/78  Resp:  [16-18] 16  SpO2:  [99 %-100 %] 100 %  O2 Device: None (Room air)    Physical Exam  Vitals and nursing note reviewed.   HENT:      Head: Normocephalic and atraumatic.     Eyes:      Extraocular Movements: Extraocular movements intact.       Cardiovascular:      Rate and Rhythm: Normal rate.   Pulmonary:      Effort: Pulmonary effort is normal.     Neurological:      Mental Status: She is alert.      Cranial Nerves: No dysarthria.     Neurological Exam  Mental Status  Alert. Recalls 3 of 3 objects immediately. At 5 minutes recalls 0 of 3 objects. Recalls 0 of 3 objects with prompting. no dysarthria present. Able to name objects and repeat. Follows one-step commands.  - oriented to self  - oriented to place  - oriented to month   - oriented to year   .    Cranial Nerves  CN II: Right visual acuity: Counts fingers. Left visual acuity: Counts fingers. Right normal visual field. Left normal visual field.  CN III, IV, VI: Extraocular movements intact bilaterally.  CN V: Facial sensation is normal.  CN VII: Full and  symmetric facial movement.  CN XII: Tongue midline without atrophy or fasciculations.    - hearing grossly intact .    Motor                                               Right                     Left   Shoulder abduction               5                          5  Elbow flexion                         5                          5  Elbow extension                    5                          5  Finger flexion                         5                          5  Hip flexion                              5                          5  Plantarflexion                         5                          5  Dorsiflexion                            5                          5    Sensory  Light touch is normal in upper and lower extremities.  Right extinction absent: Left extinction absent:    Coordination  Right: Finger-to-nose normal.Left: Finger-to-nose normal.        Lab Results: I have reviewed the following results:  I personally reviewed MRI brain NeuroQuant wwo contrast in [facility] and reviewed associated reports.   Other Study Results Review: Other studies reviewed include: Echo 2/7/25    VTE Pharmacologic Prophylaxis: Enoxaparin (Lovenox)    This note was completed in part utilizing Dragon Software.  Grammatical errors, random word insertions, spelling mistakes, and incomplete sentences may be an occasional consequence of this system secondary to software limitations, ambient noise, and hardware issues.  If you have any questions or concerns about the content, text, or information contained within the body of this dictation, please contact the provider for clarification.

## 2025-05-16 NOTE — ASSESSMENT & PLAN NOTE
Lab Results   Component Value Date    HGBA1C 7.3 (H) 03/05/2025       Recent Labs     05/15/25  1627 05/15/25  2030 05/16/25  0723 05/16/25  1139   POCGLU 166* 209* 160* 204*       Blood Sugar Average: Last 72 hrs:  (P) 187.1619089941931430    - Goal euglycemia.   - Management as per primary team.

## 2025-05-16 NOTE — ASSESSMENT & PLAN NOTE
Lab Results   Component Value Date    HGBA1C 7.3 (H) 03/05/2025       Recent Labs     05/15/25  1627 05/15/25  2030 05/16/25  0723 05/16/25  1139   POCGLU 166* 209* 160* 204*       Blood Sugar Average: Last 72 hrs:  (P) [id number]  Patient has history of type 2 diabetes, currently on Lantus 14 units daily, glimepiride and metformin  Discontinue oral antihyperglycemic agent  Sliding scale  Increased sliding scale algo 3   Will need to add meal time coverage if elevated in am   Continue

## 2025-05-16 NOTE — ASSESSMENT & PLAN NOTE
"63 yo female with DM2, focal aware seizures, trifascicular block s/p PPM January 2025 who presents with inability to care for herself.    Workup  - CTA H/N wwo contrast 5/14/25:   \"CT Brain: 1.  No CT evidence of an acute cortical infarct. 2.  Artifact versus trace products layering in the right ventricular atrium. Recommend further evaluation with a noncontrast enhanced brain MRI.   CT Angiography: 3.  Severe stenosis at the P2/P3 junction of the right PCA, new from prior. 4.  No large vessel occlusion.   Other: 5.  Patulous and fluid-filled esophagus, with the patient at risk for aspiration. 6.  A few prominent upper mediastinal lymph nodes, likely reactive.\"  - MRI brain NeuroQuant wwo contrast 5/16/25:   \"No acute intracranial pathology. Chronic microangiopathy. NeuroQuant analysis was performed: Normal hippocampal volume and enlarged ventricular system: Findings do not support hippocampal degeneration. Possible expansion of ventricular system without medial temporal lobe focused ex-vacuo process.\"  - Labs   - vitamin B6 pending   - RPR non-reactive   - vitamin B1 123   - vitamin B12 476  - folate >22.3  - TSH 0.989    Strong suspicion for underlying dementia process which will ultimately require work-up in the outpatient setting.  Imaging and lab work obtained to rule out any reversible causes.     Plan:   - continue home ASA 81 mg daily   - continue home statin   - Supportive care as per primary team.   - Patient will need formal neuro-cognitive assessment as an outpatient.  - Monitor exam and notify with changes.  "

## 2025-05-16 NOTE — PLAN OF CARE
Problem: PAIN - ADULT  Goal: Verbalizes/displays adequate comfort level or baseline comfort level  Description: Interventions:- Encourage patient to monitor pain and request assistance- Assess pain using appropriate pain scale- Administer analgesics based on type and severity of pain and evaluate response- Implement non-pharmacological measures as appropriate and evaluate response- Consider cultural and social influences on pain and pain management- Notify physician/advanced practitioner if interventions unsuccessful or patient reports new pain  Outcome: Progressing     Problem: INFECTION - ADULT  Goal: Absence or prevention of progression during hospitalization  Description: INTERVENTIONS:- Assess and monitor for signs and symptoms of infection- Monitor lab/diagnostic results- Monitor all insertion sites, i.e. indwelling lines, tubes, and drains- Monitor endotracheal if appropriate and nasal secretions for changes in amount and color- [place] appropriate cooling/warming therapies per order- Administer medications as ordered- Instruct and encourage patient and family to use good hand hygiene technique- Identify and instruct in appropriate isolation precautions for identified infection/condition  Outcome: Progressing     Problem: SAFETY ADULT  Goal: Patient will remain free of falls  Description: INTERVENTIONS:- Educate patient/family on patient safety including physical limitations- Instruct patient to call for assistance with activity - Consult OT/PT to assist with strengthening/mobility - Keep Call bell within reach- Keep bed low and locked with side rails adjusted as appropriate- Keep care items and personal belongings within reach- Initiate and maintain comfort rounds- Make Fall Risk Sign visible to staff  Outcome: Progressing  Goal: Maintain or return to baseline ADL function  Description: INTERVENTIONS:-  Assess patient's ability to carry out ADLs; assess patient's baseline for ADL function and identify  physical deficits which impact ability to perform ADLs (bathing, care of mouth/teeth, toileting, grooming, dressing, etc.)- Assess/evaluate cause of self-care deficits - Assess range of motion- Assess patient's mobility; develop plan if impaired- Assess patient's need for assistive devices and provide as appropriate- Encourage maximum independence but intervene and supervise when necessary- Involve family in performance of ADLs- Assess for home care needs following discharge - Consider OT consult to assist with ADL evaluation and planning for discharge- Provide patient education as appropriate  Outcome: Progressing  Goal: Maintains/Returns to pre admission functional level  Description: INTERVENTIONS:- Perform AM-PAC 6 Click Basic Mobility/ Daily Activity assessment daily.- Set and communicate daily mobility goal to care team and patient/family/caregiver. - Collaborate with rehabilitation services on mobility goals if consulted  Outcome: Progressing     Problem: DISCHARGE PLANNING  Goal: Discharge to home or other facility with appropriate resources  Description: INTERVENTIONS:- Identify barriers to discharge w/patient and caregiver- Arrange for needed discharge resources and transportation as appropriate- Identify discharge learning needs (meds, wound care, etc.)- Arrange for interpretive services to assist at discharge as needed- Refer to Case Management Department for coordinating discharge planning if the patient needs post-hospital services based on physician/advanced practitioner order or complex needs related to functional status, cognitive ability, or social support system  Outcome: Progressing     Problem: Knowledge Deficit  Goal: Patient/family/caregiver demonstrates understanding of disease process, treatment plan, medications, and discharge instructions  Description: Complete learning assessment and assess knowledge base.Interventions:- Provide teaching at level of understanding- Provide teaching via  preferred learning methods  Outcome: Progressing

## 2025-05-16 NOTE — ASSESSMENT & PLAN NOTE
Patient was recently admitted from 5/1-5/9 to neurology service for breakthrough seizures in the setting of medication noncompliance.   There were concerns brought up by family that admission regarding cognitive impairment/memory loss and ability to care for herself.    Scored 13/30 on MOCA done 5/7.  Was overall deemed capable to live alone  Pt was initially discharged and stayed with her brother before he took her back to her home.  She does not recall details of this.  Additionally, VNA came out to her home and pt could not properly use her insulin or use glucometer (she has been on insulin for years) so came back to the hospital for safety concerns  Will re-involve neurology as with hx of seizures she gets confused with aphasia, and will also ask geriatrics to evaluate.    Neurology and geriatrics ordered preliminary work up to identify any reversible causes  Recommending home aspirin 81 mg daily  Continue Keppra  CTA head/neck: No significant large vessel arterial occlusion or significant flow stenosis.  MRI brain 5/16: No acute findings   Labs outstanding: Vitamin B6 pending  RPR nonreactive, TSH 0.989, folate greater than 22.3, vitamin B12 is 476, vitamin B 1 is 123  Patient performed in the impaired range of functioning.  Patient does not appear to have capacity to make fully informed medical decisions per neuropsych  PT/OT -recommend rehab

## 2025-05-16 NOTE — PROGRESS NOTES
Progress Note - Hospitalist   Name: Ynes Mendoza 64 y.o. female I MRN: [id number]  Unit/Bed#: [facility] 626-01 I Date of Admission: 5/12/2025   Date of Service: 5/16/2025 I Hospital Day: 3    Assessment & Plan  Failure to thrive in adult  Patient was recently admitted from 5/1-5/9 to neurology service for breakthrough seizures in the setting of medication noncompliance.   There were concerns brought up by family that admission regarding cognitive impairment/memory loss and ability to care for herself.    Scored 13/30 on MOCA done 5/7.  Was overall deemed capable to live alone  Pt was initially discharged and stayed with her brother before he took her back to her home.  She does not recall details of this.  Additionally, VNA came out to her home and pt could not properly use her insulin or use glucometer (she has been on insulin for years) so came back to the hospital for safety concerns  Will re-involve neurology as with hx of seizures she gets confused with aphasia, and will also ask geriatrics to evaluate.    Neurology and geriatrics ordered preliminary work up to identify any reversible causes  Recommending home aspirin 81 mg daily  Continue Keppra  CTA head/neck: No significant large vessel arterial occlusion or significant flow stenosis.  MRI brain 5/16: No acute findings   Labs outstanding: Vitamin B6 pending  RPR nonreactive, TSH 0.989, folate greater than 22.3, vitamin B12 is 476, vitamin B 1 is 123  Patient performed in the impaired range of functioning.  Patient does not appear to have capacity to make fully informed medical decisions per neuropsych  PT/OT -recommend rehab  Type 2 diabetes mellitus, with long-term current use of insulin ([facility])  Lab Results   Component Value Date    HGBA1C 7.3 (H) 03/05/2025       Recent Labs     05/15/25  1627 05/15/25  2030 05/16/25  0723 05/16/25  1139   POCGLU 166* 209* 160* 204*       Blood Sugar Average: Last 72 hrs:  (P) 187.0554940686620538  Patient has history of type 2  diabetes, currently on Lantus 14 units daily, glimepiride and metformin  Discontinue oral antihyperglycemic agent  Sliding scale  Increased sliding scale algo 3   Will need to add meal time coverage if elevated in am   Continue  Cognitive impairment  See above.  Sister notes progressive decline over 6-8 months   See above   Focal epilepsy (HCC)  Continue Keppra  Plan noted above    VTE Pharmacologic Prophylaxis: VTE Score: 6 High Risk (Score >/= 5) - Pharmacological DVT Prophylaxis Ordered: enoxaparin (Lovenox). Sequential Compression Devices Ordered.    Mobility:   Basic Mobility Inpatient Raw Score: 18  JH-HLM Goal: 6: Walk 10 steps or more  JH-HLM Achieved: 4: Move to chair/commode  JH-HLM Goal NOT achieved. Continue with multidisciplinary rounding and encourage appropriate mobility to improve upon JH-HLM goals.    Patient Centered Rounds: I performed bedside rounds with nursing staff today.   Discussions with Specialists or Other Care Team Provider: nursing    Education and Discussions with Family / Patient: Updated  (sister) via phone.    Current Length of Stay: 3 day(s)  Current Patient Status: Inpatient   Certification Statement: The patient will continue to require additional inpatient hospital stay due to need for rehab with no capacity currently optioning paper work in process family request that she is in rehab closer to where they live   Discharge Plan: Anticipate discharge in >72 hrs to rehab facility.    Code Status: Level 1 - Full Code    Subjective   Pt is doing well she is comfortable no pain no headaches . She is able to provide some hx but clearly with memory deficit. She frequently starts conversation unable to complete a thought.     Objective :  Temp:  [98.3 °F (36.8 °C)-98.9 °F (37.2 °C)] 98.3 °F (36.8 °C)  HR:  [67-91] 67  BP: ()/(59-78) 93/61  Resp:  [16-18] 18  SpO2:  [98 %-100 %] 98 %  O2 Device: None (Room air)    There is no height or weight on file to calculate BMI.      Input and Output Summary (last 24 hours):     Intake/Output Summary (Last 24 hours) at 5/16/2025 1608  Last data filed at 5/16/2025 0439  Gross per 24 hour   Intake 1080 ml   Output --   Net 1080 ml       Physical Exam  Constitutional:       General: She is not in acute distress.     Appearance: She is not ill-appearing, toxic-appearing or diaphoretic.   HENT:      Head: Normocephalic and atraumatic.      Nose: No congestion.     Eyes:      General:         Right eye: No discharge.         Left eye: No discharge.       Cardiovascular:      Rate and Rhythm: Normal rate.      Heart sounds: No murmur heard.     No friction rub. No gallop.   Pulmonary:      Effort: No respiratory distress.      Breath sounds: No stridor. No wheezing, rhonchi or rales.   Chest:      Chest wall: No tenderness.   Abdominal:      General: There is no distension.      Palpations: There is no mass.      Tenderness: There is no abdominal tenderness. There is no guarding or rebound.      Hernia: No hernia is present.     Musculoskeletal:         General: No swelling, tenderness, deformity or signs of injury.      Right lower leg: No edema.      Left lower leg: No edema.     Skin:     Coloration: Skin is not jaundiced or pale.      Findings: No bruising, erythema, lesion or rash.     Neurological:      Mental Status: She is alert and oriented to person, place, and time.     Psychiatric:         Behavior: Behavior normal.         Lines/Drains:        Lab Results: I have reviewed the following results:   Results from last 7 days   Lab Units 05/13/25  0628 05/12/25  1347   WBC Thousand/uL 4.92 5.66   HEMOGLOBIN g/dL 12.1 11.7   HEMATOCRIT % 36.3 35.7   PLATELETS Thousands/uL 164 156   SEGS PCT %  --  60   LYMPHO PCT %  --  25   MONO PCT %  --  10   EOS PCT %  --  3     Results from last 7 days   Lab Units 05/13/25  0628   SODIUM mmol/L 141   POTASSIUM mmol/L 3.8   CHLORIDE mmol/L 108   CO2 mmol/L 26   BUN mg/dL 9   CREATININE mg/dL 0.71   ANION  GAP mmol/L 7   CALCIUM mg/dL 9.3   ALBUMIN g/dL 3.7   TOTAL BILIRUBIN mg/dL 0.64   ALK PHOS U/L 47   ALT U/L 14   AST U/L 11*   GLUCOSE RANDOM mg/dL 132         Results from last 7 days   Lab Units 05/16/25  1139 05/16/25  0723 05/15/25  2030 05/15/25  1627 05/15/25  1132 05/15/25  0734 05/14/25  2044 05/14/25  1635 05/14/25  1103 05/14/25  0734 05/13/25  2039 05/13/25  1641   POC GLUCOSE mg/dl 204* 160* 209* 166* 294* 95 151* 214* 229* 132 298* 158*               Recent Cultures (last 7 days):         Imaging Results Review: I reviewed radiology reports from this admission including: MRI brain.  Other Study Results Review: No additional pertinent studies reviewed.    Last 24 Hours Medication List:     Current Facility-Administered Medications:     acetaminophen (TYLENOL) tablet 650 mg, Q6H PRN    aspirin chewable tablet 81 mg, Daily    enoxaparin (LOVENOX) subcutaneous injection 40 mg, Daily    insulin glargine (LANTUS) subcutaneous injection 14 Units 0.14 mL, HS    insulin lispro (HumALOG/ADMELOG) 100 units/mL subcutaneous injection 1-6 Units, TID AC **AND** Fingerstick Glucose (POCT), TID AC    levETIRAcetam (KEPPRA) tablet 1,000 mg, Q12H DYAN    polyethylene glycol (MIRALAX) packet 17 g, Daily PRN    pravastatin (PRAVACHOL) tablet 40 mg, Daily With Dinner    senna-docusate sodium (SENOKOT S) 8.6-50 mg per tablet 1 tablet, HS    Administrative Statements   Today, Patient Was Seen By: NAYA Arita      **Please Note: This note may have been constructed using a voice recognition system.**

## 2025-05-17 LAB
ALBUMIN SERPL BCG-MCNC: 3.5 G/DL (ref 3.5–5)
ALP SERPL-CCNC: 49 U/L (ref 34–104)
ALT SERPL W P-5'-P-CCNC: 11 U/L (ref 7–52)
ANION GAP SERPL CALCULATED.3IONS-SCNC: 9 MMOL/L (ref 4–13)
AST SERPL W P-5'-P-CCNC: 10 U/L (ref 13–39)
BASOPHILS # BLD AUTO: 0.08 THOUSANDS/ÂΜL (ref 0–0.1)
BASOPHILS NFR BLD AUTO: 1 % (ref 0–1)
BILIRUB SERPL-MCNC: 0.59 MG/DL (ref 0.2–1)
BUN SERPL-MCNC: 11 MG/DL (ref 5–25)
CALCIUM SERPL-MCNC: 9.4 MG/DL (ref 8.4–10.2)
CHLORIDE SERPL-SCNC: 107 MMOL/L (ref 96–108)
CO2 SERPL-SCNC: 26 MMOL/L (ref 21–32)
CREAT SERPL-MCNC: 0.92 MG/DL (ref 0.6–1.3)
EOSINOPHIL # BLD AUTO: 0.24 THOUSAND/ÂΜL (ref 0–0.61)
EOSINOPHIL NFR BLD AUTO: 4 % (ref 0–6)
ERYTHROCYTE [DISTWIDTH] IN BLOOD BY AUTOMATED COUNT: 12.4 % (ref 11.6–15.1)
GFR SERPL CREATININE-BSD FRML MDRD: 66 ML/MIN/1.73SQ M
GLUCOSE SERPL-MCNC: 121 MG/DL (ref 65–140)
GLUCOSE SERPL-MCNC: 126 MG/DL (ref 65–140)
GLUCOSE SERPL-MCNC: 155 MG/DL (ref 65–140)
GLUCOSE SERPL-MCNC: 183 MG/DL (ref 65–140)
GLUCOSE SERPL-MCNC: 271 MG/DL (ref 65–140)
HCT VFR BLD AUTO: 36.9 % (ref 34.8–46.1)
HGB BLD-MCNC: 11.8 G/DL (ref 11.5–15.4)
IMM GRANULOCYTES # BLD AUTO: 0.05 THOUSAND/UL (ref 0–0.2)
IMM GRANULOCYTES NFR BLD AUTO: 1 % (ref 0–2)
LYMPHOCYTES # BLD AUTO: 1.7 THOUSANDS/ÂΜL (ref 0.6–4.47)
LYMPHOCYTES NFR BLD AUTO: 27 % (ref 14–44)
MCH RBC QN AUTO: 28.4 PG (ref 26.8–34.3)
MCHC RBC AUTO-ENTMCNC: 32 G/DL (ref 31.4–37.4)
MCV RBC AUTO: 89 FL (ref 82–98)
MONOCYTES # BLD AUTO: 0.55 THOUSAND/ÂΜL (ref 0.17–1.22)
MONOCYTES NFR BLD AUTO: 9 % (ref 4–12)
NEUTROPHILS # BLD AUTO: 3.69 THOUSANDS/ÂΜL (ref 1.85–7.62)
NEUTS SEG NFR BLD AUTO: 58 % (ref 43–75)
NRBC BLD AUTO-RTO: 0 /100 WBCS
PLATELET # BLD AUTO: 188 THOUSANDS/UL (ref 149–390)
PMV BLD AUTO: 11.6 FL (ref 8.9–12.7)
POTASSIUM SERPL-SCNC: 4 MMOL/L (ref 3.5–5.3)
PROT SERPL-MCNC: 6.4 G/DL (ref 6.4–8.4)
RBC # BLD AUTO: 4.15 MILLION/UL (ref 3.81–5.12)
SODIUM SERPL-SCNC: 142 MMOL/L (ref 135–147)
WBC # BLD AUTO: 6.31 THOUSAND/UL (ref 4.31–10.16)

## 2025-05-17 PROCEDURE — 99232 SBSQ HOSP IP/OBS MODERATE 35: CPT | Performed by: NURSE PRACTITIONER

## 2025-05-17 PROCEDURE — 82948 REAGENT STRIP/BLOOD GLUCOSE: CPT

## 2025-05-17 PROCEDURE — 85025 COMPLETE CBC W/AUTO DIFF WBC: CPT | Performed by: NURSE PRACTITIONER

## 2025-05-17 PROCEDURE — 80053 COMPREHEN METABOLIC PANEL: CPT | Performed by: NURSE PRACTITIONER

## 2025-05-17 RX ORDER — LORATADINE 10 MG/1
10 TABLET ORAL ONCE
Status: COMPLETED | OUTPATIENT
Start: 2025-05-17 | End: 2025-05-17

## 2025-05-17 RX ADMIN — LEVETIRACETAM 1000 MG: 500 TABLET, FILM COATED ORAL at 21:11

## 2025-05-17 RX ADMIN — ASPIRIN 81 MG CHEWABLE TABLET 81 MG: 81 TABLET CHEWABLE at 08:36

## 2025-05-17 RX ADMIN — LEVETIRACETAM 1000 MG: 500 TABLET, FILM COATED ORAL at 08:36

## 2025-05-17 RX ADMIN — LORATADINE 10 MG: 10 TABLET ORAL at 06:44

## 2025-05-17 RX ADMIN — PRAVASTATIN SODIUM 40 MG: 40 TABLET ORAL at 16:59

## 2025-05-17 RX ADMIN — INSULIN GLARGINE 14 UNITS: 100 INJECTION, SOLUTION SUBCUTANEOUS at 21:11

## 2025-05-17 RX ADMIN — SENNOSIDES AND DOCUSATE SODIUM 1 TABLET: 50; 8.6 TABLET ORAL at 21:11

## 2025-05-17 RX ADMIN — INSULIN LISPRO 1 UNITS: 100 INJECTION, SOLUTION INTRAVENOUS; SUBCUTANEOUS at 08:36

## 2025-05-17 RX ADMIN — ENOXAPARIN SODIUM 40 MG: 40 INJECTION SUBCUTANEOUS at 08:36

## 2025-05-17 RX ADMIN — INSULIN LISPRO 4 UNITS: 100 INJECTION, SOLUTION INTRAVENOUS; SUBCUTANEOUS at 21:13

## 2025-05-17 RX ADMIN — POLYETHYLENE GLYCOL 3350 17 G: 17 POWDER, FOR SOLUTION ORAL at 08:43

## 2025-05-17 RX ADMIN — INSULIN LISPRO 1 UNITS: 100 INJECTION, SOLUTION INTRAVENOUS; SUBCUTANEOUS at 12:35

## 2025-05-17 NOTE — ASSESSMENT & PLAN NOTE
Lab Results   Component Value Date    HGBA1C 7.3 (H) 03/05/2025       Recent Labs     05/16/25  1611 05/16/25  2048 05/17/25  0829 05/17/25  1234   POCGLU 208* 180* 155* 183*       Blood Sugar Average: Last 72 hrs:  (P) [id number]  Patient has history of type 2 diabetes, currently on Lantus 14 units daily, glimepiride and metformin  Discontinue oral antihyperglycemic agent  Sliding scale  Increased sliding scale algo 3   Will need to add meal time coverage if elevated in am   Fasting blood sugar this a.m. 126, blood sugars appear appropriate at this point

## 2025-05-17 NOTE — ASSESSMENT & PLAN NOTE
Patient was recently admitted from 5/1-5/9 to neurology service for breakthrough seizures in the setting of medication noncompliance.   There were concerns brought up by family that admission regarding cognitive impairment/memory loss and ability to care for herself.    Scored 13/30 on MOCA done 5/7.  Was overall deemed capable to live alone  Pt was initially discharged and stayed with her brother before he took her back to her home.  She does not recall details of this.  Additionally, VNA came out to her home and pt could not properly use her insulin or use glucometer (she has been on insulin for years) so came back to the hospital for safety concerns  Will re-involve neurology as with hx of seizures she gets confused with aphasia, and will also ask geriatrics to evaluate.    Neurology and geriatrics ordered preliminary work up to identify any reversible causes  Recommending home aspirin 81 mg daily  Continue Keppra  CTA head/neck: No significant large vessel arterial occlusion or significant flow stenosis.  MRI brain 5/16: No acute findings demonstrating normal hippocampal volume  Recommending formal neurocognitive evaluation in the outpatient setting.  Labs outstanding: Vitamin B6 pending  RPR nonreactive, TSH 0.989, folate greater than 22.3, vitamin B12 is 476, vitamin B 1 is 123  Patient performed in the impaired range of functioning.  Patient does not appear to have capacity to make fully informed medical decisions per neuropsych  PT/OT -recommend rehab

## 2025-05-17 NOTE — PLAN OF CARE
Problem: PAIN - ADULT  Goal: Verbalizes/displays adequate comfort level or baseline comfort level  Description: Interventions:- Encourage patient to monitor pain and request assistance- Assess pain using appropriate pain scale- Administer analgesics based on type and severity of pain and evaluate response- Implement non-pharmacological measures as appropriate and evaluate response- Consider cultural and social influences on pain and pain management- Notify physician/advanced practitioner if interventions unsuccessful or patient reports new pain  Outcome: Progressing     Problem: INFECTION - ADULT  Goal: Absence or prevention of progression during hospitalization  Description: INTERVENTIONS:- Assess and monitor for signs and symptoms of infection- Monitor lab/diagnostic results- Monitor all insertion sites, i.e. indwelling lines, tubes, and drains- Monitor endotracheal if appropriate and nasal secretions for changes in amount and color- [place] appropriate cooling/warming therapies per order- Administer medications as ordered- Instruct and encourage patient and family to use good hand hygiene technique- Identify and instruct in appropriate isolation precautions for identified infection/condition  Outcome: Progressing     Problem: SAFETY ADULT  Goal: Patient will remain free of falls  Description: INTERVENTIONS:- Educate patient/family on patient safety including physical limitations- Instruct patient to call for assistance with activity - Consult OT/PT to assist with strengthening/mobility - Keep Call bell within reach- Keep bed low and locked with side rails adjusted as appropriate- Keep care items and personal belongings within reach- Initiate and maintain comfort rounds- Make Fall Risk Sign visible to staff  Outcome: Progressing  Goal: Maintain or return to baseline ADL function  Description: INTERVENTIONS:-  Assess patient's ability to carry out ADLs; assess patient's baseline for ADL function and identify  physical deficits which impact ability to perform ADLs (bathing, care of mouth/teeth, toileting, grooming, dressing, etc.)- Assess/evaluate cause of self-care deficits - Assess range of motion- Assess patient's mobility; develop plan if impaired- Assess patient's need for assistive devices and provide as appropriate- Encourage maximum independence but intervene and supervise when necessary- Involve family in performance of ADLs- Assess for home care needs following discharge - Consider OT consult to assist with ADL evaluation and planning for discharge- Provide patient education as appropriate  Outcome: Progressing  Goal: Maintains/Returns to pre admission functional level  Description: INTERVENTIONS:- Perform AM-PAC 6 Click Basic Mobility/ Daily Activity assessment daily.- Set and communicate daily mobility goal to care team and patient/family/caregiver. - Collaborate with rehabilitation services on mobility goals if consulted  Outcome: Progressing     Problem: DISCHARGE PLANNING  Goal: Discharge to home or other facility with appropriate resources  Description: INTERVENTIONS:- Identify barriers to discharge w/patient and caregiver- Arrange for needed discharge resources and transportation as appropriate- Identify discharge learning needs (meds, wound care, etc.)- Arrange for interpretive services to assist at discharge as needed- Refer to Case Management Department for coordinating discharge planning if the patient needs post-hospital services based on physician/advanced practitioner order or complex needs related to functional status, cognitive ability, or social support system  Outcome: Progressing     Problem: Knowledge Deficit  Goal: Patient/family/caregiver demonstrates understanding of disease process, treatment plan, medications, and discharge instructions  Description: Complete learning assessment and assess knowledge base.Interventions:- Provide teaching at level of understanding- Provide teaching via  preferred learning methods  Outcome: Progressing

## 2025-05-17 NOTE — PROGRESS NOTES
Progress Note - Hospitalist   Name: Ynes Mendoza 64 y.o. female I MRN: [id number]  Unit/Bed#: [facility] 626-01 I Date of Admission: 5/12/2025   Date of Service: 5/17/2025 I Hospital Day: 4    Assessment & Plan  Failure to thrive in adult  Patient was recently admitted from 5/1-5/9 to neurology service for breakthrough seizures in the setting of medication noncompliance.   There were concerns brought up by family that admission regarding cognitive impairment/memory loss and ability to care for herself.    Scored 13/30 on MOCA done 5/7.  Was overall deemed capable to live alone  Pt was initially discharged and stayed with her brother before he took her back to her home.  She does not recall details of this.  Additionally, [facility]NA came out to her home and pt could not properly use her insulin or use glucometer (she has been on insulin for years) so came back to the hospital for safety concerns  Will re-involve neurology as with hx of seizures she gets confused with aphasia, and will also ask geriatrics to evaluate.    Neurology and geriatrics ordered preliminary work up to identify any reversible causes  Recommending home aspirin 81 mg daily  Continue Keppra  CTA head/neck: No significant large vessel arterial occlusion or significant flow stenosis.  MRI brain 5/16: No acute findings demonstrating normal hippocampal volume  Recommending formal neurocognitive evaluation in the outpatient setting.  Labs outstanding: Vitamin B6 pending  RPR nonreactive, TSH 0.989, folate greater than 22.3, vitamin B12 is 476, vitamin B 1 is 123  Patient performed in the impaired range of functioning.  Patient does not appear to have capacity to make fully informed medical decisions per neuropsych  PT/OT -recommend rehab  Type 2 diabetes mellitus, with long-term current use of insulin ([facility])  Lab Results   Component Value Date    HGBA1C 7.3 (H) 03/05/2025       Recent Labs     05/16/25  1611 05/16/25  2048 05/17/25  0829 05/17/25  1234   POCGLU  208* 180* 155* 183*       Blood Sugar Average: Last 72 hrs:  (P) 184.0369547785330765  Patient has history of type 2 diabetes, currently on Lantus 14 units daily, glimepiride and metformin  Discontinue oral antihyperglycemic agent  Sliding scale  Increased sliding scale algo 3   Will need to add meal time coverage if elevated in am   Fasting blood sugar this a.m. 126, blood sugars appear appropriate at this point  Cognitive impairment  See above.  Sister notes progressive decline over 6-8 months   See above   Focal epilepsy (HCC)  Continue Keppra  Plan noted above    VTE Pharmacologic Prophylaxis: VTE Score: 6 High Risk (Score >/= 5) - Pharmacological DVT Prophylaxis Ordered: enoxaparin (Lovenox). Sequential Compression Devices Ordered.    Mobility:   Basic Mobility Inpatient Raw Score: 19  JH-HLM Goal: 6: Walk 10 steps or more  JH-HLM Achieved: 6: Walk 10 steps or more  JH-HLM Goal achieved. Continue to encourage appropriate mobility.    Patient Centered Rounds: I performed bedside rounds with nursing staff today.   Discussions with Specialists or Other Care Team Provider: Nursing    Education and Discussions with Family / Patient: Patient update sister tomorrow.     Current Length of Stay: 4 day(s)  Current Patient Status: Inpatient   Certification Statement: The patient will continue to require additional inpatient hospital stay due to pending  Discharge Plan: Anticipate discharge in >72 hrs to rehab facility.    Code Status: Level 1 - Full Code    Subjective   Long discussion had with patient today.  She is pleasant and able to answer some questions appropriately however midsentence patient will drift off as she does not complete her thoughts.  She denies any discomfort pain nausea vomiting shortness .     Objective :  Temp:  [98.5 °F (36.9 °C)-98.7 °F (37.1 °C)] 98.7 °F (37.1 °C)  HR:  [62-69] 69  BP: (107-121)/(64-69) 107/64  Resp:  [16-18] 18  SpO2:  [97 %-98 %] 97 %  O2 Device: None (Room air)    There is no  height or weight on file to calculate BMI.     Input and Output Summary (last 24 hours):     Intake/Output Summary (Last 24 hours) at 5/17/2025 1619  Last data filed at 5/17/2025 1238  Gross per 24 hour   Intake 420 ml   Output 0 ml   Net 420 ml       Physical Exam  Constitutional:       General: She is not in acute distress.     Appearance: She is not ill-appearing, toxic-appearing or diaphoretic.   HENT:      Head: Normocephalic and atraumatic.     Eyes:      General:         Right eye: No discharge.         Left eye: No discharge.       Cardiovascular:      Rate and Rhythm: Normal rate.      Heart sounds: No murmur heard.     No friction rub. No gallop.   Pulmonary:      Effort: No respiratory distress.      Breath sounds: No stridor. No wheezing, rhonchi or rales.   Chest:      Chest wall: No tenderness.   Abdominal:      General: There is no distension.      Palpations: There is no mass.      Tenderness: There is no abdominal tenderness. There is no guarding or rebound.      Hernia: No hernia is present.     Musculoskeletal:         General: No swelling, tenderness, deformity or signs of injury.      Right lower leg: No edema.      Left lower leg: No edema.     Skin:     Coloration: Skin is not jaundiced or pale.      Findings: No bruising, erythema, lesion or rash.     Neurological:      Mental Status: She is alert.      Comments: Alert 2-3 but struggles with short-term memory   Psychiatric:         Behavior: Behavior normal.           Lines/Drains:              Lab Results: I have reviewed the following results:   Results from last 7 days   Lab Units 05/17/25  0515   WBC Thousand/uL 6.31   HEMOGLOBIN g/dL 11.8   HEMATOCRIT % 36.9   PLATELETS Thousands/uL 188   SEGS PCT % 58   LYMPHO PCT % 27   MONO PCT % 9   EOS PCT % 4     Results from last 7 days   Lab Units 05/17/25  0515   SODIUM mmol/L 142   POTASSIUM mmol/L 4.0   CHLORIDE mmol/L 107   CO2 mmol/L 26   BUN mg/dL 11   CREATININE mg/dL 0.92   ANION GAP  mmol/L 9   CALCIUM mg/dL 9.4   ALBUMIN g/dL 3.5   TOTAL BILIRUBIN mg/dL 0.59   ALK PHOS U/L 49   ALT U/L 11   AST U/L 10*   GLUCOSE RANDOM mg/dL 126         Results from last 7 days   Lab Units 05/17/25  1234 05/17/25  0829 05/16/25  2048 05/16/25  1611 05/16/25  1139 05/16/25  0723 05/15/25  2030 05/15/25  1627 05/15/25  1132 05/15/25  0734 05/14/25  2044 05/14/25  1635   POC GLUCOSE mg/dl 183* 155* 180* 208* 204* 160* 209* 166* 294* 95 151* 214*               Recent Cultures (last 7 days):         Imaging Results Review: No pertinent imaging studies reviewed.  Other Study Results Review: No additional pertinent studies reviewed.    Last 24 Hours Medication List:     Current Facility-Administered Medications:     acetaminophen (TYLENOL) tablet 650 mg, Q6H PRN    aspirin chewable tablet 81 mg, Daily    enoxaparin (LOVENOX) subcutaneous injection 40 mg, Daily    insulin glargine (LANTUS) subcutaneous injection 14 Units 0.14 mL, HS    insulin lispro (HumALOG/ADMELOG) 100 units/mL subcutaneous injection 1-6 Units, TID AC **AND** Fingerstick Glucose (POCT), TID AC    insulin lispro (HumALOG/ADMELOG) 100 units/mL subcutaneous injection 1-6 Units, HS    levETIRAcetam (KEPPRA) tablet 1,000 mg, Q12H DYAN    polyethylene glycol (MIRALAX) packet 17 g, Daily PRN    pravastatin (PRAVACHOL) tablet 40 mg, Daily With Dinner    senna-docusate sodium (SENOKOT S) 8.6-50 mg per tablet 1 tablet, HS    Administrative Statements   Today, Patient Was Seen By: NAYA Arita      **Please Note: This note may have been constructed using a voice recognition system.**

## 2025-05-18 LAB
GLUCOSE SERPL-MCNC: 154 MG/DL (ref 65–140)
GLUCOSE SERPL-MCNC: 164 MG/DL (ref 65–140)
GLUCOSE SERPL-MCNC: 182 MG/DL (ref 65–140)
GLUCOSE SERPL-MCNC: 83 MG/DL (ref 65–140)

## 2025-05-18 PROCEDURE — 99232 SBSQ HOSP IP/OBS MODERATE 35: CPT | Performed by: PHYSICIAN ASSISTANT

## 2025-05-18 PROCEDURE — 82948 REAGENT STRIP/BLOOD GLUCOSE: CPT

## 2025-05-18 RX ADMIN — LEVETIRACETAM 1000 MG: 500 TABLET, FILM COATED ORAL at 08:30

## 2025-05-18 RX ADMIN — INSULIN LISPRO 1 UNITS: 100 INJECTION, SOLUTION INTRAVENOUS; SUBCUTANEOUS at 16:30

## 2025-05-18 RX ADMIN — INSULIN LISPRO 1 UNITS: 100 INJECTION, SOLUTION INTRAVENOUS; SUBCUTANEOUS at 12:30

## 2025-05-18 RX ADMIN — INSULIN LISPRO 1 UNITS: 100 INJECTION, SOLUTION INTRAVENOUS; SUBCUTANEOUS at 21:14

## 2025-05-18 RX ADMIN — LEVETIRACETAM 1000 MG: 500 TABLET, FILM COATED ORAL at 21:14

## 2025-05-18 RX ADMIN — ASPIRIN 81 MG CHEWABLE TABLET 81 MG: 81 TABLET CHEWABLE at 08:30

## 2025-05-18 RX ADMIN — INSULIN GLARGINE 14 UNITS: 100 INJECTION, SOLUTION SUBCUTANEOUS at 21:14

## 2025-05-18 RX ADMIN — ENOXAPARIN SODIUM 40 MG: 40 INJECTION SUBCUTANEOUS at 08:30

## 2025-05-18 RX ADMIN — PRAVASTATIN SODIUM 40 MG: 40 TABLET ORAL at 16:29

## 2025-05-18 RX ADMIN — SENNOSIDES AND DOCUSATE SODIUM 1 TABLET: 50; 8.6 TABLET ORAL at 21:14

## 2025-05-18 NOTE — PLAN OF CARE
Problem: PAIN - ADULT  Goal: Verbalizes/displays adequate comfort level or baseline comfort level  Description: Interventions:- Encourage patient to monitor pain and request assistance- Assess pain using appropriate pain scale- Administer analgesics based on type and severity of pain and evaluate response- Implement non-pharmacological measures as appropriate and evaluate response- Consider cultural and social influences on pain and pain management- Notify physician/advanced practitioner if interventions unsuccessful or patient reports new pain  Outcome: Progressing     Problem: INFECTION - ADULT  Goal: Absence or prevention of progression during hospitalization  Description: INTERVENTIONS:- Assess and monitor for signs and symptoms of infection- Monitor lab/diagnostic results- Monitor all insertion sites, i.e. indwelling lines, tubes, and drains- Monitor endotracheal if appropriate and nasal secretions for changes in amount and color- [place] appropriate cooling/warming therapies per order- Administer medications as ordered- Instruct and encourage patient and family to use good hand hygiene technique- Identify and instruct in appropriate isolation precautions for identified infection/condition  Outcome: Progressing     Problem: SAFETY ADULT  Goal: Patient will remain free of falls  Description: INTERVENTIONS:- Educate patient/family on patient safety including physical limitations- Instruct patient to call for assistance with activity - Consult OT/PT to assist with strengthening/mobility - Keep Call bell within reach- Keep bed low and locked with side rails adjusted as appropriate- Keep care items and personal belongings within reach- Initiate and maintain comfort rounds- Make Fall Risk Sign visible to staff  Outcome: Progressing  Goal: Maintain or return to baseline ADL function  Description: INTERVENTIONS:-  Assess patient's ability to carry out ADLs; assess patient's baseline for ADL function and identify  physical deficits which impact ability to perform ADLs (bathing, care of mouth/teeth, toileting, grooming, dressing, etc.)- Assess/evaluate cause of self-care deficits - Assess range of motion- Assess patient's mobility; develop plan if impaired- Assess patient's need for assistive devices and provide as appropriate- Encourage maximum independence but intervene and supervise when necessary- Involve family in performance of ADLs- Assess for home care needs following discharge - Consider OT consult to assist with ADL evaluation and planning for discharge- Provide patient education as appropriate  Outcome: Progressing  Goal: Maintains/Returns to pre admission functional level  Description: INTERVENTIONS:- Perform AM-PAC 6 Click Basic Mobility/ Daily Activity assessment daily.- Set and communicate daily mobility goal to care team and patient/family/caregiver. - Collaborate with rehabilitation services on mobility goals if consulted  Outcome: Progressing     Problem: DISCHARGE PLANNING  Goal: Discharge to home or other facility with appropriate resources  Description: INTERVENTIONS:- Identify barriers to discharge w/patient and caregiver- Arrange for needed discharge resources and transportation as appropriate- Identify discharge learning needs (meds, wound care, etc.)- Arrange for interpretive services to assist at discharge as needed- Refer to Case Management Department for coordinating discharge planning if the patient needs post-hospital services based on physician/advanced practitioner order or complex needs related to functional status, cognitive ability, or social support system  Outcome: Progressing     Problem: Knowledge Deficit  Goal: Patient/family/caregiver demonstrates understanding of disease process, treatment plan, medications, and discharge instructions  Description: Complete learning assessment and assess knowledge base.Interventions:- Provide teaching at level of understanding- Provide teaching via  preferred learning methods  Outcome: Progressing

## 2025-05-18 NOTE — ASSESSMENT & PLAN NOTE
"Patient was recently admitted from 5/1-5/9 to neurology service for breakthrough seizures in the setting of medication noncompliance.   There were concerns brought up by family that admission regarding cognitive impairment/memory loss and ability to care for herself.    Scored 13/30 on MOCA done 5/7.  Per Neurology record review, was deemed at that time capable to live alone  Patient was initially discharged and stayed with her brother before he took her back to her home.  She did not recall details of this.  Additionally, VNA came out to her home and patient could not properly use her insulin or use glucometer (she has been on insulin for years) so came back to the hospital for safety concerns  Neurology and geriatrics evals appreciated   MRI brain 5/16: \"No acute intracranial pathology. Chronic microangiopathy. NeuroQuant analysis was performed: Normal hippocampal volume and enlarged ventricular system: Findings do not support hippocampal degeneration. Possible expansion of ventricular system without medial temporal lobe focused ex-vacuo process.\"  Vitamin B6 pending  RPR nonreactive, TSH 0.989, folate greater than 22.3, vitamin B12 is 476, vitamin B 1 is 123  Patient does not appear to have capacity to make fully informed medical decisions per neuropsych  PT/OT -recommend rehab. Case management following for safe d/c planning   "

## 2025-05-18 NOTE — ASSESSMENT & PLAN NOTE
Lab Results   Component Value Date    HGBA1C 7.3 (H) 03/05/2025       Recent Labs     05/17/25  1711 05/17/25 2016 05/18/25  0615 05/18/25  1049   POCGLU 121 271* 83 164*       Blood Sugar Average: Last 72 hrs:  (P) 178.5908175717626907  Hold glimepiride and metformin while admitted  Continue Lantus 14 units QHS and SSI TID AC and QHS

## 2025-05-18 NOTE — ASSESSMENT & PLAN NOTE
See above.  Sister reported progressive decline over 6-8 months   Geriatrics following  For not have capacity per Neuropsych

## 2025-05-18 NOTE — PROGRESS NOTES
"Progress Note - Hospitalist   Name: Ynes Mendoza 64 y.o. female I MRN: [id number]  [facility] I Date of Admission: 5/12/2025   Date of Service: 5/18/2025 I Hospital Day: 5    Assessment & Plan  Failure to thrive in adult  Patient was recently admitted from 5/1-5/9 to neurology service for breakthrough seizures in the setting of medication noncompliance.   There were concerns brought up by family that admission regarding cognitive impairment/memory loss and ability to care for herself.    Scored 13/30 on MOCA done 5/7.  Per Neurology record review, was deemed at that time capable to live alone  Patient was initially discharged and stayed with her brother before he took her back to her home.  She did not recall details of this.  Additionally, VNA came out to her home and patient could not properly use her insulin or use glucometer (she has been on insulin for years) so came back to the hospital for safety concerns  Neurology and geriatrics evals appreciated   MRI brain 5/16: \"No acute intracranial pathology. Chronic microangiopathy. NeuroQuant analysis was performed: Normal hippocampal volume and enlarged ventricular system: Findings do not support hippocampal degeneration. Possible expansion of ventricular system without medial temporal lobe focused ex-vacuo process.\"  Vitamin B6 pending  RPR nonreactive, TSH 0.989, folate greater than 22.3, vitamin B12 is 476, vitamin B 1 is 123  Patient does not appear to have capacity to make fully informed medical decisions per neuropsych  PT/OT -recommend rehab. Case management following for safe d/c planning   Type 2 diabetes mellitus, with long-term current use of insulin ([facility])  Lab Results   Component Value Date    HGBA1C 7.3 (H) 03/05/2025       Recent Labs     05/17/25  1711 05/17/25  2016 05/18/25  0615 05/18/25  1049   POCGLU 121 271* 83 164*       Blood Sugar Average: Last 72 hrs:  (P) 178.6560531670786793  Hold glimepiride and metformin while " admitted  Continue Lantus 14 units QHS and SSI TID AC and QHS  Cognitive impairment  See above.  Sister reported progressive decline over 6-8 months   Geriatrics following  For not have capacity per Neuropsych  Focal epilepsy (HCC)  Continue [facility]  Neurology evaluated    VTE Pharmacologic Prophylaxis: VTE Score: 6 High Risk (Score >/= 5) - Pharmacological DVT Prophylaxis Ordered: enoxaparin (Lovenox). Sequential Compression Devices Ordered.    Mobility:   Basic Mobility Inpatient Raw Score: 19  JH-HLM Goal: 6: Walk 10 steps or more  JH-HLM Achieved: 6: Walk 10 steps or more  JH-HLM Goal achieved. Continue to encourage appropriate mobility.    Patient Centered Rounds: I performed bedside rounds with nursing staff today.   Discussions with Specialists or Other Care Team Provider:     Education and Discussions with Family / Patient: Updated  (sister) via phone. Lori     Current Length of Stay: 5 day(s)  Current Patient Status: Inpatient   Certification Statement: The patient will continue to require additional inpatient hospital stay due to safe d/c planning  Discharge Plan: Anticipate discharge in >72 hrs to rehab facility.    Code Status: Level 1 - Full Code    Subjective   Ms. Mendoza denies complaint including HA, CP, SOB, abdominal pain. She states that [employer] turned off her service due to not paying the bill    Objective :  Temp:  [97.8 °F (36.6 °C)-98 °F (36.7 °C)] 98 °F (36.7 °C)  HR:  [71-76] 76  BP: (127-132)/(73) 127/73  Resp:  [17-18] 18  SpO2:  [95 %-98 %] 96 %  O2 Device: None (Room air)    There is no height or weight on file to calculate BMI.     Input and Output Summary (last 24 hours):     Intake/Output Summary (Last 24 hours) at 5/18/2025 1443  Last data filed at 5/18/2025 0711  Gross per 24 hour   Intake 280 ml   Output --   Net 280 ml       Physical Exam  Vitals reviewed.   Constitutional:       Comments: Patient seen sitting in bed comfortably resting watching TV, NAD      Cardiovascular:      Rate and Rhythm: Normal rate and regular rhythm.   Pulmonary:      Effort: Pulmonary effort is normal. No respiratory distress.      Breath sounds: Normal breath sounds.   Abdominal:      Palpations: Abdomen is soft.      Tenderness: There is no abdominal tenderness.     Musculoskeletal:      Right lower leg: No edema.      Left lower leg: No edema.     Skin:     General: Skin is warm.     Neurological:      Mental Status: She is alert.     Psychiatric:         Mood and Affect: Mood normal.           Lines/Drains:              Lab Results: I have reviewed the following results:   Results from last 7 days   Lab Units 05/17/25  0515   WBC Thousand/uL 6.31   HEMOGLOBIN g/dL 11.8   HEMATOCRIT % 36.9   PLATELETS Thousands/uL 188   SEGS PCT % 58   LYMPHO PCT % 27   MONO PCT % 9   EOS PCT % 4     Results from last 7 days   Lab Units 05/17/25  0515   SODIUM mmol/L 142   POTASSIUM mmol/L 4.0   CHLORIDE mmol/L 107   CO2 mmol/L 26   BUN mg/dL 11   CREATININE mg/dL 0.92   ANION GAP mmol/L 9   CALCIUM mg/dL 9.4   ALBUMIN g/dL 3.5   TOTAL BILIRUBIN mg/dL 0.59   ALK PHOS U/L 49   ALT U/L 11   AST U/L 10*   GLUCOSE RANDOM mg/dL 126         Results from last 7 days   Lab Units 05/18/25  1049 05/18/25  0615 05/17/25  2016 05/17/25  1711 05/17/25  1234 05/17/25  0829 05/16/25  2048 05/16/25  1611 05/16/25  1139 05/16/25  0723 05/15/25  2030 05/15/25  1627   POC GLUCOSE mg/dl 164* 83 271* 121 183* 155* 180* 208* 204* 160* 209* 166*               Recent Cultures (last 7 days):         Imaging Results Review: I reviewed radiology reports from this admission including: MRI brain.      Last 24 Hours Medication List:     Current Facility-Administered Medications:     acetaminophen (TYLENOL) tablet 650 mg, Q6H PRN    aspirin chewable tablet 81 mg, Daily    enoxaparin (LOVENOX) subcutaneous injection 40 mg, Daily    insulin glargine (LANTUS) subcutaneous injection 14 Units 0.14 mL, HS    insulin lispro  (HumALOG/ADMELOG) 100 units/mL subcutaneous injection 1-6 Units, TID AC **AND** Fingerstick Glucose (POCT), TID AC    insulin lispro (HumALOG/ADMELOG) 100 units/mL subcutaneous injection 1-6 Units, HS    levETIRAcetam (KEPPRA) tablet 1,000 mg, Q12H DYAN    polyethylene glycol (MIRALAX) packet 17 g, Daily PRN    pravastatin (PRAVACHOL) tablet 40 mg, Daily With Dinner    senna-docusate sodium (SENOKOT S) 8.6-50 mg per tablet 1 tablet, HS    Administrative Statements   Today, Patient Was Seen By: Yan Nolasco PA-C      **Please Note: This note may have been constructed using a voice recognition system.**

## 2025-05-18 NOTE — PLAN OF CARE
Problem: PAIN - ADULT  Goal: Verbalizes/displays adequate comfort level or baseline comfort level  Description: Interventions:- Encourage patient to monitor pain and request assistance- Assess pain using appropriate pain scale- Administer analgesics based on type and severity of pain and evaluate response- Implement non-pharmacological measures as appropriate and evaluate response- Consider cultural and social influences on pain and pain management- Notify physician/advanced practitioner if interventions unsuccessful or patient reports new pain  Outcome: Progressing     Problem: INFECTION - ADULT  Goal: Absence or prevention of progression during hospitalization  Description: INTERVENTIONS:- Assess and monitor for signs and symptoms of infection- Monitor lab/diagnostic results- Monitor all insertion sites, i.e. indwelling lines, tubes, and drains- Monitor endotracheal if appropriate and nasal secretions for changes in amount and color- [place] appropriate cooling/warming therapies per order- Administer medications as ordered- Instruct and encourage patient and family to use good hand hygiene technique- Identify and instruct in appropriate isolation precautions for identified infection/condition  Outcome: Progressing     Problem: DISCHARGE PLANNING  Goal: Discharge to home or other facility with appropriate resources  Description: INTERVENTIONS:- Identify barriers to discharge w/patient and caregiver- Arrange for needed discharge resources and transportation as appropriate- Identify discharge learning needs (meds, wound care, etc.)- Arrange for interpretive services to assist at discharge as needed- Refer to Case Management Department for coordinating discharge planning if the patient needs post-hospital services based on physician/advanced practitioner order or complex needs related to functional status, cognitive ability, or social support system  Outcome: Progressing     Problem: Knowledge Deficit  Goal:  Patient/family/caregiver demonstrates understanding of disease process, treatment plan, medications, and discharge instructions  Description: Complete learning assessment and assess knowledge base.Interventions:- Provide teaching at level of understanding- Provide teaching via preferred learning methods  Outcome: Progressing

## 2025-05-19 LAB
GLUCOSE SERPL-MCNC: 106 MG/DL (ref 65–140)
GLUCOSE SERPL-MCNC: 171 MG/DL (ref 65–140)
GLUCOSE SERPL-MCNC: 240 MG/DL (ref 65–140)
GLUCOSE SERPL-MCNC: 243 MG/DL (ref 65–140)

## 2025-05-19 PROCEDURE — 97116 GAIT TRAINING THERAPY: CPT

## 2025-05-19 PROCEDURE — 97530 THERAPEUTIC ACTIVITIES: CPT

## 2025-05-19 PROCEDURE — 99232 SBSQ HOSP IP/OBS MODERATE 35: CPT | Performed by: NURSE PRACTITIONER

## 2025-05-19 PROCEDURE — 97112 NEUROMUSCULAR REEDUCATION: CPT

## 2025-05-19 PROCEDURE — 82948 REAGENT STRIP/BLOOD GLUCOSE: CPT

## 2025-05-19 RX ADMIN — PRAVASTATIN SODIUM 40 MG: 40 TABLET ORAL at 16:03

## 2025-05-19 RX ADMIN — ENOXAPARIN SODIUM 40 MG: 40 INJECTION SUBCUTANEOUS at 08:28

## 2025-05-19 RX ADMIN — INSULIN LISPRO 1 UNITS: 100 INJECTION, SOLUTION INTRAVENOUS; SUBCUTANEOUS at 11:05

## 2025-05-19 RX ADMIN — INSULIN LISPRO 3 UNITS: 100 INJECTION, SOLUTION INTRAVENOUS; SUBCUTANEOUS at 16:03

## 2025-05-19 RX ADMIN — LEVETIRACETAM 1000 MG: 500 TABLET, FILM COATED ORAL at 21:33

## 2025-05-19 RX ADMIN — SENNOSIDES AND DOCUSATE SODIUM 1 TABLET: 50; 8.6 TABLET ORAL at 21:33

## 2025-05-19 RX ADMIN — LEVETIRACETAM 1000 MG: 500 TABLET, FILM COATED ORAL at 08:28

## 2025-05-19 RX ADMIN — ASPIRIN 81 MG CHEWABLE TABLET 81 MG: 81 TABLET CHEWABLE at 08:28

## 2025-05-19 RX ADMIN — INSULIN GLARGINE 14 UNITS: 100 INJECTION, SOLUTION SUBCUTANEOUS at 21:33

## 2025-05-19 RX ADMIN — INSULIN LISPRO 3 UNITS: 100 INJECTION, SOLUTION INTRAVENOUS; SUBCUTANEOUS at 21:33

## 2025-05-19 RX ADMIN — ACETAMINOPHEN 650 MG: 325 TABLET, FILM COATED ORAL at 19:38

## 2025-05-19 NOTE — ASSESSMENT & PLAN NOTE
"Patient was recently admitted from 5/1-5/9 to neurology service for breakthrough seizures in the setting of medication noncompliance.   There were concerns brought up by family that admission regarding cognitive impairment/memory loss and ability to care for herself.    Scored 13/30 on MOCA done 5/7.  Per Neurology record review, was deemed at that time capable to live alone  Patient was initially discharged and stayed with her brother before he took her back to her home.  She did not recall details of this.  Additionally, VNA came out to her home and patient could not properly use her insulin or use glucometer (she has been on insulin for years) so came back to the hospital for safety concerns  Neurology and geriatrics evals appreciated   MRI brain 5/16: \"No acute intracranial pathology. Chronic microangiopathy. NeuroQuant analysis was performed: Normal hippocampal volume and enlarged ventricular system: Findings do not support hippocampal degeneration. Possible expansion of ventricular system without medial temporal lobe focused ex-vacuo process.\"  Vitamin B6 pending drawn on 5/13  RPR nonreactive, TSH 0.989, folate greater than 22.3, vitamin B12 is 476, vitamin B 1 is 123  Patient does not appear to have capacity to make fully informed medical decisions per neuropsych  PT/OT -recommend rehab. Case management following for safe d/c planning   "

## 2025-05-19 NOTE — ASSESSMENT & PLAN NOTE
Lab Results   Component Value Date    HGBA1C 7.3 (H) 03/05/2025       Recent Labs     05/18/25  1552 05/18/25  2041 05/19/25  0616 05/19/25  1048   POCGLU 154* 182* 106 171*       Blood Sugar Average: Last 72 hrs:  (P) [id number]  Hold glimepiride and metformin while admitted  Continue Lantus 14 units QHS and SSI TID AC and QHS

## 2025-05-19 NOTE — PROGRESS NOTES
"Progress Note - Hospitalist   Name: Ynes Mendoza 64 y.o. female I MRN: [id number]  Unit/Bed#: [facility] 703-01 I Date of Admission: 5/12/2025   Date of Service: 5/19/2025 I Hospital Day: 6    Assessment & Plan  Failure to thrive in adult  Patient was recently admitted from 5/1-5/9 to neurology service for breakthrough seizures in the setting of medication noncompliance.   There were concerns brought up by family that admission regarding cognitive impairment/memory loss and ability to care for herself.    Scored 13/30 on MOCA done 5/7.  Per Neurology record review, was deemed at that time capable to live alone  Patient was initially discharged and stayed with her brother before he took her back to her home.  She did not recall details of this.  Additionally, VNA came out to her home and patient could not properly use her insulin or use glucometer (she has been on insulin for years) so came back to the hospital for safety concerns  Neurology and geriatrics evals appreciated   MRI brain 5/16: \"No acute intracranial pathology. Chronic microangiopathy. NeuroQuant analysis was performed: Normal hippocampal volume and enlarged ventricular system: Findings do not support hippocampal degeneration. Possible expansion of ventricular system without medial temporal lobe focused ex-vacuo process.\"  Vitamin B6 pending drawn on 5/13  RPR nonreactive, TSH 0.989, folate greater than 22.3, vitamin B12 is 476, vitamin B 1 is 123  Patient does not appear to have capacity to make fully informed medical decisions per neuropsych  PT/OT -recommend rehab. Case management following for safe d/c planning   Type 2 diabetes mellitus, with long-term current use of insulin ([facility])  Lab Results   Component Value Date    HGBA1C 7.3 (H) 03/05/2025       Recent Labs     05/18/25  1552 05/18/25  2041 05/19/25  0616 05/19/25  1048   POCGLU 154* 182* 106 171*       Blood Sugar Average: Last 72 hrs:  (P) [id number]  Hold glimepiride and metformin " while admitted  Continue Lantus 14 units QHS and SSI TID AC and QHS  Cognitive impairment  See above.  Sister reported progressive decline over 6-8 months   Geriatrics following  For not have capacity per Neuropsych  Discussed with neurology pts sister questions use of Namenda if suspected to be Dementia  However, pt will require outpt neuropsych and cognitive workup for completion of workup   Neurology will discuss with me further today   Focal epilepsy (HCC)  Continue Kera  Neurology evaluated     VTE Pharmacologic Prophylaxis: VTE Score: 6 High Risk (Score >/= 5) - Pharmacological DVT Prophylaxis Ordered: enoxaparin (Lovenox). Sequential Compression Devices Ordered.    Mobility:   Basic Mobility Inpatient Raw Score: 22  JH-HLM Goal: 7: Walk 25 feet or more  JH-HLM Achieved: 8: Walk 250 feet ot more  JH-HLM Goal achieved. Continue to encourage appropriate mobility.    Patient Centered Rounds: I performed bedside rounds with nursing staff today.   Discussions with Specialists or Other Care Team Provider: nursing     Education and Discussions with Family / Patient: Updated  (sister) via phone.    Current Length of Stay: 6 day(s)  Current Patient Status: Inpatient   Certification Statement: The patient will continue to require additional inpatient hospital stay due to ongoing need for placement   Discharge Plan: Anticipate discharge in >72 hrs to rehab facility.    Code Status: Level 1 - Full Code    Subjective   Pt is resting in bed doing well. No pain no sob chest pain or palpitations. No headache today     Objective :  Temp:  [98.3 °F (36.8 °C)-98.9 °F (37.2 °C)] 98.3 °F (36.8 °C)  HR:  [71-89] 82  BP: (120-126)/(72-73) 120/72  Resp:  [17-18] 17  SpO2:  [91 %-98 %] 91 %  O2 Device: None (Room air)    There is no height or weight on file to calculate BMI.     Input and Output Summary (last 24 hours):     Intake/Output Summary (Last 24 hours) at 5/19/2025 1233  Last data filed at 5/19/2025  0901  Gross per 24 hour   Intake 600 ml   Output --   Net 600 ml       Physical Exam  Constitutional:       General: She is not in acute distress.     Appearance: She is obese. She is not ill-appearing, toxic-appearing or diaphoretic.   HENT:      Head: Normocephalic and atraumatic.      Nose: No congestion.     Eyes:      General:         Right eye: No discharge.         Left eye: No discharge.       Cardiovascular:      Rate and Rhythm: Normal rate.      Heart sounds: No murmur heard.     No friction rub. No gallop.   Pulmonary:      Effort: No respiratory distress.      Breath sounds: No stridor. No wheezing, rhonchi or rales.   Chest:      Chest wall: No tenderness.   Abdominal:      General: There is no distension.      Palpations: There is no mass.      Tenderness: There is no abdominal tenderness. There is no guarding or rebound.      Hernia: No hernia is present.     Musculoskeletal:         General: No swelling, tenderness, deformity or signs of injury.      Right lower leg: No edema.      Left lower leg: No edema.     Skin:     Coloration: Skin is not jaundiced or pale.      Findings: No bruising, erythema, lesion or rash.     Neurological:      Mental Status: She is alert and oriented to person, place, and time.     Psychiatric:         Behavior: Behavior normal.         Lines/Drains:              Lab Results: I have reviewed the following results:   Results from last 7 days   Lab Units 05/17/25  0515   WBC Thousand/uL 6.31   HEMOGLOBIN g/dL 11.8   HEMATOCRIT % 36.9   PLATELETS Thousands/uL 188   SEGS PCT % 58   LYMPHO PCT % 27   MONO PCT % 9   EOS PCT % 4     Results from last 7 days   Lab Units 05/17/25  0515   SODIUM mmol/L 142   POTASSIUM mmol/L 4.0   CHLORIDE mmol/L 107   CO2 mmol/L 26   BUN mg/dL 11   CREATININE mg/dL 0.92   ANION GAP mmol/L 9   CALCIUM mg/dL 9.4   ALBUMIN g/dL 3.5   TOTAL BILIRUBIN mg/dL 0.59   ALK PHOS U/L 49   ALT U/L 11   AST U/L 10*   GLUCOSE RANDOM mg/dL 126         Results  from last 7 days   Lab Units 05/19/25  1048 05/19/25  0616 05/18/25  2041 05/18/25  1552 05/18/25  1049 05/18/25  0615 05/17/25  2016 05/17/25  1711 05/17/25  1234 05/17/25  0829 05/16/25  2048 05/16/25  1611   POC GLUCOSE mg/dl 171* 106 182* 154* 164* 83 271* 121 183* 155* 180* 208*               Recent Cultures (last 7 days):         Imaging Results Review: No pertinent imaging studies reviewed.  Other Study Results Review: No additional pertinent studies reviewed.    Last 24 Hours Medication List:     Current Facility-Administered Medications:     acetaminophen (TYLENOL) tablet 650 mg, Q6H PRN    aspirin chewable tablet 81 mg, Daily    enoxaparin (LOVENOX) subcutaneous injection 40 mg, Daily    insulin glargine (LANTUS) subcutaneous injection 14 Units 0.14 mL, HS    insulin lispro (HumALOG/ADMELOG) 100 units/mL subcutaneous injection 1-6 Units, TID AC **AND** Fingerstick Glucose (POCT), TID AC    insulin lispro (HumALOG/ADMELOG) 100 units/mL subcutaneous injection 1-6 Units, HS    levETIRAcetam (KEPPRA) tablet 1,000 mg, Q12H DYAN    polyethylene glycol (MIRALAX) packet 17 g, Daily PRN    pravastatin (PRAVACHOL) tablet 40 mg, Daily With Dinner    senna-docusate sodium (SENOKOT S) 8.6-50 mg per tablet 1 tablet, HS    Administrative Statements   Today, Patient Was Seen By: NAYA Arita      **Please Note: This note may have been constructed using a voice recognition system.**

## 2025-05-19 NOTE — PLAN OF CARE
Problem: PHYSICAL THERAPY ADULT  Goal: Performs mobility at highest level of function for planned discharge setting.  See evaluation for individualized goals.  Description: Treatment/Interventions: Functional transfer training, LE strengthening/ROM, Elevations, Therapeutic exercise, Endurance training, Patient/family training, Bed mobility, Equipment eval/education, Gait training, Spoke to nursing, OT          See flowsheet documentation for full assessment, interventions and recommendations.  Outcome: Progressing  Note: Prognosis: Good  Problem List: Decreased cognition, Impaired judgement, Decreased mobility, Decreased safety awareness  Assessment: Pt demonstrated improvement from prior session as evidenced by increased ambulatory distance and improved balance scores. She was slightly flexed forward with some random drifts to the right and left while walking with a 4 wheeled walker. Correlated with increased ambulatory speed which education was provided for pacing and posture. Demonstrated no overt signs of exhaustion during gait with no exacerbations of gait deviations as she progressed. Will continue to follow to maximize her independence.  Barriers to Discharge: Decreased caregiver support     Rehab Resource Intensity Level, PT: II (Moderate Resource Intensity)    See flowsheet documentation for full assessment.

## 2025-05-19 NOTE — PLAN OF CARE
Problem: PAIN - ADULT  Goal: Verbalizes/displays adequate comfort level or baseline comfort level  Description: Interventions:- Encourage patient to monitor pain and request assistance- Assess pain using appropriate pain scale- Administer analgesics based on type and severity of pain and evaluate response- Implement non-pharmacological measures as appropriate and evaluate response- Consider cultural and social influences on pain and pain management- Notify physician/advanced practitioner if interventions unsuccessful or patient reports new pain  Outcome: Progressing     Problem: INFECTION - ADULT  Goal: Absence or prevention of progression during hospitalization  Description: INTERVENTIONS:- Assess and monitor for signs and symptoms of infection- Monitor lab/diagnostic results- Monitor all insertion sites, i.e. indwelling lines, tubes, and drains- Monitor endotracheal if appropriate and nasal secretions for changes in amount and color- [place] appropriate cooling/warming therapies per order- Administer medications as ordered- Instruct and encourage patient and family to use good hand hygiene technique- Identify and instruct in appropriate isolation precautions for identified infection/condition  Outcome: Progressing     Problem: SAFETY ADULT  Goal: Patient will remain free of falls  Description: INTERVENTIONS:- Educate patient/family on patient safety including physical limitations- Instruct patient to call for assistance with activity - Consult OT/PT to assist with strengthening/mobility - Keep Call bell within reach- Keep bed low and locked with side rails adjusted as appropriate- Keep care items and personal belongings within reach- Initiate and maintain comfort rounds- Make Fall Risk Sign visible to staff  Outcome: Progressing  Goal: Maintain or return to baseline ADL function  Description: INTERVENTIONS:-  Assess patient's ability to carry out ADLs; assess patient's baseline for ADL function and identify  physical deficits which impact ability to perform ADLs (bathing, care of mouth/teeth, toileting, grooming, dressing, etc.)- Assess/evaluate cause of self-care deficits - Assess range of motion- Assess patient's mobility; develop plan if impaired- Assess patient's need for assistive devices and provide as appropriate- Encourage maximum independence but intervene and supervise when necessary- Involve family in performance of ADLs- Assess for home care needs following discharge - Consider OT consult to assist with ADL evaluation and planning for discharge- Provide patient education as appropriate  Outcome: Progressing  Goal: Maintains/Returns to pre admission functional level  Description: INTERVENTIONS:- Perform AM-PAC 6 Click Basic Mobility/ Daily Activity assessment daily.- Set and communicate daily mobility goal to care team and patient/family/caregiver. - Collaborate with rehabilitation services on mobility goals if consulted  Outcome: Progressing     Problem: DISCHARGE PLANNING  Goal: Discharge to home or other facility with appropriate resources  Description: INTERVENTIONS:- Identify barriers to discharge w/patient and caregiver- Arrange for needed discharge resources and transportation as appropriate- Identify discharge learning needs (meds, wound care, etc.)- Arrange for interpretive services to assist at discharge as needed- Refer to Case Management Department for coordinating discharge planning if the patient needs post-hospital services based on physician/advanced practitioner order or complex needs related to functional status, cognitive ability, or social support system  Outcome: Progressing     Problem: Knowledge Deficit  Goal: Patient/family/caregiver demonstrates understanding of disease process, treatment plan, medications, and discharge instructions  Description: Complete learning assessment and assess knowledge base.Interventions:- Provide teaching at level of understanding- Provide teaching via  preferred learning methods  Outcome: Progressing

## 2025-05-19 NOTE — ASSESSMENT & PLAN NOTE
See above.  Sister reported progressive decline over 6-8 months   Geriatrics following  For not have capacity per Neuropsych  Discussed with neurology pts sister questions use of Namenda if suspected to be Dementia  However, pt will require outpt neuropsych and cognitive workup for completion of workup   Neurology will discuss with me further today

## 2025-05-19 NOTE — PHYSICAL THERAPY NOTE
Physical Therapy Progress Note     05/19/25 1035   PT Last Visit   PT Visit Date 05/19/25   Note Type   Note Type Treatment   Pain Assessment   Pain Assessment Tool 0-10   Pain Score No Pain   Restrictions/Precautions   Other Precautions Cognitive;Chair Alarm;Bed Alarm;Fall Risk;Seizure  (Alarm activce post session)   Subjective   Subjective Pt states that the lights and the sun bother her and give her possible migrains so she wears sunglasses. Pt also states that she has hip pain whan she sleeps on her side.   Bed Mobility   Supine to Sit 6  Modified independent   Additional items Increased time required   Sit to Supine 6  Modified independent   Transfers   Sit to Stand 6  Modified independent   Stand to Sit 6  Modified independent   Ambulation/Elevation   Gait pattern Forward Flexion;Inconsistent parris;Improper Weight shift   Gait Assistance 5  Supervision   Assistive Device 4-wheeled walker   Distance 500 ft   Balance   Static Sitting Normal   Dynamic Sitting Good   Static Standing Fair +   Dynamic Standing Fair +   Ambulatory Fair   Activity Tolerance   Activity Tolerance Patient tolerated treatment well   Nurse Made Aware yes   Assessment   Prognosis Good   Problem List Decreased cognition;Impaired judgement;Decreased mobility;Decreased safety awareness   Assessment Pt demonstrated improvement from prior session as evidenced by increased ambulatory distance and improved balance scores. She was slightly flexed forward with some random drifts to the right and left while walking with a 4 wheeled walker. Correlated with increased ambulatory speed which education was provided for pacing and posture. Demonstrated no overt signs of exhaustion during gait with no exacerbations of gait deviations as she progressed. Will continue to follow to maximize her independence.   Barriers to Discharge Decreased caregiver support   Goals   Patient Goals Gain independence   STG Expiration Date 05/28/25   PT Treatment Day 1    Plan   Treatment/Interventions Gait training;Bed mobility;Patient/family training;Endurance training;Functional transfer training;Therapeutic exercise   Progress Improving as expected   PT Frequency 2-3x/wk   Discharge Recommendation   Rehab Resource Intensity Level, PT II (Moderate Resource Intensity)   Equipment Recommended Walker   Walker Package Recommended Rollator   AM-PAC Basic Mobility Inpatient   Turning in Flat Bed Without Bedrails 4   Lying on Back to Sitting on Edge of Flat Bed Without Bedrails 4   Moving Bed to Chair 4   Standing Up From Chair Using Arms 4   Walk in Room 3   Climb 3-5 Stairs With Railing 3   Basic Mobility Inpatient Raw Score 22   Basic Mobility Standardized Score 47.4   [facility] Highest Level Of Mobility   -HLM Goal 7: Walk 25 feet or more   JH-HLM Achieved 8: Walk 250 feet ot more           An AM-PAC Basic Mobility raw score less than 16 suggests the patient may benefit from discharge to post-acute rehab services.    Jak Morton, SPTA

## 2025-05-19 NOTE — PLAN OF CARE
Problem: PAIN - ADULT  Goal: Verbalizes/displays adequate comfort level or baseline comfort level  Description: Interventions:- Encourage patient to monitor pain and request assistance- Assess pain using appropriate pain scale- Administer analgesics based on type and severity of pain and evaluate response- Implement non-pharmacological measures as appropriate and evaluate response- Consider cultural and social influences on pain and pain management- Notify physician/advanced practitioner if interventions unsuccessful or patient reports new pain  Outcome: Progressing     Problem: INFECTION - ADULT  Goal: Absence or prevention of progression during hospitalization  Description: INTERVENTIONS:- Assess and monitor for signs and symptoms of infection- Monitor lab/diagnostic results- Monitor all insertion sites, i.e. indwelling lines, tubes, and drains- Monitor endotracheal if appropriate and nasal secretions for changes in amount and color- [place] appropriate cooling/warming therapies per order- Administer medications as ordered- Instruct and encourage patient and family to use good hand hygiene technique- Identify and instruct in appropriate isolation precautions for identified infection/condition  Outcome: Progressing     Problem: DISCHARGE PLANNING  Goal: Discharge to home or other facility with appropriate resources  Description: INTERVENTIONS:- Identify barriers to discharge w/patient and caregiver- Arrange for needed discharge resources and transportation as appropriate- Identify discharge learning needs (meds, wound care, etc.)- Arrange for interpretive services to assist at discharge as needed- Refer to Case Management Department for coordinating discharge planning if the patient needs post-hospital services based on physician/advanced practitioner order or complex needs related to functional status, cognitive ability, or social support system  Outcome: Progressing     Problem: Knowledge Deficit  Goal:  Patient/family/caregiver demonstrates understanding of disease process, treatment plan, medications, and discharge instructions  Description: Complete learning assessment and assess knowledge base.Interventions:- Provide teaching at level of understanding- Provide teaching via preferred learning methods  Outcome: Progressing     Problem: SAFETY ADULT  Goal: Patient will remain free of falls  Description: INTERVENTIONS:- Educate patient/family on patient safety including physical limitations- Instruct patient to call for assistance with activity - Consult OT/PT to assist with strengthening/mobility - Keep Call bell within reach- Keep bed low and locked with side rails adjusted as appropriate- Keep care items and personal belongings within reach- Initiate and maintain comfort rounds- Make Fall Risk Sign visible to staff  Outcome: Progressing

## 2025-05-19 NOTE — RESTORATIVE TECHNICIAN NOTE
Restorative Technician Note      Patient Name: Ynes Mendoza     Note Type: Mobility  Patient Position Upon Consult: Supine  Activity Performed: Ambulated; Dangled; Stood  Assistive Device: Other (Comment) (Rollator Walker)  Education Provided: Yes  Patient Position at End of Consult: Supine; All needs within reach; Bed/Chair alarm activated    Jesus SILVERMAN, Restorative Technician,

## 2025-05-20 LAB
GLUCOSE SERPL-MCNC: 104 MG/DL (ref 65–140)
GLUCOSE SERPL-MCNC: 207 MG/DL (ref 65–140)
GLUCOSE SERPL-MCNC: 230 MG/DL (ref 65–140)
GLUCOSE SERPL-MCNC: 245 MG/DL (ref 65–140)
VIT B6 SERPL-MCNC: NORMAL UG/L

## 2025-05-20 PROCEDURE — 82948 REAGENT STRIP/BLOOD GLUCOSE: CPT

## 2025-05-20 PROCEDURE — 99232 SBSQ HOSP IP/OBS MODERATE 35: CPT | Performed by: INTERNAL MEDICINE

## 2025-05-20 PROCEDURE — 97116 GAIT TRAINING THERAPY: CPT

## 2025-05-20 RX ADMIN — INSULIN LISPRO 2 UNITS: 100 INJECTION, SOLUTION INTRAVENOUS; SUBCUTANEOUS at 22:18

## 2025-05-20 RX ADMIN — LEVETIRACETAM 1000 MG: 500 TABLET, FILM COATED ORAL at 08:42

## 2025-05-20 RX ADMIN — ENOXAPARIN SODIUM 40 MG: 40 INJECTION SUBCUTANEOUS at 08:42

## 2025-05-20 RX ADMIN — INSULIN LISPRO 2 UNITS: 100 INJECTION, SOLUTION INTRAVENOUS; SUBCUTANEOUS at 11:24

## 2025-05-20 RX ADMIN — INSULIN LISPRO 3 UNITS: 100 INJECTION, SOLUTION INTRAVENOUS; SUBCUTANEOUS at 16:30

## 2025-05-20 RX ADMIN — LEVETIRACETAM 1000 MG: 500 TABLET, FILM COATED ORAL at 20:34

## 2025-05-20 RX ADMIN — INSULIN GLARGINE 14 UNITS: 100 INJECTION, SOLUTION SUBCUTANEOUS at 22:18

## 2025-05-20 RX ADMIN — SENNOSIDES AND DOCUSATE SODIUM 1 TABLET: 50; 8.6 TABLET ORAL at 22:18

## 2025-05-20 RX ADMIN — PRAVASTATIN SODIUM 40 MG: 40 TABLET ORAL at 16:29

## 2025-05-20 RX ADMIN — ASPIRIN 81 MG CHEWABLE TABLET 81 MG: 81 TABLET CHEWABLE at 08:42

## 2025-05-20 NOTE — PLAN OF CARE
Problem: PAIN - ADULT  Goal: Verbalizes/displays adequate comfort level or baseline comfort level  Description: Interventions:- Encourage patient to monitor pain and request assistance- Assess pain using appropriate pain scale- Administer analgesics based on type and severity of pain and evaluate response- Implement non-pharmacological measures as appropriate and evaluate response- Consider cultural and social influences on pain and pain management- Notify physician/advanced practitioner if interventions unsuccessful or patient reports new pain  Outcome: Progressing     Problem: INFECTION - ADULT  Goal: Absence or prevention of progression during hospitalization  Description: INTERVENTIONS:- Assess and monitor for signs and symptoms of infection- Monitor lab/diagnostic results- Monitor all insertion sites, i.e. indwelling lines, tubes, and drains- Monitor endotracheal if appropriate and nasal secretions for changes in amount and color- [place] appropriate cooling/warming therapies per order- Administer medications as ordered- Instruct and encourage patient and family to use good hand hygiene technique- Identify and instruct in appropriate isolation precautions for identified infection/condition  Outcome: Progressing     Problem: DISCHARGE PLANNING  Goal: Discharge to home or other facility with appropriate resources  Description: INTERVENTIONS:- Identify barriers to discharge w/patient and caregiver- Arrange for needed discharge resources and transportation as appropriate- Identify discharge learning needs (meds, wound care, etc.)- Arrange for interpretive services to assist at discharge as needed- Refer to Case Management Department for coordinating discharge planning if the patient needs post-hospital services based on physician/advanced practitioner order or complex needs related to functional status, cognitive ability, or social support system  Outcome: Progressing     Problem: Knowledge Deficit  Goal:  Patient/family/caregiver demonstrates understanding of disease process, treatment plan, medications, and discharge instructions  Description: Complete learning assessment and assess knowledge base.Interventions:- Provide teaching at level of understanding- Provide teaching via preferred learning methods  Outcome: Progressing

## 2025-05-20 NOTE — ASSESSMENT & PLAN NOTE
See above.  Sister reported progressive decline over 6-8 months   Geriatrics following  For not have capacity per Neuropsych  Outpatient follow up with neuro for baseline dementia testing and to start medication therapy if indicated at the time
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
## 2025-05-20 NOTE — RESTORATIVE TECHNICIAN NOTE
Restorative Technician Note      Patient Name: Ynes Mendoza     Note Type: Mobility  Patient Position Upon Consult: Supine  Activity Performed: Ambulated; Dangled  Assistive Device: Other (Comment) (Rollator Walker)  Education Provided: Yes  Patient Position at End of Consult: Supine; All needs within reach; Bed/Chair alarm activated      Jesus SILVERMAN, Restorative Technician,

## 2025-05-20 NOTE — ASSESSMENT & PLAN NOTE
Lab Results   Component Value Date    HGBA1C 7.3 (H) 03/05/2025       Recent Labs     05/19/25  1552 05/19/25  2114 05/20/25  0613 05/20/25  1054   POCGLU 243* 240* 104 207*       Blood Sugar Average: Last 72 hrs:  (P) 170.6988869306824543  Hold glimepiride and metformin while admitted  Diabetic diet  Continue Lantus 14 units QHS and SSI TID AC and QHS

## 2025-05-20 NOTE — PLAN OF CARE
Problem: PHYSICAL THERAPY ADULT  Goal: Performs mobility at highest level of function for planned discharge setting.  See evaluation for individualized goals.  Description: Treatment/Interventions: Functional transfer training, LE strengthening/ROM, Elevations, Therapeutic exercise, Endurance training, Patient/family training, Bed mobility, Equipment eval/education, Gait training, Spoke to nursing, OT          See flowsheet documentation for full assessment, interventions and recommendations.  Outcome: Progressing  Note: Prognosis: Good  Problem List: Impaired balance, Decreased mobility, Decreased cognition, Impaired judgement, Decreased safety awareness  Assessment: Pt very pleasant and cooperative. Completes mobility and therapeutic activity as outlined above. Pt able to complete mobility at S level. No overt LOB with use of rollator throughout session. Completes b/l LE exercises without difficulty. Pt educated on importance of continued mobility and LE exercise. Pt is progressing well towards her functional goals. Pt will continue to benefit from skilled acute PT services to address deficits and promote mobility. Pt left supine in bed with bed alarm donned, call bell and personal items within reach and all needs met.  Barriers to Discharge: Inaccessible home environment, Decreased caregiver support     Rehab Resource Intensity Level, PT: II (Moderate Resource Intensity)    See flowsheet documentation for full assessment.

## 2025-05-20 NOTE — PHYSICAL THERAPY NOTE
"                                                                                  PHYSICAL THERAPY NOTE          Patient Name: Ynes Mendoza  Today's Date: 5/20/2025 05/20/25 1419   PT Last Visit   PT Visit Date 05/20/25   Note Type   Note Type Treatment   Pain Assessment   Pain Assessment Tool 0-10   Pain Score No Pain   Restrictions/Precautions   Weight Bearing Precautions Per Order No   Other Precautions Cognitive;Chair Alarm;Bed Alarm;Fall Risk   General   Chart Reviewed Yes   Response to Previous Treatment Patient with no complaints from previous session.   Family/Caregiver Present No   Cognition   Overall Cognitive Status Impaired   Arousal/Participation Alert;Responsive;Cooperative   Attention Attends with cues to redirect   Orientation Level Oriented X4   Memory Decreased recall of precautions   Following Commands Follows one step commands with increased time or repetition   Comments pt pleasant and cooperative   Subjective   Subjective \"I love to walk\"   Bed Mobility   Supine to Sit 6  Modified independent   Additional items HOB elevated   Sit to Supine 6  Modified independent   Additional items HOB elevated   Transfers   Sit to Stand 5  Supervision   Stand to Sit 5  Supervision   Additional Comments rollator vs no AD   Ambulation/Elevation   Gait pattern Inconsistent parris;Foward flexed;Step through pattern   Gait Assistance 5  Supervision   Additional items Verbal cues   Assistive Device 4-wheeled walker  (rollator)   Distance 70'+250'   Stair Management Assistance Not tested   Ambulation/Elevation Additional Comments no overt LOB. 1 seated rest break   Balance   Static Sitting Normal   Dynamic Sitting Good   Static Standing Fair +   Dynamic Standing Fair   Ambulatory Fair -   Endurance Deficit   Endurance Deficit No   Activity Tolerance   Activity Tolerance Patient tolerated treatment well   Nurse Made Aware yes-RN cleared   Exercises   Knee AROM Long Arc Quad Sitting;10 reps;AROM;Bilateral "   Marching Sitting;10 reps;AROM;Bilateral   Neuro re-ed x5 STS with S   Balance training  pt sitting EOB for 5 minutes at S   Assessment   Prognosis Good   Problem List Impaired balance;Decreased mobility;Decreased cognition;Impaired judgement;Decreased safety awareness   Assessment Pt very pleasant and cooperative. Completes mobility and therapeutic activity as outlined above. Pt able to complete mobility at S level. No overt LOB with use of rollator throughout session. Completes b/l LE exercises without difficulty. Pt educated on importance of continued mobility and LE exercise. Pt is progressing well towards her functional goals. Pt will continue to benefit from skilled acute PT services to address deficits and promote mobility. Pt left supine in bed with bed alarm donned, call bell and personal items within reach and all needs met.   Barriers to Discharge Inaccessible home environment;Decreased caregiver support   Goals   Patient Goals to walk   STG Expiration Date 05/28/25   PT Treatment Day 2   Plan   Treatment/Interventions Functional transfer training;LE strengthening/ROM;Therapeutic exercise;Endurance training;Patient/family training;Equipment eval/education;Bed mobility;Gait training;Elevations;Continued evaluation;Spoke to nursing   Progress Progressing toward goals   PT Frequency 1-2x/wk  (pt making good functional progress and should continue to mobilize with restorative and nursing staff daily)   Discharge Recommendation   Rehab Resource Intensity Level, PT II (Moderate Resource Intensity)   Equipment Recommended Walker   Walker Package Recommended Rollator   AM-PAC Basic Mobility Inpatient   Turning in Flat Bed Without Bedrails 4   Lying on Back to Sitting on Edge of Flat Bed Without Bedrails 3   Moving Bed to Chair 3   Standing Up From Chair Using Arms 3   Walk in Room 3   Climb 3-5 Stairs With Railing 3   Basic Mobility Inpatient Raw Score 19   Basic Mobility Standardized Score 42.48   [facility]  Highest Level Of Mobility   JH-HLM Goal 6: Walk 10 steps or more   JH-HLM Achieved 8: Walk 250 feet ot more   Education   Education Provided Mobility training;Assistive device;Home exercise program   Patient Demonstrates acceptance/verbal understanding   End of Consult   Patient Position at End of Consult Supine;Bed/Chair alarm activated;All needs within reach   GURVINDER ThomasonT

## 2025-05-20 NOTE — PROGRESS NOTES
"Progress Note - Hospitalist   Name: Ynes Mendoza 64 y.o. female I MRN: [id number]  [facility] I Date of Admission: 5/12/2025   Date of Service: 5/20/2025 I Hospital Day: 7    Assessment & Plan  Failure to thrive in adult  Patient was recently admitted from 5/1-5/9 to neurology service for breakthrough seizures in the setting of medication noncompliance.   There were concerns brought up by family that admission regarding cognitive impairment/memory loss and ability to care for herself.    Scored 13/30 on MOCA done 5/7.  Per Neurology record review, was deemed at that time capable to live alone  Patient was initially discharged and stayed with her brother before he took her back to her home.  She did not recall details of this.  Additionally, VNA came out to her home and patient could not properly use her insulin or use glucometer (she has been on insulin for years) so came back to the hospital for safety concerns  Neurology and geriatrics evals appreciated   MRI brain 5/16: \"No acute intracranial pathology. Chronic microangiopathy. NeuroQuant analysis was performed: Normal hippocampal volume and enlarged ventricular system: Findings do not support hippocampal degeneration. Possible expansion of ventricular system without medial temporal lobe focused ex-vacuo process.\"  Vitamin B6 pending drawn on 5/13  RPR nonreactive, TSH 0.989, folate greater than 22.3, vitamin B12 is 476, vitamin B 1 is 123  Patient does not appear to have capacity to make fully informed medical decisions per neuropsych  Discharge planning to assisted living/LTC in the [place] area per family preference  Type 2 diabetes mellitus, with long-term current use of insulin ([facility])  Lab Results   Component Value Date    HGBA1C 7.3 (H) 03/05/2025       Recent Labs     05/19/25  1552 05/19/25  2114 05/20/25  0613 05/20/25  1054   POCGLU 243* 240* 104 207*       Blood Sugar Average: Last 72 hrs:  (P) 170.2181733868097076  Hold glimepiride " and metformin while admitted  Diabetic diet  Continue Lantus 14 units QHS and SSI TID AC and QHS  Cognitive impairment  See above.  Sister reported progressive decline over 6-8 months   Geriatrics following  For not have capacity per Neuropsych  Outpatient follow up with neuro for baseline dementia testing and to start medication therapy if indicated at the time  Focal epilepsy (HCC)  Continue [facility]  Neurology evaluated     VTE Pharmacologic Prophylaxis: VTE Score: 6 High Risk (Score >/= 5) - Pharmacological DVT Prophylaxis Ordered: heparin. Sequential Compression Devices Ordered.    Mobility:   Basic Mobility Inpatient Raw Score: 19  JH-HLM Goal: 6: Walk 10 steps or more  JH-HLM Achieved: 8: Walk 250 feet ot more  JH-HLM Goal NOT achieved. Continue with multidisciplinary rounding and encourage appropriate mobility to improve upon JH-HLM goals.    Patient Centered Rounds: I performed bedside rounds with nursing staff today.   Discussions with Specialists or Other Care Team Provider: nurse, CM    Current Length of Stay: 7 day(s)  Current Patient Status: Inpatient   Certification Statement: The patient will continue to require additional inpatient hospital stay due to discharge planning  Discharge Plan: Anticipate discharge tomorrow to LTC    Code Status: Level 1 - Full Code    Subjective   Denies any new complaints. Notes lapses in her memory    Objective :  Temp:  [97.8 °F (36.6 °C)-99.1 °F (37.3 °C)] 97.8 °F (36.6 °C)  HR:  [62-76] 76  BP: (108-126)/(63-76) 126/76  Resp:  [18] 18  SpO2:  [96 %-98 %] 98 %  O2 Device: None (Room air)    There is no height or weight on file to calculate BMI.     Input and Output Summary (last 24 hours):     Intake/Output Summary (Last 24 hours) at 5/20/2025 1450  Last data filed at 5/20/2025 0800  Gross per 24 hour   Intake 480 ml   Output --   Net 480 ml       Physical Exam  Constitutional:       Appearance: Normal appearance.   HENT:      Head: Normocephalic and atraumatic.       Nose: Nose normal.     Cardiovascular:      Rate and Rhythm: Normal rate and regular rhythm.   Pulmonary:      Effort: Pulmonary effort is normal.     Neurological:      Mental Status: She is alert and oriented to person, place, and time.      Comments: Short term memory impaired   Psychiatric:         Mood and Affect: Mood normal.         Behavior: Behavior normal.           Lines/Drains:              Lab Results: I have reviewed the following results:   Results from last 7 days   Lab Units 05/17/25  0515   WBC Thousand/uL 6.31   HEMOGLOBIN g/dL 11.8   HEMATOCRIT % 36.9   PLATELETS Thousands/uL 188   SEGS PCT % 58   LYMPHO PCT % 27   MONO PCT % 9   EOS PCT % 4     Results from last 7 days   Lab Units 05/17/25  0515   SODIUM mmol/L 142   POTASSIUM mmol/L 4.0   CHLORIDE mmol/L 107   CO2 mmol/L 26   BUN mg/dL 11   CREATININE mg/dL 0.92   ANION GAP mmol/L 9   CALCIUM mg/dL 9.4   ALBUMIN g/dL 3.5   TOTAL BILIRUBIN mg/dL 0.59   ALK PHOS U/L 49   ALT U/L 11   AST U/L 10*   GLUCOSE RANDOM mg/dL 126         Results from last 7 days   Lab Units 05/20/25  1054 05/20/25  0613 05/19/25  2114 05/19/25  1552 05/19/25  1048 05/19/25  0616 05/18/25  2041 05/18/25  1552 05/18/25  1049 05/18/25  0615 05/17/25  2016 05/17/25  1711   POC GLUCOSE mg/dl 207* 104 240* 243* 171* 106 182* 154* 164* 83 271* 121               Recent Cultures (last 7 days):         Imaging Results Review: No pertinent imaging studies reviewed.  Other Study Results Review: No additional pertinent studies reviewed.    Last 24 Hours Medication List:     Current Facility-Administered Medications:     acetaminophen (TYLENOL) tablet 650 mg, Q6H PRN    aspirin chewable tablet 81 mg, Daily    enoxaparin (LOVENOX) subcutaneous injection 40 mg, Daily    insulin glargine (LANTUS) subcutaneous injection 14 Units 0.14 mL, HS    insulin lispro (HumALOG/ADMELOG) 100 units/mL subcutaneous injection 1-6 Units, TID AC **AND** Fingerstick Glucose (POCT), TID AC    insulin lispro  (HumALOG/ADMELOG) 100 units/mL subcutaneous injection 1-6 Units, HS    levETIRAcetam (KEPPRA) tablet 1,000 mg, Q12H DYAN    polyethylene glycol (MIRALAX) packet 17 g, Daily PRN    pravastatin (PRAVACHOL) tablet 40 mg, Daily With Dinner    senna-docusate sodium (SENOKOT S) 8.6-50 mg per tablet 1 tablet, HS    Administrative Statements   Today, Patient Was Seen By: Luis Antonio Christine MD  I have spent a total time of 40 minutes in caring for this patient on the day of the visit/encounter including Diagnostic results, Prognosis, Patient and family education, Importance of tx compliance, Counseling / Coordination of care, Documenting in the medical record, Reviewing/placing orders in the medical record (including tests, medications, and/or procedures), Obtaining or reviewing history  , and Communicating with other healthcare professionals .    **Please Note: This note may have been constructed using a voice recognition system.**

## 2025-05-20 NOTE — PLAN OF CARE
Problem: PAIN - ADULT  Goal: Verbalizes/displays adequate comfort level or baseline comfort level  Description: Interventions:- Encourage patient to monitor pain and request assistance- Assess pain using appropriate pain scale- Administer analgesics based on type and severity of pain and evaluate response- Implement non-pharmacological measures as appropriate and evaluate response- Consider cultural and social influences on pain and pain management- Notify physician/advanced practitioner if interventions unsuccessful or patient reports new pain  Outcome: Progressing     Problem: INFECTION - ADULT  Goal: Absence or prevention of progression during hospitalization  Description: INTERVENTIONS:- Assess and monitor for signs and symptoms of infection- Monitor lab/diagnostic results- Monitor all insertion sites, i.e. indwelling lines, tubes, and drains- Monitor endotracheal if appropriate and nasal secretions for changes in amount and color- [place] appropriate cooling/warming therapies per order- Administer medications as ordered- Instruct and encourage patient and family to use good hand hygiene technique- Identify and instruct in appropriate isolation precautions for identified infection/condition  Outcome: Progressing     Problem: SAFETY ADULT  Goal: Patient will remain free of falls  Description: INTERVENTIONS:- Educate patient/family on patient safety including physical limitations- Instruct patient to call for assistance with activity - Consult OT/PT to assist with strengthening/mobility - Keep Call bell within reach- Keep bed low and locked with side rails adjusted as appropriate- Keep care items and personal belongings within reach- Initiate and maintain comfort rounds- Make Fall Risk Sign visible to staff  Outcome: Progressing  Goal: Maintain or return to baseline ADL function  Description: INTERVENTIONS:-  Assess patient's ability to carry out ADLs; assess patient's baseline for ADL function and identify  physical deficits which impact ability to perform ADLs (bathing, care of mouth/teeth, toileting, grooming, dressing, etc.)- Assess/evaluate cause of self-care deficits - Assess range of motion- Assess patient's mobility; develop plan if impaired- Assess patient's need for assistive devices and provide as appropriate- Encourage maximum independence but intervene and supervise when necessary- Involve family in performance of ADLs- Assess for home care needs following discharge - Consider OT consult to assist with ADL evaluation and planning for discharge- Provide patient education as appropriate  Outcome: Progressing  Goal: Maintains/Returns to pre admission functional level  Description: INTERVENTIONS:- Perform AM-PAC 6 Click Basic Mobility/ Daily Activity assessment daily.- Set and communicate daily mobility goal to care team and patient/family/caregiver. - Collaborate with rehabilitation services on mobility goals if consulted  Outcome: Progressing     Problem: DISCHARGE PLANNING  Goal: Discharge to home or other facility with appropriate resources  Description: INTERVENTIONS:- Identify barriers to discharge w/patient and caregiver- Arrange for needed discharge resources and transportation as appropriate- Identify discharge learning needs (meds, wound care, etc.)- Arrange for interpretive services to assist at discharge as needed- Refer to Case Management Department for coordinating discharge planning if the patient needs post-hospital services based on physician/advanced practitioner order or complex needs related to functional status, cognitive ability, or social support system  Outcome: Progressing     Problem: Knowledge Deficit  Goal: Patient/family/caregiver demonstrates understanding of disease process, treatment plan, medications, and discharge instructions  Description: Complete learning assessment and assess knowledge base.Interventions:- Provide teaching at level of understanding- Provide teaching via  preferred learning methods  Outcome: Progressing

## 2025-05-20 NOTE — ASSESSMENT & PLAN NOTE
"Patient was recently admitted from 5/1-5/9 to neurology service for breakthrough seizures in the setting of medication noncompliance.   There were concerns brought up by family that admission regarding cognitive impairment/memory loss and ability to care for herself.    Scored 13/30 on MOCA done 5/7.  Per Neurology record review, was deemed at that time capable to live alone  Patient was initially discharged and stayed with her brother before he took her back to her home.  She did not recall details of this.  Additionally, VNA came out to her home and patient could not properly use her insulin or use glucometer (she has been on insulin for years) so came back to the hospital for safety concerns  Neurology and geriatrics evals appreciated   MRI brain 5/16: \"No acute intracranial pathology. Chronic microangiopathy. NeuroQuant analysis was performed: Normal hippocampal volume and enlarged ventricular system: Findings do not support hippocampal degeneration. Possible expansion of ventricular system without medial temporal lobe focused ex-vacuo process.\"  Vitamin B6 pending drawn on 5/13  RPR nonreactive, TSH 0.989, folate greater than 22.3, vitamin B12 is 476, vitamin B 1 is 123  Patient does not appear to have capacity to make fully informed medical decisions per neuropsych  Discharge planning to assisted living/LTC in the [place] area per family preference  "

## 2025-05-21 ENCOUNTER — TELEPHONE (OUTPATIENT)
Age: 65
End: 2025-05-21

## 2025-05-21 LAB
GLUCOSE SERPL-MCNC: 135 MG/DL (ref 65–140)
GLUCOSE SERPL-MCNC: 167 MG/DL (ref 65–140)
GLUCOSE SERPL-MCNC: 183 MG/DL (ref 65–140)
GLUCOSE SERPL-MCNC: 87 MG/DL (ref 65–140)

## 2025-05-21 PROCEDURE — 82948 REAGENT STRIP/BLOOD GLUCOSE: CPT

## 2025-05-21 PROCEDURE — 99231 SBSQ HOSP IP/OBS SF/LOW 25: CPT | Performed by: INTERNAL MEDICINE

## 2025-05-21 PROCEDURE — 97530 THERAPEUTIC ACTIVITIES: CPT

## 2025-05-21 PROCEDURE — 97535 SELF CARE MNGMENT TRAINING: CPT

## 2025-05-21 RX ADMIN — LEVETIRACETAM 1000 MG: 500 TABLET, FILM COATED ORAL at 21:40

## 2025-05-21 RX ADMIN — ASPIRIN 81 MG CHEWABLE TABLET 81 MG: 81 TABLET CHEWABLE at 09:30

## 2025-05-21 RX ADMIN — INSULIN LISPRO 1 UNITS: 100 INJECTION, SOLUTION INTRAVENOUS; SUBCUTANEOUS at 21:41

## 2025-05-21 RX ADMIN — PRAVASTATIN SODIUM 40 MG: 40 TABLET ORAL at 16:22

## 2025-05-21 RX ADMIN — LEVETIRACETAM 1000 MG: 500 TABLET, FILM COATED ORAL at 09:30

## 2025-05-21 RX ADMIN — ENOXAPARIN SODIUM 40 MG: 40 INJECTION SUBCUTANEOUS at 09:30

## 2025-05-21 RX ADMIN — INSULIN LISPRO 1 UNITS: 100 INJECTION, SOLUTION INTRAVENOUS; SUBCUTANEOUS at 11:51

## 2025-05-21 RX ADMIN — SENNOSIDES AND DOCUSATE SODIUM 1 TABLET: 50; 8.6 TABLET ORAL at 21:40

## 2025-05-21 RX ADMIN — INSULIN GLARGINE 14 UNITS: 100 INJECTION, SOLUTION SUBCUTANEOUS at 21:41

## 2025-05-21 NOTE — ASSESSMENT & PLAN NOTE
"Patient was recently admitted from 5/1-5/9 to neurology service for breakthrough seizures in the setting of medication noncompliance.   There were concerns brought up by family that admission regarding cognitive impairment/memory loss and ability to care for herself.    Scored 13/30 on MOCA done 5/7.  Per Neurology record review, was deemed at that time capable to live alone  Patient was initially discharged and stayed with her brother before he took her back to her home.  She did not recall details of this.  Additionally, VNA came out to her home and patient could not properly use her insulin or use glucometer (she has been on insulin for years) so came back to the hospital for safety concerns  Neurology and geriatrics evals appreciated   MRI brain 5/16: \"No acute intracranial pathology. Chronic microangiopathy. NeuroQuant analysis was performed: Normal hippocampal volume and enlarged ventricular system: Findings do not support hippocampal degeneration. Possible expansion of ventricular system without medial temporal lobe focused ex-vacuo process.\"  Vitamin B6 pending drawn on 5/13  RPR nonreactive, TSH 0.989, folate greater than 22.3, vitamin B12 is 476, vitamin B 1 is 123  Patient does not appear to have capacity to make fully informed medical decisions per neuropsych  Discharge planning to assisted living/LTC in the [place] area per family preference  "

## 2025-05-21 NOTE — TELEPHONE ENCOUNTER
Pt calling in; she forgot to call earlier to cancel her appt this afternoon, she is still currently in the hospital and they want to hold her and they did find out a few things. Tried to cancel appt for today but unable to with the Arrived status listed. Pt was asking if/when Dr. Landon would want to see her or if theres anything else that should be done at this moment.    Please advise and give pt a call back if anything.
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
## 2025-05-21 NOTE — ASSESSMENT & PLAN NOTE
Lab Results   Component Value Date    HGBA1C 7.3 (H) 03/05/2025       Recent Labs     05/20/25  1621 05/20/25 2051 05/21/25  0612 05/21/25  1055   POCGLU 230* 245* 87 183*       Blood Sugar Average: Last 72 hrs:  (P) 171.7448021660485293  Hold glimepiride and metformin while admitted  Diabetic diet  Continue Lantus 14 units QHS and SSI TID AC and QHS

## 2025-05-21 NOTE — CASE MANAGEMENT
Case Management Progress Note    Patient name Ynes Mendoza  Location [facility] 703/[facility] 703-01 MRN [id number]  : 1960 Date 2025       LOS (days): 8  Geometric Mean LOS (GMLOS) (days): 2.6  Days to GMLOS:-4.4        OBJECTIVE:        Current admission status: Inpatient  Preferred Pharmacy:   [facility] [place] - [address]  [address] [facility] [address]  Phone: [phone] Fax: [phone]    [id number] [place] - [address]  [address]  [place] [address]  Phone: [phone] Fax: [phone]    [facility]  [address]  [place] [address]  Phone: [phone] Fax: [phone]    [facility] [place] - [place] - [address]  [address]  [place] [address]  Phone: [phone] Fax: [phone]    [id number] [place] - [address]  [address]  [place] [address]  Phone: [phone] Fax: [phone]    Primary Care Provider: Saloni Landon DO    Primary Insurance: [employer] [facility]  Secondary Insurance:     PROGRESS NOTE:    CARRILLO received a call from David @ [facility] re:optioning docs that were forwarded. MA-51 was incomplete, and PASRR was missing.CM completed missing information and forwarded back to David

## 2025-05-21 NOTE — OCCUPATIONAL THERAPY NOTE
"  Occupational Therapy Progress Note     Patient Name: Ynes Mendoza  Today's Date: 5/21/2025  Problem List  Principal Problem:    Failure to thrive in adult  Active Problems:    Type 2 diabetes mellitus, with long-term current use of insulin (HCC)    Generalized anxiety disorder    Focal epilepsy (HCC)    Cognitive impairment    Frailty    Ambulatory dysfunction    At risk for delirium        05/21/25 1213   OT Last Visit   OT Visit Date 05/21/25   Note Type   Note Type Treatment   Pain Assessment   Pain Assessment Tool 0-10   Pain Score No Pain   Restrictions/Precautions   Weight Bearing Precautions Per Order No   Other Precautions Cognitive;Bed Alarm;Chair Alarm;Telemetry;Fall Risk;Seizure   Lifestyle   Autonomy I adls and mobility - furniture cruises in home and use rollator outside - denies falls - i iadls (communal living area) - reports she prepares own meals by typically makes \"easy to make\" items like hot dogs - reports she uses CGM but cannot find the reader and her phone is not compatible - reports she does take prefilled insulin pens nightly   Reciprocal Relationships limited local support - states her family is more involved in her care recently and are helping her look for alternate living arrangements ([facility]) +brother, uncle   Service to Others not working   Intrinsic Gratification painting   ADL   Eating Assistance 7  Independent lunch foods   LB Dressing Assistance 5  Supervision/Setup   LB Dressing Deficit Don/doff R sock;Don/doff L sock   Bed Mobility   Supine to Sit 6  Modified independent   Transfers   Sit to Stand 5  Supervision   Stand to Sit 5  Supervision   Additional Comments no AD   Functional Mobility   Functional Mobility 5  Supervision   Additional Comments S with +3 steps from bed to chair (declined farther mobility)   Cognition   Overall Cognitive Status (S)  Impaired   Arousal/Participation Alert;Responsive;Cooperative   Attention Attends with cues to redirect " "  Orientation Level Oriented X4   Memory Decreased recall of recent events;Decreased short term memory;Decreased recall of precautions   Following Commands Follows one step commands with increased time or repetition   Comments pt pleasant and cooperative, oriented, forgetful, perservating on discharge plan, ACLs 4.8 on 5/6, neurophsych deem no capacity to make medical decisions, mini cog 2/5 unable to recall 0/3 words, able to draw clock accurately. Issued cogntive therapy HEP, pt has some insight into cognitive deficits especially memory stating \"I never remember if I schedule a lanta Donnellson appointment, I call them to check\"   Cognition Assessment Tools (mini cog)   Score 2/5 indicates cogntive deficits   Activity Tolerance   Activity Tolerance Patient tolerated treatment well   Medical Staff Made Aware RN   Assessment   Assessment Patient participated in Skilled OT session this date with interventions consisting of self care tasks, functional transfers/mobility . Patient agreeable to OT treatment session, upon arrival patient was found supine in bed.  In comparison to previous session, patient with improvements in standing dynamic balance. Patient requiring verbal cues for safety and verbal cues for correct technique. Patient continues to be functioning below baseline level, occupational performance remains limited secondary to factors listed above and increased risk for falls and injury.   From OT standpoint, recommendation at time of d/c would be mod level II.   The patient's raw score on the AM-PAC Daily Activity Inpatient Short Form is 19. A raw score of greater than or equal to 19 suggests the patient may benefit from discharge to home. Please refer to the recommendation of the Occupational Therapist for safe discharge planning. Patient to benefit from continued Occupational Therapy treatment while in the hospital to address deficits as defined above and maximize level of functional independence with ADLs and " functional mobility.   Plan   Treatment Interventions ADL retraining;Functional transfer training;UE strengthening/ROM;Endurance training;Cognitive reorientation;Patient/family training;Equipment evaluation/education;Compensatory technique education;Continued evaluation;Energy conservation;Activityengagement   Goal Expiration Date 05/27/25   OT Treatment Day 1   OT Frequency 1-2x/wk  (decreased frequency due to pt's progress)   Discharge Recommendation   Rehab Resource Intensity Level, OT II (Moderate Resource Intensity)   AM-PAC Daily Activity Inpatient   Lower Body Dressing 3   Bathing 3   Toileting 3   Upper Body Dressing 3   Grooming 3   Eating 4   Daily Activity Raw Score 19   Daily Activity Standardized Score (Calc for Raw Score >=11) 40.22   AM-PAC Applied Cognition Inpatient   Following a Speech/Presentation 2   Understanding Ordinary Conversation 3   Taking Medications 3   Remembering Where Things Are Placed or Put Away 3   Remembering List of 4-5 Errands 2   Taking Care of Complicated Tasks 2   Applied Cognition Raw Score 15   Applied Cognition Standardized Score 33.54   Mini-Cog Assessment   Word Version Version 1: Banana, Sunrise, Chair   Clock Draw (CDT) 2   Word Recall 0   Mini-Cog Score 2   Mini-Cog Results Positive screen for dementia   End of Consult   Education Provided Yes  (Cognitive therapy HEP -[facility] activity booklet #3)   Patient Position at End of Consult Bedside chair;Bed/Chair alarm activated;All needs within reach   Nurse Communication Nurse aware of consult     Angelica DUQUE/ANNA

## 2025-05-21 NOTE — PROGRESS NOTES
"Progress Note - Hospitalist   Name: Ynes Mendoza 64 y.o. female I MRN: [id number]  Unit/Bed#: [facility] 703-01 I Date of Admission: 5/12/2025   Date of Service: 5/21/2025 I Hospital Day: 8    Assessment & Plan  Failure to thrive in adult  Patient was recently admitted from 5/1-5/9 to neurology service for breakthrough seizures in the setting of medication noncompliance.   There were concerns brought up by family that admission regarding cognitive impairment/memory loss and ability to care for herself.    Scored 13/30 on MOCA done 5/7.  Per Neurology record review, was deemed at that time capable to live alone  Patient was initially discharged and stayed with her brother before he took her back to her home.  She did not recall details of this.  Additionally, VNA came out to her home and patient could not properly use her insulin or use glucometer (she has been on insulin for years) so came back to the hospital for safety concerns  Neurology and geriatrics evals appreciated   MRI brain 5/16: \"No acute intracranial pathology. Chronic microangiopathy. NeuroQuant analysis was performed: Normal hippocampal volume and enlarged ventricular system: Findings do not support hippocampal degeneration. Possible expansion of ventricular system without medial temporal lobe focused ex-vacuo process.\"  Vitamin B6 pending drawn on 5/13  RPR nonreactive, TSH 0.989, folate greater than 22.3, vitamin B12 is 476, vitamin B 1 is 123  Patient does not appear to have capacity to make fully informed medical decisions per neuropsych  Discharge planning to assisted living/LTC in the [place] area per family preference  Type 2 diabetes mellitus, with long-term current use of insulin ([facility])  Lab Results   Component Value Date    HGBA1C 7.3 (H) 03/05/2025       Recent Labs     05/20/25  1621 05/20/25  2051 05/21/25  0612 05/21/25  1055   POCGLU 230* 245* 87 183*       Blood Sugar Average: Last 72 hrs:  (P) 171.5077208412433604  Hold glimepiride and " metformin while admitted  Diabetic diet  Continue Lantus 14 units QHS and SSI TID AC and QHS  Cognitive impairment  See above.  Sister reported progressive decline over 6-8 months   Geriatrics following  For not have capacity per Neuropsych  Outpatient follow up with neuro for baseline dementia testing and to start medication therapy if indicated at the time  Focal epilepsy (HCC)  Continue [facility]  Neurology evaluated     VTE Pharmacologic Prophylaxis: VTE Score: 6 High Risk (Score >/= 5) - Pharmacological DVT Prophylaxis Ordered: heparin. Sequential Compression Devices Ordered.    Mobility:   Basic Mobility Inpatient Raw Score: 19  JH-HLM Goal: 6: Walk 10 steps or more  JH-HLM Achieved: 6: Walk 10 steps or more  JH-HLM Goal NOT achieved. Continue with multidisciplinary rounding and encourage appropriate mobility to improve upon JH-HLM goals.    Patient Centered Rounds: I performed bedside rounds with nursing staff today.   Discussions with Specialists or Other Care Team Provider: nurse, CM    Current Length of Stay: 8 day(s)  Current Patient Status: Inpatient   Certification Statement: The patient will continue to require additional inpatient hospital stay due to discharge planning  Discharge Plan: Anticipate discharge tomorrow to LTC    Code Status: Level 1 - Full Code    Subjective   Denies any new complaints. Notes lapses in her memory    Objective :  Temp:  [98.2 °F (36.8 °C)-98.3 °F (36.8 °C)] 98.2 °F (36.8 °C)  HR:  [66-78] 66  BP: (104-125)/(62-76) 110/68  Resp:  [12-16] 12  SpO2:  [97 %-99 %] 97 %  O2 Device: None (Room air)    There is no height or weight on file to calculate BMI.     Input and Output Summary (last 24 hours):     Intake/Output Summary (Last 24 hours) at 5/21/2025 1444  Last data filed at 5/21/2025 0823  Gross per 24 hour   Intake 1158 ml   Output --   Net 1158 ml       Physical Exam  Constitutional:       Appearance: Normal appearance.   HENT:      Head: Normocephalic and atraumatic.       Nose: Nose normal.     Cardiovascular:      Rate and Rhythm: Normal rate and regular rhythm.   Pulmonary:      Effort: Pulmonary effort is normal.     Neurological:      Mental Status: She is alert and oriented to person, place, and time.      Comments: Short term memory impaired   Psychiatric:         Mood and Affect: Mood normal.         Behavior: Behavior normal.           Lines/Drains:              Lab Results: I have reviewed the following results:   Results from last 7 days   Lab Units 05/17/25  0515   WBC Thousand/uL 6.31   HEMOGLOBIN g/dL 11.8   HEMATOCRIT % 36.9   PLATELETS Thousands/uL 188   SEGS PCT % 58   LYMPHO PCT % 27   MONO PCT % 9   EOS PCT % 4     Results from last 7 days   Lab Units 05/17/25  0515   SODIUM mmol/L 142   POTASSIUM mmol/L 4.0   CHLORIDE mmol/L 107   CO2 mmol/L 26   BUN mg/dL 11   CREATININE mg/dL 0.92   ANION GAP mmol/L 9   CALCIUM mg/dL 9.4   ALBUMIN g/dL 3.5   TOTAL BILIRUBIN mg/dL 0.59   ALK PHOS U/L 49   ALT U/L 11   AST U/L 10*   GLUCOSE RANDOM mg/dL 126         Results from last 7 days   Lab Units 05/21/25  1055 05/21/25  0612 05/20/25  2051 05/20/25  1621 05/20/25  1054 05/20/25  0613 05/19/25  2114 05/19/25  1552 05/19/25  1048 05/19/25  0616 05/18/25  2041 05/18/25  1552   POC GLUCOSE mg/dl 183* 87 245* 230* 207* 104 240* 243* 171* 106 182* 154*               Recent Cultures (last 7 days):         Imaging Results Review: No pertinent imaging studies reviewed.  Other Study Results Review: No additional pertinent studies reviewed.    Last 24 Hours Medication List:     Current Facility-Administered Medications:     acetaminophen (TYLENOL) tablet 650 mg, Q6H PRN    aspirin chewable tablet 81 mg, Daily    enoxaparin (LOVENOX) subcutaneous injection 40 mg, Daily    insulin glargine (LANTUS) subcutaneous injection 14 Units 0.14 mL, HS    insulin lispro (HumALOG/ADMELOG) 100 units/mL subcutaneous injection 1-6 Units, TID AC **AND** Fingerstick Glucose (POCT), TID AC    insulin  lispro (HumALOG/ADMELOG) 100 units/mL subcutaneous injection 1-6 Units, HS    levETIRAcetam (KEPPRA) tablet 1,000 mg, Q12H DYAN    polyethylene glycol (MIRALAX) packet 17 g, Daily PRN    pravastatin (PRAVACHOL) tablet 40 mg, Daily With Dinner    senna-docusate sodium (SENOKOT S) 8.6-50 mg per tablet 1 tablet, HS    Administrative Statements   Today, Patient Was Seen By: Luis Antonio Christine MD  I have spent a total time of 40 minutes in caring for this patient on the day of the visit/encounter including Diagnostic results, Prognosis, Patient and family education, Importance of tx compliance, Counseling / Coordination of care, Documenting in the medical record, Reviewing/placing orders in the medical record (including tests, medications, and/or procedures), Obtaining or reviewing history  , and Communicating with other healthcare professionals .    **Please Note: This note may have been constructed using a voice recognition system.**

## 2025-05-21 NOTE — PLAN OF CARE
Problem: OCCUPATIONAL THERAPY ADULT  Goal: Performs self-care activities at highest level of function for planned discharge setting.  See evaluation for individualized goals.  Description: Treatment Interventions: ADL retraining, Functional transfer training, Endurance training, Cognitive reorientation, Patient/family training, Equipment evaluation/education, Compensatory technique education, Activityengagement          See flowsheet documentation for full assessment, interventions and recommendations.   Note: Limitation: Decreased ADL status, Decreased endurance, Decreased cognition, Decreased Safe judgement during ADL, Decreased self-care trans, Decreased high-level ADLs  Prognosis: Good  Assessment: Patient participated in Skilled OT session this date with interventions consisting of self care tasks, functional transfers/mobility . Patient agreeable to OT treatment session, upon arrival patient was found supine in bed.  In comparison to previous session, patient with improvements in standing dynamic balance. Patient requiring verbal cues for safety and verbal cues for correct technique. Patient continues to be functioning below baseline level, occupational performance remains limited secondary to factors listed above and increased risk for falls and injury.   From OT standpoint, recommendation at time of d/c would be mod level II.   The patient's raw score on the -PAC Daily Activity Inpatient Short Form is 19. A raw score of greater than or equal to 19 suggests the patient may benefit from discharge to home. Please refer to the recommendation of the Occupational Therapist for safe discharge planning. Patient to benefit from continued Occupational Therapy treatment while in the hospital to address deficits as defined above and maximize level of functional independence with ADLs and functional mobility.     Rehab Resource Intensity Level, OT: II (Moderate Resource Intensity)

## 2025-05-22 ENCOUNTER — TELEPHONE (OUTPATIENT)
Age: 65
End: 2025-05-22

## 2025-05-22 LAB
GLUCOSE SERPL-MCNC: 112 MG/DL (ref 65–140)
GLUCOSE SERPL-MCNC: 143 MG/DL (ref 65–140)
GLUCOSE SERPL-MCNC: 193 MG/DL (ref 65–140)
GLUCOSE SERPL-MCNC: 240 MG/DL (ref 65–140)

## 2025-05-22 PROCEDURE — 99231 SBSQ HOSP IP/OBS SF/LOW 25: CPT | Performed by: INTERNAL MEDICINE

## 2025-05-22 PROCEDURE — 82948 REAGENT STRIP/BLOOD GLUCOSE: CPT

## 2025-05-22 RX ADMIN — PRAVASTATIN SODIUM 40 MG: 40 TABLET ORAL at 16:10

## 2025-05-22 RX ADMIN — LEVETIRACETAM 1000 MG: 500 TABLET, FILM COATED ORAL at 20:33

## 2025-05-22 RX ADMIN — SENNOSIDES AND DOCUSATE SODIUM 1 TABLET: 50; 8.6 TABLET ORAL at 21:34

## 2025-05-22 RX ADMIN — LEVETIRACETAM 1000 MG: 500 TABLET, FILM COATED ORAL at 09:04

## 2025-05-22 RX ADMIN — ENOXAPARIN SODIUM 40 MG: 40 INJECTION SUBCUTANEOUS at 09:04

## 2025-05-22 RX ADMIN — ASPIRIN 81 MG CHEWABLE TABLET 81 MG: 81 TABLET CHEWABLE at 09:04

## 2025-05-22 RX ADMIN — INSULIN GLARGINE 14 UNITS: 100 INJECTION, SOLUTION SUBCUTANEOUS at 21:34

## 2025-05-22 RX ADMIN — INSULIN LISPRO 3 UNITS: 100 INJECTION, SOLUTION INTRAVENOUS; SUBCUTANEOUS at 21:34

## 2025-05-22 RX ADMIN — INSULIN LISPRO 2 UNITS: 100 INJECTION, SOLUTION INTRAVENOUS; SUBCUTANEOUS at 11:00

## 2025-05-22 NOTE — ASSESSMENT & PLAN NOTE
Lab Results   Component Value Date    HGBA1C 7.3 (H) 03/05/2025       Recent Labs     05/21/25  2040 05/22/25  0610 05/22/25  1050 05/22/25  1548   POCGLU 167* 112 193* 143*       Blood Sugar Average: Last 72 hrs:  (P) 171.7897878907859764  Hold glimepiride and metformin while admitted  Diabetic diet  Continue Lantus 14 units QHS and SSI TID AC and QHS

## 2025-05-22 NOTE — PROGRESS NOTES
"Progress Note - Hospitalist   Name: Ynes Mendoza 64 y.o. female I MRN: [id number]  [facility] I Date of Admission: 5/12/2025   Date of Service: 5/22/2025 I Hospital Day: 9    Assessment & Plan  Failure to thrive in adult  Patient was recently admitted from 5/1-5/9 to neurology service for breakthrough seizures in the setting of medication noncompliance.   There were concerns brought up by family that admission regarding cognitive impairment/memory loss and ability to care for herself.    Scored 13/30 on MOCA done 5/7.  Per Neurology record review, was deemed at that time capable to live alone  Patient was initially discharged and stayed with her brother before he took her back to her home.  She did not recall details of this.  Additionally, VNA came out to her home and patient could not properly use her insulin or use glucometer (she has been on insulin for years) so came back to the hospital for safety concerns  Neurology and geriatrics evals appreciated   MRI brain 5/16: \"No acute intracranial pathology. Chronic microangiopathy. NeuroQuant analysis was performed: Normal hippocampal volume and enlarged ventricular system: Findings do not support hippocampal degeneration. Possible expansion of ventricular system without medial temporal lobe focused ex-vacuo process.\"  Vitamin B6 pending drawn on 5/13  RPR nonreactive, TSH 0.989, folate greater than 22.3, vitamin B12 is 476, vitamin B 1 is 123  Patient does not appear to have capacity to make fully informed medical decisions per neuropsych  Discharge planning to assisted living/LTC in the [place] area per family preference  Type 2 diabetes mellitus, with long-term current use of insulin ([facility])  Lab Results   Component Value Date    HGBA1C 7.3 (H) 03/05/2025       Recent Labs     05/21/25  2040 05/22/25  0610 05/22/25  1050 05/22/25  1548   POCGLU 167* 112 193* 143*       Blood Sugar Average: Last 72 hrs:  (P) 171.0935811784687336  Hold glimepiride " and metformin while admitted  Diabetic diet  Continue Lantus 14 units QHS and SSI TID AC and QHS  Cognitive impairment  See above.  Sister reported progressive decline over 6-8 months   Geriatrics following  For not have capacity per Neuropsych  Outpatient follow up with neuro for baseline dementia testing and to start medication therapy if indicated at the time  Focal epilepsy (HCC)  Continue [facility]  Neurology evaluated     VTE Pharmacologic Prophylaxis: VTE Score: 6 High Risk (Score >/= 5) - Pharmacological DVT Prophylaxis Ordered: heparin. Sequential Compression Devices Ordered.    Mobility:   Basic Mobility Inpatient Raw Score: 19  JH-HLM Goal: 6: Walk 10 steps or more  JH-HLM Achieved: 6: Walk 10 steps or more  JH-HLM Goal NOT achieved. Continue with multidisciplinary rounding and encourage appropriate mobility to improve upon JH-HLM goals.    Patient Centered Rounds: I performed bedside rounds with nursing staff today.   Discussions with Specialists or Other Care Team Provider: nurse, CM    Current Length of Stay: 9 day(s)  Current Patient Status: Inpatient   Certification Statement: The patient will continue to require additional inpatient hospital stay due to discharge planning  Discharge Plan: Anticipate discharge tomorrow to LTC    Code Status: Level 1 - Full Code    Subjective   Denies any new complaints. Notes lapses in her memory    Objective :  Temp:  [97.9 °F (36.6 °C)-98.2 °F (36.8 °C)] 97.9 °F (36.6 °C)  HR:  [72-84] 84  BP: (100-123)/(58-80) 100/58  Resp:  [16-18] 16  SpO2:  [95 %-98 %] 95 %    There is no height or weight on file to calculate BMI.     Input and Output Summary (last 24 hours):     Intake/Output Summary (Last 24 hours) at 5/22/2025 1662  Last data filed at 5/22/2025 1100  Gross per 24 hour   Intake 720 ml   Output --   Net 720 ml       Physical Exam  Constitutional:       Appearance: Normal appearance.   HENT:      Head: Normocephalic and atraumatic.      Nose: Nose normal.      Cardiovascular:      Rate and Rhythm: Normal rate and regular rhythm.   Pulmonary:      Effort: Pulmonary effort is normal.     Neurological:      Mental Status: She is alert and oriented to person, place, and time.      Comments: Short term memory impaired   Psychiatric:         Mood and Affect: Mood normal.         Behavior: Behavior normal.           Lines/Drains:              Lab Results: I have reviewed the following results:   Results from last 7 days   Lab Units 05/17/25  0515   WBC Thousand/uL 6.31   HEMOGLOBIN g/dL 11.8   HEMATOCRIT % 36.9   PLATELETS Thousands/uL 188   SEGS PCT % 58   LYMPHO PCT % 27   MONO PCT % 9   EOS PCT % 4     Results from last 7 days   Lab Units 05/17/25  0515   SODIUM mmol/L 142   POTASSIUM mmol/L 4.0   CHLORIDE mmol/L 107   CO2 mmol/L 26   BUN mg/dL 11   CREATININE mg/dL 0.92   ANION GAP mmol/L 9   CALCIUM mg/dL 9.4   ALBUMIN g/dL 3.5   TOTAL BILIRUBIN mg/dL 0.59   ALK PHOS U/L 49   ALT U/L 11   AST U/L 10*   GLUCOSE RANDOM mg/dL 126         Results from last 7 days   Lab Units 05/22/25  1548 05/22/25  1050 05/22/25  0610 05/21/25  2040 05/21/25  1619 05/21/25  1055 05/21/25  0612 05/20/25  2051 05/20/25  1621 05/20/25  1054 05/20/25  0613 05/19/25  2114   POC GLUCOSE mg/dl 143* 193* 112 167* 135 183* 87 245* 230* 207* 104 240*               Recent Cultures (last 7 days):         Imaging Results Review: No pertinent imaging studies reviewed.  Other Study Results Review: No additional pertinent studies reviewed.    Last 24 Hours Medication List:     Current Facility-Administered Medications:     acetaminophen (TYLENOL) tablet 650 mg, Q6H PRN    aspirin chewable tablet 81 mg, Daily    enoxaparin (LOVENOX) subcutaneous injection 40 mg, Daily    insulin glargine (LANTUS) subcutaneous injection 14 Units 0.14 mL, HS    insulin lispro (HumALOG/ADMELOG) 100 units/mL subcutaneous injection 1-6 Units, TID AC **AND** Fingerstick Glucose (POCT), TID AC    insulin lispro (HumALOG/ADMELOG)  100 units/mL subcutaneous injection 1-6 Units, HS    levETIRAcetam (KEPPRA) tablet 1,000 mg, Q12H DYAN    polyethylene glycol (MIRALAX) packet 17 g, Daily PRN    pravastatin (PRAVACHOL) tablet 40 mg, Daily With Dinner    senna-docusate sodium (SENOKOT S) 8.6-50 mg per tablet 1 tablet, HS    Administrative Statements   Today, Patient Was Seen By: Luis Antonio Christine MD  I have spent a total time of 40 minutes in caring for this patient on the day of the visit/encounter including Diagnostic results, Prognosis, Patient and family education, Importance of tx compliance, Counseling / Coordination of care, Documenting in the medical record, Reviewing/placing orders in the medical record (including tests, medications, and/or procedures), Obtaining or reviewing history  , and Communicating with other healthcare professionals .    **Please Note: This note may have been constructed using a voice recognition system.**

## 2025-05-22 NOTE — UTILIZATION REVIEW
"Continued Stay Review    Date: 5/22                          Current Patient Class: Inpatient  Current Level of Care: ms    HPI:64 y.o. female initially admitted on 5/12  for Failure to thrive in adult.    MRI brain 5/16: \"No acute intracranial pathology. Chronic microangiopathy. NeuroQuant analysis was performed: Normal hippocampal volume and enlarged ventricular system: Findings do not support hippocampal degeneration. Possible expansion of ventricular system without medial temporal lobe focused ex-vacuo process.\"    5/22    Assessment/Plan:   Denies any new complaints. Notes lapses in her memory.   Ongoing PT/OT.   PER NEUROPSYCH:  Patient does not have capacity to make fully informed medical decisions.  Discharge planning to assisted living/LTC in the [facility] per family preference        Medications:   Scheduled Medications:  aspirin, 81 mg, Oral, Daily  enoxaparin, 40 mg, Subcutaneous, Daily  insulin glargine, 14 Units, Subcutaneous, HS  insulin lispro, 1-6 Units, Subcutaneous, TID AC  insulin lispro, 1-6 Units, Subcutaneous, HS  levETIRAcetam, 1,000 mg, Oral, Q12H DYAN  pravastatin, 40 mg, Oral, Daily With Dinner  senna-docusate sodium, 1 tablet, Oral, HS      Continuous IV Infusions:     PRN Meds:  acetaminophen, 650 mg, Oral, Q6H PRN  polyethylene glycol, 17 g, Oral, Daily PRN      Discharge Plan: tbd    Vital Signs (last 3 days)       Date/Time Temp Pulse Resp BP MAP (mmHg) SpO2 O2 Device Patient Position - Orthostatic VS Sandy Coma Scale Score Pain    05/22/25 0800 -- -- -- -- -- -- -- -- 15 No Pain    05/22/25 07:00:17 98.2 °F (36.8 °C) 75 18 118/80 93 96 % -- -- -- --    05/21/25 20:08:01 98.2 °F (36.8 °C) 72 -- 123/71 88 98 % -- -- -- --    05/21/25 2000 -- -- -- -- -- -- -- -- 15 --    05/21/25 16:09:18 98.4 °F (36.9 °C) 62 -- 110/68 82 98 % -- -- -- --    05/21/25 07:00:33 98.2 °F (36.8 °C) 66 12 110/68 82 97 % -- -- -- --    05/20/25 20:52:42 98.2 °F (36.8 °C) 78 16 125/76 92 98 % None " (Room air) Sitting -- --     Pertinent Labs/Diagnostic Results:   Radiology:        Results from last 7 days   Lab Units 05/17/25  0515   WBC Thousand/uL 6.31   HEMOGLOBIN g/dL 11.8   HEMATOCRIT % 36.9   PLATELETS Thousands/uL 188   TOTAL NEUT ABS Thousands/µL 3.69         Results from last 7 days   Lab Units 05/17/25  0515   SODIUM mmol/L 142   POTASSIUM mmol/L 4.0   CHLORIDE mmol/L 107   CO2 mmol/L 26   ANION GAP mmol/L 9   BUN mg/dL 11   CREATININE mg/dL 0.92   EGFR ml/min/1.73sq m 66   CALCIUM mg/dL 9.4     Results from last 7 days   Lab Units 05/17/25  0515   AST U/L 10*   ALT U/L 11   ALK PHOS U/L 49   TOTAL PROTEIN g/dL 6.4   ALBUMIN g/dL 3.5   TOTAL BILIRUBIN mg/dL 0.59     Results from last 7 days   Lab Units 05/22/25  1050 05/22/25  0610 05/21/25  2040 05/21/25  1619 05/21/25  1055 05/21/25  0612 05/20/25  2051 05/20/25  1621 05/20/25  1054 05/20/25  0613 05/19/25  2114 05/19/25  1552   POC GLUCOSE mg/dl 193* 112 167* 135 183* 87 245* 230* 207* 104 240* 243*     Results from last 7 days   Lab Units 05/17/25  0515   GLUCOSE RANDOM mg/dL 126       Network Utilization Review Department  ATTENTION: Please call with any questions or concerns to [phone] and carefully listen to the prompts so that you are directed to the right person. All voicemails are confidential.   For Discharge needs, contact Care Management DC Support Team at [phone] opt. 2  Send all requests for admission clinical reviews, approved or denied determinations and any other requests to dedicated fax number below belonging to the campus where the patient is receiving treatment. List of dedicated fax numbers for the Facilities:  FACILITY NAME UR FAX NUMBER   ADMISSION DENIALS (Administrative/Medical Necessity) [phone]   DISCHARGE SUPPORT TEAM (NETWORK) [phone]   PARENT CHILD HEALTH (Maternity/NICU/Pediatrics) [phone]   [facility] [phone]   [facility]  [phone]   [facility] [phone]   [facility] [phone]   [facility] [phone]   [facility] [phone]   [facility] [phone]   [facility] [phone]   [facility] [phone]   [facility] [phone]   [facility] [phone]   [facility] [phone]

## 2025-05-22 NOTE — ASSESSMENT & PLAN NOTE
"Patient was recently admitted from 5/1-5/9 to neurology service for breakthrough seizures in the setting of medication noncompliance.   There were concerns brought up by family that admission regarding cognitive impairment/memory loss and ability to care for herself.    Scored 13/30 on MOCA done 5/7.  Per Neurology record review, was deemed at that time capable to live alone  Patient was initially discharged and stayed with her brother before he took her back to her home.  She did not recall details of this.  Additionally, VNA came out to her home and patient could not properly use her insulin or use glucometer (she has been on insulin for years) so came back to the hospital for safety concerns  Neurology and geriatrics evals appreciated   MRI brain 5/16: \"No acute intracranial pathology. Chronic microangiopathy. NeuroQuant analysis was performed: Normal hippocampal volume and enlarged ventricular system: Findings do not support hippocampal degeneration. Possible expansion of ventricular system without medial temporal lobe focused ex-vacuo process.\"  Vitamin B6 pending drawn on 5/13  RPR nonreactive, TSH 0.989, folate greater than 22.3, vitamin B12 is 476, vitamin B 1 is 123  Patient does not appear to have capacity to make fully informed medical decisions per neuropsych  Discharge planning to assisted living/LTC in the [place] area per family preference  "

## 2025-05-22 NOTE — TELEPHONE ENCOUNTER
Patient calling to update the doctor. She is currently admitted into the hospital for seizures, she repots being there for most of May so far.  She states she can be reached on her cell if there is anything the doctor needs to know or relay.  SETH

## 2025-05-23 LAB
GLUCOSE SERPL-MCNC: 115 MG/DL (ref 65–140)
GLUCOSE SERPL-MCNC: 119 MG/DL (ref 65–140)
GLUCOSE SERPL-MCNC: 195 MG/DL (ref 65–140)
GLUCOSE SERPL-MCNC: 240 MG/DL (ref 65–140)
GLUCOSE SERPL-MCNC: 268 MG/DL (ref 65–140)

## 2025-05-23 PROCEDURE — 82948 REAGENT STRIP/BLOOD GLUCOSE: CPT

## 2025-05-23 PROCEDURE — 99232 SBSQ HOSP IP/OBS MODERATE 35: CPT | Performed by: INTERNAL MEDICINE

## 2025-05-23 RX ADMIN — LEVETIRACETAM 1000 MG: 500 TABLET, FILM COATED ORAL at 09:49

## 2025-05-23 RX ADMIN — LEVETIRACETAM 1000 MG: 500 TABLET, FILM COATED ORAL at 21:08

## 2025-05-23 RX ADMIN — INSULIN LISPRO 3 UNITS: 100 INJECTION, SOLUTION INTRAVENOUS; SUBCUTANEOUS at 17:00

## 2025-05-23 RX ADMIN — INSULIN GLARGINE 14 UNITS: 100 INJECTION, SOLUTION SUBCUTANEOUS at 21:08

## 2025-05-23 RX ADMIN — SENNOSIDES AND DOCUSATE SODIUM 1 TABLET: 50; 8.6 TABLET ORAL at 21:08

## 2025-05-23 RX ADMIN — INSULIN LISPRO 3 UNITS: 100 INJECTION, SOLUTION INTRAVENOUS; SUBCUTANEOUS at 21:08

## 2025-05-23 RX ADMIN — PRAVASTATIN SODIUM 40 MG: 40 TABLET ORAL at 17:00

## 2025-05-23 RX ADMIN — INSULIN LISPRO 2 UNITS: 100 INJECTION, SOLUTION INTRAVENOUS; SUBCUTANEOUS at 11:13

## 2025-05-23 RX ADMIN — ENOXAPARIN SODIUM 40 MG: 40 INJECTION SUBCUTANEOUS at 09:49

## 2025-05-23 RX ADMIN — ASPIRIN 81 MG CHEWABLE TABLET 81 MG: 81 TABLET CHEWABLE at 09:49

## 2025-05-23 NOTE — ASSESSMENT & PLAN NOTE
"Patient was recently admitted from 5/1-5/9 to neurology service for breakthrough seizures in the setting of medication noncompliance.   There were concerns brought up by family that admission regarding cognitive impairment/memory loss and ability to care for herself.    Scored 13/30 on MOCA done 5/7.  Per Neurology record review, was deemed at that time capable to live alone  Patient was initially discharged and stayed with her brother before he took her back to her home.  She did not recall details of this.  Additionally, VNA came out to her home and patient could not properly use her insulin or use glucometer (she has been on insulin for years) so came back to the hospital for safety concerns  Neurology and geriatrics evals appreciated   MRI brain 5/16: \"No acute intracranial pathology. Chronic microangiopathy. NeuroQuant analysis was performed: Normal hippocampal volume and enlarged ventricular system: Findings do not support hippocampal degeneration. Possible expansion of ventricular system without medial temporal lobe focused ex-vacuo process.\"  Vitamin B6 pending drawn on 5/13  RPR nonreactive, TSH 0.989, folate greater than 22.3, vitamin B12 is 476, vitamin B 1 is 123  Patient does not appear to have capacity to make fully informed medical decisions per neuropsych  Discharge planning to assisted living/LTC in the [place] area per family preference  "

## 2025-05-23 NOTE — ASSESSMENT & PLAN NOTE
Lab Results   Component Value Date    HGBA1C 7.3 (H) 03/05/2025       Recent Labs     05/23/25  0617 05/23/25  0721 05/23/25  1104 05/23/25  1604   POCGLU 119 115 195* 240*       Blood Sugar Average: Last 72 hrs:  (P) 169.6875  Hold glimepiride and metformin while admitted  Diabetic diet  Continue Lantus 14 units QHS and SSI TID AC and QHS

## 2025-05-23 NOTE — ASSESSMENT & PLAN NOTE
Reports that she had an episode of vertigo yesterday while eating her dinner.  She lied down and it resolved in about 90 minutes  Symptoms have resolved and reproduced to mild degree with pancho hallpike to the right

## 2025-05-23 NOTE — CASE MANAGEMENT
Case Management Progress Note    Patient name Ynes [facility]  Location [facility] 703/[facility] 703-01 MRN [id number]  : 1960 Date 2025       LOS (days): 10  Geometric Mean LOS (GMLOS) (days): 2.6  Days to GMLOS:-6.6        OBJECTIVE:        Current admission status: Inpatient  Preferred Pharmacy:   [facility] [place] - [address]  [address] [address]  Phone: [phone] Fax: [phone]    [id number] - [place], [place] - [address]  [address]  [place] [address]  Phone: [phone] Fax: [phone]    [facility]  [address]  [place] [address]  Phone: [phone] Fax: [phone]    [facility] [place] - [address]  [address]  [place] [address]  Phone: [phone] Fax: [phone]    [id number] - [place] - [address]  [address]  [place] [address]  Phone: [phone] Fax: [phone]    Primary Care Provider: Saloni Landon DO    Primary Insurance: [email or web address] [facility]  Secondary Insurance:     PROGRESS NOTE:    CM spoke with pt sister re:choices. Choice list emailed to Lori at [email or web address]. Lori resides in [place] so there is a 4hr time difference. Please follow up with Lori for choice. Pt is NFCE eligible determination letter has been uploaded to [employer]

## 2025-05-23 NOTE — PROGRESS NOTES
"Progress Note - Hospitalist   Name: Ynes Mendoza 64 y.o. female I MRN: [id number]  Unit/Bed#: [facility] 703-01 I Date of Admission: 5/12/2025   Date of Service: 5/23/2025 I Hospital Day: 10    Assessment & Plan  Failure to thrive in adult  Patient was recently admitted from 5/1-5/9 to neurology service for breakthrough seizures in the setting of medication noncompliance.   There were concerns brought up by family that admission regarding cognitive impairment/memory loss and ability to care for herself.    Scored 13/30 on MOCA done 5/7.  Per Neurology record review, was deemed at that time capable to live alone  Patient was initially discharged and stayed with her brother before he took her back to her home.  She did not recall details of this.  Additionally, VNA came out to her home and patient could not properly use her insulin or use glucometer (she has been on insulin for years) so came back to the hospital for safety concerns  Neurology and geriatrics evals appreciated   MRI brain 5/16: \"No acute intracranial pathology. Chronic microangiopathy. NeuroQuant analysis was performed: Normal hippocampal volume and enlarged ventricular system: Findings do not support hippocampal degeneration. Possible expansion of ventricular system without medial temporal lobe focused ex-vacuo process.\"  Vitamin B6 pending drawn on 5/13  RPR nonreactive, TSH 0.989, folate greater than 22.3, vitamin B12 is 476, vitamin B 1 is 123  Patient does not appear to have capacity to make fully informed medical decisions per neuropsych  Discharge planning to assisted living/LTC in the [place] area per family preference  Type 2 diabetes mellitus, with long-term current use of insulin ([facility])  Lab Results   Component Value Date    HGBA1C 7.3 (H) 03/05/2025       Recent Labs     05/23/25  0617 05/23/25  0721 05/23/25  1104 05/23/25  1604   POCGLU 119 115 195* 240*       Blood Sugar Average: Last 72 hrs:  (P) 169.6875  Hold glimepiride and metformin " while admitted  Diabetic diet  Continue Lantus 14 units QHS and SSI TID AC and QHS  Cognitive impairment  See above.  Sister reported progressive decline over 6-8 months   Geriatrics following  For not have capacity per Neuropsych  Outpatient follow up with neuro for baseline dementia testing and to start medication therapy if indicated at the time  Focal epilepsy (HCC)  Continue [facility]  Neurology evaluated   Vertigo  Reports that she had an episode of vertigo yesterday while eating her dinner.  She lied down and it resolved in about 90 minutes  Symptoms have resolved and reproduced to mild degree with pancho hallpike to the right    VTE Pharmacologic Prophylaxis: VTE Score: 6 High Risk (Score >/= 5) - Pharmacological DVT Prophylaxis Ordered: heparin. Sequential Compression Devices Ordered.    Mobility:   Basic Mobility Inpatient Raw Score: 19  JH-HLM Goal: 6: Walk 10 steps or more  JH-HLM Achieved: 1: Laying in bed  JH-HLM Goal achieved. Continue to encourage appropriate mobility.    Patient Centered Rounds: I performed bedside rounds with nursing staff today.   Discussions with Specialists or Other Care Team Provider: nurseCARRILLO    Current Length of Stay: 10 day(s)  Current Patient Status: Inpatient   Certification Statement: The patient will continue to require additional inpatient hospital stay due to discharge planning  Discharge Plan: Anticipate discharge tomorrow to rehab facility.    Code Status: Level 1 - Full Code    Subjective   Denies any new complaints today, had veritgo episode yesterday    Objective :  Temp:  [98.4 °F (36.9 °C)-98.5 °F (36.9 °C)] 98.5 °F (36.9 °C)  HR:  [69-91] 75  BP: (106-141)/(64-75) 106/67  SpO2:  [98 %-99 %] 98 %    There is no height or weight on file to calculate BMI.     Input and Output Summary (last 24 hours):     Intake/Output Summary (Last 24 hours) at 5/23/2025 7872  Last data filed at 5/23/2025 0721  Gross per 24 hour   Intake 180 ml   Output --   Net 180 ml       Physical  Exam  Constitutional:       Appearance: Normal appearance.   HENT:      Head: Normocephalic and atraumatic.      Nose: Nose normal.     Eyes:      Extraocular Movements: Extraocular movements intact.       Cardiovascular:      Rate and Rhythm: Normal rate and regular rhythm.   Pulmonary:      Effort: Pulmonary effort is normal.     Musculoskeletal:      Right lower leg: No edema.      Left lower leg: No edema.     Skin:     General: Skin is warm and dry.     Neurological:      Mental Status: She is alert and oriented to person, place, and time. Mental status is at baseline.      Comments: Edson hallpike positive to the right         Lines/Drains:              Lab Results: I have reviewed the following results:   Results from last 7 days   Lab Units 05/17/25  0515   WBC Thousand/uL 6.31   HEMOGLOBIN g/dL 11.8   HEMATOCRIT % 36.9   PLATELETS Thousands/uL 188   SEGS PCT % 58   LYMPHO PCT % 27   MONO PCT % 9   EOS PCT % 4     Results from last 7 days   Lab Units 05/17/25  0515   SODIUM mmol/L 142   POTASSIUM mmol/L 4.0   CHLORIDE mmol/L 107   CO2 mmol/L 26   BUN mg/dL 11   CREATININE mg/dL 0.92   ANION GAP mmol/L 9   CALCIUM mg/dL 9.4   ALBUMIN g/dL 3.5   TOTAL BILIRUBIN mg/dL 0.59   ALK PHOS U/L 49   ALT U/L 11   AST U/L 10*   GLUCOSE RANDOM mg/dL 126         Results from last 7 days   Lab Units 05/23/25  1604 05/23/25  1104 05/23/25  0721 05/23/25  0617 05/22/25  2121 05/22/25  1548 05/22/25  1050 05/22/25  0610 05/21/25  2040 05/21/25  1619 05/21/25  1055 05/21/25  0612   POC GLUCOSE mg/dl 240* 195* 115 119 240* 143* 193* 112 167* 135 183* 87               Recent Cultures (last 7 days):         Imaging Results Review: No pertinent imaging studies reviewed.  Other Study Results Review: No additional pertinent studies reviewed.    Last 24 Hours Medication List:     Current Facility-Administered Medications:     acetaminophen (TYLENOL) tablet 650 mg, Q6H PRN    aspirin chewable tablet 81 mg, Daily    enoxaparin (LOVENOX)  subcutaneous injection 40 mg, Daily    insulin glargine (LANTUS) subcutaneous injection 14 Units 0.14 mL, HS    insulin lispro (HumALOG/ADMELOG) 100 units/mL subcutaneous injection 1-6 Units, TID AC **AND** Fingerstick Glucose (POCT), TID AC    insulin lispro (HumALOG/ADMELOG) 100 units/mL subcutaneous injection 1-6 Units, HS    levETIRAcetam (KEPPRA) tablet 1,000 mg, Q12H DYAN    polyethylene glycol (MIRALAX) packet 17 g, Daily PRN    pravastatin (PRAVACHOL) tablet 40 mg, Daily With Dinner    senna-docusate sodium (SENOKOT S) 8.6-50 mg per tablet 1 tablet, HS    Administrative Statements   Today, Patient Was Seen By: Luis Antonio Christine MD  I have spent a total time of 40 minutes in caring for this patient on the day of the visit/encounter including Diagnostic results, Instructions for management, Patient and family education, Impressions, Counseling / Coordination of care, Documenting in the medical record, Reviewing/placing orders in the medical record (including tests, medications, and/or procedures), Obtaining or reviewing history  , and Communicating with other healthcare professionals .    **Please Note: This note may have been constructed using a voice recognition system.**

## 2025-05-24 LAB
GLUCOSE SERPL-MCNC: 106 MG/DL (ref 65–140)
GLUCOSE SERPL-MCNC: 131 MG/DL (ref 65–140)
GLUCOSE SERPL-MCNC: 143 MG/DL (ref 65–140)
GLUCOSE SERPL-MCNC: 256 MG/DL (ref 65–140)

## 2025-05-24 PROCEDURE — 99232 SBSQ HOSP IP/OBS MODERATE 35: CPT | Performed by: INTERNAL MEDICINE

## 2025-05-24 PROCEDURE — 82948 REAGENT STRIP/BLOOD GLUCOSE: CPT

## 2025-05-24 RX ORDER — INSULIN LISPRO 100 [IU]/ML
5 INJECTION, SOLUTION INTRAVENOUS; SUBCUTANEOUS
Status: DISCONTINUED | OUTPATIENT
Start: 2025-05-24 | End: 2025-05-31

## 2025-05-24 RX ORDER — HYDROXYZINE HYDROCHLORIDE 25 MG/1
25 TABLET, FILM COATED ORAL EVERY 6 HOURS PRN
Status: DISCONTINUED | OUTPATIENT
Start: 2025-05-24 | End: 2025-06-04 | Stop reason: HOSPADM

## 2025-05-24 RX ORDER — INSULIN GLARGINE 100 [IU]/ML
15 INJECTION, SOLUTION SUBCUTANEOUS
Status: DISCONTINUED | OUTPATIENT
Start: 2025-05-24 | End: 2025-05-31

## 2025-05-24 RX ADMIN — LEVETIRACETAM 1000 MG: 500 TABLET, FILM COATED ORAL at 20:41

## 2025-05-24 RX ADMIN — DEXTRAN 70, GLYCERIN, HYPROMELLOSE 1 DROP: 1; 2; 3 SOLUTION/ DROPS OPHTHALMIC at 19:34

## 2025-05-24 RX ADMIN — INSULIN LISPRO 5 UNITS: 100 INJECTION, SOLUTION INTRAVENOUS; SUBCUTANEOUS at 16:53

## 2025-05-24 RX ADMIN — LEVETIRACETAM 1000 MG: 500 TABLET, FILM COATED ORAL at 08:07

## 2025-05-24 RX ADMIN — INSULIN LISPRO 5 UNITS: 100 INJECTION, SOLUTION INTRAVENOUS; SUBCUTANEOUS at 08:10

## 2025-05-24 RX ADMIN — ENOXAPARIN SODIUM 40 MG: 40 INJECTION SUBCUTANEOUS at 08:08

## 2025-05-24 RX ADMIN — SENNOSIDES AND DOCUSATE SODIUM 1 TABLET: 50; 8.6 TABLET ORAL at 21:21

## 2025-05-24 RX ADMIN — INSULIN LISPRO 3 UNITS: 100 INJECTION, SOLUTION INTRAVENOUS; SUBCUTANEOUS at 21:21

## 2025-05-24 RX ADMIN — INSULIN GLARGINE 15 UNITS: 100 INJECTION, SOLUTION SUBCUTANEOUS at 21:21

## 2025-05-24 RX ADMIN — ACETAMINOPHEN 650 MG: 325 TABLET, FILM COATED ORAL at 21:22

## 2025-05-24 RX ADMIN — PRAVASTATIN SODIUM 40 MG: 40 TABLET ORAL at 16:53

## 2025-05-24 RX ADMIN — ASPIRIN 81 MG CHEWABLE TABLET 81 MG: 81 TABLET CHEWABLE at 08:07

## 2025-05-24 RX ADMIN — INSULIN LISPRO 5 UNITS: 100 INJECTION, SOLUTION INTRAVENOUS; SUBCUTANEOUS at 11:03

## 2025-05-24 RX ADMIN — HYDROXYZINE HYDROCHLORIDE 25 MG: 25 TABLET, FILM COATED ORAL at 20:42

## 2025-05-24 NOTE — ASSESSMENT & PLAN NOTE
"Patient was recently admitted from 5/1-5/9 to neurology service for breakthrough seizures in the setting of medication noncompliance.   There were concerns brought up by family that admission regarding cognitive impairment/memory loss and ability to care for herself.    Scored 13/30 on MOCA done 5/7.  Per Neurology record review, was deemed at that time capable to live alone  Patient was initially discharged and stayed with her brother before he took her back to her home.  She did not recall details of this.  Additionally, VNA came out to her home and patient could not properly use her insulin or use glucometer (she has been on insulin for years) so came back to the hospital for safety concerns  Neurology and geriatrics evals appreciated   MRI brain 5/16: \"No acute intracranial pathology. Chronic microangiopathy. NeuroQuant analysis was performed: Normal hippocampal volume and enlarged ventricular system: Findings do not support hippocampal degeneration. Possible expansion of ventricular system without medial temporal lobe focused ex-vacuo process.\"  Vitamin B6 pending drawn on 5/13  RPR nonreactive, TSH 0.989, folate greater than 22.3, vitamin B12 is 476, vitamin B 1 is 123  Patient does not appear to have capacity to make fully informed medical decisions per neuropsych  Discharge planning to assisted living/LTC in the [place] area per family preference  "

## 2025-05-24 NOTE — PROGRESS NOTES
"Progress Note - Hospitalist   Name: Ynes Mendoza 64 y.o. female I MRN: [id number]  [facility] Date of Admission: 5/12/2025   Date of Service: 5/24/2025 I Hospital Day: 11    Assessment & Plan  Failure to thrive in adult  Patient was recently admitted from 5/1-5/9 to neurology service for breakthrough seizures in the setting of medication noncompliance.   There were concerns brought up by family that admission regarding cognitive impairment/memory loss and ability to care for herself.    Scored 13/30 on MOCA done 5/7.  Per Neurology record review, was deemed at that time capable to live alone  Patient was initially discharged and stayed with her brother before he took her back to her home.  She did not recall details of this.  Additionally, VNA came out to her home and patient could not properly use her insulin or use glucometer (she has been on insulin for years) so came back to the hospital for safety concerns  Neurology and geriatrics evals appreciated   MRI brain 5/16: \"No acute intracranial pathology. Chronic microangiopathy. NeuroQuant analysis was performed: Normal hippocampal volume and enlarged ventricular system: Findings do not support hippocampal degeneration. Possible expansion of ventricular system without medial temporal lobe focused ex-vacuo process.\"  Vitamin B6 pending drawn on 5/13  RPR nonreactive, TSH 0.989, folate greater than 22.3, vitamin B12 is 476, vitamin B 1 is 123  Patient does not appear to have capacity to make fully informed medical decisions per neuropsych  Discharge planning to assisted living/LTC in the [place] area per family preference  Type 2 diabetes mellitus, with long-term current use of insulin ([facility])  Lab Results   Component Value Date    HGBA1C 7.3 (H) 03/05/2025       Recent Labs     05/23/25  1604 05/23/25  2038 05/24/25  0705 05/24/25  1100   POCGLU 240* 268* 143* 131       Blood Sugar Average: Last 72 hrs:  (P) 164.8633730315397718  Hold glimepiride " and metformin while admitted  Diabetic diet  Continue Lantus 14 units QHS and SSI TID AC and QHS  Cognitive impairment  See above.  Sister reported progressive decline over 6-8 months   Geriatrics following  For not have capacity per Neuropsych  Outpatient follow up with neuro for baseline dementia testing and to start medication therapy if indicated at the time  Focal epilepsy (HCC)  Continue [facility]  Neurology evaluated   Vertigo  Reports that she had an episode of vertigo previously while eating her dinner.  She lied down and it resolved in about 90 minutes  Symptoms have resolved and reproduced to mild degree with pancho hallpike to the right  Resolved now, no further episodes since then    VTE Pharmacologic Prophylaxis: VTE Score: 6 High Risk (Score >/= 5) - Pharmacological DVT Prophylaxis Ordered: heparin. Sequential Compression Devices Ordered.    Mobility:   Basic Mobility Inpatient Raw Score: 19  JH-HLM Goal: 6: Walk 10 steps or more  JH-HLM Achieved: 6: Walk 10 steps or more  JH-HLM Goal achieved. Continue to encourage appropriate mobility.    Patient Centered Rounds: I performed bedside rounds with nursing staff today.   Discussions with Specialists or Other Care Team Provider: nurseCARRILLO    Current Length of Stay: 11 day(s)  Current Patient Status: Inpatient   Certification Statement: The patient will continue to require additional inpatient hospital stay due to discharge planning  Discharge Plan: Anticipate discharge tomorrow to rehab facility.    Code Status: Level 1 - Full Code    Subjective   Denies any new complaints today, had veritgo episode yesterday    Objective :  Temp:  [97.8 °F (36.6 °C)-98.5 °F (36.9 °C)] 97.8 °F (36.6 °C)  HR:  [71-72] 71  BP: (104-109)/(55-63) 104/55  Resp:  [18] 18  SpO2:  [96 %-100 %] 98 %    There is no height or weight on file to calculate BMI.     Input and Output Summary (last 24 hours):   No intake or output data in the 24 hours ending 05/24/25 6383      Physical  Exam  Constitutional:       Appearance: Normal appearance.   HENT:      Head: Normocephalic and atraumatic.      Nose: Nose normal.     Eyes:      Extraocular Movements: Extraocular movements intact.     Pulmonary:      Effort: Pulmonary effort is normal.     Skin:     General: Skin is warm and dry.     Neurological:      Mental Status: She is alert and oriented to person, place, and time. Mental status is at baseline.      Comments: Edson hallpike positive to the right         Lines/Drains:              Lab Results: I have reviewed the following results:                   Results from last 7 days   Lab Units 05/24/25  1100 05/24/25  0705 05/23/25  2038 05/23/25  1604 05/23/25  1104 05/23/25  0721 05/23/25  0617 05/22/25  2121 05/22/25  1548 05/22/25  1050 05/22/25  0610 05/21/25  2040   POC GLUCOSE mg/dl 131 143* 268* 240* 195* 115 119 240* 143* 193* 112 167*               Recent Cultures (last 7 days):         Imaging Results Review: No pertinent imaging studies reviewed.  Other Study Results Review: No additional pertinent studies reviewed.    Last 24 Hours Medication List:     Current Facility-Administered Medications:     acetaminophen (TYLENOL) tablet 650 mg, Q6H PRN    Artificial Tears Op Soln 1 drop, Q4H PRN    aspirin chewable tablet 81 mg, Daily    enoxaparin (LOVENOX) subcutaneous injection 40 mg, Daily    insulin glargine (LANTUS) subcutaneous injection 15 Units 0.15 mL, HS    insulin lispro (HumALOG/ADMELOG) 100 units/mL subcutaneous injection 1-6 Units, TID AC **AND** Fingerstick Glucose (POCT), TID AC    insulin lispro (HumALOG/ADMELOG) 100 units/mL subcutaneous injection 1-6 Units, HS    insulin lispro (HumALOG/ADMELOG) 100 units/mL subcutaneous injection 5 Units, TID With Meals    levETIRAcetam (KEPPRA) tablet 1,000 mg, Q12H DYAN    polyethylene glycol (MIRALAX) packet 17 g, Daily PRN    pravastatin (PRAVACHOL) tablet 40 mg, Daily With Dinner    senna-docusate sodium (SENOKOT S) 8.6-50 mg per tablet 1  tablet, HS    Administrative Statements   Today, Patient Was Seen By: Luis Antonio Christine MD  I have spent a total time of 40 minutes in caring for this patient on the day of the visit/encounter including Diagnostic results, Instructions for management, Patient and family education, Impressions, Counseling / Coordination of care, Documenting in the medical record, Reviewing/placing orders in the medical record (including tests, medications, and/or procedures), Obtaining or reviewing history  , and Communicating with other healthcare professionals .    **Please Note: This note may have been constructed using a voice recognition system.**

## 2025-05-24 NOTE — ASSESSMENT & PLAN NOTE
Lab Results   Component Value Date    HGBA1C 7.3 (H) 03/05/2025       Recent Labs     05/23/25  1604 05/23/25  2038 05/24/25  0705 05/24/25  1100   POCGLU 240* 268* 143* 131       Blood Sugar Average: Last 72 hrs:  (P) [id number]  Hold glimepiride and metformin while admitted  Diabetic diet  Continue Lantus 14 units QHS and SSI TID AC and QHS

## 2025-05-24 NOTE — PLAN OF CARE
Problem: PAIN - ADULT  Goal: Verbalizes/displays adequate comfort level or baseline comfort level  Description: Interventions:- Encourage patient to monitor pain and request assistance- Assess pain using appropriate pain scale- Administer analgesics based on type and severity of pain and evaluate response- Implement non-pharmacological measures as appropriate and evaluate response- Notify physician/advanced practitioner if interventions unsuccessful or patient reports new pain  Outcome: Progressing     Problem: INFECTION - ADULT  Goal: Absence or prevention of progression during hospitalization  Description: INTERVENTIONS:- Assess and monitor for signs and symptoms of infection- Monitor lab/diagnostic results- Monitor all insertion sites, i.e. indwelling lines, tubes, and drains- [place] appropriate cooling/warming therapies per order- Administer medications as ordered- Instruct and encourage patient and family to use good hand hygiene technique- Identify and instruct in appropriate isolation precautions for identified infection/condition  Outcome: Progressing     Problem: SAFETY ADULT  Goal: Patient will remain free of falls  Description: INTERVENTIONS:- Educate patient/family on patient safety including physical limitations- Instruct patient to call for assistance with activity - Consult OT/PT to assist with strengthening/mobility - Keep Call bell within reach- Keep bed low and locked with side rails adjusted as appropriate- Keep care items and personal belongings within reach- Initiate and maintain comfort rounds- Make Fall Risk Sign visible to staff  Outcome: Progressing  Goal: Maintain or return to baseline ADL function  Description: INTERVENTIONS:-  Assess patient's ability to carry out ADLs; assess patient's baseline for ADL function and identify physical deficits which impact ability to perform ADLs (bathing, care of mouth/teeth, toileting, grooming, dressing, etc.)- Assess/evaluate cause of self-care  deficits - Assess range of motion- Assess patient's mobility; develop plan if impaired- Assess patient's need for assistive devices and provide as appropriate- Encourage maximum independence but intervene and supervise when necessary- Involve family in performance of ADLs- Assess for home care needs following discharge - Consider OT consult to assist with ADL evaluation and planning for discharge- Provide patient education as appropriate  Outcome: Progressing  Goal: Maintains/Returns to pre admission functional level  Description: INTERVENTIONS:- Perform AM-PAC 6 Click Basic Mobility/ Daily Activity assessment daily.- Set and communicate daily mobility goal to care team and patient/family/caregiver. - Collaborate with rehabilitation services on mobility goals if consulted  Outcome: Progressing     Problem: DISCHARGE PLANNING  Goal: Discharge to home or other facility with appropriate resources  Description: INTERVENTIONS:- Identify barriers to discharge w/patient and caregiver- Arrange for needed discharge resources and transportation as appropriate- Identify discharge learning needs (meds, wound care, etc.)- Refer to Case Management Department for coordinating discharge planning if the patient needs post-hospital services based on physician/advanced practitioner order or complex needs related to functional status, cognitive ability, or social support system  Outcome: Progressing     Problem: Knowledge Deficit  Goal: Patient/family/caregiver demonstrates understanding of disease process, treatment plan, medications, and discharge instructions  Description: Complete learning assessment and assess knowledge base.Interventions:- Provide teaching at level of understanding- Provide teaching via preferred learning methods  Outcome: Progressing

## 2025-05-24 NOTE — ASSESSMENT & PLAN NOTE
Reports that she had an episode of vertigo previously while eating her dinner.  She lied down and it resolved in about 90 minutes  Symptoms have resolved and reproduced to mild degree with pancho hallpike to the right  Resolved now, no further episodes since then

## 2025-05-24 NOTE — PLAN OF CARE
Problem: PAIN - ADULT  Goal: Verbalizes/displays adequate comfort level or baseline comfort level  Description: Interventions:- Encourage patient to monitor pain and request assistance- Assess pain using appropriate pain scale- Administer analgesics based on type and severity of pain and evaluate response- Implement non-pharmacological measures as appropriate and evaluate response- Consider cultural and social influences on pain and pain management- Notify physician/advanced practitioner if interventions unsuccessful or patient reports new pain  Outcome: Progressing     Problem: INFECTION - ADULT  Goal: Absence or prevention of progression during hospitalization  Description: INTERVENTIONS:- Assess and monitor for signs and symptoms of infection- Monitor lab/diagnostic results- Monitor all insertion sites, i.e. indwelling lines, tubes, and drains- Monitor endotracheal if appropriate and nasal secretions for changes in amount and color- [place] appropriate cooling/warming therapies per order- Administer medications as ordered- Instruct and encourage patient and family to use good hand hygiene technique- Identify and instruct in appropriate isolation precautions for identified infection/condition  Outcome: Progressing     Problem: SAFETY ADULT  Goal: Patient will remain free of falls  Description: INTERVENTIONS:- Educate patient/family on patient safety including physical limitations- Instruct patient to call for assistance with activity - Consult OT/PT to assist with strengthening/mobility - Keep Call bell within reach- Keep bed low and locked with side rails adjusted as appropriate- Keep care items and personal belongings within reach- Initiate and maintain comfort rounds- Make Fall Risk Sign visible to staff  Outcome: Progressing  Goal: Maintain or return to baseline ADL function  Description: INTERVENTIONS:-  Assess patient's ability to carry out ADLs; assess patient's baseline for ADL function and identify  physical deficits which impact ability to perform ADLs (bathing, care of mouth/teeth, toileting, grooming, dressing, etc.)- Assess/evaluate cause of self-care deficits - Assess range of motion- Assess patient's mobility; develop plan if impaired- Assess patient's need for assistive devices and provide as appropriate- Encourage maximum independence but intervene and supervise when necessary- Involve family in performance of ADLs- Assess for home care needs following discharge - Consider OT consult to assist with ADL evaluation and planning for discharge- Provide patient education as appropriate  Outcome: Progressing  Goal: Maintains/Returns to pre admission functional level  Description: INTERVENTIONS:- Perform AM-PAC 6 Click Basic Mobility/ Daily Activity assessment daily.- Set and communicate daily mobility goal to care team and patient/family/caregiver. - Collaborate with rehabilitation services on mobility goals if consulted  Outcome: Progressing     Problem: DISCHARGE PLANNING  Goal: Discharge to home or other facility with appropriate resources  Description: INTERVENTIONS:- Identify barriers to discharge w/patient and caregiver- Arrange for needed discharge resources and transportation as appropriate- Identify discharge learning needs (meds, wound care, etc.)- Arrange for interpretive services to assist at discharge as needed- Refer to Case Management Department for coordinating discharge planning if the patient needs post-hospital services based on physician/advanced practitioner order or complex needs related to functional status, cognitive ability, or social support system  Outcome: Progressing     Problem: Knowledge Deficit  Goal: Patient/family/caregiver demonstrates understanding of disease process, treatment plan, medications, and discharge instructions  Description: Complete learning assessment and assess knowledge base.Interventions:- Provide teaching at level of understanding- Provide teaching via  preferred learning methods  Outcome: Progressing

## 2025-05-25 LAB
GLUCOSE SERPL-MCNC: 117 MG/DL (ref 65–140)
GLUCOSE SERPL-MCNC: 119 MG/DL (ref 65–140)
GLUCOSE SERPL-MCNC: 143 MG/DL (ref 65–140)
GLUCOSE SERPL-MCNC: 99 MG/DL (ref 65–140)

## 2025-05-25 PROCEDURE — 82948 REAGENT STRIP/BLOOD GLUCOSE: CPT

## 2025-05-25 PROCEDURE — 99232 SBSQ HOSP IP/OBS MODERATE 35: CPT | Performed by: INTERNAL MEDICINE

## 2025-05-25 RX ADMIN — INSULIN GLARGINE 15 UNITS: 100 INJECTION, SOLUTION SUBCUTANEOUS at 21:07

## 2025-05-25 RX ADMIN — DEXTRAN 70, GLYCERIN, HYPROMELLOSE 1 DROP: 1; 2; 3 SOLUTION/ DROPS OPHTHALMIC at 21:07

## 2025-05-25 RX ADMIN — INSULIN LISPRO 5 UNITS: 100 INJECTION, SOLUTION INTRAVENOUS; SUBCUTANEOUS at 16:53

## 2025-05-25 RX ADMIN — DEXTRAN 70, GLYCERIN, HYPROMELLOSE 1 DROP: 1; 2; 3 SOLUTION/ DROPS OPHTHALMIC at 08:49

## 2025-05-25 RX ADMIN — SENNOSIDES AND DOCUSATE SODIUM 1 TABLET: 50; 8.6 TABLET ORAL at 21:07

## 2025-05-25 RX ADMIN — DEXTRAN 70, GLYCERIN, HYPROMELLOSE 1 DROP: 1; 2; 3 SOLUTION/ DROPS OPHTHALMIC at 16:53

## 2025-05-25 RX ADMIN — PRAVASTATIN SODIUM 40 MG: 40 TABLET ORAL at 16:53

## 2025-05-25 RX ADMIN — ENOXAPARIN SODIUM 40 MG: 40 INJECTION SUBCUTANEOUS at 08:48

## 2025-05-25 RX ADMIN — LEVETIRACETAM 1000 MG: 500 TABLET, FILM COATED ORAL at 21:07

## 2025-05-25 RX ADMIN — LEVETIRACETAM 1000 MG: 500 TABLET, FILM COATED ORAL at 08:48

## 2025-05-25 RX ADMIN — ASPIRIN 81 MG CHEWABLE TABLET 81 MG: 81 TABLET CHEWABLE at 08:48

## 2025-05-25 RX ADMIN — INSULIN LISPRO 5 UNITS: 100 INJECTION, SOLUTION INTRAVENOUS; SUBCUTANEOUS at 11:05

## 2025-05-25 RX ADMIN — INSULIN LISPRO 5 UNITS: 100 INJECTION, SOLUTION INTRAVENOUS; SUBCUTANEOUS at 08:48

## 2025-05-25 NOTE — PLAN OF CARE
Problem: PAIN - ADULT  Goal: Verbalizes/displays adequate comfort level or baseline comfort level  Description: Interventions:- Encourage patient to monitor pain and request assistance- Assess pain using appropriate pain scale- Administer analgesics based on type and severity of pain and evaluate response- Implement non-pharmacological measures as appropriate and evaluate response- Notify physician/advanced practitioner if interventions unsuccessful or patient reports new pain  5/25/2025 1146 by Xiao Mccullough RN  Outcome: Progressing  5/25/2025 1146 by Xiao cMcullough RN  Outcome: Progressing     Problem: INFECTION - ADULT  Goal: Absence or prevention of progression during hospitalization  Description: INTERVENTIONS:- Assess and monitor for signs and symptoms of infection- Monitor lab/diagnostic results- Monitor all insertion sites, i.e. indwelling lines, tubes, and drains- [place] appropriate cooling/warming therapies per order- Administer medications as ordered- Instruct and encourage patient and family to use good hand hygiene technique- Identify and instruct in appropriate isolation precautions for identified infection/condition  5/25/2025 1146 by Xiao Mccullough RN  Outcome: Progressing  5/25/2025 1146 by Xiao Mccullough RN  Outcome: Progressing     Problem: SAFETY ADULT  Goal: Patient will remain free of falls  Description: INTERVENTIONS:- Educate patient/family on patient safety including physical limitations- Instruct patient to call for assistance with activity - Consult OT/PT to assist with strengthening/mobility - Keep Call bell within reach- Keep bed low and locked with side rails adjusted as appropriate- Keep care items and personal belongings within reach- Initiate and maintain comfort rounds- Make Fall Risk Sign visible to staff  5/25/2025 1146 by Xiao Mccullough RN  Outcome: Progressing  5/25/2025 1146 by Xiao Mccullough RN  Outcome: Progressing  Goal: Maintain or return  to baseline ADL function  Description: INTERVENTIONS:-  Assess patient's ability to carry out ADLs; assess patient's baseline for ADL function and identify physical deficits which impact ability to perform ADLs (bathing, care of mouth/teeth, toileting, grooming, dressing, etc.)- Assess/evaluate cause of self-care deficits - Assess range of motion- Assess patient's mobility; develop plan if impaired- Assess patient's need for assistive devices and provide as appropriate- Encourage maximum independence but intervene and supervise when necessary- Involve family in performance of ADLs- Assess for home care needs following discharge - Consider OT consult to assist with ADL evaluation and planning for discharge- Provide patient education as appropriate  5/25/2025 1146 by Xiao Mccullough RN  Outcome: Progressing  5/25/2025 1146 by Xiao Mccullough RN  Outcome: Progressing  Goal: Maintains/Returns to pre admission functional level  Description: INTERVENTIONS:- Perform AM-PAC 6 Click Basic Mobility/ Daily Activity assessment daily.- Set and communicate daily mobility goal to care team and patient/family/caregiver. - Collaborate with rehabilitation services on mobility goals if consulted  5/25/2025 1146 by Xiao Mccullough RN  Outcome: Progressing  5/25/2025 1146 by Xiao Mccullough RN  Outcome: Progressing     Problem: DISCHARGE PLANNING  Goal: Discharge to home or other facility with appropriate resources  Description: INTERVENTIONS:- Identify barriers to discharge w/patient and caregiver- Arrange for needed discharge resources and transportation as appropriate- Identify discharge learning needs (meds, wound care, etc.)- Refer to Case Management Department for coordinating discharge planning if the patient needs post-hospital services based on physician/advanced practitioner order or complex needs related to functional status, cognitive ability, or social support system  5/25/2025 1146 by Xiao Mccullough  RN  Outcome: Progressing  5/25/2025 1146 by Xiao Mccullough RN  Outcome: Progressing     Problem: Knowledge Deficit  Goal: Patient/family/caregiver demonstrates understanding of disease process, treatment plan, medications, and discharge instructions  Description: Complete learning assessment and assess knowledge base.Interventions:- Provide teaching at level of understanding- Provide teaching via preferred learning methods  5/25/2025 1146 by Xiao Mccullough RN  Outcome: Progressing  5/25/2025 1146 by Xiao Mccullough RN  Outcome: Progressing

## 2025-05-25 NOTE — PLAN OF CARE
Problem: PAIN - ADULT  Goal: Verbalizes/displays adequate comfort level or baseline comfort level  Description: Interventions:- Encourage patient to monitor pain and request assistance- Assess pain using appropriate pain scale- Administer analgesics based on type and severity of pain and evaluate response- Implement non-pharmacological measures as appropriate and evaluate response- Notify physician/advanced practitioner if interventions unsuccessful or patient reports new pain  Outcome: Progressing     Problem: INFECTION - ADULT  Goal: Absence or prevention of progression during hospitalization  Description: INTERVENTIONS:- Assess and monitor for signs and symptoms of infection- Monitor lab/diagnostic results- Monitor all insertion sites, i.e. indwelling lines, tubes, and drains- [place] appropriate cooling/warming therapies per order- Administer medications as ordered- Instruct and encourage patient and family to use good hand hygiene technique- Identify and instruct in appropriate isolation precautions for identified infection/condition  Outcome: Progressing     Problem: SAFETY ADULT  Goal: Patient will remain free of falls  Description: INTERVENTIONS:- Educate patient/family on patient safety including physical limitations- Instruct patient to call for assistance with activity - Consult OT/PT to assist with strengthening/mobility - Keep Call bell within reach- Keep bed low and locked with side rails adjusted as appropriate- Keep care items and personal belongings within reach- Initiate and maintain comfort rounds- Make Fall Risk Sign visible to staff  Outcome: Progressing  Goal: Maintain or return to baseline ADL function  Description: INTERVENTIONS:-  Assess patient's ability to carry out ADLs; assess patient's baseline for ADL function and identify physical deficits which impact ability to perform ADLs (bathing, care of mouth/teeth, toileting, grooming, dressing, etc.)- Assess/evaluate cause of self-care  deficits - Assess range of motion- Assess patient's mobility; develop plan if impaired- Assess patient's need for assistive devices and provide as appropriate- Encourage maximum independence but intervene and supervise when necessary- Involve family in performance of ADLs- Assess for home care needs following discharge - Consider OT consult to assist with ADL evaluation and planning for discharge- Provide patient education as appropriate  Outcome: Progressing  Goal: Maintains/Returns to pre admission functional level  Description: INTERVENTIONS:- Perform AM-PAC 6 Click Basic Mobility/ Daily Activity assessment daily.- Set and communicate daily mobility goal to care team and patient/family/caregiver. - Collaborate with rehabilitation services on mobility goals if consulted  Outcome: Progressing     Problem: DISCHARGE PLANNING  Goal: Discharge to home or other facility with appropriate resources  Description: INTERVENTIONS:- Identify barriers to discharge w/patient and caregiver- Arrange for needed discharge resources and transportation as appropriate- Identify discharge learning needs (meds, wound care, etc.)- Refer to Case Management Department for coordinating discharge planning if the patient needs post-hospital services based on physician/advanced practitioner order or complex needs related to functional status, cognitive ability, or social support system  Outcome: Progressing     Problem: Knowledge Deficit  Goal: Patient/family/caregiver demonstrates understanding of disease process, treatment plan, medications, and discharge instructions  Description: Complete learning assessment and assess knowledge base.Interventions:- Provide teaching at level of understanding- Provide teaching via preferred learning methods  Outcome: Progressing

## 2025-05-25 NOTE — PROGRESS NOTES
"Progress Note - Hospitalist   Name: Ynes Mendoza 64 y.o. female I MRN: [id number]  [facility] I Date of Admission: 5/12/2025   Date of Service: 5/25/2025 I Hospital Day: 12    Assessment & Plan  Failure to thrive in adult  Patient was recently admitted from 5/1-5/9 to neurology service for breakthrough seizures in the setting of medication noncompliance.   There were concerns brought up by family that admission regarding cognitive impairment/memory loss and ability to care for herself.    Scored 13/30 on MOCA done 5/7.  Per Neurology record review, was deemed at that time capable to live alone  Patient was initially discharged and stayed with her brother before he took her back to her home.  She did not recall details of this.  Additionally, VNA came out to her home and patient could not properly use her insulin or use glucometer (she has been on insulin for years) so came back to the hospital for safety concerns  Neurology and geriatrics evals appreciated   MRI brain 5/16: \"No acute intracranial pathology. Chronic microangiopathy. NeuroQuant analysis was performed: Normal hippocampal volume and enlarged ventricular system: Findings do not support hippocampal degeneration. Possible expansion of ventricular system without medial temporal lobe focused ex-vacuo process.\"  Vitamin B6 pending drawn on 5/13  RPR nonreactive, TSH 0.989, folate greater than 22.3, vitamin B12 is 476, vitamin B 1 is 123  Patient does not appear to have capacity to make fully informed medical decisions per neuropsych  Discharge planning to assisted living/LTC in the [place] area per family preference  Type 2 diabetes mellitus, with long-term current use of insulin ([facility])  Lab Results   Component Value Date    HGBA1C 7.3 (H) 03/05/2025       Recent Labs     05/24/25  1616 05/24/25  2030 05/25/25  0708 05/25/25  1051   POCGLU 106 256* 117 99       Blood Sugar Average: Last 72 hrs:  (P) 165.9446630836040257  Hold glimepiride and " metformin while admitted  Diabetic diet  Continue Lantus 14 units QHS and SSI TID AC and QHS  Cognitive impairment  See above.  Sister reported progressive decline over 6-8 months   Geriatrics following  For not have capacity per Neuropsych  Outpatient follow up with neuro for baseline dementia testing and to start medication therapy if indicated at the time  Focal epilepsy (HCC)  Continue [facility]  Neurology evaluated   Vertigo  Reports that she had an episode of vertigo previously while eating her dinner.  She lied down and it resolved in about 90 minutes  Symptoms have resolved and reproduced to mild degree with pancho hallpike to the right  Resolved now, no further episodes since then    VTE Pharmacologic Prophylaxis: VTE Score: 6 High Risk (Score >/= 5) - Pharmacological DVT Prophylaxis Ordered: heparin. Sequential Compression Devices Ordered.    Mobility:   Basic Mobility Inpatient Raw Score: 19  JH-HLM Goal: 6: Walk 10 steps or more  JH-HLM Achieved: 6: Walk 10 steps or more  JH-HLM Goal NOT achieved. Continue with multidisciplinary rounding and encourage appropriate mobility to improve upon JH-HLM goals.    Patient Centered Rounds: I performed bedside rounds with nursing staff today.   Discussions with Specialists or Other Care Team Provider: nurseCARRILLO    Current Length of Stay: 12 day(s)  Current Patient Status: Inpatient   Certification Statement: The patient will continue to require additional inpatient hospital stay due to discharge planning  Discharge Plan: Anticipate discharge in 24-48 hrs to rehab facility.    Code Status: Level 1 - Full Code    Subjective   PT is sleeping    Objective :  Temp:  [97.2 °F (36.2 °C)-98.3 °F (36.8 °C)] 97.2 °F (36.2 °C)  HR:  [58-91] 58  BP: (100-121)/(45-62) 100/45  Resp:  [16-18] 18  SpO2:  [96 %-99 %] 99 %  O2 Device: None (Room air)    There is no height or weight on file to calculate BMI.     Input and Output Summary (last 24 hours):     Intake/Output Summary (Last 24  hours) at 5/25/2025 1445  Last data filed at 5/24/2025 1700  Gross per 24 hour   Intake 120 ml   Output --   Net 120 ml       Physical Exam  HENT:      Head: Normocephalic and atraumatic.      Nose: Nose normal.     Eyes:      Extraocular Movements: Extraocular movements intact.       Cardiovascular:      Rate and Rhythm: Normal rate and regular rhythm.   Pulmonary:      Effort: Pulmonary effort is normal.     Skin:     General: Skin is warm and dry.           Lines/Drains:              Lab Results: I have reviewed the following results:               Results from last 7 days   Lab Units 05/25/25  1051 05/25/25  0708 05/24/25  2030 05/24/25  1616 05/24/25  1100 05/24/25  0705 05/23/25  2038 05/23/25  1604 05/23/25  1104 05/23/25  0721 05/23/25  0617 05/22/25  2121   POC GLUCOSE mg/dl 99 117 256* 106 131 143* 268* 240* 195* 115 119 240*               Recent Cultures (last 7 days):         Imaging Results Review: No pertinent imaging studies reviewed.  Other Study Results Review: No additional pertinent studies reviewed.    Last 24 Hours Medication List:     Current Facility-Administered Medications:     acetaminophen (TYLENOL) tablet 650 mg, Q6H PRN    Artificial Tears Op Soln 1 drop, TID    aspirin chewable tablet 81 mg, Daily    enoxaparin (LOVENOX) subcutaneous injection 40 mg, Daily    hydrOXYzine HCL (ATARAX) tablet 25 mg, Q6H PRN    insulin glargine (LANTUS) subcutaneous injection 15 Units 0.15 mL, HS    insulin lispro (HumALOG/ADMELOG) 100 units/mL subcutaneous injection 1-6 Units, TID AC **AND** Fingerstick Glucose (POCT), TID AC    insulin lispro (HumALOG/ADMELOG) 100 units/mL subcutaneous injection 1-6 Units, HS    insulin lispro (HumALOG/ADMELOG) 100 units/mL subcutaneous injection 5 Units, TID With Meals    levETIRAcetam (KEPPRA) tablet 1,000 mg, Q12H DYAN    polyethylene glycol (MIRALAX) packet 17 g, Daily PRN    pravastatin (PRAVACHOL) tablet 40 mg, Daily With Dinner    senna-docusate sodium (SENOKOT S)  8.6-50 mg per tablet 1 tablet, HS    Administrative Statements   Today, Patient Was Seen By: Luis Antonio Christine MD  I have spent a total time of 40 minutes in caring for this patient on the day of the visit/encounter including Diagnostic results, Instructions for management, Patient and family education, Impressions, Counseling / Coordination of care, Documenting in the medical record, Reviewing/placing orders in the medical record (including tests, medications, and/or procedures), Obtaining or reviewing history  , and Communicating with other healthcare professionals .    **Please Note: This note may have been constructed using a voice recognition system.**

## 2025-05-25 NOTE — ASSESSMENT & PLAN NOTE
"Patient was recently admitted from 5/1-5/9 to neurology service for breakthrough seizures in the setting of medication noncompliance.   There were concerns brought up by family that admission regarding cognitive impairment/memory loss and ability to care for herself.    Scored 13/30 on MOCA done 5/7.  Per Neurology record review, was deemed at that time capable to live alone  Patient was initially discharged and stayed with her brother before he took her back to her home.  She did not recall details of this.  Additionally, VNA came out to her home and patient could not properly use her insulin or use glucometer (she has been on insulin for years) so came back to the hospital for safety concerns  Neurology and geriatrics evals appreciated   MRI brain 5/16: \"No acute intracranial pathology. Chronic microangiopathy. NeuroQuant analysis was performed: Normal hippocampal volume and enlarged ventricular system: Findings do not support hippocampal degeneration. Possible expansion of ventricular system without medial temporal lobe focused ex-vacuo process.\"  Vitamin B6 pending drawn on 5/13  RPR nonreactive, TSH 0.989, folate greater than 22.3, vitamin B12 is 476, vitamin B 1 is 123  Patient does not appear to have capacity to make fully informed medical decisions per neuropsych  Discharge planning to assisted living/LTC in the [place] area per family preference  "

## 2025-05-25 NOTE — ASSESSMENT & PLAN NOTE
Lab Results   Component Value Date    HGBA1C 7.3 (H) 03/05/2025       Recent Labs     05/24/25  1616 05/24/25  2030 05/25/25  0708 05/25/25  1051   POCGLU 106 256* 117 99       Blood Sugar Average: Last 72 hrs:  (P) 165.9287172571755985  Hold glimepiride and metformin while admitted  Diabetic diet  Continue Lantus 14 units QHS and SSI TID AC and QHS

## 2025-05-26 LAB
GLUCOSE SERPL-MCNC: 133 MG/DL (ref 65–140)
GLUCOSE SERPL-MCNC: 139 MG/DL (ref 65–140)
GLUCOSE SERPL-MCNC: 143 MG/DL (ref 65–140)
GLUCOSE SERPL-MCNC: 196 MG/DL (ref 65–140)

## 2025-05-26 PROCEDURE — 82948 REAGENT STRIP/BLOOD GLUCOSE: CPT

## 2025-05-26 PROCEDURE — 99232 SBSQ HOSP IP/OBS MODERATE 35: CPT | Performed by: INTERNAL MEDICINE

## 2025-05-26 RX ORDER — POLYETHYLENE GLYCOL 3350 17 G/17G
17 POWDER, FOR SOLUTION ORAL DAILY
Status: DISCONTINUED | OUTPATIENT
Start: 2025-05-26 | End: 2025-05-26

## 2025-05-26 RX ORDER — SENNOSIDES 8.6 MG
2 TABLET ORAL DAILY
Status: DISCONTINUED | OUTPATIENT
Start: 2025-05-26 | End: 2025-05-27

## 2025-05-26 RX ADMIN — LEVETIRACETAM 1000 MG: 500 TABLET, FILM COATED ORAL at 08:31

## 2025-05-26 RX ADMIN — INSULIN GLARGINE 15 UNITS: 100 INJECTION, SOLUTION SUBCUTANEOUS at 21:10

## 2025-05-26 RX ADMIN — INSULIN LISPRO 5 UNITS: 100 INJECTION, SOLUTION INTRAVENOUS; SUBCUTANEOUS at 08:31

## 2025-05-26 RX ADMIN — PRAVASTATIN SODIUM 40 MG: 40 TABLET ORAL at 16:55

## 2025-05-26 RX ADMIN — SENNOSIDES 17.2 MG: 8.6 TABLET, FILM COATED ORAL at 12:11

## 2025-05-26 RX ADMIN — DEXTRAN 70, GLYCERIN, HYPROMELLOSE 1 DROP: 1; 2; 3 SOLUTION/ DROPS OPHTHALMIC at 16:55

## 2025-05-26 RX ADMIN — INSULIN LISPRO 2 UNITS: 100 INJECTION, SOLUTION INTRAVENOUS; SUBCUTANEOUS at 11:22

## 2025-05-26 RX ADMIN — INSULIN LISPRO 5 UNITS: 100 INJECTION, SOLUTION INTRAVENOUS; SUBCUTANEOUS at 11:23

## 2025-05-26 RX ADMIN — ENOXAPARIN SODIUM 40 MG: 40 INJECTION SUBCUTANEOUS at 08:31

## 2025-05-26 RX ADMIN — DEXTRAN 70, GLYCERIN, HYPROMELLOSE 1 DROP: 1; 2; 3 SOLUTION/ DROPS OPHTHALMIC at 08:31

## 2025-05-26 RX ADMIN — LEVETIRACETAM 1000 MG: 500 TABLET, FILM COATED ORAL at 21:10

## 2025-05-26 RX ADMIN — INSULIN LISPRO 5 UNITS: 100 INJECTION, SOLUTION INTRAVENOUS; SUBCUTANEOUS at 16:55

## 2025-05-26 RX ADMIN — ASPIRIN 81 MG CHEWABLE TABLET 81 MG: 81 TABLET CHEWABLE at 08:31

## 2025-05-26 RX ADMIN — DEXTRAN 70, GLYCERIN, HYPROMELLOSE 1 DROP: 1; 2; 3 SOLUTION/ DROPS OPHTHALMIC at 21:11

## 2025-05-26 RX ADMIN — Medication 1 PACKET: at 12:11

## 2025-05-26 NOTE — ASSESSMENT & PLAN NOTE
Lab Results   Component Value Date    HGBA1C 7.3 (H) 03/05/2025       Recent Labs     05/25/25  1628 05/25/25 2038 05/26/25  0619 05/26/25  1047   POCGLU 119 143* 133 196*       Blood Sugar Average: Last 72 hrs:  (P) 158.4621005027968894  Hold glimepiride and metformin while admitted  Diabetic diet  Continue Lantus 14 units QHS and SSI TID AC and QHS

## 2025-05-26 NOTE — PROGRESS NOTES
"Progress Note - Hospitalist   Name: Ynes Mendoza 64 y.o. female I MRN: [id number]  [facility] Date of Admission: 5/12/2025   Date of Service: 5/26/2025 I Hospital Day: 13    Assessment & Plan  Failure to thrive in adult  Patient was recently admitted from 5/1-5/9 to neurology service for breakthrough seizures in the setting of medication noncompliance.   There were concerns brought up by family that admission regarding cognitive impairment/memory loss and ability to care for herself.    Scored 13/30 on MOCA done 5/7.  Per Neurology record review, was deemed at that time capable to live alone  Patient was initially discharged and stayed with her brother before he took her back to her home.  She did not recall details of this.  Additionally, VNA came out to her home and patient could not properly use her insulin or use glucometer (she has been on insulin for years) so came back to the hospital for safety concerns  Neurology and geriatrics evals appreciated   MRI brain 5/16: \"No acute intracranial pathology. Chronic microangiopathy. NeuroQuant analysis was performed: Normal hippocampal volume and enlarged ventricular system: Findings do not support hippocampal degeneration. Possible expansion of ventricular system without medial temporal lobe focused ex-vacuo process.\"  Vitamin B6 pending drawn on 5/13  RPR nonreactive, TSH 0.989, folate greater than 22.3, vitamin B12 is 476, vitamin B 1 is 123  Patient does not appear to have capacity to make fully informed medical decisions per neuropsych  Discharge planning to assisted living/LTC in the [place] area per family preference  Type 2 diabetes mellitus, with long-term current use of insulin ([facility])  Lab Results   Component Value Date    HGBA1C 7.3 (H) 03/05/2025       Recent Labs     05/25/25  1628 05/25/25  2038 05/26/25  0619 05/26/25  1047   POCGLU 119 143* 133 196*       Blood Sugar Average: Last 72 hrs:  (P) 158.8529331051621716  Hold glimepiride " and metformin while admitted  Diabetic diet  Continue Lantus 14 units QHS and SSI TID AC and QHS  Cognitive impairment  See above.  Sister reported progressive decline over 6-8 months   Geriatrics following  For not have capacity per Neuropsych  Outpatient follow up with neuro for baseline dementia testing and to start medication therapy if indicated at the time  Focal epilepsy (HCC)  Continue [facility]  Neurology evaluated   Vertigo  Reports that she had an episode of vertigo previously while eating her dinner.  She lied down and it resolved in about 90 minutes  Symptoms have resolved and reproduced to mild degree with pancho hallpike to the right  Resolved now, no further episodes since then    VTE Pharmacologic Prophylaxis: VTE Score: 6 High Risk (Score >/= 5) - Pharmacological DVT Prophylaxis Ordered: heparin. Sequential Compression Devices Ordered.    Mobility:   Basic Mobility Inpatient Raw Score: 19  JH-HLM Goal: 6: Walk 10 steps or more  JH-HLM Achieved: 7: Walk 25 feet or more  JH-HLM Goal NOT achieved. Continue with multidisciplinary rounding and encourage appropriate mobility to improve upon JH-HLM goals.    Patient Centered Rounds: I performed bedside rounds with nursing staff today.   Discussions with Specialists or Other Care Team Provider: nurse, CM    Current Length of Stay: 13 day(s)  Current Patient Status: Inpatient   Certification Statement: The patient will continue to require additional inpatient hospital stay due to discharge planning  Discharge Plan: Anticipate discharge in 24-48 hrs to rehab facility.    Code Status: Level 1 - Full Code    Subjective   Denies any complaints today except mild constipation    Objective :  Temp:  [98 °F (36.7 °C)-98.7 °F (37.1 °C)] 98 °F (36.7 °C)  HR:  [62-83] 62  BP: ()/(59-69) 97/62  Resp:  [17-18] 17  SpO2:  [96 %-99 %] 98 %  O2 Device: None (Room air)    There is no height or weight on file to calculate BMI.     Input and Output Summary (last 24 hours):      Intake/Output Summary (Last 24 hours) at 5/26/2025 1232  Last data filed at 5/26/2025 0810  Gross per 24 hour   Intake 530 ml   Output --   Net 530 ml       Physical Exam  HENT:      Head: Normocephalic and atraumatic.      Nose: Nose normal.     Eyes:      Extraocular Movements: Extraocular movements intact.       Cardiovascular:      Rate and Rhythm: Normal rate and regular rhythm.   Pulmonary:      Effort: Pulmonary effort is normal.     Skin:     General: Skin is warm and dry.     Neurological:      Mental Status: Mental status is at baseline.           Lines/Drains:              Lab Results: I have reviewed the following results:               Results from last 7 days   Lab Units 05/26/25  1047 05/26/25  0619 05/25/25  2038 05/25/25  1628 05/25/25  1051 05/25/25  0708 05/24/25  2030 05/24/25  1616 05/24/25  1100 05/24/25  0705 05/23/25  2038 05/23/25  1604   POC GLUCOSE mg/dl 196* 133 143* 119 99 117 256* 106 131 143* 268* 240*               Recent Cultures (last 7 days):         Imaging Results Review: No pertinent imaging studies reviewed.  Other Study Results Review: No additional pertinent studies reviewed.    Last 24 Hours Medication List:     Current Facility-Administered Medications:     acetaminophen (TYLENOL) tablet 650 mg, Q6H PRN    Artificial Tears Op Soln 1 drop, TID    aspirin chewable tablet 81 mg, Daily    enoxaparin (LOVENOX) subcutaneous injection 40 mg, Daily    hydrOXYzine HCL (ATARAX) tablet 25 mg, Q6H PRN    insulin glargine (LANTUS) subcutaneous injection 15 Units 0.15 mL, HS    insulin lispro (HumALOG/ADMELOG) 100 units/mL subcutaneous injection 1-6 Units, TID AC **AND** Fingerstick Glucose (POCT), TID AC    insulin lispro (HumALOG/ADMELOG) 100 units/mL subcutaneous injection 1-6 Units, HS    insulin lispro (HumALOG/ADMELOG) 100 units/mL subcutaneous injection 5 Units, TID With Meals    levETIRAcetam (KEPPRA) tablet 1,000 mg, Q12H DYAN    pravastatin (PRAVACHOL) tablet 40 mg, Daily  With Dinner    psyllium (METAMUCIL) 1 packet, Daily    senna (SENOKOT) tablet 17.2 mg, Daily    Administrative Statements   Today, Patient Was Seen By: Luis Antonio Christine MD  I have spent a total time of 40 minutes in caring for this patient on the day of the visit/encounter including Diagnostic results, Instructions for management, Patient and family education, Impressions, Counseling / Coordination of care, Documenting in the medical record, Reviewing/placing orders in the medical record (including tests, medications, and/or procedures), Obtaining or reviewing history  , and Communicating with other healthcare professionals .    **Please Note: This note may have been constructed using a voice recognition system.**

## 2025-05-26 NOTE — PLAN OF CARE
Problem: PAIN - ADULT  Goal: Verbalizes/displays adequate comfort level or baseline comfort level  Description: Interventions:- Encourage patient to monitor pain and request assistance- Assess pain using appropriate pain scale- Administer analgesics based on type and severity of pain and evaluate response- Implement non-pharmacological measures as appropriate and evaluate response- Notify physician/advanced practitioner if interventions unsuccessful or patient reports new pain  Outcome: Progressing     Problem: INFECTION - ADULT  Goal: Absence or prevention of progression during hospitalization  Description: INTERVENTIONS:- Assess and monitor for signs and symptoms of infection- Monitor lab/diagnostic results- Monitor all insertion sites, i.e. indwelling lines, tubes, and drains- [place] appropriate cooling/warming therapies per order- Administer medications as ordered- Instruct and encourage patient and family to use good hand hygiene technique- Identify and instruct in appropriate isolation precautions for identified infection/condition  Outcome: Progressing     Problem: SAFETY ADULT  Goal: Patient will remain free of falls  Description: INTERVENTIONS:- Educate patient/family on patient safety including physical limitations- Instruct patient to call for assistance with activity - Consult OT/PT to assist with strengthening/mobility - Keep Call bell within reach- Keep bed low and locked with side rails adjusted as appropriate- Keep care items and personal belongings within reach- Initiate and maintain comfort rounds- Make Fall Risk Sign visible to staff  Outcome: Progressing  Goal: Maintain or return to baseline ADL function  Description: INTERVENTIONS:-  Assess patient's ability to carry out ADLs; assess patient's baseline for ADL function and identify physical deficits which impact ability to perform ADLs (bathing, care of mouth/teeth, toileting, grooming, dressing, etc.)- Assess/evaluate cause of self-care  deficits - Assess range of motion- Assess patient's mobility; develop plan if impaired- Assess patient's need for assistive devices and provide as appropriate- Encourage maximum independence but intervene and supervise when necessary- Involve family in performance of ADLs- Assess for home care needs following discharge - Consider OT consult to assist with ADL evaluation and planning for discharge- Provide patient education as appropriate  Outcome: Progressing  Goal: Maintains/Returns to pre admission functional level  Description: INTERVENTIONS:- Perform AM-PAC 6 Click Basic Mobility/ Daily Activity assessment daily.- Set and communicate daily mobility goal to care team and patient/family/caregiver. - Collaborate with rehabilitation services on mobility goals if consulted  Outcome: Progressing     Problem: DISCHARGE PLANNING  Goal: Discharge to home or other facility with appropriate resources  Description: INTERVENTIONS:- Identify barriers to discharge w/patient and caregiver- Arrange for needed discharge resources and transportation as appropriate- Identify discharge learning needs (meds, wound care, etc.)- Refer to Case Management Department for coordinating discharge planning if the patient needs post-hospital services based on physician/advanced practitioner order or complex needs related to functional status, cognitive ability, or social support system  Outcome: Progressing     Problem: Knowledge Deficit  Goal: Patient/family/caregiver demonstrates understanding of disease process, treatment plan, medications, and discharge instructions  Description: Complete learning assessment and assess knowledge base.Interventions:- Provide teaching at level of understanding- Provide teaching via preferred learning methods  Outcome: Progressing

## 2025-05-26 NOTE — PLAN OF CARE
Problem: PAIN - ADULT  Goal: Verbalizes/displays adequate comfort level or baseline comfort level  Description: Interventions:- Encourage patient to monitor pain and request assistance- Assess pain using appropriate pain scale- Administer analgesics based on type and severity of pain and evaluate response- Implement non-pharmacological measures as appropriate and evaluate response- Notify physician/advanced practitioner if interventions unsuccessful or patient reports new pain  Outcome: Progressing     Problem: INFECTION - ADULT  Goal: Absence or prevention of progression during hospitalization  Description: INTERVENTIONS:- Assess and monitor for signs and symptoms of infection- Monitor lab/diagnostic results- Monitor all insertion sites, i.e. indwelling lines, tubes, and drains- [place] appropriate cooling/warming therapies per order- Administer medications as ordered- Instruct and encourage patient and family to use good hand hygiene technique- Identify and instruct in appropriate isolation precautions for identified infection/condition  Outcome: Progressing     Problem: SAFETY ADULT  Goal: Patient will remain free of falls  Description: INTERVENTIONS:- Educate patient/family on patient safety including physical limitations- Instruct patient to call for assistance with activity - Consult OT/PT to assist with strengthening/mobility - Keep Call bell within reach- Keep bed low and locked with side rails adjusted as appropriate- Keep care items and personal belongings within reach- Initiate and maintain comfort rounds- Make Fall Risk Sign visible to staff  Outcome: Progressing  Goal: Maintain or return to baseline ADL function  Description: INTERVENTIONS:-  Assess patient's ability to carry out ADLs; assess patient's baseline for ADL function and identify physical deficits which impact ability to perform ADLs (bathing, care of mouth/teeth, toileting, grooming, dressing, etc.)- Assess/evaluate cause of self-care  deficits - Assess range of motion- Assess patient's mobility; develop plan if impaired- Assess patient's need for assistive devices and provide as appropriate- Encourage maximum independence but intervene and supervise when necessary- Involve family in performance of ADLs- Assess for home care needs following discharge - Consider OT consult to assist with ADL evaluation and planning for discharge- Provide patient education as appropriate  Outcome: Progressing  Goal: Maintains/Returns to pre admission functional level  Description: INTERVENTIONS:- Perform AM-PAC 6 Click Basic Mobility/ Daily Activity assessment daily.- Set and communicate daily mobility goal to care team and patient/family/caregiver. - Collaborate with rehabilitation services on mobility goals if consulted  Outcome: Progressing     Problem: DISCHARGE PLANNING  Goal: Discharge to home or other facility with appropriate resources  Description: INTERVENTIONS:- Identify barriers to discharge w/patient and caregiver- Arrange for needed discharge resources and transportation as appropriate- Identify discharge learning needs (meds, wound care, etc.)- Refer to Case Management Department for coordinating discharge planning if the patient needs post-hospital services based on physician/advanced practitioner order or complex needs related to functional status, cognitive ability, or social support system  Outcome: Progressing     Problem: Knowledge Deficit  Goal: Patient/family/caregiver demonstrates understanding of disease process, treatment plan, medications, and discharge instructions  Description: Complete learning assessment and assess knowledge base.Interventions:- Provide teaching at level of understanding- Provide teaching via preferred learning methods  Outcome: Progressing

## 2025-05-26 NOTE — ASSESSMENT & PLAN NOTE
"Patient was recently admitted from 5/1-5/9 to neurology service for breakthrough seizures in the setting of medication noncompliance.   There were concerns brought up by family that admission regarding cognitive impairment/memory loss and ability to care for herself.    Scored 13/30 on MOCA done 5/7.  Per Neurology record review, was deemed at that time capable to live alone  Patient was initially discharged and stayed with her brother before he took her back to her home.  She did not recall details of this.  Additionally, VNA came out to her home and patient could not properly use her insulin or use glucometer (she has been on insulin for years) so came back to the hospital for safety concerns  Neurology and geriatrics evals appreciated   MRI brain 5/16: \"No acute intracranial pathology. Chronic microangiopathy. NeuroQuant analysis was performed: Normal hippocampal volume and enlarged ventricular system: Findings do not support hippocampal degeneration. Possible expansion of ventricular system without medial temporal lobe focused ex-vacuo process.\"  Vitamin B6 pending drawn on 5/13  RPR nonreactive, TSH 0.989, folate greater than 22.3, vitamin B12 is 476, vitamin B 1 is 123  Patient does not appear to have capacity to make fully informed medical decisions per neuropsych  Discharge planning to assisted living/LTC in the [place] area per family preference  "

## 2025-05-27 LAB
GLUCOSE SERPL-MCNC: 115 MG/DL (ref 65–140)
GLUCOSE SERPL-MCNC: 136 MG/DL (ref 65–140)
GLUCOSE SERPL-MCNC: 187 MG/DL (ref 65–140)
GLUCOSE SERPL-MCNC: 197 MG/DL (ref 65–140)

## 2025-05-27 PROCEDURE — 82948 REAGENT STRIP/BLOOD GLUCOSE: CPT

## 2025-05-27 PROCEDURE — 99232 SBSQ HOSP IP/OBS MODERATE 35: CPT | Performed by: INTERNAL MEDICINE

## 2025-05-27 RX ORDER — POLYETHYLENE GLYCOL 3350 17 G/17G
17 POWDER, FOR SOLUTION ORAL DAILY
Status: DISCONTINUED | OUTPATIENT
Start: 2025-05-27 | End: 2025-06-04 | Stop reason: HOSPADM

## 2025-05-27 RX ORDER — AMOXICILLIN 250 MG
1 CAPSULE ORAL
Status: DISCONTINUED | OUTPATIENT
Start: 2025-05-27 | End: 2025-06-04 | Stop reason: HOSPADM

## 2025-05-27 RX ADMIN — ASPIRIN 81 MG CHEWABLE TABLET 81 MG: 81 TABLET CHEWABLE at 08:57

## 2025-05-27 RX ADMIN — INSULIN LISPRO 5 UNITS: 100 INJECTION, SOLUTION INTRAVENOUS; SUBCUTANEOUS at 08:52

## 2025-05-27 RX ADMIN — DEXTRAN 70, GLYCERIN, HYPROMELLOSE 1 DROP: 1; 2; 3 SOLUTION/ DROPS OPHTHALMIC at 21:40

## 2025-05-27 RX ADMIN — POLYETHYLENE GLYCOL 3350 17 G: 17 POWDER, FOR SOLUTION ORAL at 13:57

## 2025-05-27 RX ADMIN — PRAVASTATIN SODIUM 40 MG: 40 TABLET ORAL at 16:40

## 2025-05-27 RX ADMIN — INSULIN LISPRO 5 UNITS: 100 INJECTION, SOLUTION INTRAVENOUS; SUBCUTANEOUS at 16:40

## 2025-05-27 RX ADMIN — LEVETIRACETAM 1000 MG: 500 TABLET, FILM COATED ORAL at 08:57

## 2025-05-27 RX ADMIN — SENNOSIDES AND DOCUSATE SODIUM 1 TABLET: 50; 8.6 TABLET ORAL at 21:40

## 2025-05-27 RX ADMIN — INSULIN LISPRO 1 UNITS: 100 INJECTION, SOLUTION INTRAVENOUS; SUBCUTANEOUS at 16:40

## 2025-05-27 RX ADMIN — LEVETIRACETAM 1000 MG: 500 TABLET, FILM COATED ORAL at 21:40

## 2025-05-27 RX ADMIN — SENNOSIDES 17.2 MG: 8.6 TABLET, FILM COATED ORAL at 08:57

## 2025-05-27 RX ADMIN — Medication 1 PACKET: at 08:57

## 2025-05-27 RX ADMIN — INSULIN GLARGINE 15 UNITS: 100 INJECTION, SOLUTION SUBCUTANEOUS at 21:40

## 2025-05-27 RX ADMIN — ENOXAPARIN SODIUM 40 MG: 40 INJECTION SUBCUTANEOUS at 08:57

## 2025-05-27 RX ADMIN — INSULIN LISPRO 5 UNITS: 100 INJECTION, SOLUTION INTRAVENOUS; SUBCUTANEOUS at 12:21

## 2025-05-27 RX ADMIN — INSULIN LISPRO 2 UNITS: 100 INJECTION, SOLUTION INTRAVENOUS; SUBCUTANEOUS at 21:40

## 2025-05-27 NOTE — UTILIZATION REVIEW
Continued Stay Review    Date: 5/27                          Current Patient Class: Inpatient  Current Level of Care: MS    HPI:64 y.o. female initially admitted on 5/12   Current Diagnosis: failure to thrive, focal epilepsy, cog impairment, vertigo. Pt does not have capacity.     Assessment/Plan:   Has c/o constipation, requesting stool softener.  CM working on dispo near [facility]. On exam nondistended abd, + bowel sounds. No acute focal neuro deficits.    VS stable.      Medications:   Scheduled Medications:  Artificial Tears, 1 drop, Both Eyes, TID  aspirin, 81 mg, Oral, Daily  enoxaparin, 40 mg, Subcutaneous, Daily  insulin glargine, 15 Units, Subcutaneous, HS  insulin lispro, 1-6 Units, Subcutaneous, TID AC  insulin lispro, 1-6 Units, Subcutaneous, HS  insulin lispro, 5 Units, Subcutaneous, TID With Meals  levETIRAcetam, 1,000 mg, Oral, Q12H DYAN  polyethylene glycol, 17 g, Oral, Daily  pravastatin, 40 mg, Oral, Daily With Dinner  psyllium, 1 packet, Oral, Daily  senna-docusate sodium, 1 tablet, Oral, HS      Continuous IV Infusions:     PRN Meds:  acetaminophen, 650 mg, Oral, Q6H PRN  hydrOXYzine HCL, 25 mg, Oral, Q6H PRN      Discharge Plan: SNF    Vital Signs (last 3 days)       Date/Time Temp Pulse Resp BP MAP (mmHg) SpO2 O2 Device Patient Position - Orthostatic VS Tiki Coma Scale Score Pain    05/27/25 14:58:07 97.5 °F (36.4 °C) 72 18 111/68 82 99 % None (Room air) Sitting -- --    05/27/25 0800 -- -- -- -- -- -- None (Room air) -- 15 No Pain    05/27/25 07:23:29 98.2 °F (36.8 °C) 71 18 111/69 83 98 % -- -- -- --    05/26/25 2100 -- 80 -- 112/68 83 -- -- -- -- --    05/26/25 20:55:22 98.6 °F (37 °C) 71 -- 112/68 83 99 % -- -- -- No Pain    05/26/25 2055 98.6 °F (37 °C) 73 -- 112/68 83 100 % -- -- -- --    05/26/25 2000 -- -- -- -- -- -- -- -- 15 --    05/26/25 15:23:45 98.2 °F (36.8 °C) 70 16 111/68 82 97 % None (Room air) Lying -- --    05/26/25 0800 -- -- -- -- -- -- -- -- 15 No Pain     05/26/25 07:09:09 98 °F (36.7 °C) 62 17 97/62 74 98 % -- -- -- --    05/25/25 20:21:45 98.6 °F (37 °C) 83 18 110/69 83 99 % None (Room air) Lying -- No Pain    05/25/25 2000 -- -- -- -- -- -- -- -- 15 --    05/25/25 15:37:06 98.7 °F (37.1 °C) 68 18 103/59 74 96 % None (Room air) Lying -- --    05/25/25 0900 -- -- -- -- -- -- -- -- 15 No Pain    05/25/25 07:11:04 97.2 °F (36.2 °C) -- 18 100/45 63 99 % -- -- -- --    05/24/25 2122 -- 58 -- 113/61 78 96 % -- -- -- 4    05/24/25 21:08:56 97.9 °F (36.6 °C) 73 16 113/61 78 97 % None (Room air) Lying -- --    05/24/25 2000 -- -- -- -- -- -- -- -- 15 --    05/24/25 19:31:26 98.3 °F (36.8 °C) 91 -- 121/62 82 97 % -- -- -- --    05/24/25 1600 -- -- -- -- -- -- -- -- 15 --    05/24/25 15:18:15 97.6 °F (36.4 °C) 71 18 104/55 71 98 % None (Room air) Lying -- --    05/24/25 0800 -- -- -- -- -- -- -- -- 15 No Pain    05/24/25 07:02:34 97.8 °F (36.6 °C) 72 18 108/55 73 100 % -- -- -- --          Weight (last 2 days)       None            Pertinent Labs/Diagnostic Results:   Radiology:  MRI brain NeuroQuant wo and w contrast   Final Interpretation by E. Alec Schoenberger, MD (05/16 1143)      No acute intracranial pathology. Chronic microangiopathy.      NeuroQuant analysis was performed: Normal hippocampal volume and enlarged ventricular system: Findings do not support hippocampal degeneration. Possible expansion of ventricular system without medial temporal lobe focused ex-vacuo process.         Workstation performed: [id number]         CTA head and neck w wo contrast   Final Interpretation by Zion Smith MD (05/14 0814)   CT Brain:   1.  No CT evidence of an acute cortical infarct.   2.  Artifact versus trace products layering in the right ventricular atrium. Recommend further evaluation with a noncontrast enhanced brain MRI.      CT Angiography:   3.  Severe stenosis at the P2/P3 junction of the right PCA, new from prior.   4.  No large vessel occlusion.         Other:   5.   Patulous and fluid-filled esophagus, with the patient at risk for aspiration.   6.  A few prominent upper mediastinal lymph nodes, likely reactive.                  Workstation performed: [id number]           Cardiology:  No orders to display     GI:  No orders to display       Results from last 7 days   Lab Units 05/27/25  1541 05/27/25  1046 05/27/25  0619 05/26/25  2105 05/26/25  1553 05/26/25  1047 05/26/25  0619 05/25/25  2038 05/25/25  1628 05/25/25  1051 05/25/25  0708 05/24/25  2030   POC GLUCOSE mg/dl 187* 136 115 139 143* 196* 133 143* 119 99 117 256*     Network Utilization Review Department  ATTENTION: Please call with any questions or concerns to [phone] and carefully listen to the prompts so that you are directed to the right person. All voicemails are confidential.   For Discharge needs, contact Care Management DC Support Team at [phone] opt. 2  Send all requests for admission clinical reviews, approved or denied determinations and any other requests to dedicated fax number below belonging to the campus where the patient is receiving treatment. List of dedicated fax numbers for the Facilities:  FACILITY NAME UR FAX NUMBER   ADMISSION DENIALS (Administrative/Medical Necessity) [phone]   DISCHARGE SUPPORT TEAM (NETWORK) [phone]   PARENT CHILD HEALTH (Maternity/NICU/Pediatrics) [phone]   [facility] [phone]   [facility] [phone]   [facility] [phone]   [facility] [phone]   [facility] [phone]   [facility] [phone]   [facility] [phone]   [facility] [phone]   [facility] [phone]   [facility] [phone]   [facility] [phone]   [facility]  [facility] [phone]

## 2025-05-27 NOTE — ASSESSMENT & PLAN NOTE
"Patient was recently admitted from 5/1-5/9 to neurology service for breakthrough seizures in the setting of medication noncompliance.   There were concerns brought up by family that admission regarding cognitive impairment/memory loss and ability to care for herself.    Scored 13/30 on MOCA done 5/7.  Per Neurology record review, was deemed at that time capable to live alone  Patient was initially discharged and stayed with her brother before he took her back to her home.  She did not recall details of this.  Additionally, VNA came out to her home and patient could not properly use her insulin or use glucometer (she has been on insulin for years) so came back to the hospital for safety concerns  Neurology and geriatrics evals appreciated   MRI brain 5/16: \"No acute intracranial pathology. Chronic microangiopathy. NeuroQuant analysis was performed: Normal hippocampal volume and enlarged ventricular system: Findings do not support hippocampal degeneration. Possible expansion of ventricular system without medial temporal lobe focused ex-vacuo process.\"  Vitamin B6 pending drawn on 5/13  RPR nonreactive, TSH 0.989, folate greater than 22.3, vitamin B12 is 476, vitamin B 1 is 123  Patient does not appear to have capacity to make fully informed medical decisions per neuropsych  Discharge planning to assisted living/LTC in the [place] area per family preference  "
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
## 2025-05-27 NOTE — PROGRESS NOTES
"Progress Note - Hospitalist   Name: Ynes Mendoza 64 y.o. female I MRN: [id number]  [facility] Date of Admission: 5/12/2025   Date of Service: 5/27/2025 I Hospital Day: 14    Assessment & Plan  Failure to thrive in adult  Patient was recently admitted from 5/1-5/9 to neurology service for breakthrough seizures in the setting of medication noncompliance.   There were concerns brought up by family that admission regarding cognitive impairment/memory loss and ability to care for herself.    Scored 13/30 on MOCA done 5/7.  Per Neurology record review, was deemed at that time capable to live alone  Patient was initially discharged and stayed with her brother before he took her back to her home.  She did not recall details of this.  Additionally, VNA came out to her home and patient could not properly use her insulin or use glucometer (she has been on insulin for years) so came back to the hospital for safety concerns  Neurology and geriatrics evals appreciated   MRI brain 5/16: \"No acute intracranial pathology. Chronic microangiopathy. NeuroQuant analysis was performed: Normal hippocampal volume and enlarged ventricular system: Findings do not support hippocampal degeneration. Possible expansion of ventricular system without medial temporal lobe focused ex-vacuo process.\"  Vitamin B6 pending drawn on 5/13  RPR nonreactive, TSH 0.989, folate greater than 22.3, vitamin B12 is 476, vitamin B 1 is 123  Patient does not appear to have capacity to make fully informed medical decisions per neuropsych  Discharge planning to assisted living/LTC in the [place] area per family preference  Type 2 diabetes mellitus, with long-term current use of insulin ([facility])  Lab Results   Component Value Date    HGBA1C 7.3 (H) 03/05/2025       Recent Labs     05/26/25  1553 05/26/25  2105 05/27/25  0619 05/27/25  1046   POCGLU 143* 139 115 136       Blood Sugar Average: Last 72 hrs:  (P) [id number]  Hold glimepiride and " metformin while admitted  Diabetic diet  Continue Lantus 14 units QHS and SSI TID AC and QHS  Cognitive impairment  See above.  Sister reported progressive decline over 6-8 months   Geriatrics following  For not have capacity per Neuropsych  Outpatient follow up with neuro for baseline dementia testing and to start medication therapy if indicated at the time  Focal epilepsy (HCC)  Continue [facility]  Neurology evaluated   Vertigo  Reports that she had an episode of vertigo previously while eating her dinner.  She lied down and it resolved in about 90 minutes  Symptoms have resolved and reproduced to mild degree with pancho hallpike to the right  Resolved now, no further episodes since then    VTE Pharmacologic Prophylaxis: VTE Score: 6 High Risk (Score >/= 5) - Pharmacological DVT Prophylaxis Ordered: heparin. Sequential Compression Devices Ordered.    Mobility:   Basic Mobility Inpatient Raw Score: 19  JH-HLM Goal: 6: Walk 10 steps or more  JH-HLM Achieved: 6: Walk 10 steps or more  JH-HLM Goal NOT achieved. Continue with multidisciplinary rounding and encourage appropriate mobility to improve upon JH-HLM goals.    Patient Centered Rounds: I performed bedside rounds with nursing staff today.   Discussions with Specialists or Other Care Team Provider: RN, CM    Current Length of Stay: 14 day(s)  Current Patient Status: Inpatient   Certification Statement: The patient will continue to require additional inpatient hospital stay due to discharge planning  Discharge Plan: Anticipate discharge in 24-48 hrs to rehab facility.    Code Status: Level 1 - Full Code    Subjective:   Patient was seen and examined at bedside. The patient complains of mild constipation and asking for stool softener.    Objective:     Vitals:   Temp (24hrs), Av.4 °F (36.9 °C), Min:98.2 °F (36.8 °C), Max:98.6 °F (37 °C)    Temp:  [98.2 °F (36.8 °C)-98.6 °F (37 °C)] 98.2 °F (36.8 °C)  HR:  [70-80] 71  Resp:  [16-18] 18  BP: (111-112)/(68-69)  "111/69  SpO2:  [97 %-100 %] 98 %  There is no height or weight on file to calculate BMI.     Input and Output Summary (last 24 hours):       Intake/Output Summary (Last 24 hours) at 5/27/2025 1259  Last data filed at 5/27/2025 0900  Gross per 24 hour   Intake 1140 ml   Output 1 ml   Net 1139 ml       Physical Exam:     Physical Exam  General: no acute distress  HEENT: NC/AT, PERRL, EOM - normal  Neck: Supple  Pulm/Chest: Normal chest wall expansion, clear breath sounds on both side  CVS: normal S1&S2, capillary refill <2s  Abd: soft, non tender, non distended, bowel sounds +  MSK: move all 4 extremities spontaneously  Skin: warm  CNS: no acute focal neuro deficit      Additional Data:     Labs:              Invalid input(s): \"LABALBU\"        * I Have Reviewed All Lab Data Listed Above.  * Additional Pertinent Lab Tests Reviewed: All Labs For Current Hospital Admission Reviewed    Imaging:      I have reviewed pertinent imaging.      Recent Cultures (last 7 days):           Last 24 Hours Medication List:   Current Facility-Administered Medications   Medication Dose Route Frequency Provider Last Rate    acetaminophen  650 mg Oral Q6H PRN Oliver Leiva MD      Artificial Tears  1 drop Both Eyes TID Luis Antonio Christine MD      aspirin  81 mg Oral Daily Oliver Leiva MD      enoxaparin  40 mg Subcutaneous Daily Oliver Leiva MD      hydrOXYzine HCL  25 mg Oral Q6H PRN Luis Antonio Christine MD      insulin glargine  15 Units Subcutaneous HS Luis Antonio Christine MD      insulin lispro  1-6 Units Subcutaneous TID AC NAYA Arita      insulin lispro  1-6 Units Subcutaneous HS NAYA Arita      insulin lispro  5 Units Subcutaneous TID With Meals Luis Antonio Christine MD      levETIRAcetam  1,000 mg Oral Q12H DYAN Oliver Leiva MD      pravastatin  40 mg Oral Daily With Dinner Oliver Leiva MD      psyllium  1 packet Oral Daily Luis Antonio Christine MD      senna  2 tablet Oral Daily Luis Antonio " MD Nanci          Today, Patient Was Seen By: Odette Amezquita MD    ** Please Note: Dragon 360 Dictation voice to text software may have been used in the creation of this document. **

## 2025-05-27 NOTE — ASSESSMENT & PLAN NOTE
Continue Keppra  Neurology evaluated    Consent (Lip)/Introductory Paragraph: The rationale for Mohs was explained to the patient and consent was obtained. The risks, benefits and alternatives to therapy were discussed in detail. Specifically, the risks of lip deformity, changes in the oral aperture, infection, scarring, bleeding, prolonged wound healing, incomplete removal, allergy to anesthesia, nerve injury and recurrence were addressed. Prior to the procedure, the treatment site was clearly identified and confirmed by the patient. All components of Universal Protocol/PAUSE Rule completed.

## 2025-05-27 NOTE — PLAN OF CARE
Problem: PAIN - ADULT  Goal: Verbalizes/displays adequate comfort level or baseline comfort level  Description: Interventions:- Encourage patient to monitor pain and request assistance- Assess pain using appropriate pain scale- Administer analgesics based on type and severity of pain and evaluate response- Implement non-pharmacological measures as appropriate and evaluate response- Notify physician/advanced practitioner if interventions unsuccessful or patient reports new pain  Outcome: Progressing     Problem: INFECTION - ADULT  Goal: Absence or prevention of progression during hospitalization  Description: INTERVENTIONS:- Assess and monitor for signs and symptoms of infection- Monitor lab/diagnostic results- Monitor all insertion sites, i.e. indwelling lines, tubes, and drains- [place] appropriate cooling/warming therapies per order- Administer medications as ordered- Instruct and encourage patient and family to use good hand hygiene technique- Identify and instruct in appropriate isolation precautions for identified infection/condition  Outcome: Progressing     Problem: SAFETY ADULT  Goal: Patient will remain free of falls  Description: INTERVENTIONS:- Educate patient/family on patient safety including physical limitations- Instruct patient to call for assistance with activity - Consult OT/PT to assist with strengthening/mobility - Keep Call bell within reach- Keep bed low and locked with side rails adjusted as appropriate- Keep care items and personal belongings within reach- Initiate and maintain comfort rounds- Make Fall Risk Sign visible to staff  Outcome: Progressing  Goal: Maintain or return to baseline ADL function  Description: INTERVENTIONS:-  Assess patient's ability to carry out ADLs; assess patient's baseline for ADL function and identify physical deficits which impact ability to perform ADLs (bathing, care of mouth/teeth, toileting, grooming, dressing, etc.)- Assess/evaluate cause of self-care  deficits - Assess range of motion- Assess patient's mobility; develop plan if impaired- Assess patient's need for assistive devices and provide as appropriate- Encourage maximum independence but intervene and supervise when necessary- Involve family in performance of ADLs- Assess for home care needs following discharge - Consider OT consult to assist with ADL evaluation and planning for discharge- Provide patient education as appropriate  Outcome: Progressing  Goal: Maintains/Returns to pre admission functional level  Description: INTERVENTIONS:- Perform AM-PAC 6 Click Basic Mobility/ Daily Activity assessment daily.- Set and communicate daily mobility goal to care team and patient/family/caregiver. - Collaborate with rehabilitation services on mobility goals if consulted  Outcome: Progressing     Problem: DISCHARGE PLANNING  Goal: Discharge to home or other facility with appropriate resources  Description: INTERVENTIONS:- Identify barriers to discharge w/patient and caregiver- Arrange for needed discharge resources and transportation as appropriate- Identify discharge learning needs (meds, wound care, etc.)- Refer to Case Management Department for coordinating discharge planning if the patient needs post-hospital services based on physician/advanced practitioner order or complex needs related to functional status, cognitive ability, or social support system  Outcome: Progressing     Problem: Knowledge Deficit  Goal: Patient/family/caregiver demonstrates understanding of disease process, treatment plan, medications, and discharge instructions  Description: Complete learning assessment and assess knowledge base.Interventions:- Provide teaching at level of understanding- Provide teaching via preferred learning methods  Outcome: Progressing

## 2025-05-27 NOTE — PLAN OF CARE
Problem: PAIN - ADULT  Goal: Verbalizes/displays adequate comfort level or baseline comfort level  Description: Interventions:- Encourage patient to monitor pain and request assistance- Assess pain using appropriate pain scale- Administer analgesics based on type and severity of pain and evaluate response- Implement non-pharmacological measures as appropriate and evaluate response- Notify physician/advanced practitioner if interventions unsuccessful or patient reports new pain  Outcome: Progressing     Problem: INFECTION - ADULT  Goal: Absence or prevention of progression during hospitalization  Description: INTERVENTIONS:- Assess and monitor for signs and symptoms of infection- Monitor lab/diagnostic results- Monitor all insertion sites, i.e. indwelling lines, tubes, and drains- [place] appropriate cooling/warming therapies per order- Administer medications as ordered- Instruct and encourage patient and family to use good hand hygiene technique- Identify and instruct in appropriate isolation precautions for identified infection/condition  Outcome: Progressing     Problem: SAFETY ADULT  Goal: Patient will remain free of falls  Description: INTERVENTIONS:- Educate patient/family on patient safety including physical limitations- Instruct patient to call for assistance with activity - Consult OT/PT to assist with strengthening/mobility - Keep Call bell within reach- Keep bed low and locked with side rails adjusted as appropriate- Keep care items and personal belongings within reach- Initiate and maintain comfort rounds- Make Fall Risk Sign visible to staff  Outcome: Progressing  Goal: Maintain or return to baseline ADL function  Description: INTERVENTIONS:-  Assess patient's ability to carry out ADLs; assess patient's baseline for ADL function and identify physical deficits which impact ability to perform ADLs (bathing, care of mouth/teeth, toileting, grooming, dressing, etc.)- Assess/evaluate cause of self-care  deficits - Assess range of motion- Assess patient's mobility; develop plan if impaired- Assess patient's need for assistive devices and provide as appropriate- Encourage maximum independence but intervene and supervise when necessary- Involve family in performance of ADLs- Assess for home care needs following discharge - Consider OT consult to assist with ADL evaluation and planning for discharge- Provide patient education as appropriate  Outcome: Progressing  Goal: Maintains/Returns to pre admission functional level  Description: INTERVENTIONS:- Perform AM-PAC 6 Click Basic Mobility/ Daily Activity assessment daily.- Set and communicate daily mobility goal to care team and patient/family/caregiver. - Collaborate with rehabilitation services on mobility goals if consulted  Outcome: Progressing     Problem: DISCHARGE PLANNING  Goal: Discharge to home or other facility with appropriate resources  Description: INTERVENTIONS:- Identify barriers to discharge w/patient and caregiver- Arrange for needed discharge resources and transportation as appropriate- Identify discharge learning needs (meds, wound care, etc.)- Refer to Case Management Department for coordinating discharge planning if the patient needs post-hospital services based on physician/advanced practitioner order or complex needs related to functional status, cognitive ability, or social support system  Outcome: Progressing     Problem: Knowledge Deficit  Goal: Patient/family/caregiver demonstrates understanding of disease process, treatment plan, medications, and discharge instructions  Description: Complete learning assessment and assess knowledge base.Interventions:- Provide teaching at level of understanding- Provide teaching via preferred learning methods  Outcome: Progressing

## 2025-05-27 NOTE — ASSESSMENT & PLAN NOTE
Lab Results   Component Value Date    HGBA1C 7.3 (H) 03/05/2025       Recent Labs     05/26/25  1553 05/26/25  2105 05/27/25  0619 05/27/25  1046   POCGLU 143* 139 115 136       Blood Sugar Average: Last 72 hrs:  (P) [id number]  Hold glimepiride and metformin while admitted  Diabetic diet  Continue Lantus 14 units QHS and SSI TID AC and QHS

## 2025-05-28 LAB
GLUCOSE SERPL-MCNC: 109 MG/DL (ref 65–140)
GLUCOSE SERPL-MCNC: 141 MG/DL (ref 65–140)
GLUCOSE SERPL-MCNC: 165 MG/DL (ref 65–140)
GLUCOSE SERPL-MCNC: 89 MG/DL (ref 65–140)

## 2025-05-28 PROCEDURE — 99232 SBSQ HOSP IP/OBS MODERATE 35: CPT | Performed by: INTERNAL MEDICINE

## 2025-05-28 PROCEDURE — 82948 REAGENT STRIP/BLOOD GLUCOSE: CPT

## 2025-05-28 RX ADMIN — INSULIN GLARGINE 15 UNITS: 100 INJECTION, SOLUTION SUBCUTANEOUS at 21:25

## 2025-05-28 RX ADMIN — SENNOSIDES AND DOCUSATE SODIUM 1 TABLET: 50; 8.6 TABLET ORAL at 21:26

## 2025-05-28 RX ADMIN — ASPIRIN 81 MG CHEWABLE TABLET 81 MG: 81 TABLET CHEWABLE at 09:09

## 2025-05-28 RX ADMIN — INSULIN LISPRO 5 UNITS: 100 INJECTION, SOLUTION INTRAVENOUS; SUBCUTANEOUS at 17:12

## 2025-05-28 RX ADMIN — INSULIN LISPRO 5 UNITS: 100 INJECTION, SOLUTION INTRAVENOUS; SUBCUTANEOUS at 07:24

## 2025-05-28 RX ADMIN — POLYETHYLENE GLYCOL 3350 17 G: 17 POWDER, FOR SOLUTION ORAL at 09:09

## 2025-05-28 RX ADMIN — LEVETIRACETAM 1000 MG: 500 TABLET, FILM COATED ORAL at 21:26

## 2025-05-28 RX ADMIN — ENOXAPARIN SODIUM 40 MG: 40 INJECTION SUBCUTANEOUS at 09:09

## 2025-05-28 RX ADMIN — DEXTRAN 70, GLYCERIN, HYPROMELLOSE 1 DROP: 1; 2; 3 SOLUTION/ DROPS OPHTHALMIC at 21:25

## 2025-05-28 RX ADMIN — PRAVASTATIN SODIUM 40 MG: 40 TABLET ORAL at 17:12

## 2025-05-28 RX ADMIN — LEVETIRACETAM 1000 MG: 500 TABLET, FILM COATED ORAL at 09:09

## 2025-05-28 RX ADMIN — INSULIN LISPRO 1 UNITS: 100 INJECTION, SOLUTION INTRAVENOUS; SUBCUTANEOUS at 21:25

## 2025-05-28 RX ADMIN — INSULIN LISPRO 5 UNITS: 100 INJECTION, SOLUTION INTRAVENOUS; SUBCUTANEOUS at 12:31

## 2025-05-28 RX ADMIN — Medication 1 PACKET: at 09:09
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
## 2025-05-28 NOTE — PLAN OF CARE
Problem: PAIN - ADULT  Goal: Verbalizes/displays adequate comfort level or baseline comfort level  Description: Interventions:- Encourage patient to monitor pain and request assistance- Assess pain using appropriate pain scale- Administer analgesics based on type and severity of pain and evaluate response- Implement non-pharmacological measures as appropriate and evaluate response- Notify physician/advanced practitioner if interventions unsuccessful or patient reports new pain  5/28/2025 1127 by Destiny Lira RN  Outcome: Progressing  5/28/2025 1126 by Destiny Lira RN  Outcome: Progressing     Problem: INFECTION - ADULT  Goal: Absence or prevention of progression during hospitalization  Description: INTERVENTIONS:- Assess and monitor for signs and symptoms of infection- Monitor lab/diagnostic results- Monitor all insertion sites, i.e. indwelling lines, tubes, and drains- [place] appropriate cooling/warming therapies per order- Administer medications as ordered- Instruct and encourage patient and family to use good hand hygiene technique- Identify and instruct in appropriate isolation precautions for identified infection/condition  5/28/2025 1127 by Destiny Lira RN  Outcome: Progressing  5/28/2025 1126 by Destiny Lira RN  Outcome: Progressing     Problem: SAFETY ADULT  Goal: Patient will remain free of falls  Description: INTERVENTIONS:- Educate patient/family on patient safety including physical limitations- Instruct patient to call for assistance with activity - Consult OT/PT to assist with strengthening/mobility - Keep Call bell within reach- Keep bed low and locked with side rails adjusted as appropriate- Keep care items and personal belongings within reach- Initiate and maintain comfort rounds- Make Fall Risk Sign visible to staff  5/28/2025 1127 by Destiny Lira RN  Outcome: Progressing  5/28/2025 1126 by Destiny Lira RN  Outcome: Progressing  Goal: Maintain or return to baseline ADL  function  Description: INTERVENTIONS:-  Assess patient's ability to carry out ADLs; assess patient's baseline for ADL function and identify physical deficits which impact ability to perform ADLs (bathing, care of mouth/teeth, toileting, grooming, dressing, etc.)- Assess/evaluate cause of self-care deficits - Assess range of motion- Assess patient's mobility; develop plan if impaired- Assess patient's need for assistive devices and provide as appropriate- Encourage maximum independence but intervene and supervise when necessary- Involve family in performance of ADLs- Assess for home care needs following discharge - Consider OT consult to assist with ADL evaluation and planning for discharge- Provide patient education as appropriate  5/28/2025 1127 by Destiny Lira RN  Outcome: Progressing  5/28/2025 1126 by Destiny Lira RN  Outcome: Progressing  Goal: Maintains/Returns to pre admission functional level  Description: INTERVENTIONS:- Perform AM-PAC 6 Click Basic Mobility/ Daily Activity assessment daily.- Set and communicate daily mobility goal to care team and patient/family/caregiver. - Collaborate with rehabilitation services on mobility goals if consulted  5/28/2025 1127 by Destiny Lira RN  Outcome: Progressing  5/28/2025 1126 by Destiny Lira RN  Outcome: Progressing     Problem: DISCHARGE PLANNING  Goal: Discharge to home or other facility with appropriate resources  Description: INTERVENTIONS:- Identify barriers to discharge w/patient and caregiver- Arrange for needed discharge resources and transportation as appropriate- Identify discharge learning needs (meds, wound care, etc.)- Refer to Case Management Department for coordinating discharge planning if the patient needs post-hospital services based on physician/advanced practitioner order or complex needs related to functional status, cognitive ability, or social support system  5/28/2025 1127 by Destiny Lira RN  Outcome: Progressing  5/28/2025  1126 by Destiny Lira RN  Outcome: Progressing     Problem: Knowledge Deficit  Goal: Patient/family/caregiver demonstrates understanding of disease process, treatment plan, medications, and discharge instructions  Description: Complete learning assessment and assess knowledge base.Interventions:- Provide teaching at level of understanding- Provide teaching via preferred learning methods  5/28/2025 1127 by Destiny Lira RN  Outcome: Progressing  5/28/2025 1126 by Destiny Lira RN  Outcome: Progressing

## 2025-05-28 NOTE — ASSESSMENT & PLAN NOTE
"Patient was recently admitted from 5/1-5/9 to neurology service for breakthrough seizures in the setting of medication noncompliance.   There were concerns brought up by family that admission regarding cognitive impairment/memory loss and ability to care for herself.    Scored 13/30 on MOCA done 5/7.  Per Neurology record review, was deemed at that time capable to live alone  Patient was initially discharged and stayed with her brother before he took her back to her home.  She did not recall details of this.  Additionally, [facility] came out to her home and patient could not properly use her insulin or use glucometer (she has been on insulin for years) so came back to the hospital for safety concerns  Neurology and geriatrics evals appreciated   MRI brain 5/16: \"No acute intracranial pathology. Chronic microangiopathy. NeuroQuant analysis was performed: Normal hippocampal volume and enlarged ventricular system: Findings do not support hippocampal degeneration. Possible expansion of ventricular system without medial temporal lobe focused ex-vacuo process.\"  Vitamin B6 pending drawn on 5/13  RPR nonreactive, TSH 0.989, folate greater than 22.3, vitamin B12 is 476, vitamin B 1 is 123  Patient does not appear to have capacity to make fully informed medical decisions per neuropsych  Discharge planning to assisted living/LTC in the [place] area per family preference  "

## 2025-05-28 NOTE — PLAN OF CARE
Problem: PAIN - ADULT  Goal: Verbalizes/displays adequate comfort level or baseline comfort level  Description: Interventions:- Encourage patient to monitor pain and request assistance- Assess pain using appropriate pain scale- Administer analgesics based on type and severity of pain and evaluate response- Implement non-pharmacological measures as appropriate and evaluate response- Notify physician/advanced practitioner if interventions unsuccessful or patient reports new pain  Outcome: Progressing     Problem: INFECTION - ADULT  Goal: Absence or prevention of progression during hospitalization  Description: INTERVENTIONS:- Assess and monitor for signs and symptoms of infection- Monitor lab/diagnostic results- Monitor all insertion sites, i.e. indwelling lines, tubes, and drains- [place] appropriate cooling/warming therapies per order- Administer medications as ordered- Instruct and encourage patient and family to use good hand hygiene technique- Identify and instruct in appropriate isolation precautions for identified infection/condition  Outcome: Progressing     Problem: SAFETY ADULT  Goal: Patient will remain free of falls  Description: INTERVENTIONS:- Educate patient/family on patient safety including physical limitations- Instruct patient to call for assistance with activity - Consult OT/PT to assist with strengthening/mobility - Keep Call bell within reach- Keep bed low and locked with side rails adjusted as appropriate- Keep care items and personal belongings within reach- Initiate and maintain comfort rounds- Make Fall Risk Sign visible to staff  Outcome: Progressing  Goal: Maintain or return to baseline ADL function  Description: INTERVENTIONS:-  Assess patient's ability to carry out ADLs; assess patient's baseline for ADL function and identify physical deficits which impact ability to perform ADLs (bathing, care of mouth/teeth, toileting, grooming, dressing, etc.)- Assess/evaluate cause of self-care  deficits - Assess range of motion- Assess patient's mobility; develop plan if impaired- Assess patient's need for assistive devices and provide as appropriate- Encourage maximum independence but intervene and supervise when necessary- Involve family in performance of ADLs- Assess for home care needs following discharge - Consider OT consult to assist with ADL evaluation and planning for discharge- Provide patient education as appropriate  Outcome: Progressing  Goal: Maintains/Returns to pre admission functional level  Description: INTERVENTIONS:- Perform AM-PAC 6 Click Basic Mobility/ Daily Activity assessment daily.- Set and communicate daily mobility goal to care team and patient/family/caregiver. - Collaborate with rehabilitation services on mobility goals if consulted  Outcome: Progressing     Problem: DISCHARGE PLANNING  Goal: Discharge to home or other facility with appropriate resources  Description: INTERVENTIONS:- Identify barriers to discharge w/patient and caregiver- Arrange for needed discharge resources and transportation as appropriate- Identify discharge learning needs (meds, wound care, etc.)- Refer to Case Management Department for coordinating discharge planning if the patient needs post-hospital services based on physician/advanced practitioner order or complex needs related to functional status, cognitive ability, or social support system  Outcome: Progressing     Problem: Knowledge Deficit  Goal: Patient/family/caregiver demonstrates understanding of disease process, treatment plan, medications, and discharge instructions  Description: Complete learning assessment and assess knowledge base.Interventions:- Provide teaching at level of understanding- Provide teaching via preferred learning methods  Outcome: Progressing

## 2025-05-28 NOTE — ASSESSMENT & PLAN NOTE
Lab Results   Component Value Date    HGBA1C 7.3 (H) 03/05/2025       Recent Labs     05/27/25  1541 05/27/25  2115 05/28/25  0645 05/28/25  1103   POCGLU 187* 197* 109 89       Blood Sugar Average: Last 72 hrs:  (P) [id number]  Hold glimepiride and metformin while admitted  Diabetic diet  Continue Lantus 14 units QHS and SSI TID AC and QHS

## 2025-05-28 NOTE — PLAN OF CARE
Problem: PAIN - ADULT  Goal: Verbalizes/displays adequate comfort level or baseline comfort level  Description: Interventions:- Encourage patient to monitor pain and request assistance- Assess pain using appropriate pain scale- Administer analgesics based on type and severity of pain and evaluate response- Implement non-pharmacological measures as appropriate and evaluate response- Notify physician/advanced practitioner if interventions unsuccessful or patient reports new pain  Outcome: Progressing     Problem: INFECTION - ADULT  Goal: Absence or prevention of progression during hospitalization  Description: INTERVENTIONS:- Assess and monitor for signs and symptoms of infection- Monitor lab/diagnostic results- Monitor all insertion sites, i.e. indwelling lines, tubes, and drains- [place] appropriate cooling/warming therapies per order- Administer medications as ordered- Instruct and encourage patient and family to use good hand hygiene technique- Identify and instruct in appropriate isolation precautions for identified infection/condition  Outcome: Progressing     Problem: SAFETY ADULT  Goal: Patient will remain free of falls  Description: INTERVENTIONS:- Educate patient/family on patient safety including physical limitations- Instruct patient to call for assistance with activity - Consult OT/PT to assist with strengthening/mobility - Keep Call bell within reach- Keep bed low and locked with side rails adjusted as appropriate- Keep care items and personal belongings within reach- Initiate and maintain comfort rounds- Make Fall Risk Sign visible to staff  Outcome: Progressing  Goal: Maintain or return to baseline ADL function  Description: INTERVENTIONS:-  Assess patient's ability to carry out ADLs; assess patient's baseline for ADL function and identify physical deficits which impact ability to perform ADLs (bathing, care of mouth/teeth, toileting, grooming, dressing, etc.)- Assess/evaluate cause of self-care  deficits - Assess range of motion- Assess patient's mobility; develop plan if impaired- Assess patient's need for assistive devices and provide as appropriate- Encourage maximum independence but intervene and supervise when necessary- Involve family in performance of ADLs- Assess for home care needs following discharge - Consider OT consult to assist with ADL evaluation and planning for discharge- Provide patient education as appropriate  Outcome: Progressing  Goal: Maintains/Returns to pre admission functional level  Description: INTERVENTIONS:- Perform AM-PAC 6 Click Basic Mobility/ Daily Activity assessment daily.- Set and communicate daily mobility goal to care team and patient/family/caregiver. - Collaborate with rehabilitation services on mobility goals if consulted  Outcome: Progressing     Problem: DISCHARGE PLANNING  Goal: Discharge to home or other facility with appropriate resources  Description: INTERVENTIONS:- Identify barriers to discharge w/patient and caregiver- Arrange for needed discharge resources and transportation as appropriate- Identify discharge learning needs (meds, wound care, etc.)- Refer to Case Management Department for coordinating discharge planning if the patient needs post-hospital services based on physician/advanced practitioner order or complex needs related to functional status, cognitive ability, or social support system  Outcome: Progressing     Problem: Knowledge Deficit  Goal: Patient/family/caregiver demonstrates understanding of disease process, treatment plan, medications, and discharge instructions  Description: Complete learning assessment and assess knowledge base.Interventions:- Provide teaching at level of understanding- Provide teaching via preferred learning methods  Outcome: Progressing

## 2025-05-28 NOTE — PROGRESS NOTES
"Progress Note - Hospitalist   Name: Ynes Mendoza 64 y.o. female I MRN: [id number]  [facility] I Date of Admission: 5/12/2025   Date of Service: 5/28/2025 I Hospital Day: 15    Assessment & Plan  Failure to thrive in adult  Patient was recently admitted from 5/1-5/9 to neurology service for breakthrough seizures in the setting of medication noncompliance.   There were concerns brought up by family that admission regarding cognitive impairment/memory loss and ability to care for herself.    Scored 13/30 on MOCA done 5/7.  Per Neurology record review, was deemed at that time capable to live alone  Patient was initially discharged and stayed with her brother before he took her back to her home.  She did not recall details of this.  Additionally, VNA came out to her home and patient could not properly use her insulin or use glucometer (she has been on insulin for years) so came back to the hospital for safety concerns  Neurology and geriatrics evals appreciated   MRI brain 5/16: \"No acute intracranial pathology. Chronic microangiopathy. NeuroQuant analysis was performed: Normal hippocampal volume and enlarged ventricular system: Findings do not support hippocampal degeneration. Possible expansion of ventricular system without medial temporal lobe focused ex-vacuo process.\"  Vitamin B6 pending drawn on 5/13  RPR nonreactive, TSH 0.989, folate greater than 22.3, vitamin B12 is 476, vitamin B 1 is 123  Patient does not appear to have capacity to make fully informed medical decisions per neuropsych  Discharge planning to assisted living/LTC in the [place] area per family preference  Type 2 diabetes mellitus, with long-term current use of insulin ([facility])  Lab Results   Component Value Date    HGBA1C 7.3 (H) 03/05/2025       Recent Labs     05/27/25  1541 05/27/25  2115 05/28/25  0645 05/28/25  1103   POCGLU 187* 197* 109 89       Blood Sugar Average: Last 72 hrs:  (P) 137.1706113977686364  Hold glimepiride and " metformin while admitted  Diabetic diet  Continue Lantus 14 units QHS and SSI TID AC and QHS  Cognitive impairment  See above.  Sister reported progressive decline over 6-8 months   Geriatrics following  For not have capacity per Neuropsych  Outpatient follow up with neuro for baseline dementia testing and to start medication therapy if indicated at the time  Focal epilepsy (HCC)  Continue [facility]  Neurology evaluated   Vertigo  Reports that she had an episode of vertigo previously while eating her dinner.  She lied down and it resolved in about 90 minutes  Symptoms have resolved and reproduced to mild degree with pancho hallpike to the right  Resolved now, no further episodes since then    VTE Pharmacologic Prophylaxis: VTE Score: 6 High Risk (Score >/= 5) - Pharmacological DVT Prophylaxis Ordered: heparin. Sequential Compression Devices Ordered.    Mobility:   Basic Mobility Inpatient Raw Score: 19  JH-HLM Goal: 6: Walk 10 steps or more  JH-HLM Achieved: 7: Walk 25 feet or more  JH-HLM Goal NOT achieved. Continue with multidisciplinary rounding and encourage appropriate mobility to improve upon JH-HLM goals.    Patient Centered Rounds: I performed bedside rounds with nursing staff today.   Discussions with Specialists or Other Care Team Provider: Discussed with , RN    Current Length of Stay: 15 day(s)  Current Patient Status: Inpatient   Certification Statement: The patient will continue to require additional inpatient hospital stay due to awaiting placement  Discharge Plan: Anticipate discharge in 24-48 hrs to rehab facility.    Code Status: Level 1 - Full Code    Subjective:   Patient was seen and examined at bedside. The patient had bowel movement yesterday.  She denies any chest pain, palpitation, shortness of breath, N/V, abd pain.      Objective:     Vitals:   Temp (24hrs), Av.9 °F (36.6 °C), Min:97.5 °F (36.4 °C), Max:98.2 °F (36.8 °C)    Temp:  [97.5 °F (36.4 °C)-98.2 °F (36.8 °C)] 98 °F (36.7  "°C)  HR:  [72-89] 81  Resp:  [16-18] 16  BP: (106-133)/(68-98) 106/70  SpO2:  [97 %-99 %] 97 %  There is no height or weight on file to calculate BMI.     Input and Output Summary (last 24 hours):       Intake/Output Summary (Last 24 hours) at 5/28/2025 1447  Last data filed at 5/28/2025 1300  Gross per 24 hour   Intake 1180 ml   Output --   Net 1180 ml       Physical Exam:     Physical Exam  General: no acute distress  HEENT: NC/AT, PERRL, EOM - normal  Neck: Supple  Pulm/Chest: Normal chest wall expansion, clear breath sounds on both side  CVS: normal S1&S2, capillary refill <2s  Abd: soft, non tender, non distended, bowel sounds +  MSK: move all 4 extremities spontaneously  Skin: warm  CNS: Patient is alert and awake.      Additional Data:     Labs:              Invalid input(s): \"LABALBU\"        * I Have Reviewed All Lab Data Listed Above.  * Additional Pertinent Lab Tests Reviewed: All Labs For Current Hospital Admission Reviewed    Imaging:      I have reviewed pertinent imaging.      Recent Cultures (last 7 days):           Last 24 Hours Medication List:   Current Facility-Administered Medications   Medication Dose Route Frequency Provider Last Rate    acetaminophen  650 mg Oral Q6H PRN Oliver Leiva MD      Artificial Tears  1 drop Both Eyes TID Luis Antonio Chrsitine MD      aspirin  81 mg Oral Daily Oliver Leiva MD      enoxaparin  40 mg Subcutaneous Daily Oliver Leiva MD      hydrOXYzine HCL  25 mg Oral Q6H PRN Luis Antonio Christine MD      insulin glargine  15 Units Subcutaneous HS Luis Antonio Christine MD      insulin lispro  1-6 Units Subcutaneous TID AC NAYA Arita      insulin lispro  1-6 Units Subcutaneous HS NAYA Arita      insulin lispro  5 Units Subcutaneous TID With Meals Luis Antonio Christine MD      levETIRAcetam  1,000 mg Oral Q12H DYAN Oliver Leiva MD      polyethylene glycol  17 g Oral Daily Odette Amezquita MD      pravastatin  40 mg Oral Daily With Dinner " Oliver Leiva MD      psyllium  1 packet Oral Daily Luis Antonio Christine MD      senna-docusate sodium  1 tablet Oral HS Odette Amezquita MD          Today, Patient Was Seen By: Odette Amezquita MD    ** Please Note: Dragon 360 Dictation voice to text software may have been used in the creation of this document. **

## 2025-05-29 LAB
GLUCOSE SERPL-MCNC: 118 MG/DL (ref 65–140)
GLUCOSE SERPL-MCNC: 124 MG/DL (ref 65–140)
GLUCOSE SERPL-MCNC: 269 MG/DL (ref 65–140)
GLUCOSE SERPL-MCNC: 98 MG/DL (ref 65–140)

## 2025-05-29 PROCEDURE — 97116 GAIT TRAINING THERAPY: CPT

## 2025-05-29 PROCEDURE — 99232 SBSQ HOSP IP/OBS MODERATE 35: CPT | Performed by: INTERNAL MEDICINE

## 2025-05-29 PROCEDURE — 82948 REAGENT STRIP/BLOOD GLUCOSE: CPT

## 2025-05-29 RX ADMIN — INSULIN LISPRO 5 UNITS: 100 INJECTION, SOLUTION INTRAVENOUS; SUBCUTANEOUS at 16:30

## 2025-05-29 RX ADMIN — LEVETIRACETAM 1000 MG: 500 TABLET, FILM COATED ORAL at 20:54

## 2025-05-29 RX ADMIN — INSULIN LISPRO 5 UNITS: 100 INJECTION, SOLUTION INTRAVENOUS; SUBCUTANEOUS at 12:09

## 2025-05-29 RX ADMIN — Medication 1 PACKET: at 09:39

## 2025-05-29 RX ADMIN — PRAVASTATIN SODIUM 40 MG: 40 TABLET ORAL at 16:30

## 2025-05-29 RX ADMIN — DEXTRAN 70, GLYCERIN, HYPROMELLOSE 1 DROP: 1; 2; 3 SOLUTION/ DROPS OPHTHALMIC at 20:55

## 2025-05-29 RX ADMIN — ASPIRIN 81 MG CHEWABLE TABLET 81 MG: 81 TABLET CHEWABLE at 09:39

## 2025-05-29 RX ADMIN — DEXTRAN 70, GLYCERIN, HYPROMELLOSE 1 DROP: 1; 2; 3 SOLUTION/ DROPS OPHTHALMIC at 16:30

## 2025-05-29 RX ADMIN — INSULIN GLARGINE 15 UNITS: 100 INJECTION, SOLUTION SUBCUTANEOUS at 21:47

## 2025-05-29 RX ADMIN — SENNOSIDES AND DOCUSATE SODIUM 1 TABLET: 50; 8.6 TABLET ORAL at 21:20

## 2025-05-29 RX ADMIN — HYDROXYZINE HYDROCHLORIDE 25 MG: 25 TABLET, FILM COATED ORAL at 21:36

## 2025-05-29 RX ADMIN — LEVETIRACETAM 1000 MG: 500 TABLET, FILM COATED ORAL at 09:39

## 2025-05-29 RX ADMIN — ACETAMINOPHEN 650 MG: 325 TABLET, FILM COATED ORAL at 21:36

## 2025-05-29 RX ADMIN — ACETAMINOPHEN 650 MG: 325 TABLET, FILM COATED ORAL at 06:10

## 2025-05-29 RX ADMIN — ENOXAPARIN SODIUM 40 MG: 40 INJECTION SUBCUTANEOUS at 09:39

## 2025-05-29 RX ADMIN — INSULIN LISPRO 5 UNITS: 100 INJECTION, SOLUTION INTRAVENOUS; SUBCUTANEOUS at 09:40

## 2025-05-29 RX ADMIN — POLYETHYLENE GLYCOL 3350 17 G: 17 POWDER, FOR SOLUTION ORAL at 09:38

## 2025-05-29 RX ADMIN — DEXTRAN 70, GLYCERIN, HYPROMELLOSE 1 DROP: 1; 2; 3 SOLUTION/ DROPS OPHTHALMIC at 09:39

## 2025-05-29 RX ADMIN — INSULIN LISPRO 3 UNITS: 100 INJECTION, SOLUTION INTRAVENOUS; SUBCUTANEOUS at 12:09

## 2025-05-29 NOTE — PLAN OF CARE
Problem: PHYSICAL THERAPY ADULT  Goal: Performs mobility at highest level of function for planned discharge setting.  See evaluation for individualized goals.  Description: Treatment/Interventions: Functional transfer training, LE strengthening/ROM, Elevations, Therapeutic exercise, Endurance training, Patient/family training, Bed mobility, Equipment eval/education, Gait training, Spoke to nursing, OT          See flowsheet documentation for full assessment, interventions and recommendations.  Outcome: Progressing  Note: Prognosis: Good  Problem List: Decreased endurance, Impaired balance, Decreased cognition, Impaired judgement, Decreased safety awareness  Assessment: Pt agreeable to participate in PT session. Pt performed functional mobility as outlined above. Ambulates community distances. Questionable safety awareness as she leaves RW behind and walks short distances with no AD. Difficulty with finding colored objects and putting back in location they came from. She has cognitive impairments which make living alone unsafe and requires supervision for IADLS-awaiting placement. Pt left seated in chair with chair alarm donned, call bell, phone, and all personal needs within reach. Pt will remain on caseload to be seen as needed for dc to facility.  Barriers to Discharge: Decreased caregiver support, Inaccessible home environment     Rehab Resource Intensity Level, PT: II (Moderate Resource Intensity)    See flowsheet documentation for full assessment.

## 2025-05-29 NOTE — PROGRESS NOTES
"Progress Note - Hospitalist   Name: Ynes Mendoza 64 y.o. female I MRN: [id number]  [facility] Date of Admission: 5/12/2025   Date of Service: 5/29/2025 I Hospital Day: 16    Assessment & Plan  Failure to thrive in adult  Patient was recently admitted from 5/1-5/9 to neurology service for breakthrough seizures in the setting of medication noncompliance.   There were concerns brought up by family that admission regarding cognitive impairment/memory loss and ability to care for herself.    Scored 13/30 on MOCA done 5/7.  Per Neurology record review, was deemed at that time capable to live alone  Patient was initially discharged and stayed with her brother before he took her back to her home.  She did not recall details of this.  Additionally, VNA came out to her home and patient could not properly use her insulin or use glucometer (she has been on insulin for years) so came back to the hospital for safety concerns  Neurology and geriatrics evals appreciated   MRI brain 5/16: \"No acute intracranial pathology. Chronic microangiopathy. NeuroQuant analysis was performed: Normal hippocampal volume and enlarged ventricular system: Findings do not support hippocampal degeneration. Possible expansion of ventricular system without medial temporal lobe focused ex-vacuo process.\"  Vitamin B6 pending drawn on 5/13  RPR nonreactive, TSH 0.989, folate greater than 22.3, vitamin B12 is 476, vitamin B 1 is 123  Patient does not appear to have capacity to make fully informed medical decisions per neuropsych  Discharge planning to assisted living/LTC in the [place] area per family preference  Type 2 diabetes mellitus, with long-term current use of insulin ([facility])  Lab Results   Component Value Date    HGBA1C 7.3 (H) 03/05/2025       Recent Labs     05/28/25  1613 05/28/25  2049 05/29/25  0606 05/29/25  1051   POCGLU 141* 165* 124 269*       Blood Sugar Average: Last 72 hrs:  (P) [id number]  Hold glimepiride " and metformin while admitted  Diabetic diet  Continue Lantus 14 units QHS and SSI TID AC and QHS  Cognitive impairment  See above.  Sister reported progressive decline over 6-8 months   Geriatrics following  For not have capacity per Neuropsych  Outpatient follow up with neuro for baseline dementia testing and to start medication therapy if indicated at the time  Focal epilepsy (HCC)  Continue [facility]  Neurology evaluated   Vertigo  Reports that she had an episode of vertigo previously while eating her dinner.  She lied down and it resolved in about 90 minutes  Symptoms have resolved and reproduced to mild degree with pancho hallpike to the right  Resolved now, no further episodes since then    VTE Pharmacologic Prophylaxis: VTE Score: 6 High Risk (Score >/= 5) - Pharmacological DVT Prophylaxis Ordered: heparin. Sequential Compression Devices Ordered.    Mobility:   Basic Mobility Inpatient Raw Score: 19  JH-HLM Goal: 6: Walk 10 steps or more  JH-HLM Achieved: 6: Walk 10 steps or more  JH-HLM Goal NOT achieved. Continue with multidisciplinary rounding and encourage appropriate mobility to improve upon JH-HLM goals.    Patient Centered Rounds: I performed bedside rounds with nursing staff today.   Discussions with Specialists or Other Care Team Provider: Discussed with , RN    Current Length of Stay: 16 day(s)  Current Patient Status: Inpatient   Certification Statement: The patient will continue to require additional inpatient hospital stay due to pending placement  Discharge Plan: Pending placement    Code Status: Level 1 - Full Code    Subjective:   Patient was seen and examined at bedside. The patient has no new complaint at this time.      Objective:     Vitals:   Temp (24hrs), Av.1 °F (36.7 °C), Min:97.9 °F (36.6 °C), Max:98.3 °F (36.8 °C)    Temp:  [97.9 °F (36.6 °C)-98.3 °F (36.8 °C)] 97.9 °F (36.6 °C)  HR:  [75-93] 75  Resp:  [16-18] 18  BP: (106-131)/(67-81) 109/67  SpO2:  [97 %-100 %] 99  "%  There is no height or weight on file to calculate BMI.     Input and Output Summary (last 24 hours):       Intake/Output Summary (Last 24 hours) at 5/29/2025 1312  Last data filed at 5/29/2025 1131  Gross per 24 hour   Intake 240 ml   Output --   Net 240 ml       Physical Exam:     Physical Exam  General: no acute distress  HEENT: NC/AT, PERRL, EOM - normal  Neck: Supple  Pulm/Chest: Normal chest wall expansion, clear breath sounds on both side  CVS: normal S1&S2, capillary refill <2s  Abd: soft, non tender, non distended, bowel sounds +  MSK: move all 4 extremities spontaneously  Skin: warm  CNS: Pleasantly confused      Additional Data:     Labs:              Invalid input(s): \"LABALBU\"        * I Have Reviewed All Lab Data Listed Above.  * Additional Pertinent Lab Tests Reviewed: All Labs For Current Hospital Admission Reviewed    Imaging:      I have reviewed pertinent imaging.      Recent Cultures (last 7 days):           Last 24 Hours Medication List:   Current Facility-Administered Medications   Medication Dose Route Frequency Provider Last Rate    acetaminophen  650 mg Oral Q6H PRN Oliver Leiva MD      Artificial Tears  1 drop Both Eyes TID Luis Antonio Christine MD      aspirin  81 mg Oral Daily Oliver Leiva MD      enoxaparin  40 mg Subcutaneous Daily Oliver Leiva MD      hydrOXYzine HCL  25 mg Oral Q6H PRN Luis Antonio Christine MD      insulin glargine  15 Units Subcutaneous HS Luis Antonio Christine MD      insulin lispro  1-6 Units Subcutaneous TID AC NAYA Arita      insulin lispro  1-6 Units Subcutaneous HS NAYA Arita      insulin lispro  5 Units Subcutaneous TID With Meals Luis Antonio Christine MD      levETIRAcetam  1,000 mg Oral Q12H DYAN Oliver Leiva MD      polyethylene glycol  17 g Oral Daily Odette Amezquita MD      pravastatin  40 mg Oral Daily With Dinner Oliver Leiva MD      psyllium  1 packet Oral Daily Luis Antonio Christine MD      senna-docusate sodium  1 " tablet Oral HS Odette Amezquita MD          Today, Patient Was Seen By: Odette Amezquita MD    ** Please Note: Dragon 360 Dictation voice to text software may have been used in the creation of this document. **

## 2025-05-29 NOTE — PLAN OF CARE
Problem: PAIN - ADULT  Goal: Verbalizes/displays adequate comfort level or baseline comfort level  Description: Interventions:- Encourage patient to monitor pain and request assistance- Assess pain using appropriate pain scale- Administer analgesics based on type and severity of pain and evaluate response- Implement non-pharmacological measures as appropriate and evaluate response- Notify physician/advanced practitioner if interventions unsuccessful or patient reports new pain  Outcome: Progressing     Problem: INFECTION - ADULT  Goal: Absence or prevention of progression during hospitalization  Description: INTERVENTIONS:- Assess and monitor for signs and symptoms of infection- Monitor lab/diagnostic results- Monitor all insertion sites, i.e. indwelling lines, tubes, and drains- [place] appropriate cooling/warming therapies per order- Administer medications as ordered- Instruct and encourage patient and family to use good hand hygiene technique- Identify and instruct in appropriate isolation precautions for identified infection/condition  Outcome: Progressing     Problem: SAFETY ADULT  Goal: Patient will remain free of falls  Description: INTERVENTIONS:- Educate patient/family on patient safety including physical limitations- Instruct patient to call for assistance with activity - Consult OT/PT to assist with strengthening/mobility - Keep Call bell within reach- Keep bed low and locked with side rails adjusted as appropriate- Keep care items and personal belongings within reach- Initiate and maintain comfort rounds- Make Fall Risk Sign visible to staff  Outcome: Progressing  Goal: Maintain or return to baseline ADL function  Description: INTERVENTIONS:-  Assess patient's ability to carry out ADLs; assess patient's baseline for ADL function and identify physical deficits which impact ability to perform ADLs (bathing, care of mouth/teeth, toileting, grooming, dressing, etc.)- Assess/evaluate cause of self-care  deficits - Assess range of motion- Assess patient's mobility; develop plan if impaired- Assess patient's need for assistive devices and provide as appropriate- Encourage maximum independence but intervene and supervise when necessary- Involve family in performance of ADLs- Assess for home care needs following discharge - Consider OT consult to assist with ADL evaluation and planning for discharge- Provide patient education as appropriate  Outcome: Progressing  Goal: Maintains/Returns to pre admission functional level  Description: INTERVENTIONS:- Perform AM-PAC 6 Click Basic Mobility/ Daily Activity assessment daily.- Set and communicate daily mobility goal to care team and patient/family/caregiver. - Collaborate with rehabilitation services on mobility goals if consulted  Outcome: Progressing     Problem: DISCHARGE PLANNING  Goal: Discharge to home or other facility with appropriate resources  Description: INTERVENTIONS:- Identify barriers to discharge w/patient and caregiver- Arrange for needed discharge resources and transportation as appropriate- Identify discharge learning needs (meds, wound care, etc.)- Refer to Case Management Department for coordinating discharge planning if the patient needs post-hospital services based on physician/advanced practitioner order or complex needs related to functional status, cognitive ability, or social support system  Outcome: Progressing     Problem: Knowledge Deficit  Goal: Patient/family/caregiver demonstrates understanding of disease process, treatment plan, medications, and discharge instructions  Description: Complete learning assessment and assess knowledge base.Interventions:- Provide teaching at level of understanding- Provide teaching via preferred learning methods  Outcome: Progressing

## 2025-05-29 NOTE — PLAN OF CARE
Problem: SAFETY ADULT  Goal: Patient will remain free of falls  Description: INTERVENTIONS:- Educate patient/family on patient safety including physical limitations- Instruct patient to call for assistance with activity - Consult OT/PT to assist with strengthening/mobility - Keep Call bell within reach- Keep bed low and locked with side rails adjusted as appropriate- Keep care items and personal belongings within reach- Initiate and maintain comfort rounds- Make Fall Risk Sign visible to staff  Outcome: Progressing     Problem: DISCHARGE PLANNING  Goal: Discharge to home or other facility with appropriate resources  Description: INTERVENTIONS:- Identify barriers to discharge w/patient and caregiver- Arrange for needed discharge resources and transportation as appropriate- Identify discharge learning needs (meds, wound care, etc.)- Refer to Case Management Department for coordinating discharge planning if the patient needs post-hospital services based on physician/advanced practitioner order or complex needs related to functional status, cognitive ability, or social support system  Outcome: Progressing

## 2025-05-29 NOTE — PHYSICAL THERAPY NOTE
PHYSICAL THERAPY NOTE          Patient Name: Ynes Mendoza  Today's Date: 5/29/2025 05/29/25 1138   PT Last Visit   PT Visit Date 05/29/25   Note Type   Note Type Treatment   Pain Assessment   Pain Assessment Tool 0-10   Pain Score No Pain   Restrictions/Precautions   Weight Bearing Precautions Per Order No   Other Precautions Cognitive;Chair Alarm;Bed Alarm;Fall Risk   General   Chart Reviewed Yes   Cognition   Overall Cognitive Status Impaired   Arousal/Participation Cooperative   Attention Attends with cues to redirect   Orientation Level Oriented to person;Oriented to place;Disoriented to situation;Oriented to time   Memory Decreased short term memory;Decreased recall of recent events;Decreased long term memory   Following Commands Follows one step commands without difficulty   Subjective   Subjective agreeable   Bed Mobility   Supine to Sit Unable to assess   Sit to Supine Unable to assess   Transfers   Sit to Stand 6  Modified independent   Stand to Sit 6  Modified independent   Stand pivot 6  Modified independent   Additional Comments c rolllator   Ambulation/Elevation   Gait pattern Excessively slow   Gait Assistance 6  Modified independent   Additional items Verbal cues   Assistive Device Other (Comment)  (rollator)   Distance 500'+200'+100'  (seated vs standing rest breaks)   Ambulation/Elevation Additional Comments S short distances with no AD As pt leaves behind rollator for short distances. during ambulation, had pt search for specific colored objects and them put them on her rollator x4 followed by returning said objects back to their location   Balance   Static Sitting Normal   Dynamic Sitting Good   Static Standing Fair +   Dynamic Standing Fair   Ambulatory Fair   Endurance Deficit   Endurance Deficit No   Activity Tolerance   Activity Tolerance Patient tolerated treatment well   Assessment   Prognosis Good    Problem List Decreased endurance;Impaired balance;Decreased cognition;Impaired judgement;Decreased safety awareness   Assessment Pt agreeable to participate in PT session. Pt performed functional mobility as outlined above. Ambulates community distances. Questionable safety awareness as she leaves RW behind and walks short distances with no AD. Difficulty with finding colored objects and putting back in location they came from. She has cognitive impairments which make living alone unsafe and requires supervision for IADLS-awaiting placement. Pt left seated in chair with chair alarm donned, call bell, phone, and all personal needs within reach. Pt will remain on caseload to be seen as needed for dc to facility.   Barriers to Discharge Decreased caregiver support;Inaccessible home environment   Goals   Patient Goals to get into a facility to provide her with the care she needs   STG Expiration Date 06/19/25  (goals extended, partially met, remains on caseload for discharge to facility)   PT Treatment Day 3   Plan   Treatment/Interventions Functional transfer training;LE strengthening/ROM;Therapeutic exercise;Elevations;Endurance training;Cognitive reorientation;Patient/family training;Equipment eval/education;Bed mobility;Gait training;Spoke to nursing;Spoke to case management;OT   Progress Progressing toward goals   PT Frequency 1-2x/wk   Discharge Recommendation   Rehab Resource Intensity Level, PT II (Moderate Resource Intensity)   Equipment Recommended Walker   Walker Package Recommended Rollator   AM-PAC Basic Mobility Inpatient   Turning in Flat Bed Without Bedrails 4   Lying on Back to Sitting on Edge of Flat Bed Without Bedrails 4   Moving Bed to Chair 4   Standing Up From Chair Using Arms 4   Walk in Room 4   Climb 3-5 Stairs With Railing 3   Basic Mobility Inpatient Raw Score 23   Basic Mobility Standardized Score 50.88   [facility] Highest Level Of Mobility   [facility] Goal 7: Walk 25 feet or more   [facility]  Achieved 8: Walk 250 feet ot more     Chai Edwards, PT, DPT, NCS

## 2025-05-29 NOTE — ASSESSMENT & PLAN NOTE
"Patient was recently admitted from 5/1-5/9 to neurology service for breakthrough seizures in the setting of medication noncompliance.   There were concerns brought up by family that admission regarding cognitive impairment/memory loss and ability to care for herself.    Scored 13/30 on MOCA done 5/7.  Per Neurology record review, was deemed at that time capable to live alone  Patient was initially discharged and stayed with her brother before he took her back to her home.  She did not recall details of this.  Additionally, VNA came out to her home and patient could not properly use her insulin or use glucometer (she has been on insulin for years) so came back to the hospital for safety concerns  Neurology and geriatrics evals appreciated   MRI brain 5/16: \"No acute intracranial pathology. Chronic microangiopathy. NeuroQuant analysis was performed: Normal hippocampal volume and enlarged ventricular system: Findings do not support hippocampal degeneration. Possible expansion of ventricular system without medial temporal lobe focused ex-vacuo process.\"  Vitamin B6 pending drawn on 5/13  RPR nonreactive, TSH 0.989, folate greater than 22.3, vitamin B12 is 476, vitamin B 1 is 123  Patient does not appear to have capacity to make fully informed medical decisions per neuropsych  Discharge planning to assisted living/LTC in the [place] area per family preference  "

## 2025-05-29 NOTE — ASSESSMENT & PLAN NOTE
Lab Results   Component Value Date    HGBA1C 7.3 (H) 03/05/2025       Recent Labs     05/28/25  1613 05/28/25  2049 05/29/25  0606 05/29/25  1051   POCGLU 141* 165* 124 269*       Blood Sugar Average: Last 72 hrs:  (P) [id number]  Hold glimepiride and metformin while admitted  Diabetic diet  Continue Lantus 14 units QHS and SSI TID AC and QHS
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
## 2025-05-30 ENCOUNTER — APPOINTMENT (INPATIENT)
Dept: RADIOLOGY | Facility: HOSPITAL | Age: 65
DRG: 421 | End: 2025-05-30
Payer: MEDICARE

## 2025-05-30 PROBLEM — R41.82 ALTERED MENTAL STATUS: Status: ACTIVE | Noted: 2025-05-30

## 2025-05-30 LAB
ALBUMIN SERPL BCG-MCNC: 4.1 G/DL (ref 3.5–5)
ALP SERPL-CCNC: 47 U/L (ref 34–104)
ALT SERPL W P-5'-P-CCNC: 14 U/L (ref 7–52)
ANION GAP SERPL CALCULATED.3IONS-SCNC: 8 MMOL/L (ref 4–13)
AST SERPL W P-5'-P-CCNC: 12 U/L (ref 13–39)
BASOPHILS # BLD AUTO: 0.08 THOUSANDS/ÂΜL (ref 0–0.1)
BASOPHILS NFR BLD AUTO: 1 % (ref 0–1)
BILIRUB SERPL-MCNC: 0.53 MG/DL (ref 0.2–1)
BUN SERPL-MCNC: 16 MG/DL (ref 5–25)
CALCIUM SERPL-MCNC: 9.6 MG/DL (ref 8.4–10.2)
CHLORIDE SERPL-SCNC: 109 MMOL/L (ref 96–108)
CO2 SERPL-SCNC: 25 MMOL/L (ref 21–32)
CREAT SERPL-MCNC: 0.9 MG/DL (ref 0.6–1.3)
EOSINOPHIL # BLD AUTO: 0.07 THOUSAND/ÂΜL (ref 0–0.61)
EOSINOPHIL NFR BLD AUTO: 1 % (ref 0–6)
ERYTHROCYTE [DISTWIDTH] IN BLOOD BY AUTOMATED COUNT: 12.4 % (ref 11.6–15.1)
GFR SERPL CREATININE-BSD FRML MDRD: 68 ML/MIN/1.73SQ M
GLUCOSE SERPL-MCNC: 105 MG/DL (ref 65–140)
GLUCOSE SERPL-MCNC: 115 MG/DL (ref 65–140)
GLUCOSE SERPL-MCNC: 82 MG/DL (ref 65–140)
GLUCOSE SERPL-MCNC: 95 MG/DL (ref 65–140)
GLUCOSE SERPL-MCNC: 96 MG/DL (ref 65–140)
HCT VFR BLD AUTO: 37.2 % (ref 34.8–46.1)
HGB BLD-MCNC: 12.6 G/DL (ref 11.5–15.4)
IMM GRANULOCYTES # BLD AUTO: 0.05 THOUSAND/UL (ref 0–0.2)
IMM GRANULOCYTES NFR BLD AUTO: 1 % (ref 0–2)
LYMPHOCYTES # BLD AUTO: 1.25 THOUSANDS/ÂΜL (ref 0.6–4.47)
LYMPHOCYTES NFR BLD AUTO: 18 % (ref 14–44)
MAGNESIUM SERPL-MCNC: 1.9 MG/DL (ref 1.9–2.7)
MCH RBC QN AUTO: 29.2 PG (ref 26.8–34.3)
MCHC RBC AUTO-ENTMCNC: 33.9 G/DL (ref 31.4–37.4)
MCV RBC AUTO: 86 FL (ref 82–98)
MONOCYTES # BLD AUTO: 0.46 THOUSAND/ÂΜL (ref 0.17–1.22)
MONOCYTES NFR BLD AUTO: 7 % (ref 4–12)
NEUTROPHILS # BLD AUTO: 5.21 THOUSANDS/ÂΜL (ref 1.85–7.62)
NEUTS SEG NFR BLD AUTO: 72 % (ref 43–75)
NRBC BLD AUTO-RTO: 0 /100 WBCS
PLATELET # BLD AUTO: 174 THOUSANDS/UL (ref 149–390)
PMV BLD AUTO: 11.6 FL (ref 8.9–12.7)
POTASSIUM SERPL-SCNC: 3.8 MMOL/L (ref 3.5–5.3)
PROT SERPL-MCNC: 7.1 G/DL (ref 6.4–8.4)
RBC # BLD AUTO: 4.32 MILLION/UL (ref 3.81–5.12)
SODIUM SERPL-SCNC: 142 MMOL/L (ref 135–147)
WBC # BLD AUTO: 7.12 THOUSAND/UL (ref 4.31–10.16)

## 2025-05-30 PROCEDURE — 82948 REAGENT STRIP/BLOOD GLUCOSE: CPT

## 2025-05-30 PROCEDURE — 99232 SBSQ HOSP IP/OBS MODERATE 35: CPT | Performed by: INTERNAL MEDICINE

## 2025-05-30 PROCEDURE — 80053 COMPREHEN METABOLIC PANEL: CPT | Performed by: INTERNAL MEDICINE

## 2025-05-30 PROCEDURE — 83735 ASSAY OF MAGNESIUM: CPT | Performed by: INTERNAL MEDICINE

## 2025-05-30 PROCEDURE — 85025 COMPLETE CBC W/AUTO DIFF WBC: CPT | Performed by: INTERNAL MEDICINE

## 2025-05-30 PROCEDURE — 70450 CT HEAD/BRAIN W/O DYE: CPT

## 2025-05-30 PROCEDURE — 97168 OT RE-EVAL EST PLAN CARE: CPT

## 2025-05-30 RX ADMIN — LEVETIRACETAM 1000 MG: 500 TABLET, FILM COATED ORAL at 09:44

## 2025-05-30 RX ADMIN — INSULIN GLARGINE 15 UNITS: 100 INJECTION, SOLUTION SUBCUTANEOUS at 21:12

## 2025-05-30 RX ADMIN — SENNOSIDES AND DOCUSATE SODIUM 1 TABLET: 50; 8.6 TABLET ORAL at 21:13

## 2025-05-30 RX ADMIN — Medication 1 PACKET: at 09:44

## 2025-05-30 RX ADMIN — DEXTRAN 70, GLYCERIN, HYPROMELLOSE 1 DROP: 1; 2; 3 SOLUTION/ DROPS OPHTHALMIC at 09:44

## 2025-05-30 RX ADMIN — PRAVASTATIN SODIUM 40 MG: 40 TABLET ORAL at 16:29

## 2025-05-30 RX ADMIN — DEXTRAN 70, GLYCERIN, HYPROMELLOSE 1 DROP: 1; 2; 3 SOLUTION/ DROPS OPHTHALMIC at 16:29

## 2025-05-30 RX ADMIN — POLYETHYLENE GLYCOL 3350 17 G: 17 POWDER, FOR SOLUTION ORAL at 09:44

## 2025-05-30 RX ADMIN — LEVETIRACETAM 1000 MG: 500 TABLET, FILM COATED ORAL at 20:56

## 2025-05-30 RX ADMIN — INSULIN LISPRO 5 UNITS: 100 INJECTION, SOLUTION INTRAVENOUS; SUBCUTANEOUS at 16:30

## 2025-05-30 RX ADMIN — INSULIN LISPRO 5 UNITS: 100 INJECTION, SOLUTION INTRAVENOUS; SUBCUTANEOUS at 13:28

## 2025-05-30 RX ADMIN — DEXTRAN 70, GLYCERIN, HYPROMELLOSE 1 DROP: 1; 2; 3 SOLUTION/ DROPS OPHTHALMIC at 20:56

## 2025-05-30 RX ADMIN — ENOXAPARIN SODIUM 40 MG: 40 INJECTION SUBCUTANEOUS at 09:44

## 2025-05-30 RX ADMIN — INSULIN LISPRO 5 UNITS: 100 INJECTION, SOLUTION INTRAVENOUS; SUBCUTANEOUS at 09:44

## 2025-05-30 RX ADMIN — ASPIRIN 81 MG CHEWABLE TABLET 81 MG: 81 TABLET CHEWABLE at 09:44

## 2025-05-30 NOTE — OCCUPATIONAL THERAPY NOTE
"    Occupational Therapy RE-Evaluation     Patient Name: Ynes Mendoza  Today's Date: 5/30/2025  Problem List  Principal Problem:    Failure to thrive in adult  Active Problems:    Type 2 diabetes mellitus, with long-term current use of insulin (HCC)    Generalized anxiety disorder    Focal epilepsy (HCC)    Vertigo    Cognitive impairment    Frailty    Ambulatory dysfunction    At risk for delirium    Past Medical History  Past Medical History[1]  Past Surgical History  Past Surgical History[2]       OCCUPATIONAL THERAPY       05/30/25 1025   OT Last Visit   OT Visit Date 05/30/25   Note Type   Note type Re-Evaluation   Pain Assessment   Pain Assessment Tool 0-10   Pain Score No Pain   Restrictions/Precautions   Weight Bearing Precautions Per Order No   Other Precautions Cognitive;Chair Alarm;Fall Risk   Home Living   Type of Home House   Home Layout Two level;Able to live on main level with bedroom/bathroom   Bathroom Shower/Tub Tub/shower unit   Home Equipment Walker  (Rolator)   Prior Function   Level of [place] Independent with functional mobility   Lives With Alone   Receives Help From Family   IADLs Independent with meal prep;Independent with medication management  (does not drive - walks to grocery store)   Falls in the last 6 months 0   Vocational Unemployed   Lifestyle   Autonomy I adls and mobility - furniture cruises in home and use rollator outside - denies falls - i iadls (communal living area) - reports she prepares own meals by typically makes \"easy to make\" items like hot dogs - reports she uses CGM but cannot find the reader and her phone is not compatible - reports she does take prefilled insulin pens nightly   Reciprocal Relationships limited local support - states her family is more involved in her care recently and are helping her look for alternate living arrangements (senior communities) +brother, uncle   Service to Others not working   Intrinsic Gratification painting   ADL   Eating " Assistance 7  Independent   Grooming Assistance 5  Supervision/Setup   UB Bathing Assistance 5  Supervision/Setup   LB Bathing Assistance 5  Supervision/Setup   UB Dressing Assistance 5  Supervision/Setup   LB Dressing Assistance 5  Supervision/Setup   Toileting Assistance  5  Supervision/Setup   Transfers   Sit to Stand 6  Modified independent   Stand to Sit 6  Modified independent   Additional Comments uses Rollator   Functional Mobility   Functional Mobility 5  Supervision   Additional Comments to/from pt bathroom   Balance   Static Sitting Normal   Dynamic Sitting Good   Static Standing Fair +   Dynamic Standing Fair   Ambulatory Fair   Activity Tolerance   Activity Tolerance Other (Comment)   RUE Assessment   RUE Assessment WFL   LUE Assessment   LUE Assessment WFL   Cognition   Overall Cognitive Status Impaired   Arousal/Participation Cooperative   Attention Difficulty attending to directions   Orientation Level Oriented to person;Disoriented to situation;Disoriented to time;Disoriented to place   Memory Decreased short term memory;Decreased recall of recent events;Decreased long term memory;Decreased recall of precautions   Following Commands Follows one step commands inconsistently   Comments attempted formal cognitive screen [place] Cognitive Screen pt unable to follow directions this session   Assessment   Limitation Decreased cognition;Decreased ADL status   Prognosis Fair   Assessment Pt seen for re-evaluation due to pt Occupational Therapy Goals  May 27, 2025. Pt is a  64 y.o. female who was admitted to [facility] on 2025 with Failure to thrive in adult . PTA pt was independent w self care, functional mob and IADLs and at baseline pt lived alone in an apartment. However pt family concerned of pt's ability to care for self . Upon pt initial evaluation pt requires A for overall ADLS and functional mobility/transfers. Now upon pt Re-evaluation: Pt now is requiring SBA/Set Up assist  for all self care, toileting hygiene and functional mobility/transfers 2* the following deficits impacting occupational performance: pt poor safety awareness and cognitive impairments. Pt currently presents with impairments in the following categories -limited insight into deficits memory, insight, safety , judgement , attention , sequencing , and communication. These impairments, as well as pt's risk for falls  limit pt's ability to safely engage in all baseline areas of occupation, includinggrooming, bathing, dressing, toileting, and functional mobility/transfers From OT standpoint, pt is continued to be recommended for Level II (Moderate Intensity Resources) and will continue to benefit from continued skilled OT tx while in the hospital to address deficits as defined above and maximize level of functional independence w ADL's and functional mobility. Occupational Performance areas to continue to address include: grooming, bathing/shower, toilet hygiene, dressing, functional mobility, and clothing management and pt cognition.   Goals   Patient Goals pt unable to state this session   LTG Time Frame 10-14   Long Term Goal #1 SEE OCCUPATIONAL THERAPY UPDATED THERAPY GOALS BELOW   Plan   Treatment Interventions ADL retraining;Cognitive reorientation   Goal Expiration Date 06/13/25   OT Frequency 1-2x/wk   Discharge Recommendation   Rehab Resource Intensity Level, OT II (Moderate Resource Intensity)   AM-PAC Daily Activity Inpatient   Lower Body Dressing 3   Bathing 3   Toileting 3   Upper Body Dressing 3   Grooming 3   Eating 4   Daily Activity Raw Score 19   Daily Activity Standardized Score (Calc for Raw Score >=11) 40.22   AM-PAC Applied Cognition Inpatient   Following a Speech/Presentation 1   Understanding Ordinary Conversation 2   Taking Medications 2   Remembering Where Things Are Placed or Put Away 2   Remembering List of 4-5 Errands 1   Taking Care of Complicated Tasks 1   Applied Cognition Raw Score 9    Applied Cognition Standardized Score 22.48   Modified [place] Scale   Modified [place] Scale 4   End of Consult   Patient Position at End of Consult Bedside chair;Bed/Chair alarm activated;All needs within reach     PROCESSING/DIRECTION FOLLOWING  Pt will follow 1-2 step written directions for improved overall comprehension and engagement in life roles and pt engagement in self-care tasks.    Pt will follow 1-2 step verbal directions with good carryover for improved role performance and engagement in self-care tasks    ORIENTATION:   Pt will be alert and oriented to Time, Place and Situation with 100% accuracy using external aides and min VC     Pt will demonstrate G carryover and understanding of temporal orientation compensatory strategies and orientation of daily schedules (ie. Use of calendars, alarms, etc) for increase safety with life roles and IADL function.    Pt will demo% accuracy with clock construction and time identification for improved orientation to daily schedules and for IADL function.    Pt will demonstrate Good recall of 100% of Written information utilizing memory strategy of choice for improved STM/delayed memory     Patient will demonstrate Good understanding and use of memory book to increase pt's overall engagement in life roles and to decrease pt frustrations with STM/delayed memory loss.    Zahra Jimenes OTR/L                  [1]   Past Medical History:  Diagnosis Date    Abnormal EEG 11/05/2023    Anxiety     Aphasia 11/05/2023    Condyloma acuminatum     Depression 1968    Therapy    Diabetes mellitus (HCC) 2002    type 2    Genital warts     Herpes simplex     HPV (human papilloma virus) infection     Obesity 1990    Visual impairment 1984   [2]   Past Surgical History:  Procedure Laterality Date    CARDIAC ELECTROPHYSIOLOGY PROCEDURE N/A 1/24/2025    Procedure: Cardiac pacer implant DUAL CHAMBER;  Surgeon: Patric Sanchez MD;  [facility]: [facility];  Service: Cardiology

## 2025-05-30 NOTE — PLAN OF CARE
Problem: PAIN - ADULT  Goal: Verbalizes/displays adequate comfort level or baseline comfort level  Description: Interventions:- Encourage patient to monitor pain and request assistance- Assess pain using appropriate pain scale- Administer analgesics based on type and severity of pain and evaluate response- Implement non-pharmacological measures as appropriate and evaluate response- Notify physician/advanced practitioner if interventions unsuccessful or patient reports new pain  Outcome: Progressing     Problem: SAFETY ADULT  Goal: Patient will remain free of falls  Description: INTERVENTIONS:- Educate patient/family on patient safety including physical limitations- Instruct patient to call for assistance with activity - Consult OT/PT to assist with strengthening/mobility - Keep Call bell within reach- Keep bed low and locked with side rails adjusted as appropriate- Keep care items and personal belongings within reach- Initiate and maintain comfort rounds- Make Fall Risk Sign visible to staff  Outcome: Progressing     Problem: INFECTION - ADULT  Goal: Absence or prevention of progression during hospitalization  Description: INTERVENTIONS:- Assess and monitor for signs and symptoms of infection- Monitor lab/diagnostic results- Monitor all insertion sites, i.e. indwelling lines, tubes, and drains- [place] appropriate cooling/warming therapies per order- Administer medications as ordered- Instruct and encourage patient and family to use good hand hygiene technique- Identify and instruct in appropriate isolation precautions for identified infection/condition  Outcome: Progressing     Problem: Knowledge Deficit  Goal: Patient/family/caregiver demonstrates understanding of disease process, treatment plan, medications, and discharge instructions  Description: Complete learning assessment and assess knowledge base.Interventions:- Provide teaching at level of understanding- Provide teaching via preferred learning  methods  Outcome: Progressing
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
## 2025-05-30 NOTE — PLAN OF CARE
Problem: OCCUPATIONAL THERAPY ADULT  Goal: Performs self-care activities at highest level of function for planned discharge setting.  See evaluation for individualized goals.  Description: Treatment Interventions: ADL retraining, Functional transfer training, Endurance training, Cognitive reorientation, Patient/family training, Equipment evaluation/education, Compensatory technique education, Activityengagement          See flowsheet documentation for full assessment, interventions and recommendations.   Outcome: Progressing  Note: Limitation: Decreased cognition, Decreased ADL status  Prognosis: Fair  Assessment: Pt seen for re-evaluation due to pt Occupational Therapy Goals  May 27, 2025. Pt is a  64 y.o. female who was admitted to [facility] on 2025 with Failure to thrive in adult . PTA pt was independent w self care, functional mob and IADLs and at baseline pt lived alone in an apartment. However pt family concerned of pt's ability to care for self . Upon pt initial evaluation pt requires A for overall ADLS and functional mobility/transfers. Now upon pt Re-evaluation: Pt now is requiring SBA/Set Up assist for all self care, toileting hygiene and functional mobility/transfers 2* the following deficits impacting occupational performance: pt poor safety awareness and cognitive impairments. Pt currently presents with impairments in the following categories -limited insight into deficits memory, insight, safety , judgement , attention , sequencing , and communication. These impairments, as well as pt's risk for falls  limit pt's ability to safely engage in all baseline areas of occupation, includinggrooming, bathing, dressing, toileting, and functional mobility/transfers From OT standpoint, pt is continued to be recommended for Level II (Moderate Intensity Resources) and will continue to benefit from continued skilled OT tx while in the hospital to address deficits as defined above and  maximize level of functional independence w ADL's and functional mobility. Occupational Performance areas to continue to address include: grooming, bathing/shower, toilet hygiene, dressing, functional mobility, and clothing management and pt cognition.     Rehab Resource Intensity Level, OT: II (Moderate Resource Intensity)

## 2025-05-31 LAB
GLUCOSE SERPL-MCNC: 172 MG/DL (ref 65–140)
GLUCOSE SERPL-MCNC: 72 MG/DL (ref 65–140)

## 2025-05-31 PROCEDURE — 99232 SBSQ HOSP IP/OBS MODERATE 35: CPT | Performed by: FAMILY MEDICINE

## 2025-05-31 PROCEDURE — 82948 REAGENT STRIP/BLOOD GLUCOSE: CPT

## 2025-05-31 PROCEDURE — 99232 SBSQ HOSP IP/OBS MODERATE 35: CPT | Performed by: STUDENT IN AN ORGANIZED HEALTH CARE EDUCATION/TRAINING PROGRAM

## 2025-05-31 PROCEDURE — NC001 PR NO CHARGE: Performed by: STUDENT IN AN ORGANIZED HEALTH CARE EDUCATION/TRAINING PROGRAM

## 2025-05-31 RX ORDER — WATER 10 ML/10ML
INJECTION INTRAMUSCULAR; INTRAVENOUS; SUBCUTANEOUS
Status: COMPLETED
Start: 2025-05-31 | End: 2025-05-31

## 2025-05-31 RX ORDER — OLANZAPINE 10 MG/2ML
5 INJECTION, POWDER, FOR SOLUTION INTRAMUSCULAR ONCE
Status: DISCONTINUED | OUTPATIENT
Start: 2025-05-31 | End: 2025-06-04 | Stop reason: HOSPADM

## 2025-05-31 RX ORDER — INSULIN GLARGINE 100 [IU]/ML
10 INJECTION, SOLUTION SUBCUTANEOUS
Status: DISCONTINUED | OUTPATIENT
Start: 2025-05-31 | End: 2025-06-04 | Stop reason: HOSPADM

## 2025-05-31 RX ORDER — QUETIAPINE FUMARATE 25 MG/1
25 TABLET, FILM COATED ORAL
Status: DISCONTINUED | OUTPATIENT
Start: 2025-05-31 | End: 2025-06-04 | Stop reason: HOSPADM

## 2025-05-31 RX ORDER — OLANZAPINE 10 MG/2ML
5 INJECTION, POWDER, FOR SOLUTION INTRAMUSCULAR EVERY 6 HOURS PRN
Status: DISCONTINUED | OUTPATIENT
Start: 2025-05-31 | End: 2025-06-04 | Stop reason: HOSPADM

## 2025-05-31 RX ADMIN — INSULIN GLARGINE 10 UNITS: 100 INJECTION, SOLUTION SUBCUTANEOUS at 21:32

## 2025-05-31 RX ADMIN — DEXTRAN 70, GLYCERIN, HYPROMELLOSE 1 DROP: 1; 2; 3 SOLUTION/ DROPS OPHTHALMIC at 08:38

## 2025-05-31 RX ADMIN — QUETIAPINE FUMARATE 25 MG: 25 TABLET ORAL at 21:12

## 2025-05-31 RX ADMIN — ASPIRIN 81 MG CHEWABLE TABLET 81 MG: 81 TABLET CHEWABLE at 08:37

## 2025-05-31 RX ADMIN — LEVETIRACETAM 1000 MG: 500 TABLET, FILM COATED ORAL at 08:37

## 2025-05-31 RX ADMIN — DEXTRAN 70, GLYCERIN, HYPROMELLOSE 1 DROP: 1; 2; 3 SOLUTION/ DROPS OPHTHALMIC at 17:09

## 2025-05-31 RX ADMIN — LEVETIRACETAM 1250 MG: 750 TABLET, FILM COATED ORAL at 21:12

## 2025-05-31 RX ADMIN — OLANZAPINE 5 MG: 10 INJECTION, POWDER, LYOPHILIZED, FOR SOLUTION INTRAMUSCULAR at 13:35

## 2025-05-31 RX ADMIN — PRAVASTATIN SODIUM 40 MG: 40 TABLET ORAL at 17:09

## 2025-05-31 RX ADMIN — WATER 10 ML: 1 INJECTION INTRAMUSCULAR; INTRAVENOUS; SUBCUTANEOUS at 21:24

## 2025-05-31 RX ADMIN — POLYETHYLENE GLYCOL 3350 17 G: 17 POWDER, FOR SOLUTION ORAL at 08:41

## 2025-05-31 RX ADMIN — Medication 1 PACKET: at 08:41

## 2025-05-31 RX ADMIN — WATER: 1 INJECTION INTRAMUSCULAR; INTRAVENOUS; SUBCUTANEOUS at 13:36

## 2025-05-31 RX ADMIN — ENOXAPARIN SODIUM 40 MG: 40 INJECTION SUBCUTANEOUS at 08:37

## 2025-05-31 RX ADMIN — SENNOSIDES AND DOCUSATE SODIUM 1 TABLET: 50; 8.6 TABLET ORAL at 21:13
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
## 2025-05-31 NOTE — ASSESSMENT & PLAN NOTE
Lab Results   Component Value Date    HGBA1C 7.3 (H) 03/05/2025       Recent Labs     05/30/25  1041 05/30/25  1614 05/30/25  2122 05/31/25  0613   POCGLU 96 95 82 72       Blood Sugar Average: Last 72 hrs:  (P) 121  Hold glimepiride and metformin while admitted  Diabetic diet  Blood sugar has been <100 throughout yesterday. Will reduce Lantus from 15 to 10 units and discontinue meal coverage with Humalog.   Continue sliding scale   Monitor blood sugars and adjust insulin accordingly

## 2025-05-31 NOTE — ASSESSMENT & PLAN NOTE
Lab Results   Component Value Date    HGBA1C 7.3 (H) 03/05/2025       Recent Labs     05/30/25  0658 05/30/25  1041 05/30/25  1614 05/30/25 2122   POCGLU 115 96 95 82       Blood Sugar Average: Last 72 hrs:  (P) 133.5  Hold glimepiride and metformin while admitted  Diabetic diet  Continue Lantus 15 units hs, Humalog 5 units TID, sliding scale insulin  Monitor blood sugars and adjust insulin accordingly

## 2025-05-31 NOTE — ASSESSMENT & PLAN NOTE
65 yo female with DM2, focal aware seizures, trifascicular block s/p PPM January 2025 who presents with inability to care for herself.    Workup  - CTA H/N wwo contrast 5/14/25:   CT Brain: No CT evidence of an acute cortical infarct. Artifact versus trace products layering in the right ventricular atrium.   CT Angiography: Severe stenosis at the P2/P3 junction of the right PCA, new from prior. No large vessel occlusion.  - MRI brain NeuroQuant wwo contrast 5/16/25:   No acute intracranial pathology. Chronic microangiopathy. NeuroQuant analysis was performed: Normal hippocampal volume and enlarged ventricular system: Findings do not support hippocampal degeneration.    Strong suspicion for underlying dementia process which will ultimately require work-up in the outpatient setting.  Imaging and lab work obtained to rule out any reversible causes.     Plan:   - continue home ASA 81 mg daily   - continue home statin  - Increased keppra to 1.25 g Q12 for confusion worsened suspicious for another sz event  - Routine eeg ordered for above concern.  - Will consider switch ***  - Supportive care as per primary team  - Patient will need formal neuro-cognitive assessment as an outpatient  - Monitor exam and notify with changes

## 2025-05-31 NOTE — QUICK NOTE
Patient's abdominal [place] was briefly removed when she walked out of the room, attempting to leave again.  With a lot of verbal redirection, I was able to bring patient back to the room and placed on the bed.    Continue as needed IM Zyprexa 5 mg    I will start Seroquel 25 mg from tonight and monitor for improvement.

## 2025-05-31 NOTE — PROGRESS NOTES
"Progress Note - Hospitalist   Name: Ynes Mendoza 64 y.o. female I MRN: [id number]  [facility] I Date of Admission: 5/12/2025   Date of Service: 5/31/2025 I Hospital Day: 18    Assessment & Plan  Failure to thrive in adult  Patient was recently admitted from 5/1-5/9 to neurology service for breakthrough seizures in the setting of medication noncompliance.   There were concerns brought up by family that admission regarding cognitive impairment/memory loss and ability to care for herself.    Scored 13/30 on MOCA done 5/7.  Per Neurology record review, was deemed at that time capable to live alone  Patient was initially discharged and stayed with her brother before he took her back to her home.  She did not recall details of this.  Additionally, VNA came out to her home and patient could not properly use her insulin or use glucometer (she has been on insulin for years) so came back to the hospital for safety concerns  Neurology and geriatrics evals appreciated   MRI brain 5/16: \"No acute intracranial pathology. Chronic microangiopathy. NeuroQuant analysis was performed: Normal hippocampal volume and enlarged ventricular system: Findings do not support hippocampal degeneration. Possible expansion of ventricular system without medial temporal lobe focused ex-vacuo process.\"  RPR nonreactive, TSH 0.989, folate greater than 22.3, vitamin B12 is 476, vitamin B 1 is 123  Patient does not appear to have capacity to make fully informed medical decisions per neuropsych  Discharge planning to assisted living/LTC in the [place] area per family preference  Type 2 diabetes mellitus, with long-term current use of insulin ([facility])  Lab Results   Component Value Date    HGBA1C 7.3 (H) 03/05/2025       Recent Labs     05/30/25  1041 05/30/25  1614 05/30/25  2122 05/31/25  0613   POCGLU 96 95 82 72       Blood Sugar Average: Last 72 hrs:  (P) 121  Hold glimepiride and metformin while admitted  Diabetic diet  Blood sugar " has been <100 throughout yesterday. Will reduce Lantus from 15 to 10 units and discontinue meal coverage with Humalog.   Continue sliding scale   Monitor blood sugars and adjust insulin accordingly    Cognitive impairment  See above.  Sister reported progressive decline over 6-8 months   Geriatrics following  Does not have capacity per Neuropsych  Outpatient follow up with neuro for baseline dementia testing and to start medication therapy if indicated at the time  Focal epilepsy (HCC)  Continue [facility]  Neurology evaluated   Altered mental status  Slow to respond on 5/30.   CT head - neg  CBCD, CMP - neg  Neurology consult appreciated  Resolved currently.  Currently ambulating around the hallway, appears slightly agitated      VTE Pharmacologic Prophylaxis: VTE Score: 6 High Risk (Score >/= 5) - Pharmacological DVT Prophylaxis Ordered: enoxaparin (Lovenox). Sequential Compression Devices Ordered.    Mobility:   Basic Mobility Inpatient Raw Score: 22  JH-HLM Goal: 7: Walk 25 feet or more  JH-HLM Achieved: 6: Walk 10 steps or more  JH-HLM Goal NOT achieved. Continue with multidisciplinary rounding and encourage appropriate mobility to improve upon JH-HLM goals.    Patient Centered Rounds: I performed bedside rounds with nursing staff today.   Discussions with Specialists or Other Care Team Provider:        Current Length of Stay: 18 day(s)  Current Patient Status: Inpatient   Certification Statement: The patient will continue to require additional inpatient hospital stay due to pending placement   Discharge Plan: Anticipate discharge in 48 hrs to rehab facility.    Code Status: Level 1 - Full Code    Subjective   This morning on my exam, patient is ambulating around the hallway, attempting to leave.  Patient is currently sitting on the sofa.  Oriented to self only.  Not oriented to place, situation.  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  She is refusing to  take p.o. hydroxyzine.    For now patient is sitting on the hallway chair, looking at her phone.  Does not appear aggressive towards staff.  Has one-to-one present for her safety.    Objective :  Temp:  [97.6 °F (36.4 °C)-98.3 °F (36.8 °C)] 98.1 °F (36.7 °C)  HR:  [78-91] 90  BP: (117-124)/(75-77) 117/75  Resp:  [16] 16  SpO2:  [96 %-100 %] 96 %  O2 Device: None (Room air)    There is no height or weight on file to calculate BMI.     Input and Output Summary (last 24 hours):     Intake/Output Summary (Last 24 hours) at 5/31/2025 0928  Last data filed at 5/31/2025 0811  Gross per 24 hour   Intake 340 ml   Output --   Net 340 ml       Physical Exam  Constitutional:       Appearance: She is well-developed.   HENT:      Head: Normocephalic and atraumatic.   Pulmonary:      Effort: Pulmonary effort is normal. No respiratory distress.      Breath sounds: Normal breath sounds.     Musculoskeletal:      Cervical back: Normal range of motion.     Skin:     General: Skin is warm and dry.     Neurological:      Mental Status: She is disoriented.           Lines/Drains:              Lab Results: I have reviewed the following results:   Results from last 7 days   Lab Units 05/30/25  1704   WBC Thousand/uL 7.12   HEMOGLOBIN g/dL 12.6   HEMATOCRIT % 37.2   PLATELETS Thousands/uL 174   SEGS PCT % 72   LYMPHO PCT % 18   MONO PCT % 7   EOS PCT % 1     Results from last 7 days   Lab Units 05/30/25  1704   SODIUM mmol/L 142   POTASSIUM mmol/L 3.8   CHLORIDE mmol/L 109*   CO2 mmol/L 25   BUN mg/dL 16   CREATININE mg/dL 0.90   ANION GAP mmol/L 8   CALCIUM mg/dL 9.6   ALBUMIN g/dL 4.1   TOTAL BILIRUBIN mg/dL 0.53   ALK PHOS U/L 47   ALT U/L 14   AST U/L 12*   GLUCOSE RANDOM mg/dL 105         Results from last 7 days   Lab Units 05/31/25  0613 05/30/25  2122 05/30/25  1614 05/30/25  1041 05/30/25  0658 05/29/25  2057 05/29/25  1553 05/29/25  1051 05/29/25  0606 05/28/25  2049 05/28/25  1613 05/28/25  1103   POC GLUCOSE mg/dl 72 82 95 96  115 118 98 269* 124 165* 141* 89               Recent Cultures (last 7 days):             Last 24 Hours Medication List:     Current Facility-Administered Medications:     acetaminophen (TYLENOL) tablet 650 mg, Q6H PRN    Artificial Tears Op Soln 1 drop, TID    aspirin chewable tablet 81 mg, Daily    enoxaparin (LOVENOX) subcutaneous injection 40 mg, Daily    hydrOXYzine HCL (ATARAX) tablet 25 mg, Q6H PRN    insulin glargine (LANTUS) subcutaneous injection 15 Units 0.15 mL, HS    insulin lispro (HumALOG/ADMELOG) 100 units/mL subcutaneous injection 1-6 Units, TID AC **AND** Fingerstick Glucose (POCT), TID AC    insulin lispro (HumALOG/ADMELOG) 100 units/mL subcutaneous injection 1-6 Units, HS    insulin lispro (HumALOG/ADMELOG) 100 units/mL subcutaneous injection 5 Units, TID With Meals    levETIRAcetam (KEPPRA) tablet 1,000 mg, Q12H DYAN    polyethylene glycol (MIRALAX) packet 17 g, Daily    pravastatin (PRAVACHOL) tablet 40 mg, Daily With Dinner    psyllium (METAMUCIL) 1 packet, Daily    senna-docusate sodium (SENOKOT S) 8.6-50 mg per tablet 1 tablet, HS    Administrative Statements   Today, Patient Was Seen By: Karolina Jarquin MD      **Please Note: This note may have been constructed using a voice recognition system.**

## 2025-05-31 NOTE — ASSESSMENT & PLAN NOTE
"Patient was recently admitted from 5/1-5/9 to neurology service for breakthrough seizures in the setting of medication noncompliance.   There were concerns brought up by family that admission regarding cognitive impairment/memory loss and ability to care for herself.    Scored 13/30 on MOCA done 5/7.  Per Neurology record review, was deemed at that time capable to live alone  Patient was initially discharged and stayed with her brother before he took her back to her home.  She did not recall details of this.  Additionally, VNA came out to her home and patient could not properly use her insulin or use glucometer (she has been on insulin for years) so came back to the hospital for safety concerns  Neurology and geriatrics evals appreciated   MRI brain 5/16: \"No acute intracranial pathology. Chronic microangiopathy. NeuroQuant analysis was performed: Normal hippocampal volume and enlarged ventricular system: Findings do not support hippocampal degeneration. Possible expansion of ventricular system without medial temporal lobe focused ex-vacuo process.\"  RPR nonreactive, TSH 0.989, folate greater than 22.3, vitamin B12 is 476, vitamin B 1 is 123  Patient does not appear to have capacity to make fully informed medical decisions per neuropsych  Discharge planning to assisted living/LTC in the [place] area per family preference  "

## 2025-05-31 NOTE — ASSESSMENT & PLAN NOTE
Lab Results   Component Value Date    HGBA1C 7.3 (H) 03/05/2025       Recent Labs     05/30/25  1041 05/30/25  1614 05/30/25 2122 05/31/25  0613   POCGLU 96 95 82 72       Blood Sugar Average: Last 72 hrs:  (P) 121    - Goal euglycemia.   - Management as per primary team.

## 2025-05-31 NOTE — PROGRESS NOTES
"Progress Note - Hospitalist   Name: Ynes Mendoza 64 y.o. female I MRN: [id number]  [facility] Date of Admission: 5/12/2025   Date of Service: 5/30/2025 I Hospital Day: 17    Assessment & Plan  Failure to thrive in adult  Patient was recently admitted from 5/1-5/9 to neurology service for breakthrough seizures in the setting of medication noncompliance.   There were concerns brought up by family that admission regarding cognitive impairment/memory loss and ability to care for herself.    Scored 13/30 on MOCA done 5/7.  Per Neurology record review, was deemed at that time capable to live alone  Patient was initially discharged and stayed with her brother before he took her back to her home.  She did not recall details of this.  Additionally, VNA came out to her home and patient could not properly use her insulin or use glucometer (she has been on insulin for years) so came back to the hospital for safety concerns  Neurology and geriatrics evals appreciated   MRI brain 5/16: \"No acute intracranial pathology. Chronic microangiopathy. NeuroQuant analysis was performed: Normal hippocampal volume and enlarged ventricular system: Findings do not support hippocampal degeneration. Possible expansion of ventricular system without medial temporal lobe focused ex-vacuo process.\"  RPR nonreactive, TSH 0.989, folate greater than 22.3, vitamin B12 is 476, vitamin B 1 is 123  Patient does not appear to have capacity to make fully informed medical decisions per neuropsych  Discharge planning to assisted living/LTC in the [place] area per family preference  Type 2 diabetes mellitus, with long-term current use of insulin ([facility])  Lab Results   Component Value Date    HGBA1C 7.3 (H) 03/05/2025       Recent Labs     05/30/25  0658 05/30/25  1041 05/30/25  1614 05/30/25  2122   POCGLU 115 96 95 82       Blood Sugar Average: Last 72 hrs:  (P) 133.5  Hold glimepiride and metformin while admitted  Diabetic diet  Continue " Lantus 15 units hs, Humalog 5 units TID, sliding scale insulin  Monitor blood sugars and adjust insulin accordingly    Cognitive impairment  See above.  Sister reported progressive decline over 6-8 months   Geriatrics following  Does not have capacity per Neuropsych  Outpatient follow up with neuro for baseline dementia testing and to start medication therapy if indicated at the time  Focal epilepsy (HCC)  Continue [facility]  Neurology evaluated   Altered mental status  Slow to respond since this afternoon  CT head - neg  CBCD, CMP - neg  Monitor  Neurology notified - will review    VTE Pharmacologic Prophylaxis: VTE Score: 6 High Risk (Score >/= 5) - Pharmacological DVT Prophylaxis Ordered: enoxaparin (Lovenox). Sequential Compression Devices Ordered.    Mobility:   Basic Mobility Inpatient Raw Score: 22  JH-HLM Goal: 7: Walk 25 feet or more  JH-HLM Achieved: 6: Walk 10 steps or more  JH-HLM Goal NOT achieved. Continue with multidisciplinary rounding and encourage appropriate mobility to improve upon JH-HLM goals.    Patient Centered Rounds: I performed bedside rounds with nursing staff today.   Discussions with Specialists or Other Care Team Provider: Discussed with neurology    Education and Discussions with Family / Patient: Updated  (sister) via phone.    Current Length of Stay: 17 day(s)  Current Patient Status: Inpatient   Certification Statement: The patient will continue to require additional inpatient hospital stay due to awaiting safe placement, change in mental status requiring monitoring    Code Status: Level 1 - Full Code    Subjective   Slow to respond since this afternoon. No dysuria. No cough. Shortness of breath. No headache.     Objective :  Temp:  [97.2 °F (36.2 °C)-98.3 °F (36.8 °C)] 97.6 °F (36.4 °C)  HR:  [77-91] 91  BP: (105-124)/(63-77) 124/77  Resp:  [12-16] 16  SpO2:  [97 %-100 %] 98 %    Physical Exam  Vitals reviewed.   HENT:      Head: Normocephalic.      Nose: Nose normal.       Mouth/Throat:      Mouth: Mucous membranes are moist.     Eyes:      Extraocular Movements: Extraocular movements intact.       Cardiovascular:      Rate and Rhythm: Normal rate and regular rhythm.   Pulmonary:      Effort: Pulmonary effort is normal. No respiratory distress.      Breath sounds: Normal breath sounds. No wheezing.   Abdominal:      General: There is no distension.      Palpations: Abdomen is soft.      Tenderness: There is no abdominal tenderness.     Musculoskeletal:         General: No swelling.      Cervical back: Neck supple.     Skin:     General: Skin is warm and dry.     Neurological:      General: No focal deficit present.      Mental Status: She is alert.      Comments: Slow to respond         Lines/Drains:              Lab Results: I have reviewed the following results:   Results from last 7 days   Lab Units 05/30/25  1704   WBC Thousand/uL 7.12   HEMOGLOBIN g/dL 12.6   HEMATOCRIT % 37.2   PLATELETS Thousands/uL 174   SEGS PCT % 72   LYMPHO PCT % 18   MONO PCT % 7   EOS PCT % 1     Results from last 7 days   Lab Units 05/30/25  1704   SODIUM mmol/L 142   POTASSIUM mmol/L 3.8   CHLORIDE mmol/L 109*   CO2 mmol/L 25   BUN mg/dL 16   CREATININE mg/dL 0.90   ANION GAP mmol/L 8   CALCIUM mg/dL 9.6   ALBUMIN g/dL 4.1   TOTAL BILIRUBIN mg/dL 0.53   ALK PHOS U/L 47   ALT U/L 14   AST U/L 12*   GLUCOSE RANDOM mg/dL 105         Results from last 7 days   Lab Units 05/30/25  2122 05/30/25  1614 05/30/25  1041 05/30/25  0658 05/29/25  2057 05/29/25  1553 05/29/25  1051 05/29/25  0606 05/28/25  2049 05/28/25  1613 05/28/25  1103 05/28/25  0645   POC GLUCOSE mg/dl 82 95 96 115 118 98 269* 124 165* 141* 89 109             Last 24 Hours Medication List:     Current Facility-Administered Medications:     acetaminophen (TYLENOL) tablet 650 mg, Q6H PRN    Artificial Tears Op Soln 1 drop, TID    aspirin chewable tablet 81 mg, Daily    enoxaparin (LOVENOX) subcutaneous injection 40 mg, Daily    hydrOXYzine  HCL (ATARAX) tablet 25 mg, Q6H PRN    insulin glargine (LANTUS) subcutaneous injection 15 Units 0.15 mL, HS    insulin lispro (HumALOG/ADMELOG) 100 units/mL subcutaneous injection 1-6 Units, TID AC **AND** Fingerstick Glucose (POCT), TID AC    insulin lispro (HumALOG/ADMELOG) 100 units/mL subcutaneous injection 1-6 Units, HS    insulin lispro (HumALOG/ADMELOG) 100 units/mL subcutaneous injection 5 Units, TID With Meals    levETIRAcetam (KEPPRA) tablet 1,000 mg, Q12H DYAN    polyethylene glycol (MIRALAX) packet 17 g, Daily    pravastatin (PRAVACHOL) tablet 40 mg, Daily With Dinner    psyllium (METAMUCIL) 1 packet, Daily    senna-docusate sodium (SENOKOT S) 8.6-50 mg per tablet 1 tablet, HS    Administrative Statements   Today, Patient Was Seen By: Naomi Rodriguez MD      **Please Note: This note may have been constructed using a voice recognition system.**

## 2025-05-31 NOTE — ASSESSMENT & PLAN NOTE
65 yo female with DM2, focal aware seizures, trifascicular block s/p PPM January 2025 who presents with inability to care for herself.  Subsequently, seem to have worsened orientation and greater agitation roughly 2 to 3 weeks into this hospitalization while pending placement, appears to fluctuate throughout the day and uncertain degree of change from baseline fluctuation.    Workup:  - CTA H/N wwo contrast 5/14/25:   CT Brain: No CT evidence of an acute cortical infarct. Artifact versus trace products layering in the right ventricular atrium.   CT Angiography: Severe stenosis at the P2/P3 junction of the right PCA, new from prior. No large vessel occlusion.  - MRI brain NeuroQuant wwo contrast 5/16/25:   No acute intracranial pathology. Chronic microangiopathy. NeuroQuant analysis was performed: Normal hippocampal volume and enlarged ventricular system: Findings do not support hippocampal degeneration.    Impression:   Patient's episodes of greater disorientation than baseline/fluctuating agitation of unclear etiology although possibility it could relate to her atypical seizure presentations, other options include a worsening of an underlying dementia process and hospital-acquired delirium after near 3 weeks hospital stay and long time pending placement at rehab at eventual [place], significant workup is already been completed related to many neurology etiologies but will increase Keppra dose and obtain routine EEG for now.    Plan:   - continue home ASA 81 mg daily   - continue home statin  - Increased keppra to 1.25 g Q12 for confusion worsened suspicious for another sz event  - Routine eeg ordered for above concern  - Could consider switch to valproate if Keppra believed to be source of agitation  - Recommend delirium precautions  - 1:1 as indicated per primary team  - Supportive care as per primary team  - Patient will need formal neuro-cognitive assessment as an outpatient  - Monitor exam and notify with changes

## 2025-05-31 NOTE — PLAN OF CARE
Problem: PAIN - ADULT  Goal: Verbalizes/displays adequate comfort level or baseline comfort level  Description: Interventions:- Encourage patient to monitor pain and request assistance- Assess pain using appropriate pain scale- Administer analgesics based on type and severity of pain and evaluate response- Implement non-pharmacological measures as appropriate and evaluate response- Notify physician/advanced practitioner if interventions unsuccessful or patient reports new pain  Outcome: Progressing     Problem: INFECTION - ADULT  Goal: Absence or prevention of progression during hospitalization  Description: INTERVENTIONS:- Assess and monitor for signs and symptoms of infection- Monitor lab/diagnostic results- Monitor all insertion sites, i.e. indwelling lines, tubes, and drains- [place] appropriate cooling/warming therapies per order- Administer medications as ordered- Instruct and encourage patient and family to use good hand hygiene technique- Identify and instruct in appropriate isolation precautions for identified infection/condition  Outcome: Progressing     Problem: SAFETY ADULT  Goal: Patient will remain free of falls  Description: INTERVENTIONS:- Educate patient/family on patient safety including physical limitations- Instruct patient to call for assistance with activity - Consult OT/PT to assist with strengthening/mobility - Keep Call bell within reach- Keep bed low and locked with side rails adjusted as appropriate- Keep care items and personal belongings within reach- Initiate and maintain comfort rounds- Make Fall Risk Sign visible to staff  Outcome: Progressing  Goal: Maintain or return to baseline ADL function  Description: INTERVENTIONS:-  Assess patient's ability to carry out ADLs; assess patient's baseline for ADL function and identify physical deficits which impact ability to perform ADLs (bathing, care of mouth/teeth, toileting, grooming, dressing, etc.)- Assess/evaluate cause of self-care  deficits - Assess range of motion- Assess patient's mobility; develop plan if impaired- Assess patient's need for assistive devices and provide as appropriate- Encourage maximum independence but intervene and supervise when necessary- Involve family in performance of ADLs- Assess for home care needs following discharge - Consider OT consult to assist with ADL evaluation and planning for discharge- Provide patient education as appropriate  Outcome: Progressing  Goal: Maintains/Returns to pre admission functional level  Description: INTERVENTIONS:- Perform AM-PAC 6 Click Basic Mobility/ Daily Activity assessment daily.- Set and communicate daily mobility goal to care team and patient/family/caregiver. - Collaborate with rehabilitation services on mobility goals if consulted  Outcome: Progressing     Problem: Knowledge Deficit  Goal: Patient/family/caregiver demonstrates understanding of disease process, treatment plan, medications, and discharge instructions  Description: Complete learning assessment and assess knowledge base.Interventions:- Provide teaching at level of understanding- Provide teaching via preferred learning methods  Outcome: Progressing

## 2025-05-31 NOTE — ASSESSMENT & PLAN NOTE
Slow to respond since this afternoon  CT head - neg  CBCD, CMP - neg  Monitor  Neurology notified - will review

## 2025-05-31 NOTE — ASSESSMENT & PLAN NOTE
Slow to respond on 5/30.   CT head - neg  CBCD, CMP - neg  Neurology consult appreciated  Resolved currently.  Currently ambulating around the hallway, appears slightly agitated

## 2025-05-31 NOTE — ASSESSMENT & PLAN NOTE
See above.  Sister reported progressive decline over 6-8 months   Geriatrics following  Does not have capacity per Neuropsych  Outpatient follow up with neuro for baseline dementia testing and to start medication therapy if indicated at the time

## 2025-05-31 NOTE — PROGRESS NOTES
Progress Note - Neurology   Name: Ynes Mendoza 64 y.o. female I MRN: [id number]  [facility] Date of Admission: 5/12/2025   Date of Service: 5/31/2025 I Hospital Day: 18    Assessment & Plan  Altered mental status  65 yo female with DM2, focal aware seizures, trifascicular block s/p PPM January 2025 who presents with inability to care for herself.  Subsequently, seem to have worsened orientation and greater agitation roughly 2 to 3 weeks into this hospitalization while pending placement, appears to fluctuate throughout the day and uncertain degree of change from baseline fluctuation.    Workup:  - CTA H/N wwo contrast 5/14/25:   CT Brain: No CT evidence of an acute cortical infarct. Artifact versus trace products layering in the right ventricular atrium.   CT Angiography: Severe stenosis at the P2/P3 junction of the right PCA, new from prior. No large vessel occlusion.  - MRI brain NeuroQuant wwo contrast 5/16/25:   No acute intracranial pathology. Chronic microangiopathy. NeuroQuant analysis was performed: Normal hippocampal volume and enlarged ventricular system: Findings do not support hippocampal degeneration.    Impression:   Patient's episodes of greater disorientation than baseline/fluctuating agitation of unclear etiology although possibility it could relate to her atypical seizure presentations, other options include a worsening of an underlying dementia process and hospital-acquired delirium after near 3 weeks hospital stay and long time pending placement at rehab at eventual [place], significant workup is already been completed related to many neurology etiologies but will increase Keppra dose and obtain routine EEG for now.    Plan:   - continue home ASA 81 mg daily   - continue home statin  - Increased keppra to 1.25 g Q12 for confusion worsened suspicious for another sz event  - Routine eeg ordered for above concern  - Could consider switch to valproate if Keppra believed to be source of  agitation  - Recommend delirium precautions  - 1:1 as indicated per primary team  - Supportive care as per primary team  - Patient will need formal neuro-cognitive assessment as an outpatient  - Monitor exam and notify with changes    Ynes Mendoza will need follow-up in in 6 weeks with general neurology team for Other in 60 minute appointment. They will not require outpatient neurological testing.     I have discussed with Dr. Mandujano the above plan to treat pt. He agrees with the plan.  Please contact the SecureChat role for the Neurology service with any questions/concerns.    Subjective   Initial visit pt was only curious as to why she is getting CT scans and being evaluated by new healthcare providers, participate in exam quite well, orientation difficult for patient showing persistent confusion although questionable change from baseline, subsequent visit pt was much more agitated and attempting to wander from 1 room to another, resistant to healthcare team redirection requiring Posey,.    Review of Systems   Unable to perform ROS: Mental status change         Objective :  Temp:  [97.6 °F (36.4 °C)-98.3 °F (36.8 °C)] 98.1 °F (36.7 °C)  HR:  [78-91] 90  BP: (117-124)/(75-77) 117/75  Resp:  [16-17] 17  SpO2:  [96 %-100 %] 96 %  O2 Device: None (Room air)    Physical Exam  Vitals and nursing note reviewed.   Constitutional:       General: She is awake. She is not in acute distress.     Appearance: She is not ill-appearing, toxic-appearing or diaphoretic.   HENT:      Head: Normocephalic and atraumatic.      Right Ear: External ear normal.      Left Ear: External ear normal.      Nose: Nose normal.      Mouth/Throat:      Pharynx: Oropharynx is clear.     Eyes:      General: Lids are normal. No scleral icterus.        Right eye: No discharge.         Left eye: No discharge.      Extraocular Movements: Extraocular movements intact.      Conjunctiva/sclera: Conjunctivae normal.      Pupils: Pupils are equal, round, and  reactive to light.       Neurological:      Mental Status: She is alert.      Cranial Nerves: No dysarthria.     Neurological Exam  Mental Status  Awake and alert. no dysarthria present.  States name, time uncertain, place only [place] no more specific.    Cranial Nerves  CN II: Visual acuity is normal. Visual fields full to confrontation.  CN III, IV, VI: Extraocular movements intact bilaterally. Normal lids and orbits bilaterally. Pupils equal round and reactive to light bilaterally.  CN V: Facial sensation is normal.  CN VII: Full and symmetric facial movement.  CN VIII: Hearing is normal.  CN IX, X: Palate elevates symmetrically. Normal gag reflex.  CN XI: Shoulder shrug strength is normal.  CN XII: Tongue midline without atrophy or fasciculations.    Motor  Normal muscle bulk throughout. No fasciculations present. Normal muscle tone. No abnormal involuntary movements. Strength is 5/5 in all four extremities except as noted.    Sensory  Light touch is normal in upper and lower extremities.     Reflexes  Deep tendon reflexes are 2+ and symmetric except as noted.    Coordination  Right: Finger-to-nose normal.Left: Finger-to-nose normal.    Gait    Defer.            VTE Pharmacologic Prophylaxis: VTE covered by:  enoxaparin, Subcutaneous, 40 mg at 05/31/25 0837       Administrative Statements   I have spent a total time of 50 minutes in caring for this patient on the day of the visit/encounter including Patient and family education, Risk factor reductions, Impressions, Counseling / Coordination of care, Documenting in the medical record, Reviewing/placing orders in the medical record (including tests, medications, and/or procedures), Obtaining or reviewing history  , and Communicating with other healthcare professionals .

## 2025-05-31 NOTE — QUICK NOTE
Patient was briefly seen at bedside, there was concern for transient alteration of patient's mentation.  She has a history of focal epilepsy and there was concern that she may be having further seizure events.  On my evaluation patient appeared to be pleasantly confused however was able to converse with me properly.  She did not appear to have insight into her current situation.    She was alert and oriented to herself however otherwise was not oriented to location.  She did know the correct month but not the correct year.  Did not know the president.    She is moving all 4 extremities appropriately.    Neurology will continue to follow.

## 2025-06-01 PROBLEM — N30.00 ACUTE CYSTITIS WITHOUT HEMATURIA: Status: ACTIVE | Noted: 2025-06-01

## 2025-06-01 PROBLEM — R41.82 ALTERED MENTAL STATUS: Status: RESOLVED | Noted: 2025-05-30 | Resolved: 2025-06-01

## 2025-06-01 LAB
BACTERIA UR QL AUTO: ABNORMAL /HPF
BILIRUB UR QL STRIP: NEGATIVE
CLARITY UR: ABNORMAL
COLOR UR: ABNORMAL
GLUCOSE SERPL-MCNC: 114 MG/DL (ref 65–140)
GLUCOSE SERPL-MCNC: 130 MG/DL (ref 65–140)
GLUCOSE SERPL-MCNC: 235 MG/DL (ref 65–140)
GLUCOSE SERPL-MCNC: 75 MG/DL (ref 65–140)
GLUCOSE UR STRIP-MCNC: NEGATIVE MG/DL
HGB UR QL STRIP.AUTO: NEGATIVE
KETONES UR STRIP-MCNC: NEGATIVE MG/DL
LEUKOCYTE ESTERASE UR QL STRIP: ABNORMAL
NITRITE UR QL STRIP: POSITIVE
NON-SQ EPI CELLS URNS QL MICRO: ABNORMAL /HPF
PH UR STRIP.AUTO: 5 [PH]
PROT UR STRIP-MCNC: NEGATIVE MG/DL
RBC #/AREA URNS AUTO: ABNORMAL /HPF
SP GR UR STRIP.AUTO: 1.01 (ref 1–1.03)
UROBILINOGEN UR STRIP-ACNC: <2 MG/DL
WBC #/AREA URNS AUTO: ABNORMAL /HPF

## 2025-06-01 PROCEDURE — 99232 SBSQ HOSP IP/OBS MODERATE 35: CPT | Performed by: FAMILY MEDICINE

## 2025-06-01 PROCEDURE — 87086 URINE CULTURE/COLONY COUNT: CPT | Performed by: FAMILY MEDICINE

## 2025-06-01 PROCEDURE — 81001 URINALYSIS AUTO W/SCOPE: CPT | Performed by: FAMILY MEDICINE

## 2025-06-01 PROCEDURE — 87186 SC STD MICRODIL/AGAR DIL: CPT | Performed by: FAMILY MEDICINE

## 2025-06-01 PROCEDURE — 82948 REAGENT STRIP/BLOOD GLUCOSE: CPT

## 2025-06-01 PROCEDURE — 87077 CULTURE AEROBIC IDENTIFY: CPT | Performed by: FAMILY MEDICINE

## 2025-06-01 RX ORDER — LEVOFLOXACIN 750 MG/1
750 TABLET, FILM COATED ORAL EVERY 24 HOURS
Status: DISCONTINUED | OUTPATIENT
Start: 2025-06-01 | End: 2025-06-02

## 2025-06-01 RX ADMIN — LEVETIRACETAM 1250 MG: 750 TABLET, FILM COATED ORAL at 09:15

## 2025-06-01 RX ADMIN — DEXTRAN 70, GLYCERIN, HYPROMELLOSE 1 DROP: 1; 2; 3 SOLUTION/ DROPS OPHTHALMIC at 21:01

## 2025-06-01 RX ADMIN — LEVOFLOXACIN 750 MG: 750 TABLET, FILM COATED ORAL at 11:44

## 2025-06-01 RX ADMIN — ASPIRIN 81 MG CHEWABLE TABLET 81 MG: 81 TABLET CHEWABLE at 09:16

## 2025-06-01 RX ADMIN — DEXTRAN 70, GLYCERIN, HYPROMELLOSE 1 DROP: 1; 2; 3 SOLUTION/ DROPS OPHTHALMIC at 09:15

## 2025-06-01 RX ADMIN — LEVETIRACETAM 1250 MG: 750 TABLET, FILM COATED ORAL at 21:00

## 2025-06-01 RX ADMIN — POLYETHYLENE GLYCOL 3350 17 G: 17 POWDER, FOR SOLUTION ORAL at 09:16

## 2025-06-01 RX ADMIN — INSULIN LISPRO 3 UNITS: 100 INJECTION, SOLUTION INTRAVENOUS; SUBCUTANEOUS at 21:01

## 2025-06-01 RX ADMIN — ENOXAPARIN SODIUM 40 MG: 40 INJECTION SUBCUTANEOUS at 09:16

## 2025-06-01 RX ADMIN — INSULIN GLARGINE 10 UNITS: 100 INJECTION, SOLUTION SUBCUTANEOUS at 21:00

## 2025-06-01 RX ADMIN — SENNOSIDES AND DOCUSATE SODIUM 1 TABLET: 50; 8.6 TABLET ORAL at 21:00

## 2025-06-01 RX ADMIN — QUETIAPINE FUMARATE 25 MG: 25 TABLET ORAL at 21:00

## 2025-06-01 RX ADMIN — DEXTRAN 70, GLYCERIN, HYPROMELLOSE 1 DROP: 1; 2; 3 SOLUTION/ DROPS OPHTHALMIC at 17:00

## 2025-06-01 RX ADMIN — PRAVASTATIN SODIUM 40 MG: 40 TABLET ORAL at 17:00

## 2025-06-01 RX ADMIN — Medication 1 PACKET: at 09:16

## 2025-06-01 NOTE — PROGRESS NOTES
"Progress Note - Hospitalist   Name: Ynes Mendoza 64 y.o. female I MRN: [id number]  [facility] Date of Admission: 5/12/2025   Date of Service: 6/1/2025 I Hospital Day: 19    Assessment & Plan  Failure to thrive in adult  Patient was recently admitted from 5/1-5/9 to neurology service for breakthrough seizures in the setting of medication noncompliance.   There were concerns brought up by family that admission regarding cognitive impairment/memory loss and ability to care for herself.    Scored 13/30 on MOCA done 5/7.  Per Neurology record review, was deemed at that time capable to live alone  Patient was initially discharged and stayed with her brother before he took her back to her home.  She did not recall details of this.  Additionally, VNA came out to her home and patient could not properly use her insulin or use glucometer (she has been on insulin for years) so came back to the hospital for safety concerns  Neurology and geriatrics evals appreciated   MRI brain 5/16: \"No acute intracranial pathology. Chronic microangiopathy. NeuroQuant analysis was performed: Normal hippocampal volume and enlarged ventricular system: Findings do not support hippocampal degeneration. Possible expansion of ventricular system without medial temporal lobe focused ex-vacuo process.\"  RPR nonreactive, TSH 0.989, folate greater than 22.3, vitamin B12 is 476, vitamin B 1 is 123  Patient does not appear to have capacity to make fully informed medical decisions per neuropsych  Discharge planning to assisted living/LTC in the [place] area per family preference  Type 2 diabetes mellitus, with long-term current use of insulin ([facility])  Lab Results   Component Value Date    HGBA1C 7.3 (H) 03/05/2025       Recent Labs     05/30/25 2122 05/31/25  0613 05/31/25  2130 06/01/25  0603   POCGLU 82 72 172* 75       Blood Sugar Average: Last 72 hrs:  (P) 119.0667442951264375  Hold glimepiride and metformin while admitted  Diabetic " diet  Blood sugar has been <100 throughout. reduced Lantus from 15 to 10 units and discontinue meal coverage with Humalog.   Continue sliding scale   Monitor blood sugars and adjust insulin accordingly    Cognitive impairment  See above.  Sister reported progressive decline over 6-8 months   Geriatrics following  Does not have capacity per Neuropsych  Outpatient follow up with neuro for baseline dementia testing and to start medication therapy if indicated at the time  Focal epilepsy (HCC)  Continue [facility]  Neurology evaluated   Altered mental status (Resolved: 6/1/2025)  Slow to respond on 5/30.   CT head - neg  CBCD, CMP - neg  Neurology consult appreciated  Resolved currently.     At risk for delirium  5/31/25 patient agitated,, walking around the hallway, not oriented to place, time, situation, attempting to leave the unit. Uanble to reorient verbally.   required IM zyprexa and abdominal posey.   Likely UTI related vs. hospital delirium in the setting of prolonged hospitalization and cognitive impairment.   Continue 1:1  Started on Seroquel 25 mg at HS  Will consult geriatric if not improved  Acute cystitis without hematuria  Patient with acutely worsening agitation, and confusion. CT head negative.   Positive urinalysis noted   Reported allergy to keflex.  Start on PO Levaquin    VTE Pharmacologic Prophylaxis: VTE Score: 6 High Risk (Score >/= 5) - Pharmacological DVT Prophylaxis Ordered: enoxaparin (Lovenox). Sequential Compression Devices Ordered.    Mobility:   Basic Mobility Inpatient Raw Score: 22  JH-HLM Goal: 7: Walk 25 feet or more  JH-HLM Achieved: 7: Walk 25 feet or more  JH-HLM Goal achieved. Continue to encourage appropriate mobility.    Patient Centered Rounds: I performed bedside rounds with nursing staff today.      Education and Discussions with Family / Patient: discussed with sister danyel on the phone multiple times yesterday. I left a message with brief message on the phone.     Current  Length of Stay: 19 day(s)  Current Patient Status: Inpatient   Certification Statement: The patient will continue to require additional inpatient hospital stay due to pending placement   Discharge Plan: Anticipate discharge in 24-48 hrs to rehab facility.    Code Status: Level 1 - Full Code    Subjective     Patient examined at bedside.  Patient appears much calmer today.  Answers all question appropriately.  Reports that she understands she has a UTI and needs antibiotic.    Objective :  Temp:  [97.6 °F (36.4 °C)] 97.6 °F (36.4 °C)  HR:  [85] 85  BP: (100-114)/(62-77) 100/62  Resp:  [16-17] 17  SpO2:  [96 %] 96 %  O2 Device: None (Room air)    There is no height or weight on file to calculate BMI.     Input and Output Summary (last 24 hours):     Intake/Output Summary (Last 24 hours) at 6/1/2025 0919  Last data filed at 5/31/2025 2100  Gross per 24 hour   Intake 120 ml   Output 200 ml   Net -80 ml       Physical Exam  Constitutional:       Appearance: She is well-developed.   HENT:      Head: Normocephalic and atraumatic.   Pulmonary:      Effort: Pulmonary effort is normal. No respiratory distress.      Breath sounds: Normal breath sounds.     Musculoskeletal:      Cervical back: Normal range of motion.     Skin:     General: Skin is warm and dry.           Lines/Drains:              Lab Results: I have reviewed the following results:   Results from last 7 days   Lab Units 05/30/25  1704   WBC Thousand/uL 7.12   HEMOGLOBIN g/dL 12.6   HEMATOCRIT % 37.2   PLATELETS Thousands/uL 174   SEGS PCT % 72   LYMPHO PCT % 18   MONO PCT % 7   EOS PCT % 1     Results from last 7 days   Lab Units 05/30/25  1704   SODIUM mmol/L 142   POTASSIUM mmol/L 3.8   CHLORIDE mmol/L 109*   CO2 mmol/L 25   BUN mg/dL 16   CREATININE mg/dL 0.90   ANION GAP mmol/L 8   CALCIUM mg/dL 9.6   ALBUMIN g/dL 4.1   TOTAL BILIRUBIN mg/dL 0.53   ALK PHOS U/L 47   ALT U/L 14   AST U/L 12*   GLUCOSE RANDOM mg/dL 105         Results from last 7 days   Lab  Units 06/01/25  0603 05/31/25  2130 05/31/25  0613 05/30/25  2122 05/30/25  1614 05/30/25  1041 05/30/25  0658 05/29/25  2057 05/29/25  1553 05/29/25  1051 05/29/25  0606 05/28/25  2049   POC GLUCOSE mg/dl 75 172* 72 82 95 96 115 118 98 269* 124 165*               Recent Cultures (last 7 days):            Last 24 Hours Medication List:     Current Facility-Administered Medications:     acetaminophen (TYLENOL) tablet 650 mg, Q6H PRN    Artificial Tears Op Soln 1 drop, TID    aspirin chewable tablet 81 mg, Daily    enoxaparin (LOVENOX) subcutaneous injection 40 mg, Daily    hydrOXYzine HCL (ATARAX) tablet 25 mg, Q6H PRN    insulin glargine (LANTUS) subcutaneous injection 10 Units 0.1 mL, HS    insulin lispro (HumALOG/ADMELOG) 100 units/mL subcutaneous injection 1-6 Units, TID AC **AND** Fingerstick Glucose (POCT), TID AC    insulin lispro (HumALOG/ADMELOG) 100 units/mL subcutaneous injection 1-6 Units, HS    levETIRAcetam (KEPPRA) tablet 1,250 mg, Q12H DYAN    OLANZapine (ZyPREXA) IM injection 5 mg, Q6H PRN    OLANZapine (ZyPREXA) IM injection 5 mg, Once    polyethylene glycol (MIRALAX) packet 17 g, Daily    pravastatin (PRAVACHOL) tablet 40 mg, Daily With Dinner    psyllium (METAMUCIL) 1 packet, Daily    QUEtiapine (SEROquel) tablet 25 mg, HS    senna-docusate sodium (SENOKOT S) 8.6-50 mg per tablet 1 tablet, HS    Administrative Statements   Today, Patient Was Seen By: Karolina Jarquin MD      **Please Note: This note may have been constructed using a voice recognition system.**

## 2025-06-01 NOTE — ASSESSMENT & PLAN NOTE
Slow to respond on 5/30.   CT head - neg  CBCD, CMP - neg  Neurology consult appreciated  Resolved currently.

## 2025-06-01 NOTE — ASSESSMENT & PLAN NOTE
Patient with acutely worsening agitation, and confusion. CT head negative.   Positive urinalysis noted   Reported allergy to keflex.  Start on PO Levaquin

## 2025-06-01 NOTE — ASSESSMENT & PLAN NOTE
"Due to acute change of cognitive decline yesterday, increased agitation, obtain urinalysis.  UA is positive for leukocytes and nitrite with innumerable WBC and moderate bacteria.  Await final urine culture  Initially started on Levaquin due to allergy listed for keflex. However it reduced seizure threshold therefore discontinue, and start keflex. As per patient, listed allergy of \"confusion\" is for a prior seizure meds.   " No

## 2025-06-01 NOTE — ASSESSMENT & PLAN NOTE
Lab Results   Component Value Date    HGBA1C 7.3 (H) 03/05/2025       Recent Labs     05/30/25 2122 05/31/25  0613 05/31/25 2130 06/01/25  0603   POCGLU 82 72 172* 75       Blood Sugar Average: Last 72 hrs:  (P) 119.2755720224080783  Hold glimepiride and metformin while admitted  Diabetic diet  Blood sugar has been <100 throughout. reduced Lantus from 15 to 10 units and discontinue meal coverage with Humalog.   Continue sliding scale   Monitor blood sugars and adjust insulin accordingly

## 2025-06-01 NOTE — ASSESSMENT & PLAN NOTE
"Patient was recently admitted from 5/1-5/9 to neurology service for breakthrough seizures in the setting of medication noncompliance.   There were concerns brought up by family that admission regarding cognitive impairment/memory loss and ability to care for herself.    Scored 13/30 on MOCA done 5/7.  Per Neurology record review, was deemed at that time capable to live alone  Patient was initially discharged and stayed with her brother before he took her back to her home.  She did not recall details of this.  Additionally, VNA came out to her home and patient could not properly use her insulin or use glucometer (she has been on insulin for years) so came back to the hospital for safety concerns  Neurology and geriatrics evals appreciated   MRI brain 5/16: \"No acute intracranial pathology. Chronic microangiopathy. NeuroQuant analysis was performed: Normal hippocampal volume and enlarged ventricular system: Findings do not support hippocampal degeneration. Possible expansion of ventricular system without medial temporal lobe focused ex-vacuo process.\"  RPR nonreactive, TSH 0.989, folate greater than 22.3, vitamin B12 is 476, vitamin B 1 is 123  Patient does not appear to have capacity to make fully informed medical decisions per neuropsych  Discharge planning to assisted living/LTC in the [place] area per family preference  "

## 2025-06-01 NOTE — PLAN OF CARE
Problem: PAIN - ADULT  Goal: Verbalizes/displays adequate comfort level or baseline comfort level  Description: Interventions:- Encourage patient to monitor pain and request assistance- Assess pain using appropriate pain scale- Administer analgesics based on type and severity of pain and evaluate response- Implement non-pharmacological measures as appropriate and evaluate response- Notify physician/advanced practitioner if interventions unsuccessful or patient reports new pain  Outcome: Progressing     Problem: INFECTION - ADULT  Goal: Absence or prevention of progression during hospitalization  Description: INTERVENTIONS:- Assess and monitor for signs and symptoms of infection- Monitor lab/diagnostic results- Monitor all insertion sites, i.e. indwelling lines, tubes, and drains- [place] appropriate cooling/warming therapies per order- Administer medications as ordered- Instruct and encourage patient and family to use good hand hygiene technique- Identify and instruct in appropriate isolation precautions for identified infection/condition  Outcome: Progressing     Problem: SAFETY ADULT  Goal: Patient will remain free of falls  Description: INTERVENTIONS:- Educate patient/family on patient safety including physical limitations- Instruct patient to call for assistance with activity - Consult OT/PT to assist with strengthening/mobility - Keep Call bell within reach- Keep bed low and locked with side rails adjusted as appropriate- Keep care items and personal belongings within reach- Initiate and maintain comfort rounds- Make Fall Risk Sign visible to staff  Outcome: Progressing  Goal: Maintain or return to baseline ADL function  Description: INTERVENTIONS:-  Assess patient's ability to carry out ADLs; assess patient's baseline for ADL function and identify physical deficits which impact ability to perform ADLs (bathing, care of mouth/teeth, toileting, grooming, dressing, etc.)- Assess/evaluate cause of self-care  deficits - Assess range of motion- Assess patient's mobility; develop plan if impaired- Assess patient's need for assistive devices and provide as appropriate- Encourage maximum independence but intervene and supervise when necessary- Involve family in performance of ADLs- Assess for home care needs following discharge - Consider OT consult to assist with ADL evaluation and planning for discharge- Provide patient education as appropriate  Outcome: Progressing  Goal: Maintains/Returns to pre admission functional level  Description: INTERVENTIONS:- Perform AM-PAC 6 Click Basic Mobility/ Daily Activity assessment daily.- Set and communicate daily mobility goal to care team and patient/family/caregiver. - Collaborate with rehabilitation services on mobility goals if consulted  Outcome: Progressing     Problem: DISCHARGE PLANNING  Goal: Discharge to home or other facility with appropriate resources  Description: INTERVENTIONS:- Identify barriers to discharge w/patient and caregiver- Arrange for needed discharge resources and transportation as appropriate- Identify discharge learning needs (meds, wound care, etc.)- Refer to Case Management Department for coordinating discharge planning if the patient needs post-hospital services based on physician/advanced practitioner order or complex needs related to functional status, cognitive ability, or social support system  Outcome: Progressing     Problem: Knowledge Deficit  Goal: Patient/family/caregiver demonstrates understanding of disease process, treatment plan, medications, and discharge instructions  Description: Complete learning assessment and assess knowledge base.Interventions:- Provide teaching at level of understanding- Provide teaching via preferred learning methods  Outcome: Progressing     Problem: NEUROSENSORY - ADULT  Goal: Achieves stable or improved neurological status  Description: INTERVENTIONS  - Monitor and report changes in neurological status  - Monitor  vital signs such as temperature, blood pressure, glucose, and any other labs ordered   - Initiate measures to prevent increased intracranial pressure  - Monitor for seizure activity and implement precautions if appropriate      Outcome: Progressing  Goal: Remains free of injury related to seizures activity  Description: INTERVENTIONS  - Maintain airway, patient safety  and administer oxygen as ordered  - Monitor patient for seizure activity, document and report duration and description of seizure to physician/advanced practitioner  - If seizure occurs,  ensure patient safety during seizure  - Reorient patient post seizure  - Seizure pads on all 4 side rails  - Instruct patient/family to notify RN of any seizure activity including if an aura is experienced  - Instruct patient/family to call for assistance with activity based on nursing assessment  - Administer anti-seizure medications if ordered    Outcome: Progressing  Goal: Achieves maximal functionality and self care  Description: INTERVENTIONS  - Monitor swallowing and airway patency with patient fatigue and changes in neurological status  - Encourage and assist patient to increase activity and self care.   - Encourage visually impaired, hearing impaired and aphasic patients to use assistive/communication devices  Outcome: Progressing

## 2025-06-01 NOTE — ASSESSMENT & PLAN NOTE
5/31/25 patient agitated,, walking around the hallway, not oriented to place, time, situation, attempting to leave the unit. Uanble to reorient verbally.   required IM zyprexa and abdominal posey.   Likely UTI related vs. hospital delirium in the setting of prolonged hospitalization and cognitive impairment.   Continue 1:1  Started on Seroquel 25 mg at HS  Will consult geriatric if not improved

## 2025-06-01 NOTE — PLAN OF CARE
Problem: SAFETY ADULT  Goal: Patient will remain free of falls  Description: INTERVENTIONS:- Educate patient/family on patient safety including physical limitations- Instruct patient to call for assistance with activity - Consult OT/PT to assist with strengthening/mobility - Keep Call bell within reach- Keep bed low and locked with side rails adjusted as appropriate- Keep care items and personal belongings within reach- Initiate and maintain comfort rounds- Make Fall Risk Sign visible to staff  Outcome: Progressing     Problem: Knowledge Deficit  Goal: Patient/family/caregiver demonstrates understanding of disease process, treatment plan, medications, and discharge instructions  Description: Complete learning assessment and assess knowledge base.Interventions:- Provide teaching at level of understanding- Provide teaching via preferred learning methods  Outcome: Not Progressing     Problem: NEUROSENSORY - ADULT  Goal: Achieves stable or improved neurological status  Description: INTERVENTIONS  - Monitor and report changes in neurological status  - Monitor vital signs such as temperature, blood pressure, glucose, and any other labs ordered   - Initiate measures to prevent increased intracranial pressure  - Monitor for seizure activity and implement precautions if appropriate      Outcome: Progressing  Goal: Remains free of injury related to seizures activity  Description: INTERVENTIONS  - Maintain airway, patient safety  and administer oxygen as ordered  - Monitor patient for seizure activity, document and report duration and description of seizure to physician/advanced practitioner  - If seizure occurs,  ensure patient safety during seizure  - Reorient patient post seizure  - Seizure pads on all 4 side rails  - Instruct patient/family to notify RN of any seizure activity including if an aura is experienced  - Instruct patient/family to call for assistance with activity based on nursing assessment  - Administer  anti-seizure medications if ordered    Outcome: Progressing  Goal: Achieves maximal functionality and self care  Description: INTERVENTIONS  - Monitor swallowing and airway patency with patient fatigue and changes in neurological status  - Encourage and assist patient to increase activity and self care.   - Encourage visually impaired, hearing impaired and aphasic patients to use assistive/communication devices  Outcome: Not Progressing

## 2025-06-02 ENCOUNTER — APPOINTMENT (INPATIENT)
Dept: NEUROLOGY | Facility: CLINIC | Age: 65
DRG: 421 | End: 2025-06-02
Payer: MEDICARE

## 2025-06-02 PROBLEM — Z86.69 HX OF SEIZURE DISORDER: Status: ACTIVE | Noted: 2025-06-02

## 2025-06-02 LAB
GLUCOSE SERPL-MCNC: 141 MG/DL (ref 65–140)
GLUCOSE SERPL-MCNC: 164 MG/DL (ref 65–140)
GLUCOSE SERPL-MCNC: 269 MG/DL (ref 65–140)
GLUCOSE SERPL-MCNC: 94 MG/DL (ref 65–140)

## 2025-06-02 PROCEDURE — 97116 GAIT TRAINING THERAPY: CPT

## 2025-06-02 PROCEDURE — 82948 REAGENT STRIP/BLOOD GLUCOSE: CPT

## 2025-06-02 PROCEDURE — 95816 EEG AWAKE AND DROWSY: CPT

## 2025-06-02 PROCEDURE — 97535 SELF CARE MNGMENT TRAINING: CPT

## 2025-06-02 PROCEDURE — 97530 THERAPEUTIC ACTIVITIES: CPT

## 2025-06-02 PROCEDURE — 99232 SBSQ HOSP IP/OBS MODERATE 35: CPT | Performed by: FAMILY MEDICINE

## 2025-06-02 RX ORDER — CEPHALEXIN 500 MG/1
500 CAPSULE ORAL EVERY 6 HOURS SCHEDULED
Status: DISCONTINUED | OUTPATIENT
Start: 2025-06-02 | End: 2025-06-04 | Stop reason: HOSPADM

## 2025-06-02 RX ADMIN — Medication 1 PACKET: at 08:13

## 2025-06-02 RX ADMIN — INSULIN LISPRO 1 UNITS: 100 INJECTION, SOLUTION INTRAVENOUS; SUBCUTANEOUS at 21:04

## 2025-06-02 RX ADMIN — INSULIN LISPRO 3 UNITS: 100 INJECTION, SOLUTION INTRAVENOUS; SUBCUTANEOUS at 16:51

## 2025-06-02 RX ADMIN — INSULIN GLARGINE 10 UNITS: 100 INJECTION, SOLUTION SUBCUTANEOUS at 21:04

## 2025-06-02 RX ADMIN — LEVETIRACETAM 1250 MG: 750 TABLET, FILM COATED ORAL at 21:04

## 2025-06-02 RX ADMIN — ENOXAPARIN SODIUM 40 MG: 40 INJECTION SUBCUTANEOUS at 08:12

## 2025-06-02 RX ADMIN — CEPHALEXIN 500 MG: 500 CAPSULE ORAL at 23:38

## 2025-06-02 RX ADMIN — LEVETIRACETAM 1250 MG: 750 TABLET, FILM COATED ORAL at 08:12

## 2025-06-02 RX ADMIN — POLYETHYLENE GLYCOL 3350 17 G: 17 POWDER, FOR SOLUTION ORAL at 08:13

## 2025-06-02 RX ADMIN — PRAVASTATIN SODIUM 40 MG: 40 TABLET ORAL at 16:51

## 2025-06-02 RX ADMIN — DEXTRAN 70, GLYCERIN, HYPROMELLOSE 1 DROP: 1; 2; 3 SOLUTION/ DROPS OPHTHALMIC at 16:50

## 2025-06-02 RX ADMIN — CEPHALEXIN 500 MG: 500 CAPSULE ORAL at 16:51

## 2025-06-02 RX ADMIN — CEPHALEXIN 500 MG: 500 CAPSULE ORAL at 11:11

## 2025-06-02 RX ADMIN — DEXTRAN 70, GLYCERIN, HYPROMELLOSE 1 DROP: 1; 2; 3 SOLUTION/ DROPS OPHTHALMIC at 08:12

## 2025-06-02 RX ADMIN — ASPIRIN 81 MG CHEWABLE TABLET 81 MG: 81 TABLET CHEWABLE at 08:12

## 2025-06-02 RX ADMIN — SENNOSIDES AND DOCUSATE SODIUM 1 TABLET: 50; 8.6 TABLET ORAL at 21:04

## 2025-06-02 RX ADMIN — QUETIAPINE FUMARATE 25 MG: 25 TABLET ORAL at 21:04

## 2025-06-02 NOTE — ASSESSMENT & PLAN NOTE
Lab Results   Component Value Date    HGBA1C 7.3 (H) 03/05/2025       Recent Labs     06/01/25  1059 06/01/25  1710 06/01/25 2057 06/02/25  0644   POCGLU 114 130 235* 94       Blood Sugar Average: Last 72 hrs:  (P) [id number]  Hold glimepiride and metformin while admitted  Diabetic diet  Blood sugar has been <100 throughout. reduced Lantus from 15 to 10 units and discontinue meal coverage with Humalog.   Continue sliding scale   Monitor blood sugars and adjust insulin accordingly
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
## 2025-06-02 NOTE — ASSESSMENT & PLAN NOTE
"Patient was recently admitted from 5/1-5/9 to neurology service for breakthrough seizures in the setting of medication noncompliance.   There were concerns brought up by family that admission regarding cognitive impairment/memory loss and ability to care for herself.    Scored 13/30 on MOCA done 5/7.  Per Neurology record review, was deemed at that time capable to live alone  Patient was initially discharged and stayed with her brother before he took her back to her home.  She did not recall details of this.  Additionally, [facility] came out to her home and patient could not properly use her insulin or use glucometer (she has been on insulin for years) so came back to the hospital for safety concerns  Neurology and geriatrics evals appreciated   MRI brain 5/16: \"No acute intracranial pathology. Chronic microangiopathy. NeuroQuant analysis was performed: Normal hippocampal volume and enlarged ventricular system: Findings do not support hippocampal degeneration. Possible expansion of ventricular system without medial temporal lobe focused ex-vacuo process.\"  RPR nonreactive, TSH 0.989, folate greater than 22.3, vitamin B12 is 476, vitamin B 1 is 123  Patient does not appear to have capacity to make fully informed medical decisions per neuropsych  Discharge planning to assisted living/LTC in the [place] area per family preference  "

## 2025-06-02 NOTE — UTILIZATION REVIEW
Continued Stay Review    Date: 6-2-25                         Certification Statement: The patient will continue to require additional inpatient hospital stay due to pending placement  Discharge Plan: Anticipate discharge tomorrow to rehab facility.    Current Patient Class: Inpatient Current Level of Care: med surg    HPI:64 y.o. female initially admitted on 5-12-25     Current Diagnosis:     Failure to thrive  Diabetes  Focal epilepsy at risk f delirium  UTI       Assessment/Plan:   Mental status back to baseline : alert oriented, appropriately answers questions.  pleasant, conversational,       Scheduled Medications:    Artificial Tears, 1 drop, Both Eyes, TID  aspirin, 81 mg, Oral, Daily  cephalexin, 500 mg, Oral, Q6H DYAN  enoxaparin, 40 mg, Subcutaneous, Daily  insulin glargine, 10 Units, Subcutaneous, HS  insulin lispro, 1-6 Units, Subcutaneous, TID AC  insulin lispro, 1-6 Units, Subcutaneous, HS  levETIRAcetam, 1,250 mg, Oral, Q12H DYAN  OLANZapine, 5 mg, Intramuscular, Once  polyethylene glycol, 17 g, Oral, Daily  pravastatin, 40 mg, Oral, Daily With Dinner  psyllium, 1 packet, Oral, Daily  QUEtiapine, 25 mg, Oral, HS  senna-docusate sodium, 1 tablet, Oral, HS      Continuous IV Infusions:     PRN Meds:  acetaminophen, 650 mg, Oral, Q6H PRN  hydrOXYzine HCL, 25 mg, Oral, Q6H PRN  OLANZapine, 5 mg, Intramuscular, Q6H PRN      Discharge Plan: referred to SNF level rehab pending authorization.      Vital Signs (last 3 days)       Date/Time Temp Pulse Resp BP MAP (mmHg) SpO2 O2 Device [place] Coma Scale Score Pain    06/02/25 15:29:04 -- 67 -- 105/67 80 96 % -- -- --    06/02/25 1052 -- -- -- -- -- -- -- -- No Pain    06/02/25 1025 -- -- -- -- -- -- -- -- No Pain    06/02/25 0800 -- -- -- -- -- -- -- 15 No Pain    06/02/25 07:04:42 98 °F (36.7 °C) 82 18 112/69 83 95 % -- -- --    06/02/25 0000 -- 69 -- -- -- 97 % -- 15 --    06/01/25 2200 -- 68 -- -- -- 96 % -- -- --    06/01/25 20:58:22 98.1 °F (36.7 °C) 95  16 115/72 86 96 % None (Room air) 15 No Pain    06/01/25 17:56:25 98.2 °F (36.8 °C) -- -- 115/74 88 -- -- -- --    06/01/25 17:20:19 97.9 °F (36.6 °C) -- 18 107/82 90 -- -- -- --    06/01/25 17:19:50 97.9 °F (36.6 °C) -- -- 107/82 90 -- -- -- --    06/01/25 0800 -- -- -- -- -- -- None (Room air) 14 No Pain    06/01/25 07:09:14 97.6 °F (36.4 °C) -- 17 100/62 75 -- -- -- --    06/01/25 0400 -- -- -- -- -- -- -- 14 --    06/01/25 0000 -- -- -- -- -- -- -- 14 --    05/31/25 21:32:15 97.6 °F (36.4 °C) 85 16 114/77 89 96 % None (Room air) -- --    05/31/25 2000 -- -- -- -- -- -- None (Room air) 14 No Pain    05/31/25 1600 -- -- -- -- -- -- -- 14 --    05/31/25 0800 -- -- -- -- -- -- None (Room air) 14 No Pain    05/31/25 07:28:35 98.1 °F (36.7 °C) 90 -- 117/75 89 96 % -- -- --    05/31/25 0728 -- -- 17 -- -- -- None (Room air) -- --    05/31/25 0400 -- -- -- -- -- -- -- 14 --    05/31/25 0000 -- -- -- -- -- -- -- 14 --    05/30/25 20:53:14 97.6 °F (36.4 °C) 91 -- 124/77 93 98 % -- -- --    05/30/25 2000 -- -- -- -- -- -- None (Room air) 14 No Pain    05/30/25 16:12:36 98.3 °F (36.8 °C) 78 16 124/76 92 100 % -- -- --    05/30/25 1025 -- -- -- -- -- -- -- -- No Pain    05/30/25 0800 -- -- -- -- -- -- -- 14 No Pain    05/30/25 07:05:39 97.2 °F (36.2 °C) 84 12 105/63 77 99 % -- -- --    05/30/25 01:08:45 97.7 °F (36.5 °C) 77 16 121/67 85 97 % -- -- --          Weight (last 2 days)       None            Pertinent Labs/Diagnostic Results:       Results from last 7 days   Lab Units 05/30/25  1704   WBC Thousand/uL 7.12   HEMOGLOBIN g/dL 12.6   HEMATOCRIT % 37.2   PLATELETS Thousands/uL 174   TOTAL NEUT ABS Thousands/µL 5.21         Results from last 7 days   Lab Units 05/30/25  1704   SODIUM mmol/L 142   POTASSIUM mmol/L 3.8   CHLORIDE mmol/L 109*   CO2 mmol/L 25   ANION GAP mmol/L 8   BUN mg/dL 16   CREATININE mg/dL 0.90   EGFR ml/min/1.73sq m 68   CALCIUM mg/dL 9.6   MAGNESIUM mg/dL 1.9     Results from last 7 days   Lab Units  05/30/25  1704   AST U/L 12*   ALT U/L 14   ALK PHOS U/L 47   TOTAL PROTEIN g/dL 7.1   ALBUMIN g/dL 4.1   TOTAL BILIRUBIN mg/dL 0.53     Results from last 7 days   Lab Units 06/02/25  1103 06/02/25  0644 06/01/25  2057 06/01/25  1710 06/01/25  1059 06/01/25  0603 05/31/25  2130 05/31/25  0613 05/30/25  2122 05/30/25  1614 05/30/25  1041 05/30/25  0658   POC GLUCOSE mg/dl 141* 94 235* 130 114 75 172* 72 82 95 96 115     Results from last 7 days   Lab Units 05/30/25  1704   GLUCOSE RANDOM mg/dL 105     Results from last 7 days   Lab Units 06/01/25  0638   CLARITY UA  Turbid   COLOR UA  Light Yellow   SPEC GRAV UA  1.013   PH UA  5.0   GLUCOSE UA mg/dl Negative   KETONES UA mg/dl Negative   BLOOD UA  Negative   PROTEIN UA mg/dl Negative   NITRITE UA  Positive*   BILIRUBIN UA  Negative   UROBILINOGEN UA (BE) mg/dl <2.0   LEUKOCYTES UA  Large*   WBC UA /hpf Innumerable*   RBC UA /hpf None Seen   BACTERIA UA /hpf Moderate*   EPITHELIAL CELLS WET PREP /hpf Occasional     Results from last 7 days   Lab Units 06/01/25  0638   URINE CULTURE  Klebsiella pneumoniae*         Network Utilization Review Department  ATTENTION: Please call with any questions or concerns to [phone] and carefully listen to the prompts so that you are directed to the right person. All voicemails are confidential.   For Discharge needs, contact Care Management DC Support Team at [phone] opt. 2  Send all requests for admission clinical reviews, approved or denied determinations and any other requests to dedicated fax number below belonging to the campus where the patient is receiving treatment. List of dedicated fax numbers for the Facilities:  FACILITY NAME UR FAX NUMBER   ADMISSION DENIALS (Administrative/Medical Necessity) [phone]   DISCHARGE SUPPORT TEAM (NETWORK) [phone]   PARENT CHILD HEALTH (Maternity/NICU/Pediatrics) [phone]   [facility] [phone]   General acute hospital  [phone]   [facility] [phone]   [facility] [phone]   [facility] [phone]   [facility] [phone]   [facility] [phone]   [facility] [phone]   [facility] [phone]   [facility] [phone]   General acute hospital [phone]   [facility] [phone]

## 2025-06-02 NOTE — PHYSICAL THERAPY NOTE
PHYSICAL THERAPY NOTE          Patient Name: Ynes Mendoza  Today's Date: 6/2/2025 06/02/25 1052   PT Last Visit   PT Visit Date 06/02/25   Note Type   Note Type Treatment for insurance authorization   Pain Assessment   Pain Assessment Tool 0-10   Pain Score No Pain   Restrictions/Precautions   Weight Bearing Precautions Per Order No   Other Precautions Cognitive;Chair Alarm;Bed Alarm;Fall Risk;1:1  (virtual 1:1)   General   Chart Reviewed Yes   Response to Previous Treatment Patient with no complaints from previous session.   Family/Caregiver Present No   Cognition   Overall Cognitive Status Impaired   Arousal/Participation Alert;Responsive;Cooperative   Attention Attends with cues to redirect   Orientation Level Oriented to person;Oriented to place;Disoriented to time   Memory Decreased recall of recent events;Decreased recall of precautions   Following Commands Follows one step commands with increased time or repetition   Comments pt pleasant and cooperative   Subjective   Subjective pt agreeable to mobilize   Bed Mobility   Supine to Sit 5  Supervision   Additional items HOB elevated;Increased time required   Sit to Supine Unable to assess   Additional Comments pt OOB in chair at end of session   Transfers   Sit to Stand 6  Modified independent   Stand to Sit 6  Modified independent   Additional Comments c rollator. x2 STS throughout session   Ambulation/Elevation   Gait pattern Inconsistent parris;Foward flexed;Short stride   Gait Assistance 6  Modified independent   Additional items Verbal cues   Assistive Device Other (Comment)  (rollator vs no AD)   Distance 10'+20'+300'+20'+20' (rollator)+10'x3 (no AD)   Stair Management Assistance Not tested   Ambulation/Elevation Additional Comments no overt LOB. S for ambulating short distances with rollator   Balance   Static Sitting Normal   Dynamic Sitting Good   Static Standing  Fair +   Dynamic Standing Fair   Ambulatory Fair   Endurance Deficit   Endurance Deficit No   Activity Tolerance   Activity Tolerance Patient tolerated treatment well   Nurse Made Aware yes-RN cleared   Exercises   Balance training  pt reaching outside EBEN to reach for items to place on rollator. challenging dynamic balance to remake bed at S level   Assessment   Prognosis Good   Problem List Impaired balance;Decreased mobility;Decreased cognition;Impaired judgement;Decreased safety awareness   Assessment Pt very pleasant and agreeable to participate in PT session. Completes mobility and therapeutic activity as outlined above. Pt able to complete transfers and ambulate with rollator at mod I level. Pt completes activity to challenge dynamic balance and activity tolerance without difficulty. Pt requires 1 seated rest break due to fatigue. Pt does not demonstrate any overt LOB during session. Pt able to reach down to ground to  object with S. Pt is progressing towards her goals. Pt left upright in chair with chair alarm donned, call bell and personal items within reach and all needs met.   Barriers to Discharge Decreased caregiver support   Goals   Patient Goals to go to rehab facility   STG Expiration Date 06/19/25   PT Treatment Day 4   Plan   Treatment/Interventions Functional transfer training;Therapeutic exercise;Elevations;LE strengthening/ROM;Endurance training;Cognitive reorientation;Patient/family training;Equipment eval/education;Bed mobility;Gait training;Spoke to nursing;OT   Progress Progressing toward goals   PT Frequency 1-2x/wk   Discharge Recommendation   Rehab Resource Intensity Level, PT II (Moderate Resource Intensity)   Equipment Recommended Walker   Walker Package Recommended Wheeled walker   AM-PAC Basic Mobility Inpatient   Turning in Flat Bed Without Bedrails 4   Lying on Back to Sitting on Edge of Flat Bed Without Bedrails 4   Moving Bed to Chair 3   Standing Up From Chair Using Arms 4    Walk in Room 3   Climb 3-5 Stairs With Railing 3   Basic Mobility Inpatient Raw Score 21   Basic Mobility Standardized Score 45.55   [facility] Highest Level Of Mobility   -HLM Goal 6: Walk 10 steps or more   -HLM Achieved 8: Walk 250 feet ot more   Education   Education Provided Mobility training;Assistive device   Patient Demonstrates acceptance/verbal understanding   End of Consult   Patient Position at End of Consult Bedside chair;Bed/Chair alarm activated;All needs within reach   GURVINDER ThomasonT

## 2025-06-02 NOTE — PLAN OF CARE
Problem: PAIN - ADULT  Goal: Verbalizes/displays adequate comfort level or baseline comfort level  Description: Interventions:- Encourage patient to monitor pain and request assistance  - Assess pain using appropriate pain scale  - Administer analgesics based on type and severity of pain and evaluate response  - Implement non-pharmacological measures as appropriate and evaluate response  - Notify physician/advanced practitioner if interventions unsuccessful or patient reports new pain  Outcome: Progressing     Problem: INFECTION - ADULT  Goal: Absence or prevention of progression during hospitalization  Description: INTERVENTIONS:- Assess and monitor for signs and symptoms of infection  - Monitor lab/diagnostic results  - Monitor all insertion sites, i.e. indwelling lines, tubes, and drains  - [place] appropriate cooling/warming therapies per order  - Administer medications as ordered  - Instruct and encourage patient and family to use good hand hygiene technique  - Identify and instruct in appropriate isolation precautions for identified infection/condition  Outcome: Progressing     Problem: SAFETY ADULT  Goal: Patient will remain free of falls  Description: INTERVENTIONS:- Educate patient/family on patient safety including physical limitations  - Instruct patient to call for assistance with activity  - Consult OT/PT to assist with strengthening/mobility  - Keep Call bell within reach  - Keep bed low and locked with side rails adjusted as appropriate  - Keep care items and personal belongings within reach  - Initiate and maintain comfort rounds  - Make Fall Risk Sign visible to staff  Outcome: Progressing  Goal: Maintain or return to baseline ADL function  Description: INTERVENTIONS:-  Assess patient's ability to carry out ADLs; assess patient's baseline for ADL function and identify physical deficits which impact ability to perform ADLs (bathing, care of mouth/teeth, toileting, grooming, dressing, etc.)-  Assess/evaluate cause of self-care deficits - Assess range of motion- Assess patient's mobility; develop plan if impaired- Assess patient's need for assistive devices and provide as appropriate- Encourage maximum independence but intervene and supervise when necessary- Involve family in performance of ADLs- Assess for home care needs following discharge - Consider OT consult to assist with ADL evaluation and planning for discharge- Provide patient education as appropriate  Outcome: Progressing  Goal: Maintains/Returns to pre admission functional level  Description: INTERVENTIONS:- Perform AM-PAC 6 Click Basic Mobility/ Daily Activity assessment daily.- Set and communicate daily mobility goal to care team and patient/family/caregiver. - Collaborate with rehabilitation services on mobility goals if consulted  Outcome: Progressing     Problem: DISCHARGE PLANNING  Goal: Discharge to home or other facility with appropriate resources  Description: INTERVENTIONS:- Identify barriers to discharge w/patient and caregiver  - Arrange for needed discharge resources and transportation as appropriate  - Identify discharge learning needs (meds, wound care, etc.)  - Refer to Case Management Department for coordinating discharge planning if the patient needs post-hospital services based on physician/advanced practitioner order or complex needs related to functional status, cognitive ability, or social support system  Outcome: Progressing     Problem: Knowledge Deficit  Goal: Patient/family/caregiver demonstrates understanding of disease process, treatment plan, medications, and discharge instructions  Description: Complete learning assessment and assess knowledge base.Interventions:- Provide teaching at level of understanding- Provide teaching via preferred learning methods  Outcome: Progressing     Problem: NEUROSENSORY - ADULT  Goal: Achieves stable or improved neurological status  Description: INTERVENTIONS  - Monitor and report  changes in neurological status  - Monitor vital signs such as temperature, blood pressure, glucose, and any other labs ordered   - Initiate measures to prevent increased intracranial pressure  - Monitor for seizure activity and implement precautions if appropriate      Outcome: Progressing  Goal: Remains free of injury related to seizures activity  Description: INTERVENTIONS  - Maintain airway, patient safety  and administer oxygen as ordered  - Monitor patient for seizure activity, document and report duration and description of seizure to physician/advanced practitioner  - If seizure occurs,  ensure patient safety during seizure  - Reorient patient post seizure  - Seizure pads on all 4 side rails  - Instruct patient/family to notify RN of any seizure activity including if an aura is experienced  - Instruct patient/family to call for assistance with activity based on nursing assessment  - Administer anti-seizure medications if ordered    Outcome: Progressing  Goal: Achieves maximal functionality and self care  Description: INTERVENTIONS  - Monitor swallowing and airway patency with patient fatigue and changes in neurological status  - Encourage and assist patient to increase activity and self care.   - Encourage visually impaired, hearing impaired and aphasic patients to use assistive/communication devices  Outcome: Progressing

## 2025-06-02 NOTE — ASSESSMENT & PLAN NOTE
5/31/25 patient agitated,, walking around the hallway, not oriented to place, time, situation, attempting to leave the unit. Uanble to reorient verbally.   required IM zyprexa and abdominal posey.   Likely UTI related vs. hospital delirium in the setting of prolonged hospitalization and cognitive impairment.   Started on Seroquel 25 mg at HS on 5/31.   Will consult geriatric if not improved  Today she is back to baseline, pleasant, cooperative. Discontinued one to one.

## 2025-06-02 NOTE — PLAN OF CARE
Problem: OCCUPATIONAL THERAPY ADULT  Goal: Performs self-care activities at highest level of function for planned discharge setting.  See evaluation for individualized goals.  Description: Treatment Interventions: ADL retraining, Functional transfer training, Endurance training, Cognitive reorientation, Patient/family training, Equipment evaluation/education, Compensatory technique education, Activityengagement          See flowsheet documentation for full assessment, interventions and recommendations.   Note: Limitation: Decreased cognition, Decreased ADL status  Prognosis: Fair  Assessment: Patient participated in Skilled OT session this date with interventions consisting of self care tasks, functional transfers, memory strategies/challenges . Patient agreeable to OT treatment session, upon arrival patient was found supine in bed.  In comparison to previous session, patient continues with memory deficits impedes I levels.Patient requiring verbal cues for safety and verbal cues for correct technique. Patient continues to be functioning below baseline level, occupational performance remains limited secondary to factors listed above and increased risk for falls and injury.   From OT standpoint, recommendation at time of d/c would be mod level II The patient's raw score on the AM-PAC Daily Activity Inpatient Short Form is 20. A raw score of greater than or equal to 19 suggests the patient may benefit from discharge to home. Please refer to the recommendation of the Occupational Therapist for safe discharge planning.    Patient to benefit from continued Occupational Therapy treatment while in the hospital to address deficits as defined above and maximize level of functional independence with ADLs and functional mobility.     Rehab Resource Intensity Level, OT: II (Moderate Resource Intensity)

## 2025-06-02 NOTE — PLAN OF CARE
Problem: PAIN - ADULT  Goal: Verbalizes/displays adequate comfort level or baseline comfort level  Description: Interventions:- Encourage patient to monitor pain and request assistance- Assess pain using appropriate pain scale- Administer analgesics based on type and severity of pain and evaluate response- Implement non-pharmacological measures as appropriate and evaluate response- Notify physician/advanced practitioner if interventions unsuccessful or patient reports new pain  Outcome: Progressing     Problem: INFECTION - ADULT  Goal: Absence or prevention of progression during hospitalization  Description: INTERVENTIONS:- Assess and monitor for signs and symptoms of infection- Monitor lab/diagnostic results- Monitor all insertion sites, i.e. indwelling lines, tubes, and drains- [place] appropriate cooling/warming therapies per order- Administer medications as ordered- Instruct and encourage patient and family to use good hand hygiene technique- Identify and instruct in appropriate isolation precautions for identified infection/condition  Outcome: Progressing     Problem: SAFETY ADULT  Goal: Patient will remain free of falls  Description: INTERVENTIONS:- Educate patient/family on patient safety including physical limitations- Instruct patient to call for assistance with activity - Consult OT/PT to assist with strengthening/mobility - Keep Call bell within reach- Keep bed low and locked with side rails adjusted as appropriate- Keep care items and personal belongings within reach- Initiate and maintain comfort rounds- Make Fall Risk Sign visible to staff  Outcome: Progressing     Problem: Knowledge Deficit  Goal: Patient/family/caregiver demonstrates understanding of disease process, treatment plan, medications, and discharge instructions  Description: Complete learning assessment and assess knowledge base.Interventions:- Provide teaching at level of understanding- Provide teaching via preferred learning  methods  Outcome: Progressing     Problem: NEUROSENSORY - ADULT  Goal: Achieves stable or improved neurological status  Description: INTERVENTIONS  - Monitor and report changes in neurological status  - Monitor vital signs such as temperature, blood pressure, glucose, and any other labs ordered   - Initiate measures to prevent increased intracranial pressure  - Monitor for seizure activity and implement precautions if appropriate      Outcome: Progressing      Airway patent, Nasal mucosa clear. Mouth with normal mucosa. Throat has no vesicles, no oropharyngeal exudates and uvula is midline.

## 2025-06-02 NOTE — OCCUPATIONAL THERAPY NOTE
"  Occupational Therapy Progress Note     Patient Name: Ynes Mendoza  Today's Date: 6/2/2025  Problem List  Principal Problem:    Failure to thrive in adult  Active Problems:    Type 2 diabetes mellitus, with long-term current use of insulin (HCC)    Generalized anxiety disorder    Focal epilepsy (HCC)    Vertigo    Cognitive impairment    Frailty    Ambulatory dysfunction    At risk for delirium    Acute cystitis without hematuria     06/02/25 1025   OT Last Visit   OT Visit Date 06/02/25   Note Type   Note Type Treatment for insurance authorization   Pain Assessment   Pain Assessment Tool 0-10   Pain Score No Pain   Restrictions/Precautions   Weight Bearing Precautions Per Order No   Other Precautions Cognitive;Chair Alarm;Bed Alarm;Telemetry   Lifestyle   Autonomy I adls and mobility - furniture cruises in home and use rollator outside - denies falls - i iadls (communal living area) - reports she prepares own meals by typically makes \"easy to make\" items like hot dogs - reports she uses CGM but cannot find the reader and her phone is not compatible - reports she does take prefilled insulin pens nightly   Reciprocal Relationships limited local support - states her family is more involved in her care recently and are helping her look for alternate living arrangements (senior communities) +brother, uncle   Service to Others not working   Intrinsic Gratification painting   ADL   LB Dressing Assistance 5  Supervision/Setup   LB Dressing Deficit Don/doff R shoe;Don/doff L shoe   Light Housekeeping   Light Housekeeping Level of Assistance Close supervision   Light Housekeeping pt performed item retreival and transportation with rollator, cues locate llinen closet (portion of recall of multitstep direcitves) able to assess/transport on rollator with S -cues to recall all items need to change bed sheets/make bed. pt required occassional cue to problem solve method to make bed thoroughly.   Bed Mobility   Supine to Sit " Unable to assess   Transfers   Sit to Stand 6  Modified independent   Stand to Sit 6  Modified independent   Stand pivot 6  Modified independent   Functional Mobility   Functional Mobility 5  Supervision   Additional Comments S without AD household distance functional mobility engaging in divided attention tasks with multiple step directives.  pt forgetful of all steps able to recall 2/4 to complete requiring verbal cues for STM.   Additional items (no AD)   Cognition   Overall Cognitive Status Impaired   Arousal/Participation Alert;Responsive;Cooperative   Attention Attends with cues to redirect   Orientation Level Oriented X4   Memory Decreased recall of precautions;Decreased recall of recent events;Decreased short term memory   Following Commands Follows one step commands with increased time or repetition   Comments pt pleasant and cooperative, impaired focused attention, impaired STM with multistep directives to recall and complete during functional mobility, simple problem solving with solutions during homemaking tasks per neurophych does have capacity to make medical decision as this time.   Activity Tolerance   Activity Tolerance Patient tolerated treatment well   Medical Staff Made Aware RN   Assessment   Assessment Patient participated in Skilled OT session this date with interventions consisting of self care tasks, functional transfers, memory strategies/challenges . Patient agreeable to OT treatment session, upon arrival patient was found supine in bed.  In comparison to previous session, patient continues with memory deficits impedes I levels.Patient requiring verbal cues for safety and verbal cues for correct technique. Patient continues to be functioning below baseline level, occupational performance remains limited secondary to factors listed above and increased risk for falls and injury.   From OT standpoint, recommendation at time of d/c would be mod level II The patient's raw score on the AM-PAC  Daily Activity Inpatient Short Form is 20. A raw score of greater than or equal to 19 suggests the patient may benefit from discharge to home. Please refer to the recommendation of the Occupational Therapist for safe discharge planning.    Patient to benefit from continued Occupational Therapy treatment while in the hospital to address deficits as defined above and maximize level of functional independence with ADLs and functional mobility.   Plan   Treatment Interventions ADL retraining;Patient/family training;Cognitive reorientation;Endurance training;Functional transfer training;Continued evaluation   Goal Expiration Date 06/13/25   OT Treatment Day 2   OT Frequency 1-2x/wk   Discharge Recommendation   Rehab Resource Intensity Level, OT II (Moderate Resource Intensity)   AM-PAC Daily Activity Inpatient   Lower Body Dressing 3   Bathing 3   Toileting 3   Upper Body Dressing 3   Grooming 4   Eating 4   Daily Activity Raw Score 20   Daily Activity Standardized Score (Calc for Raw Score >=11) 42.03   AM-PAC Applied Cognition Inpatient   Following a Speech/Presentation 1   Understanding Ordinary Conversation 4   Taking Medications 2   Remembering Where Things Are Placed or Put Away 2   Remembering List of 4-5 Errands 1   Taking Care of Complicated Tasks 1   Applied Cognition Raw Score 11   Applied Cognition Standardized Score 27.03   End of Consult   Education Provided Yes;Family or social support of family present for education by provider  (memstevey strategies)   Patient Position at End of Consult All needs within reach;Bed/Chair alarm activated;Bedside chair   Nurse Communication Nurse aware of consult     Added goals   Occupational Therapy Goals:    *Mod I functional mobility and transfers to/from all surfaces with Fair + dynamic balance and safety for participation in dynamic adls and iadl tasks     Angelica Mcmahan OTR/L

## 2025-06-02 NOTE — PLAN OF CARE
Problem: PHYSICAL THERAPY ADULT  Goal: Performs mobility at highest level of function for planned discharge setting.  See evaluation for individualized goals.  Description: Treatment/Interventions: Functional transfer training, LE strengthening/ROM, Elevations, Therapeutic exercise, Endurance training, Patient/family training, Bed mobility, Equipment eval/education, Gait training, Spoke to nursing, OT          See flowsheet documentation for full assessment, interventions and recommendations.  Outcome: Progressing  Note: Prognosis: Good  Problem List: Impaired balance, Decreased mobility, Decreased cognition, Impaired judgement, Decreased safety awareness  Assessment: Pt very pleasant and agreeable to participate in PT session. Completes mobility and therapeutic activity as outlined above. Pt able to complete transfers and ambulate with rollator at mod I level. Pt completes activity to challenge dynamic balance and activity tolerance without difficulty. Pt requires 1 seated rest break due to fatigue. Pt does not demonstrate any overt LOB during session. Pt able to reach down to ground to  object with S. Pt is progressing towards her goals. Pt left upright in chair with chair alarm donned, call bell and personal items within reach and all needs met.  Barriers to Discharge: Decreased caregiver support     Rehab Resource Intensity Level, PT: II (Moderate Resource Intensity)    See flowsheet documentation for full assessment.
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
## 2025-06-02 NOTE — PROGRESS NOTES
"Progress Note - Hospitalist   Name: Ynes Mendoza 64 y.o. female I MRN: [id number]  [facility] I Date of Admission: 5/12/2025   Date of Service: 6/2/2025 I Hospital Day: 20    Assessment & Plan  Failure to thrive in adult  Patient was recently admitted from 5/1-5/9 to neurology service for breakthrough seizures in the setting of medication noncompliance.   There were concerns brought up by family that admission regarding cognitive impairment/memory loss and ability to care for herself.    Scored 13/30 on MOCA done 5/7.  Per Neurology record review, was deemed at that time capable to live alone  Patient was initially discharged and stayed with her brother before he took her back to her home.  She did not recall details of this.  Additionally, VNA came out to her home and patient could not properly use her insulin or use glucometer (she has been on insulin for years) so came back to the hospital for safety concerns  Neurology and geriatrics evals appreciated   MRI brain 5/16: \"No acute intracranial pathology. Chronic microangiopathy. NeuroQuant analysis was performed: Normal hippocampal volume and enlarged ventricular system: Findings do not support hippocampal degeneration. Possible expansion of ventricular system without medial temporal lobe focused ex-vacuo process.\"  RPR nonreactive, TSH 0.989, folate greater than 22.3, vitamin B12 is 476, vitamin B 1 is 123  Patient does not appear to have capacity to make fully informed medical decisions per neuropsych  Discharge planning to assisted living/LTC in the [place] area per family preference  Type 2 diabetes mellitus, with long-term current use of insulin ([facility])  Lab Results   Component Value Date    HGBA1C 7.3 (H) 03/05/2025       Recent Labs     06/01/25  1059 06/01/25  1710 06/01/25  2057 06/02/25  0644   POCGLU 114 130 235* 94       Blood Sugar Average: Last 72 hrs:  (P) 116.4481501238764970  Hold glimepiride and metformin while admitted  Diabetic " "diet  Blood sugar has been <100 throughout. reduced Lantus from 15 to 10 units and discontinue meal coverage with Humalog.   Continue sliding scale   Monitor blood sugars and adjust insulin accordingly    Cognitive impairment  See above.  Sister reported progressive decline over 6-8 months   Geriatrics following  Does not have capacity per Neuropsych  Outpatient follow up with neuro for baseline dementia testing and to start medication therapy if indicated at the time  Focal epilepsy (HCC)  [facility]  Neurology evaluated   At risk for delirium  5/31/25 patient agitated,, walking around the hallway, not oriented to place, time, situation, attempting to leave the unit. Uanble to reorient verbally.   required IM zyprexa and abdominal posey.   Likely UTI related vs. hospital delirium in the setting of prolonged hospitalization and cognitive impairment.   Started on Seroquel 25 mg at HS on 5/31.   Will consult geriatric if not improved  Today she is back to baseline, pleasant, cooperative. Discontinued one to one.   Acute cystitis without hematuria  Due to acute change of cognitive decline yesterday, increased agitation, obtain urinalysis.  UA is positive for leukocytes and nitrite with innumerable WBC and moderate bacteria.  Await final urine culture  Initially started on Levaquin due to allergy listed for keflex. However it reduced seizure threshold therefore discontinue, and start keflex. As per patient, listed allergy of \"confusion\" is for a prior seizure meds.     VTE Pharmacologic Prophylaxis: VTE Score: 6 High Risk (Score >/= 5) - Pharmacological DVT Prophylaxis Ordered: enoxaparin (Lovenox). Sequential Compression Devices Ordered.    Mobility:   Basic Mobility Inpatient Raw Score: 23  JH-HLM Goal: 7: Walk 25 feet or more  JH-HLM Achieved: 6: Walk 10 steps or more  JH-HLM Goal achieved. Continue to encourage appropriate mobility.    Patient Centered Rounds: I performed bedside rounds with nursing staff " today.      Current Length of Stay: 20 day(s)  Current Patient Status: Inpatient   Certification Statement: The patient will continue to require additional inpatient hospital stay due to pending placement  Discharge Plan: Anticipate discharge tomorrow to rehab facility.    Code Status: Level 1 - Full Code    Subjective     Patient examined at bedside.  Appears the patient is back to baseline mentation.  Very pleasant.  Alert and oriented.  Answering all question appropriately.  Holding a conversation.  Denies any dysuria or polyuria    Objective :  Temp:  [97.9 °F (36.6 °C)-98.2 °F (36.8 °C)] 98 °F (36.7 °C)  HR:  [68-95] 82  BP: (107-115)/(69-82) 112/69  Resp:  [16-18] 18  SpO2:  [95 %-97 %] 95 %  O2 Device: None (Room air)    There is no height or weight on file to calculate BMI.     Input and Output Summary (last 24 hours):     Intake/Output Summary (Last 24 hours) at 6/2/2025 1030  Last data filed at 6/1/2025 2201  Gross per 24 hour   Intake 120 ml   Output 3 ml   Net 117 ml       Physical Exam  Constitutional:       Appearance: She is well-developed.   HENT:      Head: Normocephalic and atraumatic.   Pulmonary:      Effort: Pulmonary effort is normal. No respiratory distress.      Breath sounds: Normal breath sounds.     Musculoskeletal:      Cervical back: Normal range of motion.     Skin:     General: Skin is warm and dry.           Lines/Drains:              Lab Results: I have reviewed the following results:   Results from last 7 days   Lab Units 05/30/25  1704   WBC Thousand/uL 7.12   HEMOGLOBIN g/dL 12.6   HEMATOCRIT % 37.2   PLATELETS Thousands/uL 174   SEGS PCT % 72   LYMPHO PCT % 18   MONO PCT % 7   EOS PCT % 1     Results from last 7 days   Lab Units 05/30/25  1704   SODIUM mmol/L 142   POTASSIUM mmol/L 3.8   CHLORIDE mmol/L 109*   CO2 mmol/L 25   BUN mg/dL 16   CREATININE mg/dL 0.90   ANION GAP mmol/L 8   CALCIUM mg/dL 9.6   ALBUMIN g/dL 4.1   TOTAL BILIRUBIN mg/dL 0.53   ALK PHOS U/L 47   ALT U/L 14    AST U/L 12*   GLUCOSE RANDOM mg/dL 105         Results from last 7 days   Lab Units 06/02/25  0644 06/01/25  2057 06/01/25  1710 06/01/25  1059 06/01/25  0603 05/31/25  2130 05/31/25  0613 05/30/25  2122 05/30/25  1614 05/30/25  1041 05/30/25  0658 05/29/25  2057   POC GLUCOSE mg/dl 94 235* 130 114 75 172* 72 82 95 96 115 118               Recent Cultures (last 7 days):   Results from last 7 days   Lab Units 06/01/25  0638   URINE CULTURE  Gram Negative Ashutosh Enteric Like*          Last 24 Hours Medication List:     Current Facility-Administered Medications:     acetaminophen (TYLENOL) tablet 650 mg, Q6H PRN    Artificial Tears Op Soln 1 drop, TID    aspirin chewable tablet 81 mg, Daily    cephalexin (KEFLEX) capsule 500 mg, Q6H DYAN    enoxaparin (LOVENOX) subcutaneous injection 40 mg, Daily    hydrOXYzine HCL (ATARAX) tablet 25 mg, Q6H PRN    insulin glargine (LANTUS) subcutaneous injection 10 Units 0.1 mL, HS    insulin lispro (HumALOG/ADMELOG) 100 units/mL subcutaneous injection 1-6 Units, TID AC **AND** Fingerstick Glucose (POCT), TID AC    insulin lispro (HumALOG/ADMELOG) 100 units/mL subcutaneous injection 1-6 Units, HS    levETIRAcetam (KEPPRA) tablet 1,250 mg, Q12H DYAN    OLANZapine (ZyPREXA) IM injection 5 mg, Q6H PRN    OLANZapine (ZyPREXA) IM injection 5 mg, Once    polyethylene glycol (MIRALAX) packet 17 g, Daily    pravastatin (PRAVACHOL) tablet 40 mg, Daily With Dinner    psyllium (METAMUCIL) 1 packet, Daily    QUEtiapine (SEROquel) tablet 25 mg, HS    senna-docusate sodium (SENOKOT S) 8.6-50 mg per tablet 1 tablet, HS    Administrative Statements   Today, Patient Was Seen By: Karolina Jarquin MD      **Please Note: This note may have been constructed using a voice recognition system.**

## 2025-06-03 ENCOUNTER — PATIENT OUTREACH (OUTPATIENT)
Dept: CASE MANAGEMENT | Facility: OTHER | Age: 65
End: 2025-06-03

## 2025-06-03 PROBLEM — G93.41 METABOLIC ENCEPHALOPATHY: Status: ACTIVE | Noted: 2025-06-03

## 2025-06-03 LAB
ANION GAP SERPL CALCULATED.3IONS-SCNC: 8 MMOL/L (ref 4–13)
BACTERIA UR CULT: ABNORMAL
BUN SERPL-MCNC: 17 MG/DL (ref 5–25)
CALCIUM SERPL-MCNC: 9 MG/DL (ref 8.4–10.2)
CHLORIDE SERPL-SCNC: 109 MMOL/L (ref 96–108)
CO2 SERPL-SCNC: 26 MMOL/L (ref 21–32)
CREAT SERPL-MCNC: 0.92 MG/DL (ref 0.6–1.3)
ERYTHROCYTE [DISTWIDTH] IN BLOOD BY AUTOMATED COUNT: 12.6 % (ref 11.6–15.1)
GFR SERPL CREATININE-BSD FRML MDRD: 66 ML/MIN/1.73SQ M
GLUCOSE SERPL-MCNC: 129 MG/DL (ref 65–140)
GLUCOSE SERPL-MCNC: 163 MG/DL (ref 65–140)
GLUCOSE SERPL-MCNC: 165 MG/DL (ref 65–140)
GLUCOSE SERPL-MCNC: 238 MG/DL (ref 65–140)
GLUCOSE SERPL-MCNC: 291 MG/DL (ref 65–140)
HCT VFR BLD AUTO: 37.6 % (ref 34.8–46.1)
HGB BLD-MCNC: 12.2 G/DL (ref 11.5–15.4)
MCH RBC QN AUTO: 29 PG (ref 26.8–34.3)
MCHC RBC AUTO-ENTMCNC: 32.4 G/DL (ref 31.4–37.4)
MCV RBC AUTO: 90 FL (ref 82–98)
PLATELET # BLD AUTO: 152 THOUSANDS/UL (ref 149–390)
PMV BLD AUTO: 11.8 FL (ref 8.9–12.7)
POTASSIUM SERPL-SCNC: 3.9 MMOL/L (ref 3.5–5.3)
RBC # BLD AUTO: 4.2 MILLION/UL (ref 3.81–5.12)
SODIUM SERPL-SCNC: 143 MMOL/L (ref 135–147)
WBC # BLD AUTO: 4.99 THOUSAND/UL (ref 4.31–10.16)

## 2025-06-03 PROCEDURE — 82948 REAGENT STRIP/BLOOD GLUCOSE: CPT

## 2025-06-03 PROCEDURE — 99232 SBSQ HOSP IP/OBS MODERATE 35: CPT | Performed by: INTERNAL MEDICINE

## 2025-06-03 PROCEDURE — 95819 EEG AWAKE AND ASLEEP: CPT | Performed by: PSYCHIATRY & NEUROLOGY

## 2025-06-03 PROCEDURE — 80048 BASIC METABOLIC PNL TOTAL CA: CPT | Performed by: FAMILY MEDICINE

## 2025-06-03 PROCEDURE — 85027 COMPLETE CBC AUTOMATED: CPT | Performed by: FAMILY MEDICINE

## 2025-06-03 RX ADMIN — INSULIN LISPRO 1 UNITS: 100 INJECTION, SOLUTION INTRAVENOUS; SUBCUTANEOUS at 17:29

## 2025-06-03 RX ADMIN — ENOXAPARIN SODIUM 40 MG: 40 INJECTION SUBCUTANEOUS at 09:15

## 2025-06-03 RX ADMIN — CEPHALEXIN 500 MG: 500 CAPSULE ORAL at 17:29

## 2025-06-03 RX ADMIN — PRAVASTATIN SODIUM 40 MG: 40 TABLET ORAL at 17:29

## 2025-06-03 RX ADMIN — INSULIN GLARGINE 10 UNITS: 100 INJECTION, SOLUTION SUBCUTANEOUS at 21:23

## 2025-06-03 RX ADMIN — DEXTRAN 70, GLYCERIN, HYPROMELLOSE 1 DROP: 1; 2; 3 SOLUTION/ DROPS OPHTHALMIC at 09:19

## 2025-06-03 RX ADMIN — CEPHALEXIN 500 MG: 500 CAPSULE ORAL at 05:32

## 2025-06-03 RX ADMIN — QUETIAPINE FUMARATE 25 MG: 25 TABLET ORAL at 21:22

## 2025-06-03 RX ADMIN — LEVETIRACETAM 1250 MG: 750 TABLET, FILM COATED ORAL at 21:22

## 2025-06-03 RX ADMIN — INSULIN LISPRO 4 UNITS: 100 INJECTION, SOLUTION INTRAVENOUS; SUBCUTANEOUS at 11:38

## 2025-06-03 RX ADMIN — Medication 1 PACKET: at 09:15

## 2025-06-03 RX ADMIN — CEPHALEXIN 500 MG: 500 CAPSULE ORAL at 11:38

## 2025-06-03 RX ADMIN — LEVETIRACETAM 1250 MG: 750 TABLET, FILM COATED ORAL at 09:15

## 2025-06-03 RX ADMIN — ASPIRIN 81 MG CHEWABLE TABLET 81 MG: 81 TABLET CHEWABLE at 09:15

## 2025-06-03 RX ADMIN — INSULIN LISPRO 3 UNITS: 100 INJECTION, SOLUTION INTRAVENOUS; SUBCUTANEOUS at 21:23

## 2025-06-03 NOTE — PROGRESS NOTES
Per chart review patient is currently at [facility] with a plan to discharge to LTC. At this time outpatient social work CM will close. Please re consult  CM as needed.
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
## 2025-06-03 NOTE — ASSESSMENT & PLAN NOTE
Metabolic encephalopathy possibly due to acute cystitis  Receiving Keflex for UTI  Optimize metabolic parameters  Avoid neurotoxins  Monitor

## 2025-06-03 NOTE — ASSESSMENT & PLAN NOTE
Delirium precautions  Sleep hygiene, reorientation  Outpatient neurology and geriatrics follow-up for formal evaluation for dementia noted  Supportive cares

## 2025-06-03 NOTE — ASSESSMENT & PLAN NOTE
Possible UTI  Urine culture reveals significant bacteriuria Klebsiella pneumonia, sensitivities noted  Continue Keflex to complete 5-day course

## 2025-06-03 NOTE — ASSESSMENT & PLAN NOTE
5/31/25 patient agitated,, walking around the hallway, not oriented to place, time, situation, attempting to leave the unit. Uanble to reorient verbally.   required IM zyprexa and abdominal posey.   Likely UTI related vs. hospital delirium in the setting of prolonged hospitalization and cognitive impairment.   Started on Seroquel 25 mg at HS on 5/31.   Geriatric inputs noted  Reorientation, sleep hygiene

## 2025-06-03 NOTE — PROGRESS NOTES
"Progress Note - Hospitalist   Name: Ynes Mendoza 64 y.o. female I MRN: [id number]  Unit/Bed#: [facility] 703-01 I Date of Admission: 5/12/2025   Date of Service: 6/3/2025 I Hospital Day: 21     Assessment & Plan  Failure to thrive in adult  Patient was recently admitted from 5/1-5/9 to neurology service for breakthrough seizures in the setting of medication noncompliance.   Scored 13/30 on MOCA done 5/7  Neurology and geriatrics inputs noted  MRI brain 5/16: \"No acute intracranial pathology. Chronic microangiopathy. NeuroQuant analysis was performed: Normal hippocampal volume and enlarged ventricular system: Findings do not support hippocampal degeneration. Possible expansion of ventricular system without medial temporal lobe focused ex-vacuo process.\"  RPR nonreactive, TSH 0.989, folate greater than 22.3, vitamin B12 is 476, vitamin B 1 is 123  Patient does not appear to have capacity to make fully informed medical decisions per neuropsych  Discharge planning to assisted living/LTC near [facility] per family preference case management following  Type 2 diabetes mellitus, with long-term current use of insulin ([facility])  Lab Results   Component Value Date    HGBA1C 7.3 (H) 03/05/2025       Recent Labs     06/02/25  1626 06/02/25  2058 06/03/25  0615 06/03/25  1059   POCGLU 269* 164* 129 291*       Blood Sugar Average: Last 72 hrs:  (P) 157.9529648612334093  Hold glimepiride and metformin while admitted  Accu-Cheks reviewed  Presently on Lantus 10 units daily  Avoid hypoglycemia  Hypoglycemia protocol in place    Cognitive impairment  Delirium precautions  Sleep hygiene, reorientation  Outpatient neurology and geriatrics follow-up for formal evaluation for dementia noted  Supportive cares   Focal epilepsy (HCC)  Continue [facility]  Neurology inputs noted  At risk for delirium  5/31/25 patient agitated,, walking around the hallway, not oriented to place, time, situation, attempting to leave the unit. Uanble to reorient " verbally.   required IM zyprexa and abdominal posey.   Likely UTI related vs. hospital delirium in the setting of prolonged hospitalization and cognitive impairment.   Started on Seroquel 25 mg at HS on 5/31.   Geriatric inputs noted  Reorientation, sleep hygiene  Acute cystitis without hematuria  Possible UTI  Urine culture reveals significant bacteriuria Klebsiella pneumonia, sensitivities noted  Continue Keflex to complete 5-day course     Hx of seizure disorder  Continue Keppra  Metabolic encephalopathy  Metabolic encephalopathy possibly due to acute cystitis  Receiving Keflex for UTI  Optimize metabolic parameters  Avoid neurotoxins  Monitor        VTE Pharmacologic Prophylaxis: VTE Score: 6 High Risk (Score >/= 5) - Pharmacological DVT Prophylaxis Ordered: enoxaparin (Lovenox). Sequential Compression Devices Ordered.    Mobility:   Basic Mobility Inpatient Raw Score: 21  JH-HLM Goal: 6: Walk 10 steps or more  JH-HLM Achieved: 7: Walk 25 feet or more  JH-HLM Goal NOT achieved. Continue with multidisciplinary rounding and encourage appropriate mobility to improve upon JH-HLM goals.    Patient Centered Rounds: I performed bedside rounds with nursing staff today.   Discussions with Specialists or Other Care Team Provider: Case management    Education and Discussions with Family / Patient: Patient, call left a message for Sister Lori.     Current Length of Stay: 21 day(s)  Current Patient Status: Inpatient   Certification Statement: The patient will continue to require additional inpatient hospital stay due to pending placement case management following  Discharge Plan: Disposition planning case management following    Code Status: Level 1 - Full Code    Subjective     Comfortably sitting up in chair  Reports feeling okay  Discussed with RN  History chart labs medications reviewed    Objective :  Temp:  [98 °F (36.7 °C)-98.5 °F (36.9 °C)] 98 °F (36.7 °C)  HR:  [65-82] 66  BP: (105-110)/(65-67) 110/67  Resp:   [16-18] 16  SpO2:  [94 %-96 %] 96 %  O2 Device: None (Room air)    There is no height or weight on file to calculate BMI.     Input and Output Summary (last 24 hours):     Intake/Output Summary (Last 24 hours) at 6/3/2025 1152  Last data filed at 6/2/2025 2201  Gross per 24 hour   Intake 420 ml   Output --   Net 420 ml       Physical Exam    Comfortably sitting up in chair  Obese  Lungs diminished breath sounds at the bases  Vesicular breath sounds  Heart sounds S1-S2 noted  Abdomen soft  Awake follows commands  No pedal edema  No rash    Lines/Drains:              Lab Results: I have reviewed the following results:   Results from last 7 days   Lab Units 06/03/25  0438 05/30/25  1704   WBC Thousand/uL 4.99 7.12   HEMOGLOBIN g/dL 12.2 12.6   HEMATOCRIT % 37.6 37.2   PLATELETS Thousands/uL 152 174   SEGS PCT %  --  72   LYMPHO PCT %  --  18   MONO PCT %  --  7   EOS PCT %  --  1     Results from last 7 days   Lab Units 06/03/25  0438 05/30/25  1704   SODIUM mmol/L 143 142   POTASSIUM mmol/L 3.9 3.8   CHLORIDE mmol/L 109* 109*   CO2 mmol/L 26 25   BUN mg/dL 17 16   CREATININE mg/dL 0.92 0.90   ANION GAP mmol/L 8 8   CALCIUM mg/dL 9.0 9.6   ALBUMIN g/dL  --  4.1   TOTAL BILIRUBIN mg/dL  --  0.53   ALK PHOS U/L  --  47   ALT U/L  --  14   AST U/L  --  12*   GLUCOSE RANDOM mg/dL 165* 105         Results from last 7 days   Lab Units 06/03/25  1059 06/03/25  0615 06/02/25 2058 06/02/25  1626 06/02/25  1103 06/02/25  0644 06/01/25  2057 06/01/25  1710 06/01/25  1059 06/01/25  0603 05/31/25  2130 05/31/25  0613   POC GLUCOSE mg/dl 291* 129 164* 269* 141* 94 235* 130 114 75 172* 72               Recent Cultures (last 7 days):   Results from last 7 days   Lab Units 06/01/25  0638   URINE CULTURE  >100,000 cfu/ml Klebsiella pneumoniae*       Imaging Results Review: I personally reviewed the following image studies/reports in PACS and discussed pertinent findings with Radiology: CT head and MRI brain. My interpretation of the  radiology images/reports is: Lab results reviewed.  Other Study Results Review: Other studies reviewed include: Culture studies reviewed    Last 24 Hours Medication List:     Current Facility-Administered Medications:     acetaminophen (TYLENOL) tablet 650 mg, Q6H PRN    Artificial Tears Op Soln 1 drop, TID    aspirin chewable tablet 81 mg, Daily    cephalexin (KEFLEX) capsule 500 mg, Q6H DYAN    enoxaparin (LOVENOX) subcutaneous injection 40 mg, Daily    hydrOXYzine HCL (ATARAX) tablet 25 mg, Q6H PRN    insulin glargine (LANTUS) subcutaneous injection 10 Units 0.1 mL, HS    insulin lispro (HumALOG/ADMELOG) 100 units/mL subcutaneous injection 1-6 Units, TID AC **AND** Fingerstick Glucose (POCT), TID AC    insulin lispro (HumALOG/ADMELOG) 100 units/mL subcutaneous injection 1-6 Units, HS    levETIRAcetam (KEPPRA) tablet 1,250 mg, Q12H DYAN    OLANZapine (ZyPREXA) IM injection 5 mg, Q6H PRN    OLANZapine (ZyPREXA) IM injection 5 mg, Once    polyethylene glycol (MIRALAX) packet 17 g, Daily    pravastatin (PRAVACHOL) tablet 40 mg, Daily With Dinner    psyllium (METAMUCIL) 1 packet, Daily    QUEtiapine (SEROquel) tablet 25 mg, HS    senna-docusate sodium (SENOKOT S) 8.6-50 mg per tablet 1 tablet, HS    Administrative Statements   Today, Patient Was Seen By: Rashard Peace MD  I have spent a total time of 33 minutes in caring for this patient on the day of the visit/encounter including Diagnostic results, Risks and benefits of tx options, Instructions for management, Patient and family education, Importance of tx compliance, Risk factor reductions, Impressions, Counseling / Coordination of care, Documenting in the medical record, Reviewing/placing orders in the medical record (including tests, medications, and/or procedures), Obtaining or reviewing history  , and Communicating with other healthcare professionals .    **Please Note: This note may have been constructed using a voice recognition system.**

## 2025-06-03 NOTE — PLAN OF CARE
Problem: PAIN - ADULT  Goal: Verbalizes/displays adequate comfort level or baseline comfort level  Description: Interventions:- Encourage patient to monitor pain and request assistance  - Assess pain using appropriate pain scale  - Administer analgesics based on type and severity of pain and evaluate response  - Implement non-pharmacological measures as appropriate and evaluate response  - Notify physician/advanced practitioner if interventions unsuccessful or patient reports new pain  Outcome: Progressing     Problem: INFECTION - ADULT  Goal: Absence or prevention of progression during hospitalization  Description: INTERVENTIONS:- Assess and monitor for signs and symptoms of infection  - Monitor lab/diagnostic results  - Monitor all insertion sites, i.e. indwelling lines, tubes, and drains  - [place] appropriate cooling/warming therapies per order  - Administer medications as ordered  - Instruct and encourage patient and family to use good hand hygiene technique  - Identify and instruct in appropriate isolation precautions for identified infection/condition  Outcome: Progressing     Problem: SAFETY ADULT  Goal: Patient will remain free of falls  Description: INTERVENTIONS:- Educate patient/family on patient safety including physical limitations  - Instruct patient to call for assistance with activity  - Consult OT/PT to assist with strengthening/mobility  - Keep Call bell within reach  - Keep bed low and locked with side rails adjusted as appropriate  - Keep care items and personal belongings within reach  - Initiate and maintain comfort rounds  - Make Fall Risk Sign visible to staff  Outcome: Progressing  Goal: Maintain or return to baseline ADL function  Description: INTERVENTIONS:-  Assess patient's ability to carry out ADLs; assess patient's baseline for ADL function and identify physical deficits which impact ability to perform ADLs (bathing, care of mouth/teeth, toileting, grooming, dressing, etc.)-  Assess/evaluate cause of self-care deficits - Assess range of motion- Assess patient's mobility; develop plan if impaired- Assess patient's need for assistive devices and provide as appropriate- Encourage maximum independence but intervene and supervise when necessary- Involve family in performance of ADLs- Assess for home care needs following discharge - Consider OT consult to assist with ADL evaluation and planning for discharge- Provide patient education as appropriate  Outcome: Progressing  Goal: Maintains/Returns to pre admission functional level  Description: INTERVENTIONS:- Perform AM-PAC 6 Click Basic Mobility/ Daily Activity assessment daily.- Set and communicate daily mobility goal to care team and patient/family/caregiver. - Collaborate with rehabilitation services on mobility goals if consulted  Outcome: Progressing     Problem: DISCHARGE PLANNING  Goal: Discharge to home or other facility with appropriate resources  Description: INTERVENTIONS:- Identify barriers to discharge w/patient and caregiver  - Arrange for needed discharge resources and transportation as appropriate  - Identify discharge learning needs (meds, wound care, etc.)  - Refer to Case Management Department for coordinating discharge planning if the patient needs post-hospital services based on physician/advanced practitioner order or complex needs related to functional status, cognitive ability, or social support system  Outcome: Progressing     Problem: NEUROSENSORY - ADULT  Goal: Achieves stable or improved neurological status  Description: INTERVENTIONS  - Monitor and report changes in neurological status  - Monitor vital signs such as temperature, blood pressure, glucose, and any other labs ordered   - Initiate measures to prevent increased intracranial pressure  - Monitor for seizure activity and implement precautions if appropriate      Outcome: Progressing  Goal: Remains free of injury related to seizures activity  Description:  INTERVENTIONS  - Maintain airway, patient safety  and administer oxygen as ordered  - Monitor patient for seizure activity, document and report duration and description of seizure to physician/advanced practitioner  - If seizure occurs,  ensure patient safety during seizure  - Reorient patient post seizure  - Seizure pads on all 4 side rails  - Instruct patient/family to notify RN of any seizure activity including if an aura is experienced  - Instruct patient/family to call for assistance with activity based on nursing assessment  - Administer anti-seizure medications if ordered    Outcome: Progressing  Goal: Achieves maximal functionality and self care  Description: INTERVENTIONS  - Monitor swallowing and airway patency with patient fatigue and changes in neurological status  - Encourage and assist patient to increase activity and self care.   - Encourage visually impaired, hearing impaired and aphasic patients to use assistive/communication devices  Outcome: Progressing

## 2025-06-03 NOTE — ASSESSMENT & PLAN NOTE
Lab Results   Component Value Date    HGBA1C 7.3 (H) 03/05/2025       Recent Labs     06/02/25  1626 06/02/25 2058 06/03/25  0615 06/03/25  1059   POCGLU 269* 164* 129 291*       Blood Sugar Average: Last 72 hrs:  (P) 157.6204561823165454  Hold glimepiride and metformin while admitted  Accu-Cheks reviewed  Presently on Lantus 10 units daily  Avoid hypoglycemia  Hypoglycemia protocol in place

## 2025-06-03 NOTE — ASSESSMENT & PLAN NOTE
"Patient was recently admitted from 5/1-5/9 to neurology service for breakthrough seizures in the setting of medication noncompliance.   Scored 13/30 on MOCA done 5/7  Neurology and geriatrics inputs noted  MRI brain 5/16: \"No acute intracranial pathology. Chronic microangiopathy. NeuroQuant analysis was performed: Normal hippocampal volume and enlarged ventricular system: Findings do not support hippocampal degeneration. Possible expansion of ventricular system without medial temporal lobe focused ex-vacuo process.\"  RPR nonreactive, TSH 0.989, folate greater than 22.3, vitamin B12 is 476, vitamin B 1 is 123  Patient does not appear to have capacity to make fully informed medical decisions per neuropsych  Discharge planning to assisted living/LTC near [facility] per family preference case management following  "

## 2025-06-04 VITALS
OXYGEN SATURATION: 94 % | DIASTOLIC BLOOD PRESSURE: 58 MMHG | RESPIRATION RATE: 18 BRPM | TEMPERATURE: 98.5 F | HEART RATE: 69 BPM | SYSTOLIC BLOOD PRESSURE: 108 MMHG

## 2025-06-04 LAB — GLUCOSE SERPL-MCNC: 169 MG/DL (ref 65–140)

## 2025-06-04 PROCEDURE — 82948 REAGENT STRIP/BLOOD GLUCOSE: CPT

## 2025-06-04 PROCEDURE — 99239 HOSP IP/OBS DSCHRG MGMT >30: CPT | Performed by: INTERNAL MEDICINE

## 2025-06-04 RX ORDER — AMOXICILLIN 250 MG
1 CAPSULE ORAL
Start: 2025-06-04

## 2025-06-04 RX ORDER — QUETIAPINE FUMARATE 25 MG/1
25 TABLET, FILM COATED ORAL
Start: 2025-06-04

## 2025-06-04 RX ORDER — INSULIN LISPRO 100 [IU]/ML
2 INJECTION, SOLUTION INTRAVENOUS; SUBCUTANEOUS
Status: DISCONTINUED | OUTPATIENT
Start: 2025-06-04 | End: 2025-06-04 | Stop reason: HOSPADM

## 2025-06-04 RX ORDER — CEPHALEXIN 500 MG/1
500 CAPSULE ORAL EVERY 6 HOURS SCHEDULED
Qty: 8 CAPSULE | Refills: 0 | Status: SHIPPED | OUTPATIENT
Start: 2025-06-04 | End: 2025-06-06

## 2025-06-04 RX ORDER — INSULIN LISPRO 100 [IU]/ML
2 INJECTION, SOLUTION INTRAVENOUS; SUBCUTANEOUS
Start: 2025-06-04

## 2025-06-04 RX ORDER — INSULIN GLARGINE 100 [IU]/ML
10 INJECTION, SOLUTION SUBCUTANEOUS
Qty: 10 ML | Refills: 0 | Status: SHIPPED | OUTPATIENT
Start: 2025-06-04

## 2025-06-04 RX ORDER — POLYETHYLENE GLYCOL 3350 17 G/17G
17 POWDER, FOR SOLUTION ORAL DAILY
Start: 2025-06-05

## 2025-06-04 RX ADMIN — CEPHALEXIN 500 MG: 500 CAPSULE ORAL at 00:29

## 2025-06-04 RX ADMIN — DEXTRAN 70, GLYCERIN, HYPROMELLOSE 1 DROP: 1; 2; 3 SOLUTION/ DROPS OPHTHALMIC at 08:23

## 2025-06-04 RX ADMIN — Medication 1 PACKET: at 08:30

## 2025-06-04 RX ADMIN — ASPIRIN 81 MG CHEWABLE TABLET 81 MG: 81 TABLET CHEWABLE at 08:23

## 2025-06-04 RX ADMIN — INSULIN LISPRO 1 UNITS: 100 INJECTION, SOLUTION INTRAVENOUS; SUBCUTANEOUS at 08:23

## 2025-06-04 RX ADMIN — CEPHALEXIN 500 MG: 500 CAPSULE ORAL at 06:13

## 2025-06-04 RX ADMIN — LEVETIRACETAM 1250 MG: 750 TABLET, FILM COATED ORAL at 08:23

## 2025-06-04 RX ADMIN — ENOXAPARIN SODIUM 40 MG: 40 INJECTION SUBCUTANEOUS at 08:23

## 2025-06-04 NOTE — PLAN OF CARE
Problem: PAIN - ADULT  Goal: Verbalizes/displays adequate comfort level or baseline comfort level  Description: Interventions:- Encourage patient to monitor pain and request assistance  - Assess pain using appropriate pain scale  - Administer analgesics based on type and severity of pain and evaluate response  - Implement non-pharmacological measures as appropriate and evaluate response  - Notify physician/advanced practitioner if interventions unsuccessful or patient reports new pain  Outcome: Completed     Problem: INFECTION - ADULT  Goal: Absence or prevention of progression during hospitalization  Description: INTERVENTIONS:- Assess and monitor for signs and symptoms of infection  - Monitor lab/diagnostic results  - Monitor all insertion sites, i.e. indwelling lines, tubes, and drains  - [place] appropriate cooling/warming therapies per order  - Administer medications as ordered  - Instruct and encourage patient and family to use good hand hygiene technique  - Identify and instruct in appropriate isolation precautions for identified infection/condition  Outcome: Completed     Problem: SAFETY ADULT  Goal: Patient will remain free of falls  Description: INTERVENTIONS:- Educate patient/family on patient safety including physical limitations  - Instruct patient to call for assistance with activity  - Consult OT/PT to assist with strengthening/mobility  - Keep Call bell within reach  - Keep bed low and locked with side rails adjusted as appropriate  - Keep care items and personal belongings within reach  - Initiate and maintain comfort rounds  - Make Fall Risk Sign visible to staff  Outcome: Completed  Goal: Maintain or return to baseline ADL function  Description: INTERVENTIONS:-  Assess patient's ability to carry out ADLs; assess patient's baseline for ADL function and identify physical deficits which impact ability to perform ADLs (bathing, care of mouth/teeth, toileting, grooming, dressing, etc.)-  Assess/evaluate cause of self-care deficits - Assess range of motion- Assess patient's mobility; develop plan if impaired- Assess patient's need for assistive devices and provide as appropriate- Encourage maximum independence but intervene and supervise when necessary- Involve family in performance of ADLs- Assess for home care needs following discharge - Consider OT consult to assist with ADL evaluation and planning for discharge- Provide patient education as appropriate  Outcome: Completed  Goal: Maintains/Returns to pre admission functional level  Description: INTERVENTIONS:- Perform AM-PAC 6 Click Basic Mobility/ Daily Activity assessment daily.- Set and communicate daily mobility goal to care team and patient/family/caregiver. - Collaborate with rehabilitation services on mobility goals if consulted  Outcome: Completed     Problem: DISCHARGE PLANNING  Goal: Discharge to home or other facility with appropriate resources  Description: INTERVENTIONS:- Identify barriers to discharge w/patient and caregiver  - Arrange for needed discharge resources and transportation as appropriate  - Identify discharge learning needs (meds, wound care, etc.)  - Refer to Case Management Department for coordinating discharge planning if the patient needs post-hospital services based on physician/advanced practitioner order or complex needs related to functional status, cognitive ability, or social support system  Outcome: Completed     Problem: Knowledge Deficit  Goal: Patient/family/caregiver demonstrates understanding of disease process, treatment plan, medications, and discharge instructions  Description: Complete learning assessment and assess knowledge base.Interventions:- Provide teaching at level of understanding- Provide teaching via preferred learning methods  Outcome: Completed     Problem: NEUROSENSORY - ADULT  Goal: Achieves stable or improved neurological status  Description: INTERVENTIONS  - Monitor and report changes  in neurological status  - Monitor vital signs such as temperature, blood pressure, glucose, and any other labs ordered   - Initiate measures to prevent increased intracranial pressure  - Monitor for seizure activity and implement precautions if appropriate      Outcome: Completed  Goal: Remains free of injury related to seizures activity  Description: INTERVENTIONS  - Maintain airway, patient safety  and administer oxygen as ordered  - Monitor patient for seizure activity, document and report duration and description of seizure to physician/advanced practitioner  - If seizure occurs,  ensure patient safety during seizure  - Reorient patient post seizure  - Seizure pads on all 4 side rails  - Instruct patient/family to notify RN of any seizure activity including if an aura is experienced  - Instruct patient/family to call for assistance with activity based on nursing assessment  - Administer anti-seizure medications if ordered    Outcome: Completed  Goal: Achieves maximal functionality and self care  Description: INTERVENTIONS  - Monitor swallowing and airway patency with patient fatigue and changes in neurological status  - Encourage and assist patient to increase activity and self care.   - Encourage visually impaired, hearing impaired and aphasic patients to use assistive/communication devices  Outcome: Completed

## 2025-06-04 NOTE — ASSESSMENT & PLAN NOTE
Lab Results   Component Value Date    HGBA1C 7.3 (H) 03/05/2025       Recent Labs     06/03/25  1059 06/03/25  1557 06/03/25  2119 06/04/25  0612   POCGLU 291* 163* 238* 169*       Blood Sugar Average: Last 72 hrs:  (P) [id number]  Hold glimepiride and metformin while admitted  Accu-Cheks reviewed  Continue to hold glimepiride at discharge  Resume metformin at discharge  Continue Lantus and Humalog, titrate insulin dose based on Accu-Cheks  Outpatient follow-up

## 2025-06-04 NOTE — QUICK NOTE
Neurology following for history of focal epilepsy and reports of transient confusion concerning for seizure activity.  Patient does have underlying dementia and likely had delirium in setting of UTI as per medical evaluation.  She has now returned back to her mental baseline, AxOx4, without focal deficits.      Workup with rEEG was negative for epileptiform discharges or seizures.  Can continue with Keppra 1.25 g q12h.      No further management from Neurology. Please call with questions or concerns    Patient has follow up scheduled with epilepsy clinic, Arabella Ghotra, on 7/16/25.

## 2025-06-04 NOTE — DISCHARGE SUMMARY
"Discharge Summary - Hospitalist   Name: Ynes Mendoza 64 y.o. female I MRN: [id number]  [facility] I Date of Admission: 5/12/2025   Date of Service: 6/4/2025 I Hospital Day: 22     Assessment & Plan  Failure to thrive in adult  Patient was recently admitted from 5/1-5/9 to neurology service for breakthrough seizures in the setting of medication noncompliance.   Scored 13/30 on MOCA done 5/7  Neurology and geriatrics inputs noted  MRI brain 5/16: \"No acute intracranial pathology. Chronic microangiopathy. NeuroQuant analysis was performed: Normal hippocampal volume and enlarged ventricular system: Findings do not support hippocampal degeneration. Possible expansion of ventricular system without medial temporal lobe focused ex-vacuo process.\"  RPR nonreactive, TSH 0.989, folate greater than 22.3, vitamin B12 is 476, vitamin B 1 is 123  Patient does not appear to have capacity to make fully informed medical decisions per neuropsych  Discharge plan discussed with case management arrangements underway  Type 2 diabetes mellitus, with long-term current use of insulin ([facility])  Lab Results   Component Value Date    HGBA1C 7.3 (H) 03/05/2025       Recent Labs     06/03/25  1059 06/03/25  1557 06/03/25  2119 06/04/25  0612   POCGLU 291* 163* 238* 169*       Blood Sugar Average: Last 72 hrs:  (P) [id number]  Hold glimepiride and metformin while admitted  Accu-Cheks reviewed  Continue to hold glimepiride at discharge  Resume metformin at discharge  Continue Lantus and Humalog, titrate insulin dose based on Accu-Cheks  Outpatient follow-up    Cognitive impairment  Delirium precautions  Sleep hygiene, reorientation  Outpatient neurology and geriatrics follow-up  Supportive cares  Focal epilepsy (HCC)  Continue [facility]  Neurology inputs noted  At risk for delirium  5/31/25 patient agitated,, walking around the hallway, not oriented to place, time, situation, attempting to leave the unit. Uanble to reorient " verbally.   required IM zyprexa and abdominal posey.   Likely UTI related vs. hospital delirium in the setting of prolonged hospitalization and cognitive impairment.   Started on Seroquel 25 mg at HS on 5/31.   Outpatient geriatrics follow-up  Acute cystitis without hematuria  Possible UTI  Urine culture reveals significant bacteriuria Klebsiella pneumonia, sensitivities noted  Continue Keflex to complete 5-day course     Hx of seizure disorder  Continue Keppra  Metabolic encephalopathy  Metabolic encephalopathy possibly due to acute cystitis  Resolved               Discharge Summary - [facility] Internal Medicine    Patient Information: Ynes Mendoza 64 y.o. female MRN: [id number]  Unit/Bed#: [facility] 703-01 Encounter: [id number]    Discharging Physician / Practitioner: Rashard Peace MD  PCP: Saloni Landon DO  Admission Date: 5/12/2025  Discharge Date: 06/04/25    Disposition:     Other: SNF    Reason for Admission: Failure to thrive at group home    Discharge Diagnoses:     Principal Problem:    Failure to thrive in adult  Active Problems:    Type 2 diabetes mellitus, with long-term current use of insulin (HCC)    Generalized anxiety disorder    Focal epilepsy (HCC)    Vertigo    Cognitive impairment    Frailty    Ambulatory dysfunction    At risk for delirium    Acute cystitis without hematuria    Hx of seizure disorder    Metabolic encephalopathy  Resolved Problems:    Altered mental status      Consultations During Hospital Stay:  Neuropsychology  Neurology  Geriatrics    Procedures Performed:     CT head no acute intracranial abnormality.  Stable chronic microangiopathic changes within the brain      Hospital Course:     Ynes Mendoza is a 64 y.o. female patient who originally presented to the hospital on 5/12/2025 due to failure to thrive at group home.  Patient was admitted seen in consultation with geriatrics, neurology, neuropsychology.  She has been deemed incompetent to make her own medical  decisions.  She is being discharged to SNF.  Patient is clinically and symptomatically improved and is deemed ready for discharge today.  Kindly review her chart for details.    Condition at Discharge: fair     Discharge Day Visit / Exam:     Subjective:      Comfortably in chair  Reports feeling better  Agreeable to discharge plan      Vitals: Blood Pressure: 108/58 (06/04/25 0717)  Pulse: 69 (06/04/25 0717)  Temperature: 98.5 °F (36.9 °C) (06/04/25 0717)  Temp Source: Oral (06/03/25 2120)  Respirations: 18 (06/04/25 0717)  SpO2: 94 % (06/04/25 0717)  Exam:   Physical Exam    Comfortably in chair  Neck supple  Lungs diminished breath sounds  Heart sounds S1-S2 noted  Abdomen soft nontender  Awake follows commands  No pedal edema  No rash      Discharge instructions/Information to patient and family:   See after visit summary for information provided to patient and family.      Discharge plan discussed with patient, call left message for Sister Lori  Outpatient follow-up with neurology, geriatrics, primary physician    Provisions for Follow-Up Care:  See after visit summary for information related to follow-up care and any pertinent home health orders.      Planned Readmission: no     Discharge Statement:  I spent 44 minutes discharging the patient. This time was spent on the day of discharge. I had direct contact with the patient on the day of discharge. Greater than 50% of the total time was spent examining patient, answering all patient questions, arranging and discussing plan of care with patient as well as directly providing post-discharge instructions.  Additional time then spent on discharge activities.    Discharge Medications:  See after visit summary for reconciled discharge medications provided to patient and family.      ** Please Note: This note has been constructed using a voice recognition system **

## 2025-06-04 NOTE — ASSESSMENT & PLAN NOTE
Delirium precautions  Sleep hygiene, reorientation  Outpatient neurology and geriatrics follow-up  Supportive cares

## 2025-06-04 NOTE — ASSESSMENT & PLAN NOTE
5/31/25 patient agitated,, walking around the hallway, not oriented to place, time, situation, attempting to leave the unit. Uanble to reorient verbally.   required IM zyprexa and abdominal posey.   Likely UTI related vs. hospital delirium in the setting of prolonged hospitalization and cognitive impairment.   Started on Seroquel 25 mg at HS on 5/31.   Outpatient geriatrics follow-up

## 2025-06-04 NOTE — ASSESSMENT & PLAN NOTE
"Patient was recently admitted from 5/1-5/9 to neurology service for breakthrough seizures in the setting of medication noncompliance.   Scored 13/30 on MOCA done 5/7  Neurology and geriatrics inputs noted  MRI brain 5/16: \"No acute intracranial pathology. Chronic microangiopathy. NeuroQuant analysis was performed: Normal hippocampal volume and enlarged ventricular system: Findings do not support hippocampal degeneration. Possible expansion of ventricular system without medial temporal lobe focused ex-vacuo process.\"  RPR nonreactive, TSH 0.989, folate greater than 22.3, vitamin B12 is 476, vitamin B 1 is 123  Patient does not appear to have capacity to make fully informed medical decisions per neuropsych  Discharge plan discussed with case management arrangements underway  "
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
## 2025-06-04 NOTE — PLAN OF CARE
Problem: PAIN - ADULT  Goal: Verbalizes/displays adequate comfort level or baseline comfort level  Description: Interventions:- Encourage patient to monitor pain and request assistance  - Assess pain using appropriate pain scale  - Administer analgesics based on type and severity of pain and evaluate response  - Implement non-pharmacological measures as appropriate and evaluate response  - Notify physician/advanced practitioner if interventions unsuccessful or patient reports new pain  Outcome: Progressing     Problem: INFECTION - ADULT  Goal: Absence or prevention of progression during hospitalization  Description: INTERVENTIONS:- Assess and monitor for signs and symptoms of infection  - Monitor lab/diagnostic results  - Monitor all insertion sites, i.e. indwelling lines, tubes, and drains  - [place] appropriate cooling/warming therapies per order  - Administer medications as ordered  - Instruct and encourage patient and family to use good hand hygiene technique  - Identify and instruct in appropriate isolation precautions for identified infection/condition  Outcome: Progressing     Problem: SAFETY ADULT  Goal: Patient will remain free of falls  Description: INTERVENTIONS:- Educate patient/family on patient safety including physical limitations  - Instruct patient to call for assistance with activity  - Consult OT/PT to assist with strengthening/mobility  - Keep Call bell within reach  - Keep bed low and locked with side rails adjusted as appropriate  - Keep care items and personal belongings within reach  - Initiate and maintain comfort rounds  - Make Fall Risk Sign visible to staff  Outcome: Progressing     Problem: Knowledge Deficit  Goal: Patient/family/caregiver demonstrates understanding of disease process, treatment plan, medications, and discharge instructions  Description: Complete learning assessment and assess knowledge base.Interventions:- Provide teaching at level of understanding- Provide teaching  via preferred learning methods  Outcome: Progressing     Problem: NEUROSENSORY - ADULT  Goal: Achieves stable or improved neurological status  Description: INTERVENTIONS  - Monitor and report changes in neurological status  - Monitor vital signs such as temperature, blood pressure, glucose, and any other labs ordered   - Initiate measures to prevent increased intracranial pressure  - Monitor for seizure activity and implement precautions if appropriate      Outcome: Progressing

## 2025-06-04 NOTE — RESTORATIVE TECHNICIAN NOTE
Restorative Technician Note      Patient Name: Ynes Mendoza     Note Type: Mobility  Patient Position Upon Consult: Supine  Activity Performed: Ambulated; Dangled; Stood  Assistive Device: Other (Comment) (Rollator Walker)  Education Provided: Yes  Patient Position at End of Consult: Bedside chair; All needs within reach; Bed/Chair alarm activated      Jesus SILVERMAN, Restorative Technician,

## 2025-06-05 NOTE — UTILIZATION REVIEW
NOTIFICATION OF ADMISSION DISCHARGE   This is a Notification of Discharge from [facility]. Please be advised that this patient has been discharge from our facility. Below you will find the admission and discharge date and time including the patient’s disposition.   UTILIZATION REVIEW CONTACT:  Utilization Review Assistants  Network Utilization Review Department  Phone: [phone] x carefully listen to the prompts. All voicemails are confidential.  Email: [email or web address]     ADMISSION INFORMATION  PRESENTATION DATE: 5/12/2025 12:58 PM  OBERVATION ADMISSION DATE: 05/12/2025 1603  INPATIENT ADMISSION DATE: 5/14/25 11:40 AM   DISCHARGE DATE: 6/4/2025 11:28 AM   DISPOSITION:Non [facility] SNF/TCU/SNU    Network Utilization Review Department  ATTENTION: Please call with any questions or concerns to [phone] and carefully listen to the prompts so that you are directed to the right person. All voicemails are confidential.   For Discharge needs, contact Care Management DC Support Team at [phone] opt. 2  Send all requests for admission clinical reviews, approved or denied determinations and any other requests to dedicated fax number below belonging to the campus where the patient is receiving treatment. List of dedicated fax numbers for the Facilities:  FACILITY NAME UR FAX NUMBER   ADMISSION DENIALS (Administrative/Medical Necessity) [phone]   DISCHARGE SUPPORT TEAM ([facility]) [phone]   PARENT CHILD HEALTH (Maternity/NICU/Pediatrics) [phone]   [facility] [phone]   [facility] [phone]   [facility] [phone]   [facility] [phone]   [facility] [phone]   [facility] [phone]   [facility] [phone]   [facility]  [phone]   [facility] [phone]   [facility] [phone]   [facility] [phone]   [facility] [phone]

## 2025-07-01 PROBLEM — N30.00 ACUTE CYSTITIS WITHOUT HEMATURIA: Status: RESOLVED | Noted: 2025-06-01 | Resolved: 2025-07-01

## 2025-07-08 ENCOUNTER — TELEPHONE (OUTPATIENT)
Dept: CARDIOLOGY CLINIC | Facility: CLINIC | Age: 65
End: 2025-07-08

## 2025-07-09 ENCOUNTER — TELEPHONE (OUTPATIENT)
Age: 65
End: 2025-07-09

## 2025-07-09 NOTE — TELEPHONE ENCOUNTER
Patient said she is returning missed call from neurology; per chart review, no documentation of same; apologized to patient and let her know we will cb if needed.

## 2025-07-10 ENCOUNTER — TELEPHONE (OUTPATIENT)
Dept: NEUROLOGY | Facility: CLINIC | Age: 65
End: 2025-07-10

## 2025-07-10 NOTE — TELEPHONE ENCOUNTER
LMOM for pt to confirm appointment with Arabella Ghotra on 7/16 in the [place] office. Advised pt to arrive 15 mins early.

## 2025-08-08 ENCOUNTER — REMOTE DEVICE CLINIC VISIT (OUTPATIENT)
Dept: CARDIOLOGY CLINIC | Facility: CLINIC | Age: 65
End: 2025-08-08
Payer: MEDICARE

## 2025-08-08 DIAGNOSIS — Z95.0 CARDIAC PACEMAKER IN SITU: Primary | ICD-10-CM

## 2025-08-08 PROCEDURE — 93296 REM INTERROG EVL PM/IDS: CPT | Performed by: INTERNAL MEDICINE

## 2025-08-08 PROCEDURE — 93294 REM INTERROG EVL PM/LDLS PM: CPT | Performed by: INTERNAL MEDICINE

## (undated) DEVICE — DGW .035 FC J3MM 150CM TEF: Brand: EMERALD

## (undated) DEVICE — CATH GUIDING FIXED SHAPE 43CM

## (undated) DEVICE — SLITTER ADJUSTABLE

## (undated) DEVICE — INTRO SHEATH PEEL AWAY 7FR

## (undated) DEVICE — LEAD 3830 US MKT/ 69CM MRI LBBAP
Type: IMPLANTABLE DEVICE | Site: HEART | Status: NON-FUNCTIONAL
Brand: SELECTSECURE™ MRI SURESCAN™
Removed: 2025-01-24